# Patient Record
Sex: MALE | HISPANIC OR LATINO | Employment: FULL TIME | URBAN - METROPOLITAN AREA
[De-identification: names, ages, dates, MRNs, and addresses within clinical notes are randomized per-mention and may not be internally consistent; named-entity substitution may affect disease eponyms.]

---

## 2020-12-21 ENCOUNTER — HOSPITAL ENCOUNTER (INPATIENT)
Facility: HOSPITAL | Age: 35
LOS: 18 days | Discharge: HOME | End: 2021-01-08
Attending: EMERGENCY MEDICINE | Admitting: HOSPITALIST

## 2020-12-21 ENCOUNTER — APPOINTMENT (OUTPATIENT)
Dept: GENERAL RADIOLOGY | Facility: HOSPITAL | Age: 35
End: 2020-12-21

## 2020-12-21 DIAGNOSIS — N17.9 ACUTE KIDNEY INJURY (HCC): ICD-10-CM

## 2020-12-21 DIAGNOSIS — J96.01 ACUTE RESPIRATORY FAILURE WITH HYPOXIA (HCC): ICD-10-CM

## 2020-12-21 DIAGNOSIS — D69.6 THROMBOCYTOPENIA (HCC): ICD-10-CM

## 2020-12-21 DIAGNOSIS — U07.1 COVID-19 VIRUS INFECTION: ICD-10-CM

## 2020-12-21 DIAGNOSIS — J18.9 PNEUMONIA OF BOTH LUNGS DUE TO INFECTIOUS ORGANISM, UNSPECIFIED PART OF LUNG: Primary | ICD-10-CM

## 2020-12-21 PROBLEM — J12.82 PNEUMONIA DUE TO COVID-19 VIRUS: Status: ACTIVE | Noted: 2020-12-21

## 2020-12-21 LAB
ABO GROUP BLD: NORMAL
ALBUMIN SERPL-MCNC: 3.1 G/DL (ref 3.5–5.2)
ALBUMIN SERPL-MCNC: 3.3 G/DL (ref 3.5–5.2)
ALBUMIN/GLOB SERPL: 0.9 G/DL
ALP SERPL-CCNC: 77 U/L (ref 39–117)
ALP SERPL-CCNC: 78 U/L (ref 39–117)
ALT SERPL W P-5'-P-CCNC: 37 U/L (ref 1–41)
ALT SERPL W P-5'-P-CCNC: 38 U/L (ref 1–41)
ANION GAP SERPL CALCULATED.3IONS-SCNC: 10.6 MMOL/L (ref 5–15)
AST SERPL-CCNC: 39 U/L (ref 1–40)
AST SERPL-CCNC: 41 U/L (ref 1–40)
B PARAPERT DNA SPEC QL NAA+PROBE: NOT DETECTED
B PERT DNA SPEC QL NAA+PROBE: NOT DETECTED
BASOPHILS # BLD AUTO: 0.01 10*3/MM3 (ref 0–0.2)
BASOPHILS NFR BLD AUTO: 0.2 % (ref 0–1.5)
BILIRUB CONJ SERPL-MCNC: <0.2 MG/DL (ref 0–0.3)
BILIRUB INDIRECT SERPL-MCNC: ABNORMAL MG/DL
BILIRUB SERPL-MCNC: 0.6 MG/DL (ref 0–1.2)
BILIRUB SERPL-MCNC: 0.6 MG/DL (ref 0–1.2)
BLD GP AB SCN SERPL QL: NEGATIVE
BUN SERPL-MCNC: 23 MG/DL (ref 6–20)
BUN/CREAT SERPL: 13.1 (ref 7–25)
C PNEUM DNA NPH QL NAA+NON-PROBE: NOT DETECTED
CALCIUM SPEC-SCNC: 7.8 MG/DL (ref 8.6–10.5)
CHLORIDE SERPL-SCNC: 97 MMOL/L (ref 98–107)
CO2 SERPL-SCNC: 21.4 MMOL/L (ref 22–29)
CREAT SERPL-MCNC: 1.76 MG/DL (ref 0.76–1.27)
CREAT SERPL-MCNC: 1.82 MG/DL (ref 0.76–1.27)
D DIMER PPP FEU-MCNC: 1.24 MCGFEU/ML (ref 0–0.49)
D-LACTATE SERPL-SCNC: 1.2 MMOL/L (ref 0.5–2)
DEPRECATED RDW RBC AUTO: 37.1 FL (ref 37–54)
EOSINOPHIL # BLD AUTO: 0 10*3/MM3 (ref 0–0.4)
EOSINOPHIL NFR BLD AUTO: 0 % (ref 0.3–6.2)
ERYTHROCYTE [DISTWIDTH] IN BLOOD BY AUTOMATED COUNT: 13 % (ref 12.3–15.4)
FERRITIN SERPL-MCNC: 3057 NG/ML (ref 30–400)
FLUAV SUBTYP SPEC NAA+PROBE: NOT DETECTED
FLUBV RNA ISLT QL NAA+PROBE: NOT DETECTED
GFR SERPL CREATININE-BSD FRML MDRD: 43 ML/MIN/1.73
GFR SERPL CREATININE-BSD FRML MDRD: 44 ML/MIN/1.73
GFR SERPL CREATININE-BSD FRML MDRD: 54 ML/MIN/1.73
GLOBULIN UR ELPH-MCNC: 3.7 GM/DL
GLUCOSE BLDC GLUCOMTR-MCNC: 346 MG/DL (ref 70–130)
GLUCOSE BLDC GLUCOMTR-MCNC: 360 MG/DL (ref 70–130)
GLUCOSE SERPL-MCNC: 291 MG/DL (ref 65–99)
HADV DNA SPEC NAA+PROBE: NOT DETECTED
HBA1C MFR BLD: 9.5 % (ref 4.8–5.6)
HCOV 229E RNA SPEC QL NAA+PROBE: NOT DETECTED
HCOV HKU1 RNA SPEC QL NAA+PROBE: NOT DETECTED
HCOV NL63 RNA SPEC QL NAA+PROBE: NOT DETECTED
HCOV OC43 RNA SPEC QL NAA+PROBE: NOT DETECTED
HCT VFR BLD AUTO: 41.2 % (ref 37.5–51)
HGB BLD-MCNC: 14.1 G/DL (ref 13–17.7)
HMPV RNA NPH QL NAA+NON-PROBE: NOT DETECTED
HPIV1 RNA SPEC QL NAA+PROBE: NOT DETECTED
HPIV2 RNA SPEC QL NAA+PROBE: NOT DETECTED
HPIV3 RNA NPH QL NAA+PROBE: NOT DETECTED
HPIV4 P GENE NPH QL NAA+PROBE: NOT DETECTED
LDH SERPL-CCNC: 489 U/L (ref 135–225)
LYMPHOCYTES # BLD AUTO: 0.77 10*3/MM3 (ref 0.7–3.1)
LYMPHOCYTES NFR BLD AUTO: 13.9 % (ref 19.6–45.3)
M PNEUMO IGG SER IA-ACNC: NOT DETECTED
MAGNESIUM SERPL-MCNC: 2 MG/DL (ref 1.6–2.6)
MCH RBC QN AUTO: 27.5 PG (ref 26.6–33)
MCHC RBC AUTO-ENTMCNC: 34.2 G/DL (ref 31.5–35.7)
MCV RBC AUTO: 80.3 FL (ref 79–97)
MONOCYTES # BLD AUTO: 0.3 10*3/MM3 (ref 0.1–0.9)
MONOCYTES NFR BLD AUTO: 5.4 % (ref 5–12)
NEUTROPHILS NFR BLD AUTO: 4.43 10*3/MM3 (ref 1.7–7)
NEUTROPHILS NFR BLD AUTO: 80.1 % (ref 42.7–76)
NT-PROBNP SERPL-MCNC: 633.2 PG/ML (ref 0–450)
PLATELET # BLD AUTO: 123 10*3/MM3 (ref 140–450)
PMV BLD AUTO: 11.8 FL (ref 6–12)
POTASSIUM SERPL-SCNC: 3.8 MMOL/L (ref 3.5–5.2)
PROCALCITONIN SERPL-MCNC: 0.21 NG/ML (ref 0–0.25)
PROCALCITONIN SERPL-MCNC: 0.23 NG/ML (ref 0–0.25)
PROT SERPL-MCNC: 7 G/DL (ref 6–8.5)
PROT SERPL-MCNC: 7 G/DL (ref 6–8.5)
QT INTERVAL: 348 MS
RBC # BLD AUTO: 5.13 10*6/MM3 (ref 4.14–5.8)
RH BLD: POSITIVE
RHINOVIRUS RNA SPEC NAA+PROBE: NOT DETECTED
RSV RNA NPH QL NAA+NON-PROBE: NOT DETECTED
SARS-COV-2 RNA NPH QL NAA+NON-PROBE: DETECTED
SODIUM SERPL-SCNC: 129 MMOL/L (ref 136–145)
T&S EXPIRATION DATE: NORMAL
TROPONIN T SERPL-MCNC: 0.02 NG/ML (ref 0–0.03)
WBC # BLD AUTO: 5.53 10*3/MM3 (ref 3.4–10.8)

## 2020-12-21 PROCEDURE — 80076 HEPATIC FUNCTION PANEL: CPT | Performed by: HOSPITALIST

## 2020-12-21 PROCEDURE — 83615 LACTATE (LD) (LDH) ENZYME: CPT | Performed by: HOSPITALIST

## 2020-12-21 PROCEDURE — 87205 SMEAR GRAM STAIN: CPT | Performed by: HOSPITALIST

## 2020-12-21 PROCEDURE — 83735 ASSAY OF MAGNESIUM: CPT | Performed by: EMERGENCY MEDICINE

## 2020-12-21 PROCEDURE — 63710000001 INSULIN LISPRO (HUMAN) PER 5 UNITS: Performed by: NURSE PRACTITIONER

## 2020-12-21 PROCEDURE — 25010000002 ENOXAPARIN PER 10 MG: Performed by: HOSPITALIST

## 2020-12-21 PROCEDURE — 86850 RBC ANTIBODY SCREEN: CPT | Performed by: HOSPITALIST

## 2020-12-21 PROCEDURE — XW033E5 INTRODUCTION OF REMDESIVIR ANTI-INFECTIVE INTO PERIPHERAL VEIN, PERCUTANEOUS APPROACH, NEW TECHNOLOGY GROUP 5: ICD-10-PCS | Performed by: INTERNAL MEDICINE

## 2020-12-21 PROCEDURE — 87070 CULTURE OTHR SPECIMN AEROBIC: CPT | Performed by: HOSPITALIST

## 2020-12-21 PROCEDURE — 82728 ASSAY OF FERRITIN: CPT | Performed by: HOSPITALIST

## 2020-12-21 PROCEDURE — 94640 AIRWAY INHALATION TREATMENT: CPT

## 2020-12-21 PROCEDURE — 83036 HEMOGLOBIN GLYCOSYLATED A1C: CPT | Performed by: HOSPITALIST

## 2020-12-21 PROCEDURE — 99285 EMERGENCY DEPT VISIT HI MDM: CPT

## 2020-12-21 PROCEDURE — 25010000003 CEFTRIAXONE PER 250 MG: Performed by: EMERGENCY MEDICINE

## 2020-12-21 PROCEDURE — 80053 COMPREHEN METABOLIC PANEL: CPT | Performed by: EMERGENCY MEDICINE

## 2020-12-21 PROCEDURE — 82565 ASSAY OF CREATININE: CPT | Performed by: HOSPITALIST

## 2020-12-21 PROCEDURE — 87040 BLOOD CULTURE FOR BACTERIA: CPT | Performed by: EMERGENCY MEDICINE

## 2020-12-21 PROCEDURE — 83605 ASSAY OF LACTIC ACID: CPT | Performed by: EMERGENCY MEDICINE

## 2020-12-21 PROCEDURE — 93010 ELECTROCARDIOGRAM REPORT: CPT | Performed by: INTERNAL MEDICINE

## 2020-12-21 PROCEDURE — 82962 GLUCOSE BLOOD TEST: CPT

## 2020-12-21 PROCEDURE — 94799 UNLISTED PULMONARY SVC/PX: CPT

## 2020-12-21 PROCEDURE — 83880 ASSAY OF NATRIURETIC PEPTIDE: CPT | Performed by: EMERGENCY MEDICINE

## 2020-12-21 PROCEDURE — 84145 PROCALCITONIN (PCT): CPT | Performed by: HOSPITALIST

## 2020-12-21 PROCEDURE — 84484 ASSAY OF TROPONIN QUANT: CPT | Performed by: EMERGENCY MEDICINE

## 2020-12-21 PROCEDURE — 85025 COMPLETE CBC W/AUTO DIFF WBC: CPT | Performed by: EMERGENCY MEDICINE

## 2020-12-21 PROCEDURE — 25010000002 DEXAMETHASONE PER 1 MG: Performed by: EMERGENCY MEDICINE

## 2020-12-21 PROCEDURE — 85379 FIBRIN DEGRADATION QUANT: CPT | Performed by: HOSPITALIST

## 2020-12-21 PROCEDURE — 36415 COLL VENOUS BLD VENIPUNCTURE: CPT | Performed by: EMERGENCY MEDICINE

## 2020-12-21 PROCEDURE — 0202U NFCT DS 22 TRGT SARS-COV-2: CPT | Performed by: EMERGENCY MEDICINE

## 2020-12-21 PROCEDURE — 63710000001 INSULIN LISPRO (HUMAN) PER 5 UNITS: Performed by: HOSPITALIST

## 2020-12-21 PROCEDURE — 71045 X-RAY EXAM CHEST 1 VIEW: CPT

## 2020-12-21 PROCEDURE — 86900 BLOOD TYPING SEROLOGIC ABO: CPT | Performed by: HOSPITALIST

## 2020-12-21 PROCEDURE — 84145 PROCALCITONIN (PCT): CPT | Performed by: EMERGENCY MEDICINE

## 2020-12-21 PROCEDURE — 86901 BLOOD TYPING SEROLOGIC RH(D): CPT | Performed by: HOSPITALIST

## 2020-12-21 PROCEDURE — 93005 ELECTROCARDIOGRAM TRACING: CPT | Performed by: EMERGENCY MEDICINE

## 2020-12-21 RX ORDER — BENZONATATE 100 MG/1
200 CAPSULE ORAL 3 TIMES DAILY PRN
Status: DISCONTINUED | OUTPATIENT
Start: 2020-12-21 | End: 2021-01-08 | Stop reason: HOSPADM

## 2020-12-21 RX ORDER — DEXTROSE MONOHYDRATE 25 G/50ML
25 INJECTION, SOLUTION INTRAVENOUS
Status: DISCONTINUED | OUTPATIENT
Start: 2020-12-21 | End: 2021-01-08 | Stop reason: HOSPADM

## 2020-12-21 RX ORDER — ONDANSETRON 4 MG/1
4 TABLET, FILM COATED ORAL EVERY 6 HOURS PRN
Status: DISCONTINUED | OUTPATIENT
Start: 2020-12-21 | End: 2021-01-08 | Stop reason: HOSPADM

## 2020-12-21 RX ORDER — METFORMIN HYDROCHLORIDE 500 MG/1
500 TABLET, EXTENDED RELEASE ORAL
COMMUNITY
End: 2021-01-08 | Stop reason: HOSPADM

## 2020-12-21 RX ORDER — CEFTRIAXONE SODIUM 2 G/50ML
2 INJECTION, SOLUTION INTRAVENOUS ONCE
Status: COMPLETED | OUTPATIENT
Start: 2020-12-21 | End: 2020-12-21

## 2020-12-21 RX ORDER — CARVEDILOL 6.25 MG/1
6.25 TABLET ORAL 2 TIMES DAILY WITH MEALS
COMMUNITY
End: 2021-08-06 | Stop reason: HOSPADM

## 2020-12-21 RX ORDER — BUMETANIDE 1 MG/1
1 TABLET ORAL DAILY
COMMUNITY
End: 2021-08-06 | Stop reason: HOSPADM

## 2020-12-21 RX ORDER — ONDANSETRON 2 MG/ML
4 INJECTION INTRAMUSCULAR; INTRAVENOUS EVERY 6 HOURS PRN
Status: DISCONTINUED | OUTPATIENT
Start: 2020-12-21 | End: 2021-01-08 | Stop reason: HOSPADM

## 2020-12-21 RX ORDER — SODIUM CHLORIDE 0.9 % (FLUSH) 0.9 %
10 SYRINGE (ML) INJECTION AS NEEDED
Status: DISCONTINUED | OUTPATIENT
Start: 2020-12-21 | End: 2021-01-08 | Stop reason: HOSPADM

## 2020-12-21 RX ORDER — SPIRONOLACTONE 25 MG/1
25 TABLET ORAL DAILY
COMMUNITY
End: 2021-01-08 | Stop reason: HOSPADM

## 2020-12-21 RX ORDER — ATORVASTATIN CALCIUM 80 MG/1
80 TABLET, FILM COATED ORAL NIGHTLY
Status: DISCONTINUED | OUTPATIENT
Start: 2020-12-21 | End: 2021-01-08 | Stop reason: HOSPADM

## 2020-12-21 RX ORDER — GUAIFENESIN/DEXTROMETHORPHAN 100-10MG/5
5 SYRUP ORAL EVERY 4 HOURS PRN
Status: DISCONTINUED | OUTPATIENT
Start: 2020-12-21 | End: 2021-01-08 | Stop reason: HOSPADM

## 2020-12-21 RX ORDER — LISINOPRIL 10 MG/1
10 TABLET ORAL DAILY
Status: DISCONTINUED | OUTPATIENT
Start: 2020-12-21 | End: 2020-12-21

## 2020-12-21 RX ORDER — ATORVASTATIN CALCIUM 80 MG/1
80 TABLET, FILM COATED ORAL DAILY
Status: ON HOLD | COMMUNITY
End: 2021-08-06 | Stop reason: SDUPTHER

## 2020-12-21 RX ORDER — DEXAMETHASONE SODIUM PHOSPHATE 4 MG/ML
6 INJECTION, SOLUTION INTRA-ARTICULAR; INTRALESIONAL; INTRAMUSCULAR; INTRAVENOUS; SOFT TISSUE ONCE
Status: COMPLETED | OUTPATIENT
Start: 2020-12-21 | End: 2020-12-21

## 2020-12-21 RX ORDER — ACETAMINOPHEN 160 MG/5ML
650 SOLUTION ORAL EVERY 4 HOURS PRN
Status: DISCONTINUED | OUTPATIENT
Start: 2020-12-21 | End: 2021-01-08 | Stop reason: HOSPADM

## 2020-12-21 RX ORDER — CARVEDILOL 6.25 MG/1
6.25 TABLET ORAL 2 TIMES DAILY WITH MEALS
Status: DISCONTINUED | OUTPATIENT
Start: 2020-12-21 | End: 2021-01-08 | Stop reason: HOSPADM

## 2020-12-21 RX ORDER — BUMETANIDE 1 MG/1
1 TABLET ORAL DAILY
Status: DISCONTINUED | OUTPATIENT
Start: 2020-12-21 | End: 2020-12-21

## 2020-12-21 RX ORDER — ACETAMINOPHEN 325 MG/1
650 TABLET ORAL EVERY 4 HOURS PRN
Status: DISCONTINUED | OUTPATIENT
Start: 2020-12-21 | End: 2021-01-08 | Stop reason: HOSPADM

## 2020-12-21 RX ORDER — SODIUM CHLORIDE 0.9 % (FLUSH) 0.9 %
10 SYRINGE (ML) INJECTION EVERY 12 HOURS SCHEDULED
Status: DISCONTINUED | OUTPATIENT
Start: 2020-12-21 | End: 2021-01-08 | Stop reason: HOSPADM

## 2020-12-21 RX ORDER — METFORMIN HYDROCHLORIDE 500 MG/1
500 TABLET, EXTENDED RELEASE ORAL
Status: DISCONTINUED | OUTPATIENT
Start: 2020-12-22 | End: 2020-12-21

## 2020-12-21 RX ORDER — SPIRONOLACTONE 25 MG/1
25 TABLET ORAL DAILY
Status: DISCONTINUED | OUTPATIENT
Start: 2020-12-21 | End: 2020-12-21

## 2020-12-21 RX ORDER — NICOTINE POLACRILEX 4 MG
15 LOZENGE BUCCAL
Status: DISCONTINUED | OUTPATIENT
Start: 2020-12-21 | End: 2021-01-08 | Stop reason: HOSPADM

## 2020-12-21 RX ORDER — ASPIRIN 81 MG/1
81 TABLET ORAL DAILY
Status: DISCONTINUED | OUTPATIENT
Start: 2020-12-21 | End: 2021-01-08 | Stop reason: HOSPADM

## 2020-12-21 RX ORDER — ACETAMINOPHEN 650 MG/1
650 SUPPOSITORY RECTAL EVERY 4 HOURS PRN
Status: DISCONTINUED | OUTPATIENT
Start: 2020-12-21 | End: 2021-01-08 | Stop reason: HOSPADM

## 2020-12-21 RX ORDER — BUDESONIDE AND FORMOTEROL FUMARATE DIHYDRATE 160; 4.5 UG/1; UG/1
2 AEROSOL RESPIRATORY (INHALATION)
Status: DISCONTINUED | OUTPATIENT
Start: 2020-12-21 | End: 2021-01-08 | Stop reason: HOSPADM

## 2020-12-21 RX ORDER — LISINOPRIL 10 MG/1
10 TABLET ORAL DAILY
COMMUNITY
End: 2021-08-06 | Stop reason: HOSPADM

## 2020-12-21 RX ADMIN — DEXAMETHASONE SODIUM PHOSPHATE 6 MG: 4 INJECTION, SOLUTION INTRAMUSCULAR; INTRAVENOUS at 10:09

## 2020-12-21 RX ADMIN — INSULIN LISPRO 12 UNITS: 100 INJECTION, SOLUTION INTRAVENOUS; SUBCUTANEOUS at 22:17

## 2020-12-21 RX ADMIN — BUDESONIDE AND FORMOTEROL FUMARATE DIHYDRATE 2 PUFF: 160; 4.5 AEROSOL RESPIRATORY (INHALATION) at 17:55

## 2020-12-21 RX ADMIN — ENOXAPARIN SODIUM 40 MG: 40 INJECTION SUBCUTANEOUS at 14:14

## 2020-12-21 RX ADMIN — ATORVASTATIN CALCIUM 80 MG: 80 TABLET, FILM COATED ORAL at 22:18

## 2020-12-21 RX ADMIN — CEFTRIAXONE SODIUM 2 G: 2 INJECTION, SOLUTION INTRAVENOUS at 10:16

## 2020-12-21 RX ADMIN — INSULIN LISPRO 10 UNITS: 100 INJECTION, SOLUTION INTRAVENOUS; SUBCUTANEOUS at 16:46

## 2020-12-21 RX ADMIN — SODIUM CHLORIDE, PRESERVATIVE FREE 10 ML: 5 INJECTION INTRAVENOUS at 22:18

## 2020-12-21 RX ADMIN — CARVEDILOL 6.25 MG: 6.25 TABLET, FILM COATED ORAL at 22:17

## 2020-12-21 RX ADMIN — ENOXAPARIN SODIUM 40 MG: 40 INJECTION SUBCUTANEOUS at 22:17

## 2020-12-21 RX ADMIN — SODIUM CHLORIDE, PRESERVATIVE FREE 10 ML: 5 INJECTION INTRAVENOUS at 14:16

## 2020-12-21 RX ADMIN — ASPIRIN 81 MG: 81 TABLET, COATED ORAL at 14:14

## 2020-12-21 RX ADMIN — REMDESIVIR 200 MG: 100 INJECTION, POWDER, LYOPHILIZED, FOR SOLUTION INTRAVENOUS at 14:14

## 2020-12-22 LAB
ALBUMIN SERPL-MCNC: 2.9 G/DL (ref 3.5–5.2)
ALBUMIN/GLOB SERPL: 0.8 G/DL
ALP SERPL-CCNC: 70 U/L (ref 39–117)
ALT SERPL W P-5'-P-CCNC: 33 U/L (ref 1–41)
ANION GAP SERPL CALCULATED.3IONS-SCNC: 10.3 MMOL/L (ref 5–15)
AST SERPL-CCNC: 33 U/L (ref 1–40)
BASOPHILS # BLD AUTO: 0.01 10*3/MM3 (ref 0–0.2)
BASOPHILS NFR BLD AUTO: 0.2 % (ref 0–1.5)
BILIRUB CONJ SERPL-MCNC: <0.2 MG/DL (ref 0–0.3)
BILIRUB SERPL-MCNC: 0.4 MG/DL (ref 0–1.2)
BUN SERPL-MCNC: 31 MG/DL (ref 6–20)
BUN/CREAT SERPL: 17.9 (ref 7–25)
CALCIUM SPEC-SCNC: 7.6 MG/DL (ref 8.6–10.5)
CHLORIDE SERPL-SCNC: 97 MMOL/L (ref 98–107)
CK SERPL-CCNC: 517 U/L (ref 20–200)
CO2 SERPL-SCNC: 22.7 MMOL/L (ref 22–29)
CREAT SERPL-MCNC: 1.73 MG/DL (ref 0.76–1.27)
CRP SERPL-MCNC: 10.75 MG/DL (ref 0–0.5)
D DIMER PPP FEU-MCNC: 0.81 MCGFEU/ML (ref 0–0.49)
DEPRECATED RDW RBC AUTO: 42.2 FL (ref 37–54)
EOSINOPHIL # BLD AUTO: 0 10*3/MM3 (ref 0–0.4)
EOSINOPHIL NFR BLD AUTO: 0 % (ref 0.3–6.2)
ERYTHROCYTE [DISTWIDTH] IN BLOOD BY AUTOMATED COUNT: 13.6 % (ref 12.3–15.4)
FERRITIN SERPL-MCNC: 3305 NG/ML (ref 30–400)
FIBRINOGEN PPP-MCNC: 690 MG/DL (ref 219–464)
GFR SERPL CREATININE-BSD FRML MDRD: 45 ML/MIN/1.73
GLOBULIN UR ELPH-MCNC: 3.8 GM/DL
GLUCOSE BLDC GLUCOMTR-MCNC: 298 MG/DL (ref 70–130)
GLUCOSE BLDC GLUCOMTR-MCNC: 326 MG/DL (ref 70–130)
GLUCOSE BLDC GLUCOMTR-MCNC: 389 MG/DL (ref 70–130)
GLUCOSE BLDC GLUCOMTR-MCNC: 419 MG/DL (ref 70–130)
GLUCOSE SERPL-MCNC: 305 MG/DL (ref 65–99)
HCT VFR BLD AUTO: 41.2 % (ref 37.5–51)
HGB BLD-MCNC: 13.6 G/DL (ref 13–17.7)
IMM GRANULOCYTES # BLD AUTO: 0.02 10*3/MM3 (ref 0–0.05)
IMM GRANULOCYTES NFR BLD AUTO: 0.4 % (ref 0–0.5)
LDH SERPL-CCNC: 440 U/L (ref 135–225)
LYMPHOCYTES # BLD AUTO: 0.58 10*3/MM3 (ref 0.7–3.1)
LYMPHOCYTES NFR BLD AUTO: 12.9 % (ref 19.6–45.3)
MCH RBC QN AUTO: 27.6 PG (ref 26.6–33)
MCHC RBC AUTO-ENTMCNC: 33 G/DL (ref 31.5–35.7)
MCV RBC AUTO: 83.6 FL (ref 79–97)
MONOCYTES # BLD AUTO: 0.3 10*3/MM3 (ref 0.1–0.9)
MONOCYTES NFR BLD AUTO: 6.7 % (ref 5–12)
NEUTROPHILS NFR BLD AUTO: 3.58 10*3/MM3 (ref 1.7–7)
NEUTROPHILS NFR BLD AUTO: 79.8 % (ref 42.7–76)
NRBC BLD AUTO-RTO: 0 /100 WBC (ref 0–0.2)
PLATELET # BLD AUTO: 127 10*3/MM3 (ref 140–450)
PMV BLD AUTO: 12.8 FL (ref 6–12)
POTASSIUM SERPL-SCNC: 4.1 MMOL/L (ref 3.5–5.2)
PROT SERPL-MCNC: 6.7 G/DL (ref 6–8.5)
RBC # BLD AUTO: 4.93 10*6/MM3 (ref 4.14–5.8)
SODIUM SERPL-SCNC: 130 MMOL/L (ref 136–145)
WBC # BLD AUTO: 4.49 10*3/MM3 (ref 3.4–10.8)

## 2020-12-22 PROCEDURE — G0008 ADMIN INFLUENZA VIRUS VAC: HCPCS | Performed by: HOSPITALIST

## 2020-12-22 PROCEDURE — 94799 UNLISTED PULMONARY SVC/PX: CPT

## 2020-12-22 PROCEDURE — 82550 ASSAY OF CK (CPK): CPT | Performed by: HOSPITALIST

## 2020-12-22 PROCEDURE — 85384 FIBRINOGEN ACTIVITY: CPT | Performed by: HOSPITALIST

## 2020-12-22 PROCEDURE — 82248 BILIRUBIN DIRECT: CPT | Performed by: HOSPITALIST

## 2020-12-22 PROCEDURE — 90686 IIV4 VACC NO PRSV 0.5 ML IM: CPT | Performed by: HOSPITALIST

## 2020-12-22 PROCEDURE — 80053 COMPREHEN METABOLIC PANEL: CPT | Performed by: HOSPITALIST

## 2020-12-22 PROCEDURE — 25010000002 INFLUENZA VAC SPLIT QUAD 0.5 ML SUSPENSION PREFILLED SYRINGE: Performed by: HOSPITALIST

## 2020-12-22 PROCEDURE — 85025 COMPLETE CBC W/AUTO DIFF WBC: CPT | Performed by: HOSPITALIST

## 2020-12-22 PROCEDURE — 63710000001 INSULIN LISPRO (HUMAN) PER 5 UNITS: Performed by: NURSE PRACTITIONER

## 2020-12-22 PROCEDURE — 82962 GLUCOSE BLOOD TEST: CPT

## 2020-12-22 PROCEDURE — 63710000001 INSULIN LISPRO (HUMAN) PER 5 UNITS: Performed by: HOSPITALIST

## 2020-12-22 PROCEDURE — 63710000001 DEXAMETHASONE PER 0.25 MG: Performed by: HOSPITALIST

## 2020-12-22 PROCEDURE — 25010000002 ENOXAPARIN PER 10 MG: Performed by: HOSPITALIST

## 2020-12-22 PROCEDURE — 83615 LACTATE (LD) (LDH) ENZYME: CPT | Performed by: HOSPITALIST

## 2020-12-22 PROCEDURE — 85379 FIBRIN DEGRADATION QUANT: CPT | Performed by: HOSPITALIST

## 2020-12-22 PROCEDURE — 86140 C-REACTIVE PROTEIN: CPT | Performed by: HOSPITALIST

## 2020-12-22 PROCEDURE — 82728 ASSAY OF FERRITIN: CPT | Performed by: HOSPITALIST

## 2020-12-22 RX ADMIN — SODIUM CHLORIDE, PRESERVATIVE FREE 10 ML: 5 INJECTION INTRAVENOUS at 22:33

## 2020-12-22 RX ADMIN — SODIUM CHLORIDE, PRESERVATIVE FREE 10 ML: 5 INJECTION INTRAVENOUS at 08:16

## 2020-12-22 RX ADMIN — ACETAMINOPHEN 650 MG: 325 TABLET, FILM COATED ORAL at 00:23

## 2020-12-22 RX ADMIN — INSULIN LISPRO 8 UNITS: 100 INJECTION, SOLUTION INTRAVENOUS; SUBCUTANEOUS at 08:16

## 2020-12-22 RX ADMIN — REMDESIVIR 100 MG: 100 INJECTION, POWDER, LYOPHILIZED, FOR SOLUTION INTRAVENOUS at 13:19

## 2020-12-22 RX ADMIN — CARVEDILOL 6.25 MG: 6.25 TABLET, FILM COATED ORAL at 08:16

## 2020-12-22 RX ADMIN — ASPIRIN 81 MG: 81 TABLET, COATED ORAL at 08:16

## 2020-12-22 RX ADMIN — INSULIN LISPRO 12 UNITS: 100 INJECTION, SOLUTION INTRAVENOUS; SUBCUTANEOUS at 16:43

## 2020-12-22 RX ADMIN — BENZONATATE 200 MG: 100 CAPSULE ORAL at 13:19

## 2020-12-22 RX ADMIN — INFLUENZA VIRUS VACCINE 0.5 ML: 15; 15; 15; 15 SUSPENSION INTRAMUSCULAR at 11:11

## 2020-12-22 RX ADMIN — ATORVASTATIN CALCIUM 80 MG: 80 TABLET, FILM COATED ORAL at 22:31

## 2020-12-22 RX ADMIN — ENOXAPARIN SODIUM 40 MG: 40 INJECTION SUBCUTANEOUS at 22:32

## 2020-12-22 RX ADMIN — ENOXAPARIN SODIUM 40 MG: 40 INJECTION SUBCUTANEOUS at 08:18

## 2020-12-22 RX ADMIN — DEXAMETHASONE 6 MG: 4 TABLET ORAL at 08:16

## 2020-12-22 RX ADMIN — CARVEDILOL 6.25 MG: 6.25 TABLET, FILM COATED ORAL at 22:31

## 2020-12-22 RX ADMIN — BUDESONIDE AND FORMOTEROL FUMARATE DIHYDRATE 2 PUFF: 160; 4.5 AEROSOL RESPIRATORY (INHALATION) at 09:40

## 2020-12-22 RX ADMIN — INSULIN LISPRO 26 UNITS: 100 INJECTION, SOLUTION INTRAVENOUS; SUBCUTANEOUS at 22:32

## 2020-12-22 RX ADMIN — BUDESONIDE AND FORMOTEROL FUMARATE DIHYDRATE 2 PUFF: 160; 4.5 AEROSOL RESPIRATORY (INHALATION) at 19:38

## 2020-12-22 RX ADMIN — ACETAMINOPHEN 650 MG: 325 TABLET, FILM COATED ORAL at 11:11

## 2020-12-22 RX ADMIN — INSULIN LISPRO 10 UNITS: 100 INJECTION, SOLUTION INTRAVENOUS; SUBCUTANEOUS at 11:50

## 2020-12-23 ENCOUNTER — APPOINTMENT (OUTPATIENT)
Dept: CARDIOLOGY | Facility: HOSPITAL | Age: 35
End: 2020-12-23

## 2020-12-23 LAB
ALBUMIN SERPL-MCNC: 2.9 G/DL (ref 3.5–5.2)
ALBUMIN/GLOB SERPL: 0.8 G/DL
ALP SERPL-CCNC: 71 U/L (ref 39–117)
ALT SERPL W P-5'-P-CCNC: 31 U/L (ref 1–41)
ANION GAP SERPL CALCULATED.3IONS-SCNC: 9.6 MMOL/L (ref 5–15)
AST SERPL-CCNC: 40 U/L (ref 1–40)
BACTERIA SPEC RESP CULT: NORMAL
BASOPHILS # BLD AUTO: 0.01 10*3/MM3 (ref 0–0.2)
BASOPHILS NFR BLD AUTO: 0.1 % (ref 0–1.5)
BH CV LOWER VASCULAR LEFT COMMON FEMORAL AUGMENT: NORMAL
BH CV LOWER VASCULAR LEFT COMMON FEMORAL COMPETENT: NORMAL
BH CV LOWER VASCULAR LEFT COMMON FEMORAL COMPRESS: NORMAL
BH CV LOWER VASCULAR LEFT COMMON FEMORAL PHASIC: NORMAL
BH CV LOWER VASCULAR LEFT COMMON FEMORAL SPONT: NORMAL
BH CV LOWER VASCULAR LEFT DISTAL FEMORAL COMPRESS: NORMAL
BH CV LOWER VASCULAR LEFT GASTRONEMIUS COMPRESS: NORMAL
BH CV LOWER VASCULAR LEFT GREATER SAPH AK COMPRESS: NORMAL
BH CV LOWER VASCULAR LEFT GREATER SAPH BK COMPRESS: NORMAL
BH CV LOWER VASCULAR LEFT LESSER SAPH COMPRESS: NORMAL
BH CV LOWER VASCULAR LEFT MID FEMORAL AUGMENT: NORMAL
BH CV LOWER VASCULAR LEFT MID FEMORAL COMPETENT: NORMAL
BH CV LOWER VASCULAR LEFT MID FEMORAL COMPRESS: NORMAL
BH CV LOWER VASCULAR LEFT MID FEMORAL PHASIC: NORMAL
BH CV LOWER VASCULAR LEFT MID FEMORAL SPONT: NORMAL
BH CV LOWER VASCULAR LEFT PERONEAL COMPRESS: NORMAL
BH CV LOWER VASCULAR LEFT POPLITEAL AUGMENT: NORMAL
BH CV LOWER VASCULAR LEFT POPLITEAL COMPETENT: NORMAL
BH CV LOWER VASCULAR LEFT POPLITEAL COMPRESS: NORMAL
BH CV LOWER VASCULAR LEFT POPLITEAL PHASIC: NORMAL
BH CV LOWER VASCULAR LEFT POPLITEAL SPONT: NORMAL
BH CV LOWER VASCULAR LEFT POSTERIOR TIBIAL COMPRESS: NORMAL
BH CV LOWER VASCULAR LEFT PROFUNDA FEMORAL COMPRESS: NORMAL
BH CV LOWER VASCULAR LEFT PROXIMAL FEMORAL COMPRESS: NORMAL
BH CV LOWER VASCULAR LEFT SAPHENOFEMORAL JUNCTION COMPRESS: NORMAL
BH CV LOWER VASCULAR RIGHT COMMON FEMORAL AUGMENT: NORMAL
BH CV LOWER VASCULAR RIGHT COMMON FEMORAL COMPETENT: NORMAL
BH CV LOWER VASCULAR RIGHT COMMON FEMORAL COMPRESS: NORMAL
BH CV LOWER VASCULAR RIGHT COMMON FEMORAL PHASIC: NORMAL
BH CV LOWER VASCULAR RIGHT COMMON FEMORAL SPONT: NORMAL
BH CV LOWER VASCULAR RIGHT DISTAL FEMORAL COMPRESS: NORMAL
BH CV LOWER VASCULAR RIGHT GASTRONEMIUS COMPRESS: NORMAL
BH CV LOWER VASCULAR RIGHT GREATER SAPH AK COMPRESS: NORMAL
BH CV LOWER VASCULAR RIGHT GREATER SAPH BK COMPRESS: NORMAL
BH CV LOWER VASCULAR RIGHT LESSER SAPH COMPRESS: NORMAL
BH CV LOWER VASCULAR RIGHT MID FEMORAL AUGMENT: NORMAL
BH CV LOWER VASCULAR RIGHT MID FEMORAL COMPETENT: NORMAL
BH CV LOWER VASCULAR RIGHT MID FEMORAL COMPRESS: NORMAL
BH CV LOWER VASCULAR RIGHT MID FEMORAL PHASIC: NORMAL
BH CV LOWER VASCULAR RIGHT MID FEMORAL SPONT: NORMAL
BH CV LOWER VASCULAR RIGHT PERONEAL COMPRESS: NORMAL
BH CV LOWER VASCULAR RIGHT POPLITEAL AUGMENT: NORMAL
BH CV LOWER VASCULAR RIGHT POPLITEAL COMPETENT: NORMAL
BH CV LOWER VASCULAR RIGHT POPLITEAL COMPRESS: NORMAL
BH CV LOWER VASCULAR RIGHT POPLITEAL PHASIC: NORMAL
BH CV LOWER VASCULAR RIGHT POPLITEAL SPONT: NORMAL
BH CV LOWER VASCULAR RIGHT POSTERIOR TIBIAL COMPRESS: NORMAL
BH CV LOWER VASCULAR RIGHT PROFUNDA FEMORAL COMPRESS: NORMAL
BH CV LOWER VASCULAR RIGHT PROXIMAL FEMORAL COMPRESS: NORMAL
BH CV LOWER VASCULAR RIGHT SAPHENOFEMORAL JUNCTION COMPRESS: NORMAL
BILIRUB CONJ SERPL-MCNC: <0.2 MG/DL (ref 0–0.3)
BILIRUB SERPL-MCNC: 0.4 MG/DL (ref 0–1.2)
BUN SERPL-MCNC: 38 MG/DL (ref 6–20)
BUN/CREAT SERPL: 27.7 (ref 7–25)
CALCIUM SPEC-SCNC: 7.7 MG/DL (ref 8.6–10.5)
CHLORIDE SERPL-SCNC: 99 MMOL/L (ref 98–107)
CK SERPL-CCNC: 757 U/L (ref 20–200)
CO2 SERPL-SCNC: 21.4 MMOL/L (ref 22–29)
CREAT SERPL-MCNC: 1.37 MG/DL (ref 0.76–1.27)
CRP SERPL-MCNC: 5.22 MG/DL (ref 0–0.5)
DEPRECATED RDW RBC AUTO: 40 FL (ref 37–54)
EOSINOPHIL # BLD AUTO: 0 10*3/MM3 (ref 0–0.4)
EOSINOPHIL NFR BLD AUTO: 0 % (ref 0.3–6.2)
ERYTHROCYTE [DISTWIDTH] IN BLOOD BY AUTOMATED COUNT: 13.7 % (ref 12.3–15.4)
FERRITIN SERPL-MCNC: 4872 NG/ML (ref 30–400)
GFR SERPL CREATININE-BSD FRML MDRD: 59 ML/MIN/1.73
GLOBULIN UR ELPH-MCNC: 3.6 GM/DL
GLUCOSE BLDC GLUCOMTR-MCNC: 193 MG/DL (ref 70–130)
GLUCOSE BLDC GLUCOMTR-MCNC: 251 MG/DL (ref 70–130)
GLUCOSE BLDC GLUCOMTR-MCNC: 325 MG/DL (ref 70–130)
GLUCOSE BLDC GLUCOMTR-MCNC: 363 MG/DL (ref 70–130)
GLUCOSE SERPL-MCNC: 169 MG/DL (ref 65–99)
GRAM STN SPEC: NORMAL
HCT VFR BLD AUTO: 39.6 % (ref 37.5–51)
HGB BLD-MCNC: 13.6 G/DL (ref 13–17.7)
IMM GRANULOCYTES # BLD AUTO: 0.04 10*3/MM3 (ref 0–0.05)
IMM GRANULOCYTES NFR BLD AUTO: 0.5 % (ref 0–0.5)
L PNEUMO1 AG UR QL IA: NEGATIVE
LYMPHOCYTES # BLD AUTO: 0.55 10*3/MM3 (ref 0.7–3.1)
LYMPHOCYTES NFR BLD AUTO: 6.5 % (ref 19.6–45.3)
MCH RBC QN AUTO: 28 PG (ref 26.6–33)
MCHC RBC AUTO-ENTMCNC: 34.3 G/DL (ref 31.5–35.7)
MCV RBC AUTO: 81.6 FL (ref 79–97)
MONOCYTES # BLD AUTO: 0.43 10*3/MM3 (ref 0.1–0.9)
MONOCYTES NFR BLD AUTO: 5.1 % (ref 5–12)
NEUTROPHILS NFR BLD AUTO: 7.45 10*3/MM3 (ref 1.7–7)
NEUTROPHILS NFR BLD AUTO: 87.8 % (ref 42.7–76)
NRBC BLD AUTO-RTO: 0 /100 WBC (ref 0–0.2)
NT-PROBNP SERPL-MCNC: 453.3 PG/ML (ref 0–450)
PLATELET # BLD AUTO: 165 10*3/MM3 (ref 140–450)
PMV BLD AUTO: 12.7 FL (ref 6–12)
POTASSIUM SERPL-SCNC: 3.7 MMOL/L (ref 3.5–5.2)
PROT SERPL-MCNC: 6.5 G/DL (ref 6–8.5)
RBC # BLD AUTO: 4.85 10*6/MM3 (ref 4.14–5.8)
S PNEUM AG SPEC QL LA: NEGATIVE
SODIUM SERPL-SCNC: 130 MMOL/L (ref 136–145)
WBC # BLD AUTO: 8.48 10*3/MM3 (ref 3.4–10.8)

## 2020-12-23 PROCEDURE — 25010000002 ENOXAPARIN PER 10 MG: Performed by: HOSPITALIST

## 2020-12-23 PROCEDURE — 87899 AGENT NOS ASSAY W/OPTIC: CPT | Performed by: HOSPITALIST

## 2020-12-23 PROCEDURE — 82962 GLUCOSE BLOOD TEST: CPT

## 2020-12-23 PROCEDURE — 94799 UNLISTED PULMONARY SVC/PX: CPT

## 2020-12-23 PROCEDURE — 63710000001 INSULIN LISPRO (HUMAN) PER 5 UNITS: Performed by: HOSPITALIST

## 2020-12-23 PROCEDURE — 82550 ASSAY OF CK (CPK): CPT | Performed by: HOSPITALIST

## 2020-12-23 PROCEDURE — 80053 COMPREHEN METABOLIC PANEL: CPT | Performed by: HOSPITALIST

## 2020-12-23 PROCEDURE — 86140 C-REACTIVE PROTEIN: CPT | Performed by: HOSPITALIST

## 2020-12-23 PROCEDURE — 82728 ASSAY OF FERRITIN: CPT | Performed by: HOSPITALIST

## 2020-12-23 PROCEDURE — 93970 EXTREMITY STUDY: CPT

## 2020-12-23 PROCEDURE — 63710000001 INSULIN GLARGINE PER 5 UNITS: Performed by: HOSPITALIST

## 2020-12-23 PROCEDURE — 25010000002 DEXAMETHASONE SODIUM PHOSPHATE 10 MG/ML SOLUTION: Performed by: INTERNAL MEDICINE

## 2020-12-23 PROCEDURE — 82248 BILIRUBIN DIRECT: CPT | Performed by: HOSPITALIST

## 2020-12-23 PROCEDURE — 85025 COMPLETE CBC W/AUTO DIFF WBC: CPT | Performed by: HOSPITALIST

## 2020-12-23 PROCEDURE — 83880 ASSAY OF NATRIURETIC PEPTIDE: CPT | Performed by: INTERNAL MEDICINE

## 2020-12-23 RX ORDER — DEXAMETHASONE SODIUM PHOSPHATE 10 MG/ML
6 INJECTION, SOLUTION INTRAMUSCULAR; INTRAVENOUS 2 TIMES DAILY
Status: DISCONTINUED | OUTPATIENT
Start: 2020-12-23 | End: 2020-12-28

## 2020-12-23 RX ORDER — INSULIN GLARGINE 100 [IU]/ML
20 INJECTION, SOLUTION SUBCUTANEOUS NIGHTLY
Status: DISCONTINUED | OUTPATIENT
Start: 2020-12-23 | End: 2020-12-24

## 2020-12-23 RX ADMIN — ASPIRIN 81 MG: 81 TABLET, COATED ORAL at 08:16

## 2020-12-23 RX ADMIN — SODIUM CHLORIDE, PRESERVATIVE FREE 10 ML: 5 INJECTION INTRAVENOUS at 22:15

## 2020-12-23 RX ADMIN — INSULIN GLARGINE 20 UNITS: 100 INJECTION, SOLUTION SUBCUTANEOUS at 22:11

## 2020-12-23 RX ADMIN — BUDESONIDE AND FORMOTEROL FUMARATE DIHYDRATE 2 PUFF: 160; 4.5 AEROSOL RESPIRATORY (INHALATION) at 11:01

## 2020-12-23 RX ADMIN — ENOXAPARIN SODIUM 40 MG: 40 INJECTION SUBCUTANEOUS at 08:15

## 2020-12-23 RX ADMIN — CARVEDILOL 6.25 MG: 6.25 TABLET, FILM COATED ORAL at 08:16

## 2020-12-23 RX ADMIN — INSULIN LISPRO 16 UNITS: 100 INJECTION, SOLUTION INTRAVENOUS; SUBCUTANEOUS at 17:24

## 2020-12-23 RX ADMIN — DEXAMETHASONE SODIUM PHOSPHATE 6 MG: 10 INJECTION, SOLUTION INTRAMUSCULAR; INTRAVENOUS at 08:17

## 2020-12-23 RX ADMIN — CARVEDILOL 6.25 MG: 6.25 TABLET, FILM COATED ORAL at 22:14

## 2020-12-23 RX ADMIN — ENOXAPARIN SODIUM 40 MG: 40 INJECTION SUBCUTANEOUS at 22:12

## 2020-12-23 RX ADMIN — GUAIFENESIN AND DEXTROMETHORPHAN 5 ML: 100; 10 SYRUP ORAL at 22:16

## 2020-12-23 RX ADMIN — BENZONATATE 200 MG: 100 CAPSULE ORAL at 04:46

## 2020-12-23 RX ADMIN — ATORVASTATIN CALCIUM 80 MG: 80 TABLET, FILM COATED ORAL at 22:13

## 2020-12-23 RX ADMIN — INSULIN LISPRO 4 UNITS: 100 INJECTION, SOLUTION INTRAVENOUS; SUBCUTANEOUS at 08:15

## 2020-12-23 RX ADMIN — REMDESIVIR 100 MG: 100 INJECTION, POWDER, LYOPHILIZED, FOR SOLUTION INTRAVENOUS at 13:31

## 2020-12-23 RX ADMIN — SODIUM CHLORIDE, PRESERVATIVE FREE 10 ML: 5 INJECTION INTRAVENOUS at 08:16

## 2020-12-23 RX ADMIN — GUAIFENESIN AND DEXTROMETHORPHAN 5 ML: 100; 10 SYRUP ORAL at 08:25

## 2020-12-23 RX ADMIN — INSULIN LISPRO 12 UNITS: 100 INJECTION, SOLUTION INTRAVENOUS; SUBCUTANEOUS at 11:27

## 2020-12-23 RX ADMIN — DEXAMETHASONE SODIUM PHOSPHATE 6 MG: 10 INJECTION, SOLUTION INTRAMUSCULAR; INTRAVENOUS at 22:14

## 2020-12-23 RX ADMIN — BUDESONIDE AND FORMOTEROL FUMARATE DIHYDRATE 2 PUFF: 160; 4.5 AEROSOL RESPIRATORY (INHALATION) at 22:43

## 2020-12-24 ENCOUNTER — APPOINTMENT (OUTPATIENT)
Dept: GENERAL RADIOLOGY | Facility: HOSPITAL | Age: 35
End: 2020-12-24

## 2020-12-24 LAB
ALBUMIN SERPL-MCNC: 2.7 G/DL (ref 3.5–5.2)
ALBUMIN/GLOB SERPL: 0.7 G/DL
ALP SERPL-CCNC: 88 U/L (ref 39–117)
ALT SERPL W P-5'-P-CCNC: 37 U/L (ref 1–41)
ANION GAP SERPL CALCULATED.3IONS-SCNC: 10.2 MMOL/L (ref 5–15)
AST SERPL-CCNC: 58 U/L (ref 1–40)
BASOPHILS # BLD AUTO: 0.01 10*3/MM3 (ref 0–0.2)
BASOPHILS NFR BLD AUTO: 0.1 % (ref 0–1.5)
BILIRUB CONJ SERPL-MCNC: <0.2 MG/DL (ref 0–0.3)
BILIRUB SERPL-MCNC: 0.5 MG/DL (ref 0–1.2)
BUN SERPL-MCNC: 36 MG/DL (ref 6–20)
BUN/CREAT SERPL: 29.5 (ref 7–25)
CALCIUM SPEC-SCNC: 7.7 MG/DL (ref 8.6–10.5)
CHLORIDE SERPL-SCNC: 99 MMOL/L (ref 98–107)
CK SERPL-CCNC: 733 U/L (ref 20–200)
CO2 SERPL-SCNC: 19.8 MMOL/L (ref 22–29)
CREAT SERPL-MCNC: 1.22 MG/DL (ref 0.76–1.27)
CRP SERPL-MCNC: 2.88 MG/DL (ref 0–0.5)
D DIMER PPP FEU-MCNC: 0.72 MCGFEU/ML (ref 0–0.49)
DEPRECATED RDW RBC AUTO: 40.1 FL (ref 37–54)
EOSINOPHIL # BLD AUTO: 0 10*3/MM3 (ref 0–0.4)
EOSINOPHIL NFR BLD AUTO: 0 % (ref 0.3–6.2)
ERYTHROCYTE [DISTWIDTH] IN BLOOD BY AUTOMATED COUNT: 13.7 % (ref 12.3–15.4)
FERRITIN SERPL-MCNC: 5551 NG/ML (ref 30–400)
FIBRINOGEN PPP-MCNC: 585 MG/DL (ref 219–464)
GFR SERPL CREATININE-BSD FRML MDRD: 68 ML/MIN/1.73
GLOBULIN UR ELPH-MCNC: 4 GM/DL
GLUCOSE BLDC GLUCOMTR-MCNC: 284 MG/DL (ref 70–130)
GLUCOSE BLDC GLUCOMTR-MCNC: 289 MG/DL (ref 70–130)
GLUCOSE BLDC GLUCOMTR-MCNC: 307 MG/DL (ref 70–130)
GLUCOSE BLDC GLUCOMTR-MCNC: 315 MG/DL (ref 70–130)
GLUCOSE SERPL-MCNC: 287 MG/DL (ref 65–99)
HCT VFR BLD AUTO: 40.9 % (ref 37.5–51)
HGB BLD-MCNC: 13.9 G/DL (ref 13–17.7)
IMM GRANULOCYTES # BLD AUTO: 0.05 10*3/MM3 (ref 0–0.05)
IMM GRANULOCYTES NFR BLD AUTO: 0.6 % (ref 0–0.5)
LDH SERPL-CCNC: 627 U/L (ref 135–225)
LYMPHOCYTES # BLD AUTO: 0.43 10*3/MM3 (ref 0.7–3.1)
LYMPHOCYTES NFR BLD AUTO: 4.8 % (ref 19.6–45.3)
MCH RBC QN AUTO: 28.1 PG (ref 26.6–33)
MCHC RBC AUTO-ENTMCNC: 34 G/DL (ref 31.5–35.7)
MCV RBC AUTO: 82.8 FL (ref 79–97)
MONOCYTES # BLD AUTO: 0.33 10*3/MM3 (ref 0.1–0.9)
MONOCYTES NFR BLD AUTO: 3.7 % (ref 5–12)
NEUTROPHILS NFR BLD AUTO: 8.13 10*3/MM3 (ref 1.7–7)
NEUTROPHILS NFR BLD AUTO: 90.8 % (ref 42.7–76)
NRBC BLD AUTO-RTO: 0 /100 WBC (ref 0–0.2)
PLATELET # BLD AUTO: 173 10*3/MM3 (ref 140–450)
PMV BLD AUTO: 12.3 FL (ref 6–12)
POTASSIUM SERPL-SCNC: 4.4 MMOL/L (ref 3.5–5.2)
PROT SERPL-MCNC: 6.7 G/DL (ref 6–8.5)
RBC # BLD AUTO: 4.94 10*6/MM3 (ref 4.14–5.8)
SODIUM SERPL-SCNC: 129 MMOL/L (ref 136–145)
WBC # BLD AUTO: 8.95 10*3/MM3 (ref 3.4–10.8)

## 2020-12-24 PROCEDURE — 71045 X-RAY EXAM CHEST 1 VIEW: CPT

## 2020-12-24 PROCEDURE — 80053 COMPREHEN METABOLIC PANEL: CPT | Performed by: HOSPITALIST

## 2020-12-24 PROCEDURE — 86140 C-REACTIVE PROTEIN: CPT | Performed by: HOSPITALIST

## 2020-12-24 PROCEDURE — 63710000001 INSULIN GLARGINE PER 5 UNITS: Performed by: HOSPITALIST

## 2020-12-24 PROCEDURE — 82962 GLUCOSE BLOOD TEST: CPT

## 2020-12-24 PROCEDURE — 94799 UNLISTED PULMONARY SVC/PX: CPT

## 2020-12-24 PROCEDURE — 82248 BILIRUBIN DIRECT: CPT | Performed by: HOSPITALIST

## 2020-12-24 PROCEDURE — 25010000002 ENOXAPARIN PER 10 MG: Performed by: HOSPITALIST

## 2020-12-24 PROCEDURE — 85025 COMPLETE CBC W/AUTO DIFF WBC: CPT | Performed by: HOSPITALIST

## 2020-12-24 PROCEDURE — 63710000001 INSULIN LISPRO (HUMAN) PER 5 UNITS: Performed by: HOSPITALIST

## 2020-12-24 PROCEDURE — 82728 ASSAY OF FERRITIN: CPT | Performed by: HOSPITALIST

## 2020-12-24 PROCEDURE — 85384 FIBRINOGEN ACTIVITY: CPT | Performed by: HOSPITALIST

## 2020-12-24 PROCEDURE — 82550 ASSAY OF CK (CPK): CPT | Performed by: HOSPITALIST

## 2020-12-24 PROCEDURE — 83615 LACTATE (LD) (LDH) ENZYME: CPT | Performed by: HOSPITALIST

## 2020-12-24 PROCEDURE — 85379 FIBRIN DEGRADATION QUANT: CPT | Performed by: HOSPITALIST

## 2020-12-24 PROCEDURE — 25010000002 DEXAMETHASONE SODIUM PHOSPHATE 10 MG/ML SOLUTION: Performed by: INTERNAL MEDICINE

## 2020-12-24 RX ORDER — INSULIN GLARGINE 100 [IU]/ML
30 INJECTION, SOLUTION SUBCUTANEOUS NIGHTLY
Status: DISCONTINUED | OUTPATIENT
Start: 2020-12-24 | End: 2020-12-26

## 2020-12-24 RX ADMIN — GUAIFENESIN AND DEXTROMETHORPHAN 5 ML: 100; 10 SYRUP ORAL at 23:55

## 2020-12-24 RX ADMIN — ASPIRIN 81 MG: 81 TABLET, COATED ORAL at 09:36

## 2020-12-24 RX ADMIN — ENOXAPARIN SODIUM 40 MG: 40 INJECTION SUBCUTANEOUS at 09:35

## 2020-12-24 RX ADMIN — CARVEDILOL 6.25 MG: 6.25 TABLET, FILM COATED ORAL at 09:36

## 2020-12-24 RX ADMIN — SODIUM CHLORIDE, PRESERVATIVE FREE 10 ML: 5 INJECTION INTRAVENOUS at 23:57

## 2020-12-24 RX ADMIN — INSULIN GLARGINE 30 UNITS: 100 INJECTION, SOLUTION SUBCUTANEOUS at 23:57

## 2020-12-24 RX ADMIN — ATORVASTATIN CALCIUM 80 MG: 80 TABLET, FILM COATED ORAL at 23:56

## 2020-12-24 RX ADMIN — CARVEDILOL 6.25 MG: 6.25 TABLET, FILM COATED ORAL at 23:56

## 2020-12-24 RX ADMIN — DEXAMETHASONE SODIUM PHOSPHATE 6 MG: 10 INJECTION, SOLUTION INTRAMUSCULAR; INTRAVENOUS at 23:56

## 2020-12-24 RX ADMIN — INSULIN LISPRO 12 UNITS: 100 INJECTION, SOLUTION INTRAVENOUS; SUBCUTANEOUS at 18:03

## 2020-12-24 RX ADMIN — BENZONATATE 200 MG: 100 CAPSULE ORAL at 09:36

## 2020-12-24 RX ADMIN — ENOXAPARIN SODIUM 40 MG: 40 INJECTION SUBCUTANEOUS at 23:55

## 2020-12-24 RX ADMIN — BUDESONIDE AND FORMOTEROL FUMARATE DIHYDRATE 2 PUFF: 160; 4.5 AEROSOL RESPIRATORY (INHALATION) at 19:44

## 2020-12-24 RX ADMIN — BUDESONIDE AND FORMOTEROL FUMARATE DIHYDRATE 2 PUFF: 160; 4.5 AEROSOL RESPIRATORY (INHALATION) at 08:35

## 2020-12-24 RX ADMIN — INSULIN LISPRO 12 UNITS: 100 INJECTION, SOLUTION INTRAVENOUS; SUBCUTANEOUS at 09:36

## 2020-12-24 RX ADMIN — REMDESIVIR 100 MG: 100 INJECTION, POWDER, LYOPHILIZED, FOR SOLUTION INTRAVENOUS at 15:30

## 2020-12-24 RX ADMIN — SODIUM CHLORIDE, PRESERVATIVE FREE 10 ML: 5 INJECTION INTRAVENOUS at 09:36

## 2020-12-24 RX ADMIN — INSULIN LISPRO 16 UNITS: 100 INJECTION, SOLUTION INTRAVENOUS; SUBCUTANEOUS at 11:54

## 2020-12-24 RX ADMIN — DEXAMETHASONE SODIUM PHOSPHATE 6 MG: 10 INJECTION, SOLUTION INTRAMUSCULAR; INTRAVENOUS at 09:31

## 2020-12-25 LAB
ALBUMIN SERPL-MCNC: 2.6 G/DL (ref 3.5–5.2)
ALBUMIN/GLOB SERPL: 0.7 G/DL
ALP SERPL-CCNC: 101 U/L (ref 39–117)
ALT SERPL W P-5'-P-CCNC: 68 U/L (ref 1–41)
ANION GAP SERPL CALCULATED.3IONS-SCNC: 7.5 MMOL/L (ref 5–15)
AST SERPL-CCNC: 116 U/L (ref 1–40)
BASOPHILS # BLD AUTO: 0.01 10*3/MM3 (ref 0–0.2)
BASOPHILS NFR BLD AUTO: 0.1 % (ref 0–1.5)
BILIRUB CONJ SERPL-MCNC: <0.2 MG/DL (ref 0–0.3)
BILIRUB SERPL-MCNC: 0.6 MG/DL (ref 0–1.2)
BUN SERPL-MCNC: 37 MG/DL (ref 6–20)
BUN/CREAT SERPL: 29.6 (ref 7–25)
CALCIUM SPEC-SCNC: 7.7 MG/DL (ref 8.6–10.5)
CHLORIDE SERPL-SCNC: 98 MMOL/L (ref 98–107)
CK SERPL-CCNC: 626 U/L (ref 20–200)
CO2 SERPL-SCNC: 23.5 MMOL/L (ref 22–29)
CREAT SERPL-MCNC: 1.25 MG/DL (ref 0.76–1.27)
CRP SERPL-MCNC: 1.63 MG/DL (ref 0–0.5)
DEPRECATED RDW RBC AUTO: 40.3 FL (ref 37–54)
EOSINOPHIL # BLD AUTO: 0 10*3/MM3 (ref 0–0.4)
EOSINOPHIL NFR BLD AUTO: 0 % (ref 0.3–6.2)
ERYTHROCYTE [DISTWIDTH] IN BLOOD BY AUTOMATED COUNT: 13.6 % (ref 12.3–15.4)
FERRITIN SERPL-MCNC: 4994 NG/ML (ref 30–400)
GFR SERPL CREATININE-BSD FRML MDRD: 66 ML/MIN/1.73
GLOBULIN UR ELPH-MCNC: 3.9 GM/DL
GLUCOSE BLDC GLUCOMTR-MCNC: 234 MG/DL (ref 70–130)
GLUCOSE BLDC GLUCOMTR-MCNC: 258 MG/DL (ref 70–130)
GLUCOSE BLDC GLUCOMTR-MCNC: 269 MG/DL (ref 70–130)
GLUCOSE BLDC GLUCOMTR-MCNC: 345 MG/DL (ref 70–130)
GLUCOSE SERPL-MCNC: 246 MG/DL (ref 65–99)
HCT VFR BLD AUTO: 42.3 % (ref 37.5–51)
HGB BLD-MCNC: 14.3 G/DL (ref 13–17.7)
IMM GRANULOCYTES # BLD AUTO: 0.06 10*3/MM3 (ref 0–0.05)
IMM GRANULOCYTES NFR BLD AUTO: 0.5 % (ref 0–0.5)
LYMPHOCYTES # BLD AUTO: 0.52 10*3/MM3 (ref 0.7–3.1)
LYMPHOCYTES NFR BLD AUTO: 4.4 % (ref 19.6–45.3)
MCH RBC QN AUTO: 27.9 PG (ref 26.6–33)
MCHC RBC AUTO-ENTMCNC: 33.8 G/DL (ref 31.5–35.7)
MCV RBC AUTO: 82.6 FL (ref 79–97)
MONOCYTES # BLD AUTO: 0.45 10*3/MM3 (ref 0.1–0.9)
MONOCYTES NFR BLD AUTO: 3.8 % (ref 5–12)
NEUTROPHILS NFR BLD AUTO: 10.69 10*3/MM3 (ref 1.7–7)
NEUTROPHILS NFR BLD AUTO: 91.2 % (ref 42.7–76)
NRBC BLD AUTO-RTO: 0.1 /100 WBC (ref 0–0.2)
PLATELET # BLD AUTO: 197 10*3/MM3 (ref 140–450)
PMV BLD AUTO: 12.3 FL (ref 6–12)
POTASSIUM SERPL-SCNC: 4.4 MMOL/L (ref 3.5–5.2)
PROT SERPL-MCNC: 6.5 G/DL (ref 6–8.5)
RBC # BLD AUTO: 5.12 10*6/MM3 (ref 4.14–5.8)
SODIUM SERPL-SCNC: 129 MMOL/L (ref 136–145)
WBC # BLD AUTO: 11.73 10*3/MM3 (ref 3.4–10.8)

## 2020-12-25 PROCEDURE — 82550 ASSAY OF CK (CPK): CPT | Performed by: HOSPITALIST

## 2020-12-25 PROCEDURE — 63710000001 INSULIN LISPRO (HUMAN) PER 5 UNITS: Performed by: HOSPITALIST

## 2020-12-25 PROCEDURE — 82248 BILIRUBIN DIRECT: CPT | Performed by: HOSPITALIST

## 2020-12-25 PROCEDURE — 86140 C-REACTIVE PROTEIN: CPT | Performed by: HOSPITALIST

## 2020-12-25 PROCEDURE — 85025 COMPLETE CBC W/AUTO DIFF WBC: CPT | Performed by: HOSPITALIST

## 2020-12-25 PROCEDURE — 82728 ASSAY OF FERRITIN: CPT | Performed by: HOSPITALIST

## 2020-12-25 PROCEDURE — 94799 UNLISTED PULMONARY SVC/PX: CPT

## 2020-12-25 PROCEDURE — 25010000002 DEXAMETHASONE SODIUM PHOSPHATE 10 MG/ML SOLUTION: Performed by: INTERNAL MEDICINE

## 2020-12-25 PROCEDURE — 25010000002 ENOXAPARIN PER 10 MG: Performed by: HOSPITALIST

## 2020-12-25 PROCEDURE — 82962 GLUCOSE BLOOD TEST: CPT

## 2020-12-25 PROCEDURE — 63710000001 INSULIN GLARGINE PER 5 UNITS: Performed by: HOSPITALIST

## 2020-12-25 PROCEDURE — 80053 COMPREHEN METABOLIC PANEL: CPT | Performed by: HOSPITALIST

## 2020-12-25 RX ADMIN — INSULIN LISPRO 16 UNITS: 100 INJECTION, SOLUTION INTRAVENOUS; SUBCUTANEOUS at 11:34

## 2020-12-25 RX ADMIN — BUDESONIDE AND FORMOTEROL FUMARATE DIHYDRATE 2 PUFF: 160; 4.5 AEROSOL RESPIRATORY (INHALATION) at 20:32

## 2020-12-25 RX ADMIN — INSULIN LISPRO 12 UNITS: 100 INJECTION, SOLUTION INTRAVENOUS; SUBCUTANEOUS at 16:48

## 2020-12-25 RX ADMIN — DEXAMETHASONE SODIUM PHOSPHATE 6 MG: 10 INJECTION, SOLUTION INTRAMUSCULAR; INTRAVENOUS at 20:26

## 2020-12-25 RX ADMIN — SODIUM CHLORIDE, PRESERVATIVE FREE 10 ML: 5 INJECTION INTRAVENOUS at 20:27

## 2020-12-25 RX ADMIN — INSULIN LISPRO 8 UNITS: 100 INJECTION, SOLUTION INTRAVENOUS; SUBCUTANEOUS at 08:47

## 2020-12-25 RX ADMIN — ATORVASTATIN CALCIUM 80 MG: 80 TABLET, FILM COATED ORAL at 20:26

## 2020-12-25 RX ADMIN — CARVEDILOL 6.25 MG: 6.25 TABLET, FILM COATED ORAL at 08:48

## 2020-12-25 RX ADMIN — DEXAMETHASONE SODIUM PHOSPHATE 6 MG: 10 INJECTION, SOLUTION INTRAMUSCULAR; INTRAVENOUS at 08:47

## 2020-12-25 RX ADMIN — INSULIN GLARGINE 30 UNITS: 100 INJECTION, SOLUTION SUBCUTANEOUS at 21:04

## 2020-12-25 RX ADMIN — SODIUM CHLORIDE, PRESERVATIVE FREE 10 ML: 5 INJECTION INTRAVENOUS at 08:50

## 2020-12-25 RX ADMIN — ENOXAPARIN SODIUM 40 MG: 40 INJECTION SUBCUTANEOUS at 20:26

## 2020-12-25 RX ADMIN — REMDESIVIR 100 MG: 100 INJECTION, POWDER, LYOPHILIZED, FOR SOLUTION INTRAVENOUS at 14:49

## 2020-12-25 RX ADMIN — CARVEDILOL 6.25 MG: 6.25 TABLET, FILM COATED ORAL at 20:26

## 2020-12-25 RX ADMIN — BUDESONIDE AND FORMOTEROL FUMARATE DIHYDRATE 2 PUFF: 160; 4.5 AEROSOL RESPIRATORY (INHALATION) at 08:02

## 2020-12-25 RX ADMIN — ASPIRIN 81 MG: 81 TABLET, COATED ORAL at 08:48

## 2020-12-25 RX ADMIN — ENOXAPARIN SODIUM 40 MG: 40 INJECTION SUBCUTANEOUS at 08:46

## 2020-12-26 LAB
ALBUMIN SERPL-MCNC: 2.8 G/DL (ref 3.5–5.2)
ALBUMIN/GLOB SERPL: 0.7 G/DL
ALP SERPL-CCNC: 104 U/L (ref 39–117)
ALT SERPL W P-5'-P-CCNC: 72 U/L (ref 1–41)
ANION GAP SERPL CALCULATED.3IONS-SCNC: 9.3 MMOL/L (ref 5–15)
AST SERPL-CCNC: 103 U/L (ref 1–40)
BACTERIA SPEC AEROBE CULT: NORMAL
BACTERIA SPEC AEROBE CULT: NORMAL
BASOPHILS # BLD AUTO: 0.01 10*3/MM3 (ref 0–0.2)
BASOPHILS NFR BLD AUTO: 0.1 % (ref 0–1.5)
BILIRUB CONJ SERPL-MCNC: <0.2 MG/DL (ref 0–0.3)
BILIRUB SERPL-MCNC: 0.6 MG/DL (ref 0–1.2)
BUN SERPL-MCNC: 39 MG/DL (ref 6–20)
BUN/CREAT SERPL: 36.8 (ref 7–25)
CALCIUM SPEC-SCNC: 7.8 MG/DL (ref 8.6–10.5)
CHLORIDE SERPL-SCNC: 101 MMOL/L (ref 98–107)
CK SERPL-CCNC: 631 U/L (ref 20–200)
CO2 SERPL-SCNC: 24.7 MMOL/L (ref 22–29)
CREAT SERPL-MCNC: 1.06 MG/DL (ref 0.76–1.27)
CRP SERPL-MCNC: 1.53 MG/DL (ref 0–0.5)
D DIMER PPP FEU-MCNC: 0.9 MCGFEU/ML (ref 0–0.49)
DEPRECATED RDW RBC AUTO: 41.4 FL (ref 37–54)
EOSINOPHIL # BLD AUTO: 0 10*3/MM3 (ref 0–0.4)
EOSINOPHIL NFR BLD AUTO: 0 % (ref 0.3–6.2)
ERYTHROCYTE [DISTWIDTH] IN BLOOD BY AUTOMATED COUNT: 13.6 % (ref 12.3–15.4)
FERRITIN SERPL-MCNC: 173 NG/ML (ref 30–400)
FIBRINOGEN PPP-MCNC: 533 MG/DL (ref 219–464)
GFR SERPL CREATININE-BSD FRML MDRD: 80 ML/MIN/1.73
GLOBULIN UR ELPH-MCNC: 3.8 GM/DL
GLUCOSE BLDC GLUCOMTR-MCNC: 235 MG/DL (ref 70–130)
GLUCOSE BLDC GLUCOMTR-MCNC: 258 MG/DL (ref 70–130)
GLUCOSE BLDC GLUCOMTR-MCNC: 304 MG/DL (ref 70–130)
GLUCOSE BLDC GLUCOMTR-MCNC: 335 MG/DL (ref 70–130)
GLUCOSE SERPL-MCNC: 248 MG/DL (ref 65–99)
HCT VFR BLD AUTO: 43.8 % (ref 37.5–51)
HGB BLD-MCNC: 14.5 G/DL (ref 13–17.7)
IMM GRANULOCYTES # BLD AUTO: 0.14 10*3/MM3 (ref 0–0.05)
IMM GRANULOCYTES NFR BLD AUTO: 1.3 % (ref 0–0.5)
LDH SERPL-CCNC: 668 U/L (ref 135–225)
LYMPHOCYTES # BLD AUTO: 0.52 10*3/MM3 (ref 0.7–3.1)
LYMPHOCYTES NFR BLD AUTO: 4.9 % (ref 19.6–45.3)
MCH RBC QN AUTO: 27.7 PG (ref 26.6–33)
MCHC RBC AUTO-ENTMCNC: 33.1 G/DL (ref 31.5–35.7)
MCV RBC AUTO: 83.6 FL (ref 79–97)
MONOCYTES # BLD AUTO: 0.41 10*3/MM3 (ref 0.1–0.9)
MONOCYTES NFR BLD AUTO: 3.9 % (ref 5–12)
NEUTROPHILS NFR BLD AUTO: 89.8 % (ref 42.7–76)
NEUTROPHILS NFR BLD AUTO: 9.51 10*3/MM3 (ref 1.7–7)
NRBC BLD AUTO-RTO: 0 /100 WBC (ref 0–0.2)
PLATELET # BLD AUTO: 216 10*3/MM3 (ref 140–450)
PMV BLD AUTO: 12.1 FL (ref 6–12)
POTASSIUM SERPL-SCNC: 4.2 MMOL/L (ref 3.5–5.2)
PROT SERPL-MCNC: 6.6 G/DL (ref 6–8.5)
RBC # BLD AUTO: 5.24 10*6/MM3 (ref 4.14–5.8)
SODIUM SERPL-SCNC: 135 MMOL/L (ref 136–145)
WBC # BLD AUTO: 10.59 10*3/MM3 (ref 3.4–10.8)

## 2020-12-26 PROCEDURE — 86140 C-REACTIVE PROTEIN: CPT | Performed by: HOSPITALIST

## 2020-12-26 PROCEDURE — 94799 UNLISTED PULMONARY SVC/PX: CPT

## 2020-12-26 PROCEDURE — 82550 ASSAY OF CK (CPK): CPT | Performed by: HOSPITALIST

## 2020-12-26 PROCEDURE — 25010000002 ENOXAPARIN PER 10 MG: Performed by: HOSPITALIST

## 2020-12-26 PROCEDURE — 25010000002 DEXAMETHASONE SODIUM PHOSPHATE 10 MG/ML SOLUTION: Performed by: INTERNAL MEDICINE

## 2020-12-26 PROCEDURE — 82962 GLUCOSE BLOOD TEST: CPT

## 2020-12-26 PROCEDURE — 63710000001 INSULIN GLARGINE PER 5 UNITS: Performed by: INTERNAL MEDICINE

## 2020-12-26 PROCEDURE — 80053 COMPREHEN METABOLIC PANEL: CPT | Performed by: HOSPITALIST

## 2020-12-26 PROCEDURE — 63710000001 INSULIN LISPRO (HUMAN) PER 5 UNITS: Performed by: HOSPITALIST

## 2020-12-26 PROCEDURE — 85384 FIBRINOGEN ACTIVITY: CPT | Performed by: HOSPITALIST

## 2020-12-26 PROCEDURE — 82248 BILIRUBIN DIRECT: CPT | Performed by: HOSPITALIST

## 2020-12-26 PROCEDURE — 82728 ASSAY OF FERRITIN: CPT | Performed by: HOSPITALIST

## 2020-12-26 PROCEDURE — 85025 COMPLETE CBC W/AUTO DIFF WBC: CPT | Performed by: HOSPITALIST

## 2020-12-26 PROCEDURE — 85379 FIBRIN DEGRADATION QUANT: CPT | Performed by: HOSPITALIST

## 2020-12-26 PROCEDURE — 83615 LACTATE (LD) (LDH) ENZYME: CPT | Performed by: HOSPITALIST

## 2020-12-26 RX ORDER — INSULIN GLARGINE 100 [IU]/ML
50 INJECTION, SOLUTION SUBCUTANEOUS NIGHTLY
Status: DISCONTINUED | OUTPATIENT
Start: 2020-12-26 | End: 2020-12-28

## 2020-12-26 RX ADMIN — CARVEDILOL 6.25 MG: 6.25 TABLET, FILM COATED ORAL at 10:01

## 2020-12-26 RX ADMIN — ASPIRIN 81 MG: 81 TABLET, COATED ORAL at 10:01

## 2020-12-26 RX ADMIN — INSULIN LISPRO 8 UNITS: 100 INJECTION, SOLUTION INTRAVENOUS; SUBCUTANEOUS at 06:35

## 2020-12-26 RX ADMIN — BUDESONIDE AND FORMOTEROL FUMARATE DIHYDRATE 2 PUFF: 160; 4.5 AEROSOL RESPIRATORY (INHALATION) at 10:30

## 2020-12-26 RX ADMIN — BUDESONIDE AND FORMOTEROL FUMARATE DIHYDRATE 2 PUFF: 160; 4.5 AEROSOL RESPIRATORY (INHALATION) at 19:41

## 2020-12-26 RX ADMIN — SODIUM CHLORIDE, PRESERVATIVE FREE 10 ML: 5 INJECTION INTRAVENOUS at 21:06

## 2020-12-26 RX ADMIN — CARVEDILOL 6.25 MG: 6.25 TABLET, FILM COATED ORAL at 21:06

## 2020-12-26 RX ADMIN — ENOXAPARIN SODIUM 40 MG: 40 INJECTION SUBCUTANEOUS at 21:06

## 2020-12-26 RX ADMIN — SODIUM CHLORIDE, PRESERVATIVE FREE 10 ML: 5 INJECTION INTRAVENOUS at 10:01

## 2020-12-26 RX ADMIN — INSULIN GLARGINE 50 UNITS: 100 INJECTION, SOLUTION SUBCUTANEOUS at 21:06

## 2020-12-26 RX ADMIN — DEXAMETHASONE SODIUM PHOSPHATE 6 MG: 10 INJECTION, SOLUTION INTRAMUSCULAR; INTRAVENOUS at 21:05

## 2020-12-26 RX ADMIN — INSULIN LISPRO 16 UNITS: 100 INJECTION, SOLUTION INTRAVENOUS; SUBCUTANEOUS at 11:25

## 2020-12-26 RX ADMIN — INSULIN LISPRO 12 UNITS: 100 INJECTION, SOLUTION INTRAVENOUS; SUBCUTANEOUS at 16:47

## 2020-12-26 RX ADMIN — BENZONATATE 200 MG: 100 CAPSULE ORAL at 11:25

## 2020-12-26 RX ADMIN — DEXAMETHASONE SODIUM PHOSPHATE 6 MG: 10 INJECTION, SOLUTION INTRAMUSCULAR; INTRAVENOUS at 10:01

## 2020-12-26 RX ADMIN — ATORVASTATIN CALCIUM 80 MG: 80 TABLET, FILM COATED ORAL at 21:06

## 2020-12-26 RX ADMIN — ENOXAPARIN SODIUM 40 MG: 40 INJECTION SUBCUTANEOUS at 10:01

## 2020-12-27 LAB
ALBUMIN SERPL-MCNC: 2.6 G/DL (ref 3.5–5.2)
ALBUMIN/GLOB SERPL: 0.7 G/DL
ALP SERPL-CCNC: 115 U/L (ref 39–117)
ALT SERPL W P-5'-P-CCNC: 74 U/L (ref 1–41)
ANION GAP SERPL CALCULATED.3IONS-SCNC: 8.7 MMOL/L (ref 5–15)
AST SERPL-CCNC: 80 U/L (ref 1–40)
BASOPHILS # BLD AUTO: 0.02 10*3/MM3 (ref 0–0.2)
BASOPHILS NFR BLD AUTO: 0.2 % (ref 0–1.5)
BILIRUB CONJ SERPL-MCNC: <0.2 MG/DL (ref 0–0.3)
BILIRUB SERPL-MCNC: 0.6 MG/DL (ref 0–1.2)
BUN SERPL-MCNC: 40 MG/DL (ref 6–20)
BUN/CREAT SERPL: 40 (ref 7–25)
CALCIUM SPEC-SCNC: 7.6 MG/DL (ref 8.6–10.5)
CHLORIDE SERPL-SCNC: 106 MMOL/L (ref 98–107)
CK SERPL-CCNC: 560 U/L (ref 20–200)
CO2 SERPL-SCNC: 21.3 MMOL/L (ref 22–29)
CREAT SERPL-MCNC: 1 MG/DL (ref 0.76–1.27)
CRP SERPL-MCNC: 0.78 MG/DL (ref 0–0.5)
DEPRECATED RDW RBC AUTO: 42.6 FL (ref 37–54)
EOSINOPHIL # BLD AUTO: 0 10*3/MM3 (ref 0–0.4)
EOSINOPHIL NFR BLD AUTO: 0 % (ref 0.3–6.2)
ERYTHROCYTE [DISTWIDTH] IN BLOOD BY AUTOMATED COUNT: 13.6 % (ref 12.3–15.4)
FERRITIN SERPL-MCNC: 2558 NG/ML (ref 30–400)
GFR SERPL CREATININE-BSD FRML MDRD: 85 ML/MIN/1.73
GLOBULIN UR ELPH-MCNC: 3.6 GM/DL
GLUCOSE BLDC GLUCOMTR-MCNC: 254 MG/DL (ref 70–130)
GLUCOSE BLDC GLUCOMTR-MCNC: 256 MG/DL (ref 70–130)
GLUCOSE BLDC GLUCOMTR-MCNC: 282 MG/DL (ref 70–130)
GLUCOSE BLDC GLUCOMTR-MCNC: 301 MG/DL (ref 70–130)
GLUCOSE SERPL-MCNC: 267 MG/DL (ref 65–99)
HCT VFR BLD AUTO: 43.3 % (ref 37.5–51)
HGB BLD-MCNC: 14.1 G/DL (ref 13–17.7)
IMM GRANULOCYTES # BLD AUTO: 0.21 10*3/MM3 (ref 0–0.05)
IMM GRANULOCYTES NFR BLD AUTO: 1.8 % (ref 0–0.5)
LYMPHOCYTES # BLD AUTO: 0.45 10*3/MM3 (ref 0.7–3.1)
LYMPHOCYTES NFR BLD AUTO: 3.8 % (ref 19.6–45.3)
MCH RBC QN AUTO: 27.6 PG (ref 26.6–33)
MCHC RBC AUTO-ENTMCNC: 32.6 G/DL (ref 31.5–35.7)
MCV RBC AUTO: 84.9 FL (ref 79–97)
MONOCYTES # BLD AUTO: 0.58 10*3/MM3 (ref 0.1–0.9)
MONOCYTES NFR BLD AUTO: 5 % (ref 5–12)
NEUTROPHILS NFR BLD AUTO: 10.43 10*3/MM3 (ref 1.7–7)
NEUTROPHILS NFR BLD AUTO: 89.2 % (ref 42.7–76)
NRBC BLD AUTO-RTO: 0.1 /100 WBC (ref 0–0.2)
PLATELET # BLD AUTO: 199 10*3/MM3 (ref 140–450)
PMV BLD AUTO: 11.8 FL (ref 6–12)
POTASSIUM SERPL-SCNC: 4.3 MMOL/L (ref 3.5–5.2)
PROT SERPL-MCNC: 6.2 G/DL (ref 6–8.5)
RBC # BLD AUTO: 5.1 10*6/MM3 (ref 4.14–5.8)
SODIUM SERPL-SCNC: 136 MMOL/L (ref 136–145)
WBC # BLD AUTO: 11.69 10*3/MM3 (ref 3.4–10.8)

## 2020-12-27 PROCEDURE — 85025 COMPLETE CBC W/AUTO DIFF WBC: CPT | Performed by: HOSPITALIST

## 2020-12-27 PROCEDURE — 63710000001 INSULIN GLARGINE PER 5 UNITS: Performed by: INTERNAL MEDICINE

## 2020-12-27 PROCEDURE — 80053 COMPREHEN METABOLIC PANEL: CPT | Performed by: HOSPITALIST

## 2020-12-27 PROCEDURE — 94799 UNLISTED PULMONARY SVC/PX: CPT

## 2020-12-27 PROCEDURE — 25010000002 ENOXAPARIN PER 10 MG: Performed by: HOSPITALIST

## 2020-12-27 PROCEDURE — 82962 GLUCOSE BLOOD TEST: CPT

## 2020-12-27 PROCEDURE — 86140 C-REACTIVE PROTEIN: CPT | Performed by: HOSPITALIST

## 2020-12-27 PROCEDURE — 63710000001 INSULIN LISPRO (HUMAN) PER 5 UNITS: Performed by: HOSPITALIST

## 2020-12-27 PROCEDURE — 25010000002 DEXAMETHASONE SODIUM PHOSPHATE 10 MG/ML SOLUTION: Performed by: INTERNAL MEDICINE

## 2020-12-27 PROCEDURE — 82550 ASSAY OF CK (CPK): CPT | Performed by: HOSPITALIST

## 2020-12-27 PROCEDURE — 82728 ASSAY OF FERRITIN: CPT | Performed by: HOSPITALIST

## 2020-12-27 PROCEDURE — 82248 BILIRUBIN DIRECT: CPT | Performed by: HOSPITALIST

## 2020-12-27 RX ADMIN — ENOXAPARIN SODIUM 40 MG: 40 INJECTION SUBCUTANEOUS at 08:09

## 2020-12-27 RX ADMIN — BUDESONIDE AND FORMOTEROL FUMARATE DIHYDRATE 2 PUFF: 160; 4.5 AEROSOL RESPIRATORY (INHALATION) at 10:15

## 2020-12-27 RX ADMIN — SODIUM CHLORIDE, PRESERVATIVE FREE 10 ML: 5 INJECTION INTRAVENOUS at 21:32

## 2020-12-27 RX ADMIN — INSULIN GLARGINE 50 UNITS: 100 INJECTION, SOLUTION SUBCUTANEOUS at 21:52

## 2020-12-27 RX ADMIN — ENOXAPARIN SODIUM 40 MG: 40 INJECTION SUBCUTANEOUS at 21:32

## 2020-12-27 RX ADMIN — INSULIN LISPRO 12 UNITS: 100 INJECTION, SOLUTION INTRAVENOUS; SUBCUTANEOUS at 16:22

## 2020-12-27 RX ADMIN — BUDESONIDE AND FORMOTEROL FUMARATE DIHYDRATE 2 PUFF: 160; 4.5 AEROSOL RESPIRATORY (INHALATION) at 19:40

## 2020-12-27 RX ADMIN — SODIUM CHLORIDE, PRESERVATIVE FREE 10 ML: 5 INJECTION INTRAVENOUS at 08:09

## 2020-12-27 RX ADMIN — CARVEDILOL 6.25 MG: 6.25 TABLET, FILM COATED ORAL at 21:32

## 2020-12-27 RX ADMIN — DEXAMETHASONE SODIUM PHOSPHATE 6 MG: 10 INJECTION, SOLUTION INTRAMUSCULAR; INTRAVENOUS at 08:08

## 2020-12-27 RX ADMIN — CARVEDILOL 6.25 MG: 6.25 TABLET, FILM COATED ORAL at 08:09

## 2020-12-27 RX ADMIN — INSULIN LISPRO 12 UNITS: 100 INJECTION, SOLUTION INTRAVENOUS; SUBCUTANEOUS at 11:25

## 2020-12-27 RX ADMIN — DEXAMETHASONE SODIUM PHOSPHATE 6 MG: 10 INJECTION, SOLUTION INTRAMUSCULAR; INTRAVENOUS at 21:32

## 2020-12-27 RX ADMIN — ASPIRIN 81 MG: 81 TABLET, COATED ORAL at 08:09

## 2020-12-27 RX ADMIN — ATORVASTATIN CALCIUM 80 MG: 80 TABLET, FILM COATED ORAL at 21:32

## 2020-12-27 RX ADMIN — INSULIN LISPRO 12 UNITS: 100 INJECTION, SOLUTION INTRAVENOUS; SUBCUTANEOUS at 08:08

## 2020-12-28 ENCOUNTER — APPOINTMENT (OUTPATIENT)
Dept: GENERAL RADIOLOGY | Facility: HOSPITAL | Age: 35
End: 2020-12-28

## 2020-12-28 LAB
ALBUMIN SERPL-MCNC: 2.7 G/DL (ref 3.5–5.2)
ALBUMIN/GLOB SERPL: 0.8 G/DL
ALP SERPL-CCNC: 164 U/L (ref 39–117)
ALT SERPL W P-5'-P-CCNC: 79 U/L (ref 1–41)
ANION GAP SERPL CALCULATED.3IONS-SCNC: 9 MMOL/L (ref 5–15)
AST SERPL-CCNC: 72 U/L (ref 1–40)
BASOPHILS # BLD AUTO: 0.02 10*3/MM3 (ref 0–0.2)
BASOPHILS NFR BLD AUTO: 0.2 % (ref 0–1.5)
BILIRUB CONJ SERPL-MCNC: <0.2 MG/DL (ref 0–0.3)
BILIRUB SERPL-MCNC: 0.5 MG/DL (ref 0–1.2)
BUN SERPL-MCNC: 40 MG/DL (ref 6–20)
BUN/CREAT SERPL: 38.8 (ref 7–25)
CALCIUM SPEC-SCNC: 7.6 MG/DL (ref 8.6–10.5)
CHLORIDE SERPL-SCNC: 104 MMOL/L (ref 98–107)
CK SERPL-CCNC: 562 U/L (ref 20–200)
CO2 SERPL-SCNC: 21 MMOL/L (ref 22–29)
CREAT SERPL-MCNC: 1.03 MG/DL (ref 0.76–1.27)
CRP SERPL-MCNC: 0.58 MG/DL (ref 0–0.5)
D DIMER PPP FEU-MCNC: 1.07 MCGFEU/ML (ref 0–0.49)
DEPRECATED RDW RBC AUTO: 42.2 FL (ref 37–54)
EOSINOPHIL # BLD AUTO: 0.01 10*3/MM3 (ref 0–0.4)
EOSINOPHIL NFR BLD AUTO: 0.1 % (ref 0.3–6.2)
ERYTHROCYTE [DISTWIDTH] IN BLOOD BY AUTOMATED COUNT: 13.7 % (ref 12.3–15.4)
FERRITIN SERPL-MCNC: 1983 NG/ML (ref 30–400)
FIBRINOGEN PPP-MCNC: 453 MG/DL (ref 219–464)
GFR SERPL CREATININE-BSD FRML MDRD: 82 ML/MIN/1.73
GLOBULIN UR ELPH-MCNC: 3.5 GM/DL
GLUCOSE BLDC GLUCOMTR-MCNC: 215 MG/DL (ref 70–130)
GLUCOSE BLDC GLUCOMTR-MCNC: 252 MG/DL (ref 70–130)
GLUCOSE BLDC GLUCOMTR-MCNC: 285 MG/DL (ref 70–130)
GLUCOSE BLDC GLUCOMTR-MCNC: 294 MG/DL (ref 70–130)
GLUCOSE SERPL-MCNC: 308 MG/DL (ref 65–99)
HCT VFR BLD AUTO: 42.2 % (ref 37.5–51)
HGB BLD-MCNC: 14.2 G/DL (ref 13–17.7)
IMM GRANULOCYTES # BLD AUTO: 0.28 10*3/MM3 (ref 0–0.05)
IMM GRANULOCYTES NFR BLD AUTO: 2.4 % (ref 0–0.5)
LDH SERPL-CCNC: 571 U/L (ref 135–225)
LYMPHOCYTES # BLD AUTO: 0.38 10*3/MM3 (ref 0.7–3.1)
LYMPHOCYTES NFR BLD AUTO: 3.3 % (ref 19.6–45.3)
MCH RBC QN AUTO: 28.7 PG (ref 26.6–33)
MCHC RBC AUTO-ENTMCNC: 33.6 G/DL (ref 31.5–35.7)
MCV RBC AUTO: 85.3 FL (ref 79–97)
MONOCYTES # BLD AUTO: 0.74 10*3/MM3 (ref 0.1–0.9)
MONOCYTES NFR BLD AUTO: 6.4 % (ref 5–12)
NEUTROPHILS NFR BLD AUTO: 10.22 10*3/MM3 (ref 1.7–7)
NEUTROPHILS NFR BLD AUTO: 87.6 % (ref 42.7–76)
NRBC BLD AUTO-RTO: 0 /100 WBC (ref 0–0.2)
PLATELET # BLD AUTO: 199 10*3/MM3 (ref 140–450)
PMV BLD AUTO: 11.7 FL (ref 6–12)
POTASSIUM SERPL-SCNC: 4.4 MMOL/L (ref 3.5–5.2)
PROT SERPL-MCNC: 6.2 G/DL (ref 6–8.5)
RBC # BLD AUTO: 4.95 10*6/MM3 (ref 4.14–5.8)
SODIUM SERPL-SCNC: 134 MMOL/L (ref 136–145)
WBC # BLD AUTO: 11.65 10*3/MM3 (ref 3.4–10.8)

## 2020-12-28 PROCEDURE — 25010000002 ENOXAPARIN PER 10 MG: Performed by: HOSPITALIST

## 2020-12-28 PROCEDURE — 63710000001 INSULIN GLARGINE PER 5 UNITS: Performed by: INTERNAL MEDICINE

## 2020-12-28 PROCEDURE — 63710000001 INSULIN LISPRO (HUMAN) PER 5 UNITS: Performed by: HOSPITALIST

## 2020-12-28 PROCEDURE — 94799 UNLISTED PULMONARY SVC/PX: CPT

## 2020-12-28 PROCEDURE — 71045 X-RAY EXAM CHEST 1 VIEW: CPT

## 2020-12-28 PROCEDURE — 82728 ASSAY OF FERRITIN: CPT | Performed by: HOSPITALIST

## 2020-12-28 PROCEDURE — 83615 LACTATE (LD) (LDH) ENZYME: CPT | Performed by: HOSPITALIST

## 2020-12-28 PROCEDURE — 94660 CPAP INITIATION&MGMT: CPT

## 2020-12-28 PROCEDURE — 80053 COMPREHEN METABOLIC PANEL: CPT | Performed by: HOSPITALIST

## 2020-12-28 PROCEDURE — 82962 GLUCOSE BLOOD TEST: CPT

## 2020-12-28 PROCEDURE — 25010000002 DEXAMETHASONE SODIUM PHOSPHATE 10 MG/ML SOLUTION: Performed by: INTERNAL MEDICINE

## 2020-12-28 PROCEDURE — 85384 FIBRINOGEN ACTIVITY: CPT | Performed by: HOSPITALIST

## 2020-12-28 PROCEDURE — 82550 ASSAY OF CK (CPK): CPT | Performed by: HOSPITALIST

## 2020-12-28 PROCEDURE — 85379 FIBRIN DEGRADATION QUANT: CPT | Performed by: HOSPITALIST

## 2020-12-28 PROCEDURE — 86140 C-REACTIVE PROTEIN: CPT | Performed by: HOSPITALIST

## 2020-12-28 PROCEDURE — 85025 COMPLETE CBC W/AUTO DIFF WBC: CPT | Performed by: HOSPITALIST

## 2020-12-28 PROCEDURE — 82248 BILIRUBIN DIRECT: CPT | Performed by: HOSPITALIST

## 2020-12-28 RX ORDER — DEXAMETHASONE SODIUM PHOSPHATE 10 MG/ML
6 INJECTION, SOLUTION INTRAMUSCULAR; INTRAVENOUS DAILY
Status: DISCONTINUED | OUTPATIENT
Start: 2020-12-29 | End: 2020-12-29

## 2020-12-28 RX ORDER — INSULIN GLARGINE 100 [IU]/ML
65 INJECTION, SOLUTION SUBCUTANEOUS NIGHTLY
Status: DISCONTINUED | OUTPATIENT
Start: 2020-12-28 | End: 2020-12-30

## 2020-12-28 RX ADMIN — ASPIRIN 81 MG: 81 TABLET, COATED ORAL at 08:08

## 2020-12-28 RX ADMIN — INSULIN LISPRO 12 UNITS: 100 INJECTION, SOLUTION INTRAVENOUS; SUBCUTANEOUS at 16:48

## 2020-12-28 RX ADMIN — ENOXAPARIN SODIUM 40 MG: 40 INJECTION SUBCUTANEOUS at 21:53

## 2020-12-28 RX ADMIN — CARVEDILOL 6.25 MG: 6.25 TABLET, FILM COATED ORAL at 21:54

## 2020-12-28 RX ADMIN — SODIUM CHLORIDE, PRESERVATIVE FREE 10 ML: 5 INJECTION INTRAVENOUS at 21:54

## 2020-12-28 RX ADMIN — BUDESONIDE AND FORMOTEROL FUMARATE DIHYDRATE 2 PUFF: 160; 4.5 AEROSOL RESPIRATORY (INHALATION) at 19:42

## 2020-12-28 RX ADMIN — INSULIN GLARGINE 65 UNITS: 100 INJECTION, SOLUTION SUBCUTANEOUS at 21:53

## 2020-12-28 RX ADMIN — SODIUM CHLORIDE, PRESERVATIVE FREE 10 ML: 5 INJECTION INTRAVENOUS at 08:14

## 2020-12-28 RX ADMIN — ATORVASTATIN CALCIUM 80 MG: 80 TABLET, FILM COATED ORAL at 21:54

## 2020-12-28 RX ADMIN — BUDESONIDE AND FORMOTEROL FUMARATE DIHYDRATE 2 PUFF: 160; 4.5 AEROSOL RESPIRATORY (INHALATION) at 09:34

## 2020-12-28 RX ADMIN — INSULIN LISPRO 12 UNITS: 100 INJECTION, SOLUTION INTRAVENOUS; SUBCUTANEOUS at 08:08

## 2020-12-28 RX ADMIN — DEXAMETHASONE SODIUM PHOSPHATE 6 MG: 10 INJECTION, SOLUTION INTRAMUSCULAR; INTRAVENOUS at 08:08

## 2020-12-28 RX ADMIN — CARVEDILOL 6.25 MG: 6.25 TABLET, FILM COATED ORAL at 08:08

## 2020-12-28 RX ADMIN — ENOXAPARIN SODIUM 40 MG: 40 INJECTION SUBCUTANEOUS at 08:07

## 2020-12-28 RX ADMIN — INSULIN LISPRO 12 UNITS: 100 INJECTION, SOLUTION INTRAVENOUS; SUBCUTANEOUS at 11:58

## 2020-12-29 LAB
ALBUMIN SERPL-MCNC: 2.9 G/DL (ref 3.5–5.2)
ALBUMIN/GLOB SERPL: 0.8 G/DL
ALP SERPL-CCNC: 125 U/L (ref 39–117)
ALT SERPL W P-5'-P-CCNC: 86 U/L (ref 1–41)
ANION GAP SERPL CALCULATED.3IONS-SCNC: 9.2 MMOL/L (ref 5–15)
AST SERPL-CCNC: 74 U/L (ref 1–40)
BASOPHILS # BLD AUTO: 0.06 10*3/MM3 (ref 0–0.2)
BASOPHILS NFR BLD AUTO: 0.3 % (ref 0–1.5)
BILIRUB CONJ SERPL-MCNC: <0.2 MG/DL (ref 0–0.3)
BILIRUB SERPL-MCNC: 0.8 MG/DL (ref 0–1.2)
BUN SERPL-MCNC: 37 MG/DL (ref 6–20)
BUN/CREAT SERPL: 33.6 (ref 7–25)
CALCIUM SPEC-SCNC: 8.1 MG/DL (ref 8.6–10.5)
CHLORIDE SERPL-SCNC: 104 MMOL/L (ref 98–107)
CK SERPL-CCNC: 465 U/L (ref 20–200)
CO2 SERPL-SCNC: 22.8 MMOL/L (ref 22–29)
CREAT SERPL-MCNC: 1.1 MG/DL (ref 0.76–1.27)
CRP SERPL-MCNC: 0.41 MG/DL (ref 0–0.5)
DEPRECATED RDW RBC AUTO: 38.8 FL (ref 37–54)
EOSINOPHIL # BLD AUTO: 0.13 10*3/MM3 (ref 0–0.4)
EOSINOPHIL NFR BLD AUTO: 0.7 % (ref 0.3–6.2)
ERYTHROCYTE [DISTWIDTH] IN BLOOD BY AUTOMATED COUNT: 13.8 % (ref 12.3–15.4)
FERRITIN SERPL-MCNC: 1891 NG/ML (ref 30–400)
GFR SERPL CREATININE-BSD FRML MDRD: 76 ML/MIN/1.73
GLOBULIN UR ELPH-MCNC: 3.8 GM/DL
GLUCOSE BLDC GLUCOMTR-MCNC: 292 MG/DL (ref 70–130)
GLUCOSE BLDC GLUCOMTR-MCNC: 348 MG/DL (ref 70–130)
GLUCOSE BLDC GLUCOMTR-MCNC: 361 MG/DL (ref 70–130)
GLUCOSE BLDC GLUCOMTR-MCNC: 85 MG/DL (ref 70–130)
GLUCOSE SERPL-MCNC: 105 MG/DL (ref 65–99)
HCT VFR BLD AUTO: 47.2 % (ref 37.5–51)
HGB BLD-MCNC: 16 G/DL (ref 13–17.7)
IMM GRANULOCYTES # BLD AUTO: 0.78 10*3/MM3 (ref 0–0.05)
IMM GRANULOCYTES NFR BLD AUTO: 4.4 % (ref 0–0.5)
LYMPHOCYTES # BLD AUTO: 1.12 10*3/MM3 (ref 0.7–3.1)
LYMPHOCYTES NFR BLD AUTO: 6.3 % (ref 19.6–45.3)
MCH RBC QN AUTO: 27.7 PG (ref 26.6–33)
MCHC RBC AUTO-ENTMCNC: 33.9 G/DL (ref 31.5–35.7)
MCV RBC AUTO: 81.7 FL (ref 79–97)
MONOCYTES # BLD AUTO: 1.28 10*3/MM3 (ref 0.1–0.9)
MONOCYTES NFR BLD AUTO: 7.2 % (ref 5–12)
NEUTROPHILS NFR BLD AUTO: 14.33 10*3/MM3 (ref 1.7–7)
NEUTROPHILS NFR BLD AUTO: 81.1 % (ref 42.7–76)
NRBC BLD AUTO-RTO: 0 /100 WBC (ref 0–0.2)
PLATELET # BLD AUTO: 287 10*3/MM3 (ref 140–450)
PMV BLD AUTO: 11.5 FL (ref 6–12)
POTASSIUM SERPL-SCNC: 3.9 MMOL/L (ref 3.5–5.2)
PROT SERPL-MCNC: 6.7 G/DL (ref 6–8.5)
RBC # BLD AUTO: 5.78 10*6/MM3 (ref 4.14–5.8)
SODIUM SERPL-SCNC: 136 MMOL/L (ref 136–145)
WBC # BLD AUTO: 17.7 10*3/MM3 (ref 3.4–10.8)

## 2020-12-29 PROCEDURE — 82550 ASSAY OF CK (CPK): CPT | Performed by: HOSPITALIST

## 2020-12-29 PROCEDURE — 63710000001 INSULIN LISPRO (HUMAN) PER 5 UNITS: Performed by: HOSPITALIST

## 2020-12-29 PROCEDURE — 94799 UNLISTED PULMONARY SVC/PX: CPT

## 2020-12-29 PROCEDURE — 82248 BILIRUBIN DIRECT: CPT | Performed by: HOSPITALIST

## 2020-12-29 PROCEDURE — 25010000002 ENOXAPARIN PER 10 MG: Performed by: HOSPITALIST

## 2020-12-29 PROCEDURE — 80053 COMPREHEN METABOLIC PANEL: CPT | Performed by: HOSPITALIST

## 2020-12-29 PROCEDURE — 82728 ASSAY OF FERRITIN: CPT | Performed by: HOSPITALIST

## 2020-12-29 PROCEDURE — 82962 GLUCOSE BLOOD TEST: CPT

## 2020-12-29 PROCEDURE — 63710000001 INSULIN GLARGINE PER 5 UNITS: Performed by: INTERNAL MEDICINE

## 2020-12-29 PROCEDURE — 25010000002 DEXAMETHASONE SODIUM PHOSPHATE 10 MG/ML SOLUTION: Performed by: INTERNAL MEDICINE

## 2020-12-29 PROCEDURE — 86140 C-REACTIVE PROTEIN: CPT | Performed by: HOSPITALIST

## 2020-12-29 PROCEDURE — 85025 COMPLETE CBC W/AUTO DIFF WBC: CPT | Performed by: HOSPITALIST

## 2020-12-29 RX ORDER — DEXAMETHASONE SODIUM PHOSPHATE 10 MG/ML
6 INJECTION, SOLUTION INTRAMUSCULAR; INTRAVENOUS 2 TIMES DAILY
Status: DISCONTINUED | OUTPATIENT
Start: 2020-12-29 | End: 2021-01-04

## 2020-12-29 RX ADMIN — ENOXAPARIN SODIUM 40 MG: 40 INJECTION SUBCUTANEOUS at 09:00

## 2020-12-29 RX ADMIN — DEXAMETHASONE SODIUM PHOSPHATE 6 MG: 10 INJECTION, SOLUTION INTRAMUSCULAR; INTRAVENOUS at 09:00

## 2020-12-29 RX ADMIN — INSULIN LISPRO 16 UNITS: 100 INJECTION, SOLUTION INTRAVENOUS; SUBCUTANEOUS at 16:41

## 2020-12-29 RX ADMIN — ASPIRIN 81 MG: 81 TABLET, COATED ORAL at 09:00

## 2020-12-29 RX ADMIN — INSULIN GLARGINE 65 UNITS: 100 INJECTION, SOLUTION SUBCUTANEOUS at 20:47

## 2020-12-29 RX ADMIN — CARVEDILOL 6.25 MG: 6.25 TABLET, FILM COATED ORAL at 20:47

## 2020-12-29 RX ADMIN — DEXAMETHASONE SODIUM PHOSPHATE 6 MG: 10 INJECTION, SOLUTION INTRAMUSCULAR; INTRAVENOUS at 20:47

## 2020-12-29 RX ADMIN — BUDESONIDE AND FORMOTEROL FUMARATE DIHYDRATE 2 PUFF: 160; 4.5 AEROSOL RESPIRATORY (INHALATION) at 22:53

## 2020-12-29 RX ADMIN — INSULIN LISPRO 12 UNITS: 100 INJECTION, SOLUTION INTRAVENOUS; SUBCUTANEOUS at 13:25

## 2020-12-29 RX ADMIN — CARVEDILOL 6.25 MG: 6.25 TABLET, FILM COATED ORAL at 09:00

## 2020-12-29 RX ADMIN — ATORVASTATIN CALCIUM 80 MG: 80 TABLET, FILM COATED ORAL at 20:47

## 2020-12-29 RX ADMIN — BUDESONIDE AND FORMOTEROL FUMARATE DIHYDRATE 2 PUFF: 160; 4.5 AEROSOL RESPIRATORY (INHALATION) at 07:47

## 2020-12-29 RX ADMIN — SODIUM CHLORIDE, PRESERVATIVE FREE 10 ML: 5 INJECTION INTRAVENOUS at 20:47

## 2020-12-29 RX ADMIN — ENOXAPARIN SODIUM 40 MG: 40 INJECTION SUBCUTANEOUS at 20:47

## 2020-12-30 LAB
ALBUMIN SERPL-MCNC: 2.4 G/DL (ref 3.5–5.2)
ALBUMIN/GLOB SERPL: 0.6 G/DL
ALP SERPL-CCNC: 119 U/L (ref 39–117)
ALT SERPL W P-5'-P-CCNC: 68 U/L (ref 1–41)
ANION GAP SERPL CALCULATED.3IONS-SCNC: 6.4 MMOL/L (ref 5–15)
AST SERPL-CCNC: 43 U/L (ref 1–40)
BASOPHILS # BLD AUTO: 0.02 10*3/MM3 (ref 0–0.2)
BASOPHILS NFR BLD AUTO: 0.2 % (ref 0–1.5)
BILIRUB CONJ SERPL-MCNC: <0.2 MG/DL (ref 0–0.3)
BILIRUB SERPL-MCNC: 0.6 MG/DL (ref 0–1.2)
BUN SERPL-MCNC: 37 MG/DL (ref 6–20)
BUN/CREAT SERPL: 39.4 (ref 7–25)
CALCIUM SPEC-SCNC: 7.9 MG/DL (ref 8.6–10.5)
CHLORIDE SERPL-SCNC: 103 MMOL/L (ref 98–107)
CK SERPL-CCNC: 406 U/L (ref 20–200)
CO2 SERPL-SCNC: 21.6 MMOL/L (ref 22–29)
CREAT SERPL-MCNC: 0.94 MG/DL (ref 0.76–1.27)
CRP SERPL-MCNC: 2.25 MG/DL (ref 0–0.5)
D DIMER PPP FEU-MCNC: 0.87 MCGFEU/ML (ref 0–0.49)
DEPRECATED RDW RBC AUTO: 39.4 FL (ref 37–54)
EOSINOPHIL # BLD AUTO: 0.01 10*3/MM3 (ref 0–0.4)
EOSINOPHIL NFR BLD AUTO: 0.1 % (ref 0.3–6.2)
ERYTHROCYTE [DISTWIDTH] IN BLOOD BY AUTOMATED COUNT: 13.4 % (ref 12.3–15.4)
FERRITIN SERPL-MCNC: 1414 NG/ML (ref 30–400)
FIBRINOGEN PPP-MCNC: 448 MG/DL (ref 219–464)
GFR SERPL CREATININE-BSD FRML MDRD: 91 ML/MIN/1.73
GLOBULIN UR ELPH-MCNC: 3.9 GM/DL
GLUCOSE BLDC GLUCOMTR-MCNC: 234 MG/DL (ref 70–130)
GLUCOSE BLDC GLUCOMTR-MCNC: 237 MG/DL (ref 70–130)
GLUCOSE BLDC GLUCOMTR-MCNC: 319 MG/DL (ref 70–130)
GLUCOSE BLDC GLUCOMTR-MCNC: 362 MG/DL (ref 70–130)
GLUCOSE SERPL-MCNC: 280 MG/DL (ref 65–99)
HCT VFR BLD AUTO: 41 % (ref 37.5–51)
HGB BLD-MCNC: 13.8 G/DL (ref 13–17.7)
IMM GRANULOCYTES # BLD AUTO: 0.21 10*3/MM3 (ref 0–0.05)
IMM GRANULOCYTES NFR BLD AUTO: 2.3 % (ref 0–0.5)
LDH SERPL-CCNC: 456 U/L (ref 135–225)
LYMPHOCYTES # BLD AUTO: 0.39 10*3/MM3 (ref 0.7–3.1)
LYMPHOCYTES NFR BLD AUTO: 4.3 % (ref 19.6–45.3)
MCH RBC QN AUTO: 27.9 PG (ref 26.6–33)
MCHC RBC AUTO-ENTMCNC: 33.7 G/DL (ref 31.5–35.7)
MCV RBC AUTO: 82.8 FL (ref 79–97)
MONOCYTES # BLD AUTO: 0.47 10*3/MM3 (ref 0.1–0.9)
MONOCYTES NFR BLD AUTO: 5.2 % (ref 5–12)
NEUTROPHILS NFR BLD AUTO: 8.02 10*3/MM3 (ref 1.7–7)
NEUTROPHILS NFR BLD AUTO: 87.9 % (ref 42.7–76)
NRBC BLD AUTO-RTO: 0 /100 WBC (ref 0–0.2)
PLATELET # BLD AUTO: 195 10*3/MM3 (ref 140–450)
PMV BLD AUTO: 11.6 FL (ref 6–12)
POTASSIUM SERPL-SCNC: 5.1 MMOL/L (ref 3.5–5.2)
PROT SERPL-MCNC: 6.3 G/DL (ref 6–8.5)
RBC # BLD AUTO: 4.95 10*6/MM3 (ref 4.14–5.8)
SODIUM SERPL-SCNC: 131 MMOL/L (ref 136–145)
WBC # BLD AUTO: 9.12 10*3/MM3 (ref 3.4–10.8)

## 2020-12-30 PROCEDURE — 85025 COMPLETE CBC W/AUTO DIFF WBC: CPT | Performed by: HOSPITALIST

## 2020-12-30 PROCEDURE — 82550 ASSAY OF CK (CPK): CPT | Performed by: HOSPITALIST

## 2020-12-30 PROCEDURE — 85379 FIBRIN DEGRADATION QUANT: CPT | Performed by: HOSPITALIST

## 2020-12-30 PROCEDURE — 83615 LACTATE (LD) (LDH) ENZYME: CPT | Performed by: HOSPITALIST

## 2020-12-30 PROCEDURE — 82962 GLUCOSE BLOOD TEST: CPT

## 2020-12-30 PROCEDURE — 63710000001 INSULIN GLARGINE PER 5 UNITS: Performed by: INTERNAL MEDICINE

## 2020-12-30 PROCEDURE — 94760 N-INVAS EAR/PLS OXIMETRY 1: CPT

## 2020-12-30 PROCEDURE — 63710000001 INSULIN LISPRO (HUMAN) PER 5 UNITS: Performed by: HOSPITALIST

## 2020-12-30 PROCEDURE — 80053 COMPREHEN METABOLIC PANEL: CPT | Performed by: HOSPITALIST

## 2020-12-30 PROCEDURE — 94799 UNLISTED PULMONARY SVC/PX: CPT

## 2020-12-30 PROCEDURE — 86140 C-REACTIVE PROTEIN: CPT | Performed by: HOSPITALIST

## 2020-12-30 PROCEDURE — 85384 FIBRINOGEN ACTIVITY: CPT | Performed by: HOSPITALIST

## 2020-12-30 PROCEDURE — 25010000002 ENOXAPARIN PER 10 MG: Performed by: HOSPITALIST

## 2020-12-30 PROCEDURE — 25010000002 DEXAMETHASONE SODIUM PHOSPHATE 10 MG/ML SOLUTION: Performed by: INTERNAL MEDICINE

## 2020-12-30 PROCEDURE — 82248 BILIRUBIN DIRECT: CPT | Performed by: HOSPITALIST

## 2020-12-30 PROCEDURE — 82728 ASSAY OF FERRITIN: CPT | Performed by: HOSPITALIST

## 2020-12-30 RX ORDER — INSULIN GLARGINE 100 [IU]/ML
80 INJECTION, SOLUTION SUBCUTANEOUS NIGHTLY
Status: DISCONTINUED | OUTPATIENT
Start: 2020-12-30 | End: 2021-01-01

## 2020-12-30 RX ADMIN — DEXAMETHASONE SODIUM PHOSPHATE 6 MG: 10 INJECTION, SOLUTION INTRAMUSCULAR; INTRAVENOUS at 08:13

## 2020-12-30 RX ADMIN — INSULIN LISPRO 8 UNITS: 100 INJECTION, SOLUTION INTRAVENOUS; SUBCUTANEOUS at 08:13

## 2020-12-30 RX ADMIN — ENOXAPARIN SODIUM 40 MG: 40 INJECTION SUBCUTANEOUS at 20:20

## 2020-12-30 RX ADMIN — ENOXAPARIN SODIUM 40 MG: 40 INJECTION SUBCUTANEOUS at 08:13

## 2020-12-30 RX ADMIN — CARVEDILOL 6.25 MG: 6.25 TABLET, FILM COATED ORAL at 20:21

## 2020-12-30 RX ADMIN — INSULIN GLARGINE 80 UNITS: 100 INJECTION, SOLUTION SUBCUTANEOUS at 21:24

## 2020-12-30 RX ADMIN — SODIUM CHLORIDE, PRESERVATIVE FREE 10 ML: 5 INJECTION INTRAVENOUS at 20:21

## 2020-12-30 RX ADMIN — INSULIN LISPRO 8 UNITS: 100 INJECTION, SOLUTION INTRAVENOUS; SUBCUTANEOUS at 11:38

## 2020-12-30 RX ADMIN — CARVEDILOL 6.25 MG: 6.25 TABLET, FILM COATED ORAL at 08:13

## 2020-12-30 RX ADMIN — ATORVASTATIN CALCIUM 80 MG: 80 TABLET, FILM COATED ORAL at 20:21

## 2020-12-30 RX ADMIN — DEXAMETHASONE SODIUM PHOSPHATE 6 MG: 10 INJECTION, SOLUTION INTRAMUSCULAR; INTRAVENOUS at 20:21

## 2020-12-30 RX ADMIN — INSULIN LISPRO 20 UNITS: 100 INJECTION, SOLUTION INTRAVENOUS; SUBCUTANEOUS at 16:49

## 2020-12-30 RX ADMIN — SODIUM CHLORIDE, PRESERVATIVE FREE 10 ML: 5 INJECTION INTRAVENOUS at 08:13

## 2020-12-30 RX ADMIN — ASPIRIN 81 MG: 81 TABLET, COATED ORAL at 08:13

## 2020-12-30 RX ADMIN — BUDESONIDE AND FORMOTEROL FUMARATE DIHYDRATE 2 PUFF: 160; 4.5 AEROSOL RESPIRATORY (INHALATION) at 08:03

## 2020-12-30 RX ADMIN — BUDESONIDE AND FORMOTEROL FUMARATE DIHYDRATE 2 PUFF: 160; 4.5 AEROSOL RESPIRATORY (INHALATION) at 20:45

## 2020-12-31 ENCOUNTER — APPOINTMENT (OUTPATIENT)
Dept: CARDIOLOGY | Facility: HOSPITAL | Age: 35
End: 2020-12-31

## 2020-12-31 LAB
ALBUMIN SERPL-MCNC: 2.6 G/DL (ref 3.5–5.2)
ALBUMIN/GLOB SERPL: 0.8 G/DL
ALP SERPL-CCNC: 166 U/L (ref 39–117)
ALT SERPL W P-5'-P-CCNC: 68 U/L (ref 1–41)
ANION GAP SERPL CALCULATED.3IONS-SCNC: 8.3 MMOL/L (ref 5–15)
AST SERPL-CCNC: 44 U/L (ref 1–40)
BASOPHILS # BLD AUTO: 0.02 10*3/MM3 (ref 0–0.2)
BASOPHILS NFR BLD AUTO: 0.2 % (ref 0–1.5)
BH CV LOWER VASCULAR LEFT COMMON FEMORAL AUGMENT: NORMAL
BH CV LOWER VASCULAR LEFT COMMON FEMORAL COMPETENT: NORMAL
BH CV LOWER VASCULAR LEFT COMMON FEMORAL COMPRESS: NORMAL
BH CV LOWER VASCULAR LEFT COMMON FEMORAL PHASIC: NORMAL
BH CV LOWER VASCULAR LEFT COMMON FEMORAL SPONT: NORMAL
BH CV LOWER VASCULAR LEFT DISTAL FEMORAL COMPRESS: NORMAL
BH CV LOWER VASCULAR LEFT GASTRONEMIUS COMPRESS: NORMAL
BH CV LOWER VASCULAR LEFT GREATER SAPH AK COMPRESS: NORMAL
BH CV LOWER VASCULAR LEFT GREATER SAPH BK COMPRESS: NORMAL
BH CV LOWER VASCULAR LEFT LESSER SAPH COMPRESS: NORMAL
BH CV LOWER VASCULAR LEFT MID FEMORAL AUGMENT: NORMAL
BH CV LOWER VASCULAR LEFT MID FEMORAL COMPETENT: NORMAL
BH CV LOWER VASCULAR LEFT MID FEMORAL COMPRESS: NORMAL
BH CV LOWER VASCULAR LEFT MID FEMORAL PHASIC: NORMAL
BH CV LOWER VASCULAR LEFT MID FEMORAL SPONT: NORMAL
BH CV LOWER VASCULAR LEFT PERONEAL COMPRESS: NORMAL
BH CV LOWER VASCULAR LEFT POPLITEAL AUGMENT: NORMAL
BH CV LOWER VASCULAR LEFT POPLITEAL COMPETENT: NORMAL
BH CV LOWER VASCULAR LEFT POPLITEAL COMPRESS: NORMAL
BH CV LOWER VASCULAR LEFT POPLITEAL PHASIC: NORMAL
BH CV LOWER VASCULAR LEFT POPLITEAL SPONT: NORMAL
BH CV LOWER VASCULAR LEFT POSTERIOR TIBIAL COMPRESS: NORMAL
BH CV LOWER VASCULAR LEFT PROFUNDA FEMORAL COMPRESS: NORMAL
BH CV LOWER VASCULAR LEFT PROXIMAL FEMORAL COMPRESS: NORMAL
BH CV LOWER VASCULAR LEFT SAPHENOFEMORAL JUNCTION COMPRESS: NORMAL
BH CV LOWER VASCULAR RIGHT COMMON FEMORAL AUGMENT: NORMAL
BH CV LOWER VASCULAR RIGHT COMMON FEMORAL COMPETENT: NORMAL
BH CV LOWER VASCULAR RIGHT COMMON FEMORAL COMPRESS: NORMAL
BH CV LOWER VASCULAR RIGHT COMMON FEMORAL PHASIC: NORMAL
BH CV LOWER VASCULAR RIGHT COMMON FEMORAL SPONT: NORMAL
BH CV LOWER VASCULAR RIGHT DISTAL FEMORAL COMPRESS: NORMAL
BH CV LOWER VASCULAR RIGHT GASTRONEMIUS COMPRESS: NORMAL
BH CV LOWER VASCULAR RIGHT GREATER SAPH AK COMPRESS: NORMAL
BH CV LOWER VASCULAR RIGHT GREATER SAPH BK COMPRESS: NORMAL
BH CV LOWER VASCULAR RIGHT LESSER SAPH COMPRESS: NORMAL
BH CV LOWER VASCULAR RIGHT MID FEMORAL AUGMENT: NORMAL
BH CV LOWER VASCULAR RIGHT MID FEMORAL COMPETENT: NORMAL
BH CV LOWER VASCULAR RIGHT MID FEMORAL COMPRESS: NORMAL
BH CV LOWER VASCULAR RIGHT MID FEMORAL PHASIC: NORMAL
BH CV LOWER VASCULAR RIGHT MID FEMORAL SPONT: NORMAL
BH CV LOWER VASCULAR RIGHT PERONEAL COMPRESS: NORMAL
BH CV LOWER VASCULAR RIGHT POPLITEAL AUGMENT: NORMAL
BH CV LOWER VASCULAR RIGHT POPLITEAL COMPETENT: NORMAL
BH CV LOWER VASCULAR RIGHT POPLITEAL COMPRESS: NORMAL
BH CV LOWER VASCULAR RIGHT POPLITEAL PHASIC: NORMAL
BH CV LOWER VASCULAR RIGHT POPLITEAL SPONT: NORMAL
BH CV LOWER VASCULAR RIGHT POSTERIOR TIBIAL COMPRESS: NORMAL
BH CV LOWER VASCULAR RIGHT PROFUNDA FEMORAL COMPRESS: NORMAL
BH CV LOWER VASCULAR RIGHT PROXIMAL FEMORAL COMPRESS: NORMAL
BH CV LOWER VASCULAR RIGHT SAPHENOFEMORAL JUNCTION COMPRESS: NORMAL
BILIRUB CONJ SERPL-MCNC: <0.2 MG/DL (ref 0–0.3)
BILIRUB SERPL-MCNC: 0.4 MG/DL (ref 0–1.2)
BUN SERPL-MCNC: 36 MG/DL (ref 6–20)
BUN/CREAT SERPL: 31.9 (ref 7–25)
CALCIUM SPEC-SCNC: 8.2 MG/DL (ref 8.6–10.5)
CHLORIDE SERPL-SCNC: 102 MMOL/L (ref 98–107)
CK SERPL-CCNC: 507 U/L (ref 20–200)
CO2 SERPL-SCNC: 19.7 MMOL/L (ref 22–29)
CREAT SERPL-MCNC: 1.13 MG/DL (ref 0.76–1.27)
CRP SERPL-MCNC: 0.91 MG/DL (ref 0–0.5)
DEPRECATED RDW RBC AUTO: 39.9 FL (ref 37–54)
EOSINOPHIL # BLD AUTO: 0.02 10*3/MM3 (ref 0–0.4)
EOSINOPHIL NFR BLD AUTO: 0.2 % (ref 0.3–6.2)
ERYTHROCYTE [DISTWIDTH] IN BLOOD BY AUTOMATED COUNT: 13.4 % (ref 12.3–15.4)
FERRITIN SERPL-MCNC: 1375 NG/ML (ref 30–400)
GFR SERPL CREATININE-BSD FRML MDRD: 74 ML/MIN/1.73
GLOBULIN UR ELPH-MCNC: 3.4 GM/DL
GLUCOSE BLDC GLUCOMTR-MCNC: 313 MG/DL (ref 70–130)
GLUCOSE BLDC GLUCOMTR-MCNC: 324 MG/DL (ref 70–130)
GLUCOSE BLDC GLUCOMTR-MCNC: 332 MG/DL (ref 70–130)
GLUCOSE BLDC GLUCOMTR-MCNC: 388 MG/DL (ref 70–130)
GLUCOSE SERPL-MCNC: 278 MG/DL (ref 65–99)
HCT VFR BLD AUTO: 41.3 % (ref 37.5–51)
HGB BLD-MCNC: 14 G/DL (ref 13–17.7)
IMM GRANULOCYTES # BLD AUTO: 0.25 10*3/MM3 (ref 0–0.05)
IMM GRANULOCYTES NFR BLD AUTO: 2.2 % (ref 0–0.5)
LYMPHOCYTES # BLD AUTO: 0.4 10*3/MM3 (ref 0.7–3.1)
LYMPHOCYTES NFR BLD AUTO: 3.5 % (ref 19.6–45.3)
MCH RBC QN AUTO: 28.2 PG (ref 26.6–33)
MCHC RBC AUTO-ENTMCNC: 33.9 G/DL (ref 31.5–35.7)
MCV RBC AUTO: 83.3 FL (ref 79–97)
MONOCYTES # BLD AUTO: 0.66 10*3/MM3 (ref 0.1–0.9)
MONOCYTES NFR BLD AUTO: 5.8 % (ref 5–12)
NEUTROPHILS NFR BLD AUTO: 88.1 % (ref 42.7–76)
NEUTROPHILS NFR BLD AUTO: 9.94 10*3/MM3 (ref 1.7–7)
NRBC BLD AUTO-RTO: 0 /100 WBC (ref 0–0.2)
PLATELET # BLD AUTO: 189 10*3/MM3 (ref 140–450)
PMV BLD AUTO: 11.5 FL (ref 6–12)
POTASSIUM SERPL-SCNC: 4.3 MMOL/L (ref 3.5–5.2)
PROT SERPL-MCNC: 6 G/DL (ref 6–8.5)
RBC # BLD AUTO: 4.96 10*6/MM3 (ref 4.14–5.8)
SODIUM SERPL-SCNC: 130 MMOL/L (ref 136–145)
WBC # BLD AUTO: 11.29 10*3/MM3 (ref 3.4–10.8)

## 2020-12-31 PROCEDURE — 93970 EXTREMITY STUDY: CPT

## 2020-12-31 PROCEDURE — 82962 GLUCOSE BLOOD TEST: CPT

## 2020-12-31 PROCEDURE — 63710000001 INSULIN GLARGINE PER 5 UNITS: Performed by: INTERNAL MEDICINE

## 2020-12-31 PROCEDURE — 94799 UNLISTED PULMONARY SVC/PX: CPT

## 2020-12-31 PROCEDURE — 80053 COMPREHEN METABOLIC PANEL: CPT | Performed by: HOSPITALIST

## 2020-12-31 PROCEDURE — 25010000002 ENOXAPARIN PER 10 MG: Performed by: HOSPITALIST

## 2020-12-31 PROCEDURE — 82728 ASSAY OF FERRITIN: CPT | Performed by: HOSPITALIST

## 2020-12-31 PROCEDURE — 85025 COMPLETE CBC W/AUTO DIFF WBC: CPT | Performed by: HOSPITALIST

## 2020-12-31 PROCEDURE — 63710000001 INSULIN LISPRO (HUMAN) PER 5 UNITS: Performed by: HOSPITALIST

## 2020-12-31 PROCEDURE — 86140 C-REACTIVE PROTEIN: CPT | Performed by: HOSPITALIST

## 2020-12-31 PROCEDURE — 82550 ASSAY OF CK (CPK): CPT | Performed by: HOSPITALIST

## 2020-12-31 PROCEDURE — 82248 BILIRUBIN DIRECT: CPT | Performed by: HOSPITALIST

## 2020-12-31 PROCEDURE — 25010000002 DEXAMETHASONE SODIUM PHOSPHATE 10 MG/ML SOLUTION: Performed by: INTERNAL MEDICINE

## 2020-12-31 RX ORDER — BUMETANIDE 1 MG/1
1 TABLET ORAL DAILY
Status: DISCONTINUED | OUTPATIENT
Start: 2020-12-31 | End: 2021-01-08 | Stop reason: HOSPADM

## 2020-12-31 RX ADMIN — CARVEDILOL 6.25 MG: 6.25 TABLET, FILM COATED ORAL at 08:02

## 2020-12-31 RX ADMIN — DEXAMETHASONE SODIUM PHOSPHATE 6 MG: 10 INJECTION, SOLUTION INTRAMUSCULAR; INTRAVENOUS at 20:12

## 2020-12-31 RX ADMIN — ATORVASTATIN CALCIUM 80 MG: 80 TABLET, FILM COATED ORAL at 20:12

## 2020-12-31 RX ADMIN — SODIUM CHLORIDE, PRESERVATIVE FREE 10 ML: 5 INJECTION INTRAVENOUS at 08:10

## 2020-12-31 RX ADMIN — INSULIN LISPRO 16 UNITS: 100 INJECTION, SOLUTION INTRAVENOUS; SUBCUTANEOUS at 08:02

## 2020-12-31 RX ADMIN — INSULIN LISPRO 16 UNITS: 100 INJECTION, SOLUTION INTRAVENOUS; SUBCUTANEOUS at 12:05

## 2020-12-31 RX ADMIN — BUMETANIDE 1 MG: 1 TABLET ORAL at 17:28

## 2020-12-31 RX ADMIN — BUDESONIDE AND FORMOTEROL FUMARATE DIHYDRATE 2 PUFF: 160; 4.5 AEROSOL RESPIRATORY (INHALATION) at 19:50

## 2020-12-31 RX ADMIN — INSULIN LISPRO 20 UNITS: 100 INJECTION, SOLUTION INTRAVENOUS; SUBCUTANEOUS at 17:27

## 2020-12-31 RX ADMIN — ENOXAPARIN SODIUM 40 MG: 40 INJECTION SUBCUTANEOUS at 08:02

## 2020-12-31 RX ADMIN — ASPIRIN 81 MG: 81 TABLET, COATED ORAL at 08:02

## 2020-12-31 RX ADMIN — DEXAMETHASONE SODIUM PHOSPHATE 6 MG: 10 INJECTION, SOLUTION INTRAMUSCULAR; INTRAVENOUS at 08:02

## 2020-12-31 RX ADMIN — BUDESONIDE AND FORMOTEROL FUMARATE DIHYDRATE 2 PUFF: 160; 4.5 AEROSOL RESPIRATORY (INHALATION) at 09:50

## 2020-12-31 RX ADMIN — ENOXAPARIN SODIUM 40 MG: 40 INJECTION SUBCUTANEOUS at 21:30

## 2020-12-31 RX ADMIN — SODIUM CHLORIDE, PRESERVATIVE FREE 10 ML: 5 INJECTION INTRAVENOUS at 20:12

## 2020-12-31 RX ADMIN — INSULIN GLARGINE 80 UNITS: 100 INJECTION, SOLUTION SUBCUTANEOUS at 20:12

## 2020-12-31 RX ADMIN — CARVEDILOL 6.25 MG: 6.25 TABLET, FILM COATED ORAL at 20:12

## 2021-01-01 LAB
ALBUMIN SERPL-MCNC: 2.8 G/DL (ref 3.5–5.2)
ALBUMIN/GLOB SERPL: 0.9 G/DL
ALP SERPL-CCNC: 187 U/L (ref 39–117)
ALT SERPL W P-5'-P-CCNC: 58 U/L (ref 1–41)
ANION GAP SERPL CALCULATED.3IONS-SCNC: 8.6 MMOL/L (ref 5–15)
AST SERPL-CCNC: 32 U/L (ref 1–40)
BASOPHILS # BLD AUTO: 0.01 10*3/MM3 (ref 0–0.2)
BASOPHILS NFR BLD AUTO: 0.1 % (ref 0–1.5)
BILIRUB CONJ SERPL-MCNC: <0.2 MG/DL (ref 0–0.3)
BILIRUB SERPL-MCNC: 0.3 MG/DL (ref 0–1.2)
BUN SERPL-MCNC: 40 MG/DL (ref 6–20)
BUN/CREAT SERPL: 39.2 (ref 7–25)
CALCIUM SPEC-SCNC: 8 MG/DL (ref 8.6–10.5)
CHLORIDE SERPL-SCNC: 102 MMOL/L (ref 98–107)
CK SERPL-CCNC: 314 U/L (ref 20–200)
CO2 SERPL-SCNC: 20.4 MMOL/L (ref 22–29)
CREAT SERPL-MCNC: 1.02 MG/DL (ref 0.76–1.27)
CRP SERPL-MCNC: 0.41 MG/DL (ref 0–0.5)
D DIMER PPP FEU-MCNC: 0.62 MCGFEU/ML (ref 0–0.49)
DEPRECATED RDW RBC AUTO: 39.5 FL (ref 37–54)
EOSINOPHIL # BLD AUTO: 0.01 10*3/MM3 (ref 0–0.4)
EOSINOPHIL NFR BLD AUTO: 0.1 % (ref 0.3–6.2)
ERYTHROCYTE [DISTWIDTH] IN BLOOD BY AUTOMATED COUNT: 13.4 % (ref 12.3–15.4)
FERRITIN SERPL-MCNC: 1150 NG/ML (ref 30–400)
FIBRINOGEN PPP-MCNC: 376 MG/DL (ref 219–464)
GFR SERPL CREATININE-BSD FRML MDRD: 83 ML/MIN/1.73
GLOBULIN UR ELPH-MCNC: 3 GM/DL
GLUCOSE BLDC GLUCOMTR-MCNC: 308 MG/DL (ref 70–130)
GLUCOSE BLDC GLUCOMTR-MCNC: 325 MG/DL (ref 70–130)
GLUCOSE BLDC GLUCOMTR-MCNC: 336 MG/DL (ref 70–130)
GLUCOSE BLDC GLUCOMTR-MCNC: 388 MG/DL (ref 70–130)
GLUCOSE SERPL-MCNC: 323 MG/DL (ref 65–99)
HCT VFR BLD AUTO: 40.1 % (ref 37.5–51)
HGB BLD-MCNC: 13.4 G/DL (ref 13–17.7)
IMM GRANULOCYTES # BLD AUTO: 0.15 10*3/MM3 (ref 0–0.05)
IMM GRANULOCYTES NFR BLD AUTO: 1.4 % (ref 0–0.5)
LDH SERPL-CCNC: 387 U/L (ref 135–225)
LYMPHOCYTES # BLD AUTO: 0.32 10*3/MM3 (ref 0.7–3.1)
LYMPHOCYTES NFR BLD AUTO: 3.1 % (ref 19.6–45.3)
MCH RBC QN AUTO: 27.7 PG (ref 26.6–33)
MCHC RBC AUTO-ENTMCNC: 33.4 G/DL (ref 31.5–35.7)
MCV RBC AUTO: 83 FL (ref 79–97)
MONOCYTES # BLD AUTO: 0.49 10*3/MM3 (ref 0.1–0.9)
MONOCYTES NFR BLD AUTO: 4.7 % (ref 5–12)
NEUTROPHILS NFR BLD AUTO: 9.51 10*3/MM3 (ref 1.7–7)
NEUTROPHILS NFR BLD AUTO: 90.6 % (ref 42.7–76)
NRBC BLD AUTO-RTO: 0 /100 WBC (ref 0–0.2)
NT-PROBNP SERPL-MCNC: 1038 PG/ML (ref 0–450)
PLATELET # BLD AUTO: 158 10*3/MM3 (ref 140–450)
PMV BLD AUTO: 12 FL (ref 6–12)
POTASSIUM SERPL-SCNC: 4.4 MMOL/L (ref 3.5–5.2)
PROT SERPL-MCNC: 5.8 G/DL (ref 6–8.5)
RBC # BLD AUTO: 4.83 10*6/MM3 (ref 4.14–5.8)
SODIUM SERPL-SCNC: 131 MMOL/L (ref 136–145)
WBC # BLD AUTO: 10.49 10*3/MM3 (ref 3.4–10.8)

## 2021-01-01 PROCEDURE — 25010000002 DEXAMETHASONE SODIUM PHOSPHATE 10 MG/ML SOLUTION: Performed by: INTERNAL MEDICINE

## 2021-01-01 PROCEDURE — 63710000001 INSULIN LISPRO (HUMAN) PER 5 UNITS: Performed by: HOSPITALIST

## 2021-01-01 PROCEDURE — 82248 BILIRUBIN DIRECT: CPT | Performed by: HOSPITALIST

## 2021-01-01 PROCEDURE — 94799 UNLISTED PULMONARY SVC/PX: CPT

## 2021-01-01 PROCEDURE — 83615 LACTATE (LD) (LDH) ENZYME: CPT | Performed by: HOSPITALIST

## 2021-01-01 PROCEDURE — 82728 ASSAY OF FERRITIN: CPT | Performed by: HOSPITALIST

## 2021-01-01 PROCEDURE — 83880 ASSAY OF NATRIURETIC PEPTIDE: CPT | Performed by: INTERNAL MEDICINE

## 2021-01-01 PROCEDURE — 85025 COMPLETE CBC W/AUTO DIFF WBC: CPT | Performed by: HOSPITALIST

## 2021-01-01 PROCEDURE — 85379 FIBRIN DEGRADATION QUANT: CPT | Performed by: HOSPITALIST

## 2021-01-01 PROCEDURE — 82550 ASSAY OF CK (CPK): CPT | Performed by: HOSPITALIST

## 2021-01-01 PROCEDURE — 80053 COMPREHEN METABOLIC PANEL: CPT | Performed by: HOSPITALIST

## 2021-01-01 PROCEDURE — 85384 FIBRINOGEN ACTIVITY: CPT | Performed by: HOSPITALIST

## 2021-01-01 PROCEDURE — 63710000001 INSULIN GLARGINE PER 5 UNITS: Performed by: INTERNAL MEDICINE

## 2021-01-01 PROCEDURE — 25010000002 ENOXAPARIN PER 10 MG: Performed by: HOSPITALIST

## 2021-01-01 PROCEDURE — 86140 C-REACTIVE PROTEIN: CPT | Performed by: HOSPITALIST

## 2021-01-01 PROCEDURE — 82962 GLUCOSE BLOOD TEST: CPT

## 2021-01-01 RX ORDER — INSULIN GLARGINE 100 [IU]/ML
40 INJECTION, SOLUTION SUBCUTANEOUS EVERY 12 HOURS SCHEDULED
Status: DISCONTINUED | OUTPATIENT
Start: 2021-01-01 | End: 2021-01-01

## 2021-01-01 RX ORDER — INSULIN GLARGINE 100 [IU]/ML
50 INJECTION, SOLUTION SUBCUTANEOUS EVERY 12 HOURS SCHEDULED
Status: DISCONTINUED | OUTPATIENT
Start: 2021-01-01 | End: 2021-01-02

## 2021-01-01 RX ADMIN — DEXAMETHASONE SODIUM PHOSPHATE 6 MG: 10 INJECTION, SOLUTION INTRAMUSCULAR; INTRAVENOUS at 10:02

## 2021-01-01 RX ADMIN — INSULIN GLARGINE 40 UNITS: 100 INJECTION, SOLUTION SUBCUTANEOUS at 10:07

## 2021-01-01 RX ADMIN — INSULIN GLARGINE 50 UNITS: 100 INJECTION, SOLUTION SUBCUTANEOUS at 22:06

## 2021-01-01 RX ADMIN — DEXAMETHASONE SODIUM PHOSPHATE 6 MG: 10 INJECTION, SOLUTION INTRAMUSCULAR; INTRAVENOUS at 22:03

## 2021-01-01 RX ADMIN — INSULIN LISPRO 16 UNITS: 100 INJECTION, SOLUTION INTRAVENOUS; SUBCUTANEOUS at 10:03

## 2021-01-01 RX ADMIN — BUDESONIDE AND FORMOTEROL FUMARATE DIHYDRATE 2 PUFF: 160; 4.5 AEROSOL RESPIRATORY (INHALATION) at 08:59

## 2021-01-01 RX ADMIN — CARVEDILOL 6.25 MG: 6.25 TABLET, FILM COATED ORAL at 22:03

## 2021-01-01 RX ADMIN — BUDESONIDE AND FORMOTEROL FUMARATE DIHYDRATE 2 PUFF: 160; 4.5 AEROSOL RESPIRATORY (INHALATION) at 19:57

## 2021-01-01 RX ADMIN — GUAIFENESIN AND DEXTROMETHORPHAN 5 ML: 100; 10 SYRUP ORAL at 22:06

## 2021-01-01 RX ADMIN — SODIUM CHLORIDE, PRESERVATIVE FREE 10 ML: 5 INJECTION INTRAVENOUS at 10:02

## 2021-01-01 RX ADMIN — ENOXAPARIN SODIUM 40 MG: 40 INJECTION SUBCUTANEOUS at 10:02

## 2021-01-01 RX ADMIN — INSULIN LISPRO 14 UNITS: 100 INJECTION, SOLUTION INTRAVENOUS; SUBCUTANEOUS at 12:00

## 2021-01-01 RX ADMIN — ASPIRIN 81 MG: 81 TABLET, COATED ORAL at 10:02

## 2021-01-01 RX ADMIN — SODIUM CHLORIDE, PRESERVATIVE FREE 10 ML: 5 INJECTION INTRAVENOUS at 22:04

## 2021-01-01 RX ADMIN — INSULIN LISPRO 20 UNITS: 100 INJECTION, SOLUTION INTRAVENOUS; SUBCUTANEOUS at 17:25

## 2021-01-01 RX ADMIN — ENOXAPARIN SODIUM 40 MG: 40 INJECTION SUBCUTANEOUS at 22:04

## 2021-01-01 RX ADMIN — ATORVASTATIN CALCIUM 80 MG: 80 TABLET, FILM COATED ORAL at 22:02

## 2021-01-01 RX ADMIN — BUMETANIDE 1 MG: 1 TABLET ORAL at 10:02

## 2021-01-01 RX ADMIN — CARVEDILOL 6.25 MG: 6.25 TABLET, FILM COATED ORAL at 10:03

## 2021-01-01 NOTE — PLAN OF CARE
Goal Outcome Evaluation:  Plan of Care Reviewed With: patient  Progress: improving  Outcome Summary: VSS; Pt up in chair for part of the night and then to bed; Optioflow decreased to 60/80; Sats in the mid 90's; No complaints noted; Blood sugar remains in the 300's; Will continue to monitor

## 2021-01-01 NOTE — PROGRESS NOTES
[facility] HOSPITALIST    PROGRESS NOTE    Name:  Rufus Dorsey   Age:  35 y.o.  Sex:  male  :  1985  MRN:  [id number]   Visit Number:  [id number]  Admission Date:  2020  Date Of Service:  21  Primary Care Physician:  Kusum, No Known     LOS: 11 days :  Patient Care Team:  Provider, No Known as PCP - General:    History taken from:     patient chart    Chief Complaint:      Dyspnea    Subjective     Interval History:     Patient seen and examined again today.    Patient is 35-year-old with diabetes type 2, CHF with systolic heart failure, coronary artery disease with ischemic cardiomyopathy, hyperlipidemia who came in 2020 with 2 weeks of cough, chest pain, shortness of breath, fevers.  Chest x-ray showed bilateral infiltrates consistent with Covid pneumonia, Covid-19 testing was positive.  Patient was admitted.  He is been given remdesivir and Decadron.  He continues to require high flow oxygen.  Venous Dopplers were done twice since admission have both been negative.  His home Bumex has been added for edema.     He has severe shortness of breath still, requiring 80% oxygen.  He denies any chest pressure, nausea, vomiting, diarrhea, pain.  He claims he is doing better.  Reviewed inflammatory markers which appear to be improving.    Review of Systems:     All systems were reviewed and negative except for:  Respiratory: positive for  shortness of air    Objective     Vital Signs:    Temp:  [96.3 °F (35.7 °C)-96.8 °F (36 °C)] 96.3 °F (35.7 °C)  Heart Rate:  [] 81  Resp:  [16-20] 18  BP: (148-178)/() 166/98    Physical Exam:    General: Alert and oriented x4, morbidly obese, mild distress  Heart: Regular rate and rhythm without murmur rub or thrill  Lungs: Rhonchi noted bilaterally without use of accessory muscles respiration  Abdomen: Soft/nontender/nondistended.  No HSM is noted.  MSK: 5/5 strength in upper/lower extremities bilaterally.     Results  Review:      I reviewed the patient's new clinical results.    Labs:    Lab Results (last 24 hours)     Procedure Component Value Units Date/Time    BNP [[id number]] Collected: 01/01/21 0625    Specimen: Blood Updated: 01/01/21 1239    POC Glucose Once [[id number]]  (Abnormal) Collected: 01/01/21 1133    Specimen: Blood Updated: 01/01/21 1137     Glucose 325 mg/dL     Ferritin [[id number]]  (Abnormal) Collected: 01/01/21 0625    Specimen: Blood Updated: 01/01/21 0854     Ferritin 1,150.00 ng/mL     Narrative:      Results may be falsely decreased if patient taking Biotin.      Comprehensive Metabolic Panel [[id number]]  (Abnormal) Collected: 01/01/21 0625    Specimen: Blood Updated: 01/01/21 0813     Glucose 323 mg/dL      BUN 40 mg/dL      Creatinine 1.02 mg/dL      Sodium 131 mmol/L      Potassium 4.4 mmol/L      Chloride 102 mmol/L      CO2 20.4 mmol/L      Calcium 8.0 mg/dL      Total Protein 5.8 g/dL      Albumin 2.80 g/dL      ALT (SGPT) 58 U/L      AST (SGOT) 32 U/L      Alkaline Phosphatase 187 U/L      Total Bilirubin 0.3 mg/dL      eGFR Non African Amer 83 mL/min/1.73      Globulin 3.0 gm/dL      A/G Ratio 0.9 g/dL      BUN/Creatinine Ratio 39.2     Anion Gap 8.6 mmol/L     Narrative:      GFR Normal >60  Chronic Kidney Disease <60  Kidney Failure <15      CK [[id number]]  (Abnormal) Collected: 01/01/21 0625    Specimen: Blood Updated: 01/01/21 0728     Creatine Kinase 314 U/L     C-reactive Protein [[id number]]  (Normal) Collected: 01/01/21 0625    Specimen: Blood Updated: 01/01/21 0728     C-Reactive Protein 0.41 mg/dL     Lactate Dehydrogenase [[id number]]  (Abnormal) Collected: 01/01/21 0625    Specimen: Blood Updated: 01/01/21 0728      U/L     Bilirubin, Direct [[id number]]  (Normal) Collected: 01/01/21 0625    Specimen: Blood Updated: 01/01/21 0728     Bilirubin, Direct <0.2 mg/dL     D-dimer, Quantitative [[id number]]  (Abnormal) Collected: 01/01/21 0625    Specimen: Blood from Hand, Right Updated:  01/01/21 0710     D-Dimer, Quantitative 0.62 MCGFEU/mL     Narrative:      The Stago D-Dimer test used in conjunction with a clinical pretest probability (PTP) assessment model, has been approved by the FDA to rule out the presence of venous thromboembolism (VTE) in outpatients suspected of deep venous thrombosis (DVT) or pulmonary embolism (PE). The cut-off for negative predictive value is <0.50 MCGFEU/mL.    CBC & Differential [[id number]]  (Abnormal) Collected: 01/01/21 0625    Specimen: Blood Updated: 01/01/21 0710    Narrative:      The following orders were created for panel order CBC & Differential.  Procedure                               Abnormality         Status                     ---------                               -----------         ------                     CBC Auto Differential[[id number]]        Abnormal            Final result                 Please view results for these tests on the individual orders.    CBC Auto Differential [[id number]]  (Abnormal) Collected: 01/01/21 0625    Specimen: Blood Updated: 01/01/21 0710     WBC 10.49 10*3/mm3      RBC 4.83 10*6/mm3      Hemoglobin 13.4 g/dL      Hematocrit 40.1 %      MCV 83.0 fL      MCH 27.7 pg      MCHC 33.4 g/dL      RDW 13.4 %      RDW-SD 39.5 fl      MPV 12.0 fL      Platelets 158 10*3/mm3      Neutrophil % 90.6 %      Lymphocyte % 3.1 %      Monocyte % 4.7 %      Eosinophil % 0.1 %      Basophil % 0.1 %      Immature Grans % 1.4 %      Neutrophils, Absolute 9.51 10*3/mm3      Lymphocytes, Absolute 0.32 10*3/mm3      Monocytes, Absolute 0.49 10*3/mm3      Eosinophils, Absolute 0.01 10*3/mm3      Basophils, Absolute 0.01 10*3/mm3      Immature Grans, Absolute 0.15 10*3/mm3      nRBC 0.0 /100 WBC     Fibrinogen [[id number]]  (Normal) Collected: 01/01/21 0625    Specimen: Blood from Hand, Right Updated: 01/01/21 0707     Fibrinogen 376 mg/dL     POC Glucose Once [[id number]]  (Abnormal) Collected: 01/01/21 0623    Specimen: Blood Updated:  01/01/21 0623     Glucose 308 mg/dL     POC Glucose Once [[id number]]  (Abnormal) Collected: 12/31/20 1957    Specimen: Blood Updated: 12/31/20 1958     Glucose 313 mg/dL     POC Glucose Once [[id number]]  (Abnormal) Collected: 12/31/20 1556    Specimen: Blood Updated: 12/31/20 1557     Glucose 388 mg/dL            Radiology:    Imaging Results (Last 24 Hours)     ** No results found for the last 24 hours. **          Medication Review:     aspirin, 81 mg, Oral, Daily  atorvastatin, 80 mg, Oral, Nightly  budesonide-formoterol, 2 puff, Inhalation, BID - RT  bumetanide, 1 mg, Oral, Daily  carvedilol, 6.25 mg, Oral, BID With Meals  dexamethasone, 6 mg, Intravenous, BID  enoxaparin, 40 mg, Subcutaneous, Q12H  insulin glargine, 50 Units, Subcutaneous, Q12H  insulin lispro, 0-24 Units, Subcutaneous, TID AC  sodium chloride, 10 mL, Intravenous, Q12H             Assessment/Plan     Problem List Items Addressed This Visit        Other    Pneumonia of both lungs due to infectious organism - Primary    Acute respiratory failure with hypoxia (CMS/HCC)    * (Principal) COVID-19 virus infection    Acute kidney injury (CMS/HCC)    Relevant Medications    bumetanide (BUMEX) 1 MG tablet    spironolactone (ALDACTONE) 25 MG tablet    Thrombocytopenia (CMS/HCC)          1. Acute hypoxic respiratory failure secondary to COVID-19 pneumonia, with high oxygen requirements.    2.  History of coronary artery disease, on aspirin, Coreg and statins and will be continued.  3.  Diabetes mellitus,   hemoglobin A1c 9.5.  4.  Hyperlipidemia, on statins.  5.  Morbid obesity, patient was counseled to lose weight.      Plan:    Continue try to wean oxygen.  Monitor inflammatory markers.  Continue Lovenox for DVT prophylaxis twice daily due to morbid obesity.  Continue with Decadron.  Patient is status post remdesivir.  Increase his Lantus to 50 units of twice daily as his blood sugars are uncontrolled.  Patient status is guarded.  Patient is full code.   Further recommendations will depend on clinical course.    Shayan Bear,   01/01/21  13:26 EST

## 2021-01-01 NOTE — PROGRESS NOTES
"  Daily Progress Note.   [address]  1/1/2021    Patient:  Name:  Rufus Dorsey  MRN:  [id number]  1985  35 y.o.  male         CC: soa    Interval History:  Follow-up for acute hypoxic respiratory failure Covid pneumonia  Patient is up at bedside he says he feels okay still requiring 60 L heated high flow with 80% FiO2.  He says he has no chest pain he says his cough maybe once today no hemoptysis no pleuritic chest pain no high fevers.  He says he feels good despite his high oxygen needs.  ROS: No fever, no diarrhea, no chest pain  PMFSSH: no change    Physical Exam:  /98 (BP Location: Left arm, Patient Position: Lying)   Pulse 81   Temp 96.3 °F (35.7 °C) (Oral)   Resp 18   Ht 167.6 cm (65.98\")   Wt 125 kg (275 lb 3.2 oz)   SpO2 94%   BMI 44.44 kg/m²   Body mass index is 44.44 kg/m².    Intake/Output Summary (Last 24 hours) at 1/1/2021 1325  Last data filed at 12/31/2020 2310  Gross per 24 hour   Intake 360 ml   Output --   Net 360 ml   60lpm/80% fio2    General appearance: Nontoxic, conversant   Eyes: anicteric sclerae, moist conjunctivae; no lid-lag; PERRLA  HENT: Large neck circumference for range of motion unable to  JVD  Lungs: Distant breath sounds however there is no wheeze I cannot detect any crackles unlabored respiratory effort  CV: RRR, no rub  Abdomen: Soft, non-tender; no masses or HSM obese  Extremities: Positive bilateral lower extremity trace to 1+ peripheral edema or extremity lymphadenopathy  Skin: Warm well perfused no rash diffuse  Psych: Appropriate affect, alert and oriented to person, place and time  Neuro moves all ext, cns 2-12 intact sensation intact    Data Review:  Notable Labs:  Results from last 7 days   Lab Units 01/01/21  0625 12/31/20  0821 12/30/20  0514 12/29/20  0827 12/28/20  0612 12/27/20  0917 12/26/20  0525   WBC 10*3/mm3 10.49 11.29* 9.12 17.70* 11.65* 11.69* 10.59   HEMOGLOBIN g/dL 13.4 14.0 13.8 16.0 14.2 14.1 14.5   PLATELETS " 10*3/mm3 158 189 195 287 199 199 216     Results from last 7 days   Lab Units 01/01/21  0625 12/31/20  0821 12/30/20  0514 12/29/20  0827 12/28/20  0612 12/27/20  0917 12/26/20  0525   SODIUM mmol/L 131* 130* 131* 136 134* 136 135*   POTASSIUM mmol/L 4.4 4.3 5.1 3.9 4.4 4.3 4.2   CHLORIDE mmol/L 102 102 103 104 104 106 101   CO2 mmol/L 20.4* 19.7* 21.6* 22.8 21.0* 21.3* 24.7   BUN mg/dL 40* 36* 37* 37* 40* 40* 39*   CREATININE mg/dL 1.02 1.13 0.94 1.10 1.03 1.00 1.06   GLUCOSE mg/dL 323* 278* 280* 105* 308* 267* 248*   CALCIUM mg/dL 8.0* 8.2* 7.9* 8.1* 7.6* 7.6* 7.8*   Estimated Creatinine Clearance: 126.2 mL/min (by C-G formula based on SCr of 1.02 mg/dL).    Results from last 7 days   Lab Units 01/01/21  0625 12/31/20  0821 12/30/20  0514  12/28/20  0612   LDH U/L 387*  --  456*  --  571*   AST (SGOT) U/L 32 44* 43*   < > 72*   ALT (SGPT) U/L 58* 68* 68*   < > 79*   FERRITIN ng/mL 1,150.00* 1,375.00* 1,414.00*   < > 1,983.00*   D DIMER QUANT MCGFEU/mL 0.62*  --  0.87*  --  1.07*   CRP mg/dL 0.41 0.91* 2.25*   < > 0.58*   PLATELETS 10*3/mm3 158 189 195   < > 199    < > = values in this interval not displayed.             Imaging:  Reviewed chest images personally from past 3 days    Scheduled meds:    aspirin, 81 mg, Oral, Daily  atorvastatin, 80 mg, Oral, Nightly  budesonide-formoterol, 2 puff, Inhalation, BID - RT  bumetanide, 1 mg, Oral, Daily  carvedilol, 6.25 mg, Oral, BID With Meals  dexamethasone, 6 mg, Intravenous, BID  enoxaparin, 40 mg, Subcutaneous, Q12H  insulin glargine, 40 Units, Subcutaneous, Q12H  insulin lispro, 0-24 Units, Subcutaneous, TID AC  sodium chloride, 10 mL, Intravenous, Q12H        ASSESSMENT  /  PLAN:  Acute hypoxic respiratory failure  COVID-19 pneumonia hyperglycemia  Hyponatremia  Ischemic cardiomyopathy  Coronary artery disease  Systolic congestive heart failure-decompensated  Morbid obesity      Patient appears volume overloaded.  1 mg Bumex today suspect he may need IV Bumex  tomorrow  Continue dexamethasone twice daily  Lovenox 40 mg every 12 given Covid hypercoagulability risk as well as his weight.  Wean FiO2 on his OptiFlow as we can  Chest x-ray reviewed prior hospital course reviewed.  All issues new to me today.  Discussed with bedside nurse appreciate her care and insight the patient.          Chris Reveles MD  [facility]  01/01/21  13:25 EST

## 2021-01-01 NOTE — PLAN OF CARE
Goal Outcome Evaluation:  Plan of Care Reviewed With: patient  Progress: improving   VSS, no c/o pain or SOA. Weaning down FI02 and hopefully to HIFlo NC tonight. Educated patient on the importance of proper diet modifications r/t increase blood sugar. Lantus increased per MD order this morning. CTM.

## 2021-01-02 LAB
ALBUMIN SERPL-MCNC: 2.6 G/DL (ref 3.5–5.2)
ALBUMIN/GLOB SERPL: 0.8 G/DL
ALP SERPL-CCNC: 173 U/L (ref 39–117)
ALT SERPL W P-5'-P-CCNC: 52 U/L (ref 1–41)
ANION GAP SERPL CALCULATED.3IONS-SCNC: 7.7 MMOL/L (ref 5–15)
AST SERPL-CCNC: 26 U/L (ref 1–40)
BASOPHILS # BLD AUTO: 0.01 10*3/MM3 (ref 0–0.2)
BASOPHILS NFR BLD AUTO: 0.1 % (ref 0–1.5)
BILIRUB CONJ SERPL-MCNC: <0.2 MG/DL (ref 0–0.3)
BILIRUB SERPL-MCNC: 0.3 MG/DL (ref 0–1.2)
BUN SERPL-MCNC: 38 MG/DL (ref 6–20)
BUN/CREAT SERPL: 31.4 (ref 7–25)
CALCIUM SPEC-SCNC: 8.1 MG/DL (ref 8.6–10.5)
CHLORIDE SERPL-SCNC: 102 MMOL/L (ref 98–107)
CK SERPL-CCNC: 199 U/L (ref 20–200)
CO2 SERPL-SCNC: 23.3 MMOL/L (ref 22–29)
CREAT SERPL-MCNC: 1.21 MG/DL (ref 0.76–1.27)
CRP SERPL-MCNC: 0.23 MG/DL (ref 0–0.5)
DEPRECATED RDW RBC AUTO: 41 FL (ref 37–54)
EOSINOPHIL # BLD AUTO: 0 10*3/MM3 (ref 0–0.4)
EOSINOPHIL NFR BLD AUTO: 0 % (ref 0.3–6.2)
ERYTHROCYTE [DISTWIDTH] IN BLOOD BY AUTOMATED COUNT: 13.5 % (ref 12.3–15.4)
FERRITIN SERPL-MCNC: 1090 NG/ML (ref 30–400)
GFR SERPL CREATININE-BSD FRML MDRD: 68 ML/MIN/1.73
GLOBULIN UR ELPH-MCNC: 3.1 GM/DL
GLUCOSE BLDC GLUCOMTR-MCNC: 293 MG/DL (ref 70–130)
GLUCOSE BLDC GLUCOMTR-MCNC: 311 MG/DL (ref 70–130)
GLUCOSE BLDC GLUCOMTR-MCNC: 341 MG/DL (ref 70–130)
GLUCOSE BLDC GLUCOMTR-MCNC: 365 MG/DL (ref 70–130)
GLUCOSE SERPL-MCNC: 323 MG/DL (ref 65–99)
HCT VFR BLD AUTO: 39.6 % (ref 37.5–51)
HGB BLD-MCNC: 13.2 G/DL (ref 13–17.7)
IMM GRANULOCYTES # BLD AUTO: 0.13 10*3/MM3 (ref 0–0.05)
IMM GRANULOCYTES NFR BLD AUTO: 1.3 % (ref 0–0.5)
LYMPHOCYTES # BLD AUTO: 0.32 10*3/MM3 (ref 0.7–3.1)
LYMPHOCYTES NFR BLD AUTO: 3.3 % (ref 19.6–45.3)
MCH RBC QN AUTO: 28 PG (ref 26.6–33)
MCHC RBC AUTO-ENTMCNC: 33.3 G/DL (ref 31.5–35.7)
MCV RBC AUTO: 84.1 FL (ref 79–97)
MONOCYTES # BLD AUTO: 0.4 10*3/MM3 (ref 0.1–0.9)
MONOCYTES NFR BLD AUTO: 4.1 % (ref 5–12)
NEUTROPHILS NFR BLD AUTO: 8.84 10*3/MM3 (ref 1.7–7)
NEUTROPHILS NFR BLD AUTO: 91.2 % (ref 42.7–76)
NRBC BLD AUTO-RTO: 0 /100 WBC (ref 0–0.2)
PLATELET # BLD AUTO: 144 10*3/MM3 (ref 140–450)
PMV BLD AUTO: 11.8 FL (ref 6–12)
POTASSIUM SERPL-SCNC: 4.6 MMOL/L (ref 3.5–5.2)
PROT SERPL-MCNC: 5.7 G/DL (ref 6–8.5)
RBC # BLD AUTO: 4.71 10*6/MM3 (ref 4.14–5.8)
SODIUM SERPL-SCNC: 133 MMOL/L (ref 136–145)
WBC # BLD AUTO: 9.7 10*3/MM3 (ref 3.4–10.8)

## 2021-01-02 PROCEDURE — 82962 GLUCOSE BLOOD TEST: CPT

## 2021-01-02 PROCEDURE — 82550 ASSAY OF CK (CPK): CPT | Performed by: HOSPITALIST

## 2021-01-02 PROCEDURE — 80053 COMPREHEN METABOLIC PANEL: CPT | Performed by: HOSPITALIST

## 2021-01-02 PROCEDURE — 85025 COMPLETE CBC W/AUTO DIFF WBC: CPT | Performed by: HOSPITALIST

## 2021-01-02 PROCEDURE — 63710000001 INSULIN LISPRO (HUMAN) PER 5 UNITS: Performed by: HOSPITALIST

## 2021-01-02 PROCEDURE — 94799 UNLISTED PULMONARY SVC/PX: CPT

## 2021-01-02 PROCEDURE — 25010000002 DEXAMETHASONE SODIUM PHOSPHATE 10 MG/ML SOLUTION: Performed by: INTERNAL MEDICINE

## 2021-01-02 PROCEDURE — 25010000002 ENOXAPARIN PER 10 MG: Performed by: HOSPITALIST

## 2021-01-02 PROCEDURE — 82728 ASSAY OF FERRITIN: CPT | Performed by: HOSPITALIST

## 2021-01-02 PROCEDURE — 82248 BILIRUBIN DIRECT: CPT | Performed by: HOSPITALIST

## 2021-01-02 PROCEDURE — 86140 C-REACTIVE PROTEIN: CPT | Performed by: HOSPITALIST

## 2021-01-02 PROCEDURE — 63710000001 INSULIN GLARGINE PER 5 UNITS: Performed by: INTERNAL MEDICINE

## 2021-01-02 RX ORDER — INSULIN GLARGINE 100 [IU]/ML
55 INJECTION, SOLUTION SUBCUTANEOUS EVERY 12 HOURS SCHEDULED
Status: DISCONTINUED | OUTPATIENT
Start: 2021-01-02 | End: 2021-01-04

## 2021-01-02 RX ADMIN — DEXAMETHASONE SODIUM PHOSPHATE 6 MG: 10 INJECTION, SOLUTION INTRAMUSCULAR; INTRAVENOUS at 08:55

## 2021-01-02 RX ADMIN — INSULIN GLARGINE 50 UNITS: 100 INJECTION, SOLUTION SUBCUTANEOUS at 08:56

## 2021-01-02 RX ADMIN — INSULIN LISPRO 12 UNITS: 100 INJECTION, SOLUTION INTRAVENOUS; SUBCUTANEOUS at 17:51

## 2021-01-02 RX ADMIN — ASPIRIN 81 MG: 81 TABLET, COATED ORAL at 08:55

## 2021-01-02 RX ADMIN — ENOXAPARIN SODIUM 40 MG: 40 INJECTION SUBCUTANEOUS at 08:55

## 2021-01-02 RX ADMIN — INSULIN GLARGINE 55 UNITS: 100 INJECTION, SOLUTION SUBCUTANEOUS at 21:07

## 2021-01-02 RX ADMIN — BUDESONIDE AND FORMOTEROL FUMARATE DIHYDRATE 2 PUFF: 160; 4.5 AEROSOL RESPIRATORY (INHALATION) at 20:21

## 2021-01-02 RX ADMIN — INSULIN LISPRO 16 UNITS: 100 INJECTION, SOLUTION INTRAVENOUS; SUBCUTANEOUS at 08:55

## 2021-01-02 RX ADMIN — BUMETANIDE 1 MG: 1 TABLET ORAL at 08:55

## 2021-01-02 RX ADMIN — DEXAMETHASONE SODIUM PHOSPHATE 6 MG: 10 INJECTION, SOLUTION INTRAMUSCULAR; INTRAVENOUS at 20:15

## 2021-01-02 RX ADMIN — INSULIN LISPRO 12 UNITS: 100 INJECTION, SOLUTION INTRAVENOUS; SUBCUTANEOUS at 12:21

## 2021-01-02 RX ADMIN — ENOXAPARIN SODIUM 40 MG: 40 INJECTION SUBCUTANEOUS at 20:15

## 2021-01-02 RX ADMIN — CARVEDILOL 6.25 MG: 6.25 TABLET, FILM COATED ORAL at 20:15

## 2021-01-02 RX ADMIN — CARVEDILOL 6.25 MG: 6.25 TABLET, FILM COATED ORAL at 08:55

## 2021-01-02 RX ADMIN — SODIUM CHLORIDE, PRESERVATIVE FREE 10 ML: 5 INJECTION INTRAVENOUS at 08:56

## 2021-01-02 RX ADMIN — SODIUM CHLORIDE, PRESERVATIVE FREE 10 ML: 5 INJECTION INTRAVENOUS at 20:15

## 2021-01-02 RX ADMIN — ATORVASTATIN CALCIUM 80 MG: 80 TABLET, FILM COATED ORAL at 20:15

## 2021-01-02 RX ADMIN — BUDESONIDE AND FORMOTEROL FUMARATE DIHYDRATE 2 PUFF: 160; 4.5 AEROSOL RESPIRATORY (INHALATION) at 09:22

## 2021-01-02 NOTE — PROGRESS NOTES
[facility] HOSPITALIST    PROGRESS NOTE    Name:  Rufus Dorsey   Age:  35 y.o.  Sex:  male  :  1985  MRN:  [id number]   Visit Number:  [id number]  Admission Date:  2020  Date Of Service:  21  Primary Care Physician:  Provider, No Known     LOS: 12 days :  Patient Care Team:  Provider, No Known as PCP - General:    History taken from:     patient chart    Chief Complaint:      Dyspnea    Subjective     Interval History:     Patient seen and examined again today.    Patient is 35-year-old with diabetes type 2, CHF with systolic heart failure, coronary artery disease with ischemic cardiomyopathy, hyperlipidemia who came in 2020 with 2 weeks of cough, chest pain, shortness of breath, fevers.  Chest x-ray showed bilateral infiltrates consistent with Covid pneumonia, Covid-19 testing was positive.  Patient was admitted.  He is been given remdesivir and Decadron.  He continues to require high flow oxygen.      He has severe shortness of breath still, worse on exertion.  He had taken off his oxygen last night and his O2 saturations were at the upper 80s.  He denies any chest pressure, nausea, vomiting, diarrhea, pain.  He claims he is doing better.  He does have some edema, he has been placed on Bumex which is his home medications.  Reviewed inflammatory markers which appear to be improving.    His blood sugars continue to be elevated while on the Decadron.    Review of Systems:     All systems were reviewed and negative except for:  Respiratory: positive for  shortness of air    Objective     Vital Signs:    Temp:  [96.6 °F (35.9 °C)-97.9 °F (36.6 °C)] 96.6 °F (35.9 °C)  Heart Rate:  [68-84] 72  Resp:  [18-20] 18  BP: (139-175)/(83-96) 147/86    Physical Exam:    General: Alert and oriented x4, morbidly obese, mild distress  Heart: Regular rate and rhythm without murmur rub or thrill  Lungs: Rhonchi noted bilaterally without use of accessory muscles respiration  Abdomen:  Soft/nontender/nondistended.  No HSM is noted.  MSK: 5/5 strength in upper/lower extremities bilaterally.     Results Review:      I reviewed the patient's new clinical results.    Labs:    Lab Results (last 24 hours)     Procedure Component Value Units Date/Time    POC Glucose Once [[id number]]  (Abnormal) Collected: 01/02/21 1051    Specimen: Blood Updated: 01/02/21 1053     Glucose 293 mg/dL     Ferritin [[id number]]  (Abnormal) Collected: 01/02/21 0700    Specimen: Blood Updated: 01/02/21 0817     Ferritin 1,090.00 ng/mL     Narrative:      Results may be falsely decreased if patient taking Biotin.      Comprehensive Metabolic Panel [[id number]]  (Abnormal) Collected: 01/02/21 0700    Specimen: Blood Updated: 01/02/21 0802     Glucose 323 mg/dL      BUN 38 mg/dL      Creatinine 1.21 mg/dL      Sodium 133 mmol/L      Potassium 4.6 mmol/L      Chloride 102 mmol/L      CO2 23.3 mmol/L      Calcium 8.1 mg/dL      Total Protein 5.7 g/dL      Albumin 2.60 g/dL      ALT (SGPT) 52 U/L      AST (SGOT) 26 U/L      Alkaline Phosphatase 173 U/L      Total Bilirubin 0.3 mg/dL      eGFR Non African Amer 68 mL/min/1.73      Globulin 3.1 gm/dL      A/G Ratio 0.8 g/dL      BUN/Creatinine Ratio 31.4     Anion Gap 7.7 mmol/L     Narrative:      GFR Normal >60  Chronic Kidney Disease <60  Kidney Failure <15      CK [[id number]]  (Normal) Collected: 01/02/21 0700    Specimen: Blood Updated: 01/02/21 0757     Creatine Kinase 199 U/L     C-reactive Protein [[id number]]  (Normal) Collected: 01/02/21 0700    Specimen: Blood Updated: 01/02/21 0757     C-Reactive Protein 0.23 mg/dL     Bilirubin, Direct [[id number]]  (Normal) Collected: 01/02/21 0700    Specimen: Blood Updated: 01/02/21 0757     Bilirubin, Direct <0.2 mg/dL     CBC & Differential [[id number]]  (Abnormal) Collected: 01/02/21 0700    Specimen: Blood Updated: 01/02/21 0733    Narrative:      The following orders were created for panel order CBC & Differential.  Procedure                                Abnormality         Status                     ---------                               -----------         ------                     CBC Auto Differential[[id number]]        Abnormal            Final result                 Please view results for these tests on the individual orders.    CBC Auto Differential [[id number]]  (Abnormal) Collected: 01/02/21 0700    Specimen: Blood Updated: 01/02/21 0733     WBC 9.70 10*3/mm3      RBC 4.71 10*6/mm3      Hemoglobin 13.2 g/dL      Hematocrit 39.6 %      MCV 84.1 fL      MCH 28.0 pg      MCHC 33.3 g/dL      RDW 13.5 %      RDW-SD 41.0 fl      MPV 11.8 fL      Platelets 144 10*3/mm3      Neutrophil % 91.2 %      Lymphocyte % 3.3 %      Monocyte % 4.1 %      Eosinophil % 0.0 %      Basophil % 0.1 %      Immature Grans % 1.3 %      Neutrophils, Absolute 8.84 10*3/mm3      Lymphocytes, Absolute 0.32 10*3/mm3      Monocytes, Absolute 0.40 10*3/mm3      Eosinophils, Absolute 0.00 10*3/mm3      Basophils, Absolute 0.01 10*3/mm3      Immature Grans, Absolute 0.13 10*3/mm3      nRBC 0.0 /100 WBC     POC Glucose Once [[id number]]  (Abnormal) Collected: 01/02/21 0616    Specimen: Blood Updated: 01/02/21 0618     Glucose 341 mg/dL     POC Glucose Once [[id number]]  (Abnormal) Collected: 01/01/21 2022    Specimen: Blood Updated: 01/01/21 2023     Glucose 336 mg/dL     POC Glucose Once [[id number]]  (Abnormal) Collected: 01/01/21 1708    Specimen: Blood Updated: 01/01/21 1713     Glucose 388 mg/dL            Radiology:    Imaging Results (Last 24 Hours)     ** No results found for the last 24 hours. **          Medication Review:     aspirin, 81 mg, Oral, Daily  atorvastatin, 80 mg, Oral, Nightly  budesonide-formoterol, 2 puff, Inhalation, BID - RT  bumetanide, 1 mg, Oral, Daily  carvedilol, 6.25 mg, Oral, BID With Meals  dexamethasone, 6 mg, Intravenous, BID  enoxaparin, 40 mg, Subcutaneous, Q12H  insulin glargine, 50 Units, Subcutaneous, Q12H  insulin lispro, 0-24 Units,  Subcutaneous, TID AC  sodium chloride, 10 mL, Intravenous, Q12H             Assessment/Plan     Problem List Items Addressed This Visit        Other    Pneumonia of both lungs due to infectious organism - Primary    Acute respiratory failure with hypoxia (CMS/HCC)    * (Principal) COVID-19 virus infection    Acute kidney injury (CMS/HCC)    Relevant Medications    bumetanide (BUMEX) 1 MG tablet    spironolactone (ALDACTONE) 25 MG tablet    Thrombocytopenia (CMS/HCC)          1. Acute hypoxic respiratory failure secondary to COVID-19 pneumonia, with high oxygen requirements.    2.  History of coronary artery disease, on aspirin, Coreg and statins and will be continued.  3.  Diabetes mellitus,   hemoglobin A1c 9.5.  4.  Hyperlipidemia, on statins.  5.  Morbid obesity, patient was counseled to lose weight.      Plan:    Continue try to wean oxygen.  Monitor inflammatory markers.  Continue Lovenox for DVT prophylaxis twice daily due to morbid obesity and severity of disease.  Continue with Decadron.  Patient is status post remdesivir.  Increase his Lantus to 55 units of twice daily as his blood sugars continue to be uncontrolled.  Patient status is guarded.  Patient is full code.  Further recommendations will depend on clinical course.    Shayan Bear DO  01/02/21  13:40 EST

## 2021-01-02 NOTE — NURSING NOTE
Pt requesting to take a shower. Light in bathroom stooped working. Per engineering there is no replacement light available. Pt will be moved to room 519. Provider is aware that pt will use HFC/optiot flow to shower. Pt VSS will CTM

## 2021-01-02 NOTE — PLAN OF CARE
Goal Outcome Evaluation:  Plan of Care Reviewed With: patient  Progress: improving     Pt ambulated 20 ft (to new room) no acute distress noted. Pt 82% room air spO2. 15 L HFC pt is 92%, pt recovered well. RT notified and opti flow connected in new room. Pt is cooperative, pleasant. VSS. Will CTM.

## 2021-01-02 NOTE — PROGRESS NOTES
"  Daily Progress Note.   [address]  1/2/2021    Patient:  Name:  Rufus Dorsey  MRN:  [id number]  1985  35 y.o.  male         CC: soa    Interval History:  Follow-up for acute hypoxic respiratory failure Covid pneumonia  Continues weaned overnight he is awake alert talking on the phone. No conversational dyspnea he denies any chest pain cough sputum production. Did urinate a lot yesterday after diuretic dose in the morning.  ROS: No fever, no diarrhea, no chest pain  PMFSSH: no change    Physical Exam:  /86 (BP Location: Left arm, Patient Position: Lying)   Pulse 72   Temp 96.6 °F (35.9 °C) (Oral)   Resp 18   Ht 167.6 cm (65.98\")   Wt 125 kg (275 lb 3.2 oz)   SpO2 91%   BMI 44.44 kg/m²   Body mass index is 44.44 kg/m².  No intake or output data in the 24 hours ending 01/02/21 [id number]/80% fio2  To now decreased down to about 64% 60 L/min OptiFlow  General appearance: Nontoxic, conversant   Eyes: anicteric sclerae, moist conjunctivae; no lid-lag; PERRLA  HENT: Large neck circumference for range of motion unable to  JVD  Lungs: Lung exam limited with body habitus I do not hear any wheeze or rhonchi cannot hear any definitive crackles  CV: RRR, no rub  Abdomen: Soft, non-tender; no masses or HSM obese  Extremities: Positive bilateral lower extremity trace to 1+ peripheral edema or extremity lymphadenopathy  Skin: Warm well perfused no rash diffuse  Psych: Appropriate affect, alert and oriented to person, place and time  Neuro moves all ext, cns 2-12 intact sensation intact    Data Review:  Notable Labs:  Results from last 7 days   Lab Units 01/02/21  0700 01/01/21  0625 12/31/20  0821 12/30/20  0514 12/29/20  0827 12/28/20  0612 12/27/20  0917   WBC 10*3/mm3 9.70 10.49 11.29* 9.12 17.70* 11.65* 11.69*   HEMOGLOBIN g/dL 13.2 13.4 14.0 13.8 16.0 14.2 14.1   PLATELETS 10*3/mm3 144 158 189 195 287 199 199     Results from last 7 days   Lab Units 01/02/21  0700 01/01/21  0625 " 12/31/20  0821 12/30/20  0514 12/29/20  0827 12/28/20  0612 12/27/20  0917   SODIUM mmol/L 133* 131* 130* 131* 136 134* 136   POTASSIUM mmol/L 4.6 4.4 4.3 5.1 3.9 4.4 4.3   CHLORIDE mmol/L 102 102 102 103 104 104 106   CO2 mmol/L 23.3 20.4* 19.7* 21.6* 22.8 21.0* 21.3*   BUN mg/dL 38* 40* 36* 37* 37* 40* 40*   CREATININE mg/dL 1.21 1.02 1.13 0.94 1.10 1.03 1.00   GLUCOSE mg/dL 323* 323* 278* 280* 105* 308* 267*   CALCIUM mg/dL 8.1* 8.0* 8.2* 7.9* 8.1* 7.6* 7.6*   Estimated Creatinine Clearance: 106.4 mL/min (by C-G formula based on SCr of 1.21 mg/dL).    Results from last 7 days   Lab Units 01/02/21  0700 01/01/21  0625 12/31/20  0821 12/30/20  0514  12/28/20  0612   LDH U/L  --  387*  --  456*  --  571*   AST (SGOT) U/L 26 32 44* 43*   < > 72*   ALT (SGPT) U/L 52* 58* 68* 68*   < > 79*   FERRITIN ng/mL 1,090.00* 1,150.00* 1,375.00* 1,414.00*   < > 1,983.00*   D DIMER QUANT MCGFEU/mL  --  0.62*  --  0.87*  --  1.07*   CRP mg/dL 0.23 0.41 0.91* 2.25*   < > 0.58*   PLATELETS 10*3/mm3 144 158 189 195   < > 199    < > = values in this interval not displayed.             Imaging:  Reviewed chest images personally from past 3 days    Scheduled meds:    aspirin, 81 mg, Oral, Daily  atorvastatin, 80 mg, Oral, Nightly  budesonide-formoterol, 2 puff, Inhalation, BID - RT  bumetanide, 1 mg, Oral, Daily  carvedilol, 6.25 mg, Oral, BID With Meals  dexamethasone, 6 mg, Intravenous, BID  enoxaparin, 40 mg, Subcutaneous, Q12H  insulin glargine, 55 Units, Subcutaneous, Q12H  insulin lispro, 0-24 Units, Subcutaneous, TID AC  sodium chloride, 10 mL, Intravenous, Q12H        ASSESSMENT  /  PLAN:  Acute hypoxic respiratory failure  COVID-19 pneumonia hyperglycemia  Hyponatremia  Ischemic cardiomyopathy  Coronary artery disease  Systolic congestive heart failure-decompensated  Morbid obesity      Agree with Bumex will monitor creatinine closely may need to take a break tomorrow pending renal function in the morning certainly has improved  his oxygenation  Continue to wean oxygen as we are able to  Continue dexamethasone twice daily CRP is decreasing nicely now ferritin still fairly high suspect will decrease the dexamethasone as he continues to get better and ferritin comes down here in the next few days. To help with his glycemic control  Lovenox 40 mg every 12 given Covid hypercoagulability risk as well as his weight.        Chris Reveles MD  [facility]  01/02/21  1058

## 2021-01-03 LAB
ALBUMIN SERPL-MCNC: 2.6 G/DL (ref 3.5–5.2)
ALBUMIN/GLOB SERPL: 0.8 G/DL
ALP SERPL-CCNC: 155 U/L (ref 39–117)
ALT SERPL W P-5'-P-CCNC: 55 U/L (ref 1–41)
ANION GAP SERPL CALCULATED.3IONS-SCNC: 9 MMOL/L (ref 5–15)
AST SERPL-CCNC: 28 U/L (ref 1–40)
BASOPHILS # BLD AUTO: 0.01 10*3/MM3 (ref 0–0.2)
BASOPHILS NFR BLD AUTO: 0.1 % (ref 0–1.5)
BILIRUB SERPL-MCNC: 0.3 MG/DL (ref 0–1.2)
BUN SERPL-MCNC: 39 MG/DL (ref 6–20)
BUN/CREAT SERPL: 37.1 (ref 7–25)
CALCIUM SPEC-SCNC: 8.1 MG/DL (ref 8.6–10.5)
CHLORIDE SERPL-SCNC: 100 MMOL/L (ref 98–107)
CK SERPL-CCNC: 167 U/L (ref 20–200)
CO2 SERPL-SCNC: 24 MMOL/L (ref 22–29)
CREAT SERPL-MCNC: 1.05 MG/DL (ref 0.76–1.27)
CRP SERPL-MCNC: 0.14 MG/DL (ref 0–0.5)
D DIMER PPP FEU-MCNC: 0.43 MCGFEU/ML (ref 0–0.49)
DEPRECATED RDW RBC AUTO: 41.9 FL (ref 37–54)
EOSINOPHIL # BLD AUTO: 0 10*3/MM3 (ref 0–0.4)
EOSINOPHIL NFR BLD AUTO: 0 % (ref 0.3–6.2)
ERYTHROCYTE [DISTWIDTH] IN BLOOD BY AUTOMATED COUNT: 13.6 % (ref 12.3–15.4)
FERRITIN SERPL-MCNC: 1070 NG/ML (ref 30–400)
FIBRINOGEN PPP-MCNC: 312 MG/DL (ref 219–464)
GFR SERPL CREATININE-BSD FRML MDRD: 80 ML/MIN/1.73
GLOBULIN UR ELPH-MCNC: 3.1 GM/DL
GLUCOSE BLDC GLUCOMTR-MCNC: 162 MG/DL (ref 70–130)
GLUCOSE BLDC GLUCOMTR-MCNC: 248 MG/DL (ref 70–130)
GLUCOSE BLDC GLUCOMTR-MCNC: 333 MG/DL (ref 70–130)
GLUCOSE BLDC GLUCOMTR-MCNC: 368 MG/DL (ref 70–130)
GLUCOSE SERPL-MCNC: 386 MG/DL (ref 65–99)
HCT VFR BLD AUTO: 39.7 % (ref 37.5–51)
HGB BLD-MCNC: 13.2 G/DL (ref 13–17.7)
IMM GRANULOCYTES # BLD AUTO: 0.11 10*3/MM3 (ref 0–0.05)
IMM GRANULOCYTES NFR BLD AUTO: 1.1 % (ref 0–0.5)
LDH SERPL-CCNC: 355 U/L (ref 135–225)
LYMPHOCYTES # BLD AUTO: 0.31 10*3/MM3 (ref 0.7–3.1)
LYMPHOCYTES NFR BLD AUTO: 3.2 % (ref 19.6–45.3)
MCH RBC QN AUTO: 28 PG (ref 26.6–33)
MCHC RBC AUTO-ENTMCNC: 33.2 G/DL (ref 31.5–35.7)
MCV RBC AUTO: 84.3 FL (ref 79–97)
MONOCYTES # BLD AUTO: 0.43 10*3/MM3 (ref 0.1–0.9)
MONOCYTES NFR BLD AUTO: 4.4 % (ref 5–12)
NEUTROPHILS NFR BLD AUTO: 8.95 10*3/MM3 (ref 1.7–7)
NEUTROPHILS NFR BLD AUTO: 91.2 % (ref 42.7–76)
NRBC BLD AUTO-RTO: 0 /100 WBC (ref 0–0.2)
PLATELET # BLD AUTO: 136 10*3/MM3 (ref 140–450)
PMV BLD AUTO: 12.1 FL (ref 6–12)
POTASSIUM SERPL-SCNC: 4.5 MMOL/L (ref 3.5–5.2)
PROT SERPL-MCNC: 5.7 G/DL (ref 6–8.5)
RBC # BLD AUTO: 4.71 10*6/MM3 (ref 4.14–5.8)
SODIUM SERPL-SCNC: 133 MMOL/L (ref 136–145)
WBC # BLD AUTO: 9.81 10*3/MM3 (ref 3.4–10.8)

## 2021-01-03 PROCEDURE — 82728 ASSAY OF FERRITIN: CPT | Performed by: HOSPITALIST

## 2021-01-03 PROCEDURE — 94799 UNLISTED PULMONARY SVC/PX: CPT

## 2021-01-03 PROCEDURE — 25010000002 DEXAMETHASONE SODIUM PHOSPHATE 10 MG/ML SOLUTION: Performed by: INTERNAL MEDICINE

## 2021-01-03 PROCEDURE — 82962 GLUCOSE BLOOD TEST: CPT

## 2021-01-03 PROCEDURE — 82550 ASSAY OF CK (CPK): CPT | Performed by: HOSPITALIST

## 2021-01-03 PROCEDURE — 85384 FIBRINOGEN ACTIVITY: CPT | Performed by: HOSPITALIST

## 2021-01-03 PROCEDURE — 63710000001 INSULIN LISPRO (HUMAN) PER 5 UNITS: Performed by: HOSPITALIST

## 2021-01-03 PROCEDURE — 80053 COMPREHEN METABOLIC PANEL: CPT | Performed by: HOSPITALIST

## 2021-01-03 PROCEDURE — 86140 C-REACTIVE PROTEIN: CPT | Performed by: HOSPITALIST

## 2021-01-03 PROCEDURE — 25010000002 ENOXAPARIN PER 10 MG: Performed by: HOSPITALIST

## 2021-01-03 PROCEDURE — 85025 COMPLETE CBC W/AUTO DIFF WBC: CPT | Performed by: HOSPITALIST

## 2021-01-03 PROCEDURE — 83615 LACTATE (LD) (LDH) ENZYME: CPT | Performed by: HOSPITALIST

## 2021-01-03 PROCEDURE — 85379 FIBRIN DEGRADATION QUANT: CPT | Performed by: HOSPITALIST

## 2021-01-03 PROCEDURE — 63710000001 INSULIN GLARGINE PER 5 UNITS: Performed by: INTERNAL MEDICINE

## 2021-01-03 RX ADMIN — INSULIN LISPRO 20 UNITS: 100 INJECTION, SOLUTION INTRAVENOUS; SUBCUTANEOUS at 06:44

## 2021-01-03 RX ADMIN — CARVEDILOL 6.25 MG: 6.25 TABLET, FILM COATED ORAL at 20:49

## 2021-01-03 RX ADMIN — BUDESONIDE AND FORMOTEROL FUMARATE DIHYDRATE 2 PUFF: 160; 4.5 AEROSOL RESPIRATORY (INHALATION) at 20:06

## 2021-01-03 RX ADMIN — SODIUM CHLORIDE, PRESERVATIVE FREE 10 ML: 5 INJECTION INTRAVENOUS at 20:49

## 2021-01-03 RX ADMIN — BUMETANIDE 1 MG: 1 TABLET ORAL at 08:31

## 2021-01-03 RX ADMIN — BUDESONIDE AND FORMOTEROL FUMARATE DIHYDRATE 2 PUFF: 160; 4.5 AEROSOL RESPIRATORY (INHALATION) at 06:59

## 2021-01-03 RX ADMIN — CARVEDILOL 6.25 MG: 6.25 TABLET, FILM COATED ORAL at 08:31

## 2021-01-03 RX ADMIN — ENOXAPARIN SODIUM 40 MG: 40 INJECTION SUBCUTANEOUS at 08:30

## 2021-01-03 RX ADMIN — SODIUM CHLORIDE, PRESERVATIVE FREE 10 ML: 5 INJECTION INTRAVENOUS at 08:31

## 2021-01-03 RX ADMIN — INSULIN LISPRO 8 UNITS: 100 INJECTION, SOLUTION INTRAVENOUS; SUBCUTANEOUS at 17:15

## 2021-01-03 RX ADMIN — INSULIN GLARGINE 55 UNITS: 100 INJECTION, SOLUTION SUBCUTANEOUS at 20:57

## 2021-01-03 RX ADMIN — INSULIN LISPRO 16 UNITS: 100 INJECTION, SOLUTION INTRAVENOUS; SUBCUTANEOUS at 11:23

## 2021-01-03 RX ADMIN — ASPIRIN 81 MG: 81 TABLET, COATED ORAL at 08:31

## 2021-01-03 RX ADMIN — ENOXAPARIN SODIUM 40 MG: 40 INJECTION SUBCUTANEOUS at 20:48

## 2021-01-03 RX ADMIN — DEXAMETHASONE SODIUM PHOSPHATE 6 MG: 10 INJECTION, SOLUTION INTRAMUSCULAR; INTRAVENOUS at 20:49

## 2021-01-03 RX ADMIN — INSULIN GLARGINE 55 UNITS: 100 INJECTION, SOLUTION SUBCUTANEOUS at 08:31

## 2021-01-03 RX ADMIN — ATORVASTATIN CALCIUM 80 MG: 80 TABLET, FILM COATED ORAL at 20:49

## 2021-01-03 RX ADMIN — DEXAMETHASONE SODIUM PHOSPHATE 6 MG: 10 INJECTION, SOLUTION INTRAMUSCULAR; INTRAVENOUS at 08:30

## 2021-01-03 NOTE — PLAN OF CARE
Goal Outcome Evaluation:  Plan of Care Reviewed With: patient  Progress: no change   Unable to titrate optiflow at this time. Pt refusing to sit bed. Up ad valente to restroom. No distress noted. Pt glucose remains high. Education attempted; however pt is not receptive to plan. Opti flow 60 L ; 70 FiO2. Will CTM

## 2021-01-03 NOTE — PROGRESS NOTES
[facility]      Mar/chart reviewed  Follow up COVID19 pneumonia, AHRF  Some cough, doesn't feel distressed    Vital Sign Min/Max for last 24 hours  Temp  Min: 96.2 °F (35.7 °C)  Max: 98 °F (36.7 °C)   BP  Min: 147/86  Max: 171/99   Pulse  Min: 64  Max: 105   Resp  Min: 18  Max: 18   SpO2  Min: 90 %  Max: 97 %   Flow (L/min)  Min: 15  Max: 60   No data recorded     Nad, axox3,   perrl, eomi, no icterus,  mmm, no jvd, trachea midline, neck supple,  chest decreased ae bilaterally, no crackles, no wheezes,   rrr,   soft, nt, nd +bs,  no c/c/ e  Skin warm, dry no rashes    Labs: 1/3: reviewed;  Glucose 386  Bun 39  Cr 1.05  Na 133  Bicarb 24  crp 0.14  Wbc 9.8  hgb 13.2  plts 136    A/P:  1. Acute hypoxemic respiratory failure -- wean optiflow as able  2. COVID 19 pneumonia --  dexamethasone 6mg bid   3. Hyperglycemia  4. Hyponatremia    Making some progress, slowly, continue to wean optiflow, hopefully onto nasal cannula today or tomorrow.

## 2021-01-03 NOTE — PLAN OF CARE
Goal Outcome Evaluation:  Plan of Care Reviewed With: patient  Progress: no change  Outcome Summary: VSS. Remains on Optiflow 60L/65%. No new complaints overnight.

## 2021-01-03 NOTE — PROGRESS NOTES
[facility] HOSPITALIST    PROGRESS NOTE    Name:  Rufus Dorsey   Age:  35 y.o.  Sex:  male  :  1985  MRN:  [id number]   Visit Number:  [id number]  Admission Date:  2020  Date Of Service:  21  Primary Care Physician:  Kusum, No Known     LOS: 13 days :  Patient Care Team:  Provider, No Known as PCP - General:    History taken from:     patient chart    Chief Complaint:      Dyspnea    Subjective     Interval History:     Patient seen and examined on 1/3/2021.    Patient is 35-year-old with diabetes type 2, CHF with systolic heart failure, coronary artery disease with ischemic cardiomyopathy, hyperlipidemia who came in 2020 with 2 weeks of cough, chest pain, shortness of breath, fevers.  Chest x-ray showed bilateral infiltrates consistent with Covid pneumonia, Covid-19 testing was positive.  Patient was admitted.  He is been given remdesivir and Decadron.  He continues to require high flow oxygen.      He has severe shortness of breath still, but he thinks he is improved.  He has been moving around the room without difficulty.  He denies any chest pressure, nausea, vomiting, diarrhea, pain.  He is receiving Bumex for his edema which is a home medication for him.    His blood sugars continue to be elevated while on the Decadron, I have adjusted his Levemir, will continue adjust as needed..  Reviewed inflammatory markers they appear to be improving.    Review of Systems:     All systems were reviewed and negative except for:  Respiratory: positive for  shortness of air    Objective     Vital Signs:    Temp:  [96.2 °F (35.7 °C)-98 °F (36.7 °C)] 98 °F (36.7 °C)  Heart Rate:  [] 65  Resp:  [18-20] 20  BP: (165-171)/(87-99) 166/87    Physical Exam:    General: Alert and oriented x4, morbidly obese, mild distress  Heart: Regular rate and rhythm without murmur rub or thrill  Lungs: Rhonchi noted bilaterally without use of accessory muscles respiration  Abdomen:  Soft/nontender/nondistended.  No HSM is noted.  MSK: 5/5 strength in upper/lower extremities bilaterally.     Results Review:      I reviewed the patient's new clinical results.    Labs:    Lab Results (last 24 hours)     Procedure Component Value Units Date/Time    POC Glucose Once [[id number]]  (Abnormal) Collected: 01/03/21 1101    Specimen: Blood Updated: 01/03/21 1101     Glucose 333 mg/dL     Ferritin [[id number]]  (Abnormal) Collected: 01/03/21 0453    Specimen: Blood Updated: 01/03/21 0645     Ferritin 1,070.00 ng/mL     Narrative:      Results may be falsely decreased if patient taking Biotin.      POC Glucose Once [[id number]]  (Abnormal) Collected: 01/03/21 0629    Specimen: Blood Updated: 01/03/21 0631     Glucose 368 mg/dL     Comprehensive Metabolic Panel [[id number]]  (Abnormal) Collected: 01/03/21 0453    Specimen: Blood Updated: 01/03/21 0628     Glucose 386 mg/dL      BUN 39 mg/dL      Creatinine 1.05 mg/dL      Sodium 133 mmol/L      Potassium 4.5 mmol/L      Chloride 100 mmol/L      CO2 24.0 mmol/L      Calcium 8.1 mg/dL      Total Protein 5.7 g/dL      Albumin 2.60 g/dL      ALT (SGPT) 55 U/L      AST (SGOT) 28 U/L      Alkaline Phosphatase 155 U/L      Total Bilirubin 0.3 mg/dL      eGFR Non African Amer 80 mL/min/1.73      Globulin 3.1 gm/dL      A/G Ratio 0.8 g/dL      BUN/Creatinine Ratio 37.1     Anion Gap 9.0 mmol/L     Narrative:      GFR Normal >60  Chronic Kidney Disease <60  Kidney Failure <15      Fibrinogen [[id number]]  (Normal) Collected: 01/03/21 0453    Specimen: Blood from Hand, Left Updated: 01/03/21 0622     Fibrinogen 312 mg/dL     D-dimer, Quantitative [[id number]]  (Normal) Collected: 01/03/21 0453    Specimen: Blood from Hand, Left Updated: 01/03/21 0622     D-Dimer, Quantitative 0.43 MCGFEU/mL     Narrative:      The Stago D-Dimer test used in conjunction with a clinical pretest probability (PTP) assessment model, has been approved by the FDA to rule out the presence of venous  thromboembolism (VTE) in outpatients suspected of deep venous thrombosis (DVT) or pulmonary embolism (PE). The cut-off for negative predictive value is <0.50 MCGFEU/mL.    CK [[id number]]  (Normal) Collected: 01/03/21 0453    Specimen: Blood Updated: 01/03/21 0550     Creatine Kinase 167 U/L     C-reactive Protein [[id number]]  (Normal) Collected: 01/03/21 0453    Specimen: Blood Updated: 01/03/21 0550     C-Reactive Protein 0.14 mg/dL     Lactate Dehydrogenase [[id number]]  (Abnormal) Collected: 01/03/21 0453    Specimen: Blood Updated: 01/03/21 0550      U/L     CBC & Differential [[id number]]  (Abnormal) Collected: 01/03/21 0453    Specimen: Blood Updated: 01/03/21 0547    Narrative:      The following orders were created for panel order CBC & Differential.  Procedure                               Abnormality         Status                     ---------                               -----------         ------                     CBC Auto Differential[[id number]]        Abnormal            Final result                 Please view results for these tests on the individual orders.    CBC Auto Differential [[id number]]  (Abnormal) Collected: 01/03/21 0453    Specimen: Blood Updated: 01/03/21 0547     WBC 9.81 10*3/mm3      RBC 4.71 10*6/mm3      Hemoglobin 13.2 g/dL      Hematocrit 39.7 %      MCV 84.3 fL      MCH 28.0 pg      MCHC 33.2 g/dL      RDW 13.6 %      RDW-SD 41.9 fl      MPV 12.1 fL      Platelets 136 10*3/mm3      Neutrophil % 91.2 %      Lymphocyte % 3.2 %      Monocyte % 4.4 %      Eosinophil % 0.0 %      Basophil % 0.1 %      Immature Grans % 1.1 %      Neutrophils, Absolute 8.95 10*3/mm3      Lymphocytes, Absolute 0.31 10*3/mm3      Monocytes, Absolute 0.43 10*3/mm3      Eosinophils, Absolute 0.00 10*3/mm3      Basophils, Absolute 0.01 10*3/mm3      Immature Grans, Absolute 0.11 10*3/mm3      nRBC 0.0 /100 WBC     POC Glucose Once [[id number]]  (Abnormal) Collected: 01/02/21 2016    Specimen: Blood  Updated: 01/02/21 2018     Glucose 365 mg/dL     POC Glucose Once [[id number]]  (Abnormal) Collected: 01/02/21 1609    Specimen: Blood Updated: 01/02/21 1610     Glucose 311 mg/dL            Radiology:    Imaging Results (Last 24 Hours)     ** No results found for the last 24 hours. **          Medication Review:     aspirin, 81 mg, Oral, Daily  atorvastatin, 80 mg, Oral, Nightly  budesonide-formoterol, 2 puff, Inhalation, BID - RT  bumetanide, 1 mg, Oral, Daily  carvedilol, 6.25 mg, Oral, BID With Meals  dexamethasone, 6 mg, Intravenous, BID  enoxaparin, 40 mg, Subcutaneous, Q12H  insulin glargine, 55 Units, Subcutaneous, Q12H  insulin lispro, 0-24 Units, Subcutaneous, TID AC  sodium chloride, 10 mL, Intravenous, Q12H             Assessment/Plan     Problem List Items Addressed This Visit        Other    Pneumonia of both lungs due to infectious organism - Primary    Acute respiratory failure with hypoxia (CMS/HCC)    * (Principal) COVID-19 virus infection    Acute kidney injury (CMS/HCC)    Relevant Medications    bumetanide (BUMEX) 1 MG tablet    spironolactone (ALDACTONE) 25 MG tablet    Thrombocytopenia (CMS/HCC)          1. Acute hypoxic respiratory failure secondary to COVID-19 pneumonia, with high oxygen requirements.  2.  ARDS due to Covid-19  3.  History of coronary artery disease, on aspirin, Coreg and statins and will be continued.  4. Diabetes mellitus,   hemoglobin A1c 9.5.  5. Hyperlipidemia, on statins.  6. Morbid obesity, patient was counseled to lose weight.      Plan:    Continue try to wean oxygen.  Monitor inflammatory markers.  Continue Lovenox for DVT prophylaxis twice daily due to morbid obesity and severity of disease.  Continue with Decadron.  Monitor his blood sugars and response to increased dose of Lantus.  Patient status is guarded.  Patient is full code.  Discussed with pulmonology.  Further recommendations will depend on clinical course.    Shayan Bear DO  01/03/21  15:08  EST

## 2021-01-04 LAB
ALBUMIN SERPL-MCNC: 2.7 G/DL (ref 3.5–5.2)
ALBUMIN/GLOB SERPL: 1 G/DL
ALP SERPL-CCNC: 141 U/L (ref 39–117)
ALT SERPL W P-5'-P-CCNC: 71 U/L (ref 1–41)
ANION GAP SERPL CALCULATED.3IONS-SCNC: 7.2 MMOL/L (ref 5–15)
AST SERPL-CCNC: 42 U/L (ref 1–40)
BASOPHILS # BLD AUTO: 0.01 10*3/MM3 (ref 0–0.2)
BASOPHILS NFR BLD AUTO: 0.1 % (ref 0–1.5)
BILIRUB SERPL-MCNC: 0.3 MG/DL (ref 0–1.2)
BUN SERPL-MCNC: 44 MG/DL (ref 6–20)
BUN/CREAT SERPL: 41.1 (ref 7–25)
CALCIUM SPEC-SCNC: 8 MG/DL (ref 8.6–10.5)
CHLORIDE SERPL-SCNC: 100 MMOL/L (ref 98–107)
CK SERPL-CCNC: 151 U/L (ref 20–200)
CO2 SERPL-SCNC: 24.8 MMOL/L (ref 22–29)
CREAT SERPL-MCNC: 1.07 MG/DL (ref 0.76–1.27)
CRP SERPL-MCNC: 0.12 MG/DL (ref 0–0.5)
DEPRECATED RDW RBC AUTO: 41.7 FL (ref 37–54)
EOSINOPHIL # BLD AUTO: 0 10*3/MM3 (ref 0–0.4)
EOSINOPHIL NFR BLD AUTO: 0 % (ref 0.3–6.2)
ERYTHROCYTE [DISTWIDTH] IN BLOOD BY AUTOMATED COUNT: 14 % (ref 12.3–15.4)
FERRITIN SERPL-MCNC: 1049 NG/ML (ref 30–400)
GFR SERPL CREATININE-BSD FRML MDRD: 79 ML/MIN/1.73
GLOBULIN UR ELPH-MCNC: 2.6 GM/DL
GLUCOSE BLDC GLUCOMTR-MCNC: 231 MG/DL (ref 70–130)
GLUCOSE BLDC GLUCOMTR-MCNC: 256 MG/DL (ref 70–130)
GLUCOSE BLDC GLUCOMTR-MCNC: 297 MG/DL (ref 70–130)
GLUCOSE BLDC GLUCOMTR-MCNC: 325 MG/DL (ref 70–130)
GLUCOSE SERPL-MCNC: 271 MG/DL (ref 65–99)
HCT VFR BLD AUTO: 40.3 % (ref 37.5–51)
HGB BLD-MCNC: 13.4 G/DL (ref 13–17.7)
IMM GRANULOCYTES # BLD AUTO: 0.12 10*3/MM3 (ref 0–0.05)
IMM GRANULOCYTES NFR BLD AUTO: 1.2 % (ref 0–0.5)
LYMPHOCYTES # BLD AUTO: 0.41 10*3/MM3 (ref 0.7–3.1)
LYMPHOCYTES NFR BLD AUTO: 3.9 % (ref 19.6–45.3)
MCH RBC QN AUTO: 27.6 PG (ref 26.6–33)
MCHC RBC AUTO-ENTMCNC: 33.3 G/DL (ref 31.5–35.7)
MCV RBC AUTO: 82.9 FL (ref 79–97)
MONOCYTES # BLD AUTO: 0.39 10*3/MM3 (ref 0.1–0.9)
MONOCYTES NFR BLD AUTO: 3.7 % (ref 5–12)
NEUTROPHILS NFR BLD AUTO: 9.5 10*3/MM3 (ref 1.7–7)
NEUTROPHILS NFR BLD AUTO: 91.1 % (ref 42.7–76)
NRBC BLD AUTO-RTO: 0 /100 WBC (ref 0–0.2)
PLATELET # BLD AUTO: 147 10*3/MM3 (ref 140–450)
PMV BLD AUTO: 12.5 FL (ref 6–12)
POTASSIUM SERPL-SCNC: 4.4 MMOL/L (ref 3.5–5.2)
PROT SERPL-MCNC: 5.3 G/DL (ref 6–8.5)
RBC # BLD AUTO: 4.86 10*6/MM3 (ref 4.14–5.8)
SODIUM SERPL-SCNC: 132 MMOL/L (ref 136–145)
WBC # BLD AUTO: 10.43 10*3/MM3 (ref 3.4–10.8)

## 2021-01-04 PROCEDURE — 63710000001 INSULIN LISPRO (HUMAN) PER 5 UNITS: Performed by: HOSPITALIST

## 2021-01-04 PROCEDURE — 86140 C-REACTIVE PROTEIN: CPT | Performed by: HOSPITALIST

## 2021-01-04 PROCEDURE — 63710000001 INSULIN GLARGINE PER 5 UNITS: Performed by: INTERNAL MEDICINE

## 2021-01-04 PROCEDURE — 85025 COMPLETE CBC W/AUTO DIFF WBC: CPT | Performed by: HOSPITALIST

## 2021-01-04 PROCEDURE — 82962 GLUCOSE BLOOD TEST: CPT

## 2021-01-04 PROCEDURE — 80053 COMPREHEN METABOLIC PANEL: CPT | Performed by: HOSPITALIST

## 2021-01-04 PROCEDURE — 82728 ASSAY OF FERRITIN: CPT | Performed by: HOSPITALIST

## 2021-01-04 PROCEDURE — 25010000002 DEXAMETHASONE SODIUM PHOSPHATE 10 MG/ML SOLUTION: Performed by: INTERNAL MEDICINE

## 2021-01-04 PROCEDURE — 25010000002 ENOXAPARIN PER 10 MG: Performed by: HOSPITALIST

## 2021-01-04 PROCEDURE — 82550 ASSAY OF CK (CPK): CPT | Performed by: HOSPITALIST

## 2021-01-04 PROCEDURE — 94799 UNLISTED PULMONARY SVC/PX: CPT

## 2021-01-04 RX ORDER — INSULIN GLARGINE 100 [IU]/ML
60 INJECTION, SOLUTION SUBCUTANEOUS EVERY 12 HOURS SCHEDULED
Status: DISCONTINUED | OUTPATIENT
Start: 2021-01-04 | End: 2021-01-08 | Stop reason: HOSPADM

## 2021-01-04 RX ORDER — DEXAMETHASONE SODIUM PHOSPHATE 10 MG/ML
6 INJECTION, SOLUTION INTRAMUSCULAR; INTRAVENOUS DAILY
Status: DISCONTINUED | OUTPATIENT
Start: 2021-01-05 | End: 2021-01-06

## 2021-01-04 RX ADMIN — BUDESONIDE AND FORMOTEROL FUMARATE DIHYDRATE 2 PUFF: 160; 4.5 AEROSOL RESPIRATORY (INHALATION) at 20:11

## 2021-01-04 RX ADMIN — INSULIN LISPRO 16 UNITS: 100 INJECTION, SOLUTION INTRAVENOUS; SUBCUTANEOUS at 16:46

## 2021-01-04 RX ADMIN — BUMETANIDE 1 MG: 1 TABLET ORAL at 08:37

## 2021-01-04 RX ADMIN — BUDESONIDE AND FORMOTEROL FUMARATE DIHYDRATE 2 PUFF: 160; 4.5 AEROSOL RESPIRATORY (INHALATION) at 10:27

## 2021-01-04 RX ADMIN — SODIUM CHLORIDE, PRESERVATIVE FREE 10 ML: 5 INJECTION INTRAVENOUS at 20:48

## 2021-01-04 RX ADMIN — CARVEDILOL 6.25 MG: 6.25 TABLET, FILM COATED ORAL at 08:37

## 2021-01-04 RX ADMIN — ENOXAPARIN SODIUM 40 MG: 40 INJECTION SUBCUTANEOUS at 20:48

## 2021-01-04 RX ADMIN — ATORVASTATIN CALCIUM 80 MG: 80 TABLET, FILM COATED ORAL at 20:47

## 2021-01-04 RX ADMIN — CARVEDILOL 6.25 MG: 6.25 TABLET, FILM COATED ORAL at 20:47

## 2021-01-04 RX ADMIN — ENOXAPARIN SODIUM 40 MG: 40 INJECTION SUBCUTANEOUS at 08:37

## 2021-01-04 RX ADMIN — INSULIN GLARGINE 55 UNITS: 100 INJECTION, SOLUTION SUBCUTANEOUS at 08:46

## 2021-01-04 RX ADMIN — INSULIN LISPRO 8 UNITS: 100 INJECTION, SOLUTION INTRAVENOUS; SUBCUTANEOUS at 12:02

## 2021-01-04 RX ADMIN — INSULIN LISPRO 12 UNITS: 100 INJECTION, SOLUTION INTRAVENOUS; SUBCUTANEOUS at 06:46

## 2021-01-04 RX ADMIN — DEXAMETHASONE SODIUM PHOSPHATE 6 MG: 10 INJECTION, SOLUTION INTRAMUSCULAR; INTRAVENOUS at 08:37

## 2021-01-04 RX ADMIN — INSULIN GLARGINE 60 UNITS: 100 INJECTION, SOLUTION SUBCUTANEOUS at 21:00

## 2021-01-04 RX ADMIN — ASPIRIN 81 MG: 81 TABLET, COATED ORAL at 08:37

## 2021-01-04 RX ADMIN — SODIUM CHLORIDE, PRESERVATIVE FREE 10 ML: 5 INJECTION INTRAVENOUS at 08:37

## 2021-01-04 NOTE — PLAN OF CARE
Goal Outcome Evaluation:  Plan of Care Reviewed With: patient  Progress: no change  Outcome Summary: VSS. Remains on Optiflow 60L/ 65%. No new complaints overnight.      Problem: Breathing Pattern Ineffective  Goal: Effective Breathing Pattern  Outcome: Ongoing, Progressing  Intervention: Promote Improved Breathing Pattern  Recent Flowsheet Documentation  Taken 1/4/2021 0407 by Caitlin Amaya RN  Head of Bed (HOB): HOB at 30-45 degrees     Problem: Diabetes Comorbidity  Goal: Blood Glucose Level Within Desired Range  Outcome: Ongoing, Progressing  Intervention: Maintain Glycemic Control  Recent Flowsheet Documentation  Taken 1/3/2021 2059 by Caitlin Amaya RN  Glycemic Management: blood glucose monitoring

## 2021-01-04 NOTE — PROGRESS NOTES
[facility] HOSPITALIST    PROGRESS NOTE    Name:  Rufus Dorsey   Age:  35 y.o.  Sex:  male  :  1985  MRN:  [id number]   Visit Number:  [id number]  Admission Date:  2020  Date Of Service:  21  Primary Care Physician:  Kusum, No Known     LOS: 14 days :  Patient Care Team:  Provider, No Known as PCP - General:    History taken from:     patient chart    Chief Complaint:      Dyspnea    Subjective     Interval History:     Patient seen and examined again today.    Patient is 35-year-old with diabetes type 2, CHF with systolic heart failure, coronary artery disease with ischemic cardiomyopathy, hyperlipidemia who came in 2020 with 2 weeks of cough, chest pain, shortness of breath, fevers.  Chest x-ray showed bilateral infiltrates consistent with Covid pneumonia, Covid-19 testing was positive.  Patient was admitted.  He has been given remdesivir and Decadron.  He continues to require high flow oxygen.  His symptoms are consistent with ARDS.    He has severe shortness of breath still, but he thinks he is improved.  He has been moving around the room without difficulty.  He denies any chest pressure, nausea, vomiting, diarrhea, pain.  He is receiving Bumex for his edema which is a home medication for him.  His only complaint is swelling in the legs.  Dopplers have been done twice during this admission and have been negative.    His blood sugars continue to be elevated while on the Decadron, will increase his Lantus to 60 units twice daily and monitor his blood sugars closely.  Patient otherwise has no new complaints.  Oxygen is being weaned down, FiO2 is 65% at present.  Pulmonology following.    Review of Systems:     All systems were reviewed and negative except for:  Respiratory: positive for  shortness of air    Objective     Vital Signs:    Temp:  [95.2 °F (35.1 °C)-98.1 °F (36.7 °C)] 95.2 °F (35.1 °C)  Heart Rate:  [65-83] 75  Resp:  [18-20] 18  BP:  (126-162)/(79-99) 160/99    Physical Exam:    General: Alert and oriented x4, morbidly obese, mild distress  Heart: Regular rate and rhythm without murmur rub or thrill  Lungs: Rhonchi noted bilaterally without use of accessory muscles respiration  Abdomen: Soft/nontender/nondistended.  No HSM is noted.  MSK: 5/5 strength in upper/lower extremities bilaterally.  Edema noted bilaterally.     Results Review:      I reviewed the patient's new clinical results.    Labs:    Lab Results (last 24 hours)     Procedure Component Value Units Date/Time    POC Glucose Once [[id number]]  (Abnormal) Collected: 01/04/21 1131    Specimen: Blood Updated: 01/04/21 1142     Glucose 231 mg/dL     Ferritin [[id number]]  (Abnormal) Collected: 01/04/21 0538    Specimen: Blood Updated: 01/04/21 0853     Ferritin 1,049.00 ng/mL     Narrative:      Results may be falsely decreased if patient taking Biotin.      Comprehensive Metabolic Panel [[id number]]  (Abnormal) Collected: 01/04/21 0538    Specimen: Blood Updated: 01/04/21 0844     Glucose 271 mg/dL      BUN 44 mg/dL      Creatinine 1.07 mg/dL      Sodium 132 mmol/L      Potassium 4.4 mmol/L      Chloride 100 mmol/L      CO2 24.8 mmol/L      Calcium 8.0 mg/dL      Total Protein 5.3 g/dL      Albumin 2.70 g/dL      ALT (SGPT) 71 U/L      AST (SGOT) 42 U/L      Alkaline Phosphatase 141 U/L      Total Bilirubin 0.3 mg/dL      eGFR Non African Amer 79 mL/min/1.73      Globulin 2.6 gm/dL      A/G Ratio 1.0 g/dL      BUN/Creatinine Ratio 41.1     Anion Gap 7.2 mmol/L     Narrative:      GFR Normal >60  Chronic Kidney Disease <60  Kidney Failure <15      CK [[id number]]  (Normal) Collected: 01/04/21 0538    Specimen: Blood Updated: 01/04/21 0830     Creatine Kinase 151 U/L     C-reactive Protein [[id number]]  (Normal) Collected: 01/04/21 0538    Specimen: Blood Updated: 01/04/21 0830     C-Reactive Protein 0.12 mg/dL     CBC & Differential [[id number]]  (Abnormal) Collected: 01/04/21 0538    Specimen:  Blood Updated: 01/04/21 0735    Narrative:      The following orders were created for panel order CBC & Differential.  Procedure                               Abnormality         Status                     ---------                               -----------         ------                     CBC Auto Differential[[id number]]        Abnormal            Final result                 Please view results for these tests on the individual orders.    CBC Auto Differential [[id number]]  (Abnormal) Collected: 01/04/21 0538    Specimen: Blood Updated: 01/04/21 0735     WBC 10.43 10*3/mm3      RBC 4.86 10*6/mm3      Hemoglobin 13.4 g/dL      Hematocrit 40.3 %      MCV 82.9 fL      MCH 27.6 pg      MCHC 33.3 g/dL      RDW 14.0 %      RDW-SD 41.7 fl      MPV 12.5 fL      Platelets 147 10*3/mm3      Neutrophil % 91.1 %      Lymphocyte % 3.9 %      Monocyte % 3.7 %      Eosinophil % 0.0 %      Basophil % 0.1 %      Immature Grans % 1.2 %      Neutrophils, Absolute 9.50 10*3/mm3      Lymphocytes, Absolute 0.41 10*3/mm3      Monocytes, Absolute 0.39 10*3/mm3      Eosinophils, Absolute 0.00 10*3/mm3      Basophils, Absolute 0.01 10*3/mm3      Immature Grans, Absolute 0.12 10*3/mm3      nRBC 0.0 /100 WBC     POC Glucose Once [[id number]]  (Abnormal) Collected: 01/04/21 0608    Specimen: Blood Updated: 01/04/21 0609     Glucose 256 mg/dL     POC Glucose Once [[id number]]  (Abnormal) Collected: 01/03/21 2048    Specimen: Blood Updated: 01/03/21 2049     Glucose 162 mg/dL     POC Glucose Once [[id number]]  (Abnormal) Collected: 01/03/21 1647    Specimen: Blood Updated: 01/03/21 1649     Glucose 248 mg/dL            Radiology:    Imaging Results (Last 24 Hours)     ** No results found for the last 24 hours. **          Medication Review:     aspirin, 81 mg, Oral, Daily  atorvastatin, 80 mg, Oral, Nightly  budesonide-formoterol, 2 puff, Inhalation, BID - RT  bumetanide, 1 mg, Oral, Daily  carvedilol, 6.25 mg, Oral, BID With Meals  [START ON  1/5/2021] dexamethasone, 6 mg, Intravenous, Daily  enoxaparin, 40 mg, Subcutaneous, Q12H  insulin glargine, 55 Units, Subcutaneous, Q12H  insulin lispro, 0-24 Units, Subcutaneous, TID AC  sodium chloride, 10 mL, Intravenous, Q12H             Assessment/Plan     Problem List Items Addressed This Visit        Other    Pneumonia of both lungs due to infectious organism - Primary    Acute respiratory failure with hypoxia (CMS/HCC)    * (Principal) COVID-19 virus infection    Acute kidney injury (CMS/HCC)    Relevant Medications    bumetanide (BUMEX) 1 MG tablet    spironolactone (ALDACTONE) 25 MG tablet    Thrombocytopenia (CMS/HCC)          1. Acute hypoxic respiratory failure secondary to COVID-19 pneumonia, with high oxygen requirements.  2.  ARDS due to Covid-19  3.  History of coronary artery disease, on aspirin, Coreg and statins and will be continued.  4. Diabetes mellitus,   hemoglobin A1c 9.5.  5. Hyperlipidemia, on statins.  6. Morbid obesity, patient was counseled to lose weight.      Plan:    Continue try to wean oxygen.  Monitor inflammatory markers.  Continue Lovenox for DVT prophylaxis twice daily due to morbid obesity and severity of disease.  Continue with Decadron.  Monitor his blood sugars, increased dose of Lantus.  Patient condition is still guarded.  Patient is full code.   Further recommendations will depend on clinical course.    Shayan Bear DO  01/04/21  16:07 EST

## 2021-01-04 NOTE — PROGRESS NOTES
[facility]      Mar/chart reviewed  Follow up COVID19 pneumonia, AHRF  Some cough, doesn't feel distressed    Vital Sign Min/Max for last 24 hours  Temp  Min: 95.2 °F (35.1 °C)  Max: 98.1 °F (36.7 °C)   BP  Min: 126/79  Max: 162/93   Pulse  Min: 65  Max: 83   Resp  Min: 18  Max: 20   SpO2  Min: 91 %  Max: 100 %   Flow (L/min)  Min: 60  Max: 60   No data recorded     Nad, axox3,   perrl, eomi, no icterus,  mmm, no jvd, trachea midline, neck supple,  chest decreased ae bilaterally, no crackles, no wheezes,   rrr,   soft, nt, nd +bs,  no c/c/ e  Skin warm, dry no rashes    Labs: 1/4: reviewed:  Glucose 271  Bun 44  Cr 1.07  Na 132  Bicarb 24  Alt 71  St 42  crp 0.12  Wbc 10  hgb 13.4  plts 147    A/P:  1. Acute hypoxemic respiratory failure -- wean optiflow as able  2. COVID 19 pneumonia --  dexamethasone 6mg change to daily  3. Hyperglycemia  4. Hyponatremia    See if we can try him back on high flow nasal cannula

## 2021-01-04 NOTE — PLAN OF CARE
Problem: Adult Inpatient Plan of Care  Goal: Plan of Care Review  Outcome: Ongoing, Progressing  Flowsheets (Taken 1/4/2021 1547)  Progress: no change  Plan of Care Reviewed With: patient  Outcome Summary: vss, no falls, no pain, RT to wean optiflow, monitor BG, ad valente, continue to monitor  Goal: Patient-Specific Goal (Individualized)  Outcome: Ongoing, Progressing  Goal: Absence of Hospital-Acquired Illness or Injury  Outcome: Ongoing, Progressing  Intervention: Identify and Manage Fall Risk  Recent Flowsheet Documentation  Taken 1/4/2021 1437 by Arlet Olsen RN  Safety Promotion/Fall Prevention:   activity supervised   assistive device/personal items within reach   clutter free environment maintained   fall prevention program maintained   nonskid shoes/slippers when out of bed   safety round/check completed   room organization consistent  Taken 1/4/2021 1203 by Arlet Olsen RN  Safety Promotion/Fall Prevention:   activity supervised   assistive device/personal items within reach   clutter free environment maintained   fall prevention program maintained   nonskid shoes/slippers when out of bed   room organization consistent   safety round/check completed  Taken 1/4/2021 1033 by Arlet Olsen RN  Safety Promotion/Fall Prevention:   activity supervised   assistive device/personal items within reach   clutter free environment maintained   fall prevention program maintained   nonskid shoes/slippers when out of bed   safety round/check completed   room organization consistent  Taken 1/4/2021 0837 by Arlet Olsen RN  Safety Promotion/Fall Prevention:   activity supervised   assistive device/personal items within reach   clutter free environment maintained   fall prevention program maintained   nonskid shoes/slippers when out of bed   room organization consistent   safety round/check completed  Goal: Optimal Comfort and Wellbeing  Outcome: Ongoing, Progressing  Goal: Readiness for Transition of  Care  Outcome: Ongoing, Progressing     Problem: Breathing Pattern Ineffective  Goal: Effective Breathing Pattern  Outcome: Ongoing, Progressing  Intervention: Promote Improved Breathing Pattern  Recent Flowsheet Documentation  Taken 1/4/2021 1437 by Arlet Olsen RN  Airway/Ventilation Management:   calming measures promoted   humidification applied   pulmonary hygiene promoted  Breathing Techniques/Airway Clearance: deep/controlled cough encouraged  Taken 1/4/2021 0837 by Arlet Olsen, RN  Airway/Ventilation Management:   airway patency maintained   calming measures promoted   humidification applied   pulmonary hygiene promoted  Breathing Techniques/Airway Clearance: deep/controlled cough encouraged     Problem: Diabetes Comorbidity  Goal: Blood Glucose Level Within Desired Range  Outcome: Ongoing, Progressing     Problem: Heart Failure Comorbidity  Goal: Maintenance of Heart Failure Symptom Control  Outcome: Ongoing, Progressing     Problem: Hypertension Comorbidity  Goal: Blood Pressure in Desired Range  Outcome: Ongoing, Progressing   Goal Outcome Evaluation:  Plan of Care Reviewed With: patient  Progress: no change  Outcome Summary: vss, no falls, no pain, RT to wean optiflow, monitor BG, ad valente, continue to monitor

## 2021-01-05 PROBLEM — E11.9 TYPE 2 DIABETES MELLITUS, WITHOUT LONG-TERM CURRENT USE OF INSULIN: Status: ACTIVE | Noted: 2021-01-05

## 2021-01-05 LAB
ALBUMIN SERPL-MCNC: 2.7 G/DL (ref 3.5–5.2)
ALBUMIN/GLOB SERPL: 1 G/DL
ALP SERPL-CCNC: 164 U/L (ref 39–117)
ALT SERPL W P-5'-P-CCNC: 68 U/L (ref 1–41)
ANION GAP SERPL CALCULATED.3IONS-SCNC: 10.1 MMOL/L (ref 5–15)
AST SERPL-CCNC: 36 U/L (ref 1–40)
BASOPHILS # BLD AUTO: 0.01 10*3/MM3 (ref 0–0.2)
BASOPHILS NFR BLD AUTO: 0.1 % (ref 0–1.5)
BILIRUB SERPL-MCNC: 0.3 MG/DL (ref 0–1.2)
BUN SERPL-MCNC: 47 MG/DL (ref 6–20)
BUN/CREAT SERPL: 44.3 (ref 7–25)
CALCIUM SPEC-SCNC: 8 MG/DL (ref 8.6–10.5)
CHLORIDE SERPL-SCNC: 102 MMOL/L (ref 98–107)
CK SERPL-CCNC: 141 U/L (ref 20–200)
CO2 SERPL-SCNC: 23.9 MMOL/L (ref 22–29)
CREAT SERPL-MCNC: 1.06 MG/DL (ref 0.76–1.27)
CRP SERPL-MCNC: 0.1 MG/DL (ref 0–0.5)
D DIMER PPP FEU-MCNC: 0.32 MCGFEU/ML (ref 0–0.49)
DEPRECATED RDW RBC AUTO: 40.8 FL (ref 37–54)
EOSINOPHIL # BLD AUTO: 0.02 10*3/MM3 (ref 0–0.4)
EOSINOPHIL NFR BLD AUTO: 0.2 % (ref 0.3–6.2)
ERYTHROCYTE [DISTWIDTH] IN BLOOD BY AUTOMATED COUNT: 13.7 % (ref 12.3–15.4)
FERRITIN SERPL-MCNC: 1046 NG/ML (ref 30–400)
FIBRINOGEN PPP-MCNC: 344 MG/DL (ref 219–464)
GFR SERPL CREATININE-BSD FRML MDRD: 80 ML/MIN/1.73
GLOBULIN UR ELPH-MCNC: 2.7 GM/DL
GLUCOSE BLDC GLUCOMTR-MCNC: 191 MG/DL (ref 70–130)
GLUCOSE BLDC GLUCOMTR-MCNC: 243 MG/DL (ref 70–130)
GLUCOSE BLDC GLUCOMTR-MCNC: 260 MG/DL (ref 70–130)
GLUCOSE BLDC GLUCOMTR-MCNC: 346 MG/DL (ref 70–130)
GLUCOSE SERPL-MCNC: 263 MG/DL (ref 65–99)
HCT VFR BLD AUTO: 41.3 % (ref 37.5–51)
HGB BLD-MCNC: 13.6 G/DL (ref 13–17.7)
LDH SERPL-CCNC: 372 U/L (ref 135–225)
LYMPHOCYTES # BLD AUTO: 0.74 10*3/MM3 (ref 0.7–3.1)
LYMPHOCYTES NFR BLD AUTO: 6.5 % (ref 19.6–45.3)
MCH RBC QN AUTO: 27.5 PG (ref 26.6–33)
MCHC RBC AUTO-ENTMCNC: 32.9 G/DL (ref 31.5–35.7)
MCV RBC AUTO: 83.6 FL (ref 79–97)
MONOCYTES # BLD AUTO: 0.81 10*3/MM3 (ref 0.1–0.9)
MONOCYTES NFR BLD AUTO: 7.1 % (ref 5–12)
NEUTROPHILS NFR BLD AUTO: 85.3 % (ref 42.7–76)
NEUTROPHILS NFR BLD AUTO: 9.78 10*3/MM3 (ref 1.7–7)
PLATELET # BLD AUTO: 134 10*3/MM3 (ref 140–450)
PMV BLD AUTO: 12.4 FL (ref 6–12)
POTASSIUM SERPL-SCNC: 3.9 MMOL/L (ref 3.5–5.2)
PROT SERPL-MCNC: 5.4 G/DL (ref 6–8.5)
RBC # BLD AUTO: 4.94 10*6/MM3 (ref 4.14–5.8)
SODIUM SERPL-SCNC: 136 MMOL/L (ref 136–145)
WBC # BLD AUTO: 11.45 10*3/MM3 (ref 3.4–10.8)

## 2021-01-05 PROCEDURE — 85384 FIBRINOGEN ACTIVITY: CPT | Performed by: HOSPITALIST

## 2021-01-05 PROCEDURE — 82962 GLUCOSE BLOOD TEST: CPT

## 2021-01-05 PROCEDURE — 82550 ASSAY OF CK (CPK): CPT | Performed by: HOSPITALIST

## 2021-01-05 PROCEDURE — 94799 UNLISTED PULMONARY SVC/PX: CPT

## 2021-01-05 PROCEDURE — 63710000001 INSULIN LISPRO (HUMAN) PER 5 UNITS: Performed by: HOSPITALIST

## 2021-01-05 PROCEDURE — 85379 FIBRIN DEGRADATION QUANT: CPT | Performed by: HOSPITALIST

## 2021-01-05 PROCEDURE — 25010000002 DEXAMETHASONE SODIUM PHOSPHATE 10 MG/ML SOLUTION: Performed by: INTERNAL MEDICINE

## 2021-01-05 PROCEDURE — 86140 C-REACTIVE PROTEIN: CPT | Performed by: HOSPITALIST

## 2021-01-05 PROCEDURE — 82728 ASSAY OF FERRITIN: CPT | Performed by: HOSPITALIST

## 2021-01-05 PROCEDURE — 25010000002 ENOXAPARIN PER 10 MG: Performed by: HOSPITALIST

## 2021-01-05 PROCEDURE — 85025 COMPLETE CBC W/AUTO DIFF WBC: CPT | Performed by: HOSPITALIST

## 2021-01-05 PROCEDURE — 63710000001 INSULIN GLARGINE PER 5 UNITS: Performed by: INTERNAL MEDICINE

## 2021-01-05 PROCEDURE — 94640 AIRWAY INHALATION TREATMENT: CPT

## 2021-01-05 PROCEDURE — 83615 LACTATE (LD) (LDH) ENZYME: CPT | Performed by: HOSPITALIST

## 2021-01-05 PROCEDURE — 80053 COMPREHEN METABOLIC PANEL: CPT | Performed by: HOSPITALIST

## 2021-01-05 RX ADMIN — INSULIN LISPRO 4 UNITS: 100 INJECTION, SOLUTION INTRAVENOUS; SUBCUTANEOUS at 11:43

## 2021-01-05 RX ADMIN — ENOXAPARIN SODIUM 40 MG: 40 INJECTION SUBCUTANEOUS at 08:27

## 2021-01-05 RX ADMIN — BUDESONIDE AND FORMOTEROL FUMARATE DIHYDRATE 2 PUFF: 160; 4.5 AEROSOL RESPIRATORY (INHALATION) at 07:52

## 2021-01-05 RX ADMIN — CARVEDILOL 6.25 MG: 6.25 TABLET, FILM COATED ORAL at 08:27

## 2021-01-05 RX ADMIN — INSULIN LISPRO 12 UNITS: 100 INJECTION, SOLUTION INTRAVENOUS; SUBCUTANEOUS at 16:44

## 2021-01-05 RX ADMIN — BUDESONIDE AND FORMOTEROL FUMARATE DIHYDRATE 2 PUFF: 160; 4.5 AEROSOL RESPIRATORY (INHALATION) at 20:54

## 2021-01-05 RX ADMIN — SODIUM CHLORIDE, PRESERVATIVE FREE 10 ML: 5 INJECTION INTRAVENOUS at 08:27

## 2021-01-05 RX ADMIN — DEXAMETHASONE SODIUM PHOSPHATE 6 MG: 10 INJECTION, SOLUTION INTRAMUSCULAR; INTRAVENOUS at 08:28

## 2021-01-05 RX ADMIN — ASPIRIN 81 MG: 81 TABLET, COATED ORAL at 08:27

## 2021-01-05 RX ADMIN — CARVEDILOL 6.25 MG: 6.25 TABLET, FILM COATED ORAL at 22:52

## 2021-01-05 RX ADMIN — BUMETANIDE 1 MG: 1 TABLET ORAL at 08:27

## 2021-01-05 RX ADMIN — ATORVASTATIN CALCIUM 80 MG: 80 TABLET, FILM COATED ORAL at 22:52

## 2021-01-05 RX ADMIN — INSULIN LISPRO 8 UNITS: 100 INJECTION, SOLUTION INTRAVENOUS; SUBCUTANEOUS at 08:27

## 2021-01-05 RX ADMIN — ENOXAPARIN SODIUM 40 MG: 40 INJECTION SUBCUTANEOUS at 20:48

## 2021-01-05 RX ADMIN — SODIUM CHLORIDE, PRESERVATIVE FREE 10 ML: 5 INJECTION INTRAVENOUS at 20:49

## 2021-01-05 RX ADMIN — INSULIN GLARGINE 60 UNITS: 100 INJECTION, SOLUTION SUBCUTANEOUS at 08:36

## 2021-01-05 RX ADMIN — INSULIN GLARGINE 60 UNITS: 100 INJECTION, SOLUTION SUBCUTANEOUS at 21:19

## 2021-01-05 NOTE — PROGRESS NOTES
[facility]      Mar/chart reviewed  Follow up COVID19 pneumonia, AHRF  Some cough, doesn't feel distressed    Vital Sign Min/Max for last 24 hours  Temp  Min: 95.2 °F (35.1 °C)  Max: 97.4 °F (36.3 °C)   BP  Min: 126/79  Max: 179/101   Pulse  Min: 65  Max: 82   Resp  Min: 18  Max: 18   SpO2  Min: 90 %  Max: 100 %   Flow (L/min)  Min: 60  Max: 60   No data recorded     Nad, axox3,   perrl, eomi, no icterus,  mmm, no jvd, trachea midline, neck supple,  chest decreased ae bilaterally, no crackles, no wheezes,   rrr,   soft, nt, nd +bs,  no c/c/ e  Skin warm, dry no rashes    Labs: 1/5: reviewed:  Wbc 11.45  hgb 13.6  plts 134    A/P:  1. Acute hypoxemic respiratory failure -- wean optiflow as able  2. COVID 19 pneumonia --  dexamethasone 6mg change to daily  3. Hyperglycemia  4. Hyponatremia     See if we can try him back on high flow nasal cannula  I asked this to be tried yesterday, not sure if it was?

## 2021-01-05 NOTE — PLAN OF CARE
Problem: Adult Inpatient Plan of Care  Goal: Plan of Care Review  Outcome: Ongoing, Progressing  Flowsheets (Taken 1/5/2021 1447)  Progress: improving  Plan of Care Reviewed With: patient  Outcome Summary: vss, RT to wean optiflow, no falls, no pain, monitor BG, ad valente, continue to monitor  Goal: Patient-Specific Goal (Individualized)  Outcome: Ongoing, Progressing  Goal: Absence of Hospital-Acquired Illness or Injury  Outcome: Ongoing, Progressing  Intervention: Identify and Manage Fall Risk  Recent Flowsheet Documentation  Taken 1/5/2021 1438 by Arlet Olsen RN  Safety Promotion/Fall Prevention:   activity supervised   assistive device/personal items within reach   clutter free environment maintained   fall prevention program maintained   nonskid shoes/slippers when out of bed   room organization consistent   safety round/check completed  Taken 1/5/2021 1143 by Arlet Olsen RN  Safety Promotion/Fall Prevention:   assistive device/personal items within reach   activity supervised   clutter free environment maintained   fall prevention program maintained   nonskid shoes/slippers when out of bed   safety round/check completed   room organization consistent  Taken 1/5/2021 1021 by Arlet Olsen RN  Safety Promotion/Fall Prevention:   assistive device/personal items within reach   activity supervised   clutter free environment maintained   fall prevention program maintained   nonskid shoes/slippers when out of bed   room organization consistent   safety round/check completed  Taken 1/5/2021 0828 by Arlet Olsen RN  Safety Promotion/Fall Prevention:   activity supervised   assistive device/personal items within reach   clutter free environment maintained   fall prevention program maintained   nonskid shoes/slippers when out of bed   room organization consistent   safety round/check completed  Goal: Optimal Comfort and Wellbeing  Outcome: Ongoing, Progressing  Goal: Readiness for Transition of  Care  Outcome: Ongoing, Progressing     Problem: Breathing Pattern Ineffective  Goal: Effective Breathing Pattern  Outcome: Ongoing, Progressing  Intervention: Promote Improved Breathing Pattern  Recent Flowsheet Documentation  Taken 1/5/2021 1438 by Arlet Olsen RN  Supportive Measures: active listening utilized  Airway/Ventilation Management:   airway patency maintained   calming measures promoted   humidification applied   pulmonary hygiene promoted  Breathing Techniques/Airway Clearance: deep/controlled cough encouraged  Taken 1/5/2021 0828 by Arlet Olsen RN  Supportive Measures: active listening utilized     Problem: Diabetes Comorbidity  Goal: Blood Glucose Level Within Desired Range  Outcome: Ongoing, Progressing     Problem: Heart Failure Comorbidity  Goal: Maintenance of Heart Failure Symptom Control  Outcome: Ongoing, Progressing     Problem: Hypertension Comorbidity  Goal: Blood Pressure in Desired Range  Outcome: Ongoing, Progressing   Goal Outcome Evaluation:  Plan of Care Reviewed With: patient  Progress: improving  Outcome Summary: vss, RT to wean optiflow, no falls, no pain, monitor BG, ad valente, continue to monitor

## 2021-01-05 NOTE — PROGRESS NOTES
Continued Stay Note  [facility]     Patient Name: Rufus Dorsey  MRN: [id number]  Today's Date: 1/5/2021    Admit Date: 12/21/2020    Discharge Plan     Row Name 01/05/21 1556       Plan    Plan  Plans are home.  CCP conts to follow for assist with needs @ DC. Marissa Garcia RN/CCP        Discharge Codes    No documentation.             Marissa Garcia RN

## 2021-01-05 NOTE — PROGRESS NOTES
Name: Rufus Dorsey ADMIT: 2020   : 1985  PCP: Provider, No Known    MRN: [id number] LOS: 15 days   AGE/SEX: 35 y.o. male  ROOM: [facility]/     Subjective   Subjective   CC: dyspnea  No acute events. Patient has no new complaints. Had a little more cough today but dyspnea is about the same. Using incentive spirometry. Taking PO. No CP/f/c/n/v/d.    Objective   Objective   Vital Signs  Temp:  [95 °F (35 °C)-97.4 °F (36.3 °C)] 95 °F (35 °C)  Heart Rate:  [70-82] 76  Resp:  [18-24] 18  BP: (132-179)/() 132/86  SpO2:  [90 %-96 %] 93 %  on  Flow (L/min):  [60] 60;   Device (Oxygen Therapy): heated;high-flow nasal cannula;humidified  Body mass index is 44.44 kg/m².  Physical Exam  Vitals signs and nursing note reviewed.   Constitutional:       General: He is not in acute distress.     Appearance: He is not toxic-appearing.   HENT:      Head: Normocephalic and atraumatic.      Nose: Nose normal.      Mouth/Throat:      Mouth: Mucous membranes are moist.      Pharynx: Oropharynx is clear.   Eyes:      Conjunctiva/sclera: Conjunctivae normal.      Pupils: Pupils are equal, round, and reactive to light.   Neck:      Musculoskeletal: Normal range of motion and neck supple.   Cardiovascular:      Rate and Rhythm: Normal rate and regular rhythm.      Pulses: Normal pulses.   Pulmonary:      Effort: Pulmonary effort is normal.      Breath sounds: Examination of the right-lower field reveals decreased breath sounds. Examination of the left-lower field reveals decreased breath sounds. Decreased breath sounds present.   Abdominal:      General: Bowel sounds are normal.      Palpations: Abdomen is soft.      Tenderness: There is no abdominal tenderness.   Musculoskeletal:         General: No swelling or tenderness.   Skin:     General: Skin is warm and dry.      Capillary Refill: Capillary refill takes less than 2 seconds.   Neurological:      General: No focal deficit present.      Mental Status: He is alert and  oriented to person, place, and time.   Psychiatric:         Mood and Affect: Mood normal.         Behavior: Behavior normal.       Results Review     I reviewed the patient's new clinical results.  I reviewed the patient's telemetry.  Results from last 7 days   Lab Units 01/05/21  0541 01/04/21 0538 01/03/21 0453 01/02/21  0700   WBC 10*3/mm3 11.45* 10.43 9.81 9.70   HEMOGLOBIN g/dL 13.6 13.4 13.2 13.2   PLATELETS 10*3/mm3 134* 147 136* 144     Results from last 7 days   Lab Units 01/05/21  0543 01/04/21  0538 01/03/21 0453 01/02/21  0700   SODIUM mmol/L 136 132* 133* 133*   POTASSIUM mmol/L 3.9 4.4 4.5 4.6   CHLORIDE mmol/L 102 100 100 102   CO2 mmol/L 23.9 24.8 24.0 23.3   BUN mg/dL 47* 44* 39* 38*   CREATININE mg/dL 1.06 1.07 1.05 1.21   GLUCOSE mg/dL 263* 271* 386* 323*   Estimated Creatinine Clearance: 121.5 mL/min (by C-G formula based on SCr of 1.06 mg/dL).  Results from last 7 days   Lab Units 01/05/21 0543 01/04/21 0538 01/03/21 0453 01/02/21  0700   ALBUMIN g/dL 2.70* 2.70* 2.60* 2.60*   BILIRUBIN mg/dL 0.3 0.3 0.3 0.3   ALK PHOS U/L 164* 141* 155* 173*   AST (SGOT) U/L 36 42* 28 26   ALT (SGPT) U/L 68* 71* 55* 52*     Results from last 7 days   Lab Units 01/05/21 0543 01/04/21 0538 01/03/21 0453 01/02/21  0700   CALCIUM mg/dL 8.0* 8.0* 8.1* 8.1*   ALBUMIN g/dL 2.70* 2.70* 2.60* 2.60*       COVID19   Date Value Ref Range Status   12/21/2020 Detected (C) Not Detected - Ref. Range Final     Glucose   Date/Time Value Ref Range Status   01/05/2021 1616 260 (H) 70 - 130 mg/dL Final   01/05/2021 1059 191 (H) 70 - 130 mg/dL Final   01/05/2021 0611 243 (H) 70 - 130 mg/dL Final   01/04/2021 2045 297 (H) 70 - 130 mg/dL Final   01/04/2021 1619 325 (H) 70 - 130 mg/dL Final   01/04/2021 1131 231 (H) 70 - 130 mg/dL Final   01/04/2021 0608 256 (H) 70 - 130 mg/dL Final       Duplex Venous Lower Extremity - Bilateral CAR  · Normal bilateral lower extremity venous duplex scan.       Scheduled Medications  aspirin,  81 mg, Oral, Daily  atorvastatin, 80 mg, Oral, Nightly  budesonide-formoterol, 2 puff, Inhalation, BID - RT  bumetanide, 1 mg, Oral, Daily  carvedilol, 6.25 mg, Oral, BID With Meals  dexamethasone, 6 mg, Intravenous, Daily  enoxaparin, 40 mg, Subcutaneous, Q12H  insulin glargine, 60 Units, Subcutaneous, Q12H  insulin lispro, 0-24 Units, Subcutaneous, TID AC  sodium chloride, 10 mL, Intravenous, Q12H    Infusions   Diet  Diet Regular; Consistent Carbohydrate, Cardiac       Assessment/Plan     Active Hospital Problems    Diagnosis  POA   • **COVID-19 virus infection [U07.1]  Unknown   • Pneumonia of both lungs due to infectious organism [J18.9]  Yes   • Acute respiratory failure with hypoxia (CMS/HCC) [J96.01]  Unknown   • Acute kidney injury (CMS/HCC) [N17.9]  Unknown   • Thrombocytopenia (CMS/HCC) [D69.6]  Unknown   • Pneumonia due to COVID-19 virus [U07.1, J12.82]  Unknown      Resolved Hospital Problems   No resolved problems to display.   COVID-19 Pneumonia with Acute Hypoxic Respiratory Failure   - completed remdesivir, on prolonged steroid course  - encourage pulmonary toilet and wean oxygen as tolerated  - follow inflammatory markers  - appreciate pulmonology recs    Type 2 DM  - complicated by hyperglycemia from steroids-will continue on current regimen as I expect some improvement with dose reduction    Morbid Obesity  - complicating above    Lovenox 40mg SC Q12H for DVT prophylaxis.  Full code.  Discussed with patient, nursing staff and care team on multidisciplinary rounds.  Anticipate discharge TBD.  timing yet to be determined.      Joey Hughes MD  [facility]  01/05/21  16:40 EST    I wore protective equipment throughout this patient encounter including a face mask, gloves and protective eyewear.  Hand hygiene was performed before donning protective equipment and after removal when leaving the room.

## 2021-01-06 LAB
ALBUMIN SERPL-MCNC: 2.6 G/DL (ref 3.5–5.2)
ALBUMIN/GLOB SERPL: 0.9 G/DL
ALP SERPL-CCNC: 181 U/L (ref 39–117)
ALT SERPL W P-5'-P-CCNC: 68 U/L (ref 1–41)
ANION GAP SERPL CALCULATED.3IONS-SCNC: 5.3 MMOL/L (ref 5–15)
AST SERPL-CCNC: 30 U/L (ref 1–40)
BASOPHILS # BLD AUTO: 0.01 10*3/MM3 (ref 0–0.2)
BASOPHILS NFR BLD AUTO: 0.1 % (ref 0–1.5)
BILIRUB SERPL-MCNC: 0.3 MG/DL (ref 0–1.2)
BUN SERPL-MCNC: 42 MG/DL (ref 6–20)
BUN/CREAT SERPL: 37.2 (ref 7–25)
CALCIUM SPEC-SCNC: 7.8 MG/DL (ref 8.6–10.5)
CHLORIDE SERPL-SCNC: 103 MMOL/L (ref 98–107)
CK SERPL-CCNC: 118 U/L (ref 20–200)
CO2 SERPL-SCNC: 26.7 MMOL/L (ref 22–29)
CREAT SERPL-MCNC: 1.13 MG/DL (ref 0.76–1.27)
CRP SERPL-MCNC: 0.08 MG/DL (ref 0–0.5)
DEPRECATED RDW RBC AUTO: 40.8 FL (ref 37–54)
EOSINOPHIL # BLD AUTO: 0.02 10*3/MM3 (ref 0–0.4)
EOSINOPHIL NFR BLD AUTO: 0.2 % (ref 0.3–6.2)
ERYTHROCYTE [DISTWIDTH] IN BLOOD BY AUTOMATED COUNT: 13.8 % (ref 12.3–15.4)
FERRITIN SERPL-MCNC: 837 NG/ML (ref 30–400)
GFR SERPL CREATININE-BSD FRML MDRD: 74 ML/MIN/1.73
GLOBULIN UR ELPH-MCNC: 2.8 GM/DL
GLUCOSE BLDC GLUCOMTR-MCNC: 221 MG/DL (ref 70–130)
GLUCOSE BLDC GLUCOMTR-MCNC: 267 MG/DL (ref 70–130)
GLUCOSE BLDC GLUCOMTR-MCNC: 268 MG/DL (ref 70–130)
GLUCOSE BLDC GLUCOMTR-MCNC: 339 MG/DL (ref 70–130)
GLUCOSE BLDC GLUCOMTR-MCNC: 489 MG/DL (ref 70–130)
GLUCOSE SERPL-MCNC: 292 MG/DL (ref 65–99)
HCT VFR BLD AUTO: 38.2 % (ref 37.5–51)
HGB BLD-MCNC: 13 G/DL (ref 13–17.7)
IMM GRANULOCYTES # BLD AUTO: 0.08 10*3/MM3 (ref 0–0.05)
IMM GRANULOCYTES NFR BLD AUTO: 0.8 % (ref 0–0.5)
LYMPHOCYTES # BLD AUTO: 0.75 10*3/MM3 (ref 0.7–3.1)
LYMPHOCYTES NFR BLD AUTO: 7.3 % (ref 19.6–45.3)
MCH RBC QN AUTO: 28.4 PG (ref 26.6–33)
MCHC RBC AUTO-ENTMCNC: 34 G/DL (ref 31.5–35.7)
MCV RBC AUTO: 83.4 FL (ref 79–97)
MONOCYTES # BLD AUTO: 0.68 10*3/MM3 (ref 0.1–0.9)
MONOCYTES NFR BLD AUTO: 6.6 % (ref 5–12)
NEUTROPHILS NFR BLD AUTO: 8.69 10*3/MM3 (ref 1.7–7)
NEUTROPHILS NFR BLD AUTO: 85 % (ref 42.7–76)
NRBC BLD AUTO-RTO: 0 /100 WBC (ref 0–0.2)
PLATELET # BLD AUTO: 115 10*3/MM3 (ref 140–450)
PMV BLD AUTO: 12.4 FL (ref 6–12)
POTASSIUM SERPL-SCNC: 4 MMOL/L (ref 3.5–5.2)
PROT SERPL-MCNC: 5.4 G/DL (ref 6–8.5)
RBC # BLD AUTO: 4.58 10*6/MM3 (ref 4.14–5.8)
SODIUM SERPL-SCNC: 135 MMOL/L (ref 136–145)
WBC # BLD AUTO: 10.23 10*3/MM3 (ref 3.4–10.8)

## 2021-01-06 PROCEDURE — 63710000001 DEXAMETHASONE PER 0.25 MG: Performed by: INTERNAL MEDICINE

## 2021-01-06 PROCEDURE — 82728 ASSAY OF FERRITIN: CPT | Performed by: HOSPITALIST

## 2021-01-06 PROCEDURE — 86140 C-REACTIVE PROTEIN: CPT | Performed by: HOSPITALIST

## 2021-01-06 PROCEDURE — 85025 COMPLETE CBC W/AUTO DIFF WBC: CPT | Performed by: HOSPITALIST

## 2021-01-06 PROCEDURE — 94799 UNLISTED PULMONARY SVC/PX: CPT

## 2021-01-06 PROCEDURE — 82550 ASSAY OF CK (CPK): CPT | Performed by: HOSPITALIST

## 2021-01-06 PROCEDURE — 63710000001 INSULIN LISPRO (HUMAN) PER 5 UNITS: Performed by: HOSPITALIST

## 2021-01-06 PROCEDURE — 25010000002 ENOXAPARIN PER 10 MG: Performed by: HOSPITALIST

## 2021-01-06 PROCEDURE — 63710000001 INSULIN GLARGINE PER 5 UNITS: Performed by: INTERNAL MEDICINE

## 2021-01-06 PROCEDURE — 80053 COMPREHEN METABOLIC PANEL: CPT | Performed by: HOSPITALIST

## 2021-01-06 PROCEDURE — 82962 GLUCOSE BLOOD TEST: CPT

## 2021-01-06 RX ORDER — NICOTINE POLACRILEX 4 MG
15 LOZENGE BUCCAL
Status: DISCONTINUED | OUTPATIENT
Start: 2021-01-06 | End: 2021-01-08 | Stop reason: HOSPADM

## 2021-01-06 RX ORDER — INSULIN LISPRO 100 [IU]/ML
8 INJECTION, SOLUTION INTRAVENOUS; SUBCUTANEOUS
Status: DISCONTINUED | OUTPATIENT
Start: 2021-01-06 | End: 2021-01-07

## 2021-01-06 RX ORDER — INSULIN LISPRO 100 [IU]/ML
0-9 INJECTION, SOLUTION INTRAVENOUS; SUBCUTANEOUS
Status: DISCONTINUED | OUTPATIENT
Start: 2021-01-06 | End: 2021-01-07

## 2021-01-06 RX ORDER — DEXTROSE MONOHYDRATE 25 G/50ML
25 INJECTION, SOLUTION INTRAVENOUS
Status: DISCONTINUED | OUTPATIENT
Start: 2021-01-06 | End: 2021-01-08 | Stop reason: HOSPADM

## 2021-01-06 RX ADMIN — ENOXAPARIN SODIUM 40 MG: 40 INJECTION SUBCUTANEOUS at 21:18

## 2021-01-06 RX ADMIN — ASPIRIN 81 MG: 81 TABLET, COATED ORAL at 12:56

## 2021-01-06 RX ADMIN — ENOXAPARIN SODIUM 40 MG: 40 INJECTION SUBCUTANEOUS at 12:54

## 2021-01-06 RX ADMIN — SODIUM CHLORIDE, PRESERVATIVE FREE 10 ML: 5 INJECTION INTRAVENOUS at 12:56

## 2021-01-06 RX ADMIN — BUDESONIDE AND FORMOTEROL FUMARATE DIHYDRATE 2 PUFF: 160; 4.5 AEROSOL RESPIRATORY (INHALATION) at 21:06

## 2021-01-06 RX ADMIN — INSULIN GLARGINE 60 UNITS: 100 INJECTION, SOLUTION SUBCUTANEOUS at 12:56

## 2021-01-06 RX ADMIN — CARVEDILOL 6.25 MG: 6.25 TABLET, FILM COATED ORAL at 21:18

## 2021-01-06 RX ADMIN — INSULIN LISPRO 8 UNITS: 100 INJECTION, SOLUTION INTRAVENOUS; SUBCUTANEOUS at 12:51

## 2021-01-06 RX ADMIN — SODIUM CHLORIDE, PRESERVATIVE FREE 10 ML: 5 INJECTION INTRAVENOUS at 21:19

## 2021-01-06 RX ADMIN — DEXAMETHASONE 6 MG: 4 TABLET ORAL at 12:56

## 2021-01-06 RX ADMIN — INSULIN GLARGINE 60 UNITS: 100 INJECTION, SOLUTION SUBCUTANEOUS at 21:00

## 2021-01-06 RX ADMIN — ATORVASTATIN CALCIUM 80 MG: 80 TABLET, FILM COATED ORAL at 21:18

## 2021-01-06 RX ADMIN — BUDESONIDE AND FORMOTEROL FUMARATE DIHYDRATE 2 PUFF: 160; 4.5 AEROSOL RESPIRATORY (INHALATION) at 10:01

## 2021-01-06 RX ADMIN — INSULIN LISPRO 12 UNITS: 100 INJECTION, SOLUTION INTRAVENOUS; SUBCUTANEOUS at 16:57

## 2021-01-06 RX ADMIN — BUMETANIDE 1 MG: 1 TABLET ORAL at 12:55

## 2021-01-06 RX ADMIN — CARVEDILOL 6.25 MG: 6.25 TABLET, FILM COATED ORAL at 12:54

## 2021-01-06 NOTE — PLAN OF CARE
Goal Outcome Evaluation:  Plan of Care Reviewed With: patient  Progress: improving  Outcome Summary: Patient with elevated bp this morning, MD aware, transitioned to HFNC today, doing well on that this shift. up in chair most of the day no c/o

## 2021-01-06 NOTE — PROGRESS NOTES
[facility]      Mar/chart reviewed  Follow up COVID19 pneumonia, AHRF  Some cough, doesn't feel distressed    Vital Sign Min/Max for last 24 hours  Temp  Min: 95 °F (35 °C)  Max: 97 °F (36.1 °C)   BP  Min: 132/86  Max: 166/97   Pulse  Min: 70  Max: 83   Resp  Min: 18  Max: 24   SpO2  Min: 92 %  Max: 97 %   Flow (L/min)  Min: 40  Max: 60   No data recorded     Nad, axox3,   perrl, eomi, no icterus,  mmm, no jvd, trachea midline, neck supple,  chest decreased ae bilaterally, no crackles, no wheezes,   rrr,   soft, nt, nd +bs,  no c/c/ e  Skin warm, dry no rashes    A/P:  1. Acute hypoxemic respiratory failure -- wean optiflow as able  2. COVID 19 pneumonia --  dexamethasone 6mg change to daily  3. Hyperglycemia  4. Hyponatremia    Stop optiflow, place patient on regular nasal cannula, suspect he can be on less than 6 liters. I put him on this at bedside, he has sats mid to high 90's

## 2021-01-06 NOTE — PLAN OF CARE
Goal Outcome Evaluation:  Plan of Care Reviewed With: patient  Progress: improving  Outcome Summary: AOx4, VSS, Optiflow weaned down to 40L/35%- patient tolerating well, no c/o pain, diabetes education continues, AM labs pending, will cont to monitor.

## 2021-01-06 NOTE — PROGRESS NOTES
Name: Rufus Dorsey ADMIT: 2020   : 1985  PCP: Provider, No Known    MRN: [id number] LOS: 16 days   AGE/SEX: 35 y.o. male  ROOM: [facility]     Subjective   Subjective   CC: dyspnea  No acute events. Patient has no new complaints. He is overall feeling better. Using incentive spirometry. Taking PO. No CP/f/c/n/v/d.    Objective   Objective   Vital Signs  Temp:  [96.5 °F (35.8 °C)-98.7 °F (37.1 °C)] 98.7 °F (37.1 °C)  Heart Rate:  [73-83] 73  Resp:  [16-20] 16  BP: (153-169)/(85-97) 169/97  SpO2:  [92 %-97 %] 93 %  on  Flow (L/min):  [10-60] 10;   Device (Oxygen Therapy): high-flow nasal cannula;humidified  Body mass index is 44.44 kg/m².  Physical Exam  Vitals signs and nursing note reviewed.   Constitutional:       General: He is not in acute distress.     Appearance: He is not toxic-appearing.   HENT:      Head: Normocephalic and atraumatic.      Nose: Nose normal.      Mouth/Throat:      Mouth: Mucous membranes are moist.      Pharynx: Oropharynx is clear.   Eyes:      Conjunctiva/sclera: Conjunctivae normal.      Pupils: Pupils are equal, round, and reactive to light.   Neck:      Musculoskeletal: Normal range of motion and neck supple.   Cardiovascular:      Rate and Rhythm: Normal rate and regular rhythm.      Pulses: Normal pulses.   Pulmonary:      Effort: Pulmonary effort is normal.      Breath sounds: Examination of the right-lower field reveals decreased breath sounds. Examination of the left-lower field reveals decreased breath sounds. Decreased breath sounds present.   Abdominal:      General: Bowel sounds are normal.      Palpations: Abdomen is soft.      Tenderness: There is no abdominal tenderness.   Musculoskeletal:         General: No swelling or tenderness.   Skin:     General: Skin is warm and dry.      Capillary Refill: Capillary refill takes less than 2 seconds.   Neurological:      General: No focal deficit present.      Mental Status: He is alert and oriented to person, place,  and time.   Psychiatric:         Mood and Affect: Mood normal.         Behavior: Behavior normal.       Results Review     I reviewed the patient's new clinical results.  I reviewed the patient's telemetry.  Results from last 7 days   Lab Units 01/06/21  0608 01/05/21  0541 01/04/21  0538 01/03/21  0453   WBC 10*3/mm3 10.23 11.45* 10.43 9.81   HEMOGLOBIN g/dL 13.0 13.6 13.4 13.2   PLATELETS 10*3/mm3 115* 134* 147 136*     Results from last 7 days   Lab Units 01/06/21  0607 01/05/21  0543 01/04/21  0538 01/03/21  0453   SODIUM mmol/L 135* 136 132* 133*   POTASSIUM mmol/L 4.0 3.9 4.4 4.5   CHLORIDE mmol/L 103 102 100 100   CO2 mmol/L 26.7 23.9 24.8 24.0   BUN mg/dL 42* 47* 44* 39*   CREATININE mg/dL 1.13 1.06 1.07 1.05   GLUCOSE mg/dL 292* 263* 271* 386*   Estimated Creatinine Clearance: 114 mL/min (by C-G formula based on SCr of 1.13 mg/dL).  Results from last 7 days   Lab Units 01/06/21  0607 01/05/21  0543 01/04/21  0538 01/03/21  0453   ALBUMIN g/dL 2.60* 2.70* 2.70* 2.60*   BILIRUBIN mg/dL 0.3 0.3 0.3 0.3   ALK PHOS U/L 181* 164* 141* 155*   AST (SGOT) U/L 30 36 42* 28   ALT (SGPT) U/L 68* 68* 71* 55*     Results from last 7 days   Lab Units 01/06/21  0607 01/05/21  0543 01/04/21  0538 01/03/21  0453   CALCIUM mg/dL 7.8* 8.0* 8.0* 8.1*   ALBUMIN g/dL 2.60* 2.70* 2.70* 2.60*       COVID19   Date Value Ref Range Status   12/21/2020 Detected (C) Not Detected - Ref. Range Final     Glucose   Date/Time Value Ref Range Status   01/06/2021 1636 267 (H) 70 - 130 mg/dL Final   01/06/2021 1150 221 (H) 70 - 130 mg/dL Final   01/06/2021 0608 268 (H) 70 - 130 mg/dL Final   01/05/2021 2023 346 (H) 70 - 130 mg/dL Final   01/05/2021 1616 260 (H) 70 - 130 mg/dL Final   01/05/2021 1059 191 (H) 70 - 130 mg/dL Final   01/05/2021 0611 243 (H) 70 - 130 mg/dL Final       Duplex Venous Lower Extremity - Bilateral CAR  · Normal bilateral lower extremity venous duplex scan.       Scheduled Medications  aspirin, 81 mg, Oral,  Daily  atorvastatin, 80 mg, Oral, Nightly  budesonide-formoterol, 2 puff, Inhalation, BID - RT  bumetanide, 1 mg, Oral, Daily  carvedilol, 6.25 mg, Oral, BID With Meals  dexamethasone, 6 mg, Oral, Daily With Breakfast  enoxaparin, 40 mg, Subcutaneous, Q12H  insulin glargine, 60 Units, Subcutaneous, Q12H  insulin lispro, 0-24 Units, Subcutaneous, TID AC  sodium chloride, 10 mL, Intravenous, Q12H    Infusions   Diet  Diet Regular; Consistent Carbohydrate, Cardiac       Assessment/Plan     Active Hospital Problems    Diagnosis  POA   • **COVID-19 virus infection [U07.1]  Yes   • Type 2 diabetes mellitus, without long-term current use of insulin (CMS/[facility]) [E11.9]  Yes   • Pneumonia of both lungs due to infectious organism [J18.9]  Yes   • Acute respiratory failure with hypoxia (CMS/[facility]) [J96.01]  Yes   • Acute kidney injury (CMS/[facility]) [N17.9]  Yes   • Thrombocytopenia (CMS/[facility]) [D69.6]  Yes   • Pneumonia due to COVID-19 virus [U07.1, J12.82]  Yes      Resolved Hospital Problems   No resolved problems to display.   COVID-19 Pneumonia with Acute Hypoxic Respiratory Failure   - completed remdesivir, on prolonged steroid course  - encourage pulmonary toilet and wean oxygen as tolerated  - follow inflammatory markers  - appreciate pulmonology recs    Type 2 DM  - complicated by hyperglycemia from steroids  - continue lantus 60 units Q12H  - schedule humalog 8 units TID with meals  - continue ssi/hypoglycemia protocol coverage but will decrease to moderate dosing    Morbid Obesity  - complicating above    Lovenox 40mg SC Q12H for DVT prophylaxis.  Full code.  Discussed with patient, nursing staff and care team on multidisciplinary rounds.  Anticipate discharge TBD.  timing yet to be determined.      Joey Hughes MD  [facility]  01/06/21  17:55 EST    I wore protective equipment throughout this patient encounter including a face mask, gloves and protective eyewear.  Hand hygiene was performed before  donning protective equipment and after removal when leaving the room.

## 2021-01-07 LAB
ALBUMIN SERPL-MCNC: 2.8 G/DL (ref 3.5–5.2)
ALBUMIN/GLOB SERPL: 1 G/DL
ALP SERPL-CCNC: 184 U/L (ref 39–117)
ALT SERPL W P-5'-P-CCNC: 68 U/L (ref 1–41)
ANION GAP SERPL CALCULATED.3IONS-SCNC: 9.5 MMOL/L (ref 5–15)
AST SERPL-CCNC: 29 U/L (ref 1–40)
BASOPHILS # BLD AUTO: 0.01 10*3/MM3 (ref 0–0.2)
BASOPHILS NFR BLD AUTO: 0.1 % (ref 0–1.5)
BILIRUB SERPL-MCNC: 0.3 MG/DL (ref 0–1.2)
BUN SERPL-MCNC: 40 MG/DL (ref 6–20)
BUN/CREAT SERPL: 38.5 (ref 7–25)
CALCIUM SPEC-SCNC: 7.9 MG/DL (ref 8.6–10.5)
CHLORIDE SERPL-SCNC: 101 MMOL/L (ref 98–107)
CK SERPL-CCNC: 125 U/L (ref 20–200)
CO2 SERPL-SCNC: 25.5 MMOL/L (ref 22–29)
CREAT SERPL-MCNC: 1.04 MG/DL (ref 0.76–1.27)
CRP SERPL-MCNC: 0.24 MG/DL (ref 0–0.5)
D DIMER PPP FEU-MCNC: 0.3 MCGFEU/ML (ref 0–0.49)
DEPRECATED RDW RBC AUTO: 45.5 FL (ref 37–54)
EOSINOPHIL # BLD AUTO: 0.01 10*3/MM3 (ref 0–0.4)
EOSINOPHIL NFR BLD AUTO: 0.1 % (ref 0.3–6.2)
ERYTHROCYTE [DISTWIDTH] IN BLOOD BY AUTOMATED COUNT: 14.2 % (ref 12.3–15.4)
FERRITIN SERPL-MCNC: 825 NG/ML (ref 30–400)
FIBRINOGEN PPP-MCNC: 386 MG/DL (ref 219–464)
GFR SERPL CREATININE-BSD FRML MDRD: 81 ML/MIN/1.73
GLOBULIN UR ELPH-MCNC: 2.9 GM/DL
GLUCOSE BLDC GLUCOMTR-MCNC: 265 MG/DL (ref 70–130)
GLUCOSE BLDC GLUCOMTR-MCNC: 290 MG/DL (ref 70–130)
GLUCOSE BLDC GLUCOMTR-MCNC: 297 MG/DL (ref 70–130)
GLUCOSE BLDC GLUCOMTR-MCNC: 362 MG/DL (ref 70–130)
GLUCOSE BLDC GLUCOMTR-MCNC: 396 MG/DL (ref 70–130)
GLUCOSE SERPL-MCNC: 325 MG/DL (ref 65–99)
HCT VFR BLD AUTO: 40.3 % (ref 37.5–51)
HGB BLD-MCNC: 12.8 G/DL (ref 13–17.7)
IMM GRANULOCYTES # BLD AUTO: 0.07 10*3/MM3 (ref 0–0.05)
IMM GRANULOCYTES NFR BLD AUTO: 0.7 % (ref 0–0.5)
LDH SERPL-CCNC: 357 U/L (ref 135–225)
LYMPHOCYTES # BLD AUTO: 0.49 10*3/MM3 (ref 0.7–3.1)
LYMPHOCYTES NFR BLD AUTO: 4.8 % (ref 19.6–45.3)
MCH RBC QN AUTO: 27.9 PG (ref 26.6–33)
MCHC RBC AUTO-ENTMCNC: 31.8 G/DL (ref 31.5–35.7)
MCV RBC AUTO: 87.8 FL (ref 79–97)
MONOCYTES # BLD AUTO: 0.59 10*3/MM3 (ref 0.1–0.9)
MONOCYTES NFR BLD AUTO: 5.8 % (ref 5–12)
NEUTROPHILS NFR BLD AUTO: 8.97 10*3/MM3 (ref 1.7–7)
NEUTROPHILS NFR BLD AUTO: 88.5 % (ref 42.7–76)
NRBC BLD AUTO-RTO: 0 /100 WBC (ref 0–0.2)
PLATELET # BLD AUTO: 109 10*3/MM3 (ref 140–450)
PMV BLD AUTO: 12.9 FL (ref 6–12)
POTASSIUM SERPL-SCNC: 4.1 MMOL/L (ref 3.5–5.2)
PROT SERPL-MCNC: 5.7 G/DL (ref 6–8.5)
RBC # BLD AUTO: 4.59 10*6/MM3 (ref 4.14–5.8)
SODIUM SERPL-SCNC: 136 MMOL/L (ref 136–145)
WBC # BLD AUTO: 10.14 10*3/MM3 (ref 3.4–10.8)

## 2021-01-07 PROCEDURE — 25010000002 ENOXAPARIN PER 10 MG: Performed by: HOSPITALIST

## 2021-01-07 PROCEDURE — 85025 COMPLETE CBC W/AUTO DIFF WBC: CPT | Performed by: HOSPITALIST

## 2021-01-07 PROCEDURE — 83615 LACTATE (LD) (LDH) ENZYME: CPT | Performed by: HOSPITALIST

## 2021-01-07 PROCEDURE — 82962 GLUCOSE BLOOD TEST: CPT

## 2021-01-07 PROCEDURE — 85379 FIBRIN DEGRADATION QUANT: CPT | Performed by: HOSPITALIST

## 2021-01-07 PROCEDURE — 82728 ASSAY OF FERRITIN: CPT | Performed by: HOSPITALIST

## 2021-01-07 PROCEDURE — 80053 COMPREHEN METABOLIC PANEL: CPT | Performed by: HOSPITALIST

## 2021-01-07 PROCEDURE — 85384 FIBRINOGEN ACTIVITY: CPT | Performed by: HOSPITALIST

## 2021-01-07 PROCEDURE — 63710000001 INSULIN LISPRO (HUMAN) PER 5 UNITS: Performed by: INTERNAL MEDICINE

## 2021-01-07 PROCEDURE — 86140 C-REACTIVE PROTEIN: CPT | Performed by: HOSPITALIST

## 2021-01-07 PROCEDURE — 63710000001 INSULIN GLARGINE PER 5 UNITS: Performed by: INTERNAL MEDICINE

## 2021-01-07 PROCEDURE — 94799 UNLISTED PULMONARY SVC/PX: CPT

## 2021-01-07 PROCEDURE — 82550 ASSAY OF CK (CPK): CPT | Performed by: HOSPITALIST

## 2021-01-07 PROCEDURE — 63710000001 DEXAMETHASONE PER 0.25 MG: Performed by: INTERNAL MEDICINE

## 2021-01-07 RX ORDER — INSULIN LISPRO 100 [IU]/ML
0-9 INJECTION, SOLUTION INTRAVENOUS; SUBCUTANEOUS
Status: DISCONTINUED | OUTPATIENT
Start: 2021-01-07 | End: 2021-01-08 | Stop reason: HOSPADM

## 2021-01-07 RX ORDER — DEXAMETHASONE 4 MG/1
4 TABLET ORAL
Status: DISCONTINUED | OUTPATIENT
Start: 2021-01-07 | End: 2021-01-08

## 2021-01-07 RX ORDER — INSULIN LISPRO 100 [IU]/ML
12 INJECTION, SOLUTION INTRAVENOUS; SUBCUTANEOUS
Status: DISCONTINUED | OUTPATIENT
Start: 2021-01-07 | End: 2021-01-08 | Stop reason: HOSPADM

## 2021-01-07 RX ADMIN — BUDESONIDE AND FORMOTEROL FUMARATE DIHYDRATE 2 PUFF: 160; 4.5 AEROSOL RESPIRATORY (INHALATION) at 17:56

## 2021-01-07 RX ADMIN — INSULIN GLARGINE 60 UNITS: 100 INJECTION, SOLUTION SUBCUTANEOUS at 10:19

## 2021-01-07 RX ADMIN — INSULIN LISPRO 12 UNITS: 100 INJECTION, SOLUTION INTRAVENOUS; SUBCUTANEOUS at 18:57

## 2021-01-07 RX ADMIN — INSULIN LISPRO 8 UNITS: 100 INJECTION, SOLUTION INTRAVENOUS; SUBCUTANEOUS at 10:18

## 2021-01-07 RX ADMIN — INSULIN LISPRO 8 UNITS: 100 INJECTION, SOLUTION INTRAVENOUS; SUBCUTANEOUS at 12:34

## 2021-01-07 RX ADMIN — INSULIN LISPRO 6 UNITS: 100 INJECTION, SOLUTION INTRAVENOUS; SUBCUTANEOUS at 10:18

## 2021-01-07 RX ADMIN — INSULIN LISPRO 8 UNITS: 100 INJECTION, SOLUTION INTRAVENOUS; SUBCUTANEOUS at 18:58

## 2021-01-07 RX ADMIN — INSULIN LISPRO 6 UNITS: 100 INJECTION, SOLUTION INTRAVENOUS; SUBCUTANEOUS at 12:34

## 2021-01-07 RX ADMIN — SODIUM CHLORIDE, PRESERVATIVE FREE 10 ML: 5 INJECTION INTRAVENOUS at 10:17

## 2021-01-07 RX ADMIN — DEXAMETHASONE 4 MG: 4 TABLET ORAL at 10:16

## 2021-01-07 RX ADMIN — INSULIN GLARGINE 60 UNITS: 100 INJECTION, SOLUTION SUBCUTANEOUS at 20:56

## 2021-01-07 RX ADMIN — BUDESONIDE AND FORMOTEROL FUMARATE DIHYDRATE 2 PUFF: 160; 4.5 AEROSOL RESPIRATORY (INHALATION) at 09:22

## 2021-01-07 RX ADMIN — CARVEDILOL 6.25 MG: 6.25 TABLET, FILM COATED ORAL at 10:16

## 2021-01-07 RX ADMIN — BUMETANIDE 1 MG: 1 TABLET ORAL at 10:17

## 2021-01-07 RX ADMIN — INSULIN LISPRO 6 UNITS: 100 INJECTION, SOLUTION INTRAVENOUS; SUBCUTANEOUS at 20:57

## 2021-01-07 RX ADMIN — CARVEDILOL 6.25 MG: 6.25 TABLET, FILM COATED ORAL at 20:56

## 2021-01-07 RX ADMIN — ATORVASTATIN CALCIUM 80 MG: 80 TABLET, FILM COATED ORAL at 20:56

## 2021-01-07 RX ADMIN — ASPIRIN 81 MG: 81 TABLET, COATED ORAL at 10:16

## 2021-01-07 RX ADMIN — ENOXAPARIN SODIUM 40 MG: 40 INJECTION SUBCUTANEOUS at 20:57

## 2021-01-07 RX ADMIN — ENOXAPARIN SODIUM 40 MG: 40 INJECTION SUBCUTANEOUS at 10:16

## 2021-01-07 NOTE — PROGRESS NOTES
"Adult Nutrition  Assessment/PES    Patient Name:  Rufus Dorsey  YOB: 1985  MRN: [id number]  Admit Date:  12/21/2020    Assessment Date:  1/7/2021    Comments:  Nutrition follow up.  Continues to eat well, 100% at meals per chart PO data.  10L O2.  Weaning dexamethasone.  Feeling better.    RD to continue to follow.    RD working remotely due to covid-19 pandemic. Assessment completed based on review of electronic medical record. RD may be reached via secure chat or email.     Reason for Assessment     Row Name 01/07/21 1106          Reason for Assessment    Reason For Assessment  follow-up protocol           Anthropometrics     Row Name 01/07/21 1106          Anthropometrics    Height  167.6 cm (65.98\")        Admit Weight    Admit Weight  -- 275# 12/31        Ideal Body Weight (IBW)    Ideal Body Weight (IBW) (kg)  65.26        Body Mass Index (BMI)    BMI Assessment  BMI 40 or greater: obesity grade III 44.41         Labs/Tests/Procedures/Meds     Row Name 01/07/21 1107          Labs/Procedures/Meds    Lab Results Reviewed  reviewed, pertinent     Lab Results Comments  Gluc, BUN, Ca, AlkP, ALT, Alb, Hgb, Platelets        Diagnostic Tests/Procedures    Diagnostic Test/Procedure Reviewed  reviewed, pertinent        Medications    Pertinent Medications Reviewed  reviewed, pertinent     Pertinent Medications Comments  bumex, dexamethasone, lantus, lovenox, humalog         Physical Findings     Row Name 01/07/21 1108          Physical Findings    Overall Physical Appearance  obese;on oxygen therapy     Skin  edema B=23, intact         Estimated/Assessed Needs     Row Name 01/07/21 1106          Calculation Measurements    Height  167.6 cm (65.98\")         Nutrition Prescription Ordered     Row Name 01/07/21 1108          Nutrition Prescription PO    Current PO Diet  Regular     Common Modifiers  Consistent Carbohydrate;Cardiac         Evaluation of Received Nutrient/Fluid Intake     Row Name 01/07/21 " 1108          PO Evaluation    Number of Meals  2     % PO Intake  100         Problem/Interventions:    Intervention Goal     Row Name 01/07/21 1109          Intervention Goal    General  Maintain nutrition;Reduce/improve symptoms;Disease management/therapy     PO  Maintain intake     Weight  Appropriate weight loss         Nutrition Intervention     Row Name 01/07/21 1109          Nutrition Intervention    RD/Tech Action  Follow Tx progress;Care plan reviewd         Education/Evaluation     Row Name 01/07/21 1109          Education    Education  Will Instruct as appropriate        Monitor/Evaluation    Monitor  Per protocol;PO intake;Weight           Electronically signed by:  Daniella Jordan RD  01/07/21 11:09 EST

## 2021-01-07 NOTE — PROGRESS NOTES
[facility]      Mar/chart reviewed  Follow up COVID19 pneumonia, AHRF  Some cough, doesn't feel distressed    Vital Sign Min/Max for last 24 hours  Temp  Min: 95.5 °F (35.3 °C)  Max: 98.7 °F (37.1 °C)   BP  Min: 153/82  Max: 169/97   Pulse  Min: 73  Max: 89   Resp  Min: 16  Max: 18   SpO2  Min: 93 %  Max: 97 %   Flow (L/min)  Min: 10  Max: 10   No data recorded     Nad, axox3,   perrl, eomi, no icterus,  mmm, no jvd, trachea midline, neck supple,  chest decreased ae bilaterally, no crackles, no wheezes,   rrr,   soft, nt, nd +bs,  no c/c/ e  Skin warm, dry no rashes     A/P:  1. Acute hypoxemic respiratory failure -- wean oxygen as able  2. COVID 19 pneumonia --  will wean dexamethasone futher  3. Hyperglycemia  4. Hyponatremia

## 2021-01-07 NOTE — PROGRESS NOTES
Continued Stay Note  [facility]     Patient Name: Rufus Dorsey  MRN: [id number]  Today's Date: 1/7/2021    Admit Date: 12/21/2020    Discharge Plan     Row Name 01/07/21 1429       Plan    Plan  Plan is to return home with his brother upon DC.  CP continues to follow for assist with needs.    Plan Comments  Juanita is following in case home O2 is needed.  ..............sk        Discharge Codes    No documentation.             Mónica Lutz RN

## 2021-01-07 NOTE — PROGRESS NOTES
Name: Rufus Dorsey ADMIT: 2020   : 1985  PCP: Provider, No Known    MRN: [id number] LOS: 17 days   AGE/SEX: 35 y.o. male  ROOM: [facility] [place]/     Subjective   Subjective   CC: dyspnea  No acute events. Patient has no new complaints.  Using incentive spirometry. Taking PO. No CP/f/c/n/v/d.    Objective   Objective   Vital Signs  Temp:  [95.5 °F (35.3 °C)-96.9 °F (36.1 °C)] 96.9 °F (36.1 °C)  Heart Rate:  [76-89] 79  Resp:  [14-18] 14  BP: (153-188)/(82-99) 160/85  SpO2:  [88 %-97 %] 97 %  on  Flow (L/min):  [9-10] 9;   Device (Oxygen Therapy): high-flow nasal cannula;humidified  Body mass index is 44.44 kg/m².  Physical Exam  Vitals signs and nursing note reviewed.   Constitutional:       General: He is not in acute distress.     Appearance: He is not toxic-appearing.   HENT:      Head: Normocephalic and atraumatic.      Nose: Nose normal.      Mouth/Throat:      Mouth: Mucous membranes are moist.      Pharynx: Oropharynx is clear.   Eyes:      Conjunctiva/sclera: Conjunctivae normal.      Pupils: Pupils are equal, round, and reactive to light.   Neck:      Musculoskeletal: Normal range of motion and neck supple.   Cardiovascular:      Rate and Rhythm: Normal rate and regular rhythm.      Pulses: Normal pulses.   Pulmonary:      Effort: Pulmonary effort is normal.      Breath sounds: Examination of the right-lower field reveals decreased breath sounds. Examination of the left-lower field reveals decreased breath sounds. Decreased breath sounds present.   Abdominal:      General: Bowel sounds are normal.      Palpations: Abdomen is soft.      Tenderness: There is no abdominal tenderness.   Musculoskeletal:         General: No swelling or tenderness.   Skin:     General: Skin is warm and dry.      Capillary Refill: Capillary refill takes less than 2 seconds.   Neurological:      General: No focal deficit present.      Mental Status: He is alert and oriented to person, place, and time.   Psychiatric:          Mood and Affect: Mood normal.         Behavior: Behavior normal.       Results Review     I reviewed the patient's new clinical results.  I reviewed the patient's telemetry.  Results from last 7 days   Lab Units 01/07/21  0511 01/06/21  0608 01/05/21  0541 01/04/21  0538   WBC 10*3/mm3 10.14 10.23 11.45* 10.43   HEMOGLOBIN g/dL 12.8* 13.0 13.6 13.4   PLATELETS 10*3/mm3 109* 115* 134* 147     Results from last 7 days   Lab Units 01/07/21  0511 01/06/21  0607 01/05/21  0543 01/04/21  0538   SODIUM mmol/L 136 135* 136 132*   POTASSIUM mmol/L 4.1 4.0 3.9 4.4   CHLORIDE mmol/L 101 103 102 100   CO2 mmol/L 25.5 26.7 23.9 24.8   BUN mg/dL 40* 42* 47* 44*   CREATININE mg/dL 1.04 1.13 1.06 1.07   GLUCOSE mg/dL 325* 292* 263* 271*   Estimated Creatinine Clearance: 123.8 mL/min (by C-G formula based on SCr of 1.04 mg/dL).  Results from last 7 days   Lab Units 01/07/21  0511 01/06/21  0607 01/05/21  0543 01/04/21  0538   ALBUMIN g/dL 2.80* 2.60* 2.70* 2.70*   BILIRUBIN mg/dL 0.3 0.3 0.3 0.3   ALK PHOS U/L 184* 181* 164* 141*   AST (SGOT) U/L 29 30 36 42*   ALT (SGPT) U/L 68* 68* 68* 71*     Results from last 7 days   Lab Units 01/07/21  0511 01/06/21  0607 01/05/21  0543 01/04/21  0538   CALCIUM mg/dL 7.9* 7.8* 8.0* 8.0*   ALBUMIN g/dL 2.80* 2.60* 2.70* 2.70*       COVID19   Date Value Ref Range Status   12/21/2020 Detected (C) Not Detected - Ref. Range Final     Glucose   Date/Time Value Ref Range Status   01/07/2021 1233 290 (H) 70 - 130 mg/dL Final   01/07/2021 1100 362 (H) 70 - 130 mg/dL Final   01/07/2021 0604 297 (H) 70 - 130 mg/dL Final   01/06/2021 2008 339 (H) 70 - 130 mg/dL Final   01/06/2021 2007 489 (C) 70 - 130 mg/dL Final   01/06/2021 1636 267 (H) 70 - 130 mg/dL Final   01/06/2021 1150 221 (H) 70 - 130 mg/dL Final       Duplex Venous Lower Extremity - Bilateral CAR  · Normal bilateral lower extremity venous duplex scan.       Scheduled Medications  aspirin, 81 mg, Oral, Daily  atorvastatin, 80 mg, Oral,  Nightly  budesonide-formoterol, 2 puff, Inhalation, BID - RT  bumetanide, 1 mg, Oral, Daily  carvedilol, 6.25 mg, Oral, BID With Meals  dexamethasone, 4 mg, Oral, Daily With Breakfast  enoxaparin, 40 mg, Subcutaneous, Q12H  insulin glargine, 60 Units, Subcutaneous, Q12H  insulin lispro, 0-9 Units, Subcutaneous, TID AC  insulin lispro, 8 Units, Subcutaneous, TID With Meals  sodium chloride, 10 mL, Intravenous, Q12H    Infusions   Diet  Diet Regular; Consistent Carbohydrate, Cardiac       Assessment/Plan     Active Hospital Problems    Diagnosis  POA   • **COVID-19 virus infection [U07.1]  Yes   • Type 2 diabetes mellitus, without long-term current use of insulin (CMS/[facility]) [E11.9]  Yes   • Pneumonia of both lungs due to infectious organism [J18.9]  Yes   • Acute respiratory failure with hypoxia (CMS/[facility]) [J96.01]  Yes   • Acute kidney injury (CMS/[facility]) [N17.9]  Yes   • Thrombocytopenia (CMS/[facility]) [D69.6]  Yes   • Pneumonia due to COVID-19 virus [U07.1, J12.82]  Yes      Resolved Hospital Problems   No resolved problems to display.   COVID-19 Pneumonia with Acute Hypoxic Respiratory Failure   - completed remdesivir, on prolonged steroid course  - encourage pulmonary toilet and wean oxygen as tolerated  - follow inflammatory markers  - appreciate pulmonology recs    Type 2 DM  - complicated by hyperglycemia from steroids  - continue lantus 60 units Q12H  - increase scheduled humalog to 12 units TID with meals  - continue ssi/hypoglycemia protocol coverage but will decrease to moderate dosing    Morbid Obesity  - complicating above    Lovenox 40mg SC Q12H for DVT prophylaxis.  Full code.  Discussed with patient, nursing staff and care team on multidisciplinary rounds.  Anticipate discharge TBD.  timing yet to be determined.      Joey Hughes MD  [facility]  01/07/21  15:25 EST    I wore protective equipment throughout this patient encounter including a face mask, gloves and protective eyewear.   Hand hygiene was performed before donning protective equipment and after removal when leaving the room.

## 2021-01-07 NOTE — PLAN OF CARE
Goal Outcome Evaluation:  Plan of Care Reviewed With: patient  Progress: improving  Outcome Summary: Patient up in chair, showered today, tolerating activity with oxygen. currently on 8LHF

## 2021-01-08 ENCOUNTER — READMISSION MANAGEMENT (OUTPATIENT)
Dept: CALL CENTER | Facility: HOSPITAL | Age: 36
End: 2021-01-08

## 2021-01-08 VITALS
HEIGHT: 66 IN | SYSTOLIC BLOOD PRESSURE: 140 MMHG | RESPIRATION RATE: 18 BRPM | HEART RATE: 75 BPM | OXYGEN SATURATION: 95 % | WEIGHT: 275.2 LBS | BODY MASS INDEX: 44.23 KG/M2 | TEMPERATURE: 95.9 F | DIASTOLIC BLOOD PRESSURE: 77 MMHG

## 2021-01-08 PROBLEM — Z79.4 TYPE 2 DIABETES MELLITUS, WITH LONG-TERM CURRENT USE OF INSULIN (HCC): Status: ACTIVE | Noted: 2021-01-05

## 2021-01-08 PROBLEM — N17.9 ACUTE KIDNEY INJURY: Status: RESOLVED | Noted: 2020-12-21 | Resolved: 2021-01-08

## 2021-01-08 LAB
ALBUMIN SERPL-MCNC: 2.8 G/DL (ref 3.5–5.2)
ALBUMIN/GLOB SERPL: 1 G/DL
ALP SERPL-CCNC: 159 U/L (ref 39–117)
ALT SERPL W P-5'-P-CCNC: 62 U/L (ref 1–41)
ANION GAP SERPL CALCULATED.3IONS-SCNC: 5.6 MMOL/L (ref 5–15)
AST SERPL-CCNC: 25 U/L (ref 1–40)
BASOPHILS # BLD AUTO: 0.01 10*3/MM3 (ref 0–0.2)
BASOPHILS NFR BLD AUTO: 0.1 % (ref 0–1.5)
BILIRUB SERPL-MCNC: 0.3 MG/DL (ref 0–1.2)
BUN SERPL-MCNC: 42 MG/DL (ref 6–20)
BUN/CREAT SERPL: 40.4 (ref 7–25)
CALCIUM SPEC-SCNC: 8 MG/DL (ref 8.6–10.5)
CHLORIDE SERPL-SCNC: 103 MMOL/L (ref 98–107)
CK SERPL-CCNC: 154 U/L (ref 20–200)
CO2 SERPL-SCNC: 27.4 MMOL/L (ref 22–29)
CREAT SERPL-MCNC: 1.04 MG/DL (ref 0.76–1.27)
CRP SERPL-MCNC: 0.19 MG/DL (ref 0–0.5)
DEPRECATED RDW RBC AUTO: 41.6 FL (ref 37–54)
EOSINOPHIL # BLD AUTO: 0.04 10*3/MM3 (ref 0–0.4)
EOSINOPHIL NFR BLD AUTO: 0.4 % (ref 0.3–6.2)
ERYTHROCYTE [DISTWIDTH] IN BLOOD BY AUTOMATED COUNT: 13.9 % (ref 12.3–15.4)
FERRITIN SERPL-MCNC: 745 NG/ML (ref 30–400)
GFR SERPL CREATININE-BSD FRML MDRD: 81 ML/MIN/1.73
GLOBULIN UR ELPH-MCNC: 2.7 GM/DL
GLUCOSE BLDC GLUCOMTR-MCNC: 252 MG/DL (ref 70–130)
GLUCOSE BLDC GLUCOMTR-MCNC: 299 MG/DL (ref 70–130)
GLUCOSE SERPL-MCNC: 229 MG/DL (ref 65–99)
HCT VFR BLD AUTO: 38.9 % (ref 37.5–51)
HGB BLD-MCNC: 12.8 G/DL (ref 13–17.7)
IMM GRANULOCYTES # BLD AUTO: 0.07 10*3/MM3 (ref 0–0.05)
IMM GRANULOCYTES NFR BLD AUTO: 0.7 % (ref 0–0.5)
LYMPHOCYTES # BLD AUTO: 0.82 10*3/MM3 (ref 0.7–3.1)
LYMPHOCYTES NFR BLD AUTO: 8.4 % (ref 19.6–45.3)
MCH RBC QN AUTO: 27.5 PG (ref 26.6–33)
MCHC RBC AUTO-ENTMCNC: 32.9 G/DL (ref 31.5–35.7)
MCV RBC AUTO: 83.7 FL (ref 79–97)
MONOCYTES # BLD AUTO: 0.61 10*3/MM3 (ref 0.1–0.9)
MONOCYTES NFR BLD AUTO: 6.2 % (ref 5–12)
NEUTROPHILS NFR BLD AUTO: 8.25 10*3/MM3 (ref 1.7–7)
NEUTROPHILS NFR BLD AUTO: 84.2 % (ref 42.7–76)
NRBC BLD AUTO-RTO: 0 /100 WBC (ref 0–0.2)
PLATELET # BLD AUTO: 102 10*3/MM3 (ref 140–450)
PMV BLD AUTO: 12.2 FL (ref 6–12)
POTASSIUM SERPL-SCNC: 4 MMOL/L (ref 3.5–5.2)
PROT SERPL-MCNC: 5.5 G/DL (ref 6–8.5)
RBC # BLD AUTO: 4.65 10*6/MM3 (ref 4.14–5.8)
SODIUM SERPL-SCNC: 136 MMOL/L (ref 136–145)
WBC # BLD AUTO: 9.8 10*3/MM3 (ref 3.4–10.8)

## 2021-01-08 PROCEDURE — 82550 ASSAY OF CK (CPK): CPT | Performed by: HOSPITALIST

## 2021-01-08 PROCEDURE — 82962 GLUCOSE BLOOD TEST: CPT

## 2021-01-08 PROCEDURE — 63710000001 INSULIN GLARGINE PER 5 UNITS: Performed by: INTERNAL MEDICINE

## 2021-01-08 PROCEDURE — 86140 C-REACTIVE PROTEIN: CPT | Performed by: HOSPITALIST

## 2021-01-08 PROCEDURE — 80053 COMPREHEN METABOLIC PANEL: CPT | Performed by: HOSPITALIST

## 2021-01-08 PROCEDURE — 25010000002 ENOXAPARIN PER 10 MG: Performed by: HOSPITALIST

## 2021-01-08 PROCEDURE — 94799 UNLISTED PULMONARY SVC/PX: CPT

## 2021-01-08 PROCEDURE — 82728 ASSAY OF FERRITIN: CPT | Performed by: HOSPITALIST

## 2021-01-08 PROCEDURE — 85025 COMPLETE CBC W/AUTO DIFF WBC: CPT | Performed by: HOSPITALIST

## 2021-01-08 PROCEDURE — 63710000001 INSULIN LISPRO (HUMAN) PER 5 UNITS: Performed by: INTERNAL MEDICINE

## 2021-01-08 RX ORDER — DEXAMETHASONE 1 MG
TABLET ORAL
Qty: 9 TABLET | Refills: 0 | Status: SHIPPED | OUTPATIENT
Start: 2021-01-09 | End: 2021-01-16

## 2021-01-08 RX ORDER — DEXAMETHASONE 2 MG/1
2 TABLET ORAL
Status: DISCONTINUED | OUTPATIENT
Start: 2021-01-09 | End: 2021-01-08 | Stop reason: HOSPADM

## 2021-01-08 RX ORDER — INSULIN LISPRO 100 [IU]/ML
0-9 INJECTION, SOLUTION INTRAVENOUS; SUBCUTANEOUS
Refills: 12
Start: 2021-01-08 | End: 2021-08-06 | Stop reason: HOSPADM

## 2021-01-08 RX ORDER — BUDESONIDE AND FORMOTEROL FUMARATE DIHYDRATE 160; 4.5 UG/1; UG/1
2 AEROSOL RESPIRATORY (INHALATION)
Qty: 10.2 G | Refills: 0 | Status: ON HOLD | OUTPATIENT
Start: 2021-01-08 | End: 2021-08-06 | Stop reason: SDUPTHER

## 2021-01-08 RX ORDER — LANCETS 28 GAUGE
1 EACH MISCELLANEOUS
Qty: 100 EACH | Refills: 0 | Status: ON HOLD | OUTPATIENT
Start: 2021-01-08 | End: 2021-08-06 | Stop reason: SDUPTHER

## 2021-01-08 RX ORDER — BLOOD-GLUCOSE METER
1 KIT MISCELLANEOUS
Qty: 1 EACH | Refills: 0 | Status: SHIPPED | OUTPATIENT
Start: 2021-01-08

## 2021-01-08 RX ORDER — INSULIN LISPRO 100 [IU]/ML
12 INJECTION, SOLUTION INTRAVENOUS; SUBCUTANEOUS
Qty: 15 ML | Refills: 0 | Status: SHIPPED | OUTPATIENT
Start: 2021-01-08 | End: 2021-08-06 | Stop reason: HOSPADM

## 2021-01-08 RX ORDER — INSULIN GLARGINE 100 [IU]/ML
60 INJECTION, SOLUTION SUBCUTANEOUS 2 TIMES DAILY
Qty: 30 ML | Refills: 0 | Status: ON HOLD | OUTPATIENT
Start: 2021-01-08 | End: 2021-08-06 | Stop reason: SDUPTHER

## 2021-01-08 RX ADMIN — INSULIN LISPRO 12 UNITS: 100 INJECTION, SOLUTION INTRAVENOUS; SUBCUTANEOUS at 12:01

## 2021-01-08 RX ADMIN — INSULIN LISPRO 6 UNITS: 100 INJECTION, SOLUTION INTRAVENOUS; SUBCUTANEOUS at 12:02

## 2021-01-08 RX ADMIN — BUMETANIDE 1 MG: 1 TABLET ORAL at 09:24

## 2021-01-08 RX ADMIN — BUDESONIDE AND FORMOTEROL FUMARATE DIHYDRATE 2 PUFF: 160; 4.5 AEROSOL RESPIRATORY (INHALATION) at 09:49

## 2021-01-08 RX ADMIN — INSULIN GLARGINE 60 UNITS: 100 INJECTION, SOLUTION SUBCUTANEOUS at 10:33

## 2021-01-08 RX ADMIN — ASPIRIN 81 MG: 81 TABLET, COATED ORAL at 09:24

## 2021-01-08 RX ADMIN — ENOXAPARIN SODIUM 40 MG: 40 INJECTION SUBCUTANEOUS at 09:26

## 2021-01-08 RX ADMIN — INSULIN LISPRO 12 UNITS: 100 INJECTION, SOLUTION INTRAVENOUS; SUBCUTANEOUS at 09:28

## 2021-01-08 RX ADMIN — CARVEDILOL 6.25 MG: 6.25 TABLET, FILM COATED ORAL at 09:24

## 2021-01-08 RX ADMIN — INSULIN LISPRO 6 UNITS: 100 INJECTION, SOLUTION INTRAVENOUS; SUBCUTANEOUS at 09:25

## 2021-01-08 NOTE — PLAN OF CARE
Goal Outcome Evaluation:  Plan of Care Reviewed With: patient  Progress: improving     Pt's O2 needs have decreased this shift. Pt's VSS at this time on 5L/NC. Pt denies any pain or distress. Pt has been educated multiple times about diet, but continues to have family bring him food. Otherwise, no issues this shift. Pt's IV came out during shower, provider was made aware that pt would like to keep IV out, and states that is ok for now.

## 2021-01-08 NOTE — PROGRESS NOTES
Case Management/Social Work    Patient Name:  Rufus Dorsey  YOB: 1985  MRN: [id number]  Admit Date:  12/21/2020      O2 sat on room air at rest: 88%      Electronically signed by:  Mónica Lutz RN  01/08/21 12:34 EST

## 2021-01-08 NOTE — DISCHARGE SUMMARY
Date of Admission: 12/21/2020  Date of Discharge:  1/8/2021  Primary Care Physician: Provider, No Known     Discharge Diagnosis:  Active Hospital Problems    Diagnosis  POA   • **COVID-19 virus infection [U07.1]  Yes   • Type 2 diabetes mellitus, with long-term current use of insulin (CMS/[facility]) [E11.9, Z79.4]  Not Applicable   • Pneumonia of both lungs due to infectious organism [J18.9]  Yes   • Acute respiratory failure with hypoxia (CMS/[facility]) [J96.01]  Yes   • Thrombocytopenia (CMS/[facility]) [D69.6]  Yes   • Pneumonia due to COVID-19 virus [U07.1, J12.82]  Yes      Resolved Hospital Problems    Diagnosis Date Resolved POA   • Acute kidney injury (CMS/[facility]) [N17.9] 01/08/2021 Yes       Presenting Problem/History of Present Illness:  Thrombocytopenia (CMS/[facility]) [D69.6]  Acute kidney injury (CMS/[facility]) [N17.9]  Acute respiratory failure with hypoxia (CMS/[facility]) [J96.01]  Pneumonia of both lungs due to infectious organism, unspecified part of lung [J18.9]  COVID-19 virus infection [U07.1]     Hospital Course:  The patient is a 35 y.o. male with a history of type 2 DM, CAD, ICM, and chronic systolic CHF, who presented with cough, dyspnea, and fever. Please see admission H&P from 12/21/20 for further details. He was found to be COVID-19 positive and had acute hypoxic respiratory failure. He was started on decadron as well as remdesivir and he completed a course of the latter in the hospital. He did require optiflo but his respiratory status steadily improved and he was only requiring 3-4 liters of oxygen at discharge. He will go home on the remainder of a prolonged steroid taper. Pulmonology did follow him in the hospital and he should keep follow up with them as well as his PCP. I suspect his oxygen can be weaned over the next few weeks.   His course was complicated by uncontrolled diabetes. He had an A1C of 9.5%.  He was started on an insulin regimen and this will be continued at home as below. I explained that he needs to keep  "follow up with his PCP for insulin adjustment-he may require less when he is done with steroids though now he is on a fairly low dose so we likely have a fair margin on his blood glucose levels.   At this point he is feeling much better and is medically stable. He will be discharged home and he is agreeable to the above plan.    Exam Today:  Blood pressure 158/80, pulse 85, temperature 96.4 °F (35.8 °C), temperature source Oral, resp. rate 18, height 167.6 cm (65.98\"), weight 125 kg (275 lb 3.2 oz), SpO2 95 %.  Vitals signs and nursing note reviewed.   Constitutional:       General: He is not in acute distress.     Appearance: He is not toxic-appearing.   HENT:      Head: Normocephalic and atraumatic.      Nose: Nose normal.      Mouth/Throat:      Mouth: Mucous membranes are moist.      Pharynx: Oropharynx is clear.   Eyes:      Conjunctiva/sclera: Conjunctivae normal.      Pupils: Pupils are equal, round, and reactive to light.   Neck:      Musculoskeletal: Normal range of motion and neck supple.   Cardiovascular:      Rate and Rhythm: Normal rate and regular rhythm.      Pulses: Normal pulses.   Pulmonary:      Effort: Pulmonary effort is normal.      Breath sounds: Examination of the right-lower field reveals decreased breath sounds. Examination of the left-lower field reveals decreased breath sounds. Decreased breath sounds present.   Abdominal:      General: Bowel sounds are normal.      Palpations: Abdomen is soft.      Tenderness: There is no abdominal tenderness.   Musculoskeletal:         General: No swelling or tenderness.   Skin:     General: Skin is warm and dry.      Capillary Refill: Capillary refill takes less than 2 seconds.   Neurological:      General: No focal deficit present.      Mental Status: He is alert and oriented to person, place, and time.   Psychiatric:         Mood and Affect: Mood normal.         Behavior: Behavior normal.     Procedures Performed:  Rufus Dorsey  Duplex scan of " extremity veins including responses to compression and other maneuvers; bilateral  Order# [id number]  Reading physician: Arsenio Espinoza MD Ordering physician: Shayan Bear DO Study date: 20   Patient Information    Patient Name   Rufus Dorsey MRN   [id number] Sex   Male  (Age)   1985 (35 y.o.)   Clinical Indication    edema/swelling; right; foot   Comments:    Admission Information    Admission Date/Time Discharge Date/Time Room/Bed   20  08:36 AM  S519/1   Interpretation Summary    · Normal bilateral lower extremity venous duplex scan.          Consults:   Consults     Date and Time Order Name Status Description    2020 0417 Inpatient Pulmonology Consult Completed     2020 1013 [facility] (on-call MD unless specified) Details Completed            Discharge Disposition:  Home or Self Care    Discharge Medications:     Discharge Medications      New Medications      Instructions Start Date   budesonide-formoterol 160-4.5 MCG/ACT inhaler  Commonly known as: SYMBICORT   2 puffs, Inhalation, 2 Times Daily - RT      dexamethasone 1 MG tablet  Commonly known as: DECADRON   Take 2 tablets by mouth Daily With Breakfast for 3 days, THEN 1 tablet Daily With Breakfast for 2 days, THEN 0.5 tablets Daily With Breakfast for 2 days.   Start Date: 2021     FreeStyle Freedom Lite w/Device kit   Use to check blood sugar as directed.      freestyle lancets   Test 4 (Four) Times a Day After Meals & at Bedtime.      glucose blood test strip   Test 4 (Four) Times a Day After Meals & at Bedtime. Use as instructed      glucose monitor monitoring kit   1 each, Does not apply, 4 Times Daily Before Meals & Nightly      Insulin Glargine 100 UNIT/ML injection pen  Commonly known as: BASAGLAR KWIKPEN   60 Units, Subcutaneous, 2 Times Daily      insulin lispro 100 UNIT/ML injection  Commonly known as: humaLOG, ADMELOG   0-9 Units, Subcutaneous, 4 Times Daily With Meals & Nightly      Insulin  Lispro (1 Unit Dial) 100 UNIT/ML solution pen-injector  Commonly known as: HumaLOG KwikPen   12 Units, Subcutaneous, 3 Times Daily With Meals, In addition to sliding scale      Insulin Pen Needle 32G X 4 MM misc   Use with insulin 5 times daily         Continue These Medications      Instructions Start Date   ASPIRIN PO   81 mg, Daily      atorvastatin 80 MG tablet  Commonly known as: LIPITOR   80 mg, Oral, Daily      bumetanide 1 MG tablet  Commonly known as: BUMEX   1 mg, Oral, Daily      carvedilol 6.25 MG tablet  Commonly known as: COREG   6.25 mg, Oral, 2 Times Daily With Meals      lisinopril 10 MG tablet  Commonly known as: PRINIVIL,ZESTRIL   10 mg, Oral, Daily         Stop These Medications    metFORMIN  MG 24 hr tablet  Commonly known as: GLUCOPHAGE-XR     spironolactone 25 MG tablet  Commonly known as: ALDACTONE            Discharge Diet:   Diet Instructions     Diet: Regular, Consistent Carbohydrate, Cardiac; Thin      Discharge Diet:  Regular  Consistent Carbohydrate  Cardiac       Fluid Consistency: Thin          Activity at Discharge:   Activity Instructions     Activity as Tolerated            Follow-up Appointments:  No future appointments.  Additional Instructions for the Follow-ups that You Need to Schedule     Discharge Follow-up with PCP   As directed       Currently Documented PCP:    Provider, No Known    PCP Phone Number:    [phone]     Follow Up Details: 1 week         Discharge Follow-up with Specialty: Pulmonology ([facility])   As directed      Specialty: Pulmonology ([facility])    Follow Up Details: 2-3 weeks               Joey Hughes MD  01/08/21  13:31 EST    Time Spent on Discharge Activities: Greater than 30 minutes.

## 2021-01-08 NOTE — PROGRESS NOTES
Continued Stay Note  [facility]     Patient Name: Rufus Dorsey  MRN: [id number]  Today's Date: 1/8/2021    Admit Date: 12/21/2020    Discharge Plan     Row Name 01/08/21 1235       Plan    Plan Comments  DC orders and order for O2 noted.  Gayle/ Juanita notified and will deliver portable O2. ...........sk        Discharge Codes    No documentation.       Expected Discharge Date and Time     Expected Discharge Date Expected Discharge Time    Jan 8, 2021             Mónica Lutz RN

## 2021-01-08 NOTE — PROGRESS NOTES
[facility]    Doing well on much less oxygen at this point  Wean dexamethasone over the next 5-7 days  Will see prn while here  Ok with  Me to d/c home with oxygen

## 2021-01-08 NOTE — PROGRESS NOTES
Continued Stay Note  [facility]     Patient Name: Rufus Dorsey  MRN: [id number]  Today's Date: 1/8/2021    Admit Date: 12/21/2020    Discharge Plan     Row Name 01/08/21 1617       Plan    Plan Comments  Gayle states [facility]ita tried to deliver O2, but pt's brother did not accept because they are moving..  CCP spoke w/ pt who states his brother will accept the O2 now.  Gayle/ Juanita notified and she will tell Juanita to try delivery again.    Final Note  DC home with new home O2 thru Juanita.........sk        Discharge Codes    No documentation.       Expected Discharge Date and Time     Expected Discharge Date Expected Discharge Time    Jan 8, 2021             Mónica Lutz RN

## 2021-01-08 NOTE — PLAN OF CARE
Goal Outcome Evaluation:  Plan of Care Reviewed With: patient  Progress: improving  Outcome Summary: Patient's vital signs are stable.He ate his breakfast and lunch while sitting in the chair in his room.Patient is stable to discharge at this time.

## 2021-01-09 ENCOUNTER — READMISSION MANAGEMENT (OUTPATIENT)
Dept: CALL CENTER | Facility: HOSPITAL | Age: 36
End: 2021-01-09

## 2021-01-09 NOTE — OUTREACH NOTE
COVID-19 Week 1 Survey      Responses   [facility] patient discharged from?  [place]   Does the patient have one of the following disease processes/diagnoses(primary or secondary)?  COVID-19   COVID-19 underlying condition?  None   Call Number  Call 1   Week 1 Call successful?  Yes   Call start time  1255   Call end time  1405   Discharge diagnosis  acute hypoxic resp failure, PNA d/t COVID-19, T2DM, MEI   If primary language is not English what is the name and relationship or agency of  used?  [employer]   Meds reviewed with patient/caregiver?  Yes   Is the patient having any side effects they believe may be caused by any medication additions or changes?  No   Does the patient have all medications ordered at discharge?  No   What is keeping the patient from filling the prescriptions?  Medication reconciliation Issue   Nursing Interventions  Nurse provided patient education, Nurse advised patient to call provider   Prescription comments  Pt does not understand insulin and he will bring into the pharmacy for review as wel as I will contact the CM or diabetes educator for review   Is the patient taking all medications as directed (includes completed medication regime)?  No   What is preventing the patient from taking all medications as directed?  Other   Nursing Interventions  Nurse provided patient education   Does the patient have a primary care provider?   No   Comments regarding PCP  Pt goes to a health clinic for care   Does the patient have an appointment with their PCP or specialist within 7 days of discharge?  No   What is preventing the patient from scheduling follow up appointments within 7 days of discharge?  Unsure of when or with whom, Transportation   Nursing Interventions  Educated patient on importance of making appointment   Has the patient kept scheduled appointments due by today?  N/A   Comments  Pt's brother transports him to his appts and if he needs  anything   Has home health visited the patient within 72 hours of discharge?  N/A   What DME was ordered?  O2 from [facility]   Has all DME been delivered?  Yes   Psychosocial issues?  Yes   Notified Case Management  Psychosocial (Non Urgent)   Comments  Language barrier- tried to speak with pt's brotehr and nephew and could not reach them.    Did the patient receive a copy of their discharge instructions?  Yes   Did the patient receive a copy of COVID-19 specific instructions?  Yes   Nursing interventions  Reviewed instructions with patient   What is the patient's perception of their health status since discharge?  Improving   Does the patient have any of the following symptoms?  Shortness of breath, Cough   Nursing Interventions  Nurse provided patient education   Pulse Ox monitoring  None   Is the patient/caregiver able to teach back steps to recovery at home?  Rest and rebuild strength, gradually increase activity, Eat a well-balance diet   If the patient is a current smoker, are they able to teach back resources for cessation?  Not a smoker   Is the patient/caregiver able to teach back the hierarchy of who to call/visit for symptoms/problems? PCP, Specialist, Home health nurse, Urgent Care, ED, 911  Yes   COVID-19 call completed?  Yes   Wrap up additional comments  Spoke with MIA Meade at [facility] regarding pt's insulin as pt confused on dosing. Called pt back with  for second time and he feels more comfortable with insulin administration after reading through all education materials.           Mel Kline RN

## 2021-01-10 ENCOUNTER — READMISSION MANAGEMENT (OUTPATIENT)
Dept: CALL CENTER | Facility: HOSPITAL | Age: 36
End: 2021-01-10

## 2021-01-10 NOTE — OUTREACH NOTE
COVID-19 Week 1 Survey      Responses   [facility] patient discharged from?  [place]   Does the patient have one of the following disease processes/diagnoses(primary or secondary)?  COVID-19   COVID-19 underlying condition?  None [Type 2 diabetes mellitus, with long-term current use of insulin (CMS/[facility]) E11.9,]   Call Number  Call 2   Week 1 Call successful?  Yes   Call start time  1121   Call end time  1126   Discharge diagnosis  COVID-19 virus infection    Medication alerts for this patient  no medication changes   Meds reviewed with patient/caregiver?  Yes   Is the patient having any side effects they believe may be caused by any medication additions or changes?  No   Does the patient have all medications ordered at discharge?  Yes   Comments regarding appointments  will seeing provider tomorrow   Does the patient have a primary care provider?   Yes   Did the patient receive a copy of their discharge instructions?  Yes   Did the patient receive a copy of COVID-19 specific instructions?  Yes   Nursing interventions  Reviewed instructions with patient   What is the patient's perception of their health status since discharge?  Improving [wanting blood sugars]   Does the patient have any of the following symptoms?  None   Pulse Ox monitoring  None [not sure the patient understood the conversation]   O2 Sat comments  -- [not sure the caller understood the conversation]   Is the patient/caregiver able to teach back steps to recovery at home?  Set small, achievable goals for return to baseline health, Rest and rebuild strength, gradually increase activity, Eat a well-balance diet [discussed with the patient]   If the patient is a current smoker, are they able to teach back resources for cessation?  Not a smoker   Is the patient/caregiver able to teach back the hierarchy of who to call/visit for symptoms/problems? PCP, Specialist, Home health nurse, Urgent Care, ED, 911  Yes   COVID-19 call completed?  Yes    Wrap up additional comments  feels he is improving, seeing provider tomorrow, watching diet and monitoring blood sugars          Isabela Cesar RN

## 2021-01-11 ENCOUNTER — READMISSION MANAGEMENT (OUTPATIENT)
Dept: CALL CENTER | Facility: HOSPITAL | Age: 36
End: 2021-01-11

## 2021-01-11 NOTE — OUTREACH NOTE
Case Management Call Center Follow-up      Responses   [facility] Call Center Tracking Reason?  Other (specify in comments)   Other Tracking Comments  Outpt education   Has the Call Center Case Management Follow-up issue been resolved?  Yes   Follow-up Comments  Notes reveiwed.  Per follow up note the day after this one, pt doing well.  Has a follow up with his MD today.  Outpt diabetes education can be ordered by his MD if needed.          Gisell Moss RN    1/11/2021, 10:04 EST

## 2021-01-11 NOTE — OUTREACH NOTE
COVID-19 Week 1 Survey      Responses   [facility] patient discharged from?  [place]   Does the patient have one of the following disease processes/diagnoses(primary or secondary)?  COVID-19   COVID-19 underlying condition?  None   Call Number  Call 3   Week 1 Call successful?  Yes   Call start time  1520   Call end time  1538   Discharge diagnosis  COVID-19 virus infection    If primary language is not English what is the name and relationship or agency of  used?  [employer]   Is the patient having any side effects they believe may be caused by any medication additions or changes?  No   Does the patient have all medications ordered at discharge?  Yes   Prescription comments  Pharmacy states pt. meds have all been picked up. Pt. states he was contacted regarding insulin and is taking as directed.   Is the patient taking all medications as directed (includes completed medication regime)?  Yes   Comments regarding appointments  Pt. states he has not been seen by PCP yet.   Does the patient have a primary care provider?   Yes   Comments  Told pt. to schedule f/u appt. with PCP per D/C instructions   What DME was ordered?  O2 @4L continuous   Comments   required   Did the patient receive a copy of their discharge instructions?  Yes   Did the patient receive a copy of COVID-19 specific instructions?  Yes   Nursing interventions  Reviewed instructions with patient   What is the patient's perception of their health status since discharge?  Improving   Does the patient have any of the following symptoms?  Shortness of breath [SOB with exertion]   Nursing Interventions  Nurse provided patient education   Pulse Ox monitoring  None   Is the patient/caregiver able to teach back steps to recovery at home?  Eat a well-balance diet, Rest and rebuild strength, gradually increase activity   Is the patient/caregiver able to teach back the hierarchy of who to call/visit for  symptoms/problems? PCP, Specialist, Home health nurse, Urgent Care, ED, 911  Yes   COVID-19 call completed?  Yes   Wrap up additional comments  Pt. states he is doing good and his blood sugar was 110. Told pt. any increased SOB or difficulty breathing to return to ED.           Ayde Angel RN

## 2021-01-17 ENCOUNTER — READMISSION MANAGEMENT (OUTPATIENT)
Dept: CALL CENTER | Facility: HOSPITAL | Age: 36
End: 2021-01-17

## 2021-01-17 NOTE — OUTREACH NOTE
COVID-19 Week 2 Survey      Responses   [facility] patient discharged from?  [place]   Does the patient have one of the following disease processes/diagnoses(primary or secondary)?  COVID-19   COVID-19 underlying condition?  None   Call Number  Call 1   COVID-19 Week 2: Call 1 attempt successful?  No   Discharge diagnosis  COVID-19 virus infection           Naomie Ty RN

## 2021-01-21 ENCOUNTER — DOCUMENTATION (OUTPATIENT)
Dept: SOCIAL WORK | Facility: HOSPITAL | Age: 36
End: 2021-01-21

## 2021-01-22 ENCOUNTER — READMISSION MANAGEMENT (OUTPATIENT)
Dept: CALL CENTER | Facility: HOSPITAL | Age: 36
End: 2021-01-22

## 2021-01-22 NOTE — OUTREACH NOTE
COVID-19 Week 3 Survey      Responses   [facility] patient discharged from?  [place]   Does the patient have one of the following disease processes/diagnoses(primary or secondary)?  COVID-19   COVID-19 underlying condition?  None   Call Number  Call 1   COVID-19 Week 3: Call 1 attempt successful?  Yes   Call start time  1140   Discharge diagnosis  COVID-19 virus infection    If primary language is not English what is the name and relationship or agency of  used?  [employer] [id number]    Is patient permission given to speak with other caregiver?  Yes   Person spoke with today (if not patient) and relationship  [place]-nephew-only can tell me he is doing much better.          Marya Browne RN

## 2021-01-29 ENCOUNTER — READMISSION MANAGEMENT (OUTPATIENT)
Dept: CALL CENTER | Facility: HOSPITAL | Age: 36
End: 2021-01-29

## 2021-01-29 NOTE — OUTREACH NOTE
Case Management Call Center Follow-up      Responses   [facility] Call Center Tracking Reason?  Other (specify in comments)   Other Tracking Comments  Needs PCP and Cardiology   Has the Call Center Case Management Follow-up issue been resolved?  Yes   Follow-up Comments  Patient has a new patient appointment on 3/29/2021 and his MD can refer him to cardiologist when he sees him in the office.          Shannon Epley, RN    1/29/2021, 16:21 EST

## 2021-01-29 NOTE — OUTREACH NOTE
Case Management Call Center Follow-up      Responses   [facility] Call Center Tracking Reason?  Other (specify in comments)   Other Tracking Comments  Needs PCP and Cardiology   Has the Call Center Case Management Follow-up issue been resolved?  Yes   Follow-up Comments  Patient has a follow up with an MD and his MD can refer him to cardiologist when he sees him in the office.          Shannon Epley, RN    1/29/2021, 16:21 EST

## 2021-01-29 NOTE — OUTREACH NOTE
COVID-19 Week 4 Survey      Responses   [facility] patient discharged from?  [place]   Does the patient have one of the following disease processes/diagnoses(primary or secondary)?  COVID-19   COVID-19 underlying condition?  None   Call Number  Call 1 [PI [facility] [phone]]   COVID-19 Week 4: Call 1 attempt successful?  Yes   Call start time  1458   Call end time  1510   Discharge diagnosis  COVID-19 virus infection    Has the patient kept scheduled appointments due by today?  N/A   Comments  Told pt. to schedule f/u appt. with PCP per D/C instructions. Gave pt the number to call for PCP providers. Pt also requests that he needs a cardiology appt as he had CABG 5 yrs ago and has not been checked since. Emailed CM to assist.    DME comments  Pt still wearing home O2 at night.    What is the patient's perception of their health status since discharge?  Improving   Does the patient have any of the following symptoms?  Cough, Shortness of breath   Nursing Interventions  Nurse provided patient education   Pulse Ox monitoring  None   Is the patient/caregiver able to teach back steps to recovery at home?  Rest and rebuild strength, gradually increase activity, Set small, achievable goals for return to baseline health   If the patient is a current smoker, are they able to teach back resources for cessation?  Not a smoker   Is the patient/caregiver able to teach back the hierarchy of who to call/visit for symptoms/problems? PCP, Specialist, Home health nurse, Urgent Care, ED, 911  Yes   Is the patient interested in additional calls from an ambulatory ?  NOTE:  applies to high risk patients requiring additional follow-up.  No   Was the number of calls appropriate?  Yes   Interested in COVID-19 Plasma Donation?  No          Aubree Chakraborty RN

## 2021-03-02 NOTE — OUTREACH NOTE
Detail Level: Generalized Prep Survey      Responses   [facility]?  [place]   Is LACE score < 7 ?  No   Emergency Room discharge w/ pulse ox?  No   Eligibility  Readm Mgmt   Discharge diagnosis  acute hypoxic resp failure, PNA d/t COVID-19, T2DM, MEI   Does the patient have one of the following disease processes/diagnoses(primary or secondary)?  COVID-19   Does the patient have Home health ordered?  No   Is there a DME ordered?  Yes   What [facility] was [facility]   Prep survey completed?  Yes          Vianney Alas RN         Detail Level: Simple

## 2021-08-02 ENCOUNTER — APPOINTMENT (OUTPATIENT)
Dept: GENERAL RADIOLOGY | Facility: HOSPITAL | Age: 36
End: 2021-08-02

## 2021-08-02 ENCOUNTER — APPOINTMENT (OUTPATIENT)
Dept: CT IMAGING | Facility: HOSPITAL | Age: 36
End: 2021-08-02

## 2021-08-02 ENCOUNTER — HOSPITAL ENCOUNTER (INPATIENT)
Facility: HOSPITAL | Age: 36
LOS: 4 days | Discharge: HOME | End: 2021-08-06
Attending: EMERGENCY MEDICINE | Admitting: INTERNAL MEDICINE

## 2021-08-02 DIAGNOSIS — J06.9 VIRAL URI WITH COUGH: Primary | ICD-10-CM

## 2021-08-02 DIAGNOSIS — I50.23 SYSTOLIC CHF, ACUTE ON CHRONIC (HCC): ICD-10-CM

## 2021-08-02 DIAGNOSIS — R79.89 ELEVATED D-DIMER: ICD-10-CM

## 2021-08-02 DIAGNOSIS — R77.8 ELEVATED TROPONIN: ICD-10-CM

## 2021-08-02 DIAGNOSIS — I50.31 DIASTOLIC CHF, ACUTE (HCC): ICD-10-CM

## 2021-08-02 DIAGNOSIS — J96.01 ACUTE RESPIRATORY FAILURE WITH HYPOXIA (HCC): ICD-10-CM

## 2021-08-02 DIAGNOSIS — I25.5 ISCHEMIC CARDIOMYOPATHY: ICD-10-CM

## 2021-08-02 DIAGNOSIS — G47.33 OSA (OBSTRUCTIVE SLEEP APNEA): ICD-10-CM

## 2021-08-02 LAB
ALBUMIN SERPL-MCNC: 2.9 G/DL (ref 3.5–5.2)
ALBUMIN/GLOB SERPL: 0.9 G/DL
ALP SERPL-CCNC: 93 U/L (ref 39–117)
ALT SERPL W P-5'-P-CCNC: 28 U/L (ref 1–41)
ANION GAP SERPL CALCULATED.3IONS-SCNC: 6.8 MMOL/L (ref 5–15)
AST SERPL-CCNC: 16 U/L (ref 1–40)
BASOPHILS # BLD AUTO: 0.02 10*3/MM3 (ref 0–0.2)
BASOPHILS NFR BLD AUTO: 0.2 % (ref 0–1.5)
BILIRUB SERPL-MCNC: 0.6 MG/DL (ref 0–1.2)
BUN SERPL-MCNC: 23 MG/DL (ref 6–20)
BUN/CREAT SERPL: 14.1 (ref 7–25)
CALCIUM SPEC-SCNC: 8.1 MG/DL (ref 8.6–10.5)
CHLORIDE SERPL-SCNC: 103 MMOL/L (ref 98–107)
CO2 SERPL-SCNC: 27.2 MMOL/L (ref 22–29)
CREAT SERPL-MCNC: 1.63 MG/DL (ref 0.76–1.27)
D DIMER PPP FEU-MCNC: 0.94 MCGFEU/ML (ref 0–0.49)
D-LACTATE SERPL-SCNC: 1.1 MMOL/L (ref 0.5–2)
DEPRECATED RDW RBC AUTO: 42 FL (ref 37–54)
EOSINOPHIL # BLD AUTO: 0.11 10*3/MM3 (ref 0–0.4)
EOSINOPHIL NFR BLD AUTO: 1.1 % (ref 0.3–6.2)
ERYTHROCYTE [DISTWIDTH] IN BLOOD BY AUTOMATED COUNT: 14 % (ref 12.3–15.4)
GFR SERPL CREATININE-BSD FRML MDRD: 48 ML/MIN/1.73
GLOBULIN UR ELPH-MCNC: 3.3 GM/DL
GLUCOSE SERPL-MCNC: 261 MG/DL (ref 65–99)
HCT VFR BLD AUTO: 42.2 % (ref 37.5–51)
HGB BLD-MCNC: 14.3 G/DL (ref 13–17.7)
HOLD SPECIMEN: NORMAL
HOLD SPECIMEN: NORMAL
IMM GRANULOCYTES # BLD AUTO: 0.04 10*3/MM3 (ref 0–0.05)
IMM GRANULOCYTES NFR BLD AUTO: 0.4 % (ref 0–0.5)
LYMPHOCYTES # BLD AUTO: 1.05 10*3/MM3 (ref 0.7–3.1)
LYMPHOCYTES NFR BLD AUTO: 10.6 % (ref 19.6–45.3)
MCH RBC QN AUTO: 27.9 PG (ref 26.6–33)
MCHC RBC AUTO-ENTMCNC: 33.9 G/DL (ref 31.5–35.7)
MCV RBC AUTO: 82.4 FL (ref 79–97)
MONOCYTES # BLD AUTO: 0.59 10*3/MM3 (ref 0.1–0.9)
MONOCYTES NFR BLD AUTO: 6 % (ref 5–12)
NEUTROPHILS NFR BLD AUTO: 8.05 10*3/MM3 (ref 1.7–7)
NEUTROPHILS NFR BLD AUTO: 81.7 % (ref 42.7–76)
NRBC BLD AUTO-RTO: 0 /100 WBC (ref 0–0.2)
NT-PROBNP SERPL-MCNC: 2666 PG/ML (ref 0–450)
PLATELET # BLD AUTO: 283 10*3/MM3 (ref 140–450)
PMV BLD AUTO: 11.3 FL (ref 6–12)
POTASSIUM SERPL-SCNC: 3.8 MMOL/L (ref 3.5–5.2)
PROCALCITONIN SERPL-MCNC: 0.15 NG/ML (ref 0–0.25)
PROT SERPL-MCNC: 6.2 G/DL (ref 6–8.5)
QT INTERVAL: 405 MS
RBC # BLD AUTO: 5.12 10*6/MM3 (ref 4.14–5.8)
SARS-COV-2 RNA RESP QL NAA+PROBE: NOT DETECTED
SODIUM SERPL-SCNC: 137 MMOL/L (ref 136–145)
TROPONIN T SERPL-MCNC: 0.04 NG/ML (ref 0–0.03)
TROPONIN T SERPL-MCNC: 0.05 NG/ML (ref 0–0.03)
TROPONIN T SERPL-MCNC: 0.07 NG/ML (ref 0–0.03)
TSH SERPL DL<=0.05 MIU/L-ACNC: 3.55 UIU/ML (ref 0.27–4.2)
WBC # BLD AUTO: 9.86 10*3/MM3 (ref 3.4–10.8)
WHOLE BLOOD HOLD SPECIMEN: NORMAL

## 2021-08-02 PROCEDURE — 25010000002 FUROSEMIDE PER 20 MG: Performed by: INTERNAL MEDICINE

## 2021-08-02 PROCEDURE — 80053 COMPREHEN METABOLIC PANEL: CPT | Performed by: EMERGENCY MEDICINE

## 2021-08-02 PROCEDURE — U0003 INFECTIOUS AGENT DETECTION BY NUCLEIC ACID (DNA OR RNA); SEVERE ACUTE RESPIRATORY SYNDROME CORONAVIRUS 2 (SARS-COV-2) (CORONAVIRUS DISEASE [COVID-19]), AMPLIFIED PROBE TECHNIQUE, MAKING USE OF HIGH THROUGHPUT TECHNOLOGIES AS DESCRIBED BY CMS-2020-01-R: HCPCS | Performed by: EMERGENCY MEDICINE

## 2021-08-02 PROCEDURE — 25010000002 ENOXAPARIN PER 10 MG: Performed by: INTERNAL MEDICINE

## 2021-08-02 PROCEDURE — 83605 ASSAY OF LACTIC ACID: CPT | Performed by: EMERGENCY MEDICINE

## 2021-08-02 PROCEDURE — 93005 ELECTROCARDIOGRAM TRACING: CPT

## 2021-08-02 PROCEDURE — 71275 CT ANGIOGRAPHY CHEST: CPT

## 2021-08-02 PROCEDURE — 85379 FIBRIN DEGRADATION QUANT: CPT | Performed by: EMERGENCY MEDICINE

## 2021-08-02 PROCEDURE — 84484 ASSAY OF TROPONIN QUANT: CPT | Performed by: EMERGENCY MEDICINE

## 2021-08-02 PROCEDURE — 83880 ASSAY OF NATRIURETIC PEPTIDE: CPT | Performed by: EMERGENCY MEDICINE

## 2021-08-02 PROCEDURE — 84145 PROCALCITONIN (PCT): CPT | Performed by: EMERGENCY MEDICINE

## 2021-08-02 PROCEDURE — 0 IOPAMIDOL PER 1 ML: Performed by: INTERNAL MEDICINE

## 2021-08-02 PROCEDURE — 71046 X-RAY EXAM CHEST 2 VIEWS: CPT

## 2021-08-02 PROCEDURE — 93010 ELECTROCARDIOGRAM REPORT: CPT | Performed by: INTERNAL MEDICINE

## 2021-08-02 PROCEDURE — 85025 COMPLETE CBC W/AUTO DIFF WBC: CPT | Performed by: EMERGENCY MEDICINE

## 2021-08-02 PROCEDURE — 93005 ELECTROCARDIOGRAM TRACING: CPT | Performed by: EMERGENCY MEDICINE

## 2021-08-02 PROCEDURE — 99285 EMERGENCY DEPT VISIT HI MDM: CPT

## 2021-08-02 PROCEDURE — 84484 ASSAY OF TROPONIN QUANT: CPT | Performed by: INTERNAL MEDICINE

## 2021-08-02 PROCEDURE — 84443 ASSAY THYROID STIM HORMONE: CPT | Performed by: INTERNAL MEDICINE

## 2021-08-02 RX ORDER — ASPIRIN 81 MG/1
81 TABLET, CHEWABLE ORAL DAILY
Status: DISCONTINUED | OUTPATIENT
Start: 2021-08-03 | End: 2021-08-06 | Stop reason: HOSPADM

## 2021-08-02 RX ORDER — CARVEDILOL 6.25 MG/1
6.25 TABLET ORAL 2 TIMES DAILY WITH MEALS
Status: DISCONTINUED | OUTPATIENT
Start: 2021-08-02 | End: 2021-08-05

## 2021-08-02 RX ORDER — INSULIN LISPRO 100 [IU]/ML
12 INJECTION, SOLUTION INTRAVENOUS; SUBCUTANEOUS
Status: DISCONTINUED | OUTPATIENT
Start: 2021-08-03 | End: 2021-08-05

## 2021-08-02 RX ORDER — NICOTINE POLACRILEX 4 MG
15 LOZENGE BUCCAL
Status: DISCONTINUED | OUTPATIENT
Start: 2021-08-02 | End: 2021-08-06 | Stop reason: HOSPADM

## 2021-08-02 RX ORDER — LABETALOL HYDROCHLORIDE 5 MG/ML
20 INJECTION, SOLUTION INTRAVENOUS ONCE
Status: COMPLETED | OUTPATIENT
Start: 2021-08-02 | End: 2021-08-02

## 2021-08-02 RX ORDER — DEXTROSE MONOHYDRATE 25 G/50ML
25 INJECTION, SOLUTION INTRAVENOUS
Status: DISCONTINUED | OUTPATIENT
Start: 2021-08-02 | End: 2021-08-06 | Stop reason: HOSPADM

## 2021-08-02 RX ORDER — ONDANSETRON 4 MG/1
4 TABLET, FILM COATED ORAL EVERY 6 HOURS PRN
Status: DISCONTINUED | OUTPATIENT
Start: 2021-08-02 | End: 2021-08-06 | Stop reason: HOSPADM

## 2021-08-02 RX ORDER — FUROSEMIDE 10 MG/ML
40 INJECTION INTRAMUSCULAR; INTRAVENOUS EVERY 12 HOURS
Status: DISCONTINUED | OUTPATIENT
Start: 2021-08-02 | End: 2021-08-05

## 2021-08-02 RX ORDER — LISINOPRIL 10 MG/1
10 TABLET ORAL DAILY
Status: DISCONTINUED | OUTPATIENT
Start: 2021-08-03 | End: 2021-08-04

## 2021-08-02 RX ORDER — ACETAMINOPHEN 325 MG/1
650 TABLET ORAL EVERY 4 HOURS PRN
Status: DISCONTINUED | OUTPATIENT
Start: 2021-08-02 | End: 2021-08-06 | Stop reason: HOSPADM

## 2021-08-02 RX ORDER — ASPIRIN 325 MG
325 TABLET ORAL ONCE
Status: COMPLETED | OUTPATIENT
Start: 2021-08-02 | End: 2021-08-02

## 2021-08-02 RX ORDER — INSULIN GLARGINE 100 [IU]/ML
60 INJECTION, SOLUTION SUBCUTANEOUS 2 TIMES DAILY
Status: DISCONTINUED | OUTPATIENT
Start: 2021-08-02 | End: 2021-08-05

## 2021-08-02 RX ORDER — INSULIN LISPRO 100 [IU]/ML
0-9 INJECTION, SOLUTION INTRAVENOUS; SUBCUTANEOUS
Status: DISCONTINUED | OUTPATIENT
Start: 2021-08-03 | End: 2021-08-06 | Stop reason: HOSPADM

## 2021-08-02 RX ORDER — ONDANSETRON 2 MG/ML
4 INJECTION INTRAMUSCULAR; INTRAVENOUS EVERY 6 HOURS PRN
Status: DISCONTINUED | OUTPATIENT
Start: 2021-08-02 | End: 2021-08-06 | Stop reason: HOSPADM

## 2021-08-02 RX ORDER — SODIUM CHLORIDE 0.9 % (FLUSH) 0.9 %
10 SYRINGE (ML) INJECTION AS NEEDED
Status: DISCONTINUED | OUTPATIENT
Start: 2021-08-02 | End: 2021-08-06 | Stop reason: HOSPADM

## 2021-08-02 RX ORDER — UREA 10 %
3 LOTION (ML) TOPICAL NIGHTLY PRN
Status: DISCONTINUED | OUTPATIENT
Start: 2021-08-02 | End: 2021-08-06 | Stop reason: HOSPADM

## 2021-08-02 RX ORDER — NITROGLYCERIN 0.4 MG/1
0.4 TABLET SUBLINGUAL
Status: DISCONTINUED | OUTPATIENT
Start: 2021-08-02 | End: 2021-08-06 | Stop reason: HOSPADM

## 2021-08-02 RX ORDER — BUDESONIDE AND FORMOTEROL FUMARATE DIHYDRATE 160; 4.5 UG/1; UG/1
2 AEROSOL RESPIRATORY (INHALATION)
Status: DISCONTINUED | OUTPATIENT
Start: 2021-08-02 | End: 2021-08-04

## 2021-08-02 RX ORDER — CEFTRIAXONE SODIUM 1 G/50ML
1 INJECTION, SOLUTION INTRAVENOUS EVERY 24 HOURS
Status: DISCONTINUED | OUTPATIENT
Start: 2021-08-02 | End: 2021-08-04

## 2021-08-02 RX ORDER — ATORVASTATIN CALCIUM 80 MG/1
80 TABLET, FILM COATED ORAL DAILY
Status: DISCONTINUED | OUTPATIENT
Start: 2021-08-03 | End: 2021-08-06 | Stop reason: HOSPADM

## 2021-08-02 RX ORDER — INSULIN GLARGINE 100 [IU]/ML
60 INJECTION, SOLUTION SUBCUTANEOUS 2 TIMES DAILY
Status: DISCONTINUED | OUTPATIENT
Start: 2021-08-02 | End: 2021-08-02 | Stop reason: CLARIF

## 2021-08-02 RX ADMIN — FUROSEMIDE 40 MG: 10 INJECTION, SOLUTION INTRAMUSCULAR; INTRAVENOUS at 21:57

## 2021-08-02 RX ADMIN — ASPIRIN 325 MG: 325 TABLET ORAL at 16:18

## 2021-08-02 RX ADMIN — ENOXAPARIN SODIUM 40 MG: 40 INJECTION SUBCUTANEOUS at 21:57

## 2021-08-02 RX ADMIN — SODIUM CHLORIDE, PRESERVATIVE FREE 10 ML: 5 INJECTION INTRAVENOUS at 21:56

## 2021-08-02 RX ADMIN — LABETALOL HYDROCHLORIDE 20 MG: 5 INJECTION, SOLUTION INTRAVENOUS at 17:02

## 2021-08-02 RX ADMIN — IOPAMIDOL 95 ML: 755 INJECTION, SOLUTION INTRAVENOUS at 18:24

## 2021-08-02 NOTE — ED TRIAGE NOTES
Cp x 3 days.  Primary language Croatian    Patient was placed in face mask during first look triage.  Patient was wearing a face mask throughout encounter.  I wore personal protective equipment throughout the encounter.  Hand hygiene was performed before and after patient encounter.

## 2021-08-02 NOTE — ED PROVIDER NOTES
EMERGENCY DEPARTMENT ENCOUNTER    Room Number:  28/28  Date of encounter:  8/2/2021  PCP: Provider, No Known  Historian: Patient     I used full protective equipment while examining this patient.  This includes face mask, gloves and protective eyewear.  I washed my hands before entering the room and immediately upon leaving the room      HPI:  Chief Complaint: Cough, shortness of breath  A complete HPI/ROS/PMH/PSH/SH/FH are unobtainable due to: None    Context: Rufus Dorsey is a 36 y.o. male who presents to the ED c/o cough, shortness of breath.  Symptoms have been ongoing for about 3 days.  Cough is predominantly dry and rated as moderate to severe.  Symptoms are worsened by nothing, improved by nothing.  Patient does describe moderate shortness of breath that is worsened by cough and exertion.  Patient describes sharp pain in his chest when he takes a deep breath or when he coughs.  Pain is in the mid chest.  Patient states he had Covid in December and has not had his Covid vaccination.  Patient works in a kitchen.  He does have history of CABG and prior cardiomyopathy.  He has a history of insulin-dependent diabetes mellitus.      MEDICAL RECORD REVIEW  I reviewed prior medical records note the patient was hospitalized in December of this year with pneumonia related to Covid infection.  Patient also has a history of insulin-dependent diabetes.    PAST MEDICAL HISTORY  Active Ambulatory Problems     Diagnosis Date Noted   • Pneumonia of both lungs due to infectious organism 12/21/2020   • Acute respiratory failure with hypoxia (CMS/[facility]) 12/21/2020   • COVID-19 virus infection 12/21/2020   • Thrombocytopenia (CMS/[facility]) 12/21/2020   • Pneumonia due to COVID-19 virus 12/21/2020   • Type 2 diabetes mellitus, with long-term current use of insulin (CMS/[facility]) 01/05/2021     Resolved Ambulatory Problems     Diagnosis Date Noted   • Acute kidney injury (CMS/[facility]) 12/21/2020     Past Medical History:   Diagnosis Date   •  Diabetes (CMS/[facility])    • Heart failure (CMS/[facility]) 05/16/2016   • High cholesterol    • Ischemic cardiomyopathy 06/25/2016         PAST SURGICAL HISTORY  Past Surgical History:   Procedure Laterality Date   • CARDIAC CATHETERIZATION  01/25/2017    Right heart cath   • CARDIAC SURGERY     • CORONARY ARTERY BYPASS GRAFT  05/26/2015    cabg x3 Dr. Key         FAMILY HISTORY  Family History   Family history unknown: Yes         SOCIAL HISTORY  Social History     Socioeconomic History   • Marital status:      Spouse name: Not on file   • Number of children: Not on file   • Years of education: Not on file   • Highest education level: Not on file   Tobacco Use   • Smoking status: Never Smoker   Substance and Sexual Activity   • Alcohol use: Never   • Drug use: Never         ALLERGIES  Patient has no known allergies.       REVIEW OF SYSTEMS  Review of Systems   Constitutional: Positive for fatigue. Negative for fever.   HENT: Negative.  Negative for sore throat.    Eyes: Negative.    Respiratory: Positive for cough and shortness of breath.    Cardiovascular: Positive for chest pain.   Gastrointestinal: Negative.    Genitourinary: Negative.  Negative for dysuria.   Musculoskeletal: Positive for myalgias. Negative for back pain.   Skin: Negative.  Negative for rash.   Neurological: Negative.  Negative for headaches.   All other systems reviewed and are negative.          PHYSICAL EXAM    I have reviewed the triage vital signs and nursing notes.    ED Triage Vitals [08/02/21 1520]   Temp Heart Rate Resp BP SpO2   97.8 °F (36.6 °C) 87 16 -- 96 %      Temp src Heart Rate Source Patient Position BP Location FiO2 (%)   Tympanic Monitor -- -- --       Physical Exam  GENERAL: Alert male in mild distress.  Triage vitals reviewed and notable for pulse of 87.  O2 saturations have been variable and it dropped as low as 86 and were reported to be 96 at triage.  O2 saturations tend to average around 93% at bedside on room  air.  HENT: nares patent  EYES: no scleral icterus  CV: regular rhythm, regular rate-no murmur  RESPIRATORY: normal effort, clear to auscultation bilaterally-O2 saturations are variable but running mostly around 93 at rest while at bedside.  ABDOMEN: soft, obese-nontender to palpation  MUSCULOSKELETAL: no deformity-no segment swelling or tenderness to palpation  NEURO: Strength, sensation, and coordination are grossly intact.  Speech and mentation are unremarkable  SKIN: warm, dry-no unusual rashes are noted      LAB RESULTS  Recent Results (from the past 24 hour(s))   ECG 12 Lead    Collection Time: 08/02/21  3:31 PM   Result Value Ref Range    QT Interval 405 ms   COVID-19,[facility] IN-HOUSE CEPHEID/[facility] NP SWAB IN TRANSPORT MEDIA 8-12 HR TAT - Swab, Nasopharynx    Collection Time: 08/02/21  4:03 PM    Specimen: Nasopharynx; Swab   Result Value Ref Range    COVID19 Not Detected Not Detected - Ref. Range   Comprehensive Metabolic Panel    Collection Time: 08/02/21  4:12 PM    Specimen: Blood   Result Value Ref Range    Glucose 261 (H) 65 - 99 mg/dL    BUN 23 (H) 6 - 20 mg/dL    Creatinine 1.63 (H) 0.76 - 1.27 mg/dL    Sodium 137 136 - 145 mmol/L    Potassium 3.8 3.5 - 5.2 mmol/L    Chloride 103 98 - 107 mmol/L    CO2 27.2 22.0 - 29.0 mmol/L    Calcium 8.1 (L) 8.6 - 10.5 mg/dL    Total Protein 6.2 6.0 - 8.5 g/dL    Albumin 2.90 (L) 3.50 - 5.20 g/dL    ALT (SGPT) 28 1 - 41 U/L    AST (SGOT) 16 1 - 40 U/L    Alkaline Phosphatase 93 39 - 117 U/L    Total Bilirubin 0.6 0.0 - 1.2 mg/dL    eGFR Non African Amer 48 (L) >60 mL/min/1.73    Globulin 3.3 gm/dL    A/G Ratio 0.9 g/dL    BUN/Creatinine Ratio 14.1 7.0 - 25.0    Anion Gap 6.8 5.0 - 15.0 mmol/L   Troponin    Collection Time: 08/02/21  4:12 PM    Specimen: Blood   Result Value Ref Range    Troponin T 0.068 (C) 0.000 - 0.030 ng/mL   Green Top (Gel)    Collection Time: 08/02/21  4:12 PM   Result Value Ref Range    Extra Tube Hold for add-ons.    Lavender Top    Collection  Time: 08/02/21  4:12 PM   Result Value Ref Range    Extra Tube hold for add-on    Gold Top - SST    Collection Time: 08/02/21  4:12 PM   Result Value Ref Range    Extra Tube Hold for add-ons.    CBC Auto Differential    Collection Time: 08/02/21  4:12 PM    Specimen: Blood   Result Value Ref Range    WBC 9.86 3.40 - 10.80 10*3/mm3    RBC 5.12 4.14 - 5.80 10*6/mm3    Hemoglobin 14.3 13.0 - 17.7 g/dL    Hematocrit 42.2 37.5 - 51.0 %    MCV 82.4 79.0 - 97.0 fL    MCH 27.9 26.6 - 33.0 pg    MCHC 33.9 31.5 - 35.7 g/dL    RDW 14.0 12.3 - 15.4 %    RDW-SD 42.0 37.0 - 54.0 fl    MPV 11.3 6.0 - 12.0 fL    Platelets 283 140 - 450 10*3/mm3    Neutrophil % 81.7 (H) 42.7 - 76.0 %    Lymphocyte % 10.6 (L) 19.6 - 45.3 %    Monocyte % 6.0 5.0 - 12.0 %    Eosinophil % 1.1 0.3 - 6.2 %    Basophil % 0.2 0.0 - 1.5 %    Immature Grans % 0.4 0.0 - 0.5 %    Neutrophils, Absolute 8.05 (H) 1.70 - 7.00 10*3/mm3    Lymphocytes, Absolute 1.05 0.70 - 3.10 10*3/mm3    Monocytes, Absolute 0.59 0.10 - 0.90 10*3/mm3    Eosinophils, Absolute 0.11 0.00 - 0.40 10*3/mm3    Basophils, Absolute 0.02 0.00 - 0.20 10*3/mm3    Immature Grans, Absolute 0.04 0.00 - 0.05 10*3/mm3    nRBC 0.0 0.0 - 0.2 /100 WBC   D-dimer, Quantitative    Collection Time: 08/02/21  4:12 PM    Specimen: Blood   Result Value Ref Range    D-Dimer, Quantitative 0.94 (H) 0.00 - 0.49 MCGFEU/mL       Ordered the above labs and independently reviewed the results.      RADIOLOGY  XR Chest 2 View    Result Date: 8/2/2021  TWO-VIEW CHEST  HISTORY: Chest pain. Previous Covid 19 pneumonia.  FINDINGS: There is extensive pneumonia scattered in both lungs and particularly involves the right upper lobe and this appearance is quite similar to an exam dating back to 12/28/2020. The findings may relate to persistent changes of Covid 19 pneumonia. There may also be a component of superimposed congestive heart failure. The heart remains enlarged with sternal wires from previous cardiac surgery.         I ordered the above noted radiological studies. Reviewed by me and discussed with radiologist.  See dictation for official radiology interpretation.      PROCEDURES  Procedures      MEDICATIONS GIVEN IN ER    Medications   sodium chloride 0.9 % flush 10 mL (has no administration in time range)   aspirin tablet 325 mg (325 mg Oral Given 8/2/21 1618)   labetalol (NORMODYNE,TRANDATE) injection 20 mg (20 mg Intravenous Given 8/2/21 1702)         PROGRESS, DATA ANALYSIS, CONSULTS, AND MEDICAL DECISION MAKING    All labs have been independently reviewed by me.  All radiology studies have been reviewed by me and discussed with radiologist dictating the report.   EKG's independently viewed and interpreted by me.  Discussion below represents my analysis of pertinent findings related to patient's condition, differential diagnosis, treatment plan and final disposition.      ED Course as of Aug 02 1726   Mon Aug 02, 2021   1557 EKG          EKG time: 1531  Rhythm/Rate: Sinus 90  P waves and KS: Normal P waves and KS intervals  QRS, axis: Normal axis, LVH, IVCD  ST and T waves: Nonspecific ST and T wave changes    Interpreted Contemporaneously by me, independently viewed  Not significantly changed compared to prior 12/2020      [DB]   1616 EKG          EKG time: 1531  Rhythm/Rate: Sinus 90  P waves and KS: Normal P waves and KS intervals  QRS, axis: Normal axis, LVH  ST and T waves: Nonspecific ST and T wave changes    Interpreted Contemporaneously by me, independently viewed  Not significantly changed compared to prior 12/2020      [DB]   1617 Chest x-ray discussed with reading radiologist shows bilateral densities consistent with multifocal pneumonia.  X-ray is similar to films from December when patient had Covid pneumonia.    [DB]   1627 CYB-25-vopf-old male presents with cough and pleuritic chest pain.  Exam is worrisome for possible Covid.  Differential diagnosis is broad and would include infectious causes such as  pneumonia, both viral and bacterial including Covid.  Pulmonary embolism, aortic dissection and acute coronary syndrome or less likely causes of patient's symptoms.    [DB]   1627 Labs show elevated troponin of 0.068.  This could be related to acute coronary syndrome but given patient's respiratory components I suspect is more likely a sequela of likely viral infection, possibly Covid.  Patient does have mildly elevated D-dimer which could go along with pulmonary embolism but I think it is likely nonspecific related to Covid.  Nonetheless we will need to get a CTA of the chest to help exclude pulmonary embolism.    [DB]   1655 At this point we are dealing with a complex patient who has diabetes, frequent cough and x-ray concerning for Covid.  Complications also include elevated troponin likely from hypoxemia with Covid as well as elevated D-dimer which may represent pulmonary embolism but I feel that is less likely.  We will go ahead and contact [facility] about admission.  Will get CTA of the chest to help exclude pulmonary embolism.  We will give some IV Lopressor to help with blood pressure and heart rate.    [DB]   1716 Covid testing did come back negative but I will hesitant to remove isolation at this time as patient has symptoms and x-ray that are consistent with possible Covid.  Will get CTA of the chest to help exclude pulmonary embolism.    [DB]   1717 I discussed treatment management of this patient with Dr. Giovanni Gonzalez who will admit on behalf of [facility].    [DB]      ED Course User Index  [DB] Christian Holley MD       AS OF 17:26 EDT VITALS:    BP - 166/96  HR - 71  TEMP - 97.8 °F (36.6 °C) (Tympanic)  O2 SATS - 95%      DIAGNOSIS  Final diagnoses:   Viral URI with cough   Acute respiratory failure with hypoxia (CMS/HCC)   Elevated troponin   Elevated d-dimer         DISPOSITION  Admission         Christian Holley MD  08/02/21 1726

## 2021-08-02 NOTE — ED NOTES
Pt c/o SOA worse on exertion, dry cough, and 10/10 chest pain for 5 days. H/o COVID in December. Not vaccinated.      Chrissy Avina, DONALDO  08/02/21 1678

## 2021-08-03 PROBLEM — Z86.16 HISTORY OF COVID-19: Status: ACTIVE | Noted: 2021-08-03

## 2021-08-03 PROBLEM — R91.8 BILATERAL PULMONARY INFILTRATES ON CHEST X-RAY: Status: ACTIVE | Noted: 2021-08-03

## 2021-08-03 PROBLEM — E66.01 CLASS 3 SEVERE OBESITY IN ADULT: Status: ACTIVE | Noted: 2021-08-03

## 2021-08-03 PROBLEM — J81.0 ACUTE PULMONARY EDEMA: Status: ACTIVE | Noted: 2021-08-03

## 2021-08-03 PROBLEM — I27.20 PULMONARY HTN: Status: ACTIVE | Noted: 2021-08-03

## 2021-08-03 PROBLEM — J90 BILATERAL PLEURAL EFFUSION: Status: ACTIVE | Noted: 2021-08-03

## 2021-08-03 PROBLEM — E66.813 CLASS 3 SEVERE OBESITY IN ADULT: Status: ACTIVE | Noted: 2021-08-03

## 2021-08-03 PROBLEM — I50.31 DIASTOLIC CHF, ACUTE: Status: ACTIVE | Noted: 2021-08-03

## 2021-08-03 LAB
ANION GAP SERPL CALCULATED.3IONS-SCNC: 10.9 MMOL/L (ref 5–15)
B PARAPERT DNA SPEC QL NAA+PROBE: NOT DETECTED
B PERT DNA SPEC QL NAA+PROBE: NOT DETECTED
BUN SERPL-MCNC: 25 MG/DL (ref 6–20)
BUN/CREAT SERPL: 20 (ref 7–25)
C PNEUM DNA NPH QL NAA+NON-PROBE: NOT DETECTED
CALCIUM SPEC-SCNC: 7.7 MG/DL (ref 8.6–10.5)
CHLORIDE SERPL-SCNC: 102 MMOL/L (ref 98–107)
CO2 SERPL-SCNC: 23.1 MMOL/L (ref 22–29)
CREAT SERPL-MCNC: 1.25 MG/DL (ref 0.76–1.27)
DEPRECATED RDW RBC AUTO: 44.1 FL (ref 37–54)
ERYTHROCYTE [DISTWIDTH] IN BLOOD BY AUTOMATED COUNT: 14.2 % (ref 12.3–15.4)
FLUAV SUBTYP SPEC NAA+PROBE: NOT DETECTED
FLUBV RNA ISLT QL NAA+PROBE: NOT DETECTED
GFR SERPL CREATININE-BSD FRML MDRD: 65 ML/MIN/1.73
GLUCOSE BLDC GLUCOMTR-MCNC: 122 MG/DL (ref 70–130)
GLUCOSE BLDC GLUCOMTR-MCNC: 219 MG/DL (ref 70–130)
GLUCOSE BLDC GLUCOMTR-MCNC: 238 MG/DL (ref 70–130)
GLUCOSE BLDC GLUCOMTR-MCNC: 86 MG/DL (ref 70–130)
GLUCOSE SERPL-MCNC: 233 MG/DL (ref 65–99)
HADV DNA SPEC NAA+PROBE: NOT DETECTED
HBA1C MFR BLD: 9.07 % (ref 4.8–5.6)
HCOV 229E RNA SPEC QL NAA+PROBE: NOT DETECTED
HCOV HKU1 RNA SPEC QL NAA+PROBE: NOT DETECTED
HCOV NL63 RNA SPEC QL NAA+PROBE: NOT DETECTED
HCOV OC43 RNA SPEC QL NAA+PROBE: NOT DETECTED
HCT VFR BLD AUTO: 46.2 % (ref 37.5–51)
HGB BLD-MCNC: 14.8 G/DL (ref 13–17.7)
HMPV RNA NPH QL NAA+NON-PROBE: NOT DETECTED
HPIV1 RNA SPEC QL NAA+PROBE: NOT DETECTED
HPIV2 RNA SPEC QL NAA+PROBE: NOT DETECTED
HPIV3 RNA NPH QL NAA+PROBE: NOT DETECTED
HPIV4 P GENE NPH QL NAA+PROBE: NOT DETECTED
M PNEUMO IGG SER IA-ACNC: NOT DETECTED
MCH RBC QN AUTO: 27.6 PG (ref 26.6–33)
MCHC RBC AUTO-ENTMCNC: 32 G/DL (ref 31.5–35.7)
MCV RBC AUTO: 86 FL (ref 79–97)
PLATELET # BLD AUTO: 285 10*3/MM3 (ref 140–450)
PMV BLD AUTO: 11.6 FL (ref 6–12)
POTASSIUM SERPL-SCNC: 3.7 MMOL/L (ref 3.5–5.2)
RBC # BLD AUTO: 5.37 10*6/MM3 (ref 4.14–5.8)
RHINOVIRUS RNA SPEC NAA+PROBE: NOT DETECTED
RSV RNA NPH QL NAA+NON-PROBE: NOT DETECTED
SARS-COV-2 RNA NPH QL NAA+NON-PROBE: NOT DETECTED
SODIUM SERPL-SCNC: 136 MMOL/L (ref 136–145)
WBC # BLD AUTO: 8.97 10*3/MM3 (ref 3.4–10.8)

## 2021-08-03 PROCEDURE — 94640 AIRWAY INHALATION TREATMENT: CPT

## 2021-08-03 PROCEDURE — 94664 DEMO&/EVAL PT USE INHALER: CPT

## 2021-08-03 PROCEDURE — 0202U NFCT DS 22 TRGT SARS-COV-2: CPT | Performed by: INTERNAL MEDICINE

## 2021-08-03 PROCEDURE — 63710000001 INSULIN GLARGINE PER 5 UNITS: Performed by: INTERNAL MEDICINE

## 2021-08-03 PROCEDURE — 25010000002 FUROSEMIDE PER 20 MG: Performed by: INTERNAL MEDICINE

## 2021-08-03 PROCEDURE — 63710000001 INSULIN LISPRO (HUMAN) PER 5 UNITS: Performed by: INTERNAL MEDICINE

## 2021-08-03 PROCEDURE — 25010000002 ENOXAPARIN PER 10 MG: Performed by: INTERNAL MEDICINE

## 2021-08-03 PROCEDURE — 94799 UNLISTED PULMONARY SVC/PX: CPT

## 2021-08-03 PROCEDURE — 82962 GLUCOSE BLOOD TEST: CPT

## 2021-08-03 PROCEDURE — 99254 IP/OBS CNSLTJ NEW/EST MOD 60: CPT | Performed by: INTERNAL MEDICINE

## 2021-08-03 PROCEDURE — 83036 HEMOGLOBIN GLYCOSYLATED A1C: CPT | Performed by: INTERNAL MEDICINE

## 2021-08-03 PROCEDURE — 25010000002 CEFTRIAXONE PER 250 MG: Performed by: INTERNAL MEDICINE

## 2021-08-03 PROCEDURE — 85027 COMPLETE CBC AUTOMATED: CPT | Performed by: INTERNAL MEDICINE

## 2021-08-03 PROCEDURE — 80048 BASIC METABOLIC PNL TOTAL CA: CPT | Performed by: INTERNAL MEDICINE

## 2021-08-03 RX ADMIN — ENOXAPARIN SODIUM 40 MG: 40 INJECTION SUBCUTANEOUS at 08:30

## 2021-08-03 RX ADMIN — CEFTRIAXONE SODIUM 1 G: 1 INJECTION, SOLUTION INTRAVENOUS at 22:27

## 2021-08-03 RX ADMIN — INSULIN LISPRO 12 UNITS: 100 INJECTION, SOLUTION INTRAVENOUS; SUBCUTANEOUS at 12:22

## 2021-08-03 RX ADMIN — INSULIN GLARGINE 60 UNITS: 100 INJECTION, SOLUTION SUBCUTANEOUS at 08:13

## 2021-08-03 RX ADMIN — CARVEDILOL 6.25 MG: 6.25 TABLET, FILM COATED ORAL at 08:13

## 2021-08-03 RX ADMIN — INSULIN LISPRO 4 UNITS: 100 INJECTION, SOLUTION INTRAVENOUS; SUBCUTANEOUS at 12:22

## 2021-08-03 RX ADMIN — FUROSEMIDE 40 MG: 10 INJECTION, SOLUTION INTRAMUSCULAR; INTRAVENOUS at 22:27

## 2021-08-03 RX ADMIN — SODIUM CHLORIDE, PRESERVATIVE FREE 10 ML: 5 INJECTION INTRAVENOUS at 22:27

## 2021-08-03 RX ADMIN — BUDESONIDE AND FORMOTEROL FUMARATE DIHYDRATE 2 PUFF: 160; 4.5 AEROSOL RESPIRATORY (INHALATION) at 07:42

## 2021-08-03 RX ADMIN — FUROSEMIDE 40 MG: 10 INJECTION, SOLUTION INTRAMUSCULAR; INTRAVENOUS at 08:30

## 2021-08-03 RX ADMIN — INSULIN LISPRO 12 UNITS: 100 INJECTION, SOLUTION INTRAVENOUS; SUBCUTANEOUS at 08:30

## 2021-08-03 RX ADMIN — LISINOPRIL 10 MG: 10 TABLET ORAL at 08:13

## 2021-08-03 RX ADMIN — ASPIRIN 81 MG: 81 TABLET, CHEWABLE ORAL at 08:13

## 2021-08-03 RX ADMIN — INSULIN LISPRO 12 UNITS: 100 INJECTION, SOLUTION INTRAVENOUS; SUBCUTANEOUS at 17:50

## 2021-08-03 RX ADMIN — ENOXAPARIN SODIUM 40 MG: 40 INJECTION SUBCUTANEOUS at 22:27

## 2021-08-03 RX ADMIN — CEFTRIAXONE SODIUM 1 G: 1 INJECTION, SOLUTION INTRAVENOUS at 00:30

## 2021-08-03 RX ADMIN — CARVEDILOL 6.25 MG: 6.25 TABLET, FILM COATED ORAL at 17:49

## 2021-08-03 RX ADMIN — ATORVASTATIN CALCIUM 80 MG: 80 TABLET, FILM COATED ORAL at 08:13

## 2021-08-03 RX ADMIN — INSULIN LISPRO 4 UNITS: 100 INJECTION, SOLUTION INTRAVENOUS; SUBCUTANEOUS at 08:13

## 2021-08-03 RX ADMIN — CARVEDILOL 6.25 MG: 6.25 TABLET, FILM COATED ORAL at 00:30

## 2021-08-03 NOTE — CONSULTS
[facility]  Consult Note                                                                              8/3/2021  Jennyfer Simons MD    Patient Identification:  Rufus Dorsey:   36 y.o.  male  1985     Date of Admission:8/2/2021    CC:   Consult quested by Dr. Owusu for elevated troponin, chest pain, coronary artery disease cardiomegaly and congestive heart failure    History of Present Illness:   Rufus Dorsey is a 36 year old pt with a history of COVID -19 (12/2020), DMII, CAD (CABG x 3 with Dr. Key) and ischemic cardiomyopathy.  Records are very limited.  He was admitted on 5/2020 with Covid pneumonia without obvious cardiac complications.  He states he has not had any cardiac concerns since his surgery and is fact is fairly active.  He has not had any exertional chest pain.    He presented to ER last night with complaints of dry cough and shortness of breath for 3 days worse with exertion.  He reported sharp pain in his chest when he takes a deep breath or when he coughs. In ER BUN/CR 23/1.63, troponin 0.068, D dimer 0.94, proBNP 2660, CXR showed extensive PNA scattered in both lungs and particularly involves the right upper lobe. EKG showed SR 90 with slight sagging of the lateral T waves is somewhat new from prior EKG.  CT angiogram of the chest shows no pulmonary emboli with enlarged pulmonary artery.  Extensive bilateral pneumonia with bilateral pleural effusions noted.  Cardiomegaly was noted findings of right-sided dysfunction.  Was quite hypertensive on admission.    Has been started on biotics.  Has been given IV Lasix.  Regimen of aspirin Coreg, Lipitor and lisinopril have been continued.  He is currently resting comfortably.    Past Medical History:  Past Medical History:   Diagnosis Date   • Coronary artery disease    • Diabetes (CMS/HCC)    • Heart failure (CMS/HCC) 05/16/2016   • High cholesterol    • Hypertension    • Ischemic cardiomyopathy 06/25/2016       Past  Surgical History:  Past Surgical History:   Procedure Laterality Date   • CARDIAC CATHETERIZATION  01/25/2017    Right heart cath   • CARDIAC SURGERY     • CORONARY ARTERY BYPASS GRAFT  05/26/2015    cabg x3 Dr. Key       Allergies:  No Known Allergies    Home Meds:  Medications Prior to Admission   Medication Sig Dispense Refill Last Dose   • ASPIRIN PO 81 mg Daily.   8/2/2021 at Unknown time   • carvedilol (COREG) 6.25 MG tablet Take 6.25 mg by mouth 2 (Two) Times a Day With Meals.   8/2/2021 at Unknown time   • atorvastatin (LIPITOR) 80 MG tablet Take 80 mg by mouth Daily.   More than a month at Unknown time   • Blood Glucose Monitoring Suppl (FreeStyle Freedom Lite) w/Device kit Use to check blood sugar as directed. 1 each 0 More than a month at Unknown time   • budesonide-formoterol (SYMBICORT) 160-4.5 MCG/ACT inhaler Inhale 2 puffs 2 (Two) Times a Day. 10.2 g 0 More than a month at Unknown time   • bumetanide (BUMEX) 1 MG tablet Take 1 mg by mouth Daily.   More than a month at Unknown time   • glucose blood test strip Test 4 (Four) Times a Day After Meals & at Bedtime. Use as instructed 100 each 0 More than a month at Unknown time   • glucose monitor monitoring kit 1 each 4 (Four) Times a Day Before Meals & at Bedtime. 1 each 0 More than a month at Unknown time   • Insulin Glargine (BASAGLAR KWIKPEN) 100 UNIT/ML injection pen Inject 60 Units under the skin into the appropriate area as directed 2 (Two) Times a Day. 30 mL 0 More than a month at Unknown time   • insulin lispro (humaLOG, ADMELOG) 100 UNIT/ML injection Inject 0-9 Units under the skin into the appropriate area as directed 4 (Four) Times a Day With Meals & at Bedtime.  12 More than a month at Unknown time   • Insulin Lispro, 1 Unit Dial, (HumaLOG KwikPen) 100 UNIT/ML solution pen-injector Inject 12 Units under the skin into the appropriate area as directed 3 (Three) Times a Day With Meals. In addition to sliding scale 15 mL 0 More than a month  at Unknown time   • Insulin Pen Needle 32G X 4 MM misc Use with insulin 5 times daily 200 each 0 More than a month at Unknown time   • Lancets (freestyle) lancets Test 4 (Four) Times a Day After Meals & at Bedtime. 100 each 0 More than a month at Unknown time   • lisinopril (PRINIVIL,ZESTRIL) 10 MG tablet Take 10 mg by mouth Daily.   More than a month at Unknown time       Current Meds  Scheduled Meds:aspirin, 81 mg, Oral, Daily  atorvastatin, 80 mg, Oral, Daily  budesonide-formoterol, 2 puff, Inhalation, BID - RT  carvedilol, 6.25 mg, Oral, BID With Meals  cefTRIAXone, 1 g, Intravenous, Q24H  enoxaparin, 40 mg, Subcutaneous, Q12H  furosemide, 40 mg, Intravenous, Q12H  insulin glargine, 60 Units, Subcutaneous, BID  insulin lispro, 0-9 Units, Subcutaneous, TID With Meals  insulin lispro, 12 Units, Subcutaneous, TID With Meals  lisinopril, 10 mg, Oral, Daily      Continuous Infusions:Pharmacy to Dose enoxaparin (LOVENOX),       PRN Meds:.•  acetaminophen  •  dextrose  •  dextrose  •  glucagon (human recombinant)  •  melatonin  •  nitroglycerin  •  ondansetron **OR** ondansetron  •  Pharmacy to Dose enoxaparin (LOVENOX)  •  sodium chloride    Social History:   Social History     Socioeconomic History   • Marital status:      Spouse name: Not on file   • Number of children: Not on file   • Years of education: Not on file   • Highest education level: Not on file   Tobacco Use   • Smoking status: Former Smoker   • Smokeless tobacco: Never Used   Vaping Use   • Vaping Use: Never used   Substance and Sexual Activity   • Alcohol use: Never   • Drug use: Never   • Sexual activity: Defer       Family History:  Family History   Family history unknown: Yes       REVIEW OF SYSTEMS:   CONSTITUTIONAL: Has had weakness recently  HEENT: Eyes: No visual loss, blurred vision, double vision or yellow sclerae.   RESPIRATORY: No hemoptysis  GASTROINTESTINAL: No anorexia, nausea, vomiting or diarrhea. No abdominal pain, bright red  blood per rectum or melena.  GENITOURINARY: No burning on urination, hematuria or increased frequency.  NEUROLOGICAL: No headache, dizziness, syncope, paralysis, ataxia, numbness or tingling in the extremities. No change in bowel or bladder control.   MUSCULOSKELETAL: No muscle, back pain, joint pain or stiffness.   HEMATOLOGIC: No anemia, bleeding or bruising.   LYMPHATICS: No enlarged nodes. No history of splenectomy.   PSYCHIATRIC: No history of depression, anxiety, hallucinations.   ENDOCRINOLOGIC: No reports of sweating, cold or heat intolerance. No polyuria or polydipsia.     Physical Exam    /81 (BP Location: Right arm, Patient Position: Lying)   Pulse 71   Temp 96.7 °F (35.9 °C) (Oral)   Resp 18   Wt 128 kg (282 lb 3 oz)   SpO2 99%   BMI 45.57 kg/m²     General Appearance Well developed, cooperative and well nourished and no acute distress   Head Normocephalic, without abnormality, atraumatic   Ears Ears appear intact with no abnormalities noted   Throat No oral lesions, no thrush, oral mucosa moist   Neck No adenopathy, supple, trachea midline, no thyromegaly, no carotid bruit, no JVD   Back No skin lesions, erythema or scars, no tenderness to percussion or palpation,range of motion is normal   Lungs Clear to auscultation,respirations regular, even and unlabored   Heart Regular rhythm and normal rate, normal S1 and S2, no murmur, no gallop, no rub, no click   Chest wall No abnormalities observed   Abdomen Normal bowel sounds, no masses, no hepatomegaly,    Extremities Moves all extremities well, no edema, no cyanosis, no redness   Pulses Pulses palpable and equal bilaterally. Normal radial, carotid, femoral, dorsalis pedis and posterior tibial pulses bilaterally.    Skin No bleeding, bruising or rash   Psychiatric Alert and oriented x 3, normal mood and affect     Results from last 7 days   Lab Units 08/03/21  0503 08/02/21  1612 08/02/21  1612   SODIUM mmol/L 136   < > 137   POTASSIUM mmol/L  3.7   < > 3.8   CHLORIDE mmol/L 102   < > 103   CO2 mmol/L 23.1   < > 27.2   BUN mg/dL 25*   < > 23*   CREATININE mg/dL 1.25   < > 1.63*   CALCIUM mg/dL 7.7*   < > 8.1*   BILIRUBIN mg/dL  --   --  0.6   ALK PHOS U/L  --   --  93   ALT (SGPT) U/L  --   --  28   AST (SGOT) U/L  --   --  16   GLUCOSE mg/dL 233*   < > 261*    < > = values in this interval not displayed.     Results from last 7 days   Lab Units 08/02/21  2224 08/02/21  1708 08/02/21  1612   TROPONIN T ng/mL 0.050* 0.035* 0.068*     Results from last 7 days   Lab Units 08/03/21  0503 08/02/21  1612   WBC 10*3/mm3 8.97 9.86   HEMOGLOBIN g/dL 14.8 14.3   HEMATOCRIT % 46.2 42.2   PLATELETS 10*3/mm3 285 283                     I personally viewed and interpreted the patient's EKG/Telemetry data    Assessment and Plan  1.  Congestive heart failure with cardiomegaly and nonischemic cardiomyopathy.  Limited outside records.  We will check an echocardiogram and continue with Lasix.  Continue goal medical directed therapy.  Eventually would consider switch to Entresto and spironolactone.  2.  Elevated troponin.  Likely demand related but records limited.  We will try to get these and may need further coronary evaluation.  Await echocardiogram results and likely will need stress test prior to dismissal.  3.  Respiratory failure with markedly abnormal chest x-ray and CT findings suggestive of ongoing pneumonic process as well as heart failure.  He had COVID-19 in December.  Covid screen is negative x2.  Isolation has been lifted.  4.  Hypertension issues were elevated but seem to be improving.    5.  Hyperlipidemia  6.  Diabetes    I have reviewed HPI and ROS above.    Mini Menon  8/3/2021  08:01 EDT    50min spent in reviewing records, discussion and examination of the patient and discussion with other members of the patient's medical team.     Dictated utilizing Dragon dictation

## 2021-08-03 NOTE — CONSULTS
Referring Provider: Dr. Simons  Reason for Consultation: Pneumonia    Patient Care Team:  Provider, No Known as PCP - General    Chief complaint:   Shortness of breath    History of present illness:  Subjective   This is a 36-year-old male patient, non-smoker with history of severe SARS-CoV-2 pneumonia in December 2020 treated with HFNC and discharged home on oxygen PRN.     He also has extensive history of CAD and cardiomyopathy.  Records not available regarding his cardiac history.    He presented to the hospital last night for shortness of breath which started 3-4 days ago and has been getting worse gradually.  Dyspnea with covering on activities and partially alleviated by rest.  Also reported orthopnea and associated dry cough and chest discomfort.  He has no fever, chills, runny nose, sinus congestion or sick contact.    He did not get vaccinated for Covid.  He has no significant shortness of breath at baseline and was not using oxygen for a while up until few days ago when he started feeling short of breath then he started using his oxygen again.    In ED, SPO2 88% on RA.  He is currently on 2 L.  SARS-CoV-2 PCR x1 -ve.     Labs:  proBNP 26,00; troponin 0.035; A1c 9; creatinine 1.2; WBC, hemoglobin and platelets normal; neutrophils 81%    Review of Systems  Constitutional: No fever or chills.   ENMT: No sinus congestion or postnasal drip.  Cardiovascular: Chest tightness and leg swelling.  No palpitation.  Respiratory: See above.  Gastrointestinal: No constipation, diarrhea or abdominal pain.  No nausea or vomiting.  Neurology: No headache, weakness, numbness or dizziness.   Musculoskeletal: No joints pain, stiffness or swelling.   Psychiatry: No depression.  Genitourinary: No dysuria or frequent urination  Endo: No weight changes. No cold or warm intolerance.  Lymphatic: No swollen glands.  Integumentary: No rash.    History  Past Medical History:   Diagnosis Date   • Coronary artery disease     • Diabetes (CMS/[facility])    • Heart failure (CMS/[facility]) 05/16/2016   • High cholesterol    • Hypertension    • Ischemic cardiomyopathy 06/25/2016   ,   Past Surgical History:   Procedure Laterality Date   • CARDIAC CATHETERIZATION  01/25/2017    Right heart cath   • CARDIAC SURGERY     • CORONARY ARTERY BYPASS GRAFT  05/26/2015    cabg x3 Dr. Key   ,   Family History   Family history unknown: Yes   ,   Social History     Socioeconomic History   • Marital status:      Spouse name: Not on file   • Number of children: Not on file   • Years of education: Not on file   • Highest education level: Not on file   Tobacco Use   • Smoking status: Former Smoker   • Smokeless tobacco: Never Used   Vaping Use   • Vaping Use: Never used   Substance and Sexual Activity   • Alcohol use: Never   • Drug use: Never   • Sexual activity: Defer     E-cigarette/Vaping   • E-cigarette/Vaping Use Never User        ,   Medications Prior to Admission   Medication Sig Dispense Refill Last Dose   • ASPIRIN PO 81 mg Daily.   8/2/2021 at Unknown time   • carvedilol (COREG) 6.25 MG tablet Take 6.25 mg by mouth 2 (Two) Times a Day With Meals.   8/2/2021 at Unknown time   • atorvastatin (LIPITOR) 80 MG tablet Take 80 mg by mouth Daily.   More than a month at Unknown time   • Blood Glucose Monitoring Suppl (FreeStyle Freedom Lite) w/Device kit Use to check blood sugar as directed. 1 each 0 More than a month at Unknown time   • budesonide-formoterol (SYMBICORT) 160-4.5 MCG/ACT inhaler Inhale 2 puffs 2 (Two) Times a Day. 10.2 g 0 More than a month at Unknown time   • bumetanide (BUMEX) 1 MG tablet Take 1 mg by mouth Daily.   More than a month at Unknown time   • glucose blood test strip Test 4 (Four) Times a Day After Meals & at Bedtime. Use as instructed 100 each 0 More than a month at Unknown time   • glucose monitor monitoring kit 1 each 4 (Four) Times a Day Before Meals & at Bedtime. 1 each 0 More than a month at Unknown time   • Insulin  Glargine (BASAGLAR KWIKPEN) 100 UNIT/ML injection pen Inject 60 Units under the skin into the appropriate area as directed 2 (Two) Times a Day. 30 mL 0 More than a month at Unknown time   • insulin lispro (humaLOG, ADMELOG) 100 UNIT/ML injection Inject 0-9 Units under the skin into the appropriate area as directed 4 (Four) Times a Day With Meals & at Bedtime.  12 More than a month at Unknown time   • Insulin Lispro, 1 Unit Dial, (HumaLOG KwikPen) 100 UNIT/ML solution pen-injector Inject 12 Units under the skin into the appropriate area as directed 3 (Three) Times a Day With Meals. In addition to sliding scale 15 mL 0 More than a month at Unknown time   • Insulin Pen Needle 32G X 4 MM misc Use with insulin 5 times daily 200 each 0 More than a month at Unknown time   • Lancets (freestyle) lancets Test 4 (Four) Times a Day After Meals & at Bedtime. 100 each 0 More than a month at Unknown time   • lisinopril (PRINIVIL,ZESTRIL) 10 MG tablet Take 10 mg by mouth Daily.   More than a month at Unknown time   , Scheduled Meds:  aspirin, 81 mg, Oral, Daily  atorvastatin, 80 mg, Oral, Daily  budesonide-formoterol, 2 puff, Inhalation, BID - RT  carvedilol, 6.25 mg, Oral, BID With Meals  cefTRIAXone, 1 g, Intravenous, Q24H  enoxaparin, 40 mg, Subcutaneous, Q12H  furosemide, 40 mg, Intravenous, Q12H  insulin glargine, 60 Units, Subcutaneous, BID  insulin lispro, 0-9 Units, Subcutaneous, TID With Meals  insulin lispro, 12 Units, Subcutaneous, TID With Meals  lisinopril, 10 mg, Oral, Daily     and Allergies:  Patient has no known allergies.    Objective     Vital Signs   Temp:  [96.7 °F (35.9 °C)-97.9 °F (36.6 °C)] 96.7 °F (35.9 °C)  Heart Rate:  [66-98] 71  Resp:  [16-22] 18  BP: (128-184)/() 128/81    PPE used per hospital policy    Physical Exam:  Constitutional: Not in acute distress.  Eyes: Injected conjunctivae, EOMI. pupils equal reactive to light.  ENMT: North and Mallampati 4.. No oral thrush.   Neck: Large.  Trachea midline. No thyromegaly  Heart: RRR, no murmur  Lungs: Diffuse bilateral fine crackles at the bases.  No wheezing.  Nonlabored breathing.  Abdomen: Obese. Soft. No tenderness or dullness. No HSM.  Extremities: No cyanosis, clubbing but grade 2 pitting edema in feet.  Warm extremities and well-perfused.  Neuro: Conscious, alert, oriented x3.  Strength 5/5 in arms.  Psych: Appropriate mood and affect.    Integumentary: No rash.  Normal skin turgor  Lymphatic: No palpable cervical or supraclavicular lymph nodes.      Diagnostic imaging:  I personally and independently reviewed the following images:   CTA chest 8/2/2021: Bilateral pleural effusion more prominent on the right.  Bilateral groundglass opacities predominantly in the upper lobes.        Assessment     1. Pulmonary edema  2. Bilateral pleural effusion  3. Acute hypoxic respiratory failure  4. Acute congestive heart failure, unspecified whether systolic or diastolic  5. Bilateral pulmonary infiltrates: Mostly groundglass secondary to pulmonary edema but could have some fibrotic changes from prior Covid (fibrosis is not clear on current CT testing)  6. Dilated pulmonary artery on CT chest suggestive of pulmonary hypertension  7. History of severe COVID-19 pneumonia 12/21/2020, requiring high flow oxygen therapy  8. Morbid obesity (BMI 45)    Recommendations:    · Agree with diuresis.  · Check RVP but doubt viral illness based on his history.  Nasopharyngeal SARS-CoV-2 PCR testing is highly sensitive but not 100% and therefore we will repeated again along with respiratory viral panel.  If negative then he can be taken off isolation if okay with infection control.  · Pleural effusion is not very large and I suspect diuresis would improve his respiratory status and therefore I did not recommend drainage.    Discussed with Dr. Menon earlier before I have examined and interviewed the patient.      Diamante Yeager MD  08/03/21  08:46 EDT

## 2021-08-03 NOTE — PROGRESS NOTES
Pharmacy Consult - [facility]    Rufsu Dorsey has been consulted for pharmacy to dose enoxaparin for VTE Prophylaxis   per Dr Simons's request.         Relevant clinical data and objective history reviewed:  36 y.o. male   126 kg (278 lb)    Home Anticoagulation: aspirin    Past Medical History:   Diagnosis Date    Diabetes (CMS/[facility])     Heart failure (CMS/[facility]) 05/16/2016    High cholesterol     Ischemic cardiomyopathy 06/25/2016     has No Known Allergies.    Lab Results   Component Value Date    WBC 9.86 08/02/2021    HGB 14.3 08/02/2021    HCT 42.2 08/02/2021    MCV 82.4 08/02/2021     08/02/2021     Lab Results   Component Value Date    GLUCOSE 261 (H) 08/02/2021    CALCIUM 8.1 (L) 08/02/2021     08/02/2021    K 3.8 08/02/2021    CO2 27.2 08/02/2021     08/02/2021    BUN 23 (H) 08/02/2021    CREATININE 1.63 (H) 08/02/2021    EGFRIFAFRI 54 (L) 12/21/2020    EGFRIFNONA 48 (L) 08/02/2021    BCR 14.1 08/02/2021    ANIONGAP 6.8 08/02/2021       Estimated Creatinine Clearance: 78.6 mL/min (A) (by C-G formula based on SCr of 1.63 mg/dL (H)).    Active Inpatient Anticoagulation Orders: none    Assessment/Plan    Will start patient on 40 mg subcutaneous every 12 hours (BMI > 40), adjusted for renal function. Labs to be ordered by clinical pharmacist if clinically indicated. Pharmacy will continue to follow.     Vince Westbrook [facility]

## 2021-08-03 NOTE — PROGRESS NOTES
Name: Rufus Dorsey ADMIT: 2021   : 1985  PCP: Provider, No Known    MRN: [id number] LOS: 1 days   AGE/SEX: 36 y.o. male  ROOM: [facility]     Subjective   Subjective    He is stable on 2L NC. Chest pain and shortness of breath yesterday has resolved. He has had leg swelling. He is tired. Patient states he has not been on treatment for diabetes and is only been taking Coreg since discharge in the hospital in December.    Assisted by  monitor.     Review of Systems he denies nausea, vomiting, fever, chills, diarrhea, rash, LUTS     Objective   Objective   Vital Signs  Temp:  [96.7 °F (35.9 °C)-97.9 °F (36.6 °C)] 97.1 °F (36.2 °C)  Heart Rate:  [66-98] 68  Resp:  [16-22] 22  BP: (128-184)/() 147/90  SpO2:  [88 %-99 %] 95 %  on  Flow (L/min):  [2] 2;   Device (Oxygen Therapy): nasal cannula  Body mass index is 45.57 kg/m².  Physical Exam  Vitals reviewed.   Constitutional:       General: He is not in acute distress.     Appearance: He is well-developed. He is obese. He is not toxic-appearing.   HENT:      Head: Normocephalic and atraumatic.   Cardiovascular:      Rate and Rhythm: Normal rate and regular rhythm.   Pulmonary:      Effort: Pulmonary effort is normal. No respiratory distress.      Breath sounds: Rales (bilateral bases ) present. No wheezing.   Abdominal:      General: Bowel sounds are normal. There is no distension.      Palpations: Abdomen is soft. There is no mass.      Tenderness: There is no abdominal tenderness.      Hernia: No hernia is present.   Musculoskeletal:      Right lower leg: Edema present.      Left lower leg: Edema present.      Comments: +2BLE edema    Skin:     General: Skin is warm and dry.   Neurological:      General: No focal deficit present.      Mental Status: He is alert and oriented to person, place, and time.   Psychiatric:         Mood and Affect: Mood normal.         Behavior: Behavior normal.         Thought Content: Thought content  normal.         Results Review     I reviewed the patient's new clinical results.  Results from last 7 days   Lab Units 08/03/21  0503 08/02/21  1612   WBC 10*3/mm3 8.97 9.86   HEMOGLOBIN g/dL 14.8 14.3   PLATELETS 10*3/mm3 285 283     Results from last 7 days   Lab Units 08/03/21  0503 08/02/21  1612   SODIUM mmol/L 136 137   POTASSIUM mmol/L 3.7 3.8   CHLORIDE mmol/L 102 103   CO2 mmol/L 23.1 27.2   BUN mg/dL 25* 23*   CREATININE mg/dL 1.25 1.63*   GLUCOSE mg/dL 233* 261*   Estimated Creatinine Clearance: 103.4 mL/min (by C-G formula based on SCr of 1.25 mg/dL).  Results from last 7 days   Lab Units 08/02/21  1612   ALBUMIN g/dL 2.90*   BILIRUBIN mg/dL 0.6   ALK PHOS U/L 93   AST (SGOT) U/L 16   ALT (SGPT) U/L 28     Results from last 7 days   Lab Units 08/03/21  0503 08/02/21  1612   CALCIUM mg/dL 7.7* 8.1*   ALBUMIN g/dL  --  2.90*     Results from last 7 days   Lab Units 08/02/21  1725 08/02/21  1708   PROCALCITONIN ng/mL  --  0.15   LACTATE mmol/L 1.1  --      COVID19   Date Value Ref Range Status   08/03/2021 Not Detected Not Detected - Ref. Range Final   08/02/2021 Not Detected Not Detected - Ref. Range Final     Hemoglobin A1C   Date/Time Value Ref Range Status   08/03/2021 0503 9.07 (H) 4.80 - 5.60 % Final     Glucose   Date/Time Value Ref Range Status   08/03/2021 1134 238 (H) 70 - 130 mg/dL Final     Comment:     Meter: [id number] : [id number] Ellie LAO   08/03/2021 0608 219 (H) 70 - 130 mg/dL Final     Comment:     RN Notified R and V Meter: [id number] : [id number] Sergio LAO       CT Angiogram Chest  Narrative: CT ANGIOGRAM CHEST-     CLINICAL HISTORY: Elevated d-dimer. Suspect COVID 19 infection.     TECHNIQUE: Spiral CT images were obtained through the chest during rapid  IV injection of contrast and were reconstructed in 2 mm thick axial  slices.     Radiation dose reduction techniques were utilized, including automated  exposure control and exposure modulation based on body  size.     COMPARISON: None     FINDINGS: Lung window images demonstrate extensive bilateral groundglass  infiltrate compatible with COVID pneumonia. Only portions of the right  middle and lower lobes are spared. The main pulmonary arteries and their  lobar and segmental branches are fairly well opacified, and demonstrate  no filling defects. There is no CT evidence of acute pulmonary  thromboemboli embolism. The heart is moderately enlarged. Injected  contrast material fills primarily the right atrium and right ventricle  and refluxes into the IVC and hepatic veins indicating significant  cardiac dysfunction. This raises the possibility that some of the  pulmonary opacities may be due to pulmonary edema. There is a  moderate-sized posteriorly layering right pleural effusion. A small  posteriorly layering left pleural effusion is also present.      Impression: Extensive bilateral pneumonia and bilateral pleural  effusions, greater in size on the right than the left. Moderate  cardiomegaly and evidence of heart failure as noted. There is no CT  evidence of acute pulmonary thromboembolism.     This report was finalized on 8/2/2021 7:03 PM by Dr. Christian Palacios M.D.     XR Chest 2 View  TWO-VIEW CHEST     HISTORY: Chest pain. Previous Covid 19 pneumonia.     FINDINGS: There is extensive pneumonia scattered in both lungs and  particularly involves the right upper lobe and this appearance is quite  similar to an exam dating back to 12/28/2020. The findings may relate to  persistent changes of Covid 19 pneumonia. There may also be a component  of superimposed congestive heart failure. The heart remains enlarged  with sternal wires from previous cardiac surgery.     This report was finalized on 8/2/2021 6:06 PM by Dr. Rustam Jones M.D.       Scheduled Medications  aspirin, 81 mg, Oral, Daily  atorvastatin, 80 mg, Oral, Daily  budesonide-formoterol, 2 puff, Inhalation, BID - RT  carvedilol, 6.25 mg, Oral, BID With  Meals  cefTRIAXone, 1 g, Intravenous, Q24H  enoxaparin, 40 mg, Subcutaneous, Q12H  furosemide, 40 mg, Intravenous, Q12H  insulin glargine, 60 Units, Subcutaneous, BID  insulin lispro, 0-9 Units, Subcutaneous, TID With Meals  insulin lispro, 12 Units, Subcutaneous, TID With Meals  lisinopril, 10 mg, Oral, Daily    Infusions  Pharmacy to Dose enoxaparin (LOVENOX),     Diet  Diet Regular; Cardiac       Assessment/Plan     Active Hospital Problems    Diagnosis  POA   • **Bilateral pulmonary infiltrates on chest x-ray [R91.8]  Yes   • Class 3 severe obesity in adult (CMS/[facility]) [E66.01]  Yes   • Acute pulmonary edema (CMS/[facility]) [J81.0]  Yes   • Bilateral pleural effusion [J90]  Yes   • Diastolic CHF, acute (CMS/[facility]) [I50.31]  Yes   • Pulmonary HTN (CMS/[facility]) [I27.20]  Yes   • History of COVID-19 [Z86.16]  Yes   • Coronary artery disease [I25.10]  Yes   • Hypertension [I10]  Yes   • Viral URI with cough [J06.9]  Yes   • Type 2 diabetes mellitus, with long-term current use of insulin (CMS/[facility]) [E11.9, Z79.4]  Not Applicable   • Ischemic cardiomyopathy [I25.5]  Yes      Resolved Hospital Problems   No resolved problems to display.       36 y.o. male admitted with Bilateral pulmonary infiltrates on chest x-ray.  CXR with extensive pneumonia both lungs. Elevated troponin and ddimer. CTA chest with no PE but enlarged pulmonary artery and bilateral pneumonia with bilateral pleural effusions. Cardiomegaly present. Patient was admitted December 2020 with severe COVID-19 pneumonia with hypoxia.    · Patient pulmonary and cardiology assistance.  · Continue Lasix IV 40 mg daily. Defer diuretic changes to cardiology. IO and daily weights.  · COVID-19 negative yesterday. Repeat RVP today negative.End isolation precautions  · Echocardiogram ordered.  · Currently on antibiotics with IV Rocephin. Defer continuation to Pulmonary. Afebrile, normal WBC and procal.      DM2 with hyperglycemia   · Patient not taking any medication since discharge  December 2020.  A1c 9, consult DM educator   started on the insulin regimen that was ordered at [facility] over the winter. Glucose elevated. Monitor and make adjustments as needed    HTN  · BP elevated 147/90 but better than admission (117/118)  monitor with diuresis. Continue carvedilol and lisinopril    Obesity-complicating all aspects of care    · Lovenox 40 mg SC daily for DVT prophylaxis.  · Full code.  · Discussed with patient, nursing staff and Dr. Cortes .  · Anticipate discharge TBD    Greater than 50% of time spent in counseling and/or coordination of care. Total face/floor time 35 minutes.    JESSICA Liu  [facility]  08/03/21  14:34 EDT

## 2021-08-03 NOTE — CASE MANAGEMENT/SOCIAL WORK
Discharge Planning Assessment  [facility]     Patient Name: Rufus Dorsey  MRN: [id number]  Today's Date: 8/3/2021    Admit Date: 8/2/2021    Discharge Needs Assessment     Row Name 08/03/21 1532       Living Environment    Lives With  sibling(s)    Current Living Arrangements  home/apartment/condo    Primary Care Provided by  self    Provides Primary Care For  no one, unable/limited ability to care for self    Family Caregiver if Needed  sibling(s)    Quality of Family Relationships  helpful;involved;supportive    Able to Return to Prior Arrangements  yes       Resource/Environmental Concerns    Resource/Environmental Concerns  none    Transportation Concerns  car, none       Transition Planning    Patient/Family Anticipates Transition to  home with family    Patient/Family Anticipated Services at Transition  none    Transportation Anticipated  family or friend will provide       Discharge Needs Assessment    Readmission Within the Last 30 Days  no previous admission in last 30 days    Equipment Currently Used at Home  none    Concerns to be Addressed  discharge planning    Anticipated Changes Related to Illness  none    Equipment Needed After Discharge  none        Discharge Plan     Row Name 08/03/21 153       Plan    Plan  Home with brothmarito Enamorado    Patient/Family in Agreement with Plan  yes    Plan Comments  Attempted to reach patient via room phone, no answer. Outbound call to patient's brother Fei. Verified face sheet. Patient lives with his brother Fei. He is IADLs and still drives. Fei unsure if patient still has oxygen at home. Spoke with [facility] and she verified patient is still set up with oxygen from them with an order for continuous oxygen at 4L NC. CCP will continue to follow for DC needs. Message sent to  to verify if patient has active [place] Medicaid. Kristen Cardenas RN CCP        Continued Care and Services - Admitted Since 8/2/2021    Coordination has not been started for  this encounter.         Demographic Summary     Row Name 08/03/21 1532       General Information    Admission Type  inpatient    Arrived From  emergency department    Referral Source  admission list    Reason for Consult  discharge planning       Contact Information    Permission Granted to Share Info With  family/designee        Functional Status     Row Name 08/03/21 1533       Functional Status    Usual Activity Tolerance  moderate    Current Activity Tolerance  moderate       Functional Status, IADL    Medications  independent    Meal Preparation  independent    Housekeeping  independent    Laundry  independent    Shopping  independent        Psychosocial     Row Name 08/03/21 1533       Developmental Stage (Eriksson's)    Developmental Stage  Stage 7 (35-65 years/Middle Adulthood) Generativity vs. Stagnation        Abuse/Neglect    No documentation.       Legal    No documentation.       Substance Abuse    No documentation.       Patient Forms    No documentation.           Kristen Cardenas RN

## 2021-08-03 NOTE — PLAN OF CARE
Goal Outcome Evaluation:              Outcome Summary: no resp distress; isolation dc'd; to transfer to 6S; O2 decreased to 1LNC; no complaints;voiding per bathroom; instructed to use urinal for I&O's

## 2021-08-03 NOTE — PLAN OF CARE
Goal Outcome Evaluation:  Plan of Care Reviewed With: patient        Progress: improving  Outcome Summary: NEW ADMIT ON MY SHIFT. NAD NOTED. VSS. A/O X4. MMM. RR E/U. SKIN PWD. ON NASAL CANNULA @ 2LPM AND TOLERATING WELL. CARDIOLOGY AND PULMONOLOGY CONSULT CALLED-TO SEE PT TODAY. SPOKE WITH CARDIOLOGY REGARDING CRITICAL TROPONIN OF 0.05-NO NEW ORDERS RECEIVED.  USED FOR ADMISSION QUESTIONS. PT DOES SPEAK SOME ENGLISH AND ABLE TO COMMUNICATE SOME WITHOUT . DENIES PAIN. WILL CTM.

## 2021-08-03 NOTE — H&P
HISTORY AND PHYSICAL   [facility]        Patient Identification:  Name: Rufus Dorsey  Age: 36 y.o.  Sex: male  :  1985  MRN: [id number]                     Primary Care Physician: Provider, No Known    Chief Complaint:  36 year old with shortness of breath, cough and congestion which started three days ago; he denies fever or chills; he has had chest pain with coughing and a deep breath; he had covid last winter; he has a history of diabetes    History of Present Illness:   As above    Past Medical History:  Past Medical History:   Diagnosis Date   • Diabetes (CMS/[facility])    • Heart failure (CMS/[facility]) 2016   • High cholesterol    • Ischemic cardiomyopathy 2016     Past Surgical History:  Past Surgical History:   Procedure Laterality Date   • CARDIAC CATHETERIZATION  2017    Right heart cath   • CARDIAC SURGERY     • CORONARY ARTERY BYPASS GRAFT  2015    cabg x3 Dr. Key      Home Meds:  Medications Prior to Admission   Medication Sig Dispense Refill Last Dose   • ASPIRIN PO 81 mg Daily.      • atorvastatin (LIPITOR) 80 MG tablet Take 80 mg by mouth Daily.      • Blood Glucose Monitoring Suppl (FreeStyle Freedom Lite) w/Device kit Use to check blood sugar as directed. 1 each 0    • budesonide-formoterol (SYMBICORT) 160-4.5 MCG/ACT inhaler Inhale 2 puffs 2 (Two) Times a Day. 10.2 g 0    • bumetanide (BUMEX) 1 MG tablet Take 1 mg by mouth Daily.      • carvedilol (COREG) 6.25 MG tablet Take 6.25 mg by mouth 2 (Two) Times a Day With Meals.      • glucose blood test strip Test 4 (Four) Times a Day After Meals & at Bedtime. Use as instructed 100 each 0    • glucose monitor monitoring kit 1 each 4 (Four) Times a Day Before Meals & at Bedtime. 1 each 0    • Insulin Glargine (BASAGLAR KWIKPEN) 100 UNIT/ML injection pen Inject 60 Units under the skin into the appropriate area as directed 2 (Two) Times a Day. 30 mL 0    • insulin lispro (humaLOG, ADMELOG) 100 UNIT/ML injection  Inject 0-9 Units under the skin into the appropriate area as directed 4 (Four) Times a Day With Meals & at Bedtime.  12    • Insulin Lispro, 1 Unit Dial, (HumaLOG KwikPen) 100 UNIT/ML solution pen-injector Inject 12 Units under the skin into the appropriate area as directed 3 (Three) Times a Day With Meals. In addition to sliding scale 15 mL 0    • Insulin Pen Needle 32G X 4 MM misc Use with insulin 5 times daily 200 each 0    • Lancets (freestyle) lancets Test 4 (Four) Times a Day After Meals & at Bedtime. 100 each 0    • lisinopril (PRINIVIL,ZESTRIL) 10 MG tablet Take 10 mg by mouth Daily.          Allergies:  No Known Allergies  Immunizations:  Immunization History   Administered Date(s) Administered   • Flulaval/Fluarix/Fluzone Quad 12/22/2020     Social History:   Social History     Social History Narrative   • Not on file     Social History     Socioeconomic History   • Marital status:      Spouse name: Not on file   • Number of children: Not on file   • Years of education: Not on file   • Highest education level: Not on file   Tobacco Use   • Smoking status: Never Smoker   Substance and Sexual Activity   • Alcohol use: Never   • Drug use: Never       Family History:  Family History   Family history unknown: Yes        Review of Systems  See history of present illness and past medical history.  Patient denies headache, dizziness, syncope, falls, trauma, change in vision, change in hearing, change in taste, changes in weight, changes in appetite, focal weakness, numbness, or paresthesia.  Patient denies  sinus pressure, rhinorrhea, epistaxis, hemoptysis, nausea, vomiting,hematemesis, diarrhea, constipation or hematchezia.  Denies cold or heat intolerance, polydipsia, polyuria, polyphagia. Denies hematuria, pyuria, dysuria, hesitancy, frequency or urgency. Denies consumption of raw and under cooked meats foods or change in water source.  Denies fever, chills, sweats, night sweats.  Denies missing any  routine medications. Remainder of ROS is negative.    Objective:  T Max 24 hrs: Temp (24hrs), Av.8 °F (36.6 °C), Min:97.8 °F (36.6 °C), Max:97.8 °F (36.6 °C)    Vitals Ranges:   Temp:  [97.8 °F (36.6 °C)] 97.8 °F (36.6 °C)  Heart Rate:  [71-98] 84  Resp:  [16] 16  BP: (143-184)/() 143/86      Exam:  /86   Pulse 84   Temp 97.8 °F (36.6 °C) (Tympanic)   Resp 16   Wt 126 kg (278 lb)   SpO2 94%   BMI 44.89 kg/m²     General Appearance:    Alert, cooperative, no distress, appears stated age   Head:    Normocephalic, without obvious abnormality, atraumatic   Eyes:    PERRL, conjunctivae/corneas clear, EOM's intact, both eyes   Ears:    Normal external ear canals, both ears   Nose:   Nares normal, septum midline, mucosa normal, no drainage    or sinus tenderness   Throat:   Lips, mucosa, and tongue normal   Neck:   Supple, symmetrical, trachea midline, no adenopathy;     thyroid:  no enlargement/tenderness/nodules; no carotid    bruit or JVD   Back:     Symmetric, no curvature, ROM normal, no CVA tenderness   Lungs:     Decreased breath sounds bilaterally, respirations unlabored   Chest Wall:    No tenderness or deformity    Heart:    Regular rate and rhythm, S1 and S2 normal, no murmur, rub   or gallop   Abdomen:     Soft, nontender, bowel sounds active all four quadrants,     no masses, no hepatomegaly, no splenomegaly   Extremities:   Extremities normal, atraumatic, no cyanosis or edema   Pulses:   2+ and symmetric all extremities   Skin:   Skin color, texture, turgor normal, no rashes or lesions   Lymph nodes:   Cervical, supraclavicular, and axillary nodes normal   Neurologic:   CNII-XII intact, normal strength, sensation intact throughout      .    Data Review:  Labs in chart were reviewed.  WBC   Date Value Ref Range Status   2021 9.86 3.40 - 10.80 10*3/mm3 Final     Hemoglobin   Date Value Ref Range Status   2021 14.3 13.0 - 17.7 g/dL Final     Hematocrit   Date Value Ref Range  Status   08/02/2021 42.2 37.5 - 51.0 % Final     Platelets   Date Value Ref Range Status   08/02/2021 283 140 - 450 10*3/mm3 Final     Sodium   Date Value Ref Range Status   08/02/2021 137 136 - 145 mmol/L Final     Potassium   Date Value Ref Range Status   08/02/2021 3.8 3.5 - 5.2 mmol/L Final     Chloride   Date Value Ref Range Status   08/02/2021 103 98 - 107 mmol/L Final     CO2   Date Value Ref Range Status   08/02/2021 27.2 22.0 - 29.0 mmol/L Final     BUN   Date Value Ref Range Status   08/02/2021 23 (H) 6 - 20 mg/dL Final     Creatinine   Date Value Ref Range Status   08/02/2021 1.63 (H) 0.76 - 1.27 mg/dL Final     Glucose   Date Value Ref Range Status   08/02/2021 261 (H) 65 - 99 mg/dL Final     Calcium   Date Value Ref Range Status   08/02/2021 8.1 (L) 8.6 - 10.5 mg/dL Final     AST (SGOT)   Date Value Ref Range Status   08/02/2021 16 1 - 40 U/L Final     ALT (SGPT)   Date Value Ref Range Status   08/02/2021 28 1 - 41 U/L Final     Alkaline Phosphatase   Date Value Ref Range Status   08/02/2021 93 39 - 117 U/L Final     No results found for: APTT, INR             Imaging Results (All)     Procedure Component Value Units Date/Time    CT Angiogram Chest [[id number]] Collected: 08/02/21 1854     Updated: 08/02/21 1906    Narrative:      CT ANGIOGRAM CHEST-     CLINICAL HISTORY: Elevated d-dimer. Suspect COVID 19 infection.     TECHNIQUE: Spiral CT images were obtained through the chest during rapid  IV injection of contrast and were reconstructed in 2 mm thick axial  slices.     Radiation dose reduction techniques were utilized, including automated  exposure control and exposure modulation based on body size.     COMPARISON: None     FINDINGS: Lung window images demonstrate extensive bilateral groundglass  infiltrate compatible with COVID pneumonia. Only portions of the right  middle and lower lobes are spared. The main pulmonary arteries and their  lobar and segmental branches are fairly well opacified, and  demonstrate  no filling defects. There is no CT evidence of acute pulmonary  thromboemboli embolism. The heart is moderately enlarged. Injected  contrast material fills primarily the right atrium and right ventricle  and refluxes into the IVC and hepatic veins indicating significant  cardiac dysfunction. This raises the possibility that some of the  pulmonary opacities may be due to pulmonary edema. There is a  moderate-sized posteriorly layering right pleural effusion. A small  posteriorly layering left pleural effusion is also present.        Impression:      Extensive bilateral pneumonia and bilateral pleural  effusions, greater in size on the right than the left. Moderate  cardiomegaly and evidence of heart failure as noted. There is no CT  evidence of acute pulmonary thromboembolism.     This report was finalized on 8/2/2021 7:03 PM by Dr. Christian Palacios M.D.       XR Chest 2 View [[id number]] Collected: 08/02/21 1605     Updated: 08/02/21 1809    Narrative:      TWO-VIEW CHEST     HISTORY: Chest pain. Previous Covid 19 pneumonia.     FINDINGS: There is extensive pneumonia scattered in both lungs and  particularly involves the right upper lobe and this appearance is quite  similar to an exam dating back to 12/28/2020. The findings may relate to  persistent changes of Covid 19 pneumonia. There may also be a component  of superimposed congestive heart failure. The heart remains enlarged  with sternal wires from previous cardiac surgery.     This report was finalized on 8/2/2021 6:06 PM by Dr. Rustam Jones M.D.           Patient Active Problem List   Diagnosis Code   • Pneumonia of both lungs due to infectious organism J18.9   • Acute respiratory failure with hypoxia (CMS/[facility]) J96.01   • COVID-19 virus infection U07.1   • Thrombocytopenia (CMS/[facility]) D69.6   • Pneumonia due to COVID-19 virus U07.1, J12.82   • Type 2 diabetes mellitus, with long-term current use of insulin (CMS/[facility]) E11.9, Z79.4   • Viral URI with  cough J06.9       Assessment:    Viral URI with cough  acute hypoxic respiratory failure  Morbid obesity  Acute chf  Diabetes  Hypertension  ckd3  Right pleural effusion    Plan:  Check echo  Ask pulmonary to see him  Start antibiotics for suspected pneumonia  Wean oxygen as tolerated  Patient has chf and probably untreated sleep apnea resulting in cor pulmonale  Monitor on telemetry      Jennyfer Simons MD  8/2/2021  20:28 EDT

## 2021-08-04 ENCOUNTER — APPOINTMENT (OUTPATIENT)
Dept: CARDIOLOGY | Facility: HOSPITAL | Age: 36
End: 2021-08-04

## 2021-08-04 LAB
ALBUMIN SERPL-MCNC: 2.5 G/DL (ref 3.5–5.2)
ALBUMIN/GLOB SERPL: 0.7 G/DL
ALP SERPL-CCNC: 87 U/L (ref 39–117)
ALT SERPL W P-5'-P-CCNC: 22 U/L (ref 1–41)
ANION GAP SERPL CALCULATED.3IONS-SCNC: 7.3 MMOL/L (ref 5–15)
AORTIC DIMENSIONLESS INDEX: 0.7 (DI)
AST SERPL-CCNC: 26 U/L (ref 1–40)
BH CV ECHO MEAS - AO ARCH DIAM (PROXIMAL TRANS.): 3 CM
BH CV ECHO MEAS - AO MAX PG (FULL): 3.6 MMHG
BH CV ECHO MEAS - AO MAX PG: 5 MMHG
BH CV ECHO MEAS - AO MEAN PG (FULL): 1 MMHG
BH CV ECHO MEAS - AO MEAN PG: 2 MMHG
BH CV ECHO MEAS - AO ROOT AREA (BSA CORRECTED): 1.1
BH CV ECHO MEAS - AO ROOT AREA: 5.3 CM^2
BH CV ECHO MEAS - AO ROOT DIAM: 2.6 CM
BH CV ECHO MEAS - AO V2 MAX: 112 CM/SEC
BH CV ECHO MEAS - AO V2 MEAN: 70.7 CM/SEC
BH CV ECHO MEAS - AO V2 VTI: 17.3 CM
BH CV ECHO MEAS - AVA(I,A): 2.7 CM^2
BH CV ECHO MEAS - AVA(I,D): 2.7 CM^2
BH CV ECHO MEAS - AVA(V,A): 2 CM^2
BH CV ECHO MEAS - AVA(V,D): 2 CM^2
BH CV ECHO MEAS - BSA(HAYCOCK): 2.5 M^2
BH CV ECHO MEAS - BSA: 2.3 M^2
BH CV ECHO MEAS - BZI_BMI: 45.2 KILOGRAMS/M^2
BH CV ECHO MEAS - BZI_METRIC_HEIGHT: 167.6 CM
BH CV ECHO MEAS - BZI_METRIC_WEIGHT: 127 KG
BH CV ECHO MEAS - CONTRAST EF 4CH: 37 CM2
BH CV ECHO MEAS - EDV(CUBED): 262.1 ML
BH CV ECHO MEAS - EDV(MOD-SP2): 287 ML
BH CV ECHO MEAS - EDV(MOD-SP4): 282 ML
BH CV ECHO MEAS - EDV(TEICH): 208.5 ML
BH CV ECHO MEAS - EF(CUBED): 39.9 %
BH CV ECHO MEAS - EF(MOD-BP): 36.9 %
BH CV ECHO MEAS - EF(MOD-SP2): 31 %
BH CV ECHO MEAS - EF(MOD-SP4): 44.3 %
BH CV ECHO MEAS - EF(TEICH): 32.2 %
BH CV ECHO MEAS - ESV(CUBED): 157.5 ML
BH CV ECHO MEAS - ESV(MOD-SP2): 198 ML
BH CV ECHO MEAS - ESV(MOD-SP4): 157 ML
BH CV ECHO MEAS - ESV(TEICH): 141.3 ML
BH CV ECHO MEAS - FS: 15.6 %
BH CV ECHO MEAS - IVS/LVPW: 1.1
BH CV ECHO MEAS - IVSD: 1.6 CM
BH CV ECHO MEAS - LAT PEAK E' VEL: 7.5 CM/SEC
BH CV ECHO MEAS - LV DIASTOLIC VOL/BSA (35-75): 122.2 ML/M^2
BH CV ECHO MEAS - LV MASS(C)D: 495.8 GRAMS
BH CV ECHO MEAS - LV MASS(C)DI: 214.9 GRAMS/M^2
BH CV ECHO MEAS - LV MAX PG: 1.4 MMHG
BH CV ECHO MEAS - LV MEAN PG: 1 MMHG
BH CV ECHO MEAS - LV SYSTOLIC VOL/BSA (12-30): 68 ML/M^2
BH CV ECHO MEAS - LV V1 MAX: 60.1 CM/SEC
BH CV ECHO MEAS - LV V1 MEAN: 46.8 CM/SEC
BH CV ECHO MEAS - LV V1 VTI: 12.2 CM
BH CV ECHO MEAS - LVIDD: 6.4 CM
BH CV ECHO MEAS - LVIDS: 5.4 CM
BH CV ECHO MEAS - LVLD AP2: 10.7 CM
BH CV ECHO MEAS - LVLD AP4: 10.5 CM
BH CV ECHO MEAS - LVLS AP2: 10 CM
BH CV ECHO MEAS - LVLS AP4: 9.4 CM
BH CV ECHO MEAS - LVOT AREA (M): 3.8 CM^2
BH CV ECHO MEAS - LVOT AREA: 3.8 CM^2
BH CV ECHO MEAS - LVOT DIAM: 2.2 CM
BH CV ECHO MEAS - LVPWD: 1.5 CM
BH CV ECHO MEAS - MED PEAK E' VEL: 3.8 CM/SEC
BH CV ECHO MEAS - MV A DUR: 0.14 SEC
BH CV ECHO MEAS - MV A MAX VEL: 46.3 CM/SEC
BH CV ECHO MEAS - MV DEC TIME: 120 SEC
BH CV ECHO MEAS - MV E MAX VEL: 102 CM/SEC
BH CV ECHO MEAS - MV E/A: 2.2
BH CV ECHO MEAS - MV MAX PG: 8.8 MMHG
BH CV ECHO MEAS - MV MEAN PG: 3 MMHG
BH CV ECHO MEAS - MV V2 MAX: 148 CM/SEC
BH CV ECHO MEAS - MV V2 MEAN: 73.4 CM/SEC
BH CV ECHO MEAS - MV V2 VTI: 34.7 CM
BH CV ECHO MEAS - MVA(VTI): 1.3 CM^2
BH CV ECHO MEAS - PA ACC TIME: 0.11 SEC
BH CV ECHO MEAS - PA MAX PG (FULL): 1.5 MMHG
BH CV ECHO MEAS - PA MAX PG: 2.2 MMHG
BH CV ECHO MEAS - PA PR(ACCEL): 30.4 MMHG
BH CV ECHO MEAS - PA V2 MAX: 74.7 CM/SEC
BH CV ECHO MEAS - PULM A REVS DUR: 0.17 SEC
BH CV ECHO MEAS - PULM A REVS VEL: 27.2 CM/SEC
BH CV ECHO MEAS - PULM DIAS VEL: 84.5 CM/SEC
BH CV ECHO MEAS - PULM S/D: 0.43
BH CV ECHO MEAS - PULM SYS VEL: 36 CM/SEC
BH CV ECHO MEAS - PVA(V,A): 4.6 CM^2
BH CV ECHO MEAS - PVA(V,D): 4.6 CM^2
BH CV ECHO MEAS - QP/QS: 1.2
BH CV ECHO MEAS - RAP SYSTOLE: 3 MMHG
BH CV ECHO MEAS - RV MAX PG: 0.72 MMHG
BH CV ECHO MEAS - RV MEAN PG: 0 MMHG
BH CV ECHO MEAS - RV V1 MAX: 42.4 CM/SEC
BH CV ECHO MEAS - RV V1 MEAN: 25.8 CM/SEC
BH CV ECHO MEAS - RV V1 VTI: 6.9 CM
BH CV ECHO MEAS - RVOT AREA: 8 CM^2
BH CV ECHO MEAS - RVOT DIAM: 3.2 CM
BH CV ECHO MEAS - SI(AO): 39.8 ML/M^2
BH CV ECHO MEAS - SI(CUBED): 45.4 ML/M^2
BH CV ECHO MEAS - SI(LVOT): 20.1 ML/M^2
BH CV ECHO MEAS - SI(MOD-SP2): 38.6 ML/M^2
BH CV ECHO MEAS - SI(MOD-SP4): 54.2 ML/M^2
BH CV ECHO MEAS - SI(TEICH): 29.1 ML/M^2
BH CV ECHO MEAS - SV(AO): 91.9 ML
BH CV ECHO MEAS - SV(CUBED): 104.7 ML
BH CV ECHO MEAS - SV(LVOT): 46.4 ML
BH CV ECHO MEAS - SV(MOD-SP2): 89 ML
BH CV ECHO MEAS - SV(MOD-SP4): 125 ML
BH CV ECHO MEAS - SV(RVOT): 55.2 ML
BH CV ECHO MEAS - SV(TEICH): 67.2 ML
BH CV ECHO MEAS - TAPSE (>1.6): 1.4 CM
BH CV ECHO MEASUREMENTS AVERAGE E/E' RATIO: 18.05
BH CV VAS BP RIGHT ARM: NORMAL MMHG
BH CV XLRA - RV BASE: 3.9 CM
BH CV XLRA - TDI S': 5 CM/SEC
BILIRUB SERPL-MCNC: 0.4 MG/DL (ref 0–1.2)
BUN SERPL-MCNC: 27 MG/DL (ref 6–20)
BUN/CREAT SERPL: 21.3 (ref 7–25)
CALCIUM SPEC-SCNC: 8.1 MG/DL (ref 8.6–10.5)
CHLORIDE SERPL-SCNC: 101 MMOL/L (ref 98–107)
CO2 SERPL-SCNC: 29.7 MMOL/L (ref 22–29)
CREAT SERPL-MCNC: 1.27 MG/DL (ref 0.76–1.27)
DEPRECATED RDW RBC AUTO: 42.7 FL (ref 37–54)
ERYTHROCYTE [DISTWIDTH] IN BLOOD BY AUTOMATED COUNT: 14 % (ref 12.3–15.4)
GFR SERPL CREATININE-BSD FRML MDRD: 64 ML/MIN/1.73
GLOBULIN UR ELPH-MCNC: 3.4 GM/DL
GLUCOSE BLDC GLUCOMTR-MCNC: 117 MG/DL (ref 70–130)
GLUCOSE BLDC GLUCOMTR-MCNC: 129 MG/DL (ref 70–130)
GLUCOSE BLDC GLUCOMTR-MCNC: 129 MG/DL (ref 70–130)
GLUCOSE BLDC GLUCOMTR-MCNC: 199 MG/DL (ref 70–130)
GLUCOSE SERPL-MCNC: 165 MG/DL (ref 65–99)
HCT VFR BLD AUTO: 45.4 % (ref 37.5–51)
HGB BLD-MCNC: 14.5 G/DL (ref 13–17.7)
LEFT ATRIUM VOLUME INDEX: 36 ML/M2
LV EF 2D ECHO EST: 37 %
MAXIMAL PREDICTED HEART RATE: 184 BPM
MCH RBC QN AUTO: 27.3 PG (ref 26.6–33)
MCHC RBC AUTO-ENTMCNC: 31.9 G/DL (ref 31.5–35.7)
MCV RBC AUTO: 85.3 FL (ref 79–97)
NT-PROBNP SERPL-MCNC: 802.9 PG/ML (ref 0–450)
PLATELET # BLD AUTO: 256 10*3/MM3 (ref 140–450)
PMV BLD AUTO: 11.2 FL (ref 6–12)
POTASSIUM SERPL-SCNC: 4.2 MMOL/L (ref 3.5–5.2)
PROT SERPL-MCNC: 5.9 G/DL (ref 6–8.5)
RBC # BLD AUTO: 5.32 10*6/MM3 (ref 4.14–5.8)
SODIUM SERPL-SCNC: 138 MMOL/L (ref 136–145)
STRESS TARGET HR: 156 BPM
URATE SERPL-MCNC: 5.4 MG/DL (ref 3.4–7)
WBC # BLD AUTO: 8.27 10*3/MM3 (ref 3.4–10.8)

## 2021-08-04 PROCEDURE — 93306 TTE W/DOPPLER COMPLETE: CPT | Performed by: INTERNAL MEDICINE

## 2021-08-04 PROCEDURE — 25010000002 FUROSEMIDE PER 20 MG: Performed by: INTERNAL MEDICINE

## 2021-08-04 PROCEDURE — 25010000002 PERFLUTREN (DEFINITY) 8.476 MG IN SODIUM CHLORIDE (PF) 0.9 % 10 ML INJECTION: Performed by: INTERNAL MEDICINE

## 2021-08-04 PROCEDURE — 82962 GLUCOSE BLOOD TEST: CPT

## 2021-08-04 PROCEDURE — 99232 SBSQ HOSP IP/OBS MODERATE 35: CPT | Performed by: INTERNAL MEDICINE

## 2021-08-04 PROCEDURE — 85027 COMPLETE CBC AUTOMATED: CPT | Performed by: NURSE PRACTITIONER

## 2021-08-04 PROCEDURE — 25010000002 ENOXAPARIN PER 10 MG: Performed by: INTERNAL MEDICINE

## 2021-08-04 PROCEDURE — 94799 UNLISTED PULMONARY SVC/PX: CPT

## 2021-08-04 PROCEDURE — 83880 ASSAY OF NATRIURETIC PEPTIDE: CPT | Performed by: NURSE PRACTITIONER

## 2021-08-04 PROCEDURE — 84550 ASSAY OF BLOOD/URIC ACID: CPT | Performed by: NURSE PRACTITIONER

## 2021-08-04 PROCEDURE — 80053 COMPREHEN METABOLIC PANEL: CPT | Performed by: NURSE PRACTITIONER

## 2021-08-04 PROCEDURE — 93306 TTE W/DOPPLER COMPLETE: CPT

## 2021-08-04 PROCEDURE — 63710000001 INSULIN LISPRO (HUMAN) PER 5 UNITS: Performed by: INTERNAL MEDICINE

## 2021-08-04 RX ORDER — LISINOPRIL 10 MG/1
10 TABLET ORAL ONCE
Status: COMPLETED | OUTPATIENT
Start: 2021-08-04 | End: 2021-08-04

## 2021-08-04 RX ORDER — LISINOPRIL 20 MG/1
20 TABLET ORAL
Status: DISCONTINUED | OUTPATIENT
Start: 2021-08-05 | End: 2021-08-05

## 2021-08-04 RX ADMIN — INSULIN LISPRO 12 UNITS: 100 INJECTION, SOLUTION INTRAVENOUS; SUBCUTANEOUS at 09:18

## 2021-08-04 RX ADMIN — INSULIN LISPRO 12 UNITS: 100 INJECTION, SOLUTION INTRAVENOUS; SUBCUTANEOUS at 12:18

## 2021-08-04 RX ADMIN — LISINOPRIL 10 MG: 10 TABLET ORAL at 09:18

## 2021-08-04 RX ADMIN — BUDESONIDE AND FORMOTEROL FUMARATE DIHYDRATE 2 PUFF: 160; 4.5 AEROSOL RESPIRATORY (INHALATION) at 08:10

## 2021-08-04 RX ADMIN — ATORVASTATIN CALCIUM 80 MG: 80 TABLET, FILM COATED ORAL at 09:18

## 2021-08-04 RX ADMIN — ENOXAPARIN SODIUM 40 MG: 40 INJECTION SUBCUTANEOUS at 09:17

## 2021-08-04 RX ADMIN — CARVEDILOL 6.25 MG: 6.25 TABLET, FILM COATED ORAL at 09:18

## 2021-08-04 RX ADMIN — PERFLUTREN 2 ML: 6.52 INJECTION, SUSPENSION INTRAVENOUS at 07:34

## 2021-08-04 RX ADMIN — ASPIRIN 81 MG: 81 TABLET, CHEWABLE ORAL at 09:18

## 2021-08-04 RX ADMIN — ENOXAPARIN SODIUM 40 MG: 40 INJECTION SUBCUTANEOUS at 20:36

## 2021-08-04 RX ADMIN — CARVEDILOL 6.25 MG: 6.25 TABLET, FILM COATED ORAL at 17:39

## 2021-08-04 RX ADMIN — LISINOPRIL 10 MG: 10 TABLET ORAL at 12:19

## 2021-08-04 RX ADMIN — FUROSEMIDE 40 MG: 10 INJECTION, SOLUTION INTRAMUSCULAR; INTRAVENOUS at 09:17

## 2021-08-04 RX ADMIN — INSULIN LISPRO 12 UNITS: 100 INJECTION, SOLUTION INTRAVENOUS; SUBCUTANEOUS at 17:39

## 2021-08-04 RX ADMIN — FUROSEMIDE 40 MG: 10 INJECTION, SOLUTION INTRAMUSCULAR; INTRAVENOUS at 20:36

## 2021-08-04 NOTE — PLAN OF CARE
Problem: Adult Inpatient Plan of Care  Goal: Plan of Care Review  Outcome: Ongoing, Progressing  Flowsheets  Taken 8/4/2021 0617 by Tanika Thompson, RN  Progress: improving  Outcome Summary: Patient had no complaints overnight. Remains on 2L NC. No respiratory distress. IV antibotics continued. A&Ox4. VSS. Will continue to monitor.  Taken 8/3/2021 0449 by Mandi Pittman, RN  Plan of Care Reviewed With: patient

## 2021-08-04 NOTE — PROGRESS NOTES
[facility]  Progress note: 2021    Patient Identification:  Name:Rufus Dorsey  Age:36 y.o.  Sex: male  :  1985  MRN: [id number]           CC:  Congestive heart failure    Interval history:  Clinically improved and his blood pressure remains elevated.    Vital Signs:   Temp:  [96.7 °F (35.9 °C)-98.3 °F (36.8 °C)] 98.3 °F (36.8 °C)  Heart Rate:  [68-79] 79  Resp:  [18-22] 18  BP: (145-159)/(89-97) 159/89    Intake/Output Summary (Last 24 hours) at 2021 1040  Last data filed at 8/3/2021 1700  Gross per 24 hour   Intake 720 ml   Output 500 ml   Net 220 ml       Physical Examination:    General Appearance No acute distress   Neck No adenopathy, supple, trachea midline, no thyromegaly, no carotid bruit   Lungs  few bibasilar rales, left greater than right.  Respirations regular, even and unlabored   Heart Regular rhythm and normal rate, normal S1 and S2, no murmur, no gallop, no rub, no click   Chest wall No abnormalities observed   Abdomen Normal bowel sounds, no masses, no hepatomegaly, soft   Extremities Moves all extremities well, no edema, no cyanosis, no redness   Neurological Alert and oriented x 3     Lab Review:  Personally reviewed the labs, radiology imaging and other cardiac procedures.     Results from last 7 days   Lab Units 21  2224 21  1708 21  1612   TROPONIN T ng/mL 0.050* 0.035* 0.068*     Results from last 7 days   Lab Units 21  0929 21  0503 21  1612   WBC 10*3/mm3 8.27 8.97 9.86   HEMOGLOBIN g/dL 14.5 14.8 14.3   HEMATOCRIT % 45.4 46.2 42.2   PLATELETS 10*3/mm3 256 285 283         Medication Review:   Meds reviewed  Scheduled Meds:aspirin, 81 mg, Oral, Daily  atorvastatin, 80 mg, Oral, Daily  budesonide-formoterol, 2 puff, Inhalation, BID - RT  carvedilol, 6.25 mg, Oral, BID With Meals  cefTRIAXone, 1 g, Intravenous, Q24H  enoxaparin, 40 mg, Subcutaneous, Q12H  furosemide, 40 mg, Intravenous, Q12H  insulin glargine, 60 Units,  Subcutaneous, BID  insulin lispro, 0-9 Units, Subcutaneous, TID With Meals  insulin lispro, 12 Units, Subcutaneous, TID With Meals  lisinopril, 10 mg, Oral, Daily      Continuous Infusions:Pharmacy to Dose enoxaparin (LOVENOX),       I personally viewed and interpreted the patient's EKG/Telemetry data    Assessment and Plan  1.  Congestive heart failure with cardiomegaly and nonischemic cardiomyopathy.  Limited outside records.  Echo today shows an ejection fraction of 39% with global hypokinesis.  2.  Elevated troponin with atypical pleuritic chest pain.    Elevated troponin likely demand related with heart failure.  Chest pain has resolved.  Check an exercise cardiac stress test once pulmonary status and hypoxia improves.  Can consider for tomorrow but will reassess in a.m.  3.  Respiratory failure with markedly abnormal chest x-ray and CT findings suggestive of ongoing pneumonic process as well as heart failure.  He had COVID-19 in December.  Covid screen is negative x2.  Isolation has been lifted.  Pulmonary service feels this is mostly due to heart failure  4.  Hypertension.  Will increase lisinopril to 20 mg a day.  5.  Hyperlipidemia  6.  Diabetes  7.  RV dysfunction.  Agree with outpatient sleep study.      Mini Menon  8/4/202110:40 EDT  24min spent in reviewing records, discussion and examination of the patient and discussion with other members of the patient's medical team.     Dictated utilizing Dragon dictation

## 2021-08-04 NOTE — PROGRESS NOTES
Name: Rufus Dorsey ADMIT: 2021   : 1985  PCP: Provider, No Known    MRN: [id number] LOS: 2 days   AGE/SEX: 36 y.o. male  ROOM: [facility]     Subjective   Subjective      He is feeling much better.  Denies shortness of breath and is walking around the room.  Denies chest pain.  Leg swelling also decreasing      Review of Systems he denies nausea, vomiting, fever, chills, diarrhea, rash, LUTS     Objective   Objective   Vital Signs  Temp:  [96.7 °F (35.9 °C)-98.3 °F (36.8 °C)] 98.3 °F (36.8 °C)  Heart Rate:  [68-79] 79  Resp:  [18-22] 18  BP: (145-159)/(89-97) 159/89  SpO2:  [92 %-97 %] 94 %  on  Flow (L/min):  [1-2] 2;   Device (Oxygen Therapy): nasal cannula  Body mass index is 45.19 kg/m².  Physical Exam  Vitals reviewed.   Constitutional:       General: He is not in acute distress.     Appearance: He is well-developed. He is obese. He is not toxic-appearing.   HENT:      Head: Normocephalic and atraumatic.   Cardiovascular:      Rate and Rhythm: Normal rate and regular rhythm.   Pulmonary:      Effort: Pulmonary effort is normal. No respiratory distress.      Breath sounds: Rales (bilateral bases, improved) present. No wheezing.   Abdominal:      General: Bowel sounds are normal. There is no distension.      Palpations: Abdomen is soft. There is no mass.      Tenderness: There is no abdominal tenderness.      Hernia: No hernia is present.   Musculoskeletal:      Right lower leg: Edema present.      Left lower leg: Edema present.   Skin:     General: Skin is warm and dry.   Neurological:      General: No focal deficit present.      Mental Status: He is alert and oriented to person, place, and time.   Psychiatric:         Mood and Affect: Mood normal.         Behavior: Behavior normal.         Thought Content: Thought content normal.         Results Review     I reviewed the patient's new clinical results.  Results from last 7 days   Lab Units 21  0929 21  0503 21  1612   WBC  10*3/mm3 8.27 8.97 9.86   HEMOGLOBIN g/dL 14.5 14.8 14.3   PLATELETS 10*3/mm3 256 285 283     Results from last 7 days   Lab Units 08/04/21  0929 08/03/21  0503 08/02/21  1612   SODIUM mmol/L 138 136 137   POTASSIUM mmol/L 4.2 3.7 3.8   CHLORIDE mmol/L 101 102 103   CO2 mmol/L 29.7* 23.1 27.2   BUN mg/dL 27* 25* 23*   CREATININE mg/dL 1.27 1.25 1.63*   GLUCOSE mg/dL 165* 233* 261*   Estimated Creatinine Clearance: 101.3 mL/min (by C-G formula based on SCr of 1.27 mg/dL).  Results from last 7 days   Lab Units 08/04/21  0929 08/02/21  1612   ALBUMIN g/dL 2.50* 2.90*   BILIRUBIN mg/dL 0.4 0.6   ALK PHOS U/L 87 93   AST (SGOT) U/L 26 16   ALT (SGPT) U/L 22 28     Results from last 7 days   Lab Units 08/04/21  0929 08/03/21  0503 08/02/21  1612   CALCIUM mg/dL 8.1* 7.7* 8.1*   ALBUMIN g/dL 2.50*  --  2.90*     Results from last 7 days   Lab Units 08/02/21  1725 08/02/21  1708   PROCALCITONIN ng/mL  --  0.15   LACTATE mmol/L 1.1  --      COVID19   Date Value Ref Range Status   08/03/2021 Not Detected Not Detected - Ref. Range Final   08/02/2021 Not Detected Not Detected - Ref. Range Final     Hemoglobin A1C   Date/Time Value Ref Range Status   08/03/2021 0503 9.07 (H) 4.80 - 5.60 % Final     Glucose   Date/Time Value Ref Range Status   08/04/2021 1137 129 70 - 130 mg/dL Final     Comment:     Meter: [id number] : [id number] Donnie Wilson NA   08/04/2021 0556 129 70 - 130 mg/dL Final     Comment:     Meter: [id number] : [id number] Amado Vann NA   08/03/2021 2026 122 70 - 130 mg/dL Final     Comment:     Meter: [id number] : [id number] Amado Vann TASHIA   08/03/2021 1619 86 70 - 130 mg/dL Final     Comment:     Meter: [id number] : [id number] Ellie Sumeet TASHIA   08/03/2021 1134 238 (H) 70 - 130 mg/dL Final     Comment:     Meter: [id number] : [id number] Ellie Sumeet TASHIA   08/03/2021 0608 219 (H) 70 - 130 mg/dL Final     Comment:     RN Notified R and V Meter: [id number] : [id number] Sergio LAO        Adult Transthoracic Echo Complete W/ Cont if Necessary Per Protocol  · Calculated left ventricular EF = 36.9% Estimated left ventricular EF =   37% Estimated left ventricular EF was in agreement with the calculated   left ventricular EF. Left ventricular systolic function is moderately   decreased. Normal left ventricular wall thickness noted. The left   ventricular cavity is severely dilated. There is left ventricular global   hypokinesis noted. Left ventricular diastolic dysfunction is noted. There   is at least Grade 3 diastolic dysfunction No evidence of a left   ventricular thrombus present.  · The right ventricular cavity is mildly dilated. Moderately reduced right   ventricular systolic function noted.  · Left atrial volume is moderately increased. The right atrial cavity is   mildly dilated  · Trace aortic valve regurgitation is present.       Scheduled Medications  aspirin, 81 mg, Oral, Daily  atorvastatin, 80 mg, Oral, Daily  budesonide-formoterol, 2 puff, Inhalation, BID - RT  carvedilol, 6.25 mg, Oral, BID With Meals  cefTRIAXone, 1 g, Intravenous, Q24H  enoxaparin, 40 mg, Subcutaneous, Q12H  furosemide, 40 mg, Intravenous, Q12H  insulin glargine, 60 Units, Subcutaneous, BID  insulin lispro, 0-9 Units, Subcutaneous, TID With Meals  insulin lispro, 12 Units, Subcutaneous, TID With Meals  [START ON 8/5/2021] lisinopril, 20 mg, Oral, Q24H    Infusions  Pharmacy to Dose enoxaparin (LOVENOX),     Diet  Diet Regular; Cardiac  NPO Diet  NPO Diet       Assessment/Plan     Active Hospital Problems    Diagnosis  POA   • **Bilateral pulmonary infiltrates on chest x-ray [R91.8]  Yes   • Class 3 severe obesity in adult (CMS/HCC) [E66.01]  Yes   • Acute pulmonary edema (CMS/HCC) [J81.0]  Yes   • Bilateral pleural effusion [J90]  Yes   • Diastolic CHF, acute (CMS/HCC) [I50.31]  Yes   • Pulmonary HTN (CMS/HCC) [I27.20]  Yes   • History of COVID-19 [Z86.16]  Yes   • Coronary artery disease [I25.10]  Yes   •  Hypertension [I10]  Yes   • Viral URI with cough [J06.9]  Yes   • Type 2 diabetes mellitus, with long-term current use of insulin (CMS/[facility]) [E11.9, Z79.4]  Not Applicable   • Ischemic cardiomyopathy [I25.5]  Yes      Resolved Hospital Problems   No resolved problems to display.       36 y.o. male admitted with Bilateral pulmonary infiltrates on chest x-ray.  CXR with extensive pneumonia both lungs. Elevated troponin and ddimer. CTA chest with no PE but enlarged pulmonary artery and bilateral pneumonia with bilateral pleural effusions. Cardiomegaly present. Patient was admitted December 2020 with severe COVID-19 pneumonia with hypoxia.    Acute hy[oxi c resp failure   multifactoral  with CHF and fibrotic changes from prior COVID  Acute on chronic CHF, cardiomyopathy, elevated troponin   · COVID-19 negative and RVP negative. No indication for abx   ·  pulmonary and cardiology following.  · Continue Lasix IV 40 mg daily. Defer diuretic  to cardiology. IO and daily weights.  · Echocardiogram above. EF 17%   · Elevated troponin secondary to demand. Stress test tmrw 8/5     DM2 with hyperglycemia   · Patient not taking any medication since discharge December 2020.  · A1c 9, consult DM educator   · started on the insulin regimen that was ordered at [facility] over the winter   · Glucose acceptable     HTN  · BP elevated 147/90 but better than admission (117/118)  monitor with diuresis. Continue carvedilol and lisinopril    Obesity-complicating all aspects of care    · Lovenox 40 mg SC daily for DVT prophylaxis.  · Full code.  · Discussed with patient, nursing staff and Dr. Cortes .  · Anticipate discharge TBD    Greater than 50% of time spent in counseling and/or coordination of care. Total face/floor time 35 minutes.    JESSICA Liu  [facility]  08/04/21  12:56 EDT

## 2021-08-04 NOTE — PLAN OF CARE
Goal Outcome Evaluation:      Patient A/O x4, on RA, VSS. No complaints noted this shift. Extra dose of lisinopril given per orders. Patient to be NPO at midnight for possible stress test per cardiology. WCTM.

## 2021-08-04 NOTE — PROGRESS NOTES
"                                              LOS: 2 days   Patient Care Team:  Provider, No Known as PCP - General    Chief Complaint:  F/up respiratory failure, pulmonary infiltrates    Subjective   Interval History  Reported mild dry cough.  Dyspnea improved.  No chest pain    REVIEW OF SYSTEMS:   CARDIOVASCULAR: No chest pain, chest pressure or chest discomfort. No palpitations but edema.   Constitutional: No fever or chills    Ventilator/Non-Invasive Ventilation Settings (From admission, onward)    None                Physical Exam:     Vital Signs  Temp:  [96.7 °F (35.9 °C)-98.3 °F (36.8 °C)] 98.3 °F (36.8 °C)  Heart Rate:  [68-79] 79  Resp:  [18-22] 18  BP: (145-159)/(89-97) 159/89    Intake/Output Summary (Last 24 hours) at 8/4/2021 1130  Last data filed at 8/3/2021 1700  Gross per 24 hour   Intake 720 ml   Output 500 ml   Net 220 ml     Flowsheet Rows      First Filed Value   Admission Height  167.6 cm (66\") Documented at 08/04/2021 0650   Admission Weight  126 kg (278 lb) Documented at 08/02/2021 1627          PPE used per hospital policy    General Appearance:   Alert, cooperative, in no acute distress   ENMT:  Mallampati score 4, moist mucous membrane   Eyes:  Pupils equal and reactive to light. EOMI   Neck:   Large. Trachea midline. No thyromegaly.   Lungs:    Diminished breath sounds throughout.  Equal air entry bilaterally.  Bibasilar crackles, up to third of the chest.    Heart:   Regular rhythm and normal rate, normal S1 and S2, no         murmur   Skin:   No rash   Abdomen:    Obese. Soft. No tenderness. No HSM.   Neuro:  Conscious, alert, oriented x3. Strength 5/5 in upper and lower  ext   Extremities:  No cyanosis, clubbing but edema.  Warm extremities and well-perfused          Results Review:        Results from last 7 days   Lab Units 08/04/21  0929 08/03/21  0503 08/03/21  0503 08/02/21  1612 08/02/21  1612   SODIUM mmol/L 138  --  136  --  137   POTASSIUM mmol/L 4.2  --  3.7  --  3.8 "   CHLORIDE mmol/L 101  --  102  --  103   CO2 mmol/L 29.7*  --  23.1  --  27.2   BUN mg/dL 27*  --  25*  --  23*   CREATININE mg/dL 1.27  --  1.25  --  1.63*   GLUCOSE mg/dL 165*   < > 233*   < > 261*   CALCIUM mg/dL 8.1*  --  7.7*  --  8.1*    < > = values in this interval not displayed.     Results from last 7 days   Lab Units 08/02/21  2224 08/02/21  1708 08/02/21  1612   TROPONIN T ng/mL 0.050* 0.035* 0.068*     Results from last 7 days   Lab Units 08/04/21  0929 08/03/21  0503 08/02/21  1612   WBC 10*3/mm3 8.27 8.97 9.86   HEMOGLOBIN g/dL 14.5 14.8 14.3   HEMATOCRIT % 45.4 46.2 42.2   PLATELETS 10*3/mm3 256 285 283         Results from last 7 days   Lab Units 08/04/21  0929   PROBNP pg/mL 802.9*       I reviewed the patient's new clinical results.        Medication Review:   aspirin, 81 mg, Oral, Daily  atorvastatin, 80 mg, Oral, Daily  budesonide-formoterol, 2 puff, Inhalation, BID - RT  carvedilol, 6.25 mg, Oral, BID With Meals  cefTRIAXone, 1 g, Intravenous, Q24H  enoxaparin, 40 mg, Subcutaneous, Q12H  furosemide, 40 mg, Intravenous, Q12H  insulin glargine, 60 Units, Subcutaneous, BID  insulin lispro, 0-9 Units, Subcutaneous, TID With Meals  insulin lispro, 12 Units, Subcutaneous, TID With Meals  lisinopril, 10 mg, Oral, Once  [START ON 8/5/2021] lisinopril, 20 mg, Oral, Q24H        Pharmacy to Dose enoxaparin (LOVENOX),         Assessment   1. Acute pulmonary edema  2. Bilateral pleural effusion  3. Acute hypoxic respiratory failure  4. Acute congestive heart failure, unspecified whether systolic or diastolic  5. Bilateral pulmonary infiltrates: Mostly groundglass secondary to pulmonary edema but could have some fibrotic changes from prior Covid (fibrosis is not clear on current CT testing)  6. Dilated pulmonary artery on CT chest suggestive of pulmonary hypertension  7. History of severe COVID-19 pneumonia 12/21/2020, requiring high flow oxygen therapy  8. Morbid obesity (BMI 45)      Plan     · Echo  pending.  · Continue diuresis.  · Can discontinue inhalers and antibiotics if being prescribed for his lungs.  No evidence of bacterial pneumonia.  RVP negative and Covid negative times 2 and therefore viral pneumonia ruled out  · Outpatient evaluation for sleep apnea  · Oxygen by nasal cannula titrate keep SPO2 >92%  · Lovenox for DVT prophylaxis      Diamante Yeager MD  08/04/21  11:30 EDT          This note was dictated utilizing [facility] [employer] dictation

## 2021-08-05 LAB
ANION GAP SERPL CALCULATED.3IONS-SCNC: 8 MMOL/L (ref 5–15)
BUN SERPL-MCNC: 30 MG/DL (ref 6–20)
BUN/CREAT SERPL: 19.5 (ref 7–25)
CALCIUM SPEC-SCNC: 8.1 MG/DL (ref 8.6–10.5)
CHLORIDE SERPL-SCNC: 100 MMOL/L (ref 98–107)
CO2 SERPL-SCNC: 30 MMOL/L (ref 22–29)
CREAT SERPL-MCNC: 1.54 MG/DL (ref 0.76–1.27)
GFR SERPL CREATININE-BSD FRML MDRD: 51 ML/MIN/1.73
GLUCOSE BLDC GLUCOMTR-MCNC: 149 MG/DL (ref 70–130)
GLUCOSE BLDC GLUCOMTR-MCNC: 159 MG/DL (ref 70–130)
GLUCOSE BLDC GLUCOMTR-MCNC: 184 MG/DL (ref 70–130)
GLUCOSE BLDC GLUCOMTR-MCNC: 200 MG/DL (ref 70–130)
GLUCOSE SERPL-MCNC: 175 MG/DL (ref 65–99)
POTASSIUM SERPL-SCNC: 3.7 MMOL/L (ref 3.5–5.2)
SODIUM SERPL-SCNC: 138 MMOL/L (ref 136–145)

## 2021-08-05 PROCEDURE — 82962 GLUCOSE BLOOD TEST: CPT

## 2021-08-05 PROCEDURE — 25010000002 ENOXAPARIN PER 10 MG: Performed by: INTERNAL MEDICINE

## 2021-08-05 PROCEDURE — 63710000001 INSULIN LISPRO (HUMAN) PER 5 UNITS: Performed by: INTERNAL MEDICINE

## 2021-08-05 PROCEDURE — 25010000002 FUROSEMIDE PER 20 MG: Performed by: INTERNAL MEDICINE

## 2021-08-05 PROCEDURE — 80048 BASIC METABOLIC PNL TOTAL CA: CPT | Performed by: INTERNAL MEDICINE

## 2021-08-05 PROCEDURE — 99232 SBSQ HOSP IP/OBS MODERATE 35: CPT | Performed by: INTERNAL MEDICINE

## 2021-08-05 RX ORDER — INSULIN GLARGINE 100 [IU]/ML
15 INJECTION, SOLUTION SUBCUTANEOUS 2 TIMES DAILY
Status: DISCONTINUED | OUTPATIENT
Start: 2021-08-06 | End: 2021-08-06 | Stop reason: HOSPADM

## 2021-08-05 RX ORDER — INSULIN LISPRO 100 [IU]/ML
6 INJECTION, SOLUTION INTRAVENOUS; SUBCUTANEOUS
Status: DISCONTINUED | OUTPATIENT
Start: 2021-08-06 | End: 2021-08-06 | Stop reason: HOSPADM

## 2021-08-05 RX ORDER — AMLODIPINE BESYLATE 5 MG/1
5 TABLET ORAL
Status: DISCONTINUED | OUTPATIENT
Start: 2021-08-05 | End: 2021-08-05

## 2021-08-05 RX ORDER — CARVEDILOL 12.5 MG/1
12.5 TABLET ORAL 2 TIMES DAILY WITH MEALS
Status: DISCONTINUED | OUTPATIENT
Start: 2021-08-05 | End: 2021-08-06 | Stop reason: HOSPADM

## 2021-08-05 RX ADMIN — INSULIN LISPRO 12 UNITS: 100 INJECTION, SOLUTION INTRAVENOUS; SUBCUTANEOUS at 17:21

## 2021-08-05 RX ADMIN — ATORVASTATIN CALCIUM 80 MG: 80 TABLET, FILM COATED ORAL at 08:48

## 2021-08-05 RX ADMIN — LISINOPRIL 20 MG: 20 TABLET ORAL at 08:48

## 2021-08-05 RX ADMIN — INSULIN LISPRO 12 UNITS: 100 INJECTION, SOLUTION INTRAVENOUS; SUBCUTANEOUS at 08:50

## 2021-08-05 RX ADMIN — ASPIRIN 81 MG: 81 TABLET, CHEWABLE ORAL at 08:48

## 2021-08-05 RX ADMIN — CARVEDILOL 6.25 MG: 6.25 TABLET, FILM COATED ORAL at 08:48

## 2021-08-05 RX ADMIN — ENOXAPARIN SODIUM 40 MG: 40 INJECTION SUBCUTANEOUS at 21:45

## 2021-08-05 RX ADMIN — CARVEDILOL 12.5 MG: 12.5 TABLET, FILM COATED ORAL at 17:21

## 2021-08-05 RX ADMIN — INSULIN LISPRO 12 UNITS: 100 INJECTION, SOLUTION INTRAVENOUS; SUBCUTANEOUS at 12:03

## 2021-08-05 RX ADMIN — INSULIN LISPRO 2 UNITS: 100 INJECTION, SOLUTION INTRAVENOUS; SUBCUTANEOUS at 08:49

## 2021-08-05 RX ADMIN — INSULIN LISPRO 2 UNITS: 100 INJECTION, SOLUTION INTRAVENOUS; SUBCUTANEOUS at 17:19

## 2021-08-05 RX ADMIN — FUROSEMIDE 40 MG: 10 INJECTION, SOLUTION INTRAMUSCULAR; INTRAVENOUS at 08:49

## 2021-08-05 RX ADMIN — ENOXAPARIN SODIUM 40 MG: 40 INJECTION SUBCUTANEOUS at 08:48

## 2021-08-05 NOTE — PROGRESS NOTES
Name: Rufus Dorsey ADMIT: 2021   : 1985  PCP: Provider, No Known    MRN: [id number] LOS: 3 days   AGE/SEX: 36 y.o. male  ROOM: [facility]/1     Subjective   Subjective         Hypoxic overnight requiring 4 L of oxygen with sats declining into the 60s.  Upon entering room he is sleeping off 02 and saturations are 82%. Denies shortness of breath or chest pain and lower extremity swelling is improved.  Stress test being postponed.    Review of Systems he denies nausea, vomiting, fever, chills, diarrhea, rash, LUTS     Objective   Objective   Vital Signs  Temp:  [97.6 °F (36.4 °C)-98.2 °F (36.8 °C)] 98 °F (36.7 °C)  Heart Rate:  [65-73] 72  Resp:  [18] 18  BP: (142-154)/(67-98) 147/87  SpO2:  [95 %-99 %] 96 %  on  Flow (L/min):  [2] 2;   Device (Oxygen Therapy): nasal cannula  Body mass index is 44.77 kg/m².  Physical Exam  Vitals reviewed.   Constitutional:       General: He is not in acute distress.     Appearance: He is well-developed. He is obese. He is not toxic-appearing.   HENT:      Head: Normocephalic and atraumatic.   Cardiovascular:      Rate and Rhythm: Normal rate and regular rhythm.   Pulmonary:      Effort: Pulmonary effort is normal. No respiratory distress.      Breath sounds: Rales (bilateral bases, improved) present. No wheezing.   Abdominal:      General: Bowel sounds are normal. There is no distension.      Palpations: Abdomen is soft. There is no mass.      Tenderness: There is no abdominal tenderness.      Hernia: No hernia is present.   Musculoskeletal:      Right lower leg: Edema present.      Left lower leg: Edema present.      Comments: BLE edema improving    Skin:     General: Skin is warm and dry.   Neurological:      General: No focal deficit present.      Mental Status: He is alert and oriented to person, place, and time.   Psychiatric:         Mood and Affect: Mood normal.         Behavior: Behavior normal.         Thought Content: Thought content normal.         Results  Review     I reviewed the patient's new clinical results.  Results from last 7 days   Lab Units 08/04/21  0929 08/03/21  0503 08/02/21  1612   WBC 10*3/mm3 8.27 8.97 9.86   HEMOGLOBIN g/dL 14.5 14.8 14.3   PLATELETS 10*3/mm3 256 285 283     Results from last 7 days   Lab Units 08/05/21  0442 08/04/21  0929 08/03/21  0503 08/02/21  1612   SODIUM mmol/L 138 138 136 137   POTASSIUM mmol/L 3.7 4.2 3.7 3.8   CHLORIDE mmol/L 100 101 102 103   CO2 mmol/L 30.0* 29.7* 23.1 27.2   BUN mg/dL 30* 27* 25* 23*   CREATININE mg/dL 1.54* 1.27 1.25 1.63*   GLUCOSE mg/dL 175* 165* 233* 261*   Estimated Creatinine Clearance: 83.2 mL/min (A) (by C-G formula based on SCr of 1.54 mg/dL (H)).  Results from last 7 days   Lab Units 08/04/21  0929 08/02/21  1612   ALBUMIN g/dL 2.50* 2.90*   BILIRUBIN mg/dL 0.4 0.6   ALK PHOS U/L 87 93   AST (SGOT) U/L 26 16   ALT (SGPT) U/L 22 28     Results from last 7 days   Lab Units 08/05/21  0442 08/04/21  0929 08/03/21  0503 08/02/21  1612   CALCIUM mg/dL 8.1* 8.1* 7.7* 8.1*   ALBUMIN g/dL  --  2.50*  --  2.90*     Results from last 7 days   Lab Units 08/02/21  1725 08/02/21  1708   PROCALCITONIN ng/mL  --  0.15   LACTATE mmol/L 1.1  --      COVID19   Date Value Ref Range Status   08/03/2021 Not Detected Not Detected - Ref. Range Final   08/02/2021 Not Detected Not Detected - Ref. Range Final     Hemoglobin A1C   Date/Time Value Ref Range Status   08/03/2021 0503 9.07 (H) 4.80 - 5.60 % Final     Glucose   Date/Time Value Ref Range Status   08/05/2021 1135 149 (H) 70 - 130 mg/dL Final     Comment:     Meter: [id number] : [id number] Assumptionmandeep Costa NA   08/05/2021 0618 159 (H) 70 - 130 mg/dL Final     Comment:     Meter: [id number] : [id number] Ivelisse Judy    08/04/2021 2059 199 (H) 70 - 130 mg/dL Final     Comment:     Meter: [id number] : [id number] Ivelisse Kim    08/04/2021 1639 117 70 - 130 mg/dL Final     Comment:     Meter: [id number] : [id number] Donnie Wilson    08/04/2021  1137 129 70 - 130 mg/dL Final     Comment:     Meter: [id number] : [id number] Donnie Wilson NA   08/04/2021 0556 129 70 - 130 mg/dL Final     Comment:     Meter: [id number] : [id number] Amado Vann NA   08/03/2021 2026 122 70 - 130 mg/dL Final     Comment:     Meter: [id number] : [id number] Amado Vann NA       Adult Transthoracic Echo Complete W/ Cont if Necessary Per Protocol  · Calculated left ventricular EF = 36.9% Estimated left ventricular EF =   37% Estimated left ventricular EF was in agreement with the calculated   left ventricular EF. Left ventricular systolic function is moderately   decreased. Normal left ventricular wall thickness noted. The left   ventricular cavity is severely dilated. There is left ventricular global   hypokinesis noted. Left ventricular diastolic dysfunction is noted. There   is at least Grade 3 diastolic dysfunction No evidence of a left   ventricular thrombus present.  · The right ventricular cavity is mildly dilated. Moderately reduced right   ventricular systolic function noted.  · Left atrial volume is moderately increased. The right atrial cavity is   mildly dilated  · Trace aortic valve regurgitation is present.       Scheduled Medications  aspirin, 81 mg, Oral, Daily  atorvastatin, 80 mg, Oral, Daily  carvedilol, 12.5 mg, Oral, BID With Meals  enoxaparin, 40 mg, Subcutaneous, Q12H  insulin glargine, 60 Units, Subcutaneous, BID  insulin lispro, 0-9 Units, Subcutaneous, TID With Meals  insulin lispro, 12 Units, Subcutaneous, TID With Meals    Infusions  Pharmacy to Dose enoxaparin (LOVENOX),     Diet  NPO Diet NPO Except: Sips With Meds  Diet Regular; Cardiac, Consistent Carbohydrate       Assessment/Plan     Active Hospital Problems    Diagnosis  POA   • **Bilateral pulmonary infiltrates on chest x-ray [R91.8]  Yes   • Class 3 severe obesity in adult (CMS/HCC) [E66.01]  Yes   • Acute pulmonary edema (CMS/HCC) [J81.0]  Yes   • Bilateral pleural effusion [J90]  Yes    • Diastolic CHF, acute (CMS/HCC) [I50.31]  Yes   • Pulmonary HTN (CMS/[facility]) [I27.20]  Yes   • History of COVID-19 [Z86.16]  Yes   • Coronary artery disease [I25.10]  Yes   • Hypertension [I10]  Yes   • Viral URI with cough [J06.9]  Yes   • Type 2 diabetes mellitus, with long-term current use of insulin (CMS/[facility]) [E11.9, Z79.4]  Not Applicable   • Ischemic cardiomyopathy [I25.5]  Yes      Resolved Hospital Problems   No resolved problems to display.       36 y.o. male admitted with Bilateral pulmonary infiltrates on chest x-ray.  CXR with extensive pneumonia both lungs. Elevated troponin and ddimer. CTA chest with no PE but enlarged pulmonary artery and bilateral pneumonia with bilateral pleural effusions. Cardiomegaly present. Patient was admitted December 2020 with severe COVID-19 pneumonia with hypoxia.    Acute hypoxi c resp failure - multifactoral  with CHF and fibrotic changes from prior COVID  Acute on chronic CHF, cardiomyopathy, elevated troponin   · COVID-19 negative and RVP negative. No indication for abx   ·  pulmonary and cardiology following.  · Has been given Lasix IV 40 mg.  Increased creatinine today so holding diuretics and lisinopril. IO and daily weights.   · Echocardiogram above.  EF 39% global hypokinesis  · Elevated troponin secondary to demand. Stress test tmrw if hypoxia improves.  Wean O2 as tolerated     DM2 with hyperglycemia   · Patient not taking any medication since discharge December 2020.  · A1c 9, consult DM educator   · started on the insulin regimen that was ordered at [place] over the winter   · Glucose a bit higher today but still acceptable    HTN  · BP elevated 147/90 but better than admission (117/118)  monitor with holding lisinopril. Continue carvedilol    Suspected VERONICA- needs OP evaluation     Obesity-complicating all aspects of care    · Lovenox 40 mg SC daily for DVT prophylaxis.  · Full code.  · Discussed with patient, nursing staff and Dr. Cortes .  · Anticipate discharge  TBD        JESSICA Liu  [facility]  08/05/21  14:04 EDT

## 2021-08-05 NOTE — PROGRESS NOTES
[facility]  Progress note: 2021    Patient Identification:  Name:Rufus Dorsey  Age:36 y.o.  Sex: male  :  1985  MRN: [id number]           CC:  Congestive heart failure, hypoxia    Interval history:  Still hypoxemic overnight requiring 4 L of oxygen with O2 saturations at times in the 60s.  This morning remains on 4 L.  Denies any chest pain or shortness of breath    Vital Signs:   Temp:  [97.6 °F (36.4 °C)-98.2 °F (36.8 °C)] 98.2 °F (36.8 °C)  Heart Rate:  [65-73] 73  Resp:  [18] 18  BP: (130-154)/(67-98) 154/98    Intake/Output Summary (Last 24 hours) at 2021 0920  Last data filed at 2021 0900  Gross per 24 hour   Intake 0 ml   Output --   Net 0 ml       Physical Examination:    General Appearance No acute distress   Neck No adenopathy, supple, trachea midline, no thyromegaly, no carotid bruit, no JVD   Lungs Clear to auscultation,respirations regular, even and unlabored   Heart Regular rhythm and normal rate, normal S1 and S2, no murmur, no gallop, no rub, no click   Chest wall No abnormalities observed   Abdomen Normal bowel sounds, no masses, no hepatomegaly, soft   Extremities Moves all extremities well, no edema, no cyanosis, no redness   Neurological Alert and oriented x 3     Lab Review:  Personally reviewed the labs, radiology imaging and other cardiac procedures.   Results from last 7 days   Lab Units 21  0442 21  0929 21  0929   SODIUM mmol/L 138   < > 138   POTASSIUM mmol/L 3.7   < > 4.2   CHLORIDE mmol/L 100   < > 101   CO2 mmol/L 30.0*   < > 29.7*   BUN mg/dL 30*   < > 27*   CREATININE mg/dL 1.54*   < > 1.27   CALCIUM mg/dL 8.1*   < > 8.1*   BILIRUBIN mg/dL  --   --  0.4   ALK PHOS U/L  --   --  87   ALT (SGPT) U/L  --   --  22   AST (SGOT) U/L  --   --  26   GLUCOSE mg/dL 175*   < > 165*    < > = values in this interval not displayed.     Results from last 7 days   Lab Units 21  2224 21  1708 21  1612   TROPONIN T ng/mL 0.050* 0.035*  0.068*     Results from last 7 days   Lab Units 08/04/21  0929 08/03/21  0503 08/02/21  1612   WBC 10*3/mm3 8.27 8.97 9.86   HEMOGLOBIN g/dL 14.5 14.8 14.3   HEMATOCRIT % 45.4 46.2 42.2   PLATELETS 10*3/mm3 256 285 283         Medication Review:   Meds reviewed  Scheduled Meds:aspirin, 81 mg, Oral, Daily  atorvastatin, 80 mg, Oral, Daily  carvedilol, 6.25 mg, Oral, BID With Meals  enoxaparin, 40 mg, Subcutaneous, Q12H  furosemide, 40 mg, Intravenous, Q12H  insulin glargine, 60 Units, Subcutaneous, BID  insulin lispro, 0-9 Units, Subcutaneous, TID With Meals  insulin lispro, 12 Units, Subcutaneous, TID With Meals  lisinopril, 20 mg, Oral, Q24H      Continuous Infusions:Pharmacy to Dose enoxaparin (LOVENOX),       I personally viewed and interpreted the patient's EKG/Telemetry data    Assessment and Plan  1.  Congestive heart failure with cardiomegaly and nonischemic cardiomyopathy.  Limited outside records.  Echo today shows an ejection fraction of 39% with global hypokinesis.  Continue with carvedilol and increased dose.  We will hold on lisinopril and Lasix given worsening renal labs.  Reassess in a.m.  2.  Elevated troponin with atypical pleuritic chest pain.    Elevated troponin likely demand related with heart failure.  Chest pain has resolved.  Check an exercise cardiac stress test once pulmonary status and hypoxia improves.  Still requiring O2 requiring 4 L overnight.  Suspect he has some degree of sleep apnea.  We will try to wean off O2 and plan on stress test probably tomorrow instead.  3.  Respiratory failure with hypoxia and with markedly abnormal  CT findings suggestive of ongoing pneumonic process as well as heart failure.  He had COVID-19 in December.  Covid screen is negative x2.  As above.  Discussed with Dr. Yeager  4.  Hypertension.  Still remains elevated.  Increase Coreg.  With renal insufficiency will hold diuretics and lisinopril reassess in a.m.  May need to institute amlodipine.  5.  Renal  insufficiency.  Will hold diuretics recheck in a.m.  6.  Diabetes  7.  RV dysfunction.  Agree with outpatient sleep study.  9.  Hyperlipidemia      Mini Menon  8/5/202109:20 EDT  25min spent in reviewing records, discussion and examination of the patient and discussion with other members of the patient's medical team.     Dictated utilizing Dragon dictation

## 2021-08-05 NOTE — PLAN OF CARE
Goal Outcome Evaluation:      Patient A.O x 4, VSS. Was on 4L NC weaned back to RA. Diet resumed and patient to be NPO at midnight for possible stress test tomorrow. WCTM.

## 2021-08-05 NOTE — PROGRESS NOTES
"                                              LOS: 3 days   Patient Care Team:  Provider, No Known as PCP - General    Chief Complaint:  F/up respiratory failure, pulmonary infiltrates    Subjective   Interval History  No cough.  No shortness of breath at rest.  Seems to be requiring up to 4 L oxygen at night.    REVIEW OF SYSTEMS:   CARDIOVASCULAR: No chest pain, chest pressure or chest discomfort. No palpitations but edema.   Constitutional: No fever or chills    Ventilator/Non-Invasive Ventilation Settings (From admission, onward)    None                Physical Exam:     Vital Signs  Temp:  [97.6 °F (36.4 °C)-98.2 °F (36.8 °C)] 98 °F (36.7 °C)  Heart Rate:  [65-73] 72  Resp:  [18] 18  BP: (142-154)/(67-98) 147/87    Intake/Output Summary (Last 24 hours) at 8/5/2021 1554  Last data filed at 8/5/2021 1030  Gross per 24 hour   Intake 210 ml   Output --   Net 210 ml     Flowsheet Rows      First Filed Value   Admission Height  167.6 cm (66\") Documented at 08/04/2021 0650   Admission Weight  126 kg (278 lb) Documented at 08/02/2021 1627          PPE used per hospital policy    General Appearance:   Alert, cooperative, in no acute distress   ENMT:  Mallampati score 4, moist mucous membrane   Eyes:  Pupils equal and reactive to light. EOMI   Neck:   Large. Trachea midline. No thyromegaly.   Lungs:    Minimal crackles at the bases with exam limited due to body habitus.  No wheezing.  Nonlabored breathing.    Heart:   Regular rhythm and normal rate, normal S1 and S2, no         murmur   Skin:   No rash   Abdomen:    Obese. Soft. No tenderness. No HSM.   Neuro:  Conscious, alert, oriented x3. Strength 5/5 in upper and lower  ext   Extremities:  No cyanosis, clubbing but grade 1-2 pitting edema in legs.  Warm extremities and well-perfused          Results Review:        Results from last 7 days   Lab Units 08/05/21  0442 08/04/21  0929 08/04/21  0929 08/03/21  0503 08/03/21  0503   SODIUM mmol/L 138  --  138  --  136 "   POTASSIUM mmol/L 3.7  --  4.2  --  3.7   CHLORIDE mmol/L 100  --  101  --  102   CO2 mmol/L 30.0*  --  29.7*  --  23.1   BUN mg/dL 30*  --  27*  --  25*   CREATININE mg/dL 1.54*  --  1.27  --  1.25   GLUCOSE mg/dL 175*   < > 165*   < > 233*   CALCIUM mg/dL 8.1*  --  8.1*  --  7.7*    < > = values in this interval not displayed.     Results from last 7 days   Lab Units 08/02/21  2224 08/02/21  1708 08/02/21  1612   TROPONIN T ng/mL 0.050* 0.035* 0.068*     Results from last 7 days   Lab Units 08/04/21  0929 08/03/21  0503 08/02/21  1612   WBC 10*3/mm3 8.27 8.97 9.86   HEMOGLOBIN g/dL 14.5 14.8 14.3   HEMATOCRIT % 45.4 46.2 42.2   PLATELETS 10*3/mm3 256 285 283         Results from last 7 days   Lab Units 08/04/21  0929   PROBNP pg/mL 802.9*       I reviewed the patient's new clinical results.        Medication Review:   aspirin, 81 mg, Oral, Daily  atorvastatin, 80 mg, Oral, Daily  carvedilol, 12.5 mg, Oral, BID With Meals  enoxaparin, 40 mg, Subcutaneous, Q12H  insulin glargine, 60 Units, Subcutaneous, BID  insulin lispro, 0-9 Units, Subcutaneous, TID With Meals  insulin lispro, 12 Units, Subcutaneous, TID With Meals        Pharmacy to Dose enoxaparin (LOVENOX),         Assessment   1. Acute pulmonary edema  2. Bilateral pleural effusion  3. Acute hypoxic respiratory failure  4. Acute on chronic systolic and diastolic CHF, EF 37% with moderately dilated LA  5. Bilateral pulmonary infiltrates: Mostly groundglass secondary to pulmonary edema but could have some fibrotic changes from prior Covid (fibrosis is not clear on current CT testing)  6. Dilated pulmonary artery on CT chest suggestive of pulmonary hypertension  7. History of severe COVID-19 pneumonia 12/21/2020, requiring high flow oxygen therapy  8. Morbid obesity (BMI 45)      Plan     · Diuresis per cardiology  · Titrate wean off oxygen.  I took him off oxygen while patient was sitting at the edge of the bed and his SPO2 remained 97% during 5 minutes.   Suspect that most of his oxygen requirement is related to sleep apnea (he probably desaturates while asleep) and may be element of restriction from obesity.  · Outpatient evaluation for sleep apnea  · Oxygen by nasal cannula titrate keep SPO2 >92%  · Lovenox for DVT prophylaxis    Discussed with Dr. Sinan Yeager MD  08/05/21  15:54 EDT          This note was dictated utilizing LaunchTrack dictation

## 2021-08-06 ENCOUNTER — APPOINTMENT (OUTPATIENT)
Dept: NUCLEAR MEDICINE | Facility: HOSPITAL | Age: 36
End: 2021-08-06

## 2021-08-06 VITALS
BODY MASS INDEX: 44.63 KG/M2 | OXYGEN SATURATION: 87 % | WEIGHT: 277.7 LBS | TEMPERATURE: 97.9 F | DIASTOLIC BLOOD PRESSURE: 89 MMHG | HEART RATE: 80 BPM | HEIGHT: 66 IN | SYSTOLIC BLOOD PRESSURE: 136 MMHG | RESPIRATION RATE: 18 BRPM

## 2021-08-06 PROBLEM — I50.43 ACUTE ON CHRONIC COMBINED SYSTOLIC AND DIASTOLIC CHF (CONGESTIVE HEART FAILURE): Status: ACTIVE | Noted: 2021-08-03

## 2021-08-06 LAB
ANION GAP SERPL CALCULATED.3IONS-SCNC: 9.5 MMOL/L (ref 5–15)
BH CV REST NUCLEAR ISOTOPE DOSE: 9.2 MCI
BH CV STRESS BP STAGE 1: NORMAL
BH CV STRESS BP STAGE 2: NORMAL
BH CV STRESS DURATION MIN STAGE 1: 3
BH CV STRESS DURATION MIN STAGE 2: 1
BH CV STRESS DURATION SEC STAGE 1: 0
BH CV STRESS DURATION SEC STAGE 2: 17
BH CV STRESS GRADE STAGE 1: 10
BH CV STRESS GRADE STAGE 2: 12
BH CV STRESS HR STAGE 1: 117
BH CV STRESS HR STAGE 2: 125
BH CV STRESS METS STAGE 1: 5
BH CV STRESS METS STAGE 2: 7.5
BH CV STRESS NUCLEAR ISOTOPE DOSE: 30.1 MCI
BH CV STRESS PROTOCOL 1: NORMAL
BH CV STRESS PROTOCOL 2 COMMENTS STAGE 1: NORMAL
BH CV STRESS PROTOCOL 2 DOSE REGADENOSON STAGE 1: 0.4
BH CV STRESS PROTOCOL 2 DURATION MIN STAGE 1: 0
BH CV STRESS PROTOCOL 2 DURATION SEC STAGE 1: 10
BH CV STRESS PROTOCOL 2 STAGE 1: 1
BH CV STRESS PROTOCOL 2: NORMAL
BH CV STRESS RECOVERY BP: NORMAL MMHG
BH CV STRESS RECOVERY HR: 86 BPM
BH CV STRESS SPEED STAGE 1: 1.7
BH CV STRESS SPEED STAGE 2: 2.5
BH CV STRESS STAGE 1: 1
BH CV STRESS STAGE 2: 2
BUN SERPL-MCNC: 32 MG/DL (ref 6–20)
BUN/CREAT SERPL: 25 (ref 7–25)
CALCIUM SPEC-SCNC: 8 MG/DL (ref 8.6–10.5)
CHLORIDE SERPL-SCNC: 101 MMOL/L (ref 98–107)
CO2 SERPL-SCNC: 29.5 MMOL/L (ref 22–29)
CREAT SERPL-MCNC: 1.28 MG/DL (ref 0.76–1.27)
GFR SERPL CREATININE-BSD FRML MDRD: 64 ML/MIN/1.73
GLUCOSE BLDC GLUCOMTR-MCNC: 107 MG/DL (ref 70–130)
GLUCOSE BLDC GLUCOMTR-MCNC: 126 MG/DL (ref 70–130)
GLUCOSE BLDC GLUCOMTR-MCNC: 186 MG/DL (ref 70–130)
GLUCOSE SERPL-MCNC: 190 MG/DL (ref 65–99)
LV EF NUC BP: 19 %
MAXIMAL PREDICTED HEART RATE: 184 BPM
PERCENT MAX PREDICTED HR: 68.48 %
POTASSIUM SERPL-SCNC: 3.5 MMOL/L (ref 3.5–5.2)
SODIUM SERPL-SCNC: 140 MMOL/L (ref 136–145)
STRESS BASELINE BP: NORMAL MMHG
STRESS BASELINE HR: 82 BPM
STRESS PERCENT HR: 81 %
STRESS POST EXERCISE DUR MIN: 4 MIN
STRESS POST EXERCISE DUR SEC: 17 SEC
STRESS POST PEAK BP: NORMAL MMHG
STRESS POST PEAK HR: 126 BPM
STRESS TARGET HR: 156 BPM

## 2021-08-06 PROCEDURE — 99232 SBSQ HOSP IP/OBS MODERATE 35: CPT | Performed by: INTERNAL MEDICINE

## 2021-08-06 PROCEDURE — 0 TECHNETIUM SESTAMIBI: Performed by: INTERNAL MEDICINE

## 2021-08-06 PROCEDURE — 78452 HT MUSCLE IMAGE SPECT MULT: CPT

## 2021-08-06 PROCEDURE — 63710000001 INSULIN LISPRO (HUMAN) PER 5 UNITS: Performed by: INTERNAL MEDICINE

## 2021-08-06 PROCEDURE — 25010000002 ENOXAPARIN PER 10 MG: Performed by: INTERNAL MEDICINE

## 2021-08-06 PROCEDURE — 93017 CV STRESS TEST TRACING ONLY: CPT

## 2021-08-06 PROCEDURE — 78452 HT MUSCLE IMAGE SPECT MULT: CPT | Performed by: INTERNAL MEDICINE

## 2021-08-06 PROCEDURE — 63710000001 INSULIN GLARGINE PER 5 UNITS: Performed by: INTERNAL MEDICINE

## 2021-08-06 PROCEDURE — 25010000002 REGADENOSON 0.4 MG/5ML SOLUTION: Performed by: INTERNAL MEDICINE

## 2021-08-06 PROCEDURE — A9500 TC99M SESTAMIBI: HCPCS | Performed by: INTERNAL MEDICINE

## 2021-08-06 PROCEDURE — 80048 BASIC METABOLIC PNL TOTAL CA: CPT | Performed by: INTERNAL MEDICINE

## 2021-08-06 PROCEDURE — 82962 GLUCOSE BLOOD TEST: CPT

## 2021-08-06 PROCEDURE — 93016 CV STRESS TEST SUPVJ ONLY: CPT | Performed by: INTERNAL MEDICINE

## 2021-08-06 PROCEDURE — 93018 CV STRESS TEST I&R ONLY: CPT | Performed by: INTERNAL MEDICINE

## 2021-08-06 RX ORDER — ATORVASTATIN CALCIUM 80 MG/1
80 TABLET, FILM COATED ORAL DAILY
Qty: 30 TABLET | Refills: 0 | Status: ON HOLD | OUTPATIENT
Start: 2021-08-06 | End: 2021-12-09 | Stop reason: SDUPTHER

## 2021-08-06 RX ORDER — AMLODIPINE BESYLATE 2.5 MG/1
2.5 TABLET ORAL
Qty: 30 TABLET | Refills: 0 | Status: SHIPPED | OUTPATIENT
Start: 2021-08-07 | End: 2021-08-18

## 2021-08-06 RX ORDER — FUROSEMIDE 20 MG/1
20 TABLET ORAL DAILY
Qty: 30 TABLET | Refills: 0 | Status: SHIPPED | OUTPATIENT
Start: 2021-08-07 | End: 2021-12-09 | Stop reason: HOSPADM

## 2021-08-06 RX ORDER — BUDESONIDE AND FORMOTEROL FUMARATE DIHYDRATE 160; 4.5 UG/1; UG/1
2 AEROSOL RESPIRATORY (INHALATION)
Qty: 10.2 G | Refills: 0 | Status: ON HOLD | OUTPATIENT
Start: 2021-08-06 | End: 2021-12-09 | Stop reason: SDUPTHER

## 2021-08-06 RX ORDER — INSULIN GLARGINE 100 [IU]/ML
15 INJECTION, SOLUTION SUBCUTANEOUS NIGHTLY
Qty: 3 PEN | Refills: 0 | Status: SHIPPED | OUTPATIENT
Start: 2021-08-06 | End: 2021-08-06 | Stop reason: SDUPTHER

## 2021-08-06 RX ORDER — FUROSEMIDE 20 MG/1
20 TABLET ORAL DAILY
Status: DISCONTINUED | OUTPATIENT
Start: 2021-08-06 | End: 2021-08-06 | Stop reason: HOSPADM

## 2021-08-06 RX ORDER — LANCETS 23 GAUGE
1 EACH MISCELLANEOUS
Qty: 100 EACH | Refills: 0 | Status: SHIPPED | OUTPATIENT
Start: 2021-08-06

## 2021-08-06 RX ORDER — AMLODIPINE BESYLATE 2.5 MG/1
2.5 TABLET ORAL
Status: DISCONTINUED | OUTPATIENT
Start: 2021-08-06 | End: 2021-08-06 | Stop reason: HOSPADM

## 2021-08-06 RX ORDER — CARVEDILOL 12.5 MG/1
12.5 TABLET ORAL 2 TIMES DAILY WITH MEALS
Qty: 60 TABLET | Refills: 0 | Status: SHIPPED | OUTPATIENT
Start: 2021-08-06 | End: 2021-09-01

## 2021-08-06 RX ORDER — INSULIN GLARGINE 100 [IU]/ML
20 INJECTION, SOLUTION SUBCUTANEOUS NIGHTLY
Qty: 5 PEN | Refills: 0 | Status: SHIPPED | OUTPATIENT
Start: 2021-08-06 | End: 2021-12-09 | Stop reason: HOSPADM

## 2021-08-06 RX ADMIN — TECHNETIUM TC 99M SESTAMIBI 1 DOSE: 1 INJECTION INTRAVENOUS at 12:20

## 2021-08-06 RX ADMIN — ATORVASTATIN CALCIUM 80 MG: 80 TABLET, FILM COATED ORAL at 08:25

## 2021-08-06 RX ADMIN — INSULIN LISPRO 2 UNITS: 100 INJECTION, SOLUTION INTRAVENOUS; SUBCUTANEOUS at 08:25

## 2021-08-06 RX ADMIN — TECHNETIUM TC 99M SESTAMIBI 1 DOSE: 1 INJECTION INTRAVENOUS at 10:50

## 2021-08-06 RX ADMIN — ASPIRIN 81 MG: 81 TABLET, CHEWABLE ORAL at 08:26

## 2021-08-06 RX ADMIN — FUROSEMIDE 20 MG: 20 TABLET ORAL at 10:17

## 2021-08-06 RX ADMIN — INSULIN LISPRO 6 UNITS: 100 INJECTION, SOLUTION INTRAVENOUS; SUBCUTANEOUS at 17:19

## 2021-08-06 RX ADMIN — REGADENOSON 0.4 MG: 0.08 INJECTION, SOLUTION INTRAVENOUS at 12:20

## 2021-08-06 RX ADMIN — INSULIN GLARGINE 15 UNITS: 100 INJECTION, SOLUTION SUBCUTANEOUS at 08:25

## 2021-08-06 RX ADMIN — CARVEDILOL 12.5 MG: 12.5 TABLET, FILM COATED ORAL at 17:19

## 2021-08-06 RX ADMIN — AMLODIPINE BESYLATE 2.5 MG: 2.5 TABLET ORAL at 10:18

## 2021-08-06 RX ADMIN — INSULIN LISPRO 6 UNITS: 100 INJECTION, SOLUTION INTRAVENOUS; SUBCUTANEOUS at 08:25

## 2021-08-06 RX ADMIN — ENOXAPARIN SODIUM 40 MG: 40 INJECTION SUBCUTANEOUS at 08:25

## 2021-08-06 NOTE — PLAN OF CARE
Goal Outcome Evaluation:  Plan of Care Reviewed With: patient        Progress: improving  Outcome Summary: VSS. A&O. NPO at 12, stress test today. 4L at night. Lantus adjusted. Will CTM

## 2021-08-06 NOTE — CASE MANAGEMENT/SOCIAL WORK
Continued Stay Note  [facility]     Patient Name: Rufus Dorsey  MRN: [id number]  Today's Date: 8/6/2021    Admit Date: 8/2/2021    Discharge Plan     Row Name 08/06/21 1447       Plan    Plan  Return home with brother    Patient/Family in Agreement with Plan  yes    Plan Comments  Spoke with patient at bedside.  Plan remains for him to return home at DC and has home O2.  He does not have scales for daily weights - given from HF program.  Spoke with Rose/[facility] will arrange F/U with HF clinic.  CCP will follow as needed.  BHumeniuk RN       Expected Discharge Date and Time     Expected Discharge Date Expected Discharge Time    Aug 9, 2021             Becky S. Humeniuk, RN

## 2021-08-06 NOTE — DISCHARGE SUMMARY
NAME: Rufus Dorsey ADMIT: 2021   : 1985  PCP: Provider, No Known    MRN: [id number] LOS: 4 days   AGE/SEX: 36 y.o. male  ROOM: S612/1     Date of Admission:  2021  Date of Discharge:  2021    PCP: Provider, No Known    CHIEF COMPLAINT  Chest Pain      DISCHARGE DIAGNOSIS  Active Hospital Problems    Diagnosis  POA   • **Acute on chronic combined systolic and diastolic CHF (congestive heart failure) (CMS/[facility]) [I50.43]  Yes   • Class 3 severe obesity in adult (CMS/[facility]) [E66.01]  Yes   • Acute pulmonary edema (CMS/[facility]) [J81.0]  Yes   • Bilateral pleural effusion [J90]  Yes   • Bilateral pulmonary infiltrates on chest x-ray [R91.8]  Yes   • Pulmonary HTN (CMS/[facility]) [I27.20]  Yes   • History of COVID-19 [Z86.16]  Yes   • Coronary artery disease [I25.10]  Yes   • Hypertension [I10]  Yes   • Viral URI with cough [J06.9]  Yes   • Type 2 diabetes mellitus, with long-term current use of insulin (CMS/[facility]) [E11.9, Z79.4]  Not Applicable      Resolved Hospital Problems   No resolved problems to display.       SECONDARY DIAGNOSES  Past Medical History:   Diagnosis Date   • Coronary artery disease    • Diabetes (CMS/[facility])    • Heart failure (CMS/[facility]) 2016   • High cholesterol    • Hypertension    • Ischemic cardiomyopathy 2016       CONSULTS   Cardiology  Pulmonary    HOSPITAL COURSE  Patient is a 36 y.o. male with history of morbid obesity, diabetes, cardiomyopathy, previous severe COVID-19 treated with high flow oxygen discharged on oxygen.  He presents to the hospital with dyspnea.  He was found to have acute respiratory failure with pulmonary edema and congestive heart failure.  Also likely with some underlying fibrotic changes from prior Covid infection.  He also has pulmonary hypertension and likely sleep apnea.    He was admitted to the hospital.  He was seen by pulmonary who did not feel he had any acute infectious etiology to the symptoms.  Also seen by cardiology and was given  diuretics with improvement of his dyspnea.  He had echo with decreased ejection fraction.  He had stress test without any ischemia.  Medications for cardiomyopathy have been titrated.  It sounds like he was not taking medications at home.  He will closely follow-up with cardiology as an outpatient.  He also was not taking insulin prior to admission.  He was on previous high doses of insulin though this was coming off of his Covid pneumonia when he was on steroids.  We will resume a lower dose of insulin at discharge but likely this will be need to titrated with outpatient providers.  He will also have outpatient sleep study.  He is on room air during the day. He did qualify for 2L oxygen at discharge.       DIAGNOSTICS    CT Angiogram Chest [[id number]] Christopher as Reviewed   Order Status: Completed Collected: 08/02/21 1854    Updated: 08/02/21 1906   Narrative:     CT ANGIOGRAM CHEST-       CLINICAL HISTORY: Elevated d-dimer. Suspect COVID 19 infection.       TECHNIQUE: Spiral CT images were obtained through the chest during rapid   IV injection of contrast and were reconstructed in 2 mm thick axial   slices.       Radiation dose reduction techniques were utilized, including automated   exposure control and exposure modulation based on body size.       COMPARISON: None       FINDINGS: Lung window images demonstrate extensive bilateral groundglass   infiltrate compatible with COVID pneumonia. Only portions of the right   middle and lower lobes are spared. The main pulmonary arteries and their   lobar and segmental branches are fairly well opacified, and demonstrate   no filling defects. There is no CT evidence of acute pulmonary   thromboemboli embolism. The heart is moderately enlarged. Injected   contrast material fills primarily the right atrium and right ventricle   and refluxes into the IVC and hepatic veins indicating significant   cardiac dysfunction. This raises the possibility that some of the   pulmonary  opacities may be due to pulmonary edema. There is a   moderate-sized posteriorly layering right pleural effusion. A small   posteriorly layering left pleural effusion is also present.       Impression:     Extensive bilateral pneumonia and bilateral pleural   effusions, greater in size on the right than the left. Moderate   cardiomegaly and evidence of heart failure as noted. There is no CT   evidence of acute pulmonary thromboembolism.          08/06/2021 0629 08/06/2021 0737 Basic Metabolic Panel [[id number]]    (Abnormal)   Blood    Final result Component Value Units   Glucose 190High  mg/dL   BUN 32High  mg/dL   Creatinine 1.28High  mg/dL   Sodium 140 mmol/L   Potassium 3.5 mmol/L   Chloride 101 mmol/L   CO2 29.5High  mmol/L   Calcium 8.0Low  mg/dL   eGFR Non African Amer 64 mL/min/1.73   BUN/Creatinine Ratio 25.0    Anion Gap 9.5 mmol/L           08/05/2021 0442 08/05/2021 0551 Basic Metabolic Panel [[id number]]    (Abnormal)   Blood    Final result Component Value Units   Glucose 175High  mg/dL   BUN 30High  mg/dL   Creatinine 1.54High  mg/dL   Sodium 138 mmol/L   Potassium 3.7  mmol/L   Chloride 100 mmol/L   CO2 30.0High  mmol/L   Calcium 8.1Low  mg/dL   eGFR Non African Amer 51Low  mL/min/1.73   BUN/Creatinine Ratio 19.5    Anion Gap 8.0 mmol/L           08/04/2021 0929 08/04/2021 0949 CBC (No Diff) [[id number]]   Blood    Final result Component Value Units   WBC 8.27 10*3/mm3   RBC 5.32 10*6/mm3   Hemoglobin 14.5 g/dL   Hematocrit 45.4 %   MCV 85.3 fL   MCH 27.3 pg   MCHC 31.9 g/dL   RDW 14.0 %   RDW-SD 42.7 fl   MPV 11.2 fL   Platelets 256 10*3/mm3        08/03/2021 0503 08/03/2021 0732 Hemoglobin A1c [[id number]]    (Abnormal)   Blood    Final result Component Value Units   Hemoglobin A1C 9.07High  %            PHYSICAL EXAM  Objective    Alert  nad  No resp distress  Obese      CONDITION ON DISCHARGE  stable    DISCHARGE DISPOSITION   Home or Self Care      DISCHARGE MEDICATIONS       Your medication list       START taking these medications      Instructions Last Dose Given Next Dose Due   amLODIPine 2.5 MG tablet  Commonly known as: NORVASC  Start taking on: August 7, 2021      Continental 1 tableta por via oral diariamente.  (Take 1 tablet by mouth Daily.)       furosemide 20 MG tablet  Commonly known as: LASIX  Start taking on: August 7, 2021      Take 1 tablet by mouth Daily.          CHANGE how you take these medications      Instructions Last Dose Given Next Dose Due   BASAGLAR KWIKPEN 100 UNIT/ML injection pen  What changed:   · how much to take  · when to take this      Inject 20 Units under the skin into the appropriate area as directed Every Night.       carvedilol 12.5 MG tablet  Commonly known as: COREG  What changed:   · medication strength  · how much to take      Take 1 tablet by mouth 2 (Two) Times a Day With Meals.          CONTINUE taking these medications      Instructions Last Dose Given Next Dose Due   ASPIRIN PO      81 mg Daily.       atorvastatin 80 MG tablet  Commonly known as: LIPITOR      Continental 1 tableta por via oral diariamente.  (Take 1 tablet by mouth Daily.)       budesonide-formoterol 160-4.5 MCG/ACT inhaler  Commonly known as: SYMBICORT      Inhalar 2 bocanadas 2 (dos) veces al joseph.  (Inhale 2 puffs 2 (Two) Times a Day.)       FreeStyle Freedom Lite w/Device kit      Use to check blood sugar as directed.       freestyle lancets      Test 4 (Four) Times a Day After Meals & at Bedtime.       glucose blood test strip      Test 4 (Four) Times a Day After Meals & at Bedtime. Use as instructed       glucose monitor monitoring kit      1 each 4 (Four) Times a Day Before Meals & at Bedtime.       Insulin Pen Needle 32G X 4 MM misc      Use with insulin 5 times daily          STOP taking these medications    bumetanide 1 MG tablet  Commonly known as: BUMEX        HumaLOG KwikPen 100 UNIT/ML solution pen-injector  Generic drug: Insulin Lispro (1 Unit Dial)        insulin lispro 100 UNIT/ML  injection  Commonly known as: ADMELOG        lisinopril 10 MG tablet  Commonly known as: PRINIVIL,ZESTRIL              Where to Get Your Medications      [facility] [place]  [address] [facility] [address]    Hours: 7:00 AM-6:00 PM Mon-Fri, 8:00 AM-4:30 PM Sat-Sun (Closed 12-12:30PM) Phone: [phone]   · amLODIPine 2.5 MG tablet  · atorvastatin 80 MG tablet  · BASAGLAR KWIKPEN 100 UNIT/ML injection pen  · budesonide-formoterol 160-4.5 MCG/ACT inhaler  · carvedilol 12.5 MG tablet  · freestyle lancets  · furosemide 20 MG tablet  · glucose blood test strip  · Insulin Pen Needle 32G X 4 MM misc        No future appointments.  Additional Instructions for the Follow-ups that You Need to Schedule     Discharge Follow-up with Specialty: Cardiology in 1-2 weeks, Sleep study as scheduled and then follow up with pulmonary afterwards, PCP in 1 week   As directed      Specialty: Cardiology in 1-2 weeks, Sleep study as scheduled and then follow up with pulmonary afterwards, PCP in 1 week           Follow-up Information     [facility] .    Specialty: Cardiology  Contact information:  [address]  [address] [address] [phone]           Provider, No Known .    Contact information:  [facility] [address] [phone]                   TEST  RESULTS PENDING AT DISCHARGE         Arie Cortes MD  [facility]  08/06/21  15:54 EDT      Time: greater than 32 minutes on discharge  It was a pleasure taking care of this patient while in the hospital.

## 2021-08-06 NOTE — CONSULTS
"Diabetes Education  Assessment/Teaching    Patient Name:  Rufus Dorsey  YOB: 1985  MRN: [id number]  Admit Date:  8/2/2021      Assessment Date:  8/6/2021    Most Recent Value   General Information    Referral From:  A1c, Database [A1C 9%]   Height  167.6 cm (66\")   Weight  126 kg (277 lb 11.2 oz)   Weight Method  Standing scale   Pregnancy Assessment   Diabetes History   What type of diabetes do you have?  Type 2   Length of Diabetes Diagnosis  6 - 10 years   Current DM knowledge  fair   Have you had diabetes education/teaching in the past?  no   Do you test your blood sugar at home?  yes   Have you had low blood sugar? (<70mg/dl)  no   Have you had high blood sugar? (>140mg/dl)  no   How would you rate your diabetes control?  good   Have You Felt Down, Depressed or Hopeless?  no   Have You Felt Little Interest or Pleasure in Doing Things?  no   Education Preferences   What areas of diabetes would you like to learn about?  avoiding high blood sugar, medications for diabetes, testing my blood sugar at home   Nutrition Information   Assessment Topics   Healthy Eating - Assessment  Needs education   Taking Medication - Assessment  Needs education   Monitoring - Assessment  Needs education   DM Goals            Most Recent Value   DM Education Needs   Meter  Has own   Medication  Insulin, Oral [admelog and basaglar]   Problem Solving  Hyperglycemia   Healthy Coping  Appropriate   Discharge Plan  Home   Motivation  Moderate   Teaching Method  Discussion, Explanation, Teach back   Patient Response  Verbalized understanding            Other Comments:   phone was used to discuss with pt his diabetes management. He states he tests his BG once a day,taked his medicine most of the time,but he holds his basaglar insulin if his BG is around 100. Explained to him to continue with the basaglar ,even if BG are in the 100's. He states he goes to the [facility] on [address]. We discussed looking into a " CGM device.        Electronically signed by:  Brittnee Casey RN  08/06/21 16:14 EDT

## 2021-08-06 NOTE — PROGRESS NOTES
[facility]  Progress note: 2021    Patient Identification:  Name:Rufus Dorsey  Age:36 y.o.  Sex: male  :  1985  MRN: [id number]           CC:  Elevated troponin, congestive heart failure    Interval history:  Hypoxic overnight and felt to be due to sleep apnea.  Labs slightly improved.  No chest pain palpitation  Vital Signs:   Temp:  [97.5 °F (36.4 °C)-98 °F (36.7 °C)] 97.9 °F (36.6 °C)  Heart Rate:  [71-74] 71  Resp:  [18] 18  BP: (133-158)/(87-90) 149/89    Intake/Output Summary (Last 24 hours) at 2021 0859  Last data filed at 2021 1806  Gross per 24 hour   Intake 570 ml   Output --   Net 570 ml       Physical Examination:    General Appearance No acute distress   Neck No adenopathy, supple, trachea midline, no thyromegaly, no carotid bruit, no JVD   Lungs  bibasilar rales present,respirations regular, even and unlabored   Heart Regular rhythm and normal rate, normal S1 and S2, no murmur, no gallop, no rub, no click   Chest wall No abnormalities observed   Abdomen Normal bowel sounds, no masses, no hepatomegaly, soft   Extremities Moves all extremities well, no edema, no cyanosis, no redness   Neurological Alert and oriented x 3     Lab Review:  Personally reviewed the labs, radiology imaging and other cardiac procedures.   Results from last 7 days   Lab Units 21  0629 21  0442 21  0929   SODIUM mmol/L 140   < > 138   POTASSIUM mmol/L 3.5   < > 4.2   CHLORIDE mmol/L 101   < > 101   CO2 mmol/L 29.5*   < > 29.7*   BUN mg/dL 32*   < > 27*   CREATININE mg/dL 1.28*   < > 1.27   CALCIUM mg/dL 8.0*   < > 8.1*   BILIRUBIN mg/dL  --   --  0.4   ALK PHOS U/L  --   --  87   ALT (SGPT) U/L  --   --  22   AST (SGOT) U/L  --   --  26   GLUCOSE mg/dL 190*   < > 165*    < > = values in this interval not displayed.     Results from last 7 days   Lab Units 21  2224 21  1708 21  1612   TROPONIN T ng/mL 0.050* 0.035* 0.068*     Results from last 7 days   Lab Units  08/04/21  0929 08/03/21  0503 08/02/21  1612   WBC 10*3/mm3 8.27 8.97 9.86   HEMOGLOBIN g/dL 14.5 14.8 14.3   HEMATOCRIT % 45.4 46.2 42.2   PLATELETS 10*3/mm3 256 285 283         Medication Review:   Meds reviewed  Scheduled Meds:aspirin, 81 mg, Oral, Daily  atorvastatin, 80 mg, Oral, Daily  carvedilol, 12.5 mg, Oral, BID With Meals  enoxaparin, 40 mg, Subcutaneous, Q12H  insulin glargine, 15 Units, Subcutaneous, BID  insulin lispro, 0-9 Units, Subcutaneous, TID With Meals  insulin lispro, 6 Units, Subcutaneous, TID With Meals      Continuous Infusions:Pharmacy to Dose enoxaparin (LOVENOX),       I personally viewed and interpreted the patient's EKG/Telemetry data    Assessment and Plan  1.  Congestive heart failure with cardiomegaly and nonischemic cardiomyopathy.  Limited outside records.  Echo today shows an ejection fraction of 39% with global hypokinesis.    Sounds slightly wet though not symptomatic.  We will add back Lasix 20 mg a day and hold off on lisinopril for now.  If stress test is negative today, can dismiss and be seen as an outpatient in the heart failure clinic in 1 week with repeat labs.  At that point can hopefully reinstitute ACE inhibitor therapy  2.  Elevated troponin with atypical pleuritic chest pain.    Elevated troponin likely demand related with heart failure.  Chest pain has resolved.  We will check an exercise cardiac stress test today.  3.  Respiratory failure with hypoxia and with markedly abnormal  CT findings suggestive of ongoing pneumonic process as well as heart failure.  He had COVID-19 in December.  Covid screen is negative x2.   I remain concerned that some of 3 hypoxia pneumonic process is noncardiac in nature.  Clinically improved however  4.  Hypertension.   As above.  And added low-dose amlodipine.  5.  Renal insufficiency.    Recheck BMP next week if dismissed today.  Y.  6.  Diabetes  7.  RV dysfunction.  Agree with outpatient sleep study.  9.  Hyperlipidemia    Mini  Sinan  8/6/202108:59 EDT  25min spent in reviewing records, discussion and examination of the patient and discussion with other members of the patient's medical team.     Dictated utilizing Dragon dictation

## 2021-08-06 NOTE — NURSING NOTE
Patient 87% on RA, placed patient on 2L NC and patient recovered to 93%. Pulmonary notified and will place order for O2.

## 2021-08-06 NOTE — CASE MANAGEMENT/SOCIAL WORK
Continued Stay Note  [facility]     Patient Name: Rufus Dorsey  MRN: [id number]  Today's Date: 8/6/2021    Admit Date: 8/2/2021    Discharge Plan     Row Name 08/06/21 1504       Plan    Plan  Return home with brother    Patient/Family in Agreement with Plan  yes    Plan Comments  Patient now told RN that he does not have O2 at home any more - that he returned it.  CCP will follow for possible O2 need at DC.  BHumeniuk RN    Row Name 08/06/21 1447       Plan    Plan  Return home with brother    Patient/Family in Agreement with Plan  yes    Plan Comments  Spoke with patient at bedside.  Plan remains for him to return home at DC and has home O2.  He does not have scales for daily weights - given from HF program.  Spoke with Rose/[facility] will arrange F/U with HF clinic.  CCP will follow as needed.  BHumeniuk RN               Expected Discharge Date and Time     Expected Discharge Date Expected Discharge Time    Aug 9, 2021             Becky S. Humeniuk, RN

## 2021-08-07 ENCOUNTER — READMISSION MANAGEMENT (OUTPATIENT)
Dept: CALL CENTER | Facility: HOSPITAL | Age: 36
End: 2021-08-07

## 2021-08-07 NOTE — OUTREACH NOTE
Prep Survey      Responses   [facility] facility patient discharged from?  [facility]   Is LACE score < 7 ?  No   Emergency Room discharge w/ pulse ox?  No   Eligibility  Readm Mgmt   Discharge diagnosis  Acute on chronic combined systolic and diastolic CHF    Does the patient have one of the following disease processes/diagnoses(primary or secondary)?  CHF   Does the patient have Home health ordered?  No   Is there a DME ordered?  No   Prep survey completed?  Yes          Sagrario Lakhani RN

## 2021-08-09 NOTE — CASE MANAGEMENT/SOCIAL WORK
Case Management Discharge Note      Final Note: DC'd home with brother and [facility] providing O2 8/6.          Durable Medical Equipment Coordination complete.    Service Provider Selected Services Address Phone Fax Patient Preferred    [facility]  Durable Medical Equipment [address], [place] [id number] [phone] --                   Transportation Services  Private: Car    Final Discharge Disposition Code: 01 - home or self-care

## 2021-08-12 ENCOUNTER — READMISSION MANAGEMENT (OUTPATIENT)
Dept: CALL CENTER | Facility: HOSPITAL | Age: 36
End: 2021-08-12

## 2021-08-12 NOTE — OUTREACH NOTE
CHF Week 1 Survey      Responses   [facility] patient discharged from?  [facility]   Does the patient have one of the following disease processes/diagnoses(primary or secondary)?  CHF   CHF Week 1 attempt successful?  No [Call to patient and attempted to review medications--noted that there was a language barrier so communicated to patient that we would end this call and that another call would be placed with an .  He voiced understanding.  ]   Call start time  1210   Unsuccessful attempts  Attempt 1 [Returned call x 2 with Intrepreter #[phone] unsucessfully--updated chart to indicate  needed]   Discharge diagnosis  Acute on chronic combined systolic and diastolic CHF    Meds reviewed with patient/caregiver?  Yes          Erin Griffin RN

## 2021-08-18 ENCOUNTER — HOSPITAL ENCOUNTER (OUTPATIENT)
Dept: CARDIOLOGY | Facility: HOSPITAL | Age: 36
Discharge: HOME | End: 2021-08-18

## 2021-08-18 ENCOUNTER — READMISSION MANAGEMENT (OUTPATIENT)
Dept: CALL CENTER | Facility: HOSPITAL | Age: 36
End: 2021-08-18

## 2021-08-18 ENCOUNTER — LAB (OUTPATIENT)
Dept: LAB | Facility: HOSPITAL | Age: 36
End: 2021-08-18

## 2021-08-18 ENCOUNTER — TELEPHONE (OUTPATIENT)
Dept: CARDIAC REHAB | Facility: HOSPITAL | Age: 36
End: 2021-08-18

## 2021-08-18 VITALS
SYSTOLIC BLOOD PRESSURE: 134 MMHG | BODY MASS INDEX: 44.03 KG/M2 | RESPIRATION RATE: 16 BRPM | DIASTOLIC BLOOD PRESSURE: 76 MMHG | WEIGHT: 274 LBS | HEIGHT: 66 IN | HEART RATE: 72 BPM | OXYGEN SATURATION: 96 %

## 2021-08-18 DIAGNOSIS — Z13.220 SCREENING CHOLESTEROL LEVEL: ICD-10-CM

## 2021-08-18 DIAGNOSIS — I50.814 BIVENTRICULAR HEART FAILURE WITH REDUCED LEFT VENTRICULAR FUNCTION (HCC): ICD-10-CM

## 2021-08-18 DIAGNOSIS — N18.2 CKD (CHRONIC KIDNEY DISEASE) STAGE 2, GFR 60-89 ML/MIN: ICD-10-CM

## 2021-08-18 DIAGNOSIS — Z79.4 TYPE 2 DIABETES MELLITUS WITHOUT COMPLICATION, WITH LONG-TERM CURRENT USE OF INSULIN (HCC): ICD-10-CM

## 2021-08-18 DIAGNOSIS — I27.20 PULMONARY HTN (HCC): Primary | ICD-10-CM

## 2021-08-18 DIAGNOSIS — R06.83 SNORES: ICD-10-CM

## 2021-08-18 DIAGNOSIS — E11.9 TYPE 2 DIABETES MELLITUS WITHOUT COMPLICATION, WITH LONG-TERM CURRENT USE OF INSULIN (HCC): ICD-10-CM

## 2021-08-18 PROBLEM — I50.43 ACUTE ON CHRONIC COMBINED SYSTOLIC AND DIASTOLIC CHF (CONGESTIVE HEART FAILURE): Status: RESOLVED | Noted: 2021-08-03 | Resolved: 2021-08-18

## 2021-08-18 LAB
ALBUMIN UR-MCNC: >440 MG/DL
ANION GAP SERPL CALCULATED.3IONS-SCNC: 9.5 MMOL/L (ref 5–15)
BACTERIA UR QL AUTO: ABNORMAL /HPF
BILIRUB UR QL STRIP: NEGATIVE
BUN SERPL-MCNC: 26 MG/DL (ref 6–20)
BUN/CREAT SERPL: 17.1 (ref 7–25)
CALCIUM SPEC-SCNC: 8.1 MG/DL (ref 8.6–10.5)
CHLORIDE SERPL-SCNC: 105 MMOL/L (ref 98–107)
CHOLEST SERPL-MCNC: 155 MG/DL (ref 0–200)
CLARITY UR: CLEAR
CO2 SERPL-SCNC: 23.5 MMOL/L (ref 22–29)
COLOR UR: ABNORMAL
CREAT SERPL-MCNC: 1.52 MG/DL (ref 0.76–1.27)
CREAT UR-MCNC: 302.1 MG/DL
GFR SERPL CREATININE-BSD FRML MDRD: 52 ML/MIN/1.73
GLUCOSE SERPL-MCNC: 120 MG/DL (ref 65–99)
GLUCOSE UR STRIP-MCNC: ABNORMAL MG/DL
HDLC SERPL-MCNC: 37 MG/DL (ref 40–60)
HGB UR QL STRIP.AUTO: ABNORMAL
HYALINE CASTS UR QL AUTO: ABNORMAL /LPF
KETONES UR QL STRIP: ABNORMAL
LDLC SERPL CALC-MCNC: 86 MG/DL (ref 0–100)
LDLC/HDLC SERPL: 2.19 {RATIO}
LEUKOCYTE ESTERASE UR QL STRIP.AUTO: NEGATIVE
NITRITE UR QL STRIP: NEGATIVE
PH UR STRIP.AUTO: 6.5 [PH] (ref 5–8)
POTASSIUM SERPL-SCNC: 3.7 MMOL/L (ref 3.5–5.2)
PROT UR QL STRIP: ABNORMAL
PROT UR-MCNC: 1040 MG/DL
PROT/CREAT UR: 3442.6 MG/G CREA (ref 0–200)
RBC # UR: ABNORMAL /HPF
REF LAB TEST METHOD: ABNORMAL
SODIUM SERPL-SCNC: 138 MMOL/L (ref 136–145)
SP GR UR STRIP: >=1.03 (ref 1–1.03)
SQUAMOUS #/AREA URNS HPF: ABNORMAL /HPF
TRIGL SERPL-MCNC: 184 MG/DL (ref 0–150)
UROBILINOGEN UR QL STRIP: ABNORMAL
VLDLC SERPL-MCNC: 32 MG/DL (ref 5–40)
WBC UR QL AUTO: ABNORMAL /HPF

## 2021-08-18 PROCEDURE — 81001 URINALYSIS AUTO W/SCOPE: CPT

## 2021-08-18 PROCEDURE — G0463 HOSPITAL OUTPT CLINIC VISIT: HCPCS

## 2021-08-18 PROCEDURE — 80048 BASIC METABOLIC PNL TOTAL CA: CPT

## 2021-08-18 PROCEDURE — 82043 UR ALBUMIN QUANTITATIVE: CPT

## 2021-08-18 PROCEDURE — 82570 ASSAY OF URINE CREATININE: CPT

## 2021-08-18 PROCEDURE — 99215 OFFICE O/P EST HI 40 MIN: CPT | Performed by: INTERNAL MEDICINE

## 2021-08-18 PROCEDURE — 84156 ASSAY OF PROTEIN URINE: CPT

## 2021-08-18 PROCEDURE — 80061 LIPID PANEL: CPT

## 2021-08-18 PROCEDURE — 36415 COLL VENOUS BLD VENIPUNCTURE: CPT

## 2021-08-18 RX ORDER — SODIUM CHLORIDE 0.9 % (FLUSH) 0.9 %
10 SYRINGE (ML) INJECTION AS NEEDED
Status: CANCELLED | OUTPATIENT
Start: 2021-09-14

## 2021-08-18 RX ORDER — SODIUM CHLORIDE 0.9 % (FLUSH) 0.9 %
3 SYRINGE (ML) INJECTION EVERY 12 HOURS SCHEDULED
Status: CANCELLED | OUTPATIENT
Start: 2021-09-14

## 2021-08-18 NOTE — TELEPHONE ENCOUNTER
Received referral to Pulmonary Rehab. PFT order noted. Patient will be scheduled as soon as results are available. Please call with any questions.

## 2021-08-18 NOTE — PATIENT INSTRUCTIONS
Sacubitril; Valsartan Oral Tablets  ¿Qué es ivy medicamento?  El SACUBITRIL; VALSARTÁN es megan combinación de un inhibidor de la neprilisina y un bloqueador del receptor de angiotensina II. Se usa para tratar la insuficiencia cardiaca.  Ivy medicamento puede ser utilizado para otros usos; si tiene alguna pregunta consulte con landers proveedor de atención médica o con landers farmacéutico.  MARCAS COMUNES: Entresto  ¿Qué le nivia informar a mi profesional de la amelia antes de barrett ivy medicamento?  Necesitan saber si usted presenta alguno de los siguientes problemas o situaciones:  diabetes y kirsten un medicamento que contiene aliskiren enfermedad renal enfermedad hepática megan reacción alérgica o inusual al sacubitril; al valsartán, a fármacos llamados inhibidores de la enzima convertidora de la angiotensina (ECA), a bloqueadores del receptor de angiotensina II (BRA), a otros medicamentos, alimentos, colorantes o conservantes si está embarazada o buscando quedar embarazada si está amamantando a un bebé  ¿Cómo nivia utilizar ivy medicamento?  [place] ivy fármaco por vía oral. Úselo según las instrucciones en la etiqueta a la misma hora todos los días. Puede tomarlo con o sin alimentos. Si el medicamento le produce malestar estomacal, tómelo con alimentos. Siga tomándolo a menos que landers proveedor de atención médica le indique dejar de hacerlo.  Hable con landers proveedor de atención médica sobre el uso de ivy fármaco en niños. Aunque se puede recetar a niños tan pequeños owen de 1 año de edad con ciertas afecciones, existen precauciones que deben tomarse.  Sobredosis: Póngase en contacto inmediatamente con un centro toxicológico o megan graham de urgencia si usted herminio que haya tomado demasiado medicamento.  ATENCIÓN: Ivy medicamento es solo para usted. No comparta ivy medicamento con nadie.  ¿Qué sucede si me olvido de megan dosis?  Si olvida megan dosis, adminístrela lo antes posible. Si es tyree la hora de la próxima dosis, administre  solo patrick dosis. No se administre dosis adicionales o dobles.  ¿Qué puede interactuar con ivy medicamento?  No use esta medicina con ninguno de los siguientes medicamentos:  aliskiren si tiene diabetes inhibidores de la enzima convertidora de la angiotensina (ECA), tales woen benazepril, captopril, enalapril, fosinopril, lisinopril o ramipril  Esta medicina también puede interactuar con los siguientes medicamentos:  bloqueadores del receptor de angiotensina II (BRA) tales owen azilsartán, candesartán, eprosartán, irbesartán, losartán, olmesartán, telmisartán o valsartán litio SARAH, medicamentos para el dolor y la inflamación, tales owen ibuprofeno o naproxeno diuréticos ahorradores de potasio, tales owen amilorida, espironolactona y triamtereno suplementos de potasio  Puede ser que esta lista no menciona todas las posibles interacciones. Informe a landers profesional de la amelia de todos los productos a base de hierbas, medicamentos de venta jalen o suplementos nutritivos que esté tomando. Si usted fuma, consume bebidas alcohólicas o si utiliza drogas ilegales, indíqueselo también a landers profesional de la amelia. Algunas sustancias pueden interactuar con landers medicamento.  ¿A qué nivia estar atento al usar ivy medicamento?  Informe a landers médico o a landers profesional de la amelia si sarbjit síntomas no comienzan a mejorar o si empeoran.  No debe quedar embarazada mientras esté usando ivy medicamento. Las mujeres deben informar a landers médico si están buscando quedar embarazadas o si creen que podrían estar embarazadas. Existe la posibilidad de efectos secundarios graves en un bebé sin nacer. Para obtener más información, hable con landers profesional de la amelia o landers farmacéutico.  Puede experimentar mareos. No conduzca, no utilice maquinaria ni anais nada que le exija permanecer en estado de alerta hasta que sepa cómo le afecta ivy medicamento. No se siente ni se ponga de pie con rapidez, especialmente si es un paciente de edad avanzada.  [place] reduce el riesgo de mareos o desmayos. Evite consumir bebidas alcohólicas; pueden hacer que se sienta más mareado.  ¿Qué efectos secundarios puedo tener al utilizar ivy medicamento?  Efectos secundarios que debe informar a landers médico o a landers profesional de la amelia tan pronto owen sea posible:  reacciones alérgicas, tales owen erupción cutánea, comezón/picazón o urticaria, e hinchazón de la stanley, los labios o la lengua signos y síntomas de aumento del nivel de potasio, tales owen debilidad muscular; dolor en el pecho; o frecuencia cardiaca rápida o irregular signos y síntomas de lesión renal, tales owen dificultad para orinar o cambios en la cantidad de orina signos y síntomas de presión sanguínea baja, tales owen sentirse mareado o aturdido, o si desarrolla fatiga extrema  Efectos secundarios que generalmente no requieren atención médica (infórmelos a landers médico o a landers profesional de la amelia si persisten o si son molestos):  tos  Puede ser que esta lista no menciona todos los posibles efectos secundarios. Comuníquese a landers médico por asesoramiento médico sobre los efectos secundarios. Usted puede informar los efectos secundarios a la FDA por teléfono al [phone].  ¿Dónde nivia guardar mi medicina?  Mantenga fuera del alcance de los niños y las mascotas.  Guarde a temperatura ambiente, entre 20 y 25 grados Celsius (68 y 77 grados Fahrenheit). Proteja de la humedad. Mantenga el recipiente catracho cerrado. Deseche todo el fármaco que no haya utilizado después de la fecha de vencimiento.  ATENCIÓN: Ivy folleto es un resumen. Puede ser que no cubra toda la posible información. Si usted tiene preguntas acerca de esta medicina, consulte con landers médico, landers farmacéutico o landers profesional de la amelia.  © 2021 Elsevier[employer] (2021-03-20 00:00:00)  Cateterismo de la arteria pulmonar  Pulmonary Artery Catheterization    El cateterismo de la arteria pulmonar es un procedimiento que se realiza para evaluar la  circulación sanguínea a través del corazón y controlar el funcionamiento del corazón. En ivy procedimiento, se introduce un tubo flexible y judd (catéter) en el lado derecho del corazón y dentro de la arteria principal que transporta la nakul desde el corazón hasta los pulmones (arteria pulmonar). El procedimiento puede realizarse carine megan cirugía cardíaca o de los vasos sanguíneos, carine un cateterismo cardíaco o para controlar afecciones graves en la unidad de cuidados intensivos.  Informe al médico acerca de lo siguiente:  · Cualquier alergia que tenga.  · Todos los medicamentos que utiliza, incluidos vitaminas, hierbas, gotas oftálmicas, cremas y medicamentos de venta jalen.  · Cualquier problema que usted o sarbjit familiares hayan tenido con anestésicos.  · Cualquier enfermedad de la nakul que tenga.  · Cirugías previas a las que se sometió.  · Cualquier afección médica que tenga.  · Si está embarazada o podría estarlo.  ¿Cuáles son los riesgos?  En general, se trata de un procedimiento seguro. Sin embargo, pueden ocurrir complicaciones, por ejemplo:  · Hematomas o hemorragia en el lugar de inserción del catéter.  · Lesiones en la vena donde se insertó el catéter.  · Punción en el pulmón. Ivy es un riesgo si se usan las venas del moises o del pecho.  · Infección.  También pueden presentarse los siguientes problemas, aunque son poco frecuentes:  · Ritmos cardíacos anormales.  · Presión arterial baja.  · Acumulación de líquido alrededor del corazón.  · Obstrucción de un vaso sanguíneo debido a un coágulo de nakul (embolia).  ¿Qué ocurre antes del procedimiento?  Medicamentos  Consulte al médico sobre:  · Cambiar o suspender los medicamentos que kirsten habitualmente. [place] es muy importante si kirsten medicamentos para la diabetes o anticoagulantes.  · Brigid medicamentos owen aspirina e ibuprofeno. Estos medicamentos pueden tener un efecto anticoagulante en la nakul. No tome estos medicamentos a menos que el  médico se lo indique.  · Brigid medicamentos de venta jalen, vitaminas, hierbas y suplementos.  Instrucciones generales  · Siga las indicaciones del médico respecto de las restricciones para las comidas o las bebidas.  · Arun que alguien lo lleve a landers casa desde el hospital o la clínica.  · Pregúntele al médico qué medidas se tomarán para ayudar a prevenir megan infección. [place] puede incluir lo siguiente:  ? Rasurar el vello alrededor del lugar de la cirugía.  ? Aron la piel con un jabón antiséptico.  ? Antibióticos.  ¿Qué ocurre carine el procedimiento?    · Le colocarán megan vía intravenosa en megan de las venas.  · Le administrarán markus de los siguientes medicamentos o ambos:  ? Un medicamento para ayudarlo a relajarse (sedante).  ? Un medicamento para adormecer la lopez (anestesia local).  · Le realizarán megan pequeña incisión en megan vena de la lopez de inserción.  · Se insertará un catéter a través de la incisión hasta llegar a la vena. El médico desplazará cuidadosamente el catéter hasta la cámara superior del corazón (aurícula derecha). Se pueden usar radiografías para ayudar a guiar el catéter hasta el lugar correcto.  · Se introducirá el catéter a través de dos válvulas cardíacas (la válvula tricúspide y la válvula pulmonar) y se lo colocará dentro de la arteria pulmonar.  · Tan pronto owen el catéter esté en landers lugar, se medirá la presión arterial en la arteria pulmonar.  · Carine el procedimiento, se le controlará constantemente el ritmo cardíaco con un electrocardiograma (ECG).  · Se retirará el catéter megan vez completados los estudios y el monitoreo.  Leslie procedimiento puede variar según el médico y el hospital.  ¿Qué sucede después del procedimiento?  · Le controlarán la presión arterial, la frecuencia cardíaca, la frecuencia respiratoria y el nivel de oxígeno en la nakul hasta que abandone el hospital o la clínica.  Resumen  · El cateterismo de la arteria pulmonar es un procedimiento que se realiza para  evaluar la circulación sanguínea a través del corazón y controlar la actividad cardíaca.  · El procedimiento puede realizarse carine megan cirugía cardíaca o de los vasos sanguíneos, carine un cateterismo cardíaco o para controlar afecciones graves en la unidad de cuidados intensivos.  · Se introduce un tubo flexible y judd (catéter) en el lado derecho del corazón y dentro de la arteria principal que transporta la nakul desde el corazón hasta los pulmones (arteria pulmonar).  Esta información no tiene johnathon fin reemplazar el consejo del médico. Asegúrese de hacerle al médico cualquier pregunta que tenga.  Document Revised: 02/21/2019 Document Reviewed: 02/20/2019  Elsevier Patient Education © 2021 Elsevier Inc.  Hipertensión pulmonar  Pulmonary Hypertension  La hipertensión pulmonar es megan afección de larga duración (crónica) en la que existe hipertensión arterial en las arterias de los pulmones (arterias pulmonares). Esta afección ocurre cuando las arterias pulmonares se estrechan y tensan, lo que dificulta que la nakul fluya a través de los pulmones. Johnathon resultado, el corazón debe trabajar más para bombear nakul a través de los pulmones, lo que dificulta la respiración.  Con el paso del tiempo, la hipertensión pulmonar puede debilitar y dañar el músculo cardíaco, en especial, landers lado derecho. La hipertensión pulmonar es megan afección grave que puede poner en riesgo la jv.  ¿Cuáles son las causas?  Leslie trastorno podría ser consecuencia de diferentes afecciones. Según landers causa, puede categorizarse en ro grupos:  · Jr 1: Hipertensión pulmonar causada por un crecimiento anormal de pequeños vasos sanguíneos en los pulmones (hipertensión arterial pulmonar). El crecimiento anormal de vasos sanguíneos puede tener causas desconocidas o puede deberse a lo siguiente:  ? Transmitido de padres a hijos (hereditario).  ? Otra enfermedad, johnathon enfermedades del tejido conjuntivo (por ejemplo, lupus o esclerodermia),  enfermedad cardíaca congénita, enfermedad hepática o VIH.  ? Ciertos medicamentos o sustancias venenosas (toxinas).  · Jr 2: Hipertensión pulmonar causada por debilidad de la cámara izquierda del corazón (ventrículo za) o por megan enfermedad en las válvulas cardíacas.  · Jr 3: Hipertensión pulmonar causada por enfermedad pulmonar o niveles bajos de oxígeno. Las causas de ivy jr incluyen lo siguiente:  ? Enfisema o enfermedad pulmonar obstructiva crónica (EPOC).  ? Apnea del sueño sin tratar.  ? Fibrosis pulmonar.  ? Exposición prolongada a grandes [place] en ciertas personas que pudieran tener, desde antes, un mayor riesgo de sufrir hipertensión pulmonar.  · Jr 4: Hipertensión pulmonar causada por coágulos de nakul en los pulmones (embolia pulmonar).  · Jr 5: Otras causas de hipertensión pulmonar, owen anemia drepanocítica, sarcoidosis, tumores que presionan las arterias pulmonares y muchas otras enfermedades.  ¿Cuáles son los signos o los síntomas?  Los síntomas de esta afección incluyen los siguientes:  · Falta de aire. Puede notar que le falta el aire:  ? Al realizar megan actividad, owen caminar.  ? Al realizar megan actividad de muy poco esfuerzo, owen vestirse.  ? Sin realizar actividad, cuando aún está sentado.  · Tos. En algunos casos, es posible que al toser expulse mucosidad con nakul proveniente de los pulmones (hemoptisis).  · Cansancio o fatiga.  · Mareos, aturdimiento o desmayos, en especial, al realizar actividad física.  · Latidos cardíacos rápidos o sensación de que el corazón aletea o que los latidos son intermitentes (palpitaciones).  · Agrandamiento de las venas del moises.  · Hinchazón del abdomen, en la parte inferior de las piernas o en ambos.  · Color azulado en los labios y en las puntas de los dedos.  · Dolor u opresión en el pecho.  · Dolor abdominal, especialmente, en la lopez superior del abdomen.  ¿Cómo se diagnostica?  Esta afección podría diagnosticarse a partir de  markus o más de los siguientes estudios:  · Radiografía de tórax.  · Análisis de nakul.  · Exploración por tomografía computarizada (TC).  · Pruebas de la función pulmonar. Esta prueba mide la cantidad de aire que pueden contener los pulmones. También controla cómo entra y sale el aire de los pulmones.  · Prueba de caminata de 6 minutos. Con esta se evalúa la gravedad de la afección en relación con sarbjit niveles de actividad.  · Electrocardiograma (ECG). Es un estudio que registra los impulsos eléctricos del corazón.  · Ecocardiograma. Leslie estudio usa ondas sonoras (ecografía) para obtener megan imagen del corazón.  · Cateterismo cardíaco. En leslie procedimiento, se introduce un tubo judd (catéter) en la arteria pulmonar y se usa para evaluar la presión en esta y en el lado derecho del corazón.  · Biopsia de pulmón. [place] puede incluir un procedimiento para extraer megan muestra de tejido pulmonar (biopsia) para landers evaluación. Podría ayudar a determinar megan causa preexistente de la hipertensión pulmonar.  ¿Cómo se trata?  Esta afección no tiene denis, gabriella el tratamiento puede ayudar a aliviar los síntomas y a retrasar el progreso de la enfermedad. El tratamiento puede incluir lo siguiente:  · Rehabilitación cardíaca. [place] es un programa de tratamiento que involucra la realización de ejercicios, educación y asesoramiento para ayudarlo a fortalecerse y retomar un estilo de jv activo.  · Oxigenoterapia.  · Medicamentos para:  ? Presión arterial más baja.  ? Relajar (dilatar) los vasos sanguíneos pulmonares.  ? Ayudar a los latidos cardíacos a ser más eficaces y bombear más nakul.  ? Ayudar al cuerpo a eliminar el exceso de líquidos (diuréticos).  ? Hacer menos espesa la nakul para prevenir la formación de coágulos de nakul en los pulmones.  · Cirugía de pulmón para aliviar la presión en el corazón, en los casos graves que no responden a los tratamientos médicos.  · Trasplante de pulmón y corazón, o trasplante de pulmón.  [place] podría realizarse en casos muy graves.  Siga estas indicaciones en landers casa:  Comida y bebida    · Siga megan dieta saludable que incluya abundantes frutas y verduras frescas, cereales integrales y frijoles.  · Limite el consumo de sal (sodio) a menos de 2300 mg por día.  Estilo de jv  · No consuma ningún producto que contenga nicotina o tabaco, owen cigarrillos y cigarrillos electrónicos. Si necesita ayuda para dejar de fumar, consulte al médico.  · Evite ser fumador pasivo.  Actividad  · Descanse lo suficiente.  · Practique los ejercicios que le indiquen. Pregúntele al médico qué ejercicios son seguros para usted.  · Evite saunas y bañeras de hidromasajes.  · Evite los lugares muy altos.  Instrucciones generales  · Tow los medicamentos de venta jalen y los recetados solamente owen se lo haya indicado el médico. No cambie ni interrumpa los medicamentos sin consultar con el médico.  · Mantenga sarbjit vacunas al día, especialmente, las vacunas contra la gripe y contra la neumonía anuales.  · Si es ramon en edad fértil, evite quedar embarazada. Hable con landers médico sobre los métodos anticonceptivos.  · Considere modos de obtener ayuda para controlar la ansiedad y el estrés que le provoca vivir con hipertensión pulmonar. Hable con el médico sobre distintos grupos de apoyo y recursos en línea.  · Use oxigenoterapia en landers hogar según las indicaciones.  · Lleve un registro de landers peso. El aumento de peso podría ser un indicio de que la afección está empeorando.  · Concurra a todas las visitas de control owen se lo haya indicado el médico. [place] es importante.  Comuníquese con un médico si:  · La tos empeora.  · Le falta el aire más de lo habitual, o comienza a tener dificultades para realizar actividades que antes podía hacer.  · Debe usar los medicamentos o el oxígeno con mayor frecuencia o en dosis más altas de lo habitual.  Solicite ayuda de inmediato si:  · Le falta el aire de manera preocupante.  · Siente dolor u  opresión en el pecho.  · Tose y escupe nakul.  · Tiene hinchazón en los pies o las piernas, y ivy cuadro empeora.  · Aumenta mucho de peso en el transcurso de 1 o 2 días.  · Los medicamentos o el oxígeno no dan resultado.  Resumen  · La hipertensión pulmonar es megan afección crónica en la que existe hipertensión arterial en las arterias de los pulmones (arterias pulmonares).  · La hipertensión pulmonar es megan afección grave que puede poner en riesgo la jv. Puede ser consecuencia de diversas enfermedades.  · El tratamiento podría consistir en barrett medicamentos y usar oxigenoterapia. Los casos graves podrían requerir la realización de megan cirugía o un trasplante.  Esta información no tiene owen fin reemplazar el consejo del médico. Asegúrese de hacerle al médico cualquier pregunta que tenga.  Document Revised: 08/29/2018 Document Reviewed: 08/29/2018  Elsevier Patient Education © 2021 Elsevier Inc.

## 2021-08-18 NOTE — PROGRESS NOTES
Heart Failure & Pulmonary Arterial Hypertension  Dale Porter M.D., Ph.D., [facility]       Justine Mneon MD  [address] [address]    Thank you for asking me to see Rufus Dorsey for .    History of Present Illness  This is a 36 y.o. male with:    1.  Concerns for PH  2. NICM      Rufus Dorsey is a 36 y.o. male who presents for consultation today.  The patient is typically seen by Provider, No Known.  The patient's primary cardiologist is Dr. Menon. The patient has a chief complaint of Systolic CHF.    The patient is a Afghan-speaking male.  Telephone  was utilized today for the visit.  The patient is referred after a recent admission to our facility.  He was noted to have a previous severe COVID-19 course that ended up necessitating discharged home on oxygen.  He presented to the emergency department with dyspnea.  He was found to have acute hypoxic respiratory failure, pulmonary edema and a moderate systolic acute congestive heart failure.  There was concerns for underlying VERONICA and pulmonary hypertension.  The patient was admitted to the hospitalist service.  He underwent aggressive diuresis.  He was initiated on carvedilol 12.5 mg 1 tablet p.o. twice daily.  He is currently using furosemide.  Plans were for outpatient sleep study.  His 2D echocardiogram did show biventricular dysfunction.  There was concern for underlying pulmonary hypertension given his degree of right ventricular dysfunction.  There was no overt indication of pulmonary hypertension on his 2D TTE.  Myocardial perfusion scintigraphy showed no evidence of inducible ischemia.  His LVEF on that study was 19%.  He was not placed on ACE-/ARB.  He is currently using a low-dose of Norvasc.  He denies a history of autoimmune disease.  No prior history of VTE.  No family history of cardiomyopathy or pulmonary hypertension.  This was the first time he has been told there was concerns for pulmonary hypertension.  He  is routinely followed by PCP.  He was diagnosed with uncontrolled diabetes when he was admitted and has been placed on insulin.  He is currently medication compliant.  He does have a picture of his pill bottles at home.  He states that he is improving since he has left the hospital.      Review of Systems - Review of Systems   Cardiovascular: Positive for dyspnea on exertion and leg swelling. Negative for chest pain, claudication and cyanosis.   Respiratory: Positive for shortness of breath.         All other systems were reviewed and were negative.    Family history is unknown by patient.     reports that he has quit smoking. He has never used smokeless tobacco. He reports that he does not drink alcohol and does not use drugs.    No Known Allergies      Current Outpatient Medications:   •  amLODIPine (NORVASC) 2.5 MG tablet, Take 1 tablet by mouth Daily., Disp: 30 tablet, Rfl: 0  •  ASPIRIN PO, 81 mg Daily., Disp: , Rfl:   •  atorvastatin (LIPITOR) 80 MG tablet, Take 1 tablet by mouth Daily., Disp: 30 tablet, Rfl: 0  •  Blood Glucose Monitoring Suppl (FreeStyle Freedom Lite) w/Device kit, Use to check blood sugar as directed., Disp: 1 each, Rfl: 0  •  budesonide-formoterol (SYMBICORT) 160-4.5 MCG/ACT inhaler, Inhale 2 puffs 2 (Two) Times a Day., Disp: 10.2 g, Rfl: 0  •  carvedilol (COREG) 12.5 MG tablet, Take 1 tablet by mouth 2 (Two) Times a Day With Meals., Disp: 60 tablet, Rfl: 0  •  furosemide (LASIX) 20 MG tablet, Take 1 tablet by mouth Daily., Disp: 30 tablet, Rfl: 0  •  glucose blood test strip, Test 4 (Four) Times a Day After Meals & at Bedtime. Use as instructed, Disp: 420 each, Rfl: 0  •  glucose monitor monitoring kit, 1 each 4 (Four) Times a Day Before Meals & at Bedtime., Disp: 1 each, Rfl: 0  •  Insulin Glargine (BASAGLAR KWIKPEN) 100 UNIT/ML injection pen, Inject 20 Units under the skin into the appropriate area as directed Every Night., Disp: 5 pen, Rfl: 0  •  Insulin Pen Needle 32G X 4 MM misc, Use  with insulin 5 times daily, Disp: 200 each, Rfl: 0  •  Lancets 28G misc, 1 each by Other route 4 (Four) Times a Day After Meals & at Bedtime., Disp: 100 each, Rfl: 0  •  mometasone-formoterol (Dulera) 200-5 MCG/ACT inhaler, Inhale 1 puff 2 (two) times a day., Disp: 13 g, Rfl: 0      Physical Exam:  I have reviewed the patient's current vital signs as documented in the patient's EMR.   Vitals:    08/18/21 1440   BP: 134/76   Pulse: 72   Resp: 16   SpO2: 96%     Body mass index is 44.25 kg/m².   Pulse Pressure: high  Pulse Ox: Normal  on room air  General: alert, appears stated age and cooperative     Body Habitus: morbidly obese    HEENT: Head: Normocephalic, no lesions, without obvious abnormality. No arcus senilis, xanthelasma or xanthomas.  No malar flush, proptosis, xanthomas or malar flush.   Neuro: alert, oriented x3  speech normal in context and clarity  memory intact grossly  Pulses: 2+ and symmetric  JVP: Volume/Pulsation: Normal.  Normal x and y descent.  Waveforms: Normal waveforms with a, x, and v waves.   Physiology: Appropriate inspiratory decrease.  No Kussmaul's. No Christiana's.      Pulmonary:Normal     Precordium: Normal impulses. P2 is not palpable.  RV Heave: absent  LV Heave: absent  Lizella:  normal size and placement  Palpable S4: absent.  Heart rate: normal    Heart Rhythm: regular     Heart Sounds: S1: normal intensity  S2: normal intensity  S3: absent   S4: absent  Opening Snap: absent    A2-OS:  no  Pericardial Rub:  Absent: Yes      Ejection click: None      Murmurs:  absent   Extremity: moves all extremities equally.   Edema: No edema.       DATA REVIEWED:       TTE/ANTHONY:  Results for orders placed during the hospital encounter of 08/02/21    Adult Transthoracic Echo Complete W/ Cont if Necessary Per Protocol    Interpretation Summary  · Calculated left ventricular EF = 36.9% Estimated left ventricular EF = 37% Estimated left ventricular EF was in agreement with the calculated left  ventricular EF. Left ventricular systolic function is moderately decreased. Normal left ventricular wall thickness noted. The left ventricular cavity is severely dilated. There is left ventricular global hypokinesis noted. Left ventricular diastolic dysfunction is noted. There is at least Grade 3 diastolic dysfunction No evidence of a left ventricular thrombus present.  · The right ventricular cavity is mildly dilated. Moderately reduced right ventricular systolic function noted.  · Left atrial volume is moderately increased. The right atrial cavity is mildly dilated  · Trace aortic valve regurgitation is present.      My interpretation of the TTE is below.   LVEF is around 35%.  RV is dilated with reduced systolic function.    -----------------------------------------------------  CT:   CT Angiogram Chest    Result Date: 8/2/2021  Extensive bilateral pneumonia and bilateral pleural effusions, greater in size on the right than the left. Moderate cardiomegaly and evidence of heart failure as noted. There is no CT evidence of acute pulmonary thromboembolism.  This report was finalized on 8/2/2021 7:03 PM by Dr. Christian Palacios M.D.      I personally reviewed the images of the CT scan.  My personal interpretation is below.      Extensive bilateral GGO.  Dilated pulmonary artery.  Cardiomegaly.  Reflux of contrast into the IVC indicative of right ventricular dysfunction.  Moderate sized right pleural effusion.  Small left pleural effusion.        Laboratory evaluations:    Lab Results   Component Value Date    GLUCOSE 190 (H) 08/06/2021    BUN 32 (H) 08/06/2021    CREATININE 1.28 (H) 08/06/2021    EGFRIFNONA 64 08/06/2021    EGFRIFAFRI 54 (L) 12/21/2020    BCR 25.0 08/06/2021    K 3.5 08/06/2021    CO2 29.5 (H) 08/06/2021    CALCIUM 8.0 (L) 08/06/2021    ALBUMIN 2.50 (L) 08/04/2021    AST 26 08/04/2021    ALT 22 08/04/2021     Lab Results   Component Value Date    WBC 8.27 08/04/2021    HGB 14.5 08/04/2021    HCT 45.4  08/04/2021    MCV 85.3 08/04/2021     08/04/2021     No results found for: CHOL, CHLPL, TRIG, HDL, LDL, LDLDIRECT  Lab Results   Component Value Date    TSH 3.550 08/02/2021     Lab Results   Component Value Date    CKTOTAL 154 01/08/2021    TROPONINT 0.050 (C) 08/02/2021     Lab Results   Component Value Date    HGBA1C 9.07 (H) 08/03/2021     No results found for: DDIMER  Lab Results   Component Value Date    ALT 22 08/04/2021     Lab Results   Component Value Date    HGBA1C 9.07 (H) 08/03/2021    HGBA1C 9.50 (H) 12/21/2020     Lab Results   Component Value Date    CREATININE 1.28 (H) 08/06/2021     Lab Results   Component Value Date    FERRITIN 745.00 (H) 01/08/2021     No results found for: INR, PROTIME    Assessment/Plan     1. Pulmonary HTN (CMS/HCC). PAH/PVH. WHO Group Likely 2/3; FC: III.  RV status: Abnormal:.  PFTs: Pending. The patient was diagnosed with likely secondary PH due to untreated VERONICA and biventricular failure.  Patient recently had COVID-19 viral pneumonia.  It is possible that he has had worsening pulmonary pressures secondary to this.  He certainly has demonstrated right ventricular dysfunction on his 2D TTE, as well as his CTA.  CTA was certainly consistent with COVID-19 pneumonia.    The Shetty Index was updated today. Most recent score: 6. Score change: 0.     RHC PRE-OP:  The indications, risks and benefits of diagnostic right  heart cardiac catheterization, angiography, conscious sedation, and possible blood transfusion were discussed in detail with the patient. The increased risks that are present with pulmonary arterial hypertension with right heart catheterization were emphasized. I have cited a complication rate of 1.1% with total adverse events. This includes a 0.3% risk of inpatient admission due to a complication. I have cited a 0.5% risk of fatality. This includes the risk of PA perforation. I have cited a risk of hematoma, vagal reactions and pneumothorax as <1%. Pulmonary  vasoreactivity testing, if indicated, can cause hypotension, bronchospasm, chest pain and SOB. Pulmonary angiography, if indicated, can rarely cause bronchospasm and one reported death has been cited due to intrapulmonary hemorrhage. I have also discussed the possible risks, though low, of heart block and the need for emergency venous pacing. Additionally, I went over that if a rare complication requires a thoracotomy or median sternotomy that this would be performed emergently by CTS. The patient is willing to proceed.      ASA class is ASA 3 - Patient with moderate systemic disease with functional limitations; Mallampati is III (soft and hard palate and base of uvula visible).    -RHC, RUE access, possible VD challenge, Co-Oximetry run  -Discussed evaluation, treatment and usual course. All questions were answered.  -PFTs  -Sleep eval  -Pulm referral  -Recommended we continue active clinical surveillance.  Signs and symptoms of worsening PAH and RV failure were discussed. Hand-out was provided in discharge paperwork.     2. Biventricular heart failure with reduced left ventricular function (CMS/HCC). not acutely decompensated chronic severe systolic heart failure. Left Heart Failure and Right Heart Failure. Etiology: Viral. LVEF 19%.  NYHA stage C;FC-III. NYHA FC change: did not change. Last 6MWT:  Community Ambulator (>0.8 m/s).      Today, Patient appears euvolemic.and with  Moderate perfusion. The patient's hemodynamics are currently acceptable. HR is: normal and is not at goal. BP/MAP was reviewed and there isroom for medication up-titration.  Clinical trajectory was assessed and hasimproved.     I suspect we will need to eventually discontinue his carvedilol and transition him to Toprol-XL so that we can be more aggressive with his triple hormonal therapy.  However, we will continue his current dose of carvedilol today.    -STOP Aldactone  -Carvedilol 12.5 mg 1 tablet p.o. twice daily     -The patient will be  started on Entresto at 24 mg- 26 mg 1 tablet p.o. twice daily.  Patient will have a follow-up appointment in 1 week for a BMP.   -The patient has been instructed to not take Entresto with any ACE-I or ARB  -The patient has been instructed to not take Entresto with any medication that contains Aliskiren  -Handout was provided on Entresto  -MRA:   The patient is FC-NYHA Class III and MRA is indicated.   -Likely can introduce at next appt  -SGLT2 inhibitor therapy:  -Deferred  -Fluid restriction:   requested  -Other 2000 ml  -Patient has been asked to weigh daily and was provided with a printed diuretic strategy.  -Sodium restriction:   -1,500 mg Na  -ICD/CRT-D:   Possibly indicated, will readdress after GDMT      3.  CKD, possible. Will send basic labs/urine. May need nephrology.    4.  Snores. Concerns for sleep disordered breathing. There is concern for VERONICA. STOP-Bang screening questionnaire was performed and yielded High Risk . No prior nocturnal polysomnography. The risks of untreated VERONICA were explained, as well as the need for sleep study.   -Avoid ETOH, weight reduction  -Treatment of HTN, per PCP  -Sleep referral   5.    Screening cholesterol level will be sent today.   6.  DM2. Endo referral.          Return in about 1 week (around 8/25/2021).

## 2021-08-18 NOTE — OUTREACH NOTE
CHF Week 2 Survey      Responses   [facility] patient discharged from?  [place]   Does the patient have one of the following disease processes/diagnoses(primary or secondary)?  CHF   Week 2 attempt successful?  Yes   Call start time  [phone]   Call end time  0914   Discharge diagnosis  Acute on chronic combined systolic and diastolic CHF    If primary language is not English what is the name and relationship or agency of  used?  [employer] ID #[id number]   Meds reviewed with patient/caregiver?  Yes   Is the patient having any side effects they believe may be caused by any medication additions or changes?  No   Does the patient have all medications ordered at discharge?  Yes   Prescription comments  PATIENT STATES HE IS UNSURE IF HE HAS ALL MEDICATIONS. HE STATES THEY ARE AT HIS WORK RIGHT NOW, AND NOT WITH HIM AT HOME. PATIENT STATES HE WILL GO PICK THEM UP AND TAKE THEM TO HIS APPOINTMENT AT THE HEART FAILURE CLINIC TODAY, SO A NURSE CAN GO THROUGH THEM WITH HIM.    Is the patient taking all medications as directed (includes completed medication regime)?  Yes   Does the patient have a primary care provider?   Yes   Does the patient have an appointment with their PCP within 7 days of discharge?  No   Comments regarding PCP  PATIENT STATES HE GOES TO [facility]   What is preventing the patient from scheduling follow up appointments within 7 days of discharge?  Haven't had time   Nursing Interventions  Educated patient on importance of making appointment, Advised patient to make appointment   Has the patient kept scheduled appointments due by today?  N/A   Comments  PATIENT HAS AN APPOINTMENT THIS AFTERNOON AT THE HEART FAILURE CLINIC. ADDRESS OF THIS CLINIC PROVIDED TO PATIENT.    Has home health visited the patient within 72 hours of discharge?  N/A   Pulse Ox monitoring  None   Psychosocial issues?  No   Did the patient receive a copy of their discharge instructions?   Yes   Nursing interventions  Reviewed instructions with patient   What is the patient's perception of their health status since discharge?  Improving   Nursing interventions  Nurse provided patient education   Is the patient weighing daily?  Yes   Does the patient have scales?  Yes   Daily weight interventions  Education provided on importance of daily weight   If the patient is a current smoker, are they able to teach back resources for cessation?  Not a smoker   Is the patient/caregiver able to teach back the hierarchy of who to call/visit for symptoms/problems? PCP, Specialist, Home health nurse, Urgent Care, ED, 911  Yes   CHF Week 2 call completed?  Yes          Yudith Saleh LPN

## 2021-08-18 NOTE — PROGRESS NOTES
"[facility] HEART FAILURE      Patient Name: Rufus Dorsey  :1985  Age: 36 y.o.  Sex: male  Referring Provider: Justine Menon MD   Primary Cardiologist: Dr. Menon  Encounter Provider: Adrienne Coffey, PharmD, BCACP      Chief Complaint:   Chief Complaint   Patient presents with   • Congestive Heart Failure       HPI:  Patient presents for his initial visit in the [facility]. Of note, patient is [place] Islander speaking and an  via phone was utilized. PMH is significant for HFrEF (EF 37%), CAD s/p CABG x 3, ischemic cardiomyopathy, HTN, pulmonary hypertension, obesity, T2DM, and history of severe COVID-19 infection. He was recently hospitalized on 21 for an acute HF exacerbation and was discharged on 21. It does not sound on the discharge summary that patient was taking any medications prior to his admission. He did bring in a picture of his medication bottles to his visit today and he has had no issues/concerns with his medications since discharge. Overall, he has been feeling well since discharge and feels like he has been stable.      Current Regimen:  Heart Failure  Carvedilol 12.5 mg bid  Furosemide 20 mg daily      Other CV Meds  ASA 81 mg daily  Atorvastatin 80 mg daily  Amlodipine 2.5 mg daily      Medication Use:  Adherence: good, no missed doses since hospitalization  Past hx of medication use/intolerance: none  Affordability: no current prescription insurance      Diet:  Defer discussion to future visit      Social History:  Tobacco use: former smoker  Exercise: none currently, he asked about being able to walk      Immunization Status:  Pneumococcal: due  Influenza: obtains annually      OBJECTIVE:    /76 (BP Location: Left arm, Patient Position: Sitting)   Pulse 72   Resp 16   Ht 167.6 cm (65.98\")   Wt 124 kg (274 lb)   SpO2 96%   BMI 44.25 kg/m²     Body mass index is 44.25 kg/m².  Wt Readings from Last 1 Encounters:   21 124 kg (274 lb)       Home BP Readings: checks daily; " systolic ranges between 120-130      Lab Review:  Renal Function: Estimated Creatinine Clearance: 99.2 mL/min (A) (by C-G formula based on SCr of 1.28 mg/dL (H)).    Lab Results   Component Value Date    PROBNP 802.9 (H) 08/04/2021       Results for orders placed during the hospital encounter of 08/02/21    Adult Transthoracic Echo Complete W/ Cont if Necessary Per Protocol    Interpretation Summary  · Calculated left ventricular EF = 36.9% Estimated left ventricular EF = 37% Estimated left ventricular EF was in agreement with the calculated left ventricular EF. Left ventricular systolic function is moderately decreased. Normal left ventricular wall thickness noted. The left ventricular cavity is severely dilated. There is left ventricular global hypokinesis noted. Left ventricular diastolic dysfunction is noted. There is at least Grade 3 diastolic dysfunction No evidence of a left ventricular thrombus present.  · The right ventricular cavity is mildly dilated. Moderately reduced right ventricular systolic function noted.  · Left atrial volume is moderately increased. The right atrial cavity is mildly dilated  · Trace aortic valve regurgitation is present.      ASSESSMENT/PLAN OF CARE:    1. HFrEF (EF 37%)  - Patient reports he has been stable since discharge. Will work to optimize medication regimen  - Stop amlodipine  - Start Entresto 24/26 mg twice daily. Provided patient with samples from clinic today. Directions were written in Trinidadian  - Continue carvedilol 12.5 mg twice daily  - Continue furosemide 20 mg daily  - Will plan to start SGLT2i and up-titrate ARNI/BB to target doses at future appts  - Defer diet discussion to future visit  - Patient was advised he can start walking and this was encouraged. He plans to go to a gym  - Patient is due for PPSV23. He is also due for COVID-19 vaccination. Will discuss with him at future visit  - Advised patient to call clinic with 2-3 lb weight gain in 24 hrs or >5 lb  weight gain in 1 week       2. Insurance  - Patient does not currently have insurance. He states every time he has reached out to someone, he gets put on hold and he has not been able to complete this  - I will reach out to [employer] to see if someone can help facilitate      3. T2DM  - Patient's last A1c was 9.07% on 8/3/21. This is above goal of <7%. Patient currently takes Basaglar 20 units daily  - Will discuss with him at future appt if he has a PCP who can help manage. Ultimately, I would suggest patient be started on metformin + GLP1 agonist, especially given his cardiac history. We will also plan to start him on SGLT2i in this clinic due to his HF. I think on this combination he could likely be weaned off of his insulin  - For now, continue Basaglar 20 units daily  - Urine microalbumin/creatinine ordered today  - Eye/foot exam due      4. CAD s/p CABG x 3  - Patient indicated for high intensity statin  - Continue atorvastatin 80 mg daily  - I don't see a lipid panel via chart review. We will order one today      5. Pulmonary hypertension  - This was discussed with him by MD at visit today. Plan is to do a RHC and then medications will be discussed if needed      6. Duplicate inhalers  - Patient has both Symbicort and Dulera listed on medication list which is duplicate therapy. Will discuss with him at next visit to see which inhaler he is actually taking. He did mention he is only taking 1 but could not recall the name        30 min spent in direct patient care        Thank you for allowing me to participate in the care of your patient,         Adrienne Coffey [facility] HEART FAILURE  08/18/21  14:48 EDT

## 2021-08-26 ENCOUNTER — TRANSCRIBE ORDERS (OUTPATIENT)
Dept: ADMINISTRATIVE | Facility: HOSPITAL | Age: 36
End: 2021-08-26

## 2021-08-26 ENCOUNTER — HOSPITAL ENCOUNTER (OUTPATIENT)
Dept: CARDIOLOGY | Facility: HOSPITAL | Age: 36
Discharge: HOME | End: 2021-08-26
Admitting: NURSE PRACTITIONER

## 2021-08-26 VITALS
WEIGHT: 271.6 LBS | DIASTOLIC BLOOD PRESSURE: 74 MMHG | HEART RATE: 72 BPM | RESPIRATION RATE: 16 BRPM | SYSTOLIC BLOOD PRESSURE: 112 MMHG | HEIGHT: 66 IN | BODY MASS INDEX: 43.65 KG/M2 | OXYGEN SATURATION: 94 %

## 2021-08-26 DIAGNOSIS — E11.59 TYPE 2 DIABETES MELLITUS WITH OTHER CIRCULATORY COMPLICATION, WITH LONG-TERM CURRENT USE OF INSULIN (HCC): ICD-10-CM

## 2021-08-26 DIAGNOSIS — Z01.818 OTHER SPECIFIED PRE-OPERATIVE EXAMINATION: Primary | ICD-10-CM

## 2021-08-26 DIAGNOSIS — I27.20 PULMONARY HTN (HCC): ICD-10-CM

## 2021-08-26 DIAGNOSIS — Z79.4 TYPE 2 DIABETES MELLITUS WITH OTHER CIRCULATORY COMPLICATION, WITH LONG-TERM CURRENT USE OF INSULIN (HCC): ICD-10-CM

## 2021-08-26 DIAGNOSIS — I50.814 BIVENTRICULAR HEART FAILURE WITH REDUCED LEFT VENTRICULAR FUNCTION (HCC): Primary | ICD-10-CM

## 2021-08-26 DIAGNOSIS — I25.10 CORONARY ARTERY DISEASE INVOLVING NATIVE HEART WITHOUT ANGINA PECTORIS, UNSPECIFIED VESSEL OR LESION TYPE: ICD-10-CM

## 2021-08-26 DIAGNOSIS — I10 ESSENTIAL HYPERTENSION: ICD-10-CM

## 2021-08-26 PROBLEM — J06.9 VIRAL URI WITH COUGH: Status: RESOLVED | Noted: 2021-08-02 | Resolved: 2021-08-26

## 2021-08-26 PROBLEM — U07.1 PNEUMONIA DUE TO COVID-19 VIRUS: Status: RESOLVED | Noted: 2020-12-21 | Resolved: 2021-08-26

## 2021-08-26 PROBLEM — Z13.220 SCREENING CHOLESTEROL LEVEL: Status: RESOLVED | Noted: 2021-08-18 | Resolved: 2021-08-26

## 2021-08-26 PROBLEM — U07.1 COVID-19 VIRUS INFECTION: Status: RESOLVED | Noted: 2020-12-21 | Resolved: 2021-08-26

## 2021-08-26 PROBLEM — J96.01 ACUTE RESPIRATORY FAILURE WITH HYPOXIA: Status: RESOLVED | Noted: 2020-12-21 | Resolved: 2021-08-26

## 2021-08-26 PROBLEM — J12.82 PNEUMONIA DUE TO COVID-19 VIRUS: Status: RESOLVED | Noted: 2020-12-21 | Resolved: 2021-08-26

## 2021-08-26 PROBLEM — J81.0 ACUTE PULMONARY EDEMA: Status: RESOLVED | Noted: 2021-08-03 | Resolved: 2021-08-26

## 2021-08-26 PROBLEM — J18.9 PNEUMONIA OF BOTH LUNGS DUE TO INFECTIOUS ORGANISM: Status: RESOLVED | Noted: 2020-12-21 | Resolved: 2021-08-26

## 2021-08-26 PROBLEM — R91.8 BILATERAL PULMONARY INFILTRATES ON CHEST X-RAY: Status: RESOLVED | Noted: 2021-08-03 | Resolved: 2021-08-26

## 2021-08-26 LAB
ANION GAP SERPL CALCULATED.3IONS-SCNC: 9.2 MMOL/L (ref 5–15)
BUN SERPL-MCNC: 24 MG/DL (ref 6–20)
BUN/CREAT SERPL: 15.4 (ref 7–25)
CALCIUM SPEC-SCNC: 8.3 MG/DL (ref 8.6–10.5)
CHLORIDE SERPL-SCNC: 106 MMOL/L (ref 98–107)
CO2 SERPL-SCNC: 25.8 MMOL/L (ref 22–29)
CREAT SERPL-MCNC: 1.56 MG/DL (ref 0.76–1.27)
GFR SERPL CREATININE-BSD FRML MDRD: 51 ML/MIN/1.73
GLUCOSE SERPL-MCNC: 119 MG/DL (ref 65–99)
POTASSIUM SERPL-SCNC: 4 MMOL/L (ref 3.5–5.2)
SODIUM SERPL-SCNC: 141 MMOL/L (ref 136–145)

## 2021-08-26 PROCEDURE — 99215 OFFICE O/P EST HI 40 MIN: CPT | Performed by: NURSE PRACTITIONER

## 2021-08-26 PROCEDURE — 80048 BASIC METABOLIC PNL TOTAL CA: CPT

## 2021-08-26 PROCEDURE — 94618 PULMONARY STRESS TESTING: CPT

## 2021-08-26 RX ORDER — ASPIRIN 81 MG/1
81 TABLET ORAL DAILY
Status: ON HOLD | COMMUNITY
End: 2021-12-09 | Stop reason: SDUPTHER

## 2021-08-26 RX ORDER — SPIRONOLACTONE 25 MG/1
25 TABLET ORAL DAILY
Qty: 30 TABLET | Refills: 1 | Status: SHIPPED | OUTPATIENT
Start: 2021-08-26 | End: 2021-12-09 | Stop reason: HOSPADM

## 2021-08-26 NOTE — PROGRESS NOTES
"[facility] HEART FAILURE      Patient Name: Rufus Dorsey  :1985  Age: 36 y.o.  Sex: male  Referring Provider: Justine Menon MD   Primary Cardiologist: Dr. Menon  Encounter Provider: Adrienne Coffey, PharmD, BCACP      Chief Complaint:   Chief Complaint   Patient presents with   • Congestive Heart Failure       HPI:  Patient presents for a follow-up visit in the [facility]. Of note, patient is [place]de[place] speaking and an  via iPad was utilized.  # [id number]. PMH is significant for HFrEF (EF 37%), CAD s/p CABG x 3, ischemic cardiomyopathy, HTN, pulmonary hypertension, obesity, T2DM, and history of severe COVID-19 infection. Patient states he has been tolerating Entresto well. He has not been checking his BP at home but does not report any dizziness or lightheadedness.         Current Regimen:  Heart Failure  Carvedilol 12.5 mg bid  Furosemide 20 mg daily  Entresto 24/26 mg bid      Other CV Meds  ASA 81 mg daily  Atorvastatin 80 mg daily      Medication Use:  Adherence: good, no missed doses since hospitalization  Past hx of medication use/intolerance: none  Affordability: no current prescription insurance  he did fill out application he received at hospital discharge and is awaiting his card      Diet:  He states he has been eating mostly vegetables and eating less meat and tortillas. He is currently drinking about 2-3 L of water daily.      Social History:  Tobacco use: former smoker  Exercise: none currently, he asked about being able to walk      Immunization Status:  Pneumococcal: due  Influenza: obtains annually  COVID-19: due      OBJECTIVE:    /74 (BP Location: Left arm, Patient Position: Sitting)   Pulse 72   Resp 16   Ht 167.6 cm (65.98\")   Wt 123 kg (271 lb 9.6 oz)   SpO2 94%   BMI 43.86 kg/m²     Body mass index is 43.86 kg/m².  Wt Readings from Last 1 Encounters:   21 123 kg (271 lb 9.6 oz)       Home BP Readings: has not checked recently      Lab Review:  Renal Function: " Estimated Creatinine Clearance: 83.2 mL/min (A) (by C-G formula based on SCr of 1.52 mg/dL (H)).    Lab Results   Component Value Date    PROBNP 802.9 (H) 08/04/2021       Results for orders placed during the hospital encounter of 08/02/21    Adult Transthoracic Echo Complete W/ Cont if Necessary Per Protocol    Interpretation Summary  · Calculated left ventricular EF = 36.9% Estimated left ventricular EF = 37% Estimated left ventricular EF was in agreement with the calculated left ventricular EF. Left ventricular systolic function is moderately decreased. Normal left ventricular wall thickness noted. The left ventricular cavity is severely dilated. There is left ventricular global hypokinesis noted. Left ventricular diastolic dysfunction is noted. There is at least Grade 3 diastolic dysfunction No evidence of a left ventricular thrombus present.  · The right ventricular cavity is mildly dilated. Moderately reduced right ventricular systolic function noted.  · Left atrial volume is moderately increased. The right atrial cavity is mildly dilated  · Trace aortic valve regurgitation is present.      ASSESSMENT/PLAN OF CARE:    1. HFrEF (EF 37%)  - Patient has been tolerating Entresto initiation well. He does not report any dizziness/lightheadedness but has not been checking his BP at home. BMP today  - Start spironolactone 25 mg daily. Reviewed MOA/dosing/side effects. Will recheck BMP next week to assess K and renal function. Sent rx to [facility] retail pharmacy and walked patient down there to  prior to leaving  - Continue Entresto 24/26 mg twice daily  - Continue carvedilol 12.5 mg twice daily  - Continue furosemide 20 mg daily  - Will plan to start SGLT2i and up-titrate ARNI/BB to target doses at future appts. We may need to switch carvedilol to metoprolol succinate to give a little more BP room  - Will re-discuss exercise recommendations at next visit and see if patient has started walking  - Patient is due  for PPSV23 and COVID-19 vaccines. Discussed and advised patient to receive COVID-19 vaccine today. He is agreeable and will check with vaccination tent to see if they are open. Will discuss PPSV23 after he receives both COVID-19 vaccines  - Advised patient to call clinic with 2-3 lb weight gain in 24 hrs or >5 lb weight gain in 1 week       2. Insurance  - Patient filled out insurance application he received from the hospital. It looks like he received a temporary ID number and is awaiting his insurance card      3. Pulmonary hypertension  - Handout on RHC provided to patient at last visit. He has reviewed this and is agreeable to proceeding with RHC. Order entered and waiting to be scheduled      4. T2DM  - Patient's last A1c was 9.07% on 8/3/21. This is above goal of <7%. Patient currently takes Basaglar 20 units daily  - Will discuss with him at future appt if he has a PCP who can help manage. Ultimately, I would suggest patient be started on metformin + GLP1 agonist, especially given his cardiac history. We will also plan to start him on SGLT2i in this clinic due to his HF. I think on this combination he could likely be weaned off of his insulin  - For now, continue Basaglar 20 units daily        20 min spent in direct patient care        Thank you for allowing me to participate in the care of your patient,         Adrienne Coffey [facility] HEART FAILURE  08/26/21  14:48 EDT

## 2021-08-26 NOTE — PROGRESS NOTES
[facility] HEART FAILURE      Patient Name: Rufus Dorsey  :1985  Age: 36 y.o.  Sex: male  Referring Provider: Justine Menon MD   Primary Cardiologist: Justine Menon MD  Encounter Provider:  JESSICA Hernández      Chief Complaint:   Chief Complaint   Patient presents with   • Congestive Heart Failure         History of Present Illness This 35 y/o male, Welsh-speaking, new to this provider, comes today for further evaluation regarding his chronic systolic heart failure.  Current diagnoses to include biventricular heart failure, pulmonary hypertension, CKD, DM2, coronary artery disease s/p CABG x3 with Dr. Key, ischemic cardiomyopathy, and prior severe illness with COVID pneumonia in May 2020.    He was admitted 2021-2021 with exacerbation of his heart failure.  Echocardiogram during admission revealed no overt indication of pulmonary hypertension with biventricular dysfunction, EF 19%.  Aggressive diuresis was initiated as was carvedilol.  He was discharged home on oral Lasix with plans for an outpatient sleep study.  Stress testing revealed no evidence of myocardial ischemia.  While admitted he was diagnosed with uncontrolled diabetes and placed on insulin.  He has been very compliant today with his medication regimen.    He was seen outpatient by Dr. Porter  and amlodipine was stopped and Entresto was started.  Referral was sent for pulmonology, PFTs, sleep study, and right heart cath was discussed with the patient.     He is currently tolerating the addition of Entresto well.  He has no active complaints at this time.   used via iPad to interview patient,  number [id number].      The following portions of the patient's history were reviewed and updated as appropriate: allergies, current medications, past family history, past medical history, past social history, past surgical history and problem list.    Current Outpatient Medications   Medication Sig  Dispense Refill   • aspirin 81 MG EC tablet Take 81 mg by mouth Daily.     • atorvastatin (LIPITOR) 80 MG tablet Take 1 tablet by mouth Daily. 30 tablet 0   • Blood Glucose Monitoring Suppl (FreeStyle Freedom Lite) w/Device kit Use to check blood sugar as directed. 1 each 0   • budesonide-formoterol (SYMBICORT) 160-4.5 MCG/ACT inhaler Inhale 2 puffs 2 (Two) Times a Day. 10.2 g 0   • carvedilol (COREG) 12.5 MG tablet Take 1 tablet by mouth 2 (Two) Times a Day With Meals. 60 tablet 0   • furosemide (LASIX) 20 MG tablet Take 1 tablet by mouth Daily. 30 tablet 0   • glucose blood test strip Test 4 (Four) Times a Day After Meals & at Bedtime 420 each 0   • glucose monitor monitoring kit 1 each 4 (Four) Times a Day Before Meals & at Bedtime. 1 each 0   • Insulin Glargine (BASAGLAR KWIKPEN) 100 UNIT/ML injection pen Inject 20 Units under the skin into the appropriate area as directed Every Night. 5 pen 0   • Insulin Pen Needle 32G X 4 MM misc Use with insulin 5 times daily 200 each 0   • Lancets 28G misc 1 each by Other route 4 (Four) Times a Day After Meals & at Bedtime. 100 each 0   • sacubitril-valsartan (ENTRESTO) 24-26 MG tablet Take 1 tablet by mouth 2 (Two) Times a Day. 60 tablet    • spironolactone (ALDACTONE) 25 MG tablet Take 1 tablet by mouth Daily. 30 tablet 1     No current facility-administered medications for this encounter.       Past Medical History:   Diagnosis Date   • Coronary artery disease    • Diabetes (CMS/[facility])    • Heart failure (CMS/HCC) 05/16/2016   • High cholesterol    • Hypertension    • Ischemic cardiomyopathy 06/25/2016       Past Surgical History:   Procedure Laterality Date   • CARDIAC CATHETERIZATION  01/25/2017    Right heart cath   • CARDIAC SURGERY     • CORONARY ARTERY BYPASS GRAFT  05/26/2015    cabg x3 Dr. Key       Physical Exam  Constitutional:       General: He is not in acute distress.     Appearance: He is well-developed. He is obese. He is not ill-appearing.   HENT:       "Head: Normocephalic and atraumatic.   Eyes:      Conjunctiva/sclera: Conjunctivae normal.      Pupils: Pupils are equal, round, and reactive to light.   Neck:      Vascular: No JVD.   Cardiovascular:      Rate and Rhythm: Normal rate and regular rhythm.      Heart sounds: Normal heart sounds. No murmur heard.   No friction rub. No gallop.    Pulmonary:      Effort: Pulmonary effort is normal. No respiratory distress.      Breath sounds: Normal breath sounds.   Abdominal:      General: Bowel sounds are normal. There is no distension.      Palpations: Abdomen is soft.   Musculoskeletal:         General: No swelling or deformity.   Skin:     General: Skin is warm and dry.      Capillary Refill: Capillary refill takes less than 2 seconds.   Neurological:      Mental Status: He is alert and oriented to person, place, and time. Mental status is at baseline.   Psychiatric:         Mood and Affect: Mood normal.         Behavior: Behavior normal.          Review of Systems   Constitutional: Negative for fatigue and unexpected weight change.   HENT: Negative for congestion and nosebleeds.    Eyes: Negative for photophobia and visual disturbance.   Respiratory: Negative for cough, chest tightness and shortness of breath.    Cardiovascular: Negative for chest pain, palpitations and leg swelling.   Gastrointestinal: Negative for abdominal distention and blood in stool.   Endocrine: Negative for polyphagia and polyuria.   Genitourinary: Negative for frequency and urgency.   Musculoskeletal: Negative for joint swelling and myalgias.   Skin: Negative for pallor and rash.   Neurological: Negative for dizziness, syncope, weakness, light-headedness, numbness and headaches.   Hematological: Does not bruise/bleed easily.   Psychiatric/Behavioral: Negative for confusion and sleep disturbance.        OBJECTIVE:  /74 (BP Location: Left arm, Patient Position: Sitting)   Pulse 72   Resp 16   Ht 167.6 cm (65.98\")   Wt 123 kg (271 lb " 9.6 oz)   SpO2 94%   BMI 43.86 kg/m²      Body mass index is 43.86 kg/m².  Wt Readings from Last 1 Encounters:   08/26/21 123 kg (271 lb 9.6 oz)       Lab Review:  Renal Function: Estimated Creatinine Clearance: 81 mL/min (A) (by C-G formula based on SCr of 1.56 mg/dL (H)).    Lab Results   Component Value Date    PROBNP 802.9 (H) 08/04/2021       Results for orders placed during the hospital encounter of 08/02/21    Adult Transthoracic Echo Complete W/ Cont if Necessary Per Protocol    Interpretation Summary  · Calculated left ventricular EF = 36.9% Estimated left ventricular EF = 37% Estimated left ventricular EF was in agreement with the calculated left ventricular EF. Left ventricular systolic function is moderately decreased. Normal left ventricular wall thickness noted. The left ventricular cavity is severely dilated. There is left ventricular global hypokinesis noted. Left ventricular diastolic dysfunction is noted. There is at least Grade 3 diastolic dysfunction No evidence of a left ventricular thrombus present.  · The right ventricular cavity is mildly dilated. Moderately reduced right ventricular systolic function noted.  · Left atrial volume is moderately increased. The right atrial cavity is mildly dilated  · Trace aortic valve regurgitation is present.      Procedures      6 MINUTE WALK  Distance: recovery: total distance 1350 ft     SpO2: 96%  Supplemental O2 Used?: No     6 Minute Walk Comments: HR 68; DYS 0; /72    Cardiac Procedures:  1. Data Deficit       Previously trialed diuretics  Lasix      Previously trialed GDMT    Entresto  Carvedilol      ASSESSMENT:     Diagnosis Plan   1. Biventricular heart failure with reduced left ventricular function (CMS/HCC)  Basic Metabolic Panel   2. Pulmonary HTN (CMS/HCC)     3. Essential hypertension     4. Coronary artery disease involving native heart without angina pectoris, unspecified vessel or lesion type     5. Type 2 diabetes mellitus with  other circulatory complication, with long-term current use of insulin (CMS/[facility])           PLAN OF CARE:  1.  HFrEF-NYHA class IIc.  Most recent ejection fraction by echocardiogram 19%.  Current guideline directed medical therapy to include Entresto and carvedilol.  Diuresed on Lasix.    Today we will add spironolactone.  He was able to review the information regarding right heart catheterization, and he would like to proceed with this.  Pulmonary function testing has been ordered and he states they called him to schedule this today.  We will get him set up for his right heart catheterization as well.  Risks and benefits reviewed with the patient: Risks to include bleeding, infection, perforation of vessel, or death.  I advised that neck step will be to add an SGLT2 inhibitor, and once Dr. Porter has the results of his pulmonary function testing and his right heart catheterization he will begin optimizing his therapy for his pulmonary hypertension as well.    He has been working really hard on his low-sodium diet he was commended for this.  We also reviewed fluid restriction.  He states he is currently drinking 2 to 3 L of fluid daily, I advised that he cut this down to 1.5 L daily.  BMP and BNP to be checked today.  The patient states that we can email his results to him and he will be able to have them translated this way.    Directions for when to call the clinic reviewed with the patient to include weight gain of 2 to 3 pounds in 24 hours, weight gain of 5 to 10 pounds within 7 days; worsening shortness of breath; worsening lower extremity edema or abdominal distention.    2.  Pulmonary hypertension-PFTs and right heart cath pending.  Dr. Porter will evaluate him once this testing is completed again.    3.  Coronary artery disease-status post CABG x3 with Dr. Key at [facility].  Currently denies angina, stable.    4.  Hypertension-stable and controlled.    5.  DM 2-currently on insulin.   Patient to be started on SGLT2 inhibitor at next appointment most likely.      The pharmacist did review the importance of getting the COVID vaccine and he is willing to go as early as today to get this.              Advance Care Planning   To be reviewed at subsequent visit      Right heart cath; start spironolactone 25 mg daily; BMP/BNP today; follow-up with repeat BMP in 1 week for labs only; follow-up with Dr. Porter after RHC; 6MWT today    Thank you for allowing me to participate in the care of your patient,         JESSICA Hernández  [facility] HEART FAILURE  08/26/21  13:24 EDT      **Morteza Disclaimer:**  Much of this encounter note is an electronic transcription/translation of spoken language to printed text. The electronic translation of spoken language may permit erroneous, or at times, nonsensical words or phrases to be inadvertently transcribed. Although I have reviewed the note for such errors, some may still exist.

## 2021-08-27 ENCOUNTER — TELEPHONE (OUTPATIENT)
Dept: CARDIOLOGY | Facility: CLINIC | Age: 36
End: 2021-08-27

## 2021-08-27 NOTE — TELEPHONE ENCOUNTER
line used.   number: [id number].    Lab results reviewed the patient.  No change to current plan of care.  All questions and concerns addressed at this time.  Understanding and agreement verbalized.    JESSICA Hernández

## 2021-09-01 ENCOUNTER — HOSPITAL ENCOUNTER (OUTPATIENT)
Dept: CARDIOLOGY | Facility: HOSPITAL | Age: 36
Discharge: HOME | End: 2021-09-01
Admitting: INTERNAL MEDICINE

## 2021-09-01 VITALS
SYSTOLIC BLOOD PRESSURE: 118 MMHG | HEIGHT: 66 IN | RESPIRATION RATE: 16 BRPM | OXYGEN SATURATION: 96 % | HEART RATE: 70 BPM | DIASTOLIC BLOOD PRESSURE: 76 MMHG | BODY MASS INDEX: 43.36 KG/M2 | WEIGHT: 269.8 LBS

## 2021-09-01 DIAGNOSIS — I50.814 BIVENTRICULAR HEART FAILURE WITH REDUCED LEFT VENTRICULAR FUNCTION (HCC): ICD-10-CM

## 2021-09-01 DIAGNOSIS — I27.20 PULMONARY HTN (HCC): Primary | ICD-10-CM

## 2021-09-01 DIAGNOSIS — R06.83 SNORES: ICD-10-CM

## 2021-09-01 DIAGNOSIS — N18.2 CKD (CHRONIC KIDNEY DISEASE) STAGE 2, GFR 60-89 ML/MIN: ICD-10-CM

## 2021-09-01 LAB
ANION GAP SERPL CALCULATED.3IONS-SCNC: 7.1 MMOL/L (ref 5–15)
BUN SERPL-MCNC: 25 MG/DL (ref 6–20)
BUN/CREAT SERPL: 17.6 (ref 7–25)
CALCIUM SPEC-SCNC: 8.6 MG/DL (ref 8.6–10.5)
CHLORIDE SERPL-SCNC: 104 MMOL/L (ref 98–107)
CO2 SERPL-SCNC: 26.9 MMOL/L (ref 22–29)
CREAT SERPL-MCNC: 1.42 MG/DL (ref 0.76–1.27)
GFR SERPL CREATININE-BSD FRML MDRD: 56 ML/MIN/1.73
GLUCOSE SERPL-MCNC: 152 MG/DL (ref 65–99)
POTASSIUM SERPL-SCNC: 3.9 MMOL/L (ref 3.5–5.2)
SODIUM SERPL-SCNC: 138 MMOL/L (ref 136–145)

## 2021-09-01 PROCEDURE — 80048 BASIC METABOLIC PNL TOTAL CA: CPT

## 2021-09-01 PROCEDURE — G0463 HOSPITAL OUTPT CLINIC VISIT: HCPCS

## 2021-09-01 PROCEDURE — 99214 OFFICE O/P EST MOD 30 MIN: CPT | Performed by: INTERNAL MEDICINE

## 2021-09-01 RX ORDER — METOPROLOL SUCCINATE 50 MG/1
50 TABLET, EXTENDED RELEASE ORAL DAILY
Qty: 31 TABLET | Refills: 3 | Status: SHIPPED | OUTPATIENT
Start: 2021-09-01 | End: 2021-12-09 | Stop reason: HOSPADM

## 2021-09-01 RX ORDER — DAPAGLIFLOZIN 10 MG/1
10 TABLET, FILM COATED ORAL DAILY
Qty: 31 TABLET | Refills: 3 | Status: ON HOLD | OUTPATIENT
Start: 2021-09-01 | End: 2021-12-09 | Stop reason: SDUPTHER

## 2021-09-01 NOTE — PROGRESS NOTES
"[facility] HEART FAILURE      Patient Name: Rufus Dorsey  :1985  Age: 36 y.o.  Sex: male  Referring Provider: Justine Menon MD   Primary Cardiologist: Dr. Menon  Encounter Provider: Adrienne Coffey, PharmD, BCACP      Chief Complaint:   Chief Complaint   Patient presents with   • Congestive Heart Failure       HPI:  Patient presents for a follow-up visit in the [facility]. Of note, patient is [place] speaking and an  was utilized. PMH is significant for HFrEF (EF 37%), CAD s/p CABG x 3, ischemic cardiomyopathy, HTN, pulmonary hypertension, obesity, T2DM, and history of severe COVID-19 infection. Patient was unable to  spironolactone after last visit due to the copay. He is still waiting on his insurance card. Overall, he continues to feel well and is stable from a HF perspective. He is scheduled for his first COVID shot on Sat 21.       Current Regimen:  Heart Failure  Carvedilol 12.5 mg bid  Furosemide 20 mg daily  Entresto 24/26 mg bid      Other CV Meds  ASA 81 mg daily  Atorvastatin 80 mg daily      Medication Use:  Adherence: good, no missed doses since hospitalization  Past hx of medication use/intolerance: none  Affordability: no current prescription insurance  he did fill out application he received at hospital discharge and is awaiting his card      Diet:  He states he has been eating mostly vegetables and eating less meat and tortillas. He is currently drinking about 2-3 L of water daily.      Social History:  Tobacco use: former smoker  Exercise: none currently, he asked about being able to walk      Immunization Status:  Pneumococcal: due  Influenza: obtains annually  COVID-19: 1st dose scheduled for 21      OBJECTIVE:    /76 (BP Location: Left arm, Patient Position: Sitting)   Pulse 70   Resp 16   Ht 167.6 cm (65.98\")   Wt 122 kg (269 lb 12.8 oz)   SpO2 96%   BMI 43.57 kg/m²     Body mass index is 43.57 kg/m².  Wt Readings from Last 1 Encounters:   21 122 kg " (269 lb 12.8 oz)       Home BP Readings: has not checked recently      Lab Review:  Renal Function: Estimated Creatinine Clearance: 80.6 mL/min (A) (by C-G formula based on SCr of 1.56 mg/dL (H)).    Lab Results   Component Value Date    PROBNP 802.9 (H) 08/04/2021       Results for orders placed during the hospital encounter of 08/02/21    Adult Transthoracic Echo Complete W/ Cont if Necessary Per Protocol    Interpretation Summary  · Calculated left ventricular EF = 36.9% Estimated left ventricular EF = 37% Estimated left ventricular EF was in agreement with the calculated left ventricular EF. Left ventricular systolic function is moderately decreased. Normal left ventricular wall thickness noted. The left ventricular cavity is severely dilated. There is left ventricular global hypokinesis noted. Left ventricular diastolic dysfunction is noted. There is at least Grade 3 diastolic dysfunction No evidence of a left ventricular thrombus present.  · The right ventricular cavity is mildly dilated. Moderately reduced right ventricular systolic function noted.  · Left atrial volume is moderately increased. The right atrial cavity is mildly dilated  · Trace aortic valve regurgitation is present.      ASSESSMENT/PLAN OF CARE:    1. HFrEF (EF 37%)  - Patient was unable to  spironolactone after last visit due to copay. He is waiting for his insurance card to be shipped. He did say he would be able to afford it today  - Stop carvedilol  - Start metoprolol succinate 50 mg daily to allow for greater BP room to titrate meds in the future  - Start spironolactone 25 mg daily. BMP in 1 week  - Start Farxiga 10 mg daily. Reviewed MOA/dosing/side effects. Provided patient with free 30-day trial card. I can provide him with samples if needed  - Continue Entresto 24/26 mg twice daily. He should have ~2 weeks left. Will provide samples at next visit and reassess patient's insurance situation  - Continue furosemide 20 mg daily  -  Will plan to up-titrate ARNI/BB dosing at future appts as patient is able to tolerate  - Patient has 1st COVID-19 shot scheduled for 9/4/21  - Asked patient to bring in medication bottles to his next visit  - Advised patient to call clinic with 2-3 lb weight gain in 24 hrs or >5 lb weight gain in 1 week       Farxiga 30-day Free Trial Information:  BIN: [id number]  PCN: [id number]  Grp: [id number]  ID: [id number]        2. T2DM  - Patient's last A1c was 9.07% on 8/3/21. This is above goal of <7%. Patient currently takes Basaglar 20 units daily  - Will discuss with him at future appt if he has a PCP who can help manage. Ultimately, I would suggest patient be started on metformin + GLP1 agonist, especially given his cardiac history. We will also plan to start him on SGLT2i in this clinic due to his HF. I think on this combination he could likely be weaned off of his insulin  - For now, continue Basaglar 20 units daily        15 min spent in direct patient care        Thank you for allowing me to participate in the care of your patient,         Adrienne Coffey [facility] HEART FAILURE  09/01/21  14:48 EDT

## 2021-09-01 NOTE — PATIENT INSTRUCTIONS
Dapagliflozin tablets  ¿Qué es leslie medicamento?  La DAPAGLIFLOZINA controla los niveles de azúcar en la nakul en personas con diabetes. Se usa con cambios en el estilo de jv tales owen dieta y ejercicio físico. También se usa para tratar la insuficiencia cardiaca. Puede disminuir la necesidad de recibir tratamiento para la insuficiencia cardiaca en un hospital.  Leslie medicamento puede ser utilizado para otros usos; si tiene alguna pregunta consulte con landers proveedor de atención médica o con landers farmacéutico.  MARCAS COMUNES: Farxiga  ¿Qué le nivia informar a mi profesional de la amelia antes de barrett leslie medicamento?  Necesitan saber si usted presenta alguno de los siguientes problemas o situaciones:  deshidratación cetoacidosis diabética dieta baja en sal comer menos debido a megan enfermedad, cirugía, dieta o cualquier otro motivo realización de cirugías antecedentes de pancreatitis o problemas del páncreas antecedentes de infección por cándida en el pene o la vagina si yvonne alcohol con frecuencia infecciones en la vejiga, los riñones o el tracto urinario enfermedad renal baja presión sanguínea en hemodiálisis problemas para orinar diabetes tipo 1 varón sin circuncidar megan reacción alérgica o inusual a la dapagliflozina, a otros medicamentos, alimentos, colorantes o conservantes si está embarazada o buscando quedar embarazada si está amamantando a un bebé  ¿Cómo nivia utilizar leslie medicamento?  [place] leslie medicamento por vía oral con un vaso de agua. Siga las instrucciones de la etiqueta del medicamento. Puede tomarlo con o sin alimentos. Si el medicamento le produce malestar estomacal, tómelo con alimentos. [place] leslie medicamento por la mañana. [place] landers dosis a la misma hora cada día. No lo tome con emgan frecuencia mayor a la indicada. No deje de tomarlo, excepto si así lo indica landers médico.  Landers farmacéutico le dará megan Guía del medicamento especial (MedGuide, nombre en inglés) con cada receta y en cada ocasión que  la vuelva a surtir. Asegúrese de leer esta información cada vez cuidadosamente.  Hable con landers pediatra para informarse acerca del uso de ivy medicamento en niños. Puede requerir atención especial.  Sobredosis: Póngase en contacto inmediatamente con un centro toxicológico o megan graham de urgencia si usted herminio que haya tomado demasiado medicamento.  ATENCIÓN: Ivy medicamento es solo para usted. No comparta ivy medicamento con nadie.  ¿Qué sucede si me olvido de megan dosis?  Si se olvida megan dosis, tómela lo antes posible. Si es tyree la hora de la próxima dosis, tome sólo patrick dosis. No tome dosis adicionales o dobles.  ¿Qué puede interactuar con ivy medicamento?  No tome esta medicina con ninguno de los siguientes medicamentos:  gatifloxacino  Esta medicina también puede interactuar con los siguientes medicamentos:  alcohol ciertos medicamentos para la presión sanguínea, enfermedad cardiaca diuréticos insulina nateglinida pioglitazona antibioticos quinolónicos owen ciprofloxacina, levofloxacino, ofloxacino repaglinida algunos suplementos dietarios a base de hierbas medicamentos esteroideos, owen prednisona o cortisona sulfonilureas, tales owen glimepirida, glipizida, gliburida medicamentos para la tiroides  Puede ser que esta lista no menciona todas las posibles interacciones. Informe a landers profesional de la amelia de todos los productos a base de hierbas, medicamentos de venta jalen o suplementos nutritivos que esté tomando. Si usted fuma, consume bebidas alcohólicas o si utiliza drogas ilegales, indíqueselo también a landers profesional de la amelia. Algunas sustancias pueden interactuar con landers medicamento.  Puede ser que esta lista no menciona todas las posibles interacciones. Informe a landers profesional de la amelia de todos los productos a base de hierbas, medicamentos de venta jalen o suplementos nutritivos que esté tomando. Si usted fuma, consume bebidas alcohólicas o si utiliza drogas ilegales, indíqueselo también a landers  profesional de la amelia. Algunas sustancias pueden interactuar con landers medicamento.  ¿A qué nivia estar atento al usar leslie medicamento?  Visite a landers médico o a landers profesional de la amelia para revisar regularmente landers evolución.  Leslie medicamento puede causar megan afección grave en la que hay demasiado ácido en la nakul. Si tiene náuseas, vómitos, dolor de estómago, cansancio inusual o problemas respiratorios, deje de barrett leslie medicamento y llame a landers médico de inmediato. Si es posible, use megan jerod reactiva de cetonas para determinar si tiene cetonas en la orina.  Se monitorizará megan prueba llamada Hemoglobina A1C (A1C). Es un análisis de nakul sencillo. Mide landers control del nivel de azúcar en la nakul carine los últimos 2 a 3 meses. Se le realizará esta prueba cada 3 a 6 meses.  Aprenda a revisar landers nivel de azúcar en la nakul. Conozca los síntomas del nivel bajo y elevado de azúcar en la nakul y cómo controlarlos.  Siempre lleve con usted megan bahman rápida de azúcar por si tiene síntomas de nivel bajo de azúcar en la nakul. Algunos ejemplos incluyen caramelos duros de azúcar o tabletas de glucosa. Asegúrese de que otras personas sepan que usted se puede ahogar si come o yvonne cuando presenta síntomas graves de nivel bajo de azúcar en la nakul, owen convulsiones o pérdida del conocimiento. Deben obtener ayuda médica de inmediato.  Informe a landers médico o profesional de la amelia si tiene niveles elevados de azúcar en la nakul. Es posible que deba cambiar la dosis de landers medicamento. Si está enfermo o hace más ejercicio que lo habitual, es posible que necesite cambiar la dosis de landers medicamento.  No saltee comidas. Pregunte a landers médico o profesional de la amelia si debe evitar el alcohol. Muchos productos de venta jalen para la tos y el resfrío contienen azúcar o alcohol. Estos pueden afectar los niveles de azúcar en la nakul.  Use un brazalete o megan bradshaw de identificación médica, y lleve con usted megan tarjeta  que describa landers enfermedad, detalles de landers medicamento y horario de dosis.  ¿Qué efectos secundarios puedo tener al utilizar ivy medicamento?  Efectos secundarios que debe informar a landers médico o a landers profesional de la amelia tan pronto owen sea posible:  reacciones alérgicas, owen erupción cutánea, comezón/picazón o urticaria, e hinchazón de la stanley, los labios o la lengua problemas respiratorios mareos sensación de desmayo o aturdimiento, caídas debilidad muscular náuseas, vómitos, dolor o malestar estomacal inusual nuevo dolor o sensibilidad, cambio en el color de la piel, llagas o úlceras, o infección en las piernas o los pies secreciones, comezón/picazón o dolor en el pene en los hombres signos y síntomas de megan infección genital, tales owen fiebre; sensibilidad, enrojecimiento, o hinchazón en los genitales o en el área desde los genitales hasta la parte de atrás del recto signos y síntomas de niveles bajos de azúcar en la nakul, tales owen ansiedad, confusión, mareos, aumento del apetito, debilidad o cansancio inusuales, sudoración, temblores, frío, irritabilidad, dolor de skip, visión borrosa, ritmo cardiaco rápido, pérdida del conocimiento signos y síntomas de megan infección urinaria, tales owen fiebre, escalofríos, megan sensación de ardor al orinar, nakul en la orina, dolor de espalda problemas para orinar o cambios en la cantidad de orina, incluso la necesidad urgente de orinar con mayor frecuencia, en cantidades mayores o carine la noche cansancio inusual flujo vaginal, comezón/picazón u olor en las mujeres  Efectos secundarios que generalmente no requieren atención médica (infórmelos a landers médico o a landers profesional de la amelia si persisten o si son molestos):  aumento leve en la frecuencia urinaria sed  Puede ser que esta lista no menciona todos los posibles efectos secundarios. Comuníquese a landers médico por asesoramiento médico sobre los efectos secundarios. Usted puede informar los efectos secundarios a  la FDA por teléfono al [phone].  ¿Dónde nivia guardar mi medicina?  Manténgala fuera del alcance de los niños.  Guárdela a temperatura ambiente, entre 15 y 30 grados C (59 y 86 grados F). Deseche todo el medicamento que no haya utilizado, después de la fecha de vencimiento.  ATENCIÓN: Leslie folleto es un resumen. Puede ser que no cubra toda la posible información. Si usted tiene preguntas acerca de esta medicina, consulte con landers médico, landers farmacéutico o landers profesional de la amelia.  © 2021 Elsevier/Gold Standard (2020-10-21 00:00:00)

## 2021-09-01 NOTE — PROGRESS NOTES
Heart Failure & Pulmonary Arterial Hypertension  Dale Porter M.D., Ph.D., [facility]       Justine Menon MD  [address] [address]    Thank you for asking me to see Rufus Dorsey for .    History of Present Illness  This is a 36 y.o. male with:    1.  Concerns for PH  2. NICM      Rufus Dorsey is a 36 y.o. male who presents for returns to clinic today.  The patient is typically seen by Provider, No Known.  The patient's primary cardiologist is Dr. Menon. The patient has a chief complaint of Systolic CHF.    This patient is a Swedish speaking male.  Telephone  was utilized for today's visit.  The following information was copied today, but updated and is accurate.  The information is retained for historical purposes: Patient presents after a recent admission to our facility.  He was noted to have a previous severe COVID-19 course that ended up necessitating discharged home on oxygen.  He presented to the emergency department with dyspnea.  He was found to have acute hypoxic respiratory failure, pulmonary edema and a moderate systolic acute congestive heart failure.  There was concerns for underlying VERONICA and pulmonary hypertension.  The patient was admitted to the hospitalist service.  He underwent aggressive diuresis.  He was initiated on carvedilol 12.5 mg 1 tablet p.o. twice daily.  He is currently using furosemide.  Plans were for outpatient sleep study.  His 2D echocardiogram did show biventricular dysfunction.  There was concern for underlying pulmonary hypertension given his degree of right ventricular dysfunction.  There was no overt indication of pulmonary hypertension on his 2D TTE.  Myocardial perfusion scintigraphy showed no evidence of inducible ischemia.  His LVEF on that study was 19%.  He was not placed on ACE-/ARB.     His regimen includes carvedilol at 12.5 mg 1 tablet p.o. twice daily, ENTRESTO 24-26mg PO BID, and furosemide 20 mg daily.  He was referred to  sleep medicine, pulmonary medicine and pulmonary rehab.  PFTs are currently pending.  He is also awaiting scheduling for his RHC.  He reports that he is medication compliant.  Denies adverse effects.  He has stable exertional dyspnea and exercise intolerance.  He has not been seen by the emergency department or admitted for ADHF since his last appointment.  In his last appointment he was started on Aldactone 25 mg.  He presents today for hemodynamic and volume assessment as well as a laboratory assessment.  Unfortunately, he states he was unable to afford the co-pay for his Aldactone, so he has not actually had pain medication.      Review of Systems - Review of Systems   Cardiovascular: Positive for dyspnea on exertion and leg swelling. Negative for chest pain, claudication and cyanosis.   Respiratory: Positive for shortness of breath.    All other systems reviewed and are negative.       All other systems were reviewed and were negative.    Family history is unknown by patient.     reports that he has quit smoking. He has never used smokeless tobacco. He reports that he does not drink alcohol and does not use drugs.    No Known Allergies      Current Outpatient Medications:   •  aspirin 81 MG EC tablet, Take 81 mg by mouth Daily., Disp: , Rfl:   •  atorvastatin (LIPITOR) 80 MG tablet, Take 1 tablet by mouth Daily., Disp: 30 tablet, Rfl: 0  •  Blood Glucose Monitoring Suppl (FreeStyle Freedom Lite) w/Device kit, Use to check blood sugar as directed., Disp: 1 each, Rfl: 0  •  budesonide-formoterol (SYMBICORT) 160-4.5 MCG/ACT inhaler, Inhale 2 puffs 2 (Two) Times a Day., Disp: 10.2 g, Rfl: 0  •  carvedilol (COREG) 12.5 MG tablet, Take 1 tablet by mouth 2 (Two) Times a Day With Meals., Disp: 60 tablet, Rfl: 0  •  furosemide (LASIX) 20 MG tablet, Take 1 tablet by mouth Daily., Disp: 30 tablet, Rfl: 0  •  glucose blood test strip, Test 4 (Four) Times a Day After Meals & at Bedtime, Disp: 420 each, Rfl: 0  •  glucose  monitor monitoring kit, 1 each 4 (Four) Times a Day Before Meals & at Bedtime., Disp: 1 each, Rfl: 0  •  Insulin Glargine (BASAGLAR KWIKPEN) 100 UNIT/ML injection pen, Inject 20 Units under the skin into the appropriate area as directed Every Night., Disp: 5 pen, Rfl: 0  •  Insulin Pen Needle 32G X 4 MM misc, Use with insulin 5 times daily, Disp: 200 each, Rfl: 0  •  Lancets 28G misc, 1 each by Other route 4 (Four) Times a Day After Meals & at Bedtime., Disp: 100 each, Rfl: 0  •  sacubitril-valsartan (ENTRESTO) 24-26 MG tablet, Take 1 tablet by mouth 2 (Two) Times a Day., Disp: 60 tablet, Rfl:   •  spironolactone (ALDACTONE) 25 MG tablet, Take 1 tablet by mouth Daily., Disp: 30 tablet, Rfl: 1      Physical Exam:  I have reviewed the patient's current vital signs as documented in the patient's EMR.   Vitals:    09/01/21 1320   BP: 118/76   Pulse: 70   Resp: 16   SpO2: 96%     Body mass index is 43.57 kg/m².   Pulse Pressure: normal  Pulse Ox: Normal  on room air  General: alert, appears stated age and cooperative     Body Habitus: morbidly obese    HEENT: Head: Normocephalic, no lesions, without obvious abnormality. No arcus senilis, xanthelasma or xanthomas.  No malar flush, proptosis, xanthomas or malar flush.   Neuro: alert, oriented x3  speech normal in context and clarity  memory intact grossly  Pulses: 2+ and symmetric  JVP: Volume/Pulsation: Normal.  Normal x and y descent.  Waveforms: Normal waveforms with a, x, and v waves.   Physiology: Appropriate inspiratory decrease.  No Kussmaul's. No Christiana's.      Pulmonary:Normal     Precordium: Normal impulses. P2 is not palpable.  RV Heave: absent  LV Heave: absent  [place]:  normal size and placement  Palpable S4: absent.  Heart rate: normal    Heart Rhythm: regular     Heart Sounds: S1: normal intensity  S2: normal intensity  S3: absent   S4: absent  Opening Snap: absent    A2-OS:  no  Pericardial Rub:  Absent: Yes      Ejection click: None      Murmurs:  absent    Extremity: moves all extremities equally.   Edema: No edema.       DATA REVIEWED:       TTE/ANTHONY:  Results for orders placed during the hospital encounter of 08/02/21    Adult Transthoracic Echo Complete W/ Cont if Necessary Per Protocol    Interpretation Summary  · Calculated left ventricular EF = 36.9% Estimated left ventricular EF = 37% Estimated left ventricular EF was in agreement with the calculated left ventricular EF. Left ventricular systolic function is moderately decreased. Normal left ventricular wall thickness noted. The left ventricular cavity is severely dilated. There is left ventricular global hypokinesis noted. Left ventricular diastolic dysfunction is noted. There is at least Grade 3 diastolic dysfunction No evidence of a left ventricular thrombus present.  · The right ventricular cavity is mildly dilated. Moderately reduced right ventricular systolic function noted.  · Left atrial volume is moderately increased. The right atrial cavity is mildly dilated  · Trace aortic valve regurgitation is present.      My interpretation of the TTE is below.     LVEF is around 35%.  RV is dilated with reduced systolic function.      -----------------------------------------------------  CT:   CT Angiogram Chest    Result Date: 8/2/2021  Extensive bilateral pneumonia and bilateral pleural effusions, greater in size on the right than the left. Moderate cardiomegaly and evidence of heart failure as noted. There is no CT evidence of acute pulmonary thromboembolism.  This report was finalized on 8/2/2021 7:03 PM by Dr. Christian Palacios M.D.      I personally reviewed the images of the CT scan.  My personal interpretation is below.    Extensive bilateral GGO's.  Dilated pulmonary artery.  Reflux of contrast into the IVC indicative of right ventricular dysfunction.  Moderate size right pleural effusion.  Small left pleural effusion.            Laboratory evaluations:    Lab Results   Component Value Date    GLUCOSE 119  (H) 08/26/2021    BUN 24 (H) 08/26/2021    CREATININE 1.56 (H) 08/26/2021    EGFRIFNONA 51 (L) 08/26/2021    EGFRIFAFRI 54 (L) 12/21/2020    BCR 15.4 08/26/2021    K 4.0 08/26/2021    CO2 25.8 08/26/2021    CALCIUM 8.3 (L) 08/26/2021    ALBUMIN 2.50 (L) 08/04/2021    AST 26 08/04/2021    ALT 22 08/04/2021     Lab Results   Component Value Date    WBC 8.27 08/04/2021    HGB 14.5 08/04/2021    HCT 45.4 08/04/2021    MCV 85.3 08/04/2021     08/04/2021     Lab Results   Component Value Date    CHOL 155 08/18/2021    TRIG 184 (H) 08/18/2021    HDL 37 (L) 08/18/2021    LDL 86 08/18/2021     Lab Results   Component Value Date    TSH 3.550 08/02/2021     Lab Results   Component Value Date    CKTOTAL 154 01/08/2021    TROPONINT 0.050 (C) 08/02/2021     Lab Results   Component Value Date    HGBA1C 9.07 (H) 08/03/2021     No results found for: DDIMER  Lab Results   Component Value Date    ALT 22 08/04/2021     Lab Results   Component Value Date    HGBA1C 9.07 (H) 08/03/2021    HGBA1C 9.50 (H) 12/21/2020     Lab Results   Component Value Date    MICROALBUR >440.0 08/18/2021    CREATININE 1.56 (H) 08/26/2021     Lab Results   Component Value Date    FERRITIN 745.00 (H) 01/08/2021     No results found for: INR, PROTIME    Assessment/Plan     1. Pulmonary HTN (CMS/HCC). PAH/PVH. WHO Group Likely 2/3; FC: III.  RV status: Abnormal:.  PFTs are currently pending.  Patient was diagnosed with likely secondary PH due to untreated VERONICA and ongoing biventricular failure.  He recently had COVID-19 viral pneumonia.  Is possible he had worsening pulmonary pressure secondary to this.  His CTA certainly demonstrated right ventricular dysfunction.    MCKEON score was updated today and remains 6.    -RHC, RUE access, possible VD challenge, Co-Oximetry run: PENDING  -Discussed evaluation, treatment and usual course. All questions were answered.  -PFTs PENDING  -Sleep eval  -Pulm referral  -Recommended we continue active clinical surveillance.   Signs and symptoms of worsening PAH and RV failure were discussed. Hand-out was provided in discharge paperwork.     2. Biventricular heart failure with reduced left ventricular function (CMS/HCC). not acutely decompensated chronic severe systolic heart failure. Left Heart Failure and Right Heart Failure. Etiology: Viral. LVEF 19%.  NYHA stage C;FC-III. NYHA FC change: did not change. Last 6MWT:  Community Ambulator (>0.8 m/s).        Today, the patient appears euvolemic.  Hemodynamics indicate we can change his Coreg to Toprol XL. I would like give him more of a MAP to possibly up-titrate his ARNI.   Clinical trajectory is stable.      -STOP Coreg and START Toprol XL 100mg   -START Aldactone 25mg  -Entresto at 24 mg- 26 mg 1 tablet p.o. twice daily.    -SGLT2 inhibitor therapy:  -A BMP at initiation to verify GFR >45 was recommended, as was interval GFR surveillance  -Pt was advised SEs, some severe, including hypersensitivity and Rich's.   -START Farxiga 10mg with quarterly assessment of GFR  -Fluid restriction:   requested  -Other 2000 ml  -Patient has been asked to weigh daily and was provided with a printed diuretic strategy.  -Sodium restriction:   -1,500 mg Na  -ICD/CRT-D:   Possibly indicated, will readdress after GDMT   -BMP today   3.  CKD, possible.  Nephrology referral placed today.   4.  Snores. Concerns for sleep disordered breathing. There is concern for VERONICA. STOP-Bang screening questionnaire was performed and yielded High Risk . No prior nocturnal polysomnography. The risks of untreated VERONICA were explained, as well as the need for sleep study.   -Avoid ETOH, weight reduction  -Treatment of HTN, per PCP  -1. Referral to Pulmonology for sleep study and 2. I discussed the importance of treating VERONICA given known association of untreated arrythmias     Return in about 1 week (around 9/8/2021).

## 2021-09-02 ENCOUNTER — TRANSCRIBE ORDERS (OUTPATIENT)
Dept: CARDIOLOGY | Facility: CLINIC | Age: 36
End: 2021-09-02

## 2021-09-02 DIAGNOSIS — Z13.6 SCREENING FOR ISCHEMIC HEART DISEASE: ICD-10-CM

## 2021-09-02 DIAGNOSIS — Z01.810 PRE-OPERATIVE CARDIOVASCULAR EXAMINATION: Primary | ICD-10-CM

## 2021-09-04 ENCOUNTER — LAB (OUTPATIENT)
Dept: LAB | Facility: HOSPITAL | Age: 36
End: 2021-09-04

## 2021-09-04 DIAGNOSIS — Z01.818 OTHER SPECIFIED PRE-OPERATIVE EXAMINATION: ICD-10-CM

## 2021-09-04 LAB — SARS-COV-2 ORF1AB RESP QL NAA+PROBE: NOT DETECTED

## 2021-09-04 PROCEDURE — C9803 HOPD COVID-19 SPEC COLLECT: HCPCS

## 2021-09-04 PROCEDURE — U0004 COV-19 TEST NON-CDC HGH THRU: HCPCS

## 2021-09-09 ENCOUNTER — TRANSCRIBE ORDERS (OUTPATIENT)
Dept: SLEEP MEDICINE | Facility: HOSPITAL | Age: 36
End: 2021-09-09

## 2021-09-09 DIAGNOSIS — Z01.818 OTHER SPECIFIED PRE-OPERATIVE EXAMINATION: Primary | ICD-10-CM

## 2021-09-11 ENCOUNTER — LAB (OUTPATIENT)
Dept: LAB | Facility: HOSPITAL | Age: 36
End: 2021-09-11

## 2021-09-11 DIAGNOSIS — Z01.818 OTHER SPECIFIED PRE-OPERATIVE EXAMINATION: ICD-10-CM

## 2021-09-11 LAB — SARS-COV-2 ORF1AB RESP QL NAA+PROBE: NOT DETECTED

## 2021-09-11 PROCEDURE — C9803 HOPD COVID-19 SPEC COLLECT: HCPCS

## 2021-09-11 PROCEDURE — U0004 COV-19 TEST NON-CDC HGH THRU: HCPCS

## 2021-09-13 ENCOUNTER — DOCUMENTATION (OUTPATIENT)
Dept: CARDIOLOGY | Facility: HOSPITAL | Age: 36
End: 2021-09-13

## 2021-09-13 NOTE — PROGRESS NOTES
Spoke to Bill in cath lab to let him know that his RHC needs to be cancelled tomorrow. We were notified that procedure needs to be canceled because Pt does not have insurance or pre- procedure labs.  I am not authorized to cancel it in Epic.  He said he would let the  know.  We have attempted to contact the Pt but have not been able to reach him    Mattie Robles RN  [facility].

## 2021-09-17 ENCOUNTER — TELEPHONE (OUTPATIENT)
Dept: CARDIOLOGY | Facility: HOSPITAL | Age: 36
End: 2021-09-17

## 2021-09-17 NOTE — TELEPHONE ENCOUNTER
Called Patient using [employer]. Patient did not answer,  left VM. Reason for call, Made Patient an appointment at [facility] for November 22,2021 @ 9:00 am at the [facility] location [address]. Will Fax records.

## 2021-10-08 RX ORDER — INSULIN GLARGINE 100 [IU]/ML
20 INJECTION, SOLUTION SUBCUTANEOUS NIGHTLY
Qty: 5 PEN | Refills: 0 | Status: CANCELLED | OUTPATIENT
Start: 2021-10-08

## 2021-10-20 RX ORDER — INSULIN GLARGINE 100 [IU]/ML
20 INJECTION, SOLUTION SUBCUTANEOUS NIGHTLY
Qty: 5 PEN | Refills: 0 | Status: CANCELLED | OUTPATIENT
Start: 2021-10-08

## 2021-10-24 RX ORDER — INSULIN GLARGINE 100 [IU]/ML
20 INJECTION, SOLUTION SUBCUTANEOUS NIGHTLY
Qty: 5 PEN | Refills: 0 | Status: CANCELLED | OUTPATIENT
Start: 2021-10-08

## 2021-10-25 ENCOUNTER — TRANSCRIBE ORDERS (OUTPATIENT)
Dept: SLEEP MEDICINE | Facility: HOSPITAL | Age: 36
End: 2021-10-25

## 2021-10-25 DIAGNOSIS — Z01.818 OTHER SPECIFIED PRE-OPERATIVE EXAMINATION: Primary | ICD-10-CM

## 2021-12-01 RX ORDER — INSULIN GLARGINE 100 [IU]/ML
20 INJECTION, SOLUTION SUBCUTANEOUS NIGHTLY
Qty: 5 PEN | Refills: 0 | Status: CANCELLED | OUTPATIENT
Start: 2021-12-01

## 2021-12-03 ENCOUNTER — APPOINTMENT (OUTPATIENT)
Dept: GENERAL RADIOLOGY | Facility: HOSPITAL | Age: 36
End: 2021-12-03

## 2021-12-03 ENCOUNTER — HOSPITAL ENCOUNTER (INPATIENT)
Facility: HOSPITAL | Age: 36
LOS: 6 days | Discharge: HOME | End: 2021-12-09
Attending: EMERGENCY MEDICINE | Admitting: HOSPITALIST

## 2021-12-03 DIAGNOSIS — I20.0 UNSTABLE ANGINA (HCC): ICD-10-CM

## 2021-12-03 DIAGNOSIS — R77.8 ELEVATED TROPONIN: ICD-10-CM

## 2021-12-03 DIAGNOSIS — R09.02 HYPOXIA: ICD-10-CM

## 2021-12-03 DIAGNOSIS — I50.9 ACUTE ON CHRONIC CONGESTIVE HEART FAILURE, UNSPECIFIED HEART FAILURE TYPE (HCC): Primary | ICD-10-CM

## 2021-12-03 DIAGNOSIS — I50.21 ACUTE SYSTOLIC CHF (CONGESTIVE HEART FAILURE) (HCC): ICD-10-CM

## 2021-12-03 LAB
ALBUMIN SERPL-MCNC: 2.9 G/DL (ref 3.5–5.2)
ALBUMIN/GLOB SERPL: 0.8 G/DL
ALP SERPL-CCNC: 82 U/L (ref 39–117)
ALT SERPL W P-5'-P-CCNC: 16 U/L (ref 1–41)
ANION GAP SERPL CALCULATED.3IONS-SCNC: 12.1 MMOL/L (ref 5–15)
AST SERPL-CCNC: 14 U/L (ref 1–40)
BASOPHILS # BLD AUTO: 0.03 10*3/MM3 (ref 0–0.2)
BASOPHILS NFR BLD AUTO: 0.2 % (ref 0–1.5)
BILIRUB SERPL-MCNC: 0.6 MG/DL (ref 0–1.2)
BUN SERPL-MCNC: 27 MG/DL (ref 6–20)
BUN/CREAT SERPL: 17.2 (ref 7–25)
CALCIUM SPEC-SCNC: 8.1 MG/DL (ref 8.6–10.5)
CHLORIDE SERPL-SCNC: 100 MMOL/L (ref 98–107)
CO2 SERPL-SCNC: 23.9 MMOL/L (ref 22–29)
CREAT SERPL-MCNC: 1.57 MG/DL (ref 0.76–1.27)
DEPRECATED RDW RBC AUTO: 40.5 FL (ref 37–54)
EOSINOPHIL # BLD AUTO: 0.28 10*3/MM3 (ref 0–0.4)
EOSINOPHIL NFR BLD AUTO: 2.2 % (ref 0.3–6.2)
ERYTHROCYTE [DISTWIDTH] IN BLOOD BY AUTOMATED COUNT: 13.8 % (ref 12.3–15.4)
GFR SERPL CREATININE-BSD FRML MDRD: 50 ML/MIN/1.73
GLOBULIN UR ELPH-MCNC: 3.6 GM/DL
GLUCOSE SERPL-MCNC: 200 MG/DL (ref 65–99)
HCT VFR BLD AUTO: 42.2 % (ref 37.5–51)
HGB BLD-MCNC: 14.7 G/DL (ref 13–17.7)
HOLD SPECIMEN: NORMAL
IMM GRANULOCYTES # BLD AUTO: 0.07 10*3/MM3 (ref 0–0.05)
IMM GRANULOCYTES NFR BLD AUTO: 0.5 % (ref 0–0.5)
LYMPHOCYTES # BLD AUTO: 1.41 10*3/MM3 (ref 0.7–3.1)
LYMPHOCYTES NFR BLD AUTO: 11.1 % (ref 19.6–45.3)
MCH RBC QN AUTO: 28.7 PG (ref 26.6–33)
MCHC RBC AUTO-ENTMCNC: 34.8 G/DL (ref 31.5–35.7)
MCV RBC AUTO: 82.4 FL (ref 79–97)
MONOCYTES # BLD AUTO: 0.85 10*3/MM3 (ref 0.1–0.9)
MONOCYTES NFR BLD AUTO: 6.7 % (ref 5–12)
NEUTROPHILS NFR BLD AUTO: 10.11 10*3/MM3 (ref 1.7–7)
NEUTROPHILS NFR BLD AUTO: 79.3 % (ref 42.7–76)
NRBC BLD AUTO-RTO: 0 /100 WBC (ref 0–0.2)
NT-PROBNP SERPL-MCNC: 4144 PG/ML (ref 0–450)
PLATELET # BLD AUTO: 233 10*3/MM3 (ref 140–450)
PMV BLD AUTO: 11.7 FL (ref 6–12)
POTASSIUM SERPL-SCNC: 3.8 MMOL/L (ref 3.5–5.2)
PROT SERPL-MCNC: 6.5 G/DL (ref 6–8.5)
QT INTERVAL: 361 MS
RBC # BLD AUTO: 5.12 10*6/MM3 (ref 4.14–5.8)
SODIUM SERPL-SCNC: 136 MMOL/L (ref 136–145)
TROPONIN T SERPL-MCNC: 0.15 NG/ML (ref 0–0.03)
TROPONIN T SERPL-MCNC: 0.18 NG/ML (ref 0–0.03)
WBC NRBC COR # BLD: 12.75 10*3/MM3 (ref 3.4–10.8)
WHOLE BLOOD HOLD SPECIMEN: NORMAL
WHOLE BLOOD HOLD SPECIMEN: NORMAL

## 2021-12-03 PROCEDURE — 99284 EMERGENCY DEPT VISIT MOD MDM: CPT

## 2021-12-03 PROCEDURE — 93010 ELECTROCARDIOGRAM REPORT: CPT | Performed by: INTERNAL MEDICINE

## 2021-12-03 PROCEDURE — U0004 COV-19 TEST NON-CDC HGH THRU: HCPCS | Performed by: EMERGENCY MEDICINE

## 2021-12-03 PROCEDURE — 93005 ELECTROCARDIOGRAM TRACING: CPT | Performed by: EMERGENCY MEDICINE

## 2021-12-03 PROCEDURE — 93005 ELECTROCARDIOGRAM TRACING: CPT

## 2021-12-03 PROCEDURE — 71046 X-RAY EXAM CHEST 2 VIEWS: CPT

## 2021-12-03 PROCEDURE — 84484 ASSAY OF TROPONIN QUANT: CPT | Performed by: EMERGENCY MEDICINE

## 2021-12-03 PROCEDURE — 85025 COMPLETE CBC W/AUTO DIFF WBC: CPT | Performed by: EMERGENCY MEDICINE

## 2021-12-03 PROCEDURE — 25010000002 FUROSEMIDE PER 20 MG: Performed by: EMERGENCY MEDICINE

## 2021-12-03 PROCEDURE — 80053 COMPREHEN METABOLIC PANEL: CPT | Performed by: EMERGENCY MEDICINE

## 2021-12-03 PROCEDURE — 83880 ASSAY OF NATRIURETIC PEPTIDE: CPT | Performed by: EMERGENCY MEDICINE

## 2021-12-03 RX ORDER — SODIUM CHLORIDE 0.9 % (FLUSH) 0.9 %
10 SYRINGE (ML) INJECTION AS NEEDED
Status: DISCONTINUED | OUTPATIENT
Start: 2021-12-03 | End: 2021-12-09 | Stop reason: HOSPADM

## 2021-12-03 RX ORDER — FUROSEMIDE 10 MG/ML
40 INJECTION INTRAMUSCULAR; INTRAVENOUS ONCE
Status: COMPLETED | OUTPATIENT
Start: 2021-12-03 | End: 2021-12-03

## 2021-12-03 RX ORDER — ASPIRIN 81 MG/1
324 TABLET, CHEWABLE ORAL ONCE
Status: COMPLETED | OUTPATIENT
Start: 2021-12-03 | End: 2021-12-03

## 2021-12-03 RX ORDER — ASPIRIN 325 MG
325 TABLET ORAL ONCE
Status: DISCONTINUED | OUTPATIENT
Start: 2021-12-03 | End: 2021-12-03

## 2021-12-03 RX ADMIN — FUROSEMIDE 40 MG: 10 INJECTION, SOLUTION INTRAMUSCULAR; INTRAVENOUS at 19:51

## 2021-12-03 RX ADMIN — ASPIRIN 324 MG: 81 TABLET, CHEWABLE ORAL at 21:27

## 2021-12-03 NOTE — ED TRIAGE NOTES
Pt reports upper abd. Pain and midsternal chest pain x few days.     Pt arrives in triage with mask on. Triage staff wearing N95 masks and goggles.

## 2021-12-04 PROBLEM — I50.21 ACUTE SYSTOLIC CHF (CONGESTIVE HEART FAILURE): Status: ACTIVE | Noted: 2021-12-04

## 2021-12-04 PROBLEM — I20.0 UNSTABLE ANGINA (HCC): Status: ACTIVE | Noted: 2021-12-04

## 2021-12-04 PROBLEM — R07.89 OTHER CHEST PAIN: Status: ACTIVE | Noted: 2021-12-04

## 2021-12-04 LAB
ANION GAP SERPL CALCULATED.3IONS-SCNC: 8.7 MMOL/L (ref 5–15)
B PARAPERT DNA SPEC QL NAA+PROBE: NOT DETECTED
B PERT DNA SPEC QL NAA+PROBE: NOT DETECTED
BASOPHILS # BLD AUTO: 0.03 10*3/MM3 (ref 0–0.2)
BASOPHILS NFR BLD AUTO: 0.3 % (ref 0–1.5)
BUN SERPL-MCNC: 29 MG/DL (ref 6–20)
BUN/CREAT SERPL: 16.8 (ref 7–25)
C PNEUM DNA NPH QL NAA+NON-PROBE: NOT DETECTED
CALCIUM SPEC-SCNC: 8.1 MG/DL (ref 8.6–10.5)
CHLORIDE SERPL-SCNC: 100 MMOL/L (ref 98–107)
CO2 SERPL-SCNC: 27.3 MMOL/L (ref 22–29)
CREAT SERPL-MCNC: 1.73 MG/DL (ref 0.76–1.27)
DEPRECATED RDW RBC AUTO: 40.4 FL (ref 37–54)
EOSINOPHIL # BLD AUTO: 0.36 10*3/MM3 (ref 0–0.4)
EOSINOPHIL NFR BLD AUTO: 3.4 % (ref 0.3–6.2)
ERYTHROCYTE [DISTWIDTH] IN BLOOD BY AUTOMATED COUNT: 13.5 % (ref 12.3–15.4)
FLUAV SUBTYP SPEC NAA+PROBE: NOT DETECTED
FLUBV RNA ISLT QL NAA+PROBE: NOT DETECTED
GFR SERPL CREATININE-BSD FRML MDRD: 45 ML/MIN/1.73
GLUCOSE BLDC GLUCOMTR-MCNC: 158 MG/DL (ref 70–130)
GLUCOSE BLDC GLUCOMTR-MCNC: 186 MG/DL (ref 70–130)
GLUCOSE BLDC GLUCOMTR-MCNC: 195 MG/DL (ref 70–130)
GLUCOSE BLDC GLUCOMTR-MCNC: 215 MG/DL (ref 70–130)
GLUCOSE SERPL-MCNC: 226 MG/DL (ref 65–99)
HADV DNA SPEC NAA+PROBE: NOT DETECTED
HCOV 229E RNA SPEC QL NAA+PROBE: NOT DETECTED
HCOV HKU1 RNA SPEC QL NAA+PROBE: NOT DETECTED
HCOV NL63 RNA SPEC QL NAA+PROBE: NOT DETECTED
HCOV OC43 RNA SPEC QL NAA+PROBE: NOT DETECTED
HCT VFR BLD AUTO: 40.2 % (ref 37.5–51)
HGB BLD-MCNC: 13.5 G/DL (ref 13–17.7)
HMPV RNA NPH QL NAA+NON-PROBE: NOT DETECTED
HPIV1 RNA ISLT QL NAA+PROBE: NOT DETECTED
HPIV2 RNA SPEC QL NAA+PROBE: NOT DETECTED
HPIV3 RNA NPH QL NAA+PROBE: NOT DETECTED
HPIV4 P GENE NPH QL NAA+PROBE: NOT DETECTED
IMM GRANULOCYTES # BLD AUTO: 0.05 10*3/MM3 (ref 0–0.05)
IMM GRANULOCYTES NFR BLD AUTO: 0.5 % (ref 0–0.5)
INR PPP: 1.1 (ref 0.9–1.1)
LYMPHOCYTES # BLD AUTO: 1.48 10*3/MM3 (ref 0.7–3.1)
LYMPHOCYTES NFR BLD AUTO: 14.1 % (ref 19.6–45.3)
M PNEUMO IGG SER IA-ACNC: NOT DETECTED
MAGNESIUM SERPL-MCNC: 2.2 MG/DL (ref 1.6–2.6)
MCH RBC QN AUTO: 27.8 PG (ref 26.6–33)
MCHC RBC AUTO-ENTMCNC: 33.6 G/DL (ref 31.5–35.7)
MCV RBC AUTO: 82.7 FL (ref 79–97)
MONOCYTES # BLD AUTO: 0.78 10*3/MM3 (ref 0.1–0.9)
MONOCYTES NFR BLD AUTO: 7.4 % (ref 5–12)
NEUTROPHILS NFR BLD AUTO: 7.77 10*3/MM3 (ref 1.7–7)
NEUTROPHILS NFR BLD AUTO: 74.3 % (ref 42.7–76)
NRBC BLD AUTO-RTO: 0 /100 WBC (ref 0–0.2)
PLATELET # BLD AUTO: 203 10*3/MM3 (ref 140–450)
PMV BLD AUTO: 11.9 FL (ref 6–12)
POTASSIUM SERPL-SCNC: 3.7 MMOL/L (ref 3.5–5.2)
PROCALCITONIN SERPL-MCNC: 0.2 NG/ML (ref 0–0.25)
PROTHROMBIN TIME: 14 SECONDS (ref 11.7–14.2)
QT INTERVAL: 377 MS
RBC # BLD AUTO: 4.86 10*6/MM3 (ref 4.14–5.8)
RHINOVIRUS RNA SPEC NAA+PROBE: NOT DETECTED
RSV RNA NPH QL NAA+NON-PROBE: NOT DETECTED
SARS-COV-2 ORF1AB RESP QL NAA+PROBE: NOT DETECTED
SARS-COV-2 RNA NPH QL NAA+NON-PROBE: NOT DETECTED
SODIUM SERPL-SCNC: 136 MMOL/L (ref 136–145)
TROPONIN T SERPL-MCNC: 0.18 NG/ML (ref 0–0.03)
WBC NRBC COR # BLD: 10.47 10*3/MM3 (ref 3.4–10.8)

## 2021-12-04 PROCEDURE — 99153 MOD SED SAME PHYS/QHP EA: CPT | Performed by: INTERNAL MEDICINE

## 2021-12-04 PROCEDURE — 85347 COAGULATION TIME ACTIVATED: CPT

## 2021-12-04 PROCEDURE — 93005 ELECTROCARDIOGRAM TRACING: CPT | Performed by: INTERNAL MEDICINE

## 2021-12-04 PROCEDURE — C1887 CATHETER, GUIDING: HCPCS | Performed by: INTERNAL MEDICINE

## 2021-12-04 PROCEDURE — B2181ZZ FLUOROSCOPY OF LEFT INTERNAL MAMMARY BYPASS GRAFT USING LOW OSMOLAR CONTRAST: ICD-10-PCS | Performed by: INTERNAL MEDICINE

## 2021-12-04 PROCEDURE — 4A023N7 MEASUREMENT OF CARDIAC SAMPLING AND PRESSURE, LEFT HEART, PERCUTANEOUS APPROACH: ICD-10-PCS | Performed by: INTERNAL MEDICINE

## 2021-12-04 PROCEDURE — 0 IOPAMIDOL PER 1 ML: Performed by: INTERNAL MEDICINE

## 2021-12-04 PROCEDURE — 82962 GLUCOSE BLOOD TEST: CPT

## 2021-12-04 PROCEDURE — 94640 AIRWAY INHALATION TREATMENT: CPT

## 2021-12-04 PROCEDURE — 25010000002 ONDANSETRON PER 1 MG: Performed by: INTERNAL MEDICINE

## 2021-12-04 PROCEDURE — 84484 ASSAY OF TROPONIN QUANT: CPT | Performed by: NURSE PRACTITIONER

## 2021-12-04 PROCEDURE — 25010000002 FUROSEMIDE PER 20 MG: Performed by: NURSE PRACTITIONER

## 2021-12-04 PROCEDURE — 93459 L HRT ART/GRFT ANGIO: CPT | Performed by: INTERNAL MEDICINE

## 2021-12-04 PROCEDURE — 63710000001 INSULIN GLARGINE PER 5 UNITS: Performed by: INTERNAL MEDICINE

## 2021-12-04 PROCEDURE — B2111ZZ FLUOROSCOPY OF MULTIPLE CORONARY ARTERIES USING LOW OSMOLAR CONTRAST: ICD-10-PCS | Performed by: INTERNAL MEDICINE

## 2021-12-04 PROCEDURE — B2131ZZ FLUOROSCOPY OF MULTIPLE CORONARY ARTERY BYPASS GRAFTS USING LOW OSMOLAR CONTRAST: ICD-10-PCS | Performed by: INTERNAL MEDICINE

## 2021-12-04 PROCEDURE — C1769 GUIDE WIRE: HCPCS | Performed by: INTERNAL MEDICINE

## 2021-12-04 PROCEDURE — 92937 PRQ TRLUML REVSC CAB GRF 1: CPT | Performed by: INTERNAL MEDICINE

## 2021-12-04 PROCEDURE — 83735 ASSAY OF MAGNESIUM: CPT | Performed by: NURSE PRACTITIONER

## 2021-12-04 PROCEDURE — 027034Z DILATION OF CORONARY ARTERY, ONE ARTERY WITH DRUG-ELUTING INTRALUMINAL DEVICE, PERCUTANEOUS APPROACH: ICD-10-PCS | Performed by: INTERNAL MEDICINE

## 2021-12-04 PROCEDURE — C1874 STENT, COATED/COV W/DEL SYS: HCPCS | Performed by: INTERNAL MEDICINE

## 2021-12-04 PROCEDURE — 85025 COMPLETE CBC W/AUTO DIFF WBC: CPT | Performed by: NURSE PRACTITIONER

## 2021-12-04 PROCEDURE — B2151ZZ FLUOROSCOPY OF LEFT HEART USING LOW OSMOLAR CONTRAST: ICD-10-PCS | Performed by: INTERNAL MEDICINE

## 2021-12-04 PROCEDURE — 63710000001 INSULIN LISPRO (HUMAN) PER 5 UNITS: Performed by: INTERNAL MEDICINE

## 2021-12-04 PROCEDURE — 80048 BASIC METABOLIC PNL TOTAL CA: CPT | Performed by: NURSE PRACTITIONER

## 2021-12-04 PROCEDURE — 63710000001 INSULIN LISPRO (HUMAN) PER 5 UNITS: Performed by: NURSE PRACTITIONER

## 2021-12-04 PROCEDURE — 25010000002 HEPARIN (PORCINE) PER 1000 UNITS: Performed by: INTERNAL MEDICINE

## 2021-12-04 PROCEDURE — 99152 MOD SED SAME PHYS/QHP 5/>YRS: CPT | Performed by: INTERNAL MEDICINE

## 2021-12-04 PROCEDURE — 85610 PROTHROMBIN TIME: CPT | Performed by: NURSE PRACTITIONER

## 2021-12-04 PROCEDURE — C9604 PERC D-E COR REVASC T CABG S: HCPCS | Performed by: INTERNAL MEDICINE

## 2021-12-04 PROCEDURE — 25010000002 HYDRALAZINE PER 20 MG: Performed by: INTERNAL MEDICINE

## 2021-12-04 PROCEDURE — 0202U NFCT DS 22 TRGT SARS-COV-2: CPT | Performed by: HOSPITALIST

## 2021-12-04 PROCEDURE — 84145 PROCALCITONIN (PCT): CPT | Performed by: HOSPITALIST

## 2021-12-04 PROCEDURE — 25010000002 MIDAZOLAM PER 1 MG: Performed by: INTERNAL MEDICINE

## 2021-12-04 PROCEDURE — 99254 IP/OBS CNSLTJ NEW/EST MOD 60: CPT | Performed by: INTERNAL MEDICINE

## 2021-12-04 PROCEDURE — 94799 UNLISTED PULMONARY SVC/PX: CPT

## 2021-12-04 PROCEDURE — C1894 INTRO/SHEATH, NON-LASER: HCPCS | Performed by: INTERNAL MEDICINE

## 2021-12-04 PROCEDURE — 25010000002 FENTANYL CITRATE (PF) 50 MCG/ML SOLUTION: Performed by: INTERNAL MEDICINE

## 2021-12-04 PROCEDURE — 25010000002 FUROSEMIDE PER 20 MG: Performed by: INTERNAL MEDICINE

## 2021-12-04 PROCEDURE — 94664 DEMO&/EVAL PT USE INHALER: CPT

## 2021-12-04 DEVICE — XIENCE SKYPOINT™ EVEROLIMUS ELUTING CORONARY STENT SYSTEM 2.50 MM X 08 MM / RAPID-EXCHANGE
Type: IMPLANTABLE DEVICE | Status: FUNCTIONAL
Brand: XIENCE SKYPOINT™

## 2021-12-04 RX ORDER — ASPIRIN 81 MG/1
81 TABLET ORAL DAILY
Status: DISCONTINUED | OUTPATIENT
Start: 2021-12-04 | End: 2021-12-04 | Stop reason: SDUPTHER

## 2021-12-04 RX ORDER — INSULIN GLARGINE 100 [IU]/ML
20 INJECTION, SOLUTION SUBCUTANEOUS NIGHTLY
Status: DISCONTINUED | OUTPATIENT
Start: 2021-12-04 | End: 2021-12-04 | Stop reason: CLARIF

## 2021-12-04 RX ORDER — LIDOCAINE HYDROCHLORIDE 20 MG/ML
INJECTION, SOLUTION INFILTRATION; PERINEURAL AS NEEDED
Status: DISCONTINUED | OUTPATIENT
Start: 2021-12-04 | End: 2021-12-04 | Stop reason: HOSPADM

## 2021-12-04 RX ORDER — INSULIN LISPRO 100 [IU]/ML
0-7 INJECTION, SOLUTION INTRAVENOUS; SUBCUTANEOUS
Status: DISCONTINUED | OUTPATIENT
Start: 2021-12-04 | End: 2021-12-09 | Stop reason: HOSPADM

## 2021-12-04 RX ORDER — HYDROCODONE BITARTRATE AND ACETAMINOPHEN 5; 325 MG/1; MG/1
1 TABLET ORAL EVERY 4 HOURS PRN
Status: DISCONTINUED | OUTPATIENT
Start: 2021-12-04 | End: 2021-12-09 | Stop reason: HOSPADM

## 2021-12-04 RX ORDER — FENTANYL CITRATE 50 UG/ML
INJECTION, SOLUTION INTRAMUSCULAR; INTRAVENOUS AS NEEDED
Status: DISCONTINUED | OUTPATIENT
Start: 2021-12-04 | End: 2021-12-04 | Stop reason: HOSPADM

## 2021-12-04 RX ORDER — SODIUM CHLORIDE 9 MG/ML
INJECTION, SOLUTION INTRAVENOUS CONTINUOUS PRN
Status: COMPLETED | OUTPATIENT
Start: 2021-12-04 | End: 2021-12-04

## 2021-12-04 RX ORDER — ASPIRIN 81 MG/1
81 TABLET ORAL DAILY
Status: DISCONTINUED | OUTPATIENT
Start: 2021-12-05 | End: 2021-12-09 | Stop reason: HOSPADM

## 2021-12-04 RX ORDER — SODIUM CHLORIDE 0.9 % (FLUSH) 0.9 %
10 SYRINGE (ML) INJECTION AS NEEDED
Status: DISCONTINUED | OUTPATIENT
Start: 2021-12-04 | End: 2021-12-09 | Stop reason: HOSPADM

## 2021-12-04 RX ORDER — FUROSEMIDE 10 MG/ML
40 INJECTION INTRAMUSCULAR; INTRAVENOUS ONCE
Status: COMPLETED | OUTPATIENT
Start: 2021-12-04 | End: 2021-12-04

## 2021-12-04 RX ORDER — ACETAMINOPHEN 325 MG/1
650 TABLET ORAL EVERY 4 HOURS PRN
Status: DISCONTINUED | OUTPATIENT
Start: 2021-12-04 | End: 2021-12-09 | Stop reason: HOSPADM

## 2021-12-04 RX ORDER — ATORVASTATIN CALCIUM 80 MG/1
80 TABLET, FILM COATED ORAL NIGHTLY
Status: DISCONTINUED | OUTPATIENT
Start: 2021-12-04 | End: 2021-12-09 | Stop reason: HOSPADM

## 2021-12-04 RX ORDER — FUROSEMIDE 10 MG/ML
80 INJECTION INTRAMUSCULAR; INTRAVENOUS EVERY 12 HOURS
Status: DISCONTINUED | OUTPATIENT
Start: 2021-12-04 | End: 2021-12-05

## 2021-12-04 RX ORDER — DEXTROSE MONOHYDRATE 25 G/50ML
25 INJECTION, SOLUTION INTRAVENOUS
Status: DISCONTINUED | OUTPATIENT
Start: 2021-12-04 | End: 2021-12-09 | Stop reason: HOSPADM

## 2021-12-04 RX ORDER — NICOTINE POLACRILEX 4 MG
15 LOZENGE BUCCAL
Status: DISCONTINUED | OUTPATIENT
Start: 2021-12-04 | End: 2021-12-09 | Stop reason: HOSPADM

## 2021-12-04 RX ORDER — ONDANSETRON 2 MG/ML
4 INJECTION INTRAMUSCULAR; INTRAVENOUS EVERY 6 HOURS PRN
Status: DISCONTINUED | OUTPATIENT
Start: 2021-12-04 | End: 2021-12-09 | Stop reason: HOSPADM

## 2021-12-04 RX ORDER — INSULIN GLARGINE 100 [IU]/ML
20 INJECTION, SOLUTION SUBCUTANEOUS NIGHTLY
Status: DISCONTINUED | OUTPATIENT
Start: 2021-12-04 | End: 2021-12-09 | Stop reason: HOSPADM

## 2021-12-04 RX ORDER — ONDANSETRON 4 MG/1
4 TABLET, FILM COATED ORAL EVERY 6 HOURS PRN
Status: DISCONTINUED | OUTPATIENT
Start: 2021-12-04 | End: 2021-12-09 | Stop reason: HOSPADM

## 2021-12-04 RX ORDER — NITROGLYCERIN 0.4 MG/1
0.4 TABLET SUBLINGUAL
Status: DISCONTINUED | OUTPATIENT
Start: 2021-12-04 | End: 2021-12-09 | Stop reason: HOSPADM

## 2021-12-04 RX ORDER — HYDRALAZINE HYDROCHLORIDE 20 MG/ML
INJECTION INTRAMUSCULAR; INTRAVENOUS AS NEEDED
Status: DISCONTINUED | OUTPATIENT
Start: 2021-12-04 | End: 2021-12-04 | Stop reason: HOSPADM

## 2021-12-04 RX ORDER — MIDAZOLAM HYDROCHLORIDE 1 MG/ML
INJECTION INTRAMUSCULAR; INTRAVENOUS AS NEEDED
Status: DISCONTINUED | OUTPATIENT
Start: 2021-12-04 | End: 2021-12-04 | Stop reason: HOSPADM

## 2021-12-04 RX ORDER — ATORVASTATIN CALCIUM 20 MG/1
40 TABLET, FILM COATED ORAL NIGHTLY
Status: DISCONTINUED | OUTPATIENT
Start: 2021-12-04 | End: 2021-12-04 | Stop reason: SDUPTHER

## 2021-12-04 RX ORDER — ASPIRIN 325 MG
TABLET ORAL AS NEEDED
Status: DISCONTINUED | OUTPATIENT
Start: 2021-12-04 | End: 2021-12-04 | Stop reason: HOSPADM

## 2021-12-04 RX ORDER — BUDESONIDE AND FORMOTEROL FUMARATE DIHYDRATE 160; 4.5 UG/1; UG/1
2 AEROSOL RESPIRATORY (INHALATION)
Status: DISCONTINUED | OUTPATIENT
Start: 2021-12-04 | End: 2021-12-09 | Stop reason: HOSPADM

## 2021-12-04 RX ORDER — PRASUGREL 10 MG/1
10 TABLET, FILM COATED ORAL DAILY
Status: DISCONTINUED | OUTPATIENT
Start: 2021-12-05 | End: 2021-12-09 | Stop reason: HOSPADM

## 2021-12-04 RX ORDER — SODIUM CHLORIDE 0.9 % (FLUSH) 0.9 %
10 SYRINGE (ML) INJECTION EVERY 12 HOURS SCHEDULED
Status: DISCONTINUED | OUTPATIENT
Start: 2021-12-04 | End: 2021-12-09 | Stop reason: HOSPADM

## 2021-12-04 RX ORDER — HEPARIN SODIUM 1000 [USP'U]/ML
INJECTION, SOLUTION INTRAVENOUS; SUBCUTANEOUS AS NEEDED
Status: DISCONTINUED | OUTPATIENT
Start: 2021-12-04 | End: 2021-12-04 | Stop reason: HOSPADM

## 2021-12-04 RX ADMIN — SODIUM CHLORIDE, PRESERVATIVE FREE 10 ML: 5 INJECTION INTRAVENOUS at 11:16

## 2021-12-04 RX ADMIN — SODIUM CHLORIDE, PRESERVATIVE FREE 10 ML: 5 INJECTION INTRAVENOUS at 20:01

## 2021-12-04 RX ADMIN — SODIUM NITROPRUSSIDE 4 MCG/KG/MIN: 25 INJECTION, SOLUTION, CONCENTRATE INTRAVENOUS at 23:52

## 2021-12-04 RX ADMIN — BUDESONIDE AND FORMOTEROL FUMARATE DIHYDRATE 2 PUFF: 160; 4.5 AEROSOL RESPIRATORY (INHALATION) at 21:17

## 2021-12-04 RX ADMIN — FUROSEMIDE 80 MG: 10 INJECTION, SOLUTION INTRAMUSCULAR; INTRAVENOUS at 17:47

## 2021-12-04 RX ADMIN — EMPAGLIFLOZIN 10 MG: 10 TABLET, FILM COATED ORAL at 17:47

## 2021-12-04 RX ADMIN — SODIUM NITROPRUSSIDE 3.2 MCG/KG/MIN: 25 INJECTION, SOLUTION, CONCENTRATE INTRAVENOUS at 18:48

## 2021-12-04 RX ADMIN — INSULIN GLARGINE 20 UNITS: 100 INJECTION, SOLUTION SUBCUTANEOUS at 20:00

## 2021-12-04 RX ADMIN — INSULIN LISPRO 2 UNITS: 100 INJECTION, SOLUTION INTRAVENOUS; SUBCUTANEOUS at 17:59

## 2021-12-04 RX ADMIN — FUROSEMIDE 40 MG: 10 INJECTION, SOLUTION INTRAMUSCULAR; INTRAVENOUS at 11:24

## 2021-12-04 RX ADMIN — SODIUM NITROPRUSSIDE 3.4 MCG/KG/MIN: 25 INJECTION, SOLUTION, CONCENTRATE INTRAVENOUS at 21:08

## 2021-12-04 RX ADMIN — INSULIN LISPRO 2 UNITS: 100 INJECTION, SOLUTION INTRAVENOUS; SUBCUTANEOUS at 06:48

## 2021-12-04 RX ADMIN — ATORVASTATIN CALCIUM 80 MG: 80 TABLET, FILM COATED ORAL at 20:00

## 2021-12-04 RX ADMIN — SODIUM NITROPRUSSIDE 0.3 MCG/KG/MIN: 25 INJECTION, SOLUTION, CONCENTRATE INTRAVENOUS at 15:30

## 2021-12-04 RX ADMIN — ONDANSETRON 4 MG: 2 INJECTION INTRAMUSCULAR; INTRAVENOUS at 22:33

## 2021-12-04 RX ADMIN — ONDANSETRON 4 MG: 2 INJECTION INTRAMUSCULAR; INTRAVENOUS at 16:48

## 2021-12-04 NOTE — ED PROVIDER NOTES
EMERGENCY DEPARTMENT ENCOUNTER    Room Number:  27/27  Date of encounter:  12/3/2021  PCP: Provider, No Known  Patient Care Team:  Provider, No Known as PCP - General   Historian: Patient    HPI:  Chief Complaint: Chest pain  A complete HPI/ROS/PMH/PSH/SH/FH are unobtainable due to: Language barrier    Context: Rufus Dorsey is a 36 y.o. male who presents to the ED c/o chest pain and shortness of breath for the last 5 days.  He reports it is gotten progressively worse.  He reports the pain is constant.  It is in the center of his chest.  It does not radiate.  It is associated with shortness of breath.  He has not had any nausea or diaphoresis.  He states he has had similar episodes in the past.  He reports the shortness of breath is worse when he wakes up in the morning.  The pain does not go away.  He is not on home oxygen.  He states he has been taking his diuretics.  He states his symptoms have gotten progressively worse since Monday.  He states his symptoms are worse with laying flat.  He reports his legs have been swelling.    Prior record review: Cardiology note 8/6/2021 with congestive heart failure, EF 39% with global hypokinesis.  Elevated troponin likely related to heart failure.  Respiratory failure with hypoxia.    PAST MEDICAL HISTORY  Active Ambulatory Problems     Diagnosis Date Noted   • Thrombocytopenia ([facility]) 12/21/2020   • Type 2 diabetes mellitus, with long-term current use of insulin ([facility]) 01/05/2021   • Class 3 severe obesity in adult ([facility]) 08/03/2021   • Bilateral pleural effusion 08/03/2021   • Pulmonary HTN ([facility]) 08/03/2021   • History of COVID-19 08/03/2021   • Coronary artery disease    • Hypertension    • Biventricular heart failure with reduced left ventricular function ([facility]) 08/18/2021   • CKD (chronic kidney disease) stage 2, GFR 60-89 ml/min 08/18/2021   • Snores 08/18/2021     Resolved Ambulatory Problems     Diagnosis Date Noted   • Pneumonia of both lungs due to infectious  organism 12/21/2020   • Acute respiratory failure with hypoxia (HCC) 12/21/2020   • COVID-19 virus infection 12/21/2020   • Acute kidney injury (HCC) 12/21/2020   • Pneumonia due to COVID-19 virus 12/21/2020   • Viral URI with cough 08/02/2021   • Acute pulmonary edema (HCC) 08/03/2021   • Acute on chronic combined systolic and diastolic CHF (congestive heart failure) (HCC) 08/03/2021   • Bilateral pulmonary infiltrates on chest x-ray 08/03/2021   • Ischemic cardiomyopathy 06/25/2016   • Screening cholesterol level 08/18/2021     Past Medical History:   Diagnosis Date   • CHF (congestive heart failure) ([facility])    • Diabetes (HCC)    • Heart failure (HCC) 05/16/2016   • High cholesterol        The patient qualifies to receive the vaccine, but they have not yet received it.    PAST SURGICAL HISTORY  Past Surgical History:   Procedure Laterality Date   • CARDIAC CATHETERIZATION  01/25/2017    Right heart cath   • CARDIAC SURGERY     • CORONARY ARTERY BYPASS GRAFT  05/26/2015    cabg x3 Dr. Key         FAMILY HISTORY  Family History   Family history unknown: Yes         SOCIAL HISTORY  Social History     Socioeconomic History   • Marital status:    Tobacco Use   • Smoking status: Former Smoker   • Smokeless tobacco: Never Used   Vaping Use   • Vaping Use: Never used   Substance and Sexual Activity   • Alcohol use: Never   • Drug use: Never   • Sexual activity: Defer         ALLERGIES  Patient has no known allergies.        REVIEW OF SYSTEMS  Review of Systems   Positive chest pain, shortness of breath, negative nausea, negative vomiting, negative fever, negative chills, negative abdominal pain  All systems reviewed and negative except for those discussed in HPI.       PHYSICAL EXAM    I have reviewed the triage vital signs and nursing notes.    ED Triage Vitals   Temp Heart Rate Resp BP SpO2   12/03/21 1347 12/03/21 1347 12/03/21 1347 12/03/21 1852 12/03/21 1347   98.7 °F (37.1 °C) 103 18 (!) 182/110 91 %       Temp src Heart Rate Source Patient Position BP Location FiO2 (%)   12/03/21 1347 -- -- -- --   Tympanic           Physical Exam  GENERAL: Awake, alert, no acute distress  SKIN: Warm, dry  HENT: Normocephalic, atraumatic  EYES: no scleral icterus  CV: regular rhythm, regular rate  RESPIRATORY: normal effort, lungs clear  ABDOMEN: soft, nontender, nondistended  MUSCULOSKELETAL: no deformity, 1-2+ pitting edema bilateral lower extremities.  No calf tenderness  NEURO: alert, moves all extremities, follows commands          LAB RESULTS  Recent Results (from the past 24 hour(s))   ECG 12 Lead    Collection Time: 12/03/21  1:51 PM   Result Value Ref Range    QT Interval 361 ms   Comprehensive Metabolic Panel    Collection Time: 12/03/21  7:00 PM    Specimen: Blood   Result Value Ref Range    Glucose 200 (H) 65 - 99 mg/dL    BUN 27 (H) 6 - 20 mg/dL    Creatinine 1.57 (H) 0.76 - 1.27 mg/dL    Sodium 136 136 - 145 mmol/L    Potassium 3.8 3.5 - 5.2 mmol/L    Chloride 100 98 - 107 mmol/L    CO2 23.9 22.0 - 29.0 mmol/L    Calcium 8.1 (L) 8.6 - 10.5 mg/dL    Total Protein 6.5 6.0 - 8.5 g/dL    Albumin 2.90 (L) 3.50 - 5.20 g/dL    ALT (SGPT) 16 1 - 41 U/L    AST (SGOT) 14 1 - 40 U/L    Alkaline Phosphatase 82 39 - 117 U/L    Total Bilirubin 0.6 0.0 - 1.2 mg/dL    eGFR Non African Amer 50 (L) >60 mL/min/1.73    Globulin 3.6 gm/dL    A/G Ratio 0.8 g/dL    BUN/Creatinine Ratio 17.2 7.0 - 25.0    Anion Gap 12.1 5.0 - 15.0 mmol/L   Troponin    Collection Time: 12/03/21  7:00 PM    Specimen: Blood   Result Value Ref Range    Troponin T 0.149 (C) 0.000 - 0.030 ng/mL   Green Top (Gel)    Collection Time: 12/03/21  7:00 PM   Result Value Ref Range    Extra Tube Hold for add-ons.    Lavender Top    Collection Time: 12/03/21  7:00 PM   Result Value Ref Range    Extra Tube hold for add-on    Light Blue Top    Collection Time: 12/03/21  7:00 PM   Result Value Ref Range    Extra Tube hold for add-on    CBC Auto Differential    Collection  Time: 12/03/21  7:00 PM    Specimen: Blood   Result Value Ref Range    WBC 12.75 (H) 3.40 - 10.80 10*3/mm3    RBC 5.12 4.14 - 5.80 10*6/mm3    Hemoglobin 14.7 13.0 - 17.7 g/dL    Hematocrit 42.2 37.5 - 51.0 %    MCV 82.4 79.0 - 97.0 fL    MCH 28.7 26.6 - 33.0 pg    MCHC 34.8 31.5 - 35.7 g/dL    RDW 13.8 12.3 - 15.4 %    RDW-SD 40.5 37.0 - 54.0 fl    MPV 11.7 6.0 - 12.0 fL    Platelets 233 140 - 450 10*3/mm3    Neutrophil % 79.3 (H) 42.7 - 76.0 %    Lymphocyte % 11.1 (L) 19.6 - 45.3 %    Monocyte % 6.7 5.0 - 12.0 %    Eosinophil % 2.2 0.3 - 6.2 %    Basophil % 0.2 0.0 - 1.5 %    Immature Grans % 0.5 0.0 - 0.5 %    Neutrophils, Absolute 10.11 (H) 1.70 - 7.00 10*3/mm3    Lymphocytes, Absolute 1.41 0.70 - 3.10 10*3/mm3    Monocytes, Absolute 0.85 0.10 - 0.90 10*3/mm3    Eosinophils, Absolute 0.28 0.00 - 0.40 10*3/mm3    Basophils, Absolute 0.03 0.00 - 0.20 10*3/mm3    Immature Grans, Absolute 0.07 (H) 0.00 - 0.05 10*3/mm3    nRBC 0.0 0.0 - 0.2 /100 WBC   BNP    Collection Time: 12/03/21  7:00 PM    Specimen: Blood   Result Value Ref Range    proBNP 4,144.0 (H) 0.0 - 450.0 pg/mL       Ordered the above labs and independently reviewed the results.        RADIOLOGY  XR Chest 2 View    Result Date: 12/3/2021  XR CHEST 2 VW-  HISTORY: Male who is 36 years-old,  chest pain  TECHNIQUE: Frontal and lateral views of the chest  COMPARISON: 08/02/2021  FINDINGS: The heart is enlarged. Sternotomy wires are noted. Extensive bilateral infiltrates are seen, suggesting pneumonia and/or edema, although the appearance is similar to prior exam, there could be a chronic component. No pleural effusion, or pneumothorax. No acute osseous process.      Extensive bilateral infiltrates are seen, suggesting pneumonia and/or edema, although the appearance is similar to prior exam, there could be a chronic component. Follow-up is recommended. Cardiomegaly.  This report was finalized on 12/3/2021 3:07 PM by Dr. Suleiman Anderson M.D.        I  ordered the above noted radiological studies. Reviewed by me and discussed with radiologist.  See dictation for official radiology interpretation.      PROCEDURES    Procedures      MEDICATIONS GIVEN IN ER    Medications   sodium chloride 0.9 % flush 10 mL (has no administration in time range)   furosemide (LASIX) injection 40 mg (40 mg Intravenous Given 12/3/21 1951)   aspirin chewable tablet 324 mg (324 mg Oral Given 12/3/21 2127)         PROGRESS, DATA ANALYSIS, CONSULTS, AND MEDICAL DECISION MAKING    All labs have been independently reviewed by me.  All radiology studies have been reviewed by me and discussed with radiologist dictating the report.   EKG's independently viewed and interpreted by me.  Discussion below represents my analysis of pertinent findings related to patient's condition, differential diagnosis, treatment plan and final disposition.    Differential diagnosis includes but is not limited to CHF exacerbation, non-STEMI, STEMI, unstable angina, pneumonia, hypoxia.    ED Course as of 12/03/21 2145   Fri Dec 03, 2021   1945 EKG          EKG time: 1351  Rhythm/Rate: Sinus tach, rate 103  P waves and KS: Normal P, normal KS  QRS, axis: Narrow QRS, normal axis, LVH  ST and T waves: Normal ST and T waves    Interpreted Contemporaneously by me, independently viewed  Not significantly changed compared to prior 8-2-21   [TR]   2105 Creatinine(!): 1.57 [TR]   2105 Troponin T(!!): 0.149 [TR]   2105 WBC(!): 12.75 [TR]   2138 I reviewed work-up and findings with the patient.  Answered all questions.  Plan admission.  He is agreeable. [TR]   2139 proBNP(!): 4,144.0 [TR]   2139 SpO2: 92 % [TR]   2143 Speaking with Luis with [facility].  Will admit to Dr. Diallo. [TR]      ED Course User Index  [TR] Beau Thomas MD           PPE: The patient wore a mask and I wore an N95 mask throughout the entire patient encounter.       AS OF 21:45 EST VITALS:    BP - 169/96  HR - 98  TEMP - 98.7 °F (37.1 °C)  (Tympanic)  O2 SATS - 92%        DIAGNOSIS  Final diagnoses:   Acute on chronic congestive heart failure, unspecified heart failure type (HCC)   Elevated troponin   Hypoxia         DISPOSITION  ED Disposition     ED Disposition Condition Comment    Decision to Admit  Level of Care: Telemetry [5]   Diagnosis: Acute on chronic congestive heart failure, unspecified heart failure type (HCC) [[id number]]   Admitting Physician: ALCIDES HERMOSILLO [[id number]]   Attending Physician: ALCIDES HERMOSILLO [[id number]]   Certification: I Certify That Inpatient Hospital Services Are Medically Necessary For Greater Than 2 Midnights                  Beau Thomas MD  12/03/21 1424

## 2021-12-04 NOTE — H&P
Patient Name:  Rufus Dorsey  YOB: 1985  MRN:  [id number]  Admit Date:  12/3/2021  Patient Care Team:  Provider, No Known as PCP - General      Subjective   History Present Illness     Chief Complaint   Patient presents with   • Chest Pain       Mr. Dorsey is a 36 y.o. male with a history of congestive heart failure, CABG, cardiomyopathy respiratory failure with hypoxia that presents to [facility] complaining of chest pain and shortness of breath for the last 5 days with progressive worsening.  Chest pain described as compression and squeezing in the middle of the chest.  There is no radiation.  He denies diaphoresis.  Shortness of breath has been constant and worsened with exertion and lying down.  No fever or chills.  Nothing makes the chest pain or shortness of breath any worse or better.  However his chest pain is currently a little bit better while he is sitting here.  He reports orthopnea.  He has a productive cough with pink and white sputum production.  He has noticed swelling of his legs and feet which is also progressively worse.  He has gained about 3 or 4 pounds in the past couple of weeks.  He watches his diet and does not eat any fast food or canned goods or added salt.  When questioned about his medication the patient states he believes he made a mistake when he picked up his prescriptions last time after being discharged from the hospital.  He has been on his regular diabetes medications but has not been on any other medications at all.  He states he believes he got confused when he went to the pharmacy and did not  all of his medicines.    The patient's Merry language is Greenlandic.  Interview conducted via audiovisual  on the iPad.    Review of Systems   Constitutional: Negative for appetite change, chills, diaphoresis and fever.   HENT: Negative for congestion, rhinorrhea and sore throat.    Eyes: Negative for visual disturbance.   Respiratory:  Positive for cough and shortness of breath.    Cardiovascular: Positive for chest pain and leg swelling. Negative for palpitations.   Gastrointestinal: Positive for abdominal distention. Negative for abdominal pain, constipation, diarrhea, nausea and vomiting.   Genitourinary: Negative for difficulty urinating.   Musculoskeletal: Negative for arthralgias and myalgias.   Skin: Negative for rash and wound.   Neurological: Negative for dizziness, weakness, light-headedness and headaches.   Psychiatric/Behavioral: Negative for decreased concentration.        Personal History     Past Medical History:   Diagnosis Date   • CHF (congestive heart failure) ([facility])    • Coronary artery disease    • Diabetes (HCC)    • Heart failure (HCC) 05/16/2016   • High cholesterol    • Hypertension    • Ischemic cardiomyopathy 06/25/2016     Past Surgical History:   Procedure Laterality Date   • CARDIAC CATHETERIZATION  01/25/2017    Right heart cath   • CARDIAC SURGERY     • CORONARY ARTERY BYPASS GRAFT  05/26/2015    cabg x3 Dr. Key     Family History   Family history unknown: Yes     Social History     Tobacco Use   • Smoking status: Former Smoker   • Smokeless tobacco: Never Used   Vaping Use   • Vaping Use: Never used   Substance Use Topics   • Alcohol use: Never   • Drug use: Never     No current facility-administered medications on file prior to encounter.     Current Outpatient Medications on File Prior to Encounter   Medication Sig Dispense Refill   • aspirin 81 MG EC tablet Take 81 mg by mouth Daily.     • Blood Glucose Monitoring Suppl (FreeStyle Freedom Lite) w/Device kit Use to check blood sugar as directed. 1 each 0   • budesonide-formoterol (SYMBICORT) 160-4.5 MCG/ACT inhaler Inhale 2 puffs 2 (Two) Times a Day. 10.2 g 0   • Dapagliflozin Propanediol (Farxiga) 10 MG tablet Take 1 tablet by mouth Daily. 31 tablet 3   • furosemide (LASIX) 20 MG tablet Take 1 tablet by mouth Daily. 30 tablet 0   • glucose blood test strip  Test 4 (Four) Times a Day After Meals & at Bedtime 420 each 0   • glucose monitor monitoring kit 1 each 4 (Four) Times a Day Before Meals & at Bedtime. 1 each 0   • Insulin Glargine (BASAGLAR KWIKPEN) 100 UNIT/ML injection pen Inject 20 Units under the skin into the appropriate area as directed Every Night. 5 pen 0   • Insulin Pen Needle 32G X 4 MM misc Use with insulin 5 times daily 200 each 0   • Lancets 28G misc 1 each by Other route 4 (Four) Times a Day After Meals & at Bedtime. 100 each 0   • metoprolol succinate XL (TOPROL-XL) 50 MG 24 hr tablet Take 1 tablet by mouth Daily. (Patient taking differently: Take 25 mg by mouth Daily.) 31 tablet 3   • sacubitril-valsartan (ENTRESTO) 24-26 MG tablet Take 1 tablet by mouth 2 (Two) Times a Day. 60 tablet    • spironolactone (ALDACTONE) 25 MG tablet Take 1 tablet by mouth Daily. 30 tablet 1   • atorvastatin (LIPITOR) 80 MG tablet Take 1 tablet by mouth Daily. 30 tablet 0     No Known Allergies    Objective    Objective     Vital Signs  Temp:  [97.9 °F (36.6 °C)-98.7 °F (37.1 °C)] 97.9 °F (36.6 °C)  Heart Rate:  [] 92  Resp:  [16-22] 16  BP: (148-187)/() 187/100  SpO2:  [88 %-100 %] 100 %  on   ;   Device (Oxygen Therapy): room air  Body mass index is 44.05 kg/m².    Physical Exam  Constitutional:       General: He is not in acute distress.     Appearance: He is obese. He is not ill-appearing or toxic-appearing.   HENT:      Head: Normocephalic and atraumatic.      Nose: Nose normal.      Mouth/Throat:      Mouth: Mucous membranes are moist.   Eyes:      Extraocular Movements: Extraocular movements intact.      Conjunctiva/sclera: Conjunctivae normal.      Pupils: Pupils are equal, round, and reactive to light.   Cardiovascular:      Rate and Rhythm: Normal rate and regular rhythm.      Pulses: Normal pulses.      Heart sounds: Normal heart sounds.   Pulmonary:      Effort: Pulmonary effort is normal. No respiratory distress.      Breath sounds: Normal  breath sounds.   Abdominal:      General: Bowel sounds are normal. There is no distension.      Palpations: Abdomen is soft.      Tenderness: There is no abdominal tenderness.   Musculoskeletal:         General: Swelling (Moderate +2 pitting edema to lower legs, ankles, feet bilaterally) and tenderness present. Normal range of motion.      Cervical back: Normal range of motion.   Skin:     General: Skin is warm and dry.   Neurological:      General: No focal deficit present.      Mental Status: He is alert and oriented to person, place, and time.   Psychiatric:         Mood and Affect: Mood normal.         Results Review:  I reviewed the patient's new clinical results.      Lab Results (last 24 hours)     Procedure Component Value Units Date/Time    CBC & Differential [[id number]]  (Abnormal) Collected: 12/03/21 1900    Specimen: Blood Updated: 12/03/21 1923    Narrative:      The following orders were created for panel order CBC & Differential.  Procedure                               Abnormality         Status                     ---------                               -----------         ------                     CBC Auto Differential[[id number]]        Abnormal            Final result                 Please view results for these tests on the individual orders.    Comprehensive Metabolic Panel [[id number]]  (Abnormal) Collected: 12/03/21 1900    Specimen: Blood Updated: 12/03/21 1939     Glucose 200 mg/dL      BUN 27 mg/dL      Creatinine 1.57 mg/dL      Sodium 136 mmol/L      Potassium 3.8 mmol/L      Comment: Slight hemolysis detected by analyzer. Results may be affected.        Chloride 100 mmol/L      CO2 23.9 mmol/L      Calcium 8.1 mg/dL      Total Protein 6.5 g/dL      Albumin 2.90 g/dL      ALT (SGPT) 16 U/L      AST (SGOT) 14 U/L      Alkaline Phosphatase 82 U/L      Total Bilirubin 0.6 mg/dL      eGFR Non African Amer 50 mL/min/1.73      Globulin 3.6 gm/dL      A/G Ratio 0.8 g/dL      BUN/Creatinine  Ratio 17.2     Anion Gap 12.1 mmol/L     Narrative:      GFR Normal >60  Chronic Kidney Disease <60  Kidney Failure <15      Troponin [[id number]]  (Abnormal) Collected: 12/03/21 1900    Specimen: Blood Updated: 12/03/21 2001     Troponin T 0.149 ng/mL     Narrative:      Troponin T Reference Range:  <= 0.03 ng/mL-   Negative for AMI  >0.03 ng/mL-     Abnormal for myocardial necrosis.  Clinicians would have to utilize clinical acumen, EKG, Troponin and serial changes to determine if it is an Acute Myocardial Infarction or myocardial injury due to an underlying chronic condition.       Results may be falsely decreased if patient taking Biotin.      CBC Auto Differential [[id number]]  (Abnormal) Collected: 12/03/21 1900    Specimen: Blood Updated: 12/03/21 1923     WBC 12.75 10*3/mm3      RBC 5.12 10*6/mm3      Hemoglobin 14.7 g/dL      Hematocrit 42.2 %      MCV 82.4 fL      MCH 28.7 pg      MCHC 34.8 g/dL      RDW 13.8 %      RDW-SD 40.5 fl      MPV 11.7 fL      Platelets 233 10*3/mm3      Neutrophil % 79.3 %      Lymphocyte % 11.1 %      Monocyte % 6.7 %      Eosinophil % 2.2 %      Basophil % 0.2 %      Immature Grans % 0.5 %      Neutrophils, Absolute 10.11 10*3/mm3      Lymphocytes, Absolute 1.41 10*3/mm3      Monocytes, Absolute 0.85 10*3/mm3      Eosinophils, Absolute 0.28 10*3/mm3      Basophils, Absolute 0.03 10*3/mm3      Immature Grans, Absolute 0.07 10*3/mm3      nRBC 0.0 /100 WBC     BNP [[id number]]  (Abnormal) Collected: 12/03/21 1900    Specimen: Blood Updated: 12/03/21 2135     proBNP 4,144.0 pg/mL     Narrative:      Among patients with dyspnea, NT-proBNP is highly sensitive for the detection of acute congestive heart failure. In addition NT-proBNP of <300 pg/ml effectively rules out acute congestive heart failure with 99% negative predictive value.    Results may be falsely decreased if patient taking Biotin.      Troponin [[id number]]  (Abnormal) Collected: 12/03/21 2132    Specimen: Blood Updated:  12/03/21 2241     Troponin T 0.183 ng/mL     Narrative:      Troponin T Reference Range:  <= 0.03 ng/mL-   Negative for AMI  >0.03 ng/mL-     Abnormal for myocardial necrosis.  Clinicians would have to utilize clinical acumen, EKG, Troponin and serial changes to determine if it is an Acute Myocardial Infarction or myocardial injury due to an underlying chronic condition.       Results may be falsely decreased if patient taking Biotin.      COVID PRE-OP / PRE-PROCEDURE SCREENING ORDER (NO ISOLATION) - Swab, Nasopharynx [[id number]]  (Normal) Collected: 12/03/21 2143    Specimen: Swab from Nasopharynx Updated: 12/04/21 0125    Narrative:      The following orders were created for panel order COVID PRE-OP / PRE-PROCEDURE SCREENING ORDER (NO ISOLATION) - Swab, Nasopharynx.  Procedure                               Abnormality         Status                     ---------                               -----------         ------                     COVID-19,APTIMA PANTHER(...[[id number]]  Normal              Final result                 Please view results for these tests on the individual orders.    COVID-19,APTIMA PANTHER(BRADY),[facility], NP/OP SWAB IN UTM/VTM/SALINE TRANSPORT MEDIA,24 HR TAT - Swab, Nasopharynx [[id number]]  (Normal) Collected: 12/03/21 2143    Specimen: Swab from Nasopharynx Updated: 12/04/21 0125     COVID19 Not Detected    Narrative:      Fact sheet for providers: https://www.fda.gov/media/683427/download     Fact sheet for patients: https://www.fda.gov/media/551961/download    Test performed by RT PCR.    Troponin [[id number]]  (Abnormal) Collected: 12/04/21 0418    Specimen: Blood Updated: 12/04/21 0612     Troponin T 0.177 ng/mL     Narrative:      Troponin T Reference Range:  <= 0.03 ng/mL-   Negative for AMI  >0.03 ng/mL-     Abnormal for myocardial necrosis.  Clinicians would have to utilize clinical acumen, EKG, Troponin and serial changes to determine if it is an Acute Myocardial Infarction or  myocardial injury due to an underlying chronic condition.       Results may be falsely decreased if patient taking Biotin.      Basic Metabolic Panel [[id number]]  (Abnormal) Collected: 12/04/21 0418    Specimen: Blood Updated: 12/04/21 0611     Glucose 226 mg/dL      BUN 29 mg/dL      Creatinine 1.73 mg/dL      Sodium 136 mmol/L      Potassium 3.7 mmol/L      Chloride 100 mmol/L      CO2 27.3 mmol/L      Calcium 8.1 mg/dL      eGFR Non African Amer 45 mL/min/1.73      BUN/Creatinine Ratio 16.8     Anion Gap 8.7 mmol/L     Narrative:      GFR Normal >60  Chronic Kidney Disease <60  Kidney Failure <15      CBC Auto Differential [[id number]]  (Abnormal) Collected: 12/04/21 0418    Specimen: Blood Updated: 12/04/21 0535     WBC 10.47 10*3/mm3      RBC 4.86 10*6/mm3      Hemoglobin 13.5 g/dL      Hematocrit 40.2 %      MCV 82.7 fL      MCH 27.8 pg      MCHC 33.6 g/dL      RDW 13.5 %      RDW-SD 40.4 fl      MPV 11.9 fL      Platelets 203 10*3/mm3      Neutrophil % 74.3 %      Lymphocyte % 14.1 %      Monocyte % 7.4 %      Eosinophil % 3.4 %      Basophil % 0.3 %      Immature Grans % 0.5 %      Neutrophils, Absolute 7.77 10*3/mm3      Lymphocytes, Absolute 1.48 10*3/mm3      Monocytes, Absolute 0.78 10*3/mm3      Eosinophils, Absolute 0.36 10*3/mm3      Basophils, Absolute 0.03 10*3/mm3      Immature Grans, Absolute 0.05 10*3/mm3      nRBC 0.0 /100 WBC     Protime-INR [[id number]]  (Normal) Collected: 12/04/21 0418    Specimen: Blood Updated: 12/04/21 0549     Protime 14.0 Seconds      INR 1.10    Magnesium [[id number]]  (Normal) Collected: 12/04/21 0418    Specimen: Blood Updated: 12/04/21 0611     Magnesium 2.2 mg/dL     POC Glucose Once [[id number]]  (Abnormal) Collected: 12/04/21 0623    Specimen: Blood Updated: 12/04/21 0632     Glucose 195 mg/dL      Comment: Meter: [id number] : [id number] Chao LAO       POC Glucose Once [[id number]]  (Abnormal) Collected: 12/04/21 1135    Specimen: Blood Updated: 12/04/21  1138     Glucose 158 mg/dL      Comment: Meter: [id number] : [id number] Hand Selena ALIN             Imaging Results (Last 24 Hours)     Procedure Component Value Units Date/Time    XR Chest 2 View [[id number]] Collected: 12/03/21 1503     Updated: 12/03/21 1510    Narrative:      XR CHEST 2 VW-     HISTORY: Male who is 36 years-old,  chest pain     TECHNIQUE: Frontal and lateral views of the chest     COMPARISON: 08/02/2021     FINDINGS: The heart is enlarged. Sternotomy wires are noted. Extensive  bilateral infiltrates are seen, suggesting pneumonia and/or edema,  although the appearance is similar to prior exam, there could be a  chronic component. No pleural effusion, or pneumothorax. No acute  osseous process.       Impression:      Extensive bilateral infiltrates are seen, suggesting  pneumonia and/or edema, although the appearance is similar to prior  exam, there could be a chronic component. Follow-up is recommended.  Cardiomegaly.     This report was finalized on 12/3/2021 3:07 PM by Dr. Suleiman Anderson M.D.             Results for orders placed during the hospital encounter of 08/02/21    Adult Transthoracic Echo Complete W/ Cont if Necessary Per Protocol    Interpretation Summary  · Calculated left ventricular EF = 36.9% Estimated left ventricular EF = 37% Estimated left ventricular EF was in agreement with the calculated left ventricular EF. Left ventricular systolic function is moderately decreased. Normal left ventricular wall thickness noted. The left ventricular cavity is severely dilated. There is left ventricular global hypokinesis noted. Left ventricular diastolic dysfunction is noted. There is at least Grade 3 diastolic dysfunction No evidence of a left ventricular thrombus present.  · The right ventricular cavity is mildly dilated. Moderately reduced right ventricular systolic function noted.  · Left atrial volume is moderately increased. The right atrial cavity is mildly dilated  · Trace  aortic valve regurgitation is present.      ECG 12 Lead   Final Result   HEART RATE= 103  bpm   RR Interval= 580  ms   NM Interval= 187  ms   P Horizontal Axis= 17  deg   P Front Axis= 7  deg   QRSD Interval= 122  ms   QT Interval= 361  ms   QRS Axis= 75  deg   T Wave Axis= 162  deg   - ABNORMAL ECG -   Sinus tachycardia   Probable left atrial enlargement   LVH with secondary repolarization abnormality   NO SIGNIFICANT CHANGE FROM PREVIOUS ECG   Electronically Signed By: Meeta Arguelles ([facility]) 03-Dec-2021 15:31:41   Date and Time of Study: 2021-12-03 13:51:58           Assessment/Plan     Active Hospital Problems    Diagnosis  POA   • **Acute on chronic congestive heart failure (HCC) [I50.9]  Yes   • Other chest pain [R07.89]  Unknown   • CKD (chronic kidney disease) stage 2, GFR 60-89 ml/min [N18.2]  Yes   • Pulmonary HTN ([facility]) [I27.20]  Yes   • Obesity, Class III, BMI 40-49.9 (morbid obesity) ([facility]) [E66.01]  Unknown   • Coronary artery disease [I25.10]  Yes   • Hypertension [I10]  Yes   • Type 2 diabetes mellitus, with long-term current use of insulin ([facility]) [E11.9, Z79.4]  Not Applicable      Resolved Hospital Problems   No resolved problems to display.     Mr. Dorsey is a 36 y.o. male with a history of congestive heart failure, diabetes, CABG, cardiomyopathy respiratory failure with hypoxia that presents to [facility] complaining of this pain and shortness of breath for the last 5 days.    Work-up: Troponin 0 0.149, 0.183 0.177.  BNP 4,144  Creatinine 1.73, BUN 29  White count normal.  Hemoglobin 13.5  Blood glucose readings elevated, 152-226  COVID-19 swab negative  Chest x-ray shows cardiomegaly and extensive bilateral infiltrates with similar appearance on prior exam  EKG sinus tach with probable left atrial enlargement, LVF    Chest pain with elevated troponin  Acute on chronic congestive heart failure  CKD  Pulmonary hypertension  Obesity  CAD  Hypertension  Diabetes mellitus type  2    Plan:   Received 40 mg IV Lasix last night.  Has about 1000 mL of urine output documented for last shift.    Recent admission with elevated troponin and CHF.  Symptoms likely precipitated by him not being on his medications.  Cardiology following and plan is for Cath Lab.  Blood pressure significantly elevated.  Defer to cardiology and monitor.  We will manage his diabetes coverage with sliding scale NovoLog for now.      · I discussed the patient's findings and my recommendations with patient.    VTE Prophylaxis - SCDs.  Code Status - Full code.       JESSICA Robledo  [facility]  12/04/21  12:28 EST

## 2021-12-04 NOTE — ED NOTES
Pt noted to drop sats when sleeping, pt woken and encouraged to take deep breaths and sats immediately improve.     Halie Esqueda, RN  12/03/21 6500

## 2021-12-04 NOTE — ED NOTES
.  Nursing report ED to floor  Rufus Dorsey  36 y.o.  male    HPI :   Chief Complaint   Patient presents with   • Chest Pain       Admitting doctor:   Joey Diallo MD    Admitting diagnosis:   The primary encounter diagnosis was Acute on chronic congestive heart failure, unspecified heart failure type (HCC). Diagnoses of Elevated troponin and Hypoxia were also pertinent to this visit.    Code status:   Current Code Status     Date Active Code Status Order ID Comments User Context       Prior    Advance Care Planning Activity          Allergies:   Patient has no known allergies.    Intake and Output    Intake/Output Summary (Last 24 hours) at 12/3/2021 2235  Last data filed at 12/3/2021 2134  Gross per 24 hour   Intake --   Output 1000 ml   Net -1000 ml       Weight:       12/03/21 1852   Weight: 127 kg (280 lb 6.4 oz)       Most recent vitals:   Vitals:    12/03/21 1347 12/03/21 1852 12/03/21 2133   BP:  (!) 182/110 169/96   Pulse: 103 106 98   Resp: 18 22 18   Temp: 98.7 °F (37.1 °C)     TempSrc: Tympanic     SpO2: 91% 92%    Weight:  127 kg (280 lb 6.4 oz)        Active LDAs/IV Access:   Lines, Drains & Airways     Active LDAs     Name Placement date Placement time Site Days    Peripheral IV 12/03/21 1859 Right Antecubital 12/03/21 1859  Antecubital  less than 1                Labs (abnormal labs have a star):   Labs Reviewed   COMPREHENSIVE METABOLIC PANEL - Abnormal; Notable for the following components:       Result Value    Glucose 200 (*)     BUN 27 (*)     Creatinine 1.57 (*)     Calcium 8.1 (*)     Albumin 2.90 (*)     eGFR Non  Amer 50 (*)     All other components within normal limits    Narrative:     GFR Normal >60  Chronic Kidney Disease <60  Kidney Failure <15     TROPONIN (IN-HOUSE) - Abnormal; Notable for the following components:    Troponin T 0.149 (*)     All other components within normal limits    Narrative:     Troponin T Reference Range:  <= 0.03 ng/mL-   Negative for  AMI  >0.03 ng/mL-     Abnormal for myocardial necrosis.  Clinicians would have to utilize clinical acumen, EKG, Troponin and serial changes to determine if it is an Acute Myocardial Infarction or myocardial injury due to an underlying chronic condition.       Results may be falsely decreased if patient taking Biotin.     CBC WITH AUTO DIFFERENTIAL - Abnormal; Notable for the following components:    WBC 12.75 (*)     Neutrophil % 79.3 (*)     Lymphocyte % 11.1 (*)     Neutrophils, Absolute 10.11 (*)     Immature Grans, Absolute 0.07 (*)     All other components within normal limits   BNP (IN-HOUSE) - Abnormal; Notable for the following components:    proBNP 4,144.0 (*)     All other components within normal limits    Narrative:     Among patients with dyspnea, NT-proBNP is highly sensitive for the detection of acute congestive heart failure. In addition NT-proBNP of <300 pg/ml effectively rules out acute congestive heart failure with 99% negative predictive value.    Results may be falsely decreased if patient taking Biotin.     COVID PRE-OP / PRE-PROCEDURE SCREENING ORDER (NO ISOLATION)    Narrative:     The following orders were created for panel order COVID PRE-OP / PRE-PROCEDURE SCREENING ORDER (NO ISOLATION) - Swab, Nasopharynx.  Procedure                               Abnormality         Status                     ---------                               -----------         ------                     COVID-19,APTIMA PANTHER(...[[id number]]                      In process                   Please view results for these tests on the individual orders.   COVID-19,APTIMA PANTHER (BRADY) SARABJIT ,NP/OP SWAB IN UTM/VTM/SALINE TRANSPORT MEDIA,24 HR TAT   RAINBOW DRAW    Narrative:     The following orders were created for panel order [place] Draw.  Procedure                               Abnormality         Status                     ---------                               -----------         ------                      Green Top (Gel)[[id number]]                                  Final result               Lavender Top[[id number]]                                     Final result               Gold Top - SST[[id number]]                                                              Light Blue Top[[id number]]                                   Final result                 Please view results for these tests on the individual orders.   TROPONIN (IN-HOUSE)   CBC AND DIFFERENTIAL    Narrative:     The following orders were created for panel order CBC & Differential.  Procedure                               Abnormality         Status                     ---------                               -----------         ------                     CBC Auto Differential[[id number]]        Abnormal            Final result                 Please view results for these tests on the individual orders.   GREEN TOP   LAVENDER TOP   LIGHT BLUE TOP       EKG:   ECG 12 Lead   Final Result   HEART RATE= 103  bpm   RR Interval= 580  ms   RI Interval= 187  ms   P Horizontal Axis= 17  deg   P Front Axis= 7  deg   QRSD Interval= 122  ms   QT Interval= 361  ms   QRS Axis= 75  deg   T Wave Axis= 162  deg   - ABNORMAL ECG -   Sinus tachycardia   Probable left atrial enlargement   LVH with secondary repolarization abnormality   NO SIGNIFICANT CHANGE FROM PREVIOUS ECG   Electronically Signed By: Meeta Arguelles ([facility]) 03-Dec-2021 15:31:41   Date and Time of Study: 2021-12-03 13:51:58          Meds given in ED:   Medications   sodium chloride 0.9 % flush 10 mL (has no administration in time range)   furosemide (LASIX) injection 40 mg (40 mg Intravenous Given 12/3/21 1951)   aspirin chewable tablet 324 mg (324 mg Oral Given 12/3/21 2127)       Imaging results:  XR Chest 2 View    Result Date: 12/3/2021  Extensive bilateral infiltrates are seen, suggesting pneumonia and/or edema, although the appearance is similar to prior exam, there could be a chronic component.  Follow-up is recommended. Cardiomegaly.  This report was finalized on 12/3/2021 3:07 PM by Dr. Suleiman Anderson M.D.        Ambulatory status:   - up ad valente    Social issues:   Social History     Socioeconomic History   • Marital status:    Tobacco Use   • Smoking status: Former Smoker   • Smokeless tobacco: Never Used   Vaping Use   • Vaping Use: Never used   Substance and Sexual Activity   • Alcohol use: Never   • Drug use: Never   • Sexual activity: Defer       NIH Stroke Scale:        Nursing report ED to floor:       Halie Esqueda, RN  12/03/21 9857

## 2021-12-04 NOTE — CONSULTS
Date of Hospital Visit: 21  Encounter Provider: Les Reyes MD  Place of Service: [facility] CARDIOLOGY  Patient Name: Rufus Dorsey  :1985  [id number]  Referral Provider: Joey Diallo*    Chief complaint: Chest Pain/CHF    Reason for Consult:  Chest Pain    History of Present Illness:    Mr Dorsey is a 36 year old patient of Dr Menon who is now being followed by the heart failure clinic.  He has a history of systolic congestive heart failure, ischemic cardiomyopathy with EF 19%, pulmonary hypertension, diabetes, coronary disease (s/p CABG x 3 )    He has prolonged stay in the hospital with COVID-19 in 2020 and he was discharged home on oxygen.     He was seen by Dr Menon in 2021 when he was admitted with pneumonia and pleural effusions for elevated troponin and CHF. He was treated with IV diuretics. He did undergo stress test which showed no ischemia and EF 19% and ECHO which showed his EF of 36%. There was concern that he was not compliant with his medications at home     He was last seen in the heart failure clinic in 2021.  He was doing well on Entresto and lasix, aldactone. He was to be set up for RHC and then discuss possible ICD after but I do not see this was done and he was a no show to his last heart failure appointment.    He presented to the ED last night with complaints of constant chest pain and SOA that started 5 days prior. He reports his symptoms are worse with lying down and he has associated edema.  He no longer is on home oxygen.     His B/P was elevated at 182/110.  Labs included troponin 0.149 with creatinine of 1.57, BNP 4144.  CXR showed bilateral infiltrates and edema which is similar to his previous exam and cardiomegaly. He received lasix 40 mg and  mg and was admitted for further evaluation.      His repeat troponin was 0.183 and 0.177. He diuresed 1000 cc overnight . His blood pressure has improved  to 148/98    I reviewed the above HPI and agree with it this is a gentleman who only speaks [place]ian this was obtained with the services of an  he has had a history of a cardiomyopathy thought to be an ischemic myopathy he has had a three-vessel bypass not here in 2016 he has diabetes he had probably has some degree of nephrotic syndrome he has been having chest pain for 5 days is worse with any kind of activity he does not have PND orthopnea he has had a chronic cough that is been nonproductive no fevers chills or sweats no history of bleeding no history of lung disease he does have some renal insufficiency    Previous Cardiac Testing   ECHO 8/4/2021  · Calculated left ventricular EF = 36.9% Estimated left ventricular EF = 37% Estimated left ventricular EF was in agreement with the calculated left ventricular EF. Left ventricular systolic function is moderately decreased. Normal left ventricular wall thickness noted. The left ventricular cavity is severely dilated. There is left ventricular global hypokinesis noted. Left ventricular diastolic dysfunction is noted. There is at least Grade 3 diastolic dysfunction No evidence of a left ventricular thrombus present.  · The right ventricular cavity is mildly dilated. Moderately reduced right ventricular systolic function noted.  · Left atrial volume is moderately increased. The right atrial cavity is mildly dilated  · Trace aortic valve regurgitation is present.      Stress Test 8/6/2021  · Post regadenoson EKG is negative for myocardial ischemia.  · Left ventricular ejection fraction is severely reduced. (Calculated EF = 19%). There is global hypokinesis with akinetic inferior wall.  · Myocardial perfusion imaging indicates no evidence of ischemia.  · Impressions are consistent with a high risk study.  · There is no prior study available for comparison.           Past Medical History:   Diagnosis Date   • CHF (congestive heart failure) (HCC)    • Coronary  artery disease    • Diabetes (HCC)    • Heart failure (HCC) 05/16/2016   • High cholesterol    • Hypertension    • Ischemic cardiomyopathy 06/25/2016       Past Surgical History:   Procedure Laterality Date   • CARDIAC CATHETERIZATION  01/25/2017    Right heart cath   • CARDIAC SURGERY     • CORONARY ARTERY BYPASS GRAFT  05/26/2015    cabg x3 Dr. Key       Medications Prior to Admission   Medication Sig Dispense Refill Last Dose   • aspirin 81 MG EC tablet Take 81 mg by mouth Daily.      • Blood Glucose Monitoring Suppl (FreeStyle Freedom Lite) w/Device kit Use to check blood sugar as directed. 1 each 0    • budesonide-formoterol (SYMBICORT) 160-4.5 MCG/ACT inhaler Inhale 2 puffs 2 (Two) Times a Day. 10.2 g 0    • Dapagliflozin Propanediol (Farxiga) 10 MG tablet Take 1 tablet by mouth Daily. 31 tablet 3    • furosemide (LASIX) 20 MG tablet Take 1 tablet by mouth Daily. 30 tablet 0    • glucose blood test strip Test 4 (Four) Times a Day After Meals & at Bedtime 420 each 0    • glucose monitor monitoring kit 1 each 4 (Four) Times a Day Before Meals & at Bedtime. 1 each 0    • Insulin Glargine (BASAGLAR KWIKPEN) 100 UNIT/ML injection pen Inject 20 Units under the skin into the appropriate area as directed Every Night. 5 pen 0    • Insulin Pen Needle 32G X 4 MM misc Use with insulin 5 times daily 200 each 0    • Lancets 28G misc 1 each by Other route 4 (Four) Times a Day After Meals & at Bedtime. 100 each 0    • metoprolol succinate XL (TOPROL-XL) 50 MG 24 hr tablet Take 1 tablet by mouth Daily. (Patient taking differently: Take 25 mg by mouth Daily.) 31 tablet 3    • sacubitril-valsartan (ENTRESTO) 24-26 MG tablet Take 1 tablet by mouth 2 (Two) Times a Day. 60 tablet     • spironolactone (ALDACTONE) 25 MG tablet Take 1 tablet by mouth Daily. 30 tablet 1    • atorvastatin (LIPITOR) 80 MG tablet Take 1 tablet by mouth Daily. 30 tablet 0        Current Meds  Scheduled Meds:insulin lispro, 0-7 Units, Subcutaneous,  TID AC  sodium chloride, 10 mL, Intravenous, Q12H      Continuous Infusions:   PRN Meds:.•  dextrose  •  dextrose  •  glucagon (human recombinant)  •  nitroglycerin  •  ondansetron  •  sodium chloride  •  sodium chloride    Allergies as of 12/03/2021   • (No Known Allergies)       Social History     Socioeconomic History   • Marital status: Single   Tobacco Use   • Smoking status: Former Smoker   • Smokeless tobacco: Never Used   Vaping Use   • Vaping Use: Never used   Substance and Sexual Activity   • Alcohol use: Never   • Drug use: Never   • Sexual activity: Defer       Family History   Family history unknown: Yes       REVIEW OF SYSTEMS:   ROS was performed and is negative except as outlined in HPI     REVIEW OF SYSTEMS:   CONSTITUTIONAL: No weight loss, fever, chills, weakness or fatigue.   HEENT: Eyes: No visual loss, blurred vision, double vision or yellow sclerae. Ears, Nose, Throat: No hearing loss, sneezing, congestion, runny nose or sore throat.   SKIN: No rash or itching.     RESPIRATORY: No shortness of breath, hemoptysis, cough or sputum.   GASTROINTESTINAL: No anorexia, nausea, vomiting or diarrhea. No abdominal pain, bright red blood per rectum or melena.  GENITOURINARY: No burning on urination, hematuria or increased frequency.  NEUROLOGICAL: No headache, dizziness, syncope, paralysis, ataxia, numbness or tingling in the extremities. No change in bowel or bladder control.   MUSCULOSKELETAL: No muscle, back pain, joint pain or stiffness.   HEMATOLOGIC: No anemia, bleeding or bruising.   LYMPHATICS: No enlarged nodes. No history of splenectomy.   PSYCHIATRIC: No history of depression, anxiety, hallucinations.   ENDOCRINOLOGIC: No reports of sweating, cold or heat intolerance. No polyuria or polydipsia.        Objective:   Temp:  [98.6 °F (37 °C)-98.7 °F (37.1 °C)] 98.6 °F (37 °C)  Heart Rate:  [] 84  Resp:  [16-22] 16  BP: (148-182)/() 148/98  Body mass index is 44.05 kg/m².  Flowsheet  Rows      First Filed Value   Admission Height --   Admission Weight 127 kg (280 lb 6.4 oz) Documented at 12/03/2021 1852        Vitals:    12/04/21 0719   BP: 148/98   Pulse: 84   Resp: 16   Temp: 98.6 °F (37 °C)   SpO2: 96%       Head:    Normocephalic, without obvious abnormality, atraumatic   Eyes:            Lids and lashes normal, conjunctivae and sclerae normal, no   icterus, no pallor   Ears:    Ears appear intact with no abnormalities noted   Throat:   No oral lesions, dentition good   Neck:   No adenopathy, supple, trachea midline, no thyromegaly, no   carotid bruit, no JVD   Lungs:     Breath sounds are equal and clear to auscultation    Heart:    Normal S1 and S2, RRR, No M/G/R   Abdomen:    Normal bowel sounds, no masses, no organomegaly, soft, nontender,       nondistended, no guarding   Extremities:   Moves all extremities well, no edema, no cyanosis, no redness   Pulses:   Pulses palpable and equal bilaterally.    Skin:  Psychiatric:   No bleeding, bruising or rash    Awake, alert and oriented x 3, normal mood and affect             Telemetry      EKG on arrival      EKG baseline        I personally viewed and interpreted the patient's EKG/Telemetry data    Assessment:  Active Hospital Problems    Diagnosis  POA   • Other chest pain [R07.89]  Unknown   • Acute on chronic congestive heart failure (HCC) [I50.9]  Yes   • CKD (chronic kidney disease) stage 2, GFR 60-89 ml/min [N18.2]  Yes   • Pulmonary HTN (HCC) [I27.20]  Yes   • Obesity, Class III, BMI 40-49.9 (morbid obesity) ([facility]) [E66.01]  Unknown   • Coronary artery disease [I25.10]  Yes   • Hypertension [I10]  Yes   • Type 2 diabetes mellitus, with long-term current use of insulin (HCC) [E11.9, Z79.4]  Not Applicable   • Thrombocytopenia ([facility]) [D69.6]  Yes      Resolved Hospital Problems   No resolved problems to display.       Plan: So that the sad case a young man with a history of a bypass bad diabetes probably nephrotic syndrome now comes in  with chest pain for 5 days with minimal activity troponins are elevated in the setting of a creatinine is elevated INR proBNP that is but his story is very suggestive of an acute coronary syndrome so I think we need to take him to the lab today and do a cardiac cath on him I want to do a left and right heart cath on him they had talked about doing a right heart in the past but it had gotten set up.  I went over the risk versus benefits of this approach with him and he agrees and understands further decisions will be based on the findings at the time of his cath    Les Reyes MD  12/04/21  11:24 EST.

## 2021-12-05 LAB
ANION GAP SERPL CALCULATED.3IONS-SCNC: 7.7 MMOL/L (ref 5–15)
BACTERIA UR QL AUTO: ABNORMAL /HPF
BILIRUB UR QL STRIP: NEGATIVE
BUN SERPL-MCNC: 34 MG/DL (ref 6–20)
BUN/CREAT SERPL: 14.2 (ref 7–25)
CALCIUM SPEC-SCNC: 7.9 MG/DL (ref 8.6–10.5)
CHLORIDE SERPL-SCNC: 95 MMOL/L (ref 98–107)
CHOLEST SERPL-MCNC: 260 MG/DL (ref 0–200)
CLARITY UR: CLEAR
CO2 SERPL-SCNC: 26.3 MMOL/L (ref 22–29)
COLOR UR: YELLOW
CREAT SERPL-MCNC: 2.39 MG/DL (ref 0.76–1.27)
CREAT UR-MCNC: 66.5 MG/DL
DEPRECATED RDW RBC AUTO: 42.9 FL (ref 37–54)
ERYTHROCYTE [DISTWIDTH] IN BLOOD BY AUTOMATED COUNT: 14 % (ref 12.3–15.4)
GFR SERPL CREATININE-BSD FRML MDRD: 31 ML/MIN/1.73
GLUCOSE BLDC GLUCOMTR-MCNC: 178 MG/DL (ref 70–130)
GLUCOSE BLDC GLUCOMTR-MCNC: 190 MG/DL (ref 70–130)
GLUCOSE BLDC GLUCOMTR-MCNC: 213 MG/DL (ref 70–130)
GLUCOSE BLDC GLUCOMTR-MCNC: 217 MG/DL (ref 70–130)
GLUCOSE BLDC GLUCOMTR-MCNC: 226 MG/DL (ref 70–130)
GLUCOSE SERPL-MCNC: 239 MG/DL (ref 65–99)
GLUCOSE UR STRIP-MCNC: ABNORMAL MG/DL
HBA1C MFR BLD: 7.4 % (ref 4.8–5.6)
HCT VFR BLD AUTO: 38.8 % (ref 37.5–51)
HDLC SERPL-MCNC: 36 MG/DL (ref 40–60)
HGB BLD-MCNC: 12.8 G/DL (ref 13–17.7)
HGB UR QL STRIP.AUTO: NEGATIVE
HYALINE CASTS UR QL AUTO: ABNORMAL /LPF
KETONES UR QL STRIP: NEGATIVE
LDLC SERPL CALC-MCNC: 201 MG/DL (ref 0–100)
LDLC/HDLC SERPL: 5.52 {RATIO}
LEUKOCYTE ESTERASE UR QL STRIP.AUTO: NEGATIVE
MCH RBC QN AUTO: 27.8 PG (ref 26.6–33)
MCHC RBC AUTO-ENTMCNC: 33 G/DL (ref 31.5–35.7)
MCV RBC AUTO: 84.3 FL (ref 79–97)
NITRITE UR QL STRIP: NEGATIVE
PH UR STRIP.AUTO: 5.5 [PH] (ref 5–8)
PLATELET # BLD AUTO: 256 10*3/MM3 (ref 140–450)
PMV BLD AUTO: 11.7 FL (ref 6–12)
POTASSIUM SERPL-SCNC: 3.6 MMOL/L (ref 3.5–5.2)
PROT ?TM UR-MCNC: 161 MG/DL
PROT UR QL STRIP: ABNORMAL
PROT/CREAT UR: 2421.1 MG/G CREA (ref 0–200)
QT INTERVAL: 393 MS
RBC # BLD AUTO: 4.6 10*6/MM3 (ref 4.14–5.8)
RBC # UR STRIP: ABNORMAL /HPF
REF LAB TEST METHOD: ABNORMAL
SODIUM SERPL-SCNC: 129 MMOL/L (ref 136–145)
SP GR UR STRIP: 1.02 (ref 1–1.03)
SPERM URNS QL MICRO: ABNORMAL /HPF
SQUAMOUS #/AREA URNS HPF: ABNORMAL /HPF
TRIGL SERPL-MCNC: 126 MG/DL (ref 0–150)
UROBILINOGEN UR QL STRIP: ABNORMAL
VLDLC SERPL-MCNC: 23 MG/DL (ref 5–40)
WBC # UR STRIP: ABNORMAL /HPF
WBC NRBC COR # BLD: 11.47 10*3/MM3 (ref 3.4–10.8)

## 2021-12-05 PROCEDURE — 85027 COMPLETE CBC AUTOMATED: CPT | Performed by: INTERNAL MEDICINE

## 2021-12-05 PROCEDURE — 99233 SBSQ HOSP IP/OBS HIGH 50: CPT | Performed by: INTERNAL MEDICINE

## 2021-12-05 PROCEDURE — 94799 UNLISTED PULMONARY SVC/PX: CPT

## 2021-12-05 PROCEDURE — 63710000001 INSULIN GLARGINE PER 5 UNITS: Performed by: INTERNAL MEDICINE

## 2021-12-05 PROCEDURE — 94760 N-INVAS EAR/PLS OXIMETRY 1: CPT

## 2021-12-05 PROCEDURE — 84156 ASSAY OF PROTEIN URINE: CPT | Performed by: INTERNAL MEDICINE

## 2021-12-05 PROCEDURE — 80061 LIPID PANEL: CPT | Performed by: INTERNAL MEDICINE

## 2021-12-05 PROCEDURE — 80048 BASIC METABOLIC PNL TOTAL CA: CPT | Performed by: INTERNAL MEDICINE

## 2021-12-05 PROCEDURE — 25010000002 HYDRALAZINE PER 20 MG: Performed by: INTERNAL MEDICINE

## 2021-12-05 PROCEDURE — 82962 GLUCOSE BLOOD TEST: CPT

## 2021-12-05 PROCEDURE — 81001 URINALYSIS AUTO W/SCOPE: CPT | Performed by: INTERNAL MEDICINE

## 2021-12-05 PROCEDURE — 25010000002 FUROSEMIDE PER 20 MG: Performed by: INTERNAL MEDICINE

## 2021-12-05 PROCEDURE — 63710000001 INSULIN LISPRO (HUMAN) PER 5 UNITS: Performed by: HOSPITALIST

## 2021-12-05 PROCEDURE — 63710000001 INSULIN LISPRO (HUMAN) PER 5 UNITS: Performed by: INTERNAL MEDICINE

## 2021-12-05 PROCEDURE — 82570 ASSAY OF URINE CREATININE: CPT | Performed by: INTERNAL MEDICINE

## 2021-12-05 PROCEDURE — 93005 ELECTROCARDIOGRAM TRACING: CPT | Performed by: INTERNAL MEDICINE

## 2021-12-05 PROCEDURE — 83036 HEMOGLOBIN GLYCOSYLATED A1C: CPT | Performed by: INTERNAL MEDICINE

## 2021-12-05 RX ORDER — INSULIN LISPRO 100 [IU]/ML
5 INJECTION, SOLUTION INTRAVENOUS; SUBCUTANEOUS
Status: DISCONTINUED | OUTPATIENT
Start: 2021-12-05 | End: 2021-12-09 | Stop reason: HOSPADM

## 2021-12-05 RX ORDER — HYDRALAZINE HYDROCHLORIDE 20 MG/ML
10 INJECTION INTRAMUSCULAR; INTRAVENOUS ONCE
Status: COMPLETED | OUTPATIENT
Start: 2021-12-05 | End: 2021-12-05

## 2021-12-05 RX ORDER — METOPROLOL SUCCINATE 25 MG/1
25 TABLET, EXTENDED RELEASE ORAL
Status: DISCONTINUED | OUTPATIENT
Start: 2021-12-05 | End: 2021-12-06

## 2021-12-05 RX ORDER — ISOSORBIDE MONONITRATE 30 MG/1
30 TABLET, EXTENDED RELEASE ORAL
Status: DISCONTINUED | OUTPATIENT
Start: 2021-12-05 | End: 2021-12-07

## 2021-12-05 RX ORDER — HYDRALAZINE HYDROCHLORIDE 50 MG/1
50 TABLET, FILM COATED ORAL EVERY 8 HOURS SCHEDULED
Status: DISCONTINUED | OUTPATIENT
Start: 2021-12-05 | End: 2021-12-09 | Stop reason: HOSPADM

## 2021-12-05 RX ADMIN — INSULIN LISPRO 3 UNITS: 100 INJECTION, SOLUTION INTRAVENOUS; SUBCUTANEOUS at 08:47

## 2021-12-05 RX ADMIN — INSULIN LISPRO 5 UNITS: 100 INJECTION, SOLUTION INTRAVENOUS; SUBCUTANEOUS at 17:50

## 2021-12-05 RX ADMIN — SODIUM NITROPRUSSIDE 3.1 MCG/KG/MIN: 25 INJECTION, SOLUTION, CONCENTRATE INTRAVENOUS at 08:34

## 2021-12-05 RX ADMIN — EMPAGLIFLOZIN 10 MG: 10 TABLET, FILM COATED ORAL at 08:48

## 2021-12-05 RX ADMIN — SODIUM NITROPRUSSIDE 3.1 MCG/KG/MIN: 25 INJECTION, SOLUTION, CONCENTRATE INTRAVENOUS at 05:34

## 2021-12-05 RX ADMIN — ISOSORBIDE MONONITRATE 30 MG: 30 TABLET ORAL at 14:00

## 2021-12-05 RX ADMIN — SODIUM CHLORIDE, PRESERVATIVE FREE 10 ML: 5 INJECTION INTRAVENOUS at 21:06

## 2021-12-05 RX ADMIN — SODIUM NITROPRUSSIDE 3.9 MCG/KG/MIN: 25 INJECTION, SOLUTION, CONCENTRATE INTRAVENOUS at 03:36

## 2021-12-05 RX ADMIN — HYDRALAZINE HYDROCHLORIDE 50 MG: 50 TABLET, FILM COATED ORAL at 14:00

## 2021-12-05 RX ADMIN — INSULIN LISPRO 3 UNITS: 100 INJECTION, SOLUTION INTRAVENOUS; SUBCUTANEOUS at 12:42

## 2021-12-05 RX ADMIN — BUDESONIDE AND FORMOTEROL FUMARATE DIHYDRATE 2 PUFF: 160; 4.5 AEROSOL RESPIRATORY (INHALATION) at 08:04

## 2021-12-05 RX ADMIN — SODIUM CHLORIDE, PRESERVATIVE FREE 10 ML: 5 INJECTION INTRAVENOUS at 11:15

## 2021-12-05 RX ADMIN — SODIUM NITROPRUSSIDE 2.9 MCG/KG/MIN: 25 INJECTION, SOLUTION, CONCENTRATE INTRAVENOUS at 11:14

## 2021-12-05 RX ADMIN — HYDRALAZINE HYDROCHLORIDE 50 MG: 50 TABLET, FILM COATED ORAL at 21:17

## 2021-12-05 RX ADMIN — INSULIN GLARGINE 20 UNITS: 100 INJECTION, SOLUTION SUBCUTANEOUS at 21:05

## 2021-12-05 RX ADMIN — ASPIRIN 81 MG: 81 TABLET, COATED ORAL at 08:48

## 2021-12-05 RX ADMIN — INSULIN LISPRO 5 UNITS: 100 INJECTION, SOLUTION INTRAVENOUS; SUBCUTANEOUS at 12:43

## 2021-12-05 RX ADMIN — METOPROLOL SUCCINATE 25 MG: 25 TABLET, EXTENDED RELEASE ORAL at 14:00

## 2021-12-05 RX ADMIN — PRASUGREL 10 MG: 10 TABLET, FILM COATED ORAL at 08:48

## 2021-12-05 RX ADMIN — INSULIN LISPRO 2 UNITS: 100 INJECTION, SOLUTION INTRAVENOUS; SUBCUTANEOUS at 17:50

## 2021-12-05 RX ADMIN — FUROSEMIDE 80 MG: 10 INJECTION, SOLUTION INTRAMUSCULAR; INTRAVENOUS at 05:34

## 2021-12-05 RX ADMIN — HYDRALAZINE HYDROCHLORIDE 10 MG: 20 INJECTION INTRAMUSCULAR; INTRAVENOUS at 13:09

## 2021-12-05 RX ADMIN — BUDESONIDE AND FORMOTEROL FUMARATE DIHYDRATE 2 PUFF: 160; 4.5 AEROSOL RESPIRATORY (INHALATION) at 19:07

## 2021-12-05 RX ADMIN — ATORVASTATIN CALCIUM 80 MG: 80 TABLET, FILM COATED ORAL at 21:06

## 2021-12-05 RX ADMIN — SODIUM NITROPRUSSIDE 4.1 MCG/KG/MIN: 25 INJECTION, SOLUTION, CONCENTRATE INTRAVENOUS at 01:41

## 2021-12-05 NOTE — PROGRESS NOTES
Name: Rufus Dorsey ADMIT: 12/3/2021   : 1985  PCP: Provider, No Known    MRN: [id number] LOS: 2 days   AGE/SEX: 36 y.o. male  ROOM: [facility]     Subjective   Subjective   Denies chest pain, shortness of breath    Review of Systems   Constitutional: Negative for fever.   Respiratory: Negative for shortness of breath.    Cardiovascular: Negative for chest pain.   Gastrointestinal: Negative for abdominal pain.      Objective   Objective   Vital Signs  Temp:  [97.9 °F (36.6 °C)-99 °F (37.2 °C)] 98 °F (36.7 °C)  Heart Rate:  [] 101  Resp:  [11-26] 14  BP: ()/() 146/60  SpO2:  [85 %-100 %] 99 %  on  Flow (L/min):  [3-4] 4;   Device (Oxygen Therapy): nasal cannula  Body mass index is 43.26 kg/m².    Physical Exam  Constitutional:       General: He is not in acute distress.     Appearance: Normal appearance. He is not toxic-appearing.   HENT:      Head: Normocephalic and atraumatic.   Cardiovascular:      Rate and Rhythm: Normal rate and regular rhythm.   Pulmonary:      Effort: Pulmonary effort is normal. No respiratory distress.      Breath sounds: Normal breath sounds. No wheezing or rhonchi.   Abdominal:      General: Bowel sounds are normal. There is no distension.      Palpations: Abdomen is soft.      Tenderness: There is no abdominal tenderness. There is no guarding or rebound.   Musculoskeletal:         General: No swelling.      Right lower leg: No edema.      Left lower leg: No edema.   Skin:     General: Skin is warm and dry.   Neurological:      General: No focal deficit present.      Mental Status: He is alert.   Psychiatric:         Mood and Affect: Mood normal.         Behavior: Behavior normal.     Results Review  I reviewed the patient's new clinical results.  Results from last 7 days   Lab Units 21  0540 21  0418 21  1900   WBC 10*3/mm3 11.47* 10.47 12.75*   HEMOGLOBIN g/dL 12.8* 13.5 14.7   PLATELETS 10*3/mm3 256 203 233     Results from last 7 days   Lab  Units 12/05/21  0540 12/04/21  0418 12/03/21  1900   SODIUM mmol/L 129* 136 136   POTASSIUM mmol/L 3.6 3.7 3.8   CHLORIDE mmol/L 95* 100 100   CO2 mmol/L 26.3 27.3 23.9   BUN mg/dL 34* 29* 27*   CREATININE mg/dL 2.39* 1.73* 1.57*   GLUCOSE mg/dL 239* 226* 200*     Lab Results   Component Value Date    ANIONGAP 7.7 12/05/2021     Estimated Creatinine Clearance: 52.6 mL/min (A) (by C-G formula based on SCr of 2.39 mg/dL (H)).    Results from last 7 days   Lab Units 12/03/21  1900   ALBUMIN g/dL 2.90*   BILIRUBIN mg/dL 0.6   ALK PHOS U/L 82   AST (SGOT) U/L 14   ALT (SGPT) U/L 16     Results from last 7 days   Lab Units 12/05/21  0540 12/04/21  0418 12/03/21  1900   CALCIUM mg/dL 7.9* 8.1* 8.1*   ALBUMIN g/dL  --   --  2.90*   MAGNESIUM mg/dL  --  2.2  --      Results from last 7 days   Lab Units 12/04/21  0418   PROCALCITONIN ng/mL 0.20     Hemoglobin A1C   Date/Time Value Ref Range Status   12/05/2021 0540 7.40 (H) 4.80 - 5.60 % Final     Glucose   Date/Time Value Ref Range Status   12/05/2021 0725 217 (H) 70 - 130 mg/dL Final     Comment:     Meter: [id number] : [id number] Aung Leonard NA   12/05/2021 0546 226 (H) 70 - 130 mg/dL Final     Comment:     Meter: [id number] : [id number] Yaquelinleidy Chavez NA   12/04/2021 1920 215 (H) 70 - 130 mg/dL Final     Comment:     Meter: [id number] : [id number] Ana Mariaclaude Chavez NA   12/04/2021 1742 186 (H) 70 - 130 mg/dL Final     Comment:     Meter: [id number] : [id number] Micaeladee Kerrnona NA   12/04/2021 1135 158 (H) 70 - 130 mg/dL Final     Comment:     Meter: [id number] : [id number] Sagar OGDEN   12/04/2021 0623 195 (H) 70 - 130 mg/dL Final     Comment:     Meter: [id number] : [id number] Chao Quezada NA       Scheduled Meds  aspirin, 81 mg, Oral, Daily  atorvastatin, 80 mg, Oral, Nightly  budesonide-formoterol, 2 puff, Inhalation, BID - RT  empagliflozin, 10 mg, Oral, Daily  furosemide, 80 mg, Intravenous, Q12H  insulin glargine, 20 Units,  Subcutaneous, Nightly  insulin lispro, 0-7 Units, Subcutaneous, TID AC  prasugrel, 10 mg, Oral, Daily  sodium chloride, 10 mL, Intravenous, Q12H    Continuous Infusions  nitroprusside, 0.3-10 mcg/kg/min, Last Rate: 2.9 mcg/kg/min (12/05/21 1114)    PRN Meds  •  acetaminophen  •  dextrose  •  dextrose  •  glucagon (human recombinant)  •  HYDROcod Polst-CPM Polst ER  •  HYDROcodone-acetaminophen  •  nitroglycerin  •  ondansetron  •  ondansetron **OR** ondansetron  •  sodium chloride  •  sodium chloride    nitroprusside, 0.3-10 mcg/kg/min, Last Rate: 2.9 mcg/kg/min (12/05/21 1114)    Diet  Diet Regular; Cardiac      Results for orders placed during the hospital encounter of 08/02/21    Adult Transthoracic Echo Complete W/ Cont if Necessary Per Protocol    Interpretation Summary  · Calculated left ventricular EF = 36.9% Estimated left ventricular EF = 37% Estimated left ventricular EF was in agreement with the calculated left ventricular EF. Left ventricular systolic function is moderately decreased. Normal left ventricular wall thickness noted. The left ventricular cavity is severely dilated. There is left ventricular global hypokinesis noted. Left ventricular diastolic dysfunction is noted. There is at least Grade 3 diastolic dysfunction No evidence of a left ventricular thrombus present.  · The right ventricular cavity is mildly dilated. Moderately reduced right ventricular systolic function noted.  · Left atrial volume is moderately increased. The right atrial cavity is mildly dilated  · Trace aortic valve regurgitation is present.       Intake/Output Summary (Last 24 hours) at 12/5/2021 1209  Last data filed at 12/5/2021 1114  Gross per 24 hour   Intake 2713 ml   Output 3100 ml   Net -387 ml        Assessment/Plan     Active Hospital Problems    Diagnosis  POA   • **Acute on chronic congestive heart failure (HCC) [I50.9]  Yes   • Other chest pain [R07.89]  Unknown   • Unstable angina (HCC) [I20.0]  Unknown   •  Acute systolic CHF (congestive heart failure) ([facility]) [I50.21]  Unknown   • CKD (chronic kidney disease) stage 2, GFR 60-89 ml/min [N18.2]  Yes   • Pulmonary HTN ([facility]) [I27.20]  Yes   • Obesity, Class III, BMI 40-49.9 (morbid obesity) ([facility]) [E66.01]  Yes   • Coronary artery disease [I25.10]  Yes   • Hypertension [I10]  Yes   • Type 2 diabetes mellitus, with long-term current use of insulin ([facility]) [E11.9, Z79.4]  Not Applicable      Resolved Hospital Problems   No resolved problems to display.     36 y.o. male admitted with Acute on chronic congestive heart failure ([facility]).    · NSTEMI s/p PCI  · CHF IV furosemide  · [facility] nephrology consulted  · DM2 uncontrolled. On long acting and correctional insulin, add mealtime. A1c 7.40  · Discussed with patient and nursing staff  · Discharge: ANGELES Kramer MD  [facility]  12/05/21  12:05 EST    I wore protective equipment throughout this patient encounter including a face mask, gloves, and protective eyewear.  Hand hygiene was performed before donning protective equipment and after removal when leaving the room.

## 2021-12-05 NOTE — CONSULTS
Referring Provider: Dr Reyes   Reason for Consultation: MEI     Subjective     Chief complaint   Chief Complaint   Patient presents with   • Chest Pain       History of present illness:  37 yo  M with h/o CAD/CABG, ICM, DM, HTN who presented yesterday with chest pain, dyspnea, swelling, and 3-4 lb weight gain over couple weeks.  Underwent urgent cardiac cath yesterday for ACS which revealed LVEDP 40 to 50 mm Hg and stent placed LIMA to LAD.  Started on nitroprusside and diuresed.  Cr climbed slightly 1.57 to 1.73 yesterday with diuresis, up further 2.4 today post cath.  BL Cr appears to be 1.3 - 1.5 mg/dL range as of AUG/SEPT 2021 (thoug as low as 1.0 in Jan 2021).  He was hosp Dec 2020 with COVID-19 PNA.  UOP 3200 cc/24h through this AM.  Initial CXR showed pulm edema.  Home meds notable for lasix, aldactone, entresto, jardiance.  A1c is 7.4.  On nasal cannula O2 currently.  Less CP/dyspnea.  Nausea with emesis x 1 yesterday resolved.  Patient speaks some English.      Past Medical History:   Diagnosis Date   • CHF (congestive heart failure) (HCC)    • Coronary artery disease    • Diabetes (HCC)    • Heart failure (HCC) 05/16/2016   • High cholesterol    • Hypertension    • Ischemic cardiomyopathy 06/25/2016     Past Surgical History:   Procedure Laterality Date   • CARDIAC CATHETERIZATION  01/25/2017    Right heart cath   • CARDIAC SURGERY     • CORONARY ARTERY BYPASS GRAFT  05/26/2015    cabg x3 Dr. Key     Family History   Family history unknown: Yes       Social History     Tobacco Use   • Smoking status: Former Smoker   • Smokeless tobacco: Never Used   Vaping Use   • Vaping Use: Never used   Substance Use Topics   • Alcohol use: Never   • Drug use: Never     Medications Prior to Admission   Medication Sig Dispense Refill Last Dose   • aspirin 81 MG EC tablet Take 81 mg by mouth Daily.      • Blood Glucose Monitoring Suppl (FreeStyle Freedom Lite) w/Device kit Use to check blood sugar as  "directed. 1 each 0    • budesonide-formoterol (SYMBICORT) 160-4.5 MCG/ACT inhaler Inhale 2 puffs 2 (Two) Times a Day. 10.2 g 0    • Dapagliflozin Propanediol (Farxiga) 10 MG tablet Take 1 tablet by mouth Daily. 31 tablet 3    • furosemide (LASIX) 20 MG tablet Take 1 tablet by mouth Daily. 30 tablet 0    • glucose blood test strip Test 4 (Four) Times a Day After Meals & at Bedtime 420 each 0    • glucose monitor monitoring kit 1 each 4 (Four) Times a Day Before Meals & at Bedtime. 1 each 0    • Insulin Glargine (BASAGLAR KWIKPEN) 100 UNIT/ML injection pen Inject 20 Units under the skin into the appropriate area as directed Every Night. 5 pen 0    • Insulin Pen Needle 32G X 4 MM misc Use with insulin 5 times daily 200 each 0    • Lancets 28G misc 1 each by Other route 4 (Four) Times a Day After Meals & at Bedtime. 100 each 0    • metoprolol succinate XL (TOPROL-XL) 50 MG 24 hr tablet Take 1 tablet by mouth Daily. (Patient taking differently: Take 25 mg by mouth Daily.) 31 tablet 3    • sacubitril-valsartan (ENTRESTO) 24-26 MG tablet Take 1 tablet by mouth 2 (Two) Times a Day. 60 tablet     • spironolactone (ALDACTONE) 25 MG tablet Take 1 tablet by mouth Daily. 30 tablet 1    • atorvastatin (LIPITOR) 80 MG tablet Take 1 tablet by mouth Daily. 30 tablet 0      Allergies:  Patient has no known allergies.    Review of Systems  14 points review of system was performed and it was negative other than what noted above in the HPI    Objective     Vital Signs  Temp:  [97.9 °F (36.6 °C)-99 °F (37.2 °C)] 98 °F (36.7 °C)  Heart Rate:  [] 101  Resp:  [11-26] 14  BP: ()/() 146/60    Flowsheet Rows      First Filed Value   Admission Height 167.6 cm (66\") Documented at 12/04/2021 1445   Admission Weight 127 kg (280 lb 6.4 oz) Documented at 12/03/2021 1852           I/O this shift:  In: 420 [P.O.:420]  Out: 500 [Urine:500]  I/O last 3 completed shifts:  In: 2293 [P.O.:240; I.V.:2053]  Out: 4200 " [Urine:4200]    Intake/Output Summary (Last 24 hours) at 12/5/2021 1234  Last data filed at 12/5/2021 1114  Gross per 24 hour   Intake 2713 ml   Output 3100 ml   Net -387 ml       Physical Exam:  General Appearance: pleasant obese M in no distress, comfortable   Skin: warm and dry  HEENT: pupils round and reactive to light, oral mucosa normal, nonicteric sclera  Neck: supple, no JVD, trachea midline  Lungs: CTA, unlabored breathing effort  Heart: RRR, normal S1 and S2, no S3, no rub  Abdomen: soft, nontender, normoactive bowels  : no palpable bladder,  Extremities: trace BLE pedal/ankle edema, 2+ radial pulses   Neuro: normal speech and mental status    Results Review:  Results for orders placed or performed during the hospital encounter of 12/03/21   COVID-19,APTIMA PANTHER(BRADY),[facility] SARABJIT, NP/OP SWAB IN UTM/VTM/SALINE TRANSPORT MEDIA,24 HR TAT - Swab, Nasopharynx    Specimen: Nasopharynx; Swab   Result Value Ref Range    COVID19 Not Detected Not Detected - Ref. Range   Respiratory Panel PCR w/COVID-19(SARS-CoV-2) SARABJIT/JENAE/LEX/PAD/COR/MAD/KULDEEP In-House, NP Swab in UTM/VTM, 3-4 HR TAT - Swab, Nasopharynx    Specimen: Nasopharynx; Swab   Result Value Ref Range    ADENOVIRUS, PCR Not Detected Not Detected    Coronavirus 229E Not Detected Not Detected    Coronavirus HKU1 Not Detected Not Detected    Coronavirus NL63 Not Detected Not Detected    Coronavirus OC43 Not Detected Not Detected    COVID19 Not Detected Not Detected - Ref. Range    Human Metapneumovirus Not Detected Not Detected    Human Rhinovirus/Enterovirus Not Detected Not Detected    Influenza A PCR Not Detected Not Detected    Influenza B PCR Not Detected Not Detected    Parainfluenza Virus 1 Not Detected Not Detected    Parainfluenza Virus 2 Not Detected Not Detected    Parainfluenza Virus 3 Not Detected Not Detected    Parainfluenza Virus 4 Not Detected Not Detected    RSV, PCR Not Detected Not Detected    Bordetella pertussis pcr Not Detected Not Detected     Bordetella parapertussis PCR Not Detected Not Detected    Chlamydophila pneumoniae PCR Not Detected Not Detected    Mycoplasma pneumo by PCR Not Detected Not Detected   Comprehensive Metabolic Panel    Specimen: Blood   Result Value Ref Range    Glucose 200 (H) 65 - 99 mg/dL    BUN 27 (H) 6 - 20 mg/dL    Creatinine 1.57 (H) 0.76 - 1.27 mg/dL    Sodium 136 136 - 145 mmol/L    Potassium 3.8 3.5 - 5.2 mmol/L    Chloride 100 98 - 107 mmol/L    CO2 23.9 22.0 - 29.0 mmol/L    Calcium 8.1 (L) 8.6 - 10.5 mg/dL    Total Protein 6.5 6.0 - 8.5 g/dL    Albumin 2.90 (L) 3.50 - 5.20 g/dL    ALT (SGPT) 16 1 - 41 U/L    AST (SGOT) 14 1 - 40 U/L    Alkaline Phosphatase 82 39 - 117 U/L    Total Bilirubin 0.6 0.0 - 1.2 mg/dL    eGFR Non African Amer 50 (L) >60 mL/min/1.73    Globulin 3.6 gm/dL    A/G Ratio 0.8 g/dL    BUN/Creatinine Ratio 17.2 7.0 - 25.0    Anion Gap 12.1 5.0 - 15.0 mmol/L   Troponin    Specimen: Blood   Result Value Ref Range    Troponin T 0.149 (C) 0.000 - 0.030 ng/mL   Troponin    Specimen: Blood   Result Value Ref Range    Troponin T 0.183 (C) 0.000 - 0.030 ng/mL   CBC Auto Differential    Specimen: Blood   Result Value Ref Range    WBC 12.75 (H) 3.40 - 10.80 10*3/mm3    RBC 5.12 4.14 - 5.80 10*6/mm3    Hemoglobin 14.7 13.0 - 17.7 g/dL    Hematocrit 42.2 37.5 - 51.0 %    MCV 82.4 79.0 - 97.0 fL    MCH 28.7 26.6 - 33.0 pg    MCHC 34.8 31.5 - 35.7 g/dL    RDW 13.8 12.3 - 15.4 %    RDW-SD 40.5 37.0 - 54.0 fl    MPV 11.7 6.0 - 12.0 fL    Platelets 233 140 - 450 10*3/mm3    Neutrophil % 79.3 (H) 42.7 - 76.0 %    Lymphocyte % 11.1 (L) 19.6 - 45.3 %    Monocyte % 6.7 5.0 - 12.0 %    Eosinophil % 2.2 0.3 - 6.2 %    Basophil % 0.2 0.0 - 1.5 %    Immature Grans % 0.5 0.0 - 0.5 %    Neutrophils, Absolute 10.11 (H) 1.70 - 7.00 10*3/mm3    Lymphocytes, Absolute 1.41 0.70 - 3.10 10*3/mm3    Monocytes, Absolute 0.85 0.10 - 0.90 10*3/mm3    Eosinophils, Absolute 0.28 0.00 - 0.40 10*3/mm3    Basophils, Absolute 0.03 0.00 - 0.20  10*3/mm3    Immature Grans, Absolute 0.07 (H) 0.00 - 0.05 10*3/mm3    nRBC 0.0 0.0 - 0.2 /100 WBC   BNP    Specimen: Blood   Result Value Ref Range    proBNP 4,144.0 (H) 0.0 - 450.0 pg/mL   Troponin    Specimen: Blood   Result Value Ref Range    Troponin T 0.177 (C) 0.000 - 0.030 ng/mL   Basic Metabolic Panel    Specimen: Blood   Result Value Ref Range    Glucose 226 (H) 65 - 99 mg/dL    BUN 29 (H) 6 - 20 mg/dL    Creatinine 1.73 (H) 0.76 - 1.27 mg/dL    Sodium 136 136 - 145 mmol/L    Potassium 3.7 3.5 - 5.2 mmol/L    Chloride 100 98 - 107 mmol/L    CO2 27.3 22.0 - 29.0 mmol/L    Calcium 8.1 (L) 8.6 - 10.5 mg/dL    eGFR Non African Amer 45 (L) >60 mL/min/1.73    BUN/Creatinine Ratio 16.8 7.0 - 25.0    Anion Gap 8.7 5.0 - 15.0 mmol/L   CBC Auto Differential    Specimen: Blood   Result Value Ref Range    WBC 10.47 3.40 - 10.80 10*3/mm3    RBC 4.86 4.14 - 5.80 10*6/mm3    Hemoglobin 13.5 13.0 - 17.7 g/dL    Hematocrit 40.2 37.5 - 51.0 %    MCV 82.7 79.0 - 97.0 fL    MCH 27.8 26.6 - 33.0 pg    MCHC 33.6 31.5 - 35.7 g/dL    RDW 13.5 12.3 - 15.4 %    RDW-SD 40.4 37.0 - 54.0 fl    MPV 11.9 6.0 - 12.0 fL    Platelets 203 140 - 450 10*3/mm3    Neutrophil % 74.3 42.7 - 76.0 %    Lymphocyte % 14.1 (L) 19.6 - 45.3 %    Monocyte % 7.4 5.0 - 12.0 %    Eosinophil % 3.4 0.3 - 6.2 %    Basophil % 0.3 0.0 - 1.5 %    Immature Grans % 0.5 0.0 - 0.5 %    Neutrophils, Absolute 7.77 (H) 1.70 - 7.00 10*3/mm3    Lymphocytes, Absolute 1.48 0.70 - 3.10 10*3/mm3    Monocytes, Absolute 0.78 0.10 - 0.90 10*3/mm3    Eosinophils, Absolute 0.36 0.00 - 0.40 10*3/mm3    Basophils, Absolute 0.03 0.00 - 0.20 10*3/mm3    Immature Grans, Absolute 0.05 0.00 - 0.05 10*3/mm3    nRBC 0.0 0.0 - 0.2 /100 WBC   Protime-INR    Specimen: Blood   Result Value Ref Range    Protime 14.0 11.7 - 14.2 Seconds    INR 1.10 0.90 - 1.10   Magnesium    Specimen: Blood   Result Value Ref Range    Magnesium 2.2 1.6 - 2.6 mg/dL   Procalcitonin    Specimen: Blood   Result Value  Ref Range    Procalcitonin 0.20 0.00 - 0.25 ng/mL   CBC (No Diff)    Specimen: Blood   Result Value Ref Range    WBC 11.47 (H) 3.40 - 10.80 10*3/mm3    RBC 4.60 4.14 - 5.80 10*6/mm3    Hemoglobin 12.8 (L) 13.0 - 17.7 g/dL    Hematocrit 38.8 37.5 - 51.0 %    MCV 84.3 79.0 - 97.0 fL    MCH 27.8 26.6 - 33.0 pg    MCHC 33.0 31.5 - 35.7 g/dL    RDW 14.0 12.3 - 15.4 %    RDW-SD 42.9 37.0 - 54.0 fl    MPV 11.7 6.0 - 12.0 fL    Platelets 256 140 - 450 10*3/mm3   Basic Metabolic Panel    Specimen: Blood   Result Value Ref Range    Glucose 239 (H) 65 - 99 mg/dL    BUN 34 (H) 6 - 20 mg/dL    Creatinine 2.39 (H) 0.76 - 1.27 mg/dL    Sodium 129 (L) 136 - 145 mmol/L    Potassium 3.6 3.5 - 5.2 mmol/L    Chloride 95 (L) 98 - 107 mmol/L    CO2 26.3 22.0 - 29.0 mmol/L    Calcium 7.9 (L) 8.6 - 10.5 mg/dL    eGFR Non African Amer 31 (L) >60 mL/min/1.73    BUN/Creatinine Ratio 14.2 7.0 - 25.0    Anion Gap 7.7 5.0 - 15.0 mmol/L   Hemoglobin A1c    Specimen: Blood   Result Value Ref Range    Hemoglobin A1C 7.40 (H) 4.80 - 5.60 %   Lipid Panel    Specimen: Blood   Result Value Ref Range    Total Cholesterol 260 (H) 0 - 200 mg/dL    Triglycerides 126 0 - 150 mg/dL    HDL Cholesterol 36 (L) 40 - 60 mg/dL    LDL Cholesterol  201 (H) 0 - 100 mg/dL    VLDL Cholesterol 23 5 - 40 mg/dL    LDL/HDL Ratio 5.52    POC Glucose Once    Specimen: Blood   Result Value Ref Range    Glucose 195 (H) 70 - 130 mg/dL   POC Glucose Once    Specimen: Blood   Result Value Ref Range    Glucose 158 (H) 70 - 130 mg/dL   POC Glucose Once    Specimen: Blood   Result Value Ref Range    Glucose 186 (H) 70 - 130 mg/dL   POC Glucose Once    Specimen: Blood   Result Value Ref Range    Glucose 215 (H) 70 - 130 mg/dL   POC Glucose Once    Specimen: Blood   Result Value Ref Range    Glucose 226 (H) 70 - 130 mg/dL   POC Glucose Once    Specimen: Blood   Result Value Ref Range    Glucose 217 (H) 70 - 130 mg/dL   ECG 12 Lead   Result Value Ref Range    QT Interval 361 ms   ECG  12 Lead   Result Value Ref Range    QT Interval 377 ms   ECG 12 Lead   Result Value Ref Range    QT Interval 393 ms   Green Top (Gel)   Result Value Ref Range    Extra Tube Hold for add-ons.    Lavender Top   Result Value Ref Range    Extra Tube hold for add-on    Light Blue Top   Result Value Ref Range    Extra Tube hold for add-on      Imaging Results (Last 72 Hours)     Procedure Component Value Units Date/Time    XR Chest 2 View [[id number]] Collected: 12/03/21 1503     Updated: 12/03/21 1510    Narrative:      XR CHEST 2 VW-     HISTORY: Male who is 36 years-old,  chest pain     TECHNIQUE: Frontal and lateral views of the chest     COMPARISON: 08/02/2021     FINDINGS: The heart is enlarged. Sternotomy wires are noted. Extensive  bilateral infiltrates are seen, suggesting pneumonia and/or edema,  although the appearance is similar to prior exam, there could be a  chronic component. No pleural effusion, or pneumothorax. No acute  osseous process.       Impression:      Extensive bilateral infiltrates are seen, suggesting  pneumonia and/or edema, although the appearance is similar to prior  exam, there could be a chronic component. Follow-up is recommended.  Cardiomegaly.     This report was finalized on 12/3/2021 3:07 PM by Dr. Suleiman Anderson M.D.               aspirin, 81 mg, Oral, Daily  atorvastatin, 80 mg, Oral, Nightly  budesonide-formoterol, 2 puff, Inhalation, BID - RT  furosemide, 80 mg, Intravenous, Q12H  hydrALAZINE, 50 mg, Oral, Q8H  insulin glargine, 20 Units, Subcutaneous, Nightly  insulin lispro, 0-7 Units, Subcutaneous, TID AC  insulin lispro, 5 Units, Subcutaneous, TID With Meals  isosorbide mononitrate, 30 mg, Oral, Q24H  metoprolol succinate XL, 25 mg, Oral, Q24H  prasugrel, 10 mg, Oral, Daily  sodium chloride, 10 mL, Intravenous, Q12H      nitroprusside, 0.3-10 mcg/kg/min, Last Rate: 2.9 mcg/kg/min (12/05/21 1114)        Assessment/Plan   1. Non olig MEI - Cr climbed slightly 1.5 to 1.7  with diuresis but up to 2.4 today likely due to contrast nephropathy post cath.  K/HCO3 normal.  Mild hypervolemic hyponatremia present, Na 129.    2. CKD stage 3 - BL Cr 1.3 - 1.5 range with multiple risk factors: DM, HTN, atherosclerotic CVD, CHF   3. Vol overload - improved some with initial diuresis, UOP ~ 5L/36h; will hold lasix this evening due to MEI and likely resume in AM  4. CAD/CABG, NSTEMI, s/p LHC/PCI 12/4/21, on effient, asa 81   5. ICM, EF 37% on echo Aug 2021   6. HTN - BP labile, hydralazine/nitrate added to help wean nitroprusside; hold entresto due to MEI; also on toprol XL low dose  7. DM2, A1c 7.4,  this AM; mgmt per [facility]; hold jardiance for now due to MEI     Plan  - DC jardiance   - hold lasix, likely resume in AM  - holding entresto  - check UA & PVR   - will watch for hopeful plateau in Cr next 48 to 72h    Thank you Dr Reyes for involving me in pt's care.      Acute on chronic congestive heart failure (HCC)    Type 2 diabetes mellitus, with long-term current use of insulin (HCC)    Obesity, Class III, BMI 40-49.9 (morbid obesity) (HCC)    Pulmonary HTN (HCC)    Coronary artery disease    Hypertension    CKD (chronic kidney disease) stage 2, GFR 60-89 ml/min    Other chest pain    Unstable angina (HCC)    Acute systolic CHF (congestive heart failure) (HCC)        I discussed the patient's findings and my recommendations with patient, nursing staff and consulting provider    Red Ackerman MD  12/05/21  12:34 EST      Much of this encounter note is an electronic transcription/translation of spoken language to printed text. The electronic translation of spoken language may permit erroneous, or at times, nonsensical words or phrases to be inadvertently transcribed; Although I have reviewed the note for such errors, some may still exist

## 2021-12-05 NOTE — PROGRESS NOTES
"Rufus Dorsey  1985 36 y.o.  [id number]      Patient Care Team:  Provider, No Known as PCP - General    CC: Non-STEMI, severe coronary disease, ischemic cardiomyopathy, diabetes, renal failure  Interval History: He does look better but his renal functions worsened      Objective   Vital Signs  Temp:  [97.9 °F (36.6 °C)-99 °F (37.2 °C)] 98 °F (36.7 °C)  Heart Rate:  [] 101  Resp:  [11-26] 14  BP: ()/() 146/60    Intake/Output Summary (Last 24 hours) at 12/5/2021 1202  Last data filed at 12/5/2021 1114  Gross per 24 hour   Intake 2713 ml   Output 3100 ml   Net -387 ml     Flowsheet Rows      First Filed Value   Admission Height 167.6 cm (66\") Documented at 12/04/2021 1445   Admission Weight 127 kg (280 lb 6.4 oz) Documented at 12/03/2021 1852          Physical Exam:   General Appearance:    Alert,oriented, in no acute distress   Lungs:     Clear to auscultation,BS are equal    Heart:    Normal S1 and S2, RRR without murmur, positive S3 gallop or rub   HEENT:    Sclerae are clear, no JVD or adenopathy   Abdomen:     Normal bowel sounds, soft nontender, nondistended, no HSM   Extremities:   Moves all extremities well, no edema, no cyanosis, no             Redness, no rash     Medication Review:      aspirin, 81 mg, Oral, Daily  atorvastatin, 80 mg, Oral, Nightly  budesonide-formoterol, 2 puff, Inhalation, BID - RT  empagliflozin, 10 mg, Oral, Daily  furosemide, 80 mg, Intravenous, Q12H  insulin glargine, 20 Units, Subcutaneous, Nightly  insulin lispro, 0-7 Units, Subcutaneous, TID AC  prasugrel, 10 mg, Oral, Daily  sodium chloride, 10 mL, Intravenous, Q12H      nitroprusside, 0.3-10 mcg/kg/min, Last Rate: 2.9 mcg/kg/min (12/05/21 1114)          I reviewed the patient's new clinical results.  I personally viewed and interpreted the patient's EKG/Telemetry data    Assessment/Plan  Active Hospital Problems    Diagnosis  POA   • **Acute on chronic congestive heart failure (HCC) [I50.9]  Yes     " Priority: High   • Unstable angina ([facility]) [I20.0]  Unknown     Priority: High   • Hypertension [I10]  Yes     Priority: High   • Other chest pain [R07.89]  Unknown   • Acute systolic CHF (congestive heart failure) ([facility]) [I50.21]  Unknown   • CKD (chronic kidney disease) stage 2, GFR 60-89 ml/min [N18.2]  Yes   • Pulmonary HTN ([facility]) [I27.20]  Yes   • Obesity, Class III, BMI 40-49.9 (morbid obesity) ([facility]) [E66.01]  Yes   • Coronary artery disease [I25.10]  Yes   • Type 2 diabetes mellitus, with long-term current use of insulin ([facility]) [E11.9, Z79.4]  Not Applicable      Resolved Hospital Problems   No resolved problems to display.       This is a really sick young man is got really bad coronary artery disease we did place a stent and the LIMA to the LAD that may help a little bit is not having chest discomfort now he is got a severe cardiomyopathy severe heart failure yesterday his LVEDP was 45.  I had him on night pride working to wean him off that and try and move him out of the CCU today his renal function has worsened and so I need the nephrology service to see him and I have consulted them.  His prognosis is still guarded    Les Reyes MD  12/05/21  12:02 EST

## 2021-12-06 LAB
ACT BLD: 303 SECONDS (ref 82–152)
ALBUMIN SERPL-MCNC: 2.8 G/DL (ref 3.5–5.2)
ANION GAP SERPL CALCULATED.3IONS-SCNC: 6.4 MMOL/L (ref 5–15)
BUN SERPL-MCNC: 31 MG/DL (ref 6–20)
BUN/CREAT SERPL: 15.9 (ref 7–25)
CALCIUM SPEC-SCNC: 8 MG/DL (ref 8.6–10.5)
CHLORIDE SERPL-SCNC: 101 MMOL/L (ref 98–107)
CO2 SERPL-SCNC: 28.6 MMOL/L (ref 22–29)
CREAT SERPL-MCNC: 1.95 MG/DL (ref 0.76–1.27)
DEPRECATED RDW RBC AUTO: 45.4 FL (ref 37–54)
ERYTHROCYTE [DISTWIDTH] IN BLOOD BY AUTOMATED COUNT: 14.2 % (ref 12.3–15.4)
GFR SERPL CREATININE-BSD FRML MDRD: 39 ML/MIN/1.73
GLUCOSE BLDC GLUCOMTR-MCNC: 145 MG/DL (ref 70–130)
GLUCOSE BLDC GLUCOMTR-MCNC: 164 MG/DL (ref 70–130)
GLUCOSE BLDC GLUCOMTR-MCNC: 167 MG/DL (ref 70–130)
GLUCOSE BLDC GLUCOMTR-MCNC: 184 MG/DL (ref 70–130)
GLUCOSE SERPL-MCNC: 166 MG/DL (ref 65–99)
HCT VFR BLD AUTO: 41 % (ref 37.5–51)
HGB BLD-MCNC: 13 G/DL (ref 13–17.7)
MCH RBC QN AUTO: 27.4 PG (ref 26.6–33)
MCHC RBC AUTO-ENTMCNC: 31.7 G/DL (ref 31.5–35.7)
MCV RBC AUTO: 86.3 FL (ref 79–97)
PHOSPHATE SERPL-MCNC: 4.3 MG/DL (ref 2.5–4.5)
PLATELET # BLD AUTO: 230 10*3/MM3 (ref 140–450)
PMV BLD AUTO: 11.9 FL (ref 6–12)
POTASSIUM SERPL-SCNC: 3.6 MMOL/L (ref 3.5–5.2)
RBC # BLD AUTO: 4.75 10*6/MM3 (ref 4.14–5.8)
SODIUM SERPL-SCNC: 136 MMOL/L (ref 136–145)
URATE SERPL-MCNC: 6.5 MG/DL (ref 3.4–7)
WBC NRBC COR # BLD: 10.11 10*3/MM3 (ref 3.4–10.8)

## 2021-12-06 PROCEDURE — 82962 GLUCOSE BLOOD TEST: CPT

## 2021-12-06 PROCEDURE — 99232 SBSQ HOSP IP/OBS MODERATE 35: CPT | Performed by: INTERNAL MEDICINE

## 2021-12-06 PROCEDURE — 63710000001 INSULIN LISPRO (HUMAN) PER 5 UNITS: Performed by: HOSPITALIST

## 2021-12-06 PROCEDURE — 94799 UNLISTED PULMONARY SVC/PX: CPT

## 2021-12-06 PROCEDURE — 84550 ASSAY OF BLOOD/URIC ACID: CPT | Performed by: INTERNAL MEDICINE

## 2021-12-06 PROCEDURE — 80069 RENAL FUNCTION PANEL: CPT | Performed by: INTERNAL MEDICINE

## 2021-12-06 PROCEDURE — 85027 COMPLETE CBC AUTOMATED: CPT | Performed by: HOSPITALIST

## 2021-12-06 PROCEDURE — 63710000001 INSULIN LISPRO (HUMAN) PER 5 UNITS: Performed by: INTERNAL MEDICINE

## 2021-12-06 PROCEDURE — 63710000001 INSULIN GLARGINE PER 5 UNITS: Performed by: INTERNAL MEDICINE

## 2021-12-06 RX ORDER — METOPROLOL SUCCINATE 100 MG/1
100 TABLET, EXTENDED RELEASE ORAL
Status: DISCONTINUED | OUTPATIENT
Start: 2021-12-07 | End: 2021-12-09 | Stop reason: HOSPADM

## 2021-12-06 RX ORDER — METOPROLOL SUCCINATE 50 MG/1
50 TABLET, EXTENDED RELEASE ORAL
Status: DISCONTINUED | OUTPATIENT
Start: 2021-12-06 | End: 2021-12-06

## 2021-12-06 RX ORDER — BUMETANIDE 2 MG/1
2 TABLET ORAL
Status: DISCONTINUED | OUTPATIENT
Start: 2021-12-06 | End: 2021-12-08

## 2021-12-06 RX ADMIN — ASPIRIN 81 MG: 81 TABLET, COATED ORAL at 08:18

## 2021-12-06 RX ADMIN — ATORVASTATIN CALCIUM 80 MG: 80 TABLET, FILM COATED ORAL at 20:08

## 2021-12-06 RX ADMIN — BUMETANIDE 2 MG: 2 TABLET ORAL at 17:59

## 2021-12-06 RX ADMIN — METOPROLOL SUCCINATE 50 MG: 25 TABLET, EXTENDED RELEASE ORAL at 08:18

## 2021-12-06 RX ADMIN — BUDESONIDE AND FORMOTEROL FUMARATE DIHYDRATE 2 PUFF: 160; 4.5 AEROSOL RESPIRATORY (INHALATION) at 20:10

## 2021-12-06 RX ADMIN — INSULIN GLARGINE 20 UNITS: 100 INJECTION, SOLUTION SUBCUTANEOUS at 20:08

## 2021-12-06 RX ADMIN — BUDESONIDE AND FORMOTEROL FUMARATE DIHYDRATE 2 PUFF: 160; 4.5 AEROSOL RESPIRATORY (INHALATION) at 08:57

## 2021-12-06 RX ADMIN — INSULIN LISPRO 5 UNITS: 100 INJECTION, SOLUTION INTRAVENOUS; SUBCUTANEOUS at 08:17

## 2021-12-06 RX ADMIN — INSULIN LISPRO 2 UNITS: 100 INJECTION, SOLUTION INTRAVENOUS; SUBCUTANEOUS at 08:17

## 2021-12-06 RX ADMIN — SODIUM CHLORIDE, PRESERVATIVE FREE 10 ML: 5 INJECTION INTRAVENOUS at 21:19

## 2021-12-06 RX ADMIN — HYDRALAZINE HYDROCHLORIDE 50 MG: 50 TABLET, FILM COATED ORAL at 05:56

## 2021-12-06 RX ADMIN — PRASUGREL 10 MG: 10 TABLET, FILM COATED ORAL at 08:18

## 2021-12-06 RX ADMIN — BUMETANIDE 2 MG: 2 TABLET ORAL at 08:18

## 2021-12-06 RX ADMIN — SODIUM CHLORIDE, PRESERVATIVE FREE 10 ML: 5 INJECTION INTRAVENOUS at 09:57

## 2021-12-06 RX ADMIN — HYDRALAZINE HYDROCHLORIDE 50 MG: 50 TABLET, FILM COATED ORAL at 14:52

## 2021-12-06 RX ADMIN — ISOSORBIDE MONONITRATE 30 MG: 30 TABLET ORAL at 08:18

## 2021-12-06 RX ADMIN — HYDRALAZINE HYDROCHLORIDE 50 MG: 50 TABLET, FILM COATED ORAL at 21:19

## 2021-12-06 RX ADMIN — INSULIN LISPRO 5 UNITS: 100 INJECTION, SOLUTION INTRAVENOUS; SUBCUTANEOUS at 17:59

## 2021-12-06 RX ADMIN — INSULIN LISPRO 2 UNITS: 100 INJECTION, SOLUTION INTRAVENOUS; SUBCUTANEOUS at 12:04

## 2021-12-06 RX ADMIN — INSULIN LISPRO 5 UNITS: 100 INJECTION, SOLUTION INTRAVENOUS; SUBCUTANEOUS at 12:04

## 2021-12-06 NOTE — PLAN OF CARE
Goal Outcome Evaluation:   Pt remains in CCU on 3.5 L O2 via NC. Pt denies any chest pain. VSS. CTM in CCU.   Problem: Adult Inpatient Plan of Care  Goal: Optimal Comfort and Wellbeing  Outcome: Ongoing, Progressing  Intervention: Provide Person-Centered Care     Problem: Diabetes Comorbidity  Goal: Blood Glucose Level Within Desired Range  Outcome: Ongoing, Progressing     Problem: Heart Failure Comorbidity  Goal: Maintenance of Heart Failure Symptom Control  Outcome: Ongoing, Progressing  Intervention: Maintain Heart Failure-Management Strategies     Problem: Tissue Perfusion (Acute Coronary Syndrome)  Goal: Adequate Tissue Perfusion  Outcome: Ongoing, Progressing  Intervention: Optimize Cardiac Tissue Perfusion     Problem: Tissue Perfusion (Acute Coronary Syndrome)  Goal: Adequate Tissue Perfusion  Outcome: Ongoing, Progressing  Intervention: Optimize Cardiac Tissue Perfusion     Problem: Adult Inpatient Plan of Care  Goal: Absence of Hospital-Acquired Illness or Injury  Intervention: Identify and Manage Fall Risk  Intervention: Prevent Skin Injury  Intervention: Prevent Infection     Problem: Adjustment to Illness (Acute Coronary Syndrome)  Goal: Optimal Adaptation to Illness  Intervention: Support Adjustment to Life-Changing Event     Problem: Adult Inpatient Plan of Care  Goal: Absence of Hospital-Acquired Illness or Injury  Intervention: Prevent Infection     Problem: Adjustment to Illness (Acute Coronary Syndrome)  Goal: Optimal Adaptation to Illness  Intervention: Support Adjustment to Life-Changing Event

## 2021-12-06 NOTE — PLAN OF CARE
Goal Outcome Evaluation:  No chest pain or nausea today. Renal consult for increasing Creatinine. Nipride off, oral meds started.

## 2021-12-06 NOTE — CASE MANAGEMENT/SOCIAL WORK
Discharge Planning Assessment  [facility]     Patient Name: Rufus Dorsey  MRN: [id number]  Today's Date: 12/6/2021    Admit Date: 12/3/2021     Discharge Needs Assessment     Row Name 12/06/21 1131       Living Environment    Lives With sibling(s)    Current Living Arrangements home/apartment/condo    Potentially Unsafe Housing Conditions unable to assess    Primary Care Provided by self    Provides Primary Care For no one    Family Caregiver if Needed sibling(s)    Quality of Family Relationships supportive    Able to Return to Prior Arrangements yes       Resource/Environmental Concerns    Resource/Environmental Concerns none    Transportation Concerns car, none       Transition Planning    Patient/Family Anticipates Transition to home with family    Patient/Family Anticipated Services at Transition none    Transportation Anticipated family or friend will provide       Discharge Needs Assessment    Equipment Currently Used at Home oxygen    Concerns to be Addressed discharge planning    Equipment Needed After Discharge none    Provided Post Acute Provider List? N/A    Provided Post Acute Provider Quality & Resource List? N/A               Discharge Plan     Row Name 12/06/21 1136       Plan    Plan Comments CCP spoke to patient at bedside.   CCP role explained and discharge planning discussed. Face sheet verified.   Pt PCP is at the [facility].  Pt’s emergency contact is his brother Fei, [phone].   Pt lives in a one story house with his brother.  He uses no DME to ambulate.  He uses oxygen at night.  He is independent with ADL’s.  He has a no past  history of Home Health.  He has no history of rehab.   Pt obtains his medications from the clinic or [facility] retail pharmacy  He would like meds to bed for this visit.  Discharge plan is home.  Pt denies needs  CCP following for needs closer to discharge    Row Name 12/06/21 1131       Plan    Plan Plan is home  He lives with brother  He will provide  transport home              Continued Care and Services - Admitted Since 12/3/2021    Coordination has not been started for this encounter.          Demographic Summary    No documentation.                Functional Status    No documentation.                Psychosocial    No documentation.                Abuse/Neglect    No documentation.                Legal    No documentation.                Substance Abuse    No documentation.                Patient Forms    No documentation.                   Zoë Ramon RN

## 2021-12-06 NOTE — NURSING NOTE
VSS, no complaints of chest pain, BP stable, tolerating getting up to the bathroom, possible OF today?

## 2021-12-06 NOTE — PROGRESS NOTES
"Patient Name: Rufus Dorsey  :1985  36 y.o.      Patient Care Team:  Provider, No Known as PCP - General    Interval History:   New tachycardia    Subjective:  Following for coronary disease/ischemic cardiomyopathy and now arrhythmia    Objective   Vital Signs  Temp:  [97.9 °F (36.6 °C)-99.5 °F (37.5 °C)] 98.4 °F (36.9 °C)  Heart Rate:  [] 92  Resp:  [16-18] 18  BP: (133-164)/(63-93) 152/90    Intake/Output Summary (Last 24 hours) at 2021 1135  Last data filed at 2021 0500  Gross per 24 hour   Intake 1286 ml   Output 625 ml   Net 661 ml     Flowsheet Rows      First Filed Value   Admission Height 167.6 cm (66\") Documented at 2021 1445   Admission Weight 127 kg (280 lb 6.4 oz) Documented at 2021 1852          Physical Exam:   General Appearance:    Alert, cooperative, in no acute distress   Lungs:     Clear to auscultation.  Normal respiratory effort and rate.      Heart:    Regular rhythm and normal rate, normal S1 and S2, no murmurs, gallops or rubs.     Chest Wall:    No abnormalities observed   Abdomen:     Soft, nontender, positive bowel sounds.     Extremities:   no cyanosis, clubbing or edema.  No marked joint deformities.  Adequate musculoskeletal strength.       Results Review:    Results from last 7 days   Lab Units 21  0554   SODIUM mmol/L 136   POTASSIUM mmol/L 3.6   CHLORIDE mmol/L 101   CO2 mmol/L 28.6   BUN mg/dL 31*   CREATININE mg/dL 1.95*   GLUCOSE mg/dL 166*   CALCIUM mg/dL 8.0*     Results from last 7 days   Lab Units 21  0418 21  2132 21  1900   TROPONIN T ng/mL 0.177* 0.183* 0.149*     Results from last 7 days   Lab Units 21  0554   WBC 10*3/mm3 10.11   HEMOGLOBIN g/dL 13.0   HEMATOCRIT % 41.0   PLATELETS 10*3/mm3 230     Results from last 7 days   Lab Units 21  0418   INR  1.10     Results from last 7 days   Lab Units 21  0540   CHOLESTEROL mg/dL 260*     Results from last 7 days   Lab Units 21  0418 "   MAGNESIUM mg/dL 2.2     Results from last 7 days   Lab Units 12/05/21  0540   CHOLESTEROL mg/dL 260*   TRIGLYCERIDES mg/dL 126   HDL CHOL mg/dL 36*   LDL CHOL mg/dL 201*         Medication Review:   aspirin, 81 mg, Oral, Daily  atorvastatin, 80 mg, Oral, Nightly  budesonide-formoterol, 2 puff, Inhalation, BID - RT  bumetanide, 2 mg, Oral, BID  hydrALAZINE, 50 mg, Oral, Q8H  insulin glargine, 20 Units, Subcutaneous, Nightly  insulin lispro, 0-7 Units, Subcutaneous, TID AC  insulin lispro, 5 Units, Subcutaneous, TID With Meals  isosorbide mononitrate, 30 mg, Oral, Q24H  metoprolol succinate XL, 50 mg, Oral, Q24H  prasugrel, 10 mg, Oral, Daily  sodium chloride, 10 mL, Intravenous, Q12H         nitroprusside, 0.3-10 mcg/kg/min, Last Rate: Stopped (12/05/21 1357)        Assessment/Plan     1.  Coronary artery disease with history of CABG x3.  Heart catheterization December 4, 2021 showed diffuse small vessel disease.  A drug-eluting stent was placed in the LIMA going to the LAD.  2.  Acute on chronic systolic congestive heart failure.  Ejection fraction 37% by echo August 2021.  Left ventricular end-diastolic pressure between 40 and 50 mmHg by cath on December 4, 2021  3.  Chest pain.  4.  Tachycardia.  5.  Diabetes  6.  Moderately reduced right ventricular systolic function.  7.  Acute on chronic kidney disease.  Please see nephrology note.    Continue to uptitrate metoprolol succinate for better blood pressure and rhythm control.  Hydralazine and nitrates and lieu of ACE inhibitor/angiotensin receptor blocker/Entresto due to kidney disease.  Once Toprol-XL is maximized, start to maximize nitrates given his small vessel disease.    Okay with transfer out of the unit today.  Increase ambulation.    Sagrario Otoole MD, [facility]  12/06/21  11:35 EST

## 2021-12-06 NOTE — PROGRESS NOTES
"   LOS: 3 days    Patient Care Team:  Provider, No Known as PCP - General    Chief Complaint:    Chief Complaint   Patient presents with   • Chest Pain     Follow UP MEI CKD3  Subjective     Interval History:   UOP 1.1L   Off nipride  Less SOA/swelling  150/90 this AM      Objective     Vital Signs  Temp:  [97.9 °F (36.6 °C)-99.5 °F (37.5 °C)] 98.4 °F (36.9 °C)  Heart Rate:  [] 87  Resp:  [16] 16  BP: (103-164)/(60-93) 152/90    Flowsheet Rows      First Filed Value   Admission Height 167.6 cm (66\") Documented at 12/04/2021 1445   Admission Weight 127 kg (280 lb 6.4 oz) Documented at 12/03/2021 1852          No intake/output data recorded.  I/O last 3 completed shifts:  In: 3118 [P.O.:1030; I.V.:2088]  Out: 1925 [Urine:1925]    Intake/Output Summary (Last 24 hours) at 12/6/2021 0733  Last data filed at 12/6/2021 0500  Gross per 24 hour   Intake 1706 ml   Output 1125 ml   Net 581 ml       Physical Exam:  General Appearance: alert, comfortable  Neck supple no JVD  Lungs CTA bilat no rales  CV RRR no m/g  abd soft NT/ND  vasc trace BLE pedal/ankle edema      Results Review:    Results from last 7 days   Lab Units 12/06/21  0554 12/05/21  0540 12/04/21  0418 12/03/21  1900 12/03/21  1900   SODIUM mmol/L 136 129* 136   < > 136   POTASSIUM mmol/L 3.6 3.6 3.7   < > 3.8   CHLORIDE mmol/L 101 95* 100   < > 100   CO2 mmol/L 28.6 26.3 27.3   < > 23.9   BUN mg/dL 31* 34* 29*   < > 27*   CREATININE mg/dL 1.95* 2.39* 1.73*   < > 1.57*   CALCIUM mg/dL 8.0* 7.9* 8.1*   < > 8.1*   BILIRUBIN mg/dL  --   --   --   --  0.6   ALK PHOS U/L  --   --   --   --  82   ALT (SGPT) U/L  --   --   --   --  16   AST (SGOT) U/L  --   --   --   --  14   GLUCOSE mg/dL 166* 239* 226*   < > 200*    < > = values in this interval not displayed.       Estimated Creatinine Clearance: 64.5 mL/min (A) (by C-G formula based on SCr of 1.95 mg/dL (H)).    Results from last 7 days   Lab Units 12/06/21  0554 12/04/21  0418   MAGNESIUM mg/dL  --  2.2 "   PHOSPHORUS mg/dL 4.3  --        Results from last 7 days   Lab Units 12/06/21  0554   URIC ACID mg/dL 6.5       Results from last 7 days   Lab Units 12/06/21  0554 12/05/21  0540 12/04/21 0418 12/03/21  1900   WBC 10*3/mm3 10.11 11.47* 10.47 12.75*   HEMOGLOBIN g/dL 13.0 12.8* 13.5 14.7   PLATELETS 10*3/mm3 230 256 203 233       Results from last 7 days   Lab Units 12/04/21  0418   INR  1.10         Imaging Results (Last 24 Hours)     ** No results found for the last 24 hours. **        aspirin, 81 mg, Oral, Daily  atorvastatin, 80 mg, Oral, Nightly  budesonide-formoterol, 2 puff, Inhalation, BID - RT  hydrALAZINE, 50 mg, Oral, Q8H  insulin glargine, 20 Units, Subcutaneous, Nightly  insulin lispro, 0-7 Units, Subcutaneous, TID AC  insulin lispro, 5 Units, Subcutaneous, TID With Meals  isosorbide mononitrate, 30 mg, Oral, Q24H  metoprolol succinate XL, 25 mg, Oral, Q24H  prasugrel, 10 mg, Oral, Daily  sodium chloride, 10 mL, Intravenous, Q12H      nitroprusside, 0.3-10 mcg/kg/min, Last Rate: Stopped (12/05/21 1357)        Medication Review:   Current Facility-Administered Medications   Medication Dose Route Frequency Provider Last Rate Last Admin   • acetaminophen (TYLENOL) tablet 650 mg  650 mg Oral Q4H PRN Les Reyes MD       • aspirin EC tablet 81 mg  81 mg Oral Daily Les Reyes MD   81 mg at 12/05/21 0848   • atorvastatin (LIPITOR) tablet 80 mg  80 mg Oral Nightly Les Reyes MD   80 mg at 12/05/21 2106   • budesonide-formoterol (SYMBICORT) 160-4.5 MCG/ACT inhaler 2 puff  2 puff Inhalation BID - RT Les Reyes MD   2 puff at 12/05/21 1907   • dextrose (D50W) (25 g/50 mL) IV injection 25 g  25 g Intravenous Q15 Min PRN Les Reyes MD       • dextrose (GLUTOSE) oral gel 15 g  15 g Oral Q15 Min PRN Les Reyes MD       • glucagon (human recombinant) (GLUCAGEN DIAGNOSTIC) injection 1 mg  1 mg Subcutaneous Q15 Min PRN Les Reyes MD       • hydrALAZINE (APRESOLINE) tablet 50 mg   50 mg Oral Q8H Les Reyes MD   50 mg at 12/06/21 0556   • HYDROcod Polst-CPM Polst ER (TUSSIONEX PENNKINETIC) 10-8 MG/5ML ER suspension 5 mL  5 mL Oral Q12H PRN Cedrick Kramer MD       • HYDROcodone-acetaminophen (NORCO) 5-325 MG per tablet 1 tablet  1 tablet Oral Q4H PRN Les Reyes MD       • insulin glargine (LANTUS, SEMGLEE) injection 20 Units  20 Units Subcutaneous Nightly Les Reyes MD   20 Units at 12/05/21 2105   • insulin lispro (ADMELOG) injection 0-7 Units  0-7 Units Subcutaneous TID AC Les Reyes MD   2 Units at 12/05/21 1750   • insulin lispro (ADMELOG) injection 5 Units  5 Units Subcutaneous TID With Meals Cedrick Kramer MD   5 Units at 12/05/21 1750   • isosorbide mononitrate (IMDUR) 24 hr tablet 30 mg  30 mg Oral Q24H Les Reyes MD   30 mg at 12/05/21 1400   • metoprolol succinate XL (TOPROL-XL) 24 hr tablet 25 mg  25 mg Oral Q24H Les Reyes MD   25 mg at 12/05/21 1400   • nitroglycerin (NITROSTAT) SL tablet 0.4 mg  0.4 mg Sublingual Q5 Min PRN Les Reyes MD       • nitroprusside (NIPRIDE) 50 mg in dextrose (D5W) 5 % 250 mL (0.2 mg/mL) infusion  0.3-10 mcg/kg/min Intravenous Titrated Les Reyes MD   Stopped at 12/05/21 1357   • ondansetron (ZOFRAN) injection 4 mg  4 mg Intravenous Q6H PRN Les Reyes MD   4 mg at 12/04/21 2233   • ondansetron (ZOFRAN) tablet 4 mg  4 mg Oral Q6H PRN Les Reyes MD        Or   • ondansetron (ZOFRAN) injection 4 mg  4 mg Intravenous Q6H PRN Les Reyes MD       • prasugrel (EFFIENT) tablet 10 mg  10 mg Oral Daily Les Reyes MD   10 mg at 12/05/21 0848   • sodium chloride 0.9 % flush 10 mL  10 mL Intravenous PRN Les Reyes MD       • sodium chloride 0.9 % flush 10 mL  10 mL Intravenous Q12H Les Reyes MD   10 mL at 12/05/21 2106   • sodium chloride 0.9 % flush 10 mL  10 mL Intravenous PRN Les Reyes MD           Assessment/Plan   1. Non olig MEI - Cr climbed slightly 1.5 to 1.7  with diuresis but up to 2.4 yesterday likely due to contrast nephropathy post cath.  Better 1.9 today.  K/HCO3 normal.  Mild hypervolemic hyponatremia better, Na up to 136.    2. CKD stage 3 - BL Cr 1.3 - 1.5 range with multiple risk factors: DM, HTN, atherosclerotic CVD, CHF   3. Vol overload - improved some with initial diuresis, less edema/dyspnea, held IV lasix yest PM due to MEI; transition to PO diuretic today  4. CAD/CABG, NSTEMI, s/p LHC/PCI 12/4/21, on effient, asa 81   5. ICM, EF 37% on echo Aug 2021   6. HTN - BP labile, hydralazine/nitrate added now off nitroprusside; holding entresto due to MEI; also on toprol XL low dose, HR 80s, 150/90 this AM  7. DM2, A1c 7.4, mgmt per [facility]; hold jardiance for now due to MEI     Plan  - start bumex 2mg PO BID  - inc toprol XL 50mg   - no objection to Tx out CCU       Acute on chronic congestive heart failure (HCC)    Type 2 diabetes mellitus, with long-term current use of insulin (HCC)    Obesity, Class III, BMI 40-49.9 (morbid obesity) (HCC)    Pulmonary HTN (HCC)    Coronary artery disease    Hypertension    CKD (chronic kidney disease) stage 2, GFR 60-89 ml/min    Other chest pain    Unstable angina (HCC)    Acute systolic CHF (congestive heart failure) ([facility])              Red Ackerman MD  12/06/21  07:33 EST

## 2021-12-07 LAB
ANION GAP SERPL CALCULATED.3IONS-SCNC: 8 MMOL/L (ref 5–15)
BASOPHILS # BLD AUTO: 0.03 10*3/MM3 (ref 0–0.2)
BASOPHILS NFR BLD AUTO: 0.3 % (ref 0–1.5)
BUN SERPL-MCNC: 28 MG/DL (ref 6–20)
BUN/CREAT SERPL: 16.9 (ref 7–25)
CALCIUM SPEC-SCNC: 7.9 MG/DL (ref 8.6–10.5)
CHLORIDE SERPL-SCNC: 96 MMOL/L (ref 98–107)
CO2 SERPL-SCNC: 29 MMOL/L (ref 22–29)
CREAT SERPL-MCNC: 1.66 MG/DL (ref 0.76–1.27)
DEPRECATED RDW RBC AUTO: 44.2 FL (ref 37–54)
EOSINOPHIL # BLD AUTO: 0.39 10*3/MM3 (ref 0–0.4)
EOSINOPHIL NFR BLD AUTO: 3.5 % (ref 0.3–6.2)
ERYTHROCYTE [DISTWIDTH] IN BLOOD BY AUTOMATED COUNT: 14.2 % (ref 12.3–15.4)
GFR SERPL CREATININE-BSD FRML MDRD: 47 ML/MIN/1.73
GLUCOSE BLDC GLUCOMTR-MCNC: 129 MG/DL (ref 70–130)
GLUCOSE BLDC GLUCOMTR-MCNC: 168 MG/DL (ref 70–130)
GLUCOSE BLDC GLUCOMTR-MCNC: 229 MG/DL (ref 70–130)
GLUCOSE BLDC GLUCOMTR-MCNC: 340 MG/DL (ref 70–130)
GLUCOSE SERPL-MCNC: 138 MG/DL (ref 65–99)
HCT VFR BLD AUTO: 40.3 % (ref 37.5–51)
HGB BLD-MCNC: 13.2 G/DL (ref 13–17.7)
IMM GRANULOCYTES # BLD AUTO: 0.04 10*3/MM3 (ref 0–0.05)
IMM GRANULOCYTES NFR BLD AUTO: 0.4 % (ref 0–0.5)
LYMPHOCYTES # BLD AUTO: 0.96 10*3/MM3 (ref 0.7–3.1)
LYMPHOCYTES NFR BLD AUTO: 8.6 % (ref 19.6–45.3)
MCH RBC QN AUTO: 28.1 PG (ref 26.6–33)
MCHC RBC AUTO-ENTMCNC: 32.8 G/DL (ref 31.5–35.7)
MCV RBC AUTO: 85.7 FL (ref 79–97)
MONOCYTES # BLD AUTO: 0.9 10*3/MM3 (ref 0.1–0.9)
MONOCYTES NFR BLD AUTO: 8 % (ref 5–12)
NEUTROPHILS NFR BLD AUTO: 79.2 % (ref 42.7–76)
NEUTROPHILS NFR BLD AUTO: 8.88 10*3/MM3 (ref 1.7–7)
NRBC BLD AUTO-RTO: 0 /100 WBC (ref 0–0.2)
PLATELET # BLD AUTO: 244 10*3/MM3 (ref 140–450)
PMV BLD AUTO: 11.4 FL (ref 6–12)
POTASSIUM SERPL-SCNC: 3.6 MMOL/L (ref 3.5–5.2)
RBC # BLD AUTO: 4.7 10*6/MM3 (ref 4.14–5.8)
SODIUM SERPL-SCNC: 133 MMOL/L (ref 136–145)
WBC NRBC COR # BLD: 11.2 10*3/MM3 (ref 3.4–10.8)

## 2021-12-07 PROCEDURE — 94760 N-INVAS EAR/PLS OXIMETRY 1: CPT

## 2021-12-07 PROCEDURE — 94799 UNLISTED PULMONARY SVC/PX: CPT

## 2021-12-07 PROCEDURE — 63710000001 INSULIN LISPRO (HUMAN) PER 5 UNITS: Performed by: HOSPITALIST

## 2021-12-07 PROCEDURE — 99232 SBSQ HOSP IP/OBS MODERATE 35: CPT | Performed by: INTERNAL MEDICINE

## 2021-12-07 PROCEDURE — 63710000001 INSULIN LISPRO (HUMAN) PER 5 UNITS: Performed by: INTERNAL MEDICINE

## 2021-12-07 PROCEDURE — 94761 N-INVAS EAR/PLS OXIMETRY MLT: CPT

## 2021-12-07 PROCEDURE — 80048 BASIC METABOLIC PNL TOTAL CA: CPT | Performed by: INTERNAL MEDICINE

## 2021-12-07 PROCEDURE — 85025 COMPLETE CBC W/AUTO DIFF WBC: CPT | Performed by: INTERNAL MEDICINE

## 2021-12-07 PROCEDURE — 63710000001 INSULIN GLARGINE PER 5 UNITS: Performed by: INTERNAL MEDICINE

## 2021-12-07 PROCEDURE — 82962 GLUCOSE BLOOD TEST: CPT

## 2021-12-07 RX ORDER — ISOSORBIDE MONONITRATE 60 MG/1
60 TABLET, EXTENDED RELEASE ORAL
Status: DISCONTINUED | OUTPATIENT
Start: 2021-12-08 | End: 2021-12-09 | Stop reason: HOSPADM

## 2021-12-07 RX ORDER — ISOSORBIDE MONONITRATE 30 MG/1
30 TABLET, EXTENDED RELEASE ORAL ONCE
Status: COMPLETED | OUTPATIENT
Start: 2021-12-07 | End: 2021-12-07

## 2021-12-07 RX ORDER — POTASSIUM CHLORIDE 750 MG/1
20 TABLET, FILM COATED, EXTENDED RELEASE ORAL ONCE
Status: COMPLETED | OUTPATIENT
Start: 2021-12-07 | End: 2021-12-07

## 2021-12-07 RX ADMIN — BUDESONIDE AND FORMOTEROL FUMARATE DIHYDRATE 2 PUFF: 160; 4.5 AEROSOL RESPIRATORY (INHALATION) at 10:38

## 2021-12-07 RX ADMIN — BUDESONIDE AND FORMOTEROL FUMARATE DIHYDRATE 2 PUFF: 160; 4.5 AEROSOL RESPIRATORY (INHALATION) at 20:48

## 2021-12-07 RX ADMIN — ASPIRIN 81 MG: 81 TABLET, COATED ORAL at 08:58

## 2021-12-07 RX ADMIN — INSULIN LISPRO 3 UNITS: 100 INJECTION, SOLUTION INTRAVENOUS; SUBCUTANEOUS at 17:09

## 2021-12-07 RX ADMIN — INSULIN LISPRO 5 UNITS: 100 INJECTION, SOLUTION INTRAVENOUS; SUBCUTANEOUS at 17:09

## 2021-12-07 RX ADMIN — ISOSORBIDE MONONITRATE 30 MG: 30 TABLET ORAL at 10:22

## 2021-12-07 RX ADMIN — HYDRALAZINE HYDROCHLORIDE 50 MG: 50 TABLET, FILM COATED ORAL at 13:24

## 2021-12-07 RX ADMIN — INSULIN LISPRO 5 UNITS: 100 INJECTION, SOLUTION INTRAVENOUS; SUBCUTANEOUS at 11:47

## 2021-12-07 RX ADMIN — PRASUGREL 10 MG: 10 TABLET, FILM COATED ORAL at 08:58

## 2021-12-07 RX ADMIN — BUMETANIDE 2 MG: 2 TABLET ORAL at 08:58

## 2021-12-07 RX ADMIN — SODIUM CHLORIDE, PRESERVATIVE FREE 10 ML: 5 INJECTION INTRAVENOUS at 20:17

## 2021-12-07 RX ADMIN — BUMETANIDE 2 MG: 2 TABLET ORAL at 17:10

## 2021-12-07 RX ADMIN — INSULIN LISPRO 5 UNITS: 100 INJECTION, SOLUTION INTRAVENOUS; SUBCUTANEOUS at 09:00

## 2021-12-07 RX ADMIN — HYDRALAZINE HYDROCHLORIDE 50 MG: 50 TABLET, FILM COATED ORAL at 21:52

## 2021-12-07 RX ADMIN — METOPROLOL SUCCINATE 100 MG: 100 TABLET, EXTENDED RELEASE ORAL at 08:58

## 2021-12-07 RX ADMIN — POTASSIUM CHLORIDE 20 MEQ: 750 TABLET, EXTENDED RELEASE ORAL at 10:21

## 2021-12-07 RX ADMIN — ATORVASTATIN CALCIUM 80 MG: 80 TABLET, FILM COATED ORAL at 20:17

## 2021-12-07 RX ADMIN — ISOSORBIDE MONONITRATE 30 MG: 30 TABLET ORAL at 08:58

## 2021-12-07 RX ADMIN — HYDRALAZINE HYDROCHLORIDE 50 MG: 50 TABLET, FILM COATED ORAL at 05:44

## 2021-12-07 RX ADMIN — HYDROCODONE BITARTRATE AND ACETAMINOPHEN 1 TABLET: 5; 325 TABLET ORAL at 21:54

## 2021-12-07 RX ADMIN — INSULIN GLARGINE 20 UNITS: 100 INJECTION, SOLUTION SUBCUTANEOUS at 20:17

## 2021-12-07 RX ADMIN — SODIUM CHLORIDE, PRESERVATIVE FREE 10 ML: 5 INJECTION INTRAVENOUS at 09:00

## 2021-12-07 RX ADMIN — INSULIN LISPRO 2 UNITS: 100 INJECTION, SOLUTION INTRAVENOUS; SUBCUTANEOUS at 08:57

## 2021-12-07 NOTE — PROGRESS NOTES
[facility] Progress Note      Patient Name: Rufus Dorsey  : 1985  MRN: [id number]  Primary Care Physician:  Provider, No Known  Date of admission: 12/3/2021    Subjective     Interval History:   Follow up Rocco on CKD 3.  Feels better. Spoke in Polish and english.  Ate breakfast yesterday.  Did not want lunch or dinner.  Jello this am. Not soa.  Edema better. Weight not done    Review of Systems:   As noted above    Objective     Vitals:   Temp:  [97.9 °F (36.6 °C)-99.3 °F (37.4 °C)] 97.9 °F (36.6 °C)  Heart Rate:  [80-93] 91  Resp:  [15-18] 15  BP: (138-158)/(74-91) 138/74  Flow (L/min):  [3.5] 3.5    Intake/Output Summary (Last 24 hours) at 2021 0851  Last data filed at 2021 0500  Gross per 24 hour   Intake 730 ml   Output 2550 ml   Net -1820 ml       Physical Exam:    General Appearance: Obese, alert, oriented x 3, no acute distress. Sitting on side of bed.    Skin: warm and dry  HEENT: oral mucosa normal, nonicteric sclera  Neck: supple, no JVD  Lungs: Clear to auscultation, no wheezing.   Heart: RRR, normal S1 and S2  Abdomen: soft, nontender, nondistended. + bs  Extremities: no edema lower ext  Neuro: normal speech and mental status     Scheduled Meds:     aspirin, 81 mg, Oral, Daily  atorvastatin, 80 mg, Oral, Nightly  budesonide-formoterol, 2 puff, Inhalation, BID - RT  bumetanide, 2 mg, Oral, BID  hydrALAZINE, 50 mg, Oral, Q8H  insulin glargine, 20 Units, Subcutaneous, Nightly  insulin lispro, 0-7 Units, Subcutaneous, TID AC  insulin lispro, 5 Units, Subcutaneous, TID With Meals  isosorbide mononitrate, 30 mg, Oral, Q24H  metoprolol succinate XL, 100 mg, Oral, Q24H  prasugrel, 10 mg, Oral, Daily  sodium chloride, 10 mL, Intravenous, Q12H      IV Meds:   nitroprusside, 0.3-10 mcg/kg/min, Last Rate: Stopped (21 8076)        Results Reviewed:   I have personally reviewed the results from the time of this admission to 2021 08:51 EST     Results from  last 7 days   Lab Units 12/07/21  0545 12/06/21  0554 12/05/21  0540 12/04/21 0418 12/03/21  1900   SODIUM mmol/L 133* 136 129*   < > 136   POTASSIUM mmol/L 3.6 3.6 3.6   < > 3.8   CHLORIDE mmol/L 96* 101 95*   < > 100   CO2 mmol/L 29.0 28.6 26.3   < > 23.9   BUN mg/dL 28* 31* 34*   < > 27*   CREATININE mg/dL 1.66* 1.95* 2.39*   < > 1.57*   CALCIUM mg/dL 7.9* 8.0* 7.9*   < > 8.1*   BILIRUBIN mg/dL  --   --   --   --  0.6   ALK PHOS U/L  --   --   --   --  82   ALT (SGPT) U/L  --   --   --   --  16   AST (SGOT) U/L  --   --   --   --  14   GLUCOSE mg/dL 138* 166* 239*   < > 200*    < > = values in this interval not displayed.       Estimated Creatinine Clearance: 75.8 mL/min (A) (by C-G formula based on SCr of 1.66 mg/dL (H)).    Results from last 7 days   Lab Units 12/06/21  0554 12/04/21 0418   MAGNESIUM mg/dL  --  2.2   PHOSPHORUS mg/dL 4.3  --        Results from last 7 days   Lab Units 12/06/21  0554   URIC ACID mg/dL 6.5       Results from last 7 days   Lab Units 12/07/21  0545 12/06/21  0554 12/05/21  0540 12/04/21 0418 12/03/21  1900   WBC 10*3/mm3 11.20* 10.11 11.47* 10.47 12.75*   HEMOGLOBIN g/dL 13.2 13.0 12.8* 13.5 14.7   PLATELETS 10*3/mm3 244 230 256 203 233       Results from last 7 days   Lab Units 12/04/21 0418   INR  1.10       Assessment / Plan     ASSESSMENT:  1. Rocco on CKD3.  Creatinine better .  Weight not done.  diuresing on po diuretic.   2. Acute on chronic ischemic systolic heart failure, CAD, NSTEMI: ROSEY LIMA to LAD 12/4. RV systolic dysfunction.   3. DM2  4. HTN better controlled on po meds.     PLAN:  1. Continue po bumex.  2. Ok with renal for transfer out of CCU.   3. One dose KDur today.   Marissa Beck MD  12/07/21  08:51 EST    [facility]  [phone]

## 2021-12-08 LAB
ALBUMIN SERPL-MCNC: 2.6 G/DL (ref 3.5–5.2)
ALBUMIN/GLOB SERPL: 0.7 G/DL
ALP SERPL-CCNC: 95 U/L (ref 39–117)
ALT SERPL W P-5'-P-CCNC: 19 U/L (ref 1–41)
ANION GAP SERPL CALCULATED.3IONS-SCNC: 13.2 MMOL/L (ref 5–15)
AST SERPL-CCNC: 37 U/L (ref 1–40)
BILIRUB SERPL-MCNC: 0.4 MG/DL (ref 0–1.2)
BUN SERPL-MCNC: 37 MG/DL (ref 6–20)
BUN/CREAT SERPL: 20.3 (ref 7–25)
CALCIUM SPEC-SCNC: 8.1 MG/DL (ref 8.6–10.5)
CHLORIDE SERPL-SCNC: 99 MMOL/L (ref 98–107)
CO2 SERPL-SCNC: 24.8 MMOL/L (ref 22–29)
CREAT SERPL-MCNC: 1.82 MG/DL (ref 0.76–1.27)
GFR SERPL CREATININE-BSD FRML MDRD: 42 ML/MIN/1.73
GLOBULIN UR ELPH-MCNC: 3.7 GM/DL
GLUCOSE BLDC GLUCOMTR-MCNC: 116 MG/DL (ref 70–130)
GLUCOSE BLDC GLUCOMTR-MCNC: 131 MG/DL (ref 70–130)
GLUCOSE BLDC GLUCOMTR-MCNC: 139 MG/DL (ref 70–130)
GLUCOSE BLDC GLUCOMTR-MCNC: 154 MG/DL (ref 70–130)
GLUCOSE BLDC GLUCOMTR-MCNC: 179 MG/DL (ref 70–130)
GLUCOSE SERPL-MCNC: 163 MG/DL (ref 65–99)
MAGNESIUM SERPL-MCNC: 2.2 MG/DL (ref 1.6–2.6)
POTASSIUM SERPL-SCNC: 3.3 MMOL/L (ref 3.5–5.2)
PROT SERPL-MCNC: 6.3 G/DL (ref 6–8.5)
SODIUM SERPL-SCNC: 137 MMOL/L (ref 136–145)

## 2021-12-08 PROCEDURE — 94760 N-INVAS EAR/PLS OXIMETRY 1: CPT

## 2021-12-08 PROCEDURE — 63710000001 INSULIN LISPRO (HUMAN) PER 5 UNITS: Performed by: INTERNAL MEDICINE

## 2021-12-08 PROCEDURE — 94761 N-INVAS EAR/PLS OXIMETRY MLT: CPT

## 2021-12-08 PROCEDURE — 63710000001 INSULIN GLARGINE PER 5 UNITS: Performed by: INTERNAL MEDICINE

## 2021-12-08 PROCEDURE — 94799 UNLISTED PULMONARY SVC/PX: CPT

## 2021-12-08 PROCEDURE — 82962 GLUCOSE BLOOD TEST: CPT

## 2021-12-08 PROCEDURE — 63710000001 INSULIN LISPRO (HUMAN) PER 5 UNITS: Performed by: HOSPITALIST

## 2021-12-08 PROCEDURE — 83735 ASSAY OF MAGNESIUM: CPT | Performed by: INTERNAL MEDICINE

## 2021-12-08 PROCEDURE — 80053 COMPREHEN METABOLIC PANEL: CPT | Performed by: INTERNAL MEDICINE

## 2021-12-08 RX ORDER — POTASSIUM CHLORIDE 750 MG/1
20 TABLET, FILM COATED, EXTENDED RELEASE ORAL DAILY
Status: DISCONTINUED | OUTPATIENT
Start: 2021-12-08 | End: 2021-12-09 | Stop reason: HOSPADM

## 2021-12-08 RX ORDER — BUMETANIDE 1 MG/1
1 TABLET ORAL
Status: DISCONTINUED | OUTPATIENT
Start: 2021-12-08 | End: 2021-12-09 | Stop reason: HOSPADM

## 2021-12-08 RX ORDER — INSULIN GLARGINE 100 [IU]/ML
20 INJECTION, SOLUTION SUBCUTANEOUS NIGHTLY
Qty: 5 PEN | Refills: 0 | Status: CANCELLED | OUTPATIENT
Start: 2021-12-01

## 2021-12-08 RX ADMIN — BUMETANIDE 1 MG: 1 TABLET ORAL at 17:30

## 2021-12-08 RX ADMIN — INSULIN LISPRO 5 UNITS: 100 INJECTION, SOLUTION INTRAVENOUS; SUBCUTANEOUS at 09:48

## 2021-12-08 RX ADMIN — HYDROCODONE BITARTRATE AND ACETAMINOPHEN 1 TABLET: 5; 325 TABLET ORAL at 22:56

## 2021-12-08 RX ADMIN — HYDRALAZINE HYDROCHLORIDE 50 MG: 50 TABLET, FILM COATED ORAL at 14:52

## 2021-12-08 RX ADMIN — HYDRALAZINE HYDROCHLORIDE 50 MG: 50 TABLET, FILM COATED ORAL at 23:19

## 2021-12-08 RX ADMIN — SODIUM CHLORIDE, PRESERVATIVE FREE 10 ML: 5 INJECTION INTRAVENOUS at 08:57

## 2021-12-08 RX ADMIN — METOPROLOL SUCCINATE 100 MG: 100 TABLET, EXTENDED RELEASE ORAL at 08:56

## 2021-12-08 RX ADMIN — POTASSIUM CHLORIDE 20 MEQ: 750 TABLET, EXTENDED RELEASE ORAL at 08:56

## 2021-12-08 RX ADMIN — ASPIRIN 81 MG: 81 TABLET, COATED ORAL at 08:56

## 2021-12-08 RX ADMIN — ISOSORBIDE MONONITRATE 60 MG: 60 TABLET ORAL at 08:56

## 2021-12-08 RX ADMIN — ATORVASTATIN CALCIUM 80 MG: 80 TABLET, FILM COATED ORAL at 21:03

## 2021-12-08 RX ADMIN — INSULIN LISPRO 2 UNITS: 100 INJECTION, SOLUTION INTRAVENOUS; SUBCUTANEOUS at 09:48

## 2021-12-08 RX ADMIN — PRASUGREL 10 MG: 10 TABLET, FILM COATED ORAL at 08:56

## 2021-12-08 RX ADMIN — HYDROCODONE POLISTIREX AND CHLORPHENIRAMINE POLISITREX 5 ML: 10; 8 SUSPENSION, EXTENDED RELEASE ORAL at 22:56

## 2021-12-08 RX ADMIN — INSULIN GLARGINE 20 UNITS: 100 INJECTION, SOLUTION SUBCUTANEOUS at 21:03

## 2021-12-08 RX ADMIN — BUMETANIDE 1 MG: 1 TABLET ORAL at 08:56

## 2021-12-08 RX ADMIN — HYDRALAZINE HYDROCHLORIDE 50 MG: 50 TABLET, FILM COATED ORAL at 05:16

## 2021-12-08 RX ADMIN — BUDESONIDE AND FORMOTEROL FUMARATE DIHYDRATE 2 PUFF: 160; 4.5 AEROSOL RESPIRATORY (INHALATION) at 08:04

## 2021-12-08 RX ADMIN — INSULIN LISPRO 5 UNITS: 100 INJECTION, SOLUTION INTRAVENOUS; SUBCUTANEOUS at 18:39

## 2021-12-08 RX ADMIN — INSULIN LISPRO 2 UNITS: 100 INJECTION, SOLUTION INTRAVENOUS; SUBCUTANEOUS at 12:25

## 2021-12-08 RX ADMIN — BUDESONIDE AND FORMOTEROL FUMARATE DIHYDRATE 2 PUFF: 160; 4.5 AEROSOL RESPIRATORY (INHALATION) at 20:08

## 2021-12-08 RX ADMIN — INSULIN LISPRO 5 UNITS: 100 INJECTION, SOLUTION INTRAVENOUS; SUBCUTANEOUS at 12:25

## 2021-12-08 NOTE — PLAN OF CARE
Goal Outcome Evaluation:      Pt remains in CCU, on RA, refusing to use NC- which he uses at home but does maintain O2 saturations 92-96%. Pt awaiting floor bed on telemetry, CTM.      Problem: Adult Inpatient Plan of Care  Goal: Absence of Hospital-Acquired Illness or Injury  Outcome: Ongoing, Progressing  Intervention: Identify and Manage Fall Risk  Intervention: Prevent Skin Injury  Intervention: Prevent Infection  Goal: Optimal Comfort and Wellbeing  Outcome: Ongoing, Progressing  Intervention: Provide Person-Centered Care     Problem: Diabetes Comorbidity  Goal: Blood Glucose Level Within Desired Range  Outcome: Ongoing, Progressing     Problem: Heart Failure Comorbidity  Goal: Maintenance of Heart Failure Symptom Control  Outcome: Ongoing, Progressing  Intervention: Maintain Heart Failure-Management Strategies     Problem: Adjustment to Illness (Acute Coronary Syndrome)  Goal: Optimal Adaptation to Illness  Outcome: Ongoing, Progressing  Intervention: Support Adjustment to Life-Changing Event     Problem: Arrhythmia/Dysrhythmia (Acute Coronary Syndrome)  Goal: Normalized Cardiac Rhythm  Outcome: Ongoing, Progressing     Problem: Diabetes Comorbidity  Goal: Blood Glucose Level Within Desired Range  Outcome: Ongoing, Progressing     Problem: Adjustment to Illness (Acute Coronary Syndrome)  Goal: Optimal Adaptation to Illness  Outcome: Ongoing, Progressing  Intervention: Support Adjustment to Life-Changing Event     Problem: Tissue Perfusion (Acute Coronary Syndrome)  Goal: Adequate Tissue Perfusion  Intervention: Optimize Cardiac Tissue Perfusion

## 2021-12-08 NOTE — PROGRESS NOTES
"   LOS: 5 days    Patient Care Team:  Provider, No Known as PCP - General    Chief Complaint:    Chief Complaint   Patient presents with   • Chest Pain     Follow UP I/O 1.3/2.4  Subjective     Interval History:   Feels well, denies dyspnea ; on room air    Objective     Vital Signs  Temp:  [97.8 °F (36.6 °C)-98.8 °F (37.1 °C)] 97.8 °F (36.6 °C)  Heart Rate:  [81-98] 81  Resp:  [16-24] 18  BP: (130-150)/(65-89) 144/83    Flowsheet Rows      First Filed Value   Admission Height 167.6 cm (66\") Documented at 12/04/2021 1445   Admission Weight 127 kg (280 lb 6.4 oz) Documented at 12/03/2021 1852          No intake/output data recorded.  I/O last 3 completed shifts:  In: 1820 [P.O.:1820]  Out: 3700 [Urine:3700]    Intake/Output Summary (Last 24 hours) at 12/8/2021 0704  Last data filed at 12/8/2021 0516  Gross per 24 hour   Intake 1340 ml   Output 2400 ml   Net -1060 ml       Physical Exam:  General Appearance: alert, comfortable  Neck supple no JVD  Lungs CTA bilat no rales  CV RRR no m/g  abd soft NT/ND  vasc no pedal/ankle edema     Results Review:    Results from last 7 days   Lab Units 12/07/21  0545 12/06/21  0554 12/05/21  0540 12/04/21  0418 12/03/21  1900   SODIUM mmol/L 133* 136 129*   < > 136   POTASSIUM mmol/L 3.6 3.6 3.6   < > 3.8   CHLORIDE mmol/L 96* 101 95*   < > 100   CO2 mmol/L 29.0 28.6 26.3   < > 23.9   BUN mg/dL 28* 31* 34*   < > 27*   CREATININE mg/dL 1.66* 1.95* 2.39*   < > 1.57*   CALCIUM mg/dL 7.9* 8.0* 7.9*   < > 8.1*   BILIRUBIN mg/dL  --   --   --   --  0.6   ALK PHOS U/L  --   --   --   --  82   ALT (SGPT) U/L  --   --   --   --  16   AST (SGOT) U/L  --   --   --   --  14   GLUCOSE mg/dL 138* 166* 239*   < > 200*    < > = values in this interval not displayed.       Estimated Creatinine Clearance: 75.4 mL/min (A) (by C-G formula based on SCr of 1.66 mg/dL (H)).    Results from last 7 days   Lab Units 12/06/21  0554 12/04/21  0418   MAGNESIUM mg/dL  --  2.2   PHOSPHORUS mg/dL 4.3  --  "       Results from last 7 days   Lab Units 12/06/21  0554   URIC ACID mg/dL 6.5       Results from last 7 days   Lab Units 12/07/21  0545 12/06/21  0554 12/05/21  0540 12/04/21  0418 12/03/21  1900   WBC 10*3/mm3 11.20* 10.11 11.47* 10.47 12.75*   HEMOGLOBIN g/dL 13.2 13.0 12.8* 13.5 14.7   PLATELETS 10*3/mm3 244 230 256 203 233       Results from last 7 days   Lab Units 12/04/21 0418   INR  1.10         Imaging Results (Last 24 Hours)     ** No results found for the last 24 hours. **        aspirin, 81 mg, Oral, Daily  atorvastatin, 80 mg, Oral, Nightly  budesonide-formoterol, 2 puff, Inhalation, BID - RT  bumetanide, 2 mg, Oral, BID  hydrALAZINE, 50 mg, Oral, Q8H  insulin glargine, 20 Units, Subcutaneous, Nightly  insulin lispro, 0-7 Units, Subcutaneous, TID AC  insulin lispro, 5 Units, Subcutaneous, TID With Meals  isosorbide mononitrate, 60 mg, Oral, Q24H  metoprolol succinate XL, 100 mg, Oral, Q24H  prasugrel, 10 mg, Oral, Daily  sodium chloride, 10 mL, Intravenous, Q12H      nitroprusside, 0.3-10 mcg/kg/min, Last Rate: Stopped (12/05/21 1357)        Medication Review:   Current Facility-Administered Medications   Medication Dose Route Frequency Provider Last Rate Last Admin   • acetaminophen (TYLENOL) tablet 650 mg  650 mg Oral Q4H PRN Les Reyes MD       • aspirin EC tablet 81 mg  81 mg Oral Daily Les Reyes MD   81 mg at 12/07/21 0858   • atorvastatin (LIPITOR) tablet 80 mg  80 mg Oral Nightly Les Reyes MD   80 mg at 12/07/21 2017   • budesonide-formoterol (SYMBICORT) 160-4.5 MCG/ACT inhaler 2 puff  2 puff Inhalation BID - RT Les Reyes MD   2 puff at 12/07/21 2048   • bumetanide (BUMEX) tablet 2 mg  2 mg Oral BID Red Ackerman MD   2 mg at 12/07/21 1710   • dextrose (D50W) (25 g/50 mL) IV injection 25 g  25 g Intravenous Q15 Min PRN Les Reyes MD       • dextrose (GLUTOSE) oral gel 15 g  15 g Oral Q15 Min PRN Les Reyes MD       • glucagon (human  recombinant) (GLUCAGEN DIAGNOSTIC) injection 1 mg  1 mg Subcutaneous Q15 Min PRN Les Reyes MD       • hydrALAZINE (APRESOLINE) tablet 50 mg  50 mg Oral Q8H Les Reyes MD   50 mg at 12/08/21 0516   • HYDROcod Polst-CPM Polst ER (TUSSIONEX PENNKINETIC) 10-8 MG/5ML ER suspension 5 mL  5 mL Oral Q12H PRN Cedrick Kramer MD       • HYDROcodone-acetaminophen (NORCO) 5-325 MG per tablet 1 tablet  1 tablet Oral Q4H PRN Les Reyes MD   1 tablet at 12/07/21 2154   • insulin glargine (LANTUS, SEMGLEE) injection 20 Units  20 Units Subcutaneous Nightly Les Reyes MD   20 Units at 12/07/21 2017   • insulin lispro (ADMELOG) injection 0-7 Units  0-7 Units Subcutaneous TID AC eLs Reyes MD   3 Units at 12/07/21 1709   • insulin lispro (ADMELOG) injection 5 Units  5 Units Subcutaneous TID With Meals Cedrick Kramer MD   5 Units at 12/07/21 1709   • isosorbide mononitrate (IMDUR) 24 hr tablet 60 mg  60 mg Oral Q24H Sagrario Otoole MD       • metoprolol succinate XL (TOPROL-XL) 24 hr tablet 100 mg  100 mg Oral Q24H Sagrario Otoole MD   100 mg at 12/07/21 0858   • nitroglycerin (NITROSTAT) SL tablet 0.4 mg  0.4 mg Sublingual Q5 Min PRN Les Reyes MD       • nitroprusside (NIPRIDE) 50 mg in dextrose (D5W) 5 % 250 mL (0.2 mg/mL) infusion  0.3-10 mcg/kg/min Intravenous Titrated Les Reyes MD   Stopped at 12/05/21 1357   • ondansetron (ZOFRAN) injection 4 mg  4 mg Intravenous Q6H PRN Les Reyes MD   4 mg at 12/04/21 2233   • ondansetron (ZOFRAN) tablet 4 mg  4 mg Oral Q6H PRN Les Reyes MD        Or   • ondansetron (ZOFRAN) injection 4 mg  4 mg Intravenous Q6H PRN Les Reyes MD       • prasugrel (EFFIENT) tablet 10 mg  10 mg Oral Daily Les Reyes MD   10 mg at 12/07/21 0858   • sodium chloride 0.9 % flush 10 mL  10 mL Intravenous PRN Les Reyes MD       • sodium chloride 0.9 % flush 10 mL  10 mL Intravenous Q12H Les Reyes MD   10 mL at 12/07/21 2017   •  sodium chloride 0.9 % flush 10 mL  10 mL Intravenous PRN Les Reyes MD           Assessment/Plan   1. Non olig MEI - RADHA resolving, Cr down to 1.6 yesterday, labs pending today  2. CKD stage 3 - BL Cr 1.3 - 1.5 range with multiple risk factors: DM, HTN, atherosclerotic CVD, CHF   3. Vol overload - improved with diuresis, good UOP on PO bumex, NEG fluid balance, no periph edema or dyspnea   4. CAD/CABG, NSTEMI, s/p LHC/PCI 12/4/21, on effient, asa 81   5. ICM, EF 37% on echo Aug 2021   6. HTN - BP better, toprol XL titrated to 100mg, on hydralazine and imdur; could resume entresto as outpatient   7. DM2, A1c 7.4, mgmt per [facility]; hold jardiance for now due to MEI     Plan  - instead of Tx out CCU patient appears stable enough for discharge, RN to touch base with [facility].  Continue bumex as is barring surprises on pending labs.  WIll arrange nephrology follow up 2-3 weeks.    Addendum: labs back, Cr up slightly 1.8 and K down to 3.3.  Will decrease bumex 1mg BID, continue this dose at discharge.  Will schedule KCL 20meq dialy.  Ok with discharge still.      Acute on chronic congestive heart failure (HCC)    Type 2 diabetes mellitus, with long-term current use of insulin ([facility])    Obesity, Class III, BMI 40-49.9 (morbid obesity) ([facility])    Pulmonary HTN ([facility])    Coronary artery disease    Hypertension    CKD (chronic kidney disease) stage 2, GFR 60-89 ml/min    Other chest pain    Unstable angina ([facility])    Acute systolic CHF (congestive heart failure) ([facility])              Red Ackerman MD  12/08/21  07:04 EST

## 2021-12-08 NOTE — PROGRESS NOTES
Name: Rufus Dorsey ADMIT: 12/3/2021   : 1985  PCP: Provider, No Known    MRN: [id number] LOS: 5 days   AGE/SEX: 36 y.o. male  ROOM: [facility]     Subjective   Subjective   Patient is lying on the bed and is in no major distress.  No new events overnight.  Denies nausea, vomiting, abdominal pain, chest pain.       Objective   Objective   Vital Signs  Temp:  [97.8 °F (36.6 °C)-98.4 °F (36.9 °C)] 98.4 °F (36.9 °C)  Heart Rate:  [81-95] 82  Resp:  [16-20] 18  BP: (135-165)/(82-95) 136/93  SpO2:  [93 %-94 %] 94 %  on   ;   Device (Oxygen Therapy): room air  Body mass index is 43.06 kg/m².  Physical Exam  Constitutional:       General: He is not in acute distress.     Appearance: Normal appearance. He is not toxic-appearing.   HENT:      Head: Normocephalic and atraumatic.   Cardiovascular:      Rate and Rhythm: Normal rate and regular rhythm.   Pulmonary:      Effort: Pulmonary effort is normal. No respiratory distress.      Breath sounds: Normal breath sounds. No wheezing or rhonchi.   Abdominal:      General: Bowel sounds are normal. There is no distension.      Palpations: Abdomen is soft.      Tenderness: There is no abdominal tenderness. There is no guarding or rebound.   Musculoskeletal:         General: No swelling.      Right lower leg: No edema.      Left lower leg: No edema.   Skin:     General: Skin is warm and dry.   Neurological:      General: No focal deficit present.      Mental Status: He is alert.   Psychiatric:         Mood and Affect: Mood normal.         Behavior: Behavior normal.       Results Review     I reviewed the patient's new clinical results.  Results from last 7 days   Lab Units 21  0545 21  0554 21  0540 21  0418   WBC 10*3/mm3 11.20* 10.11 11.47* 10.47   HEMOGLOBIN g/dL 13.2 13.0 12.8* 13.5   PLATELETS 10*3/mm3 244 230 256 203     Results from last 7 days   Lab Units 21  0516 21  0545 21  0554 21  0540   SODIUM mmol/L 137 133* 136  129*   POTASSIUM mmol/L 3.3* 3.6 3.6 3.6   CHLORIDE mmol/L 99 96* 101 95*   CO2 mmol/L 24.8 29.0 28.6 26.3   BUN mg/dL 37* 28* 31* 34*   CREATININE mg/dL 1.82* 1.66* 1.95* 2.39*   GLUCOSE mg/dL 163* 138* 166* 239*   EGFR IF NONAFRICN AM mL/min/1.73 42* 47* 39* 31*     Results from last 7 days   Lab Units 12/08/21  0516 12/06/21  0554 12/03/21  1900   ALBUMIN g/dL 2.60* 2.80* 2.90*   BILIRUBIN mg/dL 0.4  --  0.6   ALK PHOS U/L 95  --  82   AST (SGOT) U/L 37  --  14   ALT (SGPT) U/L 19  --  16     Results from last 7 days   Lab Units 12/08/21  0516 12/07/21  0545 12/06/21  0554 12/05/21  0540 12/04/21  0418 12/04/21  0418 12/03/21  1900 12/03/21  1900   CALCIUM mg/dL 8.1* 7.9* 8.0* 7.9*   < > 8.1*   < > 8.1*   ALBUMIN g/dL 2.60*  --  2.80*  --   --   --   --  2.90*   MAGNESIUM mg/dL 2.2  --   --   --   --  2.2  --   --    PHOSPHORUS mg/dL  --   --  4.3  --   --   --   --   --     < > = values in this interval not displayed.     Results from last 7 days   Lab Units 12/04/21  0418   PROCALCITONIN ng/mL 0.20     COVID19   Date Value Ref Range Status   12/04/2021 Not Detected Not Detected - Ref. Range Final   12/03/2021 Not Detected Not Detected - Ref. Range Final     Glucose   Date/Time Value Ref Range Status   12/08/2021 1103 179 (H) 70 - 130 mg/dL Final     Comment:     Meter: [id number] : [id number] Joe Flory TASHIA   12/08/2021 0752 154 (H) 70 - 130 mg/dL Final     Comment:     Meter: [id number] : [id number] Joe Ruth NA   12/07/2021 1954 129 70 - 130 mg/dL Final     Comment:     Meter: [id number] : [id number] Magdalena Mcdonald NA   12/07/2021 1656 229 (H) 70 - 130 mg/dL Final     Comment:     Meter: [id number] : [id number] Chandler Wallis RN   12/07/2021 1136 340 (H) 70 - 130 mg/dL Final     Comment:     Meter: [id number] : [id number] [facility]    12/07/2021 0746 168 (H) 70 - 130 mg/dL Final     Comment:     Meter: [id number] : [id number] [facility]    12/06/2021 1951 164 (H) 70 -  130 mg/dL Final     Comment:     Meter: [id number] : [id number] Sd Garza [facility]       Cardiac Catheterization/Vascular Study  CARDIAC CATHETERIZATION REPORT    Procedure:Left heart catheterization with selective injection of the LIMA   and saphenous vein grafts, PCI thru LIMA to LAD    DATE OF PROCEDURE: 12/04/21    PROCEDURE PERFORMED BY: Les Reyes MD, [facility]    INDICATION FOR PROCEDURE: Unstable angina heart failure    DESCRIPTION OF PROCEDURE: After consent was obtained, access was gained in   his left radial artery using a micropuncture technique. A 6-Chinese short   sheath was placed without difficulty.  Intraarterial cocktail was given.    Selective injection of the LIMA followed by selective injection of the   left and right coronary arteries were performed using a JL-3.5 and JR-4   diagnostic catheter.  We then used an JR4 catheter to inject the saphenous   vein grafts followed by left ventriculography.  Patient tolerated the   procedure well without early complication and EBL was minimal.  At the   conclusion, the sheath was removed and hemostasis was obtained. The   patient went to the prep holding area in stable condition.    FINDINGS:    LEFT VENTRICULOGRAPHY: The LV pressure was 158/40.  There was no mitral   insufficiency or gradient across the aortic valve on pullback.  No LV gram   was performed    CORONARY ANGIOGRAPHY:  Left main: Normal  Left anterior descending: Immediately diseased 50% and a 90% then 100%   occlusion in the mid LAD  Ramus intermedius:Not present  Circumflex: Diffusely diseased throughout 70% proximally OM1 90% lesion   long area of 90% disease in the distal circumflex and that is just   occluded there are faint left to right collaterals  RCA: Is a dominant vessel.  100% occluded proximally    LIMA to LAD: The large graft is patent until it hits the insertion site of   the LAD and there is a 90% insertion lesion the LAD in the distal LAD just   is occluded     SVG  to OM1 and OM 2 is widely patent and looks good the native vessels are   very small probably diseased throughout and there are left to right   collaterals    I could not find any more vein grafts and the report was only 3 so we have   the mall    SUMMARY: Acute systolic heart failure unstable angina working to do a PCI   in his JENNIFER to the LAD but it is possible he could have angina after that   he has terrible small vessel diffuse diabetic coronary disease in addition   his LVEDP is between 40 and 50.    Interventional note: 10,000 units of heparin was given the ACT was   therapeutic prasugrel was given at the conclusion of the case.  A 6 Kyrgyz   JENNIFER guide was passed up and intubated the left internal mammary artery.  A   BMW was passed down in the distal LAD and we direct stented this with a   2.5x8 Xience Janina drug-eluting stent at 16 ivette there was 0% residual and   BELKIS-3 flow and at that point balloons wires and guides were removed the   sheath was removed and a TR band was applied    Interventional data: 90% JENNIFER to the LAD pre, post it was 0.  Type C   lesion, 0 complications no dissection the flow is BELKIS-3 both pre and post    RECOMMENDATIONS: Routine PCI care this guide needs aggressive guideline   directed medical therapy but he is in trouble right now we will admit him   to the CCU and put him on nitroprusside went up to diurese him we will   have to watch his renal function carefully his prognosis is guarded    Les Reyes MD  12/04/21  13:57 EST    Scheduled Medications  aspirin, 81 mg, Oral, Daily  atorvastatin, 80 mg, Oral, Nightly  budesonide-formoterol, 2 puff, Inhalation, BID - RT  bumetanide, 1 mg, Oral, BID  hydrALAZINE, 50 mg, Oral, Q8H  insulin glargine, 20 Units, Subcutaneous, Nightly  insulin lispro, 0-7 Units, Subcutaneous, TID AC  insulin lispro, 5 Units, Subcutaneous, TID With Meals  isosorbide mononitrate, 60 mg, Oral, Q24H  metoprolol succinate XL, 100 mg, Oral, Q24H  potassium  chloride, 20 mEq, Oral, Daily  prasugrel, 10 mg, Oral, Daily  sodium chloride, 10 mL, Intravenous, Q12H    Infusions  nitroprusside, 0.3-10 mcg/kg/min, Last Rate: Stopped (12/05/21 1357)    Diet  Diet Regular; Consistent Carbohydrate, Cardiac       Assessment/Plan     Active Hospital Problems    Diagnosis  POA   • **Acute on chronic congestive heart failure (HCC) [I50.9]  Yes   • Other chest pain [R07.89]  Unknown   • Unstable angina ([facility]) [I20.0]  Unknown   • Acute systolic CHF (congestive heart failure) ([facility]) [I50.21]  Unknown   • CKD (chronic kidney disease) stage 2, GFR 60-89 ml/min [N18.2]  Yes   • Pulmonary HTN ([facility]) [I27.20]  Yes   • Obesity, Class III, BMI 40-49.9 (morbid obesity) ([facility]) [E66.01]  Yes   • Coronary artery disease [I25.10]  Yes   • Hypertension [I10]  Yes   • Type 2 diabetes mellitus, with long-term current use of insulin ([facility]) [E11.9, Z79.4]  Not Applicable      Resolved Hospital Problems   No resolved problems to display.       36 y.o. male admitted with Acute on chronic congestive heart failure (HCC).    1. Non-ST ovation MI, patient underwent cardiac catheter and stent placement earlier during this hospital stay and he also has a history of bypass surgery.  On aspirin, Effient, metoprolol, isosorbide, statins.  Please note patient is uninsured and reportedly cannot afford medications.  CCP consult will be obtained for medication assistance as an outpatient basis.  2.  Acute on chronic systolic dysfunction, currently compensated and is on Bumex and metoprolol.  3.  CKD stage II, creatinine is at baseline.  Nephrology also evaluated him.  Did have some contrast-induced nephropathy earlier during this hospital stay.  4.  Morbid obesity, patient was counseled to lose weight.  5.  Diabetes mellitus, currently on Lantus and corrective dose insulin and will be continued.  6.  Hypertension, on hydralazine, isosorbide and metoprolol and will be continued.  7.  Hyperlipidemia, on statins.      Patrick  Garry Barrett MD  [facility]  12/08/21  17:02 EST

## 2021-12-09 VITALS
SYSTOLIC BLOOD PRESSURE: 119 MMHG | TEMPERATURE: 99.1 F | WEIGHT: 265.2 LBS | DIASTOLIC BLOOD PRESSURE: 65 MMHG | HEIGHT: 66 IN | RESPIRATION RATE: 18 BRPM | OXYGEN SATURATION: 95 % | HEART RATE: 83 BPM | BODY MASS INDEX: 42.62 KG/M2

## 2021-12-09 LAB
ANION GAP SERPL CALCULATED.3IONS-SCNC: 7.4 MMOL/L (ref 5–15)
BUN SERPL-MCNC: 33 MG/DL (ref 6–20)
BUN/CREAT SERPL: 20.9 (ref 7–25)
CALCIUM SPEC-SCNC: 7.9 MG/DL (ref 8.6–10.5)
CHLORIDE SERPL-SCNC: 99 MMOL/L (ref 98–107)
CO2 SERPL-SCNC: 25.6 MMOL/L (ref 22–29)
CREAT SERPL-MCNC: 1.58 MG/DL (ref 0.76–1.27)
GFR SERPL CREATININE-BSD FRML MDRD: 50 ML/MIN/1.73
GLUCOSE BLDC GLUCOMTR-MCNC: 131 MG/DL (ref 70–130)
GLUCOSE BLDC GLUCOMTR-MCNC: 143 MG/DL (ref 70–130)
GLUCOSE SERPL-MCNC: 122 MG/DL (ref 65–99)
POTASSIUM SERPL-SCNC: 3.6 MMOL/L (ref 3.5–5.2)
SODIUM SERPL-SCNC: 132 MMOL/L (ref 136–145)

## 2021-12-09 PROCEDURE — 63710000001 INSULIN LISPRO (HUMAN) PER 5 UNITS: Performed by: HOSPITALIST

## 2021-12-09 PROCEDURE — 82962 GLUCOSE BLOOD TEST: CPT

## 2021-12-09 PROCEDURE — 94799 UNLISTED PULMONARY SVC/PX: CPT

## 2021-12-09 PROCEDURE — 80048 BASIC METABOLIC PNL TOTAL CA: CPT | Performed by: INTERNAL MEDICINE

## 2021-12-09 RX ORDER — PRASUGREL 10 MG/1
10 TABLET, FILM COATED ORAL DAILY
Qty: 30 TABLET | Refills: 0 | Status: SHIPPED | OUTPATIENT
Start: 2021-12-10 | End: 2022-01-09

## 2021-12-09 RX ORDER — DAPAGLIFLOZIN 10 MG/1
10 TABLET, FILM COATED ORAL DAILY
Qty: 30 TABLET | Refills: 0 | Status: SHIPPED | OUTPATIENT
Start: 2021-12-09 | End: 2022-10-20 | Stop reason: SDUPTHER

## 2021-12-09 RX ORDER — INSULIN GLARGINE 100 [IU]/ML
20 INJECTION, SOLUTION SUBCUTANEOUS NIGHTLY
Qty: 15 ML | Refills: 0 | Status: SHIPPED | OUTPATIENT
Start: 2021-12-09 | End: 2022-01-08

## 2021-12-09 RX ORDER — METOPROLOL SUCCINATE 100 MG/1
100 TABLET, EXTENDED RELEASE ORAL
Qty: 30 TABLET | Refills: 0 | Status: SHIPPED | OUTPATIENT
Start: 2021-12-10 | End: 2022-10-20

## 2021-12-09 RX ORDER — ATORVASTATIN CALCIUM 80 MG/1
80 TABLET, FILM COATED ORAL DAILY
Qty: 30 TABLET | Refills: 0 | Status: SHIPPED | OUTPATIENT
Start: 2021-12-09 | End: 2022-01-08

## 2021-12-09 RX ORDER — BUDESONIDE AND FORMOTEROL FUMARATE DIHYDRATE 160; 4.5 UG/1; UG/1
2 AEROSOL RESPIRATORY (INHALATION)
Qty: 1 EACH | Refills: 0 | Status: SHIPPED | OUTPATIENT
Start: 2021-12-09 | End: 2022-01-08

## 2021-12-09 RX ORDER — ISOSORBIDE MONONITRATE 30 MG/1
60 TABLET, EXTENDED RELEASE ORAL
Qty: 60 TABLET | Refills: 0 | Status: SHIPPED | OUTPATIENT
Start: 2021-12-10 | End: 2022-10-20

## 2021-12-09 RX ORDER — ASPIRIN 81 MG/1
81 TABLET ORAL DAILY
Qty: 30 TABLET | Refills: 0 | Status: SHIPPED | OUTPATIENT
Start: 2021-12-09 | End: 2022-01-08

## 2021-12-09 RX ORDER — HYDRALAZINE HYDROCHLORIDE 50 MG/1
50 TABLET, FILM COATED ORAL EVERY 8 HOURS SCHEDULED
Qty: 90 TABLET | Refills: 0 | Status: SHIPPED | OUTPATIENT
Start: 2021-12-09 | End: 2022-10-20

## 2021-12-09 RX ORDER — BLOOD-GLUCOSE METER
1 EACH MISCELLANEOUS
Qty: 100 EACH | Refills: 0 | Status: SHIPPED | OUTPATIENT
Start: 2021-12-09 | End: 2022-01-08

## 2021-12-09 RX ORDER — BUMETANIDE 1 MG/1
1 TABLET ORAL 2 TIMES DAILY
Qty: 60 TABLET | Refills: 0 | Status: SHIPPED | OUTPATIENT
Start: 2021-12-09 | End: 2022-10-20

## 2021-12-09 RX ADMIN — HYDRALAZINE HYDROCHLORIDE 50 MG: 50 TABLET, FILM COATED ORAL at 06:06

## 2021-12-09 RX ADMIN — ISOSORBIDE MONONITRATE 60 MG: 60 TABLET ORAL at 08:09

## 2021-12-09 RX ADMIN — INSULIN LISPRO 5 UNITS: 100 INJECTION, SOLUTION INTRAVENOUS; SUBCUTANEOUS at 08:10

## 2021-12-09 RX ADMIN — POTASSIUM CHLORIDE 20 MEQ: 750 TABLET, EXTENDED RELEASE ORAL at 08:09

## 2021-12-09 RX ADMIN — BUDESONIDE AND FORMOTEROL FUMARATE DIHYDRATE 2 PUFF: 160; 4.5 AEROSOL RESPIRATORY (INHALATION) at 08:45

## 2021-12-09 RX ADMIN — METOPROLOL SUCCINATE 100 MG: 100 TABLET, EXTENDED RELEASE ORAL at 08:09

## 2021-12-09 RX ADMIN — HYDRALAZINE HYDROCHLORIDE 50 MG: 50 TABLET, FILM COATED ORAL at 14:59

## 2021-12-09 RX ADMIN — ASPIRIN 81 MG: 81 TABLET, COATED ORAL at 08:09

## 2021-12-09 RX ADMIN — SODIUM CHLORIDE, PRESERVATIVE FREE 10 ML: 5 INJECTION INTRAVENOUS at 08:10

## 2021-12-09 RX ADMIN — PRASUGREL 10 MG: 10 TABLET, FILM COATED ORAL at 08:09

## 2021-12-09 RX ADMIN — INSULIN LISPRO 5 UNITS: 100 INJECTION, SOLUTION INTRAVENOUS; SUBCUTANEOUS at 12:13

## 2021-12-09 RX ADMIN — BUMETANIDE 1 MG: 1 TABLET ORAL at 08:09

## 2021-12-09 NOTE — PLAN OF CARE
Goal Outcome Evaluation:  Patient being discharged home. Meds to beds delivered. Patient denies pain. Patient to be discharged today.

## 2021-12-09 NOTE — CASE MANAGEMENT/SOCIAL WORK
Continued Stay Note  [facility]     Patient Name: Rufus Dorsey  MRN: [id number]  Today's Date: 12/9/2021    Admit Date: 12/3/2021     Discharge Plan     Row Name 12/09/21 1429       Plan    Plan Comments DC orders in EPIC.  Patient got his dc medications from [facility] and was able to afford them.  [facility] did give patient [employer], [employer] and one other drug discount card.  Financial assistance application also printed out and given to patient. Patient denies any additional needs and plans to return home. Eugenia Moreno RN               Discharge Codes    No documentation.               Expected Discharge Date and Time     Expected Discharge Date Expected Discharge Time    Dec 9, 2021             Eugenia Moreno RN

## 2021-12-09 NOTE — CASE MANAGEMENT/SOCIAL WORK
Case Management Discharge Note      Final Note: DC'd home. Eugenia Moreno RN    Provided Post Acute Provider List?: N/A  Provided Post Acute Provider Quality & Resource List?: N/A    Selected Continued Care - Admitted Since 12/3/2021     Destination    No services have been selected for the patient.              Durable Medical Equipment    No services have been selected for the patient.              Dialysis/Infusion    No services have been selected for the patient.              Home Medical Care    No services have been selected for the patient.              Therapy    No services have been selected for the patient.              Community Resources    No services have been selected for the patient.              Community & DME    No services have been selected for the patient.                  Transportation Services  Private: Car    Final Discharge Disposition Code: 01 - home or self-care

## 2021-12-09 NOTE — CONSULTS
Met with patient, discussed benefits of cardiac rehab. Provided phase II information along with the contact information for cardiac rehab here at [facility]. Will call after discharge to schedule, patient unsure if he wants to attend,

## 2021-12-09 NOTE — PROGRESS NOTES
"   LOS: 6 days    Patient Care Team:  Provider, No Known as PCP - General    Chief Complaint:    Chief Complaint   Patient presents with   • Chest Pain     Follow UP MEI CKD3  Subjective     Interval History:   DC held due to concerns about ability to obtain/fill meds  Wt stable 265 (vs 272 on 12/4)  Hypoxic to 88% overnight  No dyspnea currently     Objective     Vital Signs  Temp:  [97.9 °F (36.6 °C)-99.1 °F (37.3 °C)] 99.1 °F (37.3 °C)  Heart Rate:  [82-95] 83  Resp:  [18-20] 20  BP: (110-165)/(62-95) 110/67    Flowsheet Rows      First Filed Value   Admission Height 167.6 cm (66\") Documented at 12/04/2021 1445   Admission Weight 127 kg (280 lb 6.4 oz) Documented at 12/03/2021 1852          No intake/output data recorded.  I/O last 3 completed shifts:  In: 840 [P.O.:840]  Out: 1200 [Urine:1200]    Intake/Output Summary (Last 24 hours) at 12/9/2021 0800  Last data filed at 12/9/2021 0333  Gross per 24 hour   Intake 480 ml   Output 600 ml   Net -120 ml       Physical Exam:  General Appearance: alert, comfortable  Neck supple no JVD  Lungs CTA bilat no rales  CV RRR no m/g  abd soft NT/ND  vasc no pedal/ankle edema     Results Review:    Results from last 7 days   Lab Units 12/08/21  0516 12/07/21  0545 12/06/21  0554 12/04/21  0418 12/03/21  1900   SODIUM mmol/L 137 133* 136   < > 136   POTASSIUM mmol/L 3.3* 3.6 3.6   < > 3.8   CHLORIDE mmol/L 99 96* 101   < > 100   CO2 mmol/L 24.8 29.0 28.6   < > 23.9   BUN mg/dL 37* 28* 31*   < > 27*   CREATININE mg/dL 1.82* 1.66* 1.95*   < > 1.57*   CALCIUM mg/dL 8.1* 7.9* 8.0*   < > 8.1*   BILIRUBIN mg/dL 0.4  --   --   --  0.6   ALK PHOS U/L 95  --   --   --  82   ALT (SGPT) U/L 19  --   --   --  16   AST (SGOT) U/L 37  --   --   --  14   GLUCOSE mg/dL 163* 138* 166*   < > 200*    < > = values in this interval not displayed.       Estimated Creatinine Clearance: 68.5 mL/min (A) (by C-G formula based on SCr of 1.82 mg/dL (H)).    Results from last 7 days   Lab Units " 12/08/21  0516 12/06/21  0554 12/04/21  0418   MAGNESIUM mg/dL 2.2  --  2.2   PHOSPHORUS mg/dL  --  4.3  --        Results from last 7 days   Lab Units 12/06/21  0554   URIC ACID mg/dL 6.5       Results from last 7 days   Lab Units 12/07/21  0545 12/06/21  0554 12/05/21  0540 12/04/21  0418 12/03/21  1900   WBC 10*3/mm3 11.20* 10.11 11.47* 10.47 12.75*   HEMOGLOBIN g/dL 13.2 13.0 12.8* 13.5 14.7   PLATELETS 10*3/mm3 244 230 256 203 233       Results from last 7 days   Lab Units 12/04/21 0418   INR  1.10         Imaging Results (Last 24 Hours)     ** No results found for the last 24 hours. **        aspirin, 81 mg, Oral, Daily  atorvastatin, 80 mg, Oral, Nightly  budesonide-formoterol, 2 puff, Inhalation, BID - RT  bumetanide, 1 mg, Oral, BID  hydrALAZINE, 50 mg, Oral, Q8H  insulin glargine, 20 Units, Subcutaneous, Nightly  insulin lispro, 0-7 Units, Subcutaneous, TID AC  insulin lispro, 5 Units, Subcutaneous, TID With Meals  isosorbide mononitrate, 60 mg, Oral, Q24H  metoprolol succinate XL, 100 mg, Oral, Q24H  potassium chloride, 20 mEq, Oral, Daily  prasugrel, 10 mg, Oral, Daily  sodium chloride, 10 mL, Intravenous, Q12H      nitroprusside, 0.3-10 mcg/kg/min, Last Rate: Stopped (12/05/21 1357)        Medication Review:   Current Facility-Administered Medications   Medication Dose Route Frequency Provider Last Rate Last Admin   • acetaminophen (TYLENOL) tablet 650 mg  650 mg Oral Q4H PRN Les Reyes MD       • aspirin EC tablet 81 mg  81 mg Oral Daily Les Reyes MD   81 mg at 12/08/21 0856   • atorvastatin (LIPITOR) tablet 80 mg  80 mg Oral Nightly Les Reyes MD   80 mg at 12/08/21 0461   • budesonide-formoterol (SYMBICORT) 160-4.5 MCG/ACT inhaler 2 puff  2 puff Inhalation BID - RT Les Reyes MD   2 puff at 12/08/21 2008   • bumetanide (BUMEX) tablet 1 mg  1 mg Oral BID Red Ackerman MD   1 mg at 12/08/21 1730   • dextrose (D50W) (25 g/50 mL) IV injection 25 g  25 g Intravenous  Q15 Min PRN Les Reyes MD       • dextrose (GLUTOSE) oral gel 15 g  15 g Oral Q15 Min PRN Les Reyes MD       • glucagon (human recombinant) (GLUCAGEN DIAGNOSTIC) injection 1 mg  1 mg Subcutaneous Q15 Min PRN Les Reyes MD       • hydrALAZINE (APRESOLINE) tablet 50 mg  50 mg Oral Q8H Les Reyes MD   50 mg at 12/09/21 0606   • HYDROcod Polst-CPM Polst ER (TUSSIONEX PENNKINETIC) 10-8 MG/5ML ER suspension 5 mL  5 mL Oral Q12H PRN Cedrick Kramer MD   5 mL at 12/08/21 2256   • HYDROcodone-acetaminophen (NORCO) 5-325 MG per tablet 1 tablet  1 tablet Oral Q4H PRN Les Reyes MD   1 tablet at 12/08/21 2256   • insulin glargine (LANTUS, SEMGLEE) injection 20 Units  20 Units Subcutaneous Nightly Les Reyes MD   20 Units at 12/08/21 2103   • insulin lispro (ADMELOG) injection 0-7 Units  0-7 Units Subcutaneous TID AC Les Reyes MD   2 Units at 12/08/21 1225   • insulin lispro (ADMELOG) injection 5 Units  5 Units Subcutaneous TID With Meals Cedrick Kramer MD   5 Units at 12/08/21 1839   • isosorbide mononitrate (IMDUR) 24 hr tablet 60 mg  60 mg Oral Q24H Sagrario Otoole MD   60 mg at 12/08/21 0856   • metoprolol succinate XL (TOPROL-XL) 24 hr tablet 100 mg  100 mg Oral Q24H Sagrario Otoole MD   100 mg at 12/08/21 0856   • nitroglycerin (NITROSTAT) SL tablet 0.4 mg  0.4 mg Sublingual Q5 Min PRN Les Reyes MD       • nitroprusside (NIPRIDE) 50 mg in dextrose (D5W) 5 % 250 mL (0.2 mg/mL) infusion  0.3-10 mcg/kg/min Intravenous Titrated Les Reyes MD   Stopped at 12/05/21 1357   • ondansetron (ZOFRAN) injection 4 mg  4 mg Intravenous Q6H PRN Les Reyes MD   4 mg at 12/04/21 2232   • ondansetron (ZOFRAN) tablet 4 mg  4 mg Oral Q6H PRN Les Reyes MD        Or   • ondansetron (ZOFRAN) injection 4 mg  4 mg Intravenous Q6H PRN Les Ryees MD       • potassium chloride (K-DUR,KLOR-CON) ER tablet 20 mEq  20 mEq Oral Daily Red Ackerman MD   20 mEq  at 12/08/21 0856   • prasugrel (EFFIENT) tablet 10 mg  10 mg Oral Daily Les Reyes MD   10 mg at 12/08/21 0856   • sodium chloride 0.9 % flush 10 mL  10 mL Intravenous PRN Les Reyes MD       • sodium chloride 0.9 % flush 10 mL  10 mL Intravenous Q12H Les Reyes MD   10 mL at 12/08/21 0857   • sodium chloride 0.9 % flush 10 mL  10 mL Intravenous PRN Les Reyes MD           Assessment/Plan   1. Non olig MEI - RADHA had resolved, Cr down to 1.6 on 12/7, up to 1.8 yesterday likely prerenal due to diuresis, reduced bumex dose, labs pending today.  Hypokalemic.  2. CKD stage 3 - BL Cr 1.3 - 1.5 range with multiple risk factors: DM, HTN, atherosclerotic CVD, CHF   3. Vol overload - improved with diuresis, UOP NR but weight down 7 lbs.  Hypoxic overnight while slept (? VERONICA), no dyspnea currently; reduced bumex 1mg PO BID yesterday, no periph edema   4. CAD/CABG, NSTEMI, s/p LHC/PCI 12/4/21, on effient, asa 81   5. ICM, EF 37% on echo Aug 2021 ; no LV gram on cath here   6. HTN - BP better, toprol XL titrated to 100mg, on hydralazine and imdur  7. DM2, A1c 7.4, mgmt per [facility]; hold jardiance for now due to MEI     Plan  - continue bumex 1mg BID and KCL 20meq daily pending lab review, d/w RN, to call with results  - CCP for help with medication access -- if this is resolved no objection to discharge today and will arrange nephrology follow up 2 weeks      Acute on chronic congestive heart failure (HCC)    Type 2 diabetes mellitus, with long-term current use of insulin ([facility])    Obesity, Class III, BMI 40-49.9 (morbid obesity) ([facility])    Pulmonary HTN (HCC)    Coronary artery disease    Hypertension    CKD (chronic kidney disease) stage 2, GFR 60-89 ml/min    Other chest pain    Unstable angina (HCC)    Acute systolic CHF (congestive heart failure) ([facility])              Red Ackerman MD  12/09/21  08:00 EST

## 2021-12-09 NOTE — DISCHARGE SUMMARY
Patient Name: Rufus Dorsey  : 1985  MRN: [id number]    Date of Admission: 12/3/2021  Date of Discharge:  2021  Primary Care Physician: Provider, No Known      Chief Complaint:   Chest Pain      Discharge Diagnoses     Active Hospital Problems    Diagnosis  POA   • **Acute on chronic congestive heart failure (HCC) [I50.9]  Yes   • Other chest pain [R07.89]  Unknown   • Unstable angina (HCC) [I20.0]  Unknown   • Acute systolic CHF (congestive heart failure) (HCC) [I50.21]  Unknown   • CKD (chronic kidney disease) stage 2, GFR 60-89 ml/min [N18.2]  Yes   • Pulmonary HTN (HCC) [I27.20]  Yes   • Obesity, Class III, BMI 40-49.9 (morbid obesity) (Formerly [facility]) [E66.01]  Yes   • Coronary artery disease [I25.10]  Yes   • Hypertension [I10]  Yes   • Type 2 diabetes mellitus, with long-term current use of insulin (Formerly [facility]) [E11.9, Z79.4]  Not Applicable      Resolved Hospital Problems   No resolved problems to display.        Hospital Course     Mr. Dorsey is a 36 y.o. male with a history of congestive heart failure, CABG, cardiomyopathy respiratory failure with hypoxia that presents to [facility] complaining of chest pain and shortness of breath for the last 5 days with progressive worsening.  Chest pain described as compression and squeezing in the middle of the chest.  There is no radiation.  He denies diaphoresis.  Shortness of breath has been constant and worsened with exertion and lying down.  No fever or chills.  Nothing makes the chest pain or shortness of breath any worse or better.  However his chest pain is currently a little bit better while he is sitting here.  He reports orthopnea.  He has a productive cough with pink and white sputum production.  He has noticed swelling of his legs and feet which is also progressively worse.  He has gained about 3 or 4 pounds in the past couple of weeks.  He watches his diet and does not eat any fast food or canned goods or added salt.  When questioned  about his medication the patient states he believes he made a mistake when he picked up his prescriptions last time after being discharged from the hospital.  He has been on his regular diabetes medications but has not been on any other medications at all.  He states he believes he got confused when he went to the pharmacy and did not  all of his medicines.    1. Non-ST elevation MI, patient underwent cardiac catheter and stent placement earlier during this hospital stay and he also has a history of bypass surgery.  On aspirin, Effient, metoprolol, isosorbide, statins.  Patient is unfortunately uninsured and [facility] has been notified and they will coordinate with the pharmacy to arrange his home medications.  He will follow up with heart failure clinic as well as cardiology as an outpatient basis.  2.  Acute on chronic systolic dysfunction, currently compensated and is on Bumex, Entresto, metoprolol.  3.  CKD stage II, creatinine is at baseline.  Nephrology also evaluated him.  Did have some contrast-induced nephropathy earlier during this hospital stay.  Nephrology did clear him to be discharged home.  4.  Morbid obesity, patient was counseled to lose weight.  5.  Diabetes mellitus, on Lantus and Farxiga which will be continued upon discharge.  6.  Hypertension, on hydralazine, isosorbide and metoprolol and will be continued.  7.  Hyperlipidemia, on statins.    Day of Discharge       Physical Exam:  Temp:  [97.9 °F (36.6 °C)-99.1 °F (37.3 °C)] 99.1 °F (37.3 °C)  Heart Rate:  [82-98] 94  Resp:  [18-20] 18  BP: (110-136)/(62-93) 110/67  Body mass index is 42.8 kg/m².  Physical Exam  Constitutional:       General: He is not in acute distress.     Appearance: Normal appearance. He is not toxic-appearing.   HENT:      Head: Normocephalic and atraumatic.   Cardiovascular:      Rate and Rhythm: Normal rate and regular rhythm.   Pulmonary:      Effort: Pulmonary effort is normal. No respiratory distress.      Breath  sounds: Normal breath sounds. No wheezing or rhonchi.   Abdominal:      General: Bowel sounds are normal. There is no distension.      Palpations: Abdomen is soft.      Tenderness: There is no abdominal tenderness. There is no guarding or rebound.   Musculoskeletal:         General: No swelling.      Right lower leg: No edema.      Left lower leg: No edema.   Skin:     General: Skin is warm and dry.   Neurological:      General: No focal deficit present.      Mental Status: He is alert.   Psychiatric:         Mood and Affect: Mood normal.         Behavior: Behavior normal.          Consultants     Consult Orders (all) (From admission, onward)     Start     Ordered    12/05/21 1210  Inpatient Nephrology Consult  Once        Specialty:  Nephrology  Provider:  Red Ackerman MD    12/05/21 1209    12/05/21 0725  Inpatient Nephrology Consult  Once        Specialty:  Nephrology  Provider:  Red Ackerman MD    12/05/21 0726    12/04/21 0135  Inpatient Cardiology Consult  Once        Specialty:  Cardiology  Provider:  Justine Menon MD    12/04/21 0135    12/04/21 0059  Inpatient Case Management  Consult  Once        Provider:  (Not yet assigned)    12/04/21 0101    12/03/21 2102  [facility] (on-call MD unless specified) Details  Once        Specialty:  Hospitalist  Provider:  (Not yet assigned)    12/03/21 2101              Procedures     SAPHENOUS VEIN GRAFT, Coronary angiography, Stent ROSEY coronary, Native mammary injection      Imaging Results (All)     Procedure Component Value Units Date/Time    XR Chest 2 View [[id number]] Collected: 12/03/21 1503     Updated: 12/03/21 1510    Narrative:      XR CHEST 2 VW-     HISTORY: Male who is 36 years-old,  chest pain     TECHNIQUE: Frontal and lateral views of the chest     COMPARISON: 08/02/2021     FINDINGS: The heart is enlarged. Sternotomy wires are noted. Extensive  bilateral infiltrates are seen, suggesting pneumonia and/or edema,  although  the appearance is similar to prior exam, there could be a  chronic component. No pleural effusion, or pneumothorax. No acute  osseous process.       Impression:      Extensive bilateral infiltrates are seen, suggesting  pneumonia and/or edema, although the appearance is similar to prior  exam, there could be a chronic component. Follow-up is recommended.  Cardiomegaly.     This report was finalized on 12/3/2021 3:07 PM by Dr. Suleiman Anderson M.D.           Results for orders placed during the hospital encounter of 12/21/20    Duplex Venous Lower Extremity - Bilateral CAR    Interpretation Summary  · Normal bilateral lower extremity venous duplex scan.    Results for orders placed during the hospital encounter of 08/02/21    Adult Transthoracic Echo Complete W/ Cont if Necessary Per Protocol    Interpretation Summary  · Calculated left ventricular EF = 36.9% Estimated left ventricular EF = 37% Estimated left ventricular EF was in agreement with the calculated left ventricular EF. Left ventricular systolic function is moderately decreased. Normal left ventricular wall thickness noted. The left ventricular cavity is severely dilated. There is left ventricular global hypokinesis noted. Left ventricular diastolic dysfunction is noted. There is at least Grade 3 diastolic dysfunction No evidence of a left ventricular thrombus present.  · The right ventricular cavity is mildly dilated. Moderately reduced right ventricular systolic function noted.  · Left atrial volume is moderately increased. The right atrial cavity is mildly dilated  · Trace aortic valve regurgitation is present.    Pertinent Labs     Results from last 7 days   Lab Units 12/07/21  0545 12/06/21  0554 12/05/21  0540 12/04/21  0418   WBC 10*3/mm3 11.20* 10.11 11.47* 10.47   HEMOGLOBIN g/dL 13.2 13.0 12.8* 13.5   PLATELETS 10*3/mm3 244 230 256 203     Results from last 7 days   Lab Units 12/09/21  1013 12/08/21  0516 12/07/21  0545 12/06/21  0554   SODIUM  mmol/L 132* 137 133* 136   POTASSIUM mmol/L 3.6 3.3* 3.6 3.6   CHLORIDE mmol/L 99 99 96* 101   CO2 mmol/L 25.6 24.8 29.0 28.6   BUN mg/dL 33* 37* 28* 31*   CREATININE mg/dL 1.58* 1.82* 1.66* 1.95*   GLUCOSE mg/dL 122* 163* 138* 166*   EGFR IF NONAFRICN AM mL/min/1.73 50* 42* 47* 39*     Results from last 7 days   Lab Units 12/08/21  0516 12/06/21  0554 12/03/21  1900   ALBUMIN g/dL 2.60* 2.80* 2.90*   BILIRUBIN mg/dL 0.4  --  0.6   ALK PHOS U/L 95  --  82   AST (SGOT) U/L 37  --  14   ALT (SGPT) U/L 19  --  16     Results from last 7 days   Lab Units 12/09/21  1013 12/08/21  0516 12/07/21  0545 12/06/21  0554 12/05/21  0540 12/04/21  0418 12/03/21  1900 12/03/21  1900   CALCIUM mg/dL 7.9* 8.1* 7.9* 8.0*   < > 8.1*   < > 8.1*   ALBUMIN g/dL  --  2.60*  --  2.80*  --   --   --  2.90*   MAGNESIUM mg/dL  --  2.2  --   --   --  2.2  --   --    PHOSPHORUS mg/dL  --   --   --  4.3  --   --   --   --     < > = values in this interval not displayed.       Results from last 7 days   Lab Units 12/04/21  0418 12/03/21  2132 12/03/21  1900   TROPONIN T ng/mL 0.177* 0.183* 0.149*   PROBNP pg/mL  --   --  4,144.0*     Results from last 7 days   Lab Units 12/06/21  0554 12/05/21  1713   CREATININE UR mg/dL  --  66.5   PROTEIN TOTAL URINE mg/dL  --  161.0   URIC ACID mg/dL 6.5  --    PROT/CREAT RATIO UR mg/G Crea  --  2,421.1*     Results from last 7 days   Lab Units 12/05/21  0540   CHOLESTEROL mg/dL 260*   TRIGLYCERIDES mg/dL 126   HDL CHOL mg/dL 36*   LDL CHOL mg/dL 201*         Results from last 7 days   Lab Units 12/04/21  1608   COVID19  Not Detected       Test Results Pending at Discharge       Discharge Details        Discharge Medications      New Medications      Instructions Start Date   bumetanide 1 MG tablet  Commonly known as: BUMEX   1 mg, Oral, 2 Times Daily      hydrALAZINE 50 MG tablet  Commonly known as: APRESOLINE   50 mg, Oral, Every 8 Hours Scheduled      insulin glargine 100 UNIT/ML injection  Commonly known  as: LANTUS, SEMGLEE  Replaces: BASAGLAR KWIKPEN 100 UNIT/ML injection pen   20 Units, Subcutaneous, Nightly      isosorbide mononitrate 30 MG 24 hr tablet  Commonly known as: IMDUR   60 mg, Oral, Every 24 Hours Scheduled   Start Date: December 10, 2021     prasugrel 10 MG tablet  Commonly known as: EFFIENT   10 mg, Oral, Daily   Start Date: December 10, 2021        Changes to Medications      Instructions Start Date   metoprolol succinate  MG 24 hr tablet  Commonly known as: TOPROL-XL  What changed:   · medication strength  · how much to take  · when to take this   100 mg, Oral, Every 24 Hours Scheduled   Start Date: December 10, 2021        Continue These Medications      Instructions Start Date   aspirin 81 MG EC tablet   81 mg, Oral, Daily      atorvastatin 80 MG tablet  Commonly known as: LIPITOR   80 mg, Oral, Daily      budesonide-formoterol 160-4.5 MCG/ACT inhaler  Commonly known as: SYMBICORT   2 puffs, Inhalation, 2 Times Daily - RT      Farxiga 10 MG tablet  Generic drug: Dapagliflozin Propanediol   Take 1 tablet by mouth Daily.      FreeStyle Freedom Lite w/Device kit   Use to check blood sugar as directed.      glucose blood test strip   1 each, Other, 4 Times Daily After Meals & at Bedtime      glucose monitor monitoring kit   1 each, Does not apply, 4 Times Daily Before Meals & Nightly      Lancets 28G misc   1 each, Other, 4 Times Daily After Meals & at Bedtime      sacubitril-valsartan 24-26 MG tablet  Commonly known as: ENTRESTO   1 tablet, Oral, 2 Times Daily      Unifine Pentips 32G X 4 MM misc  Generic drug: Insulin Pen Needle   Use with insulin 5 times daily         Stop These Medications    BASAGLAR KWIKPEN 100 UNIT/ML injection pen  Replaced by: insulin glargine 100 UNIT/ML injection     furosemide 20 MG tablet  Commonly known as: LASIX     spironolactone 25 MG tablet  Commonly known as: ALDACTONE            No Known Allergies    Discharge Disposition:  Home or Self Care      Discharge  Diet:  Diet Order   Procedures   • Diet Regular; Consistent Carbohydrate, Cardiac       Discharge Activity:   Activity Instructions     Activity as Tolerated            CODE STATUS:    Code Status and Medical Interventions:   Ordered at: 12/04/21 2536     Level Of Support Discussed With:    Patient     Code Status (Patient has no pulse and is not breathing):    CPR (Attempt to Resuscitate)     Medical Interventions (Patient has pulse or is breathing):    Full Support       Future Appointments   Date Time Provider Department Center   12/21/2021 10:30 AM Lupis Petersen, APRN [facility]     Additional Instructions for the Follow-ups that You Need to Schedule     Ambulatory Referral to Cardiac Rehab   As directed      Discharge Follow-up with PCP   As directed       Currently Documented PCP:    Provider, No Known    PCP Phone Number:    [phone]     Follow Up Details: 2 weeks         Discharge Follow-up with Specified Provider: ; 3 Weeks   As directed      To:     Follow Up: 3 Weeks         Discharge Follow-up with Specified Provider: ; 1 Month   As directed      To:     Follow Up: 1 Month            Follow-up Information     [facility] .    Specialty: Cardiac Rehabilitation  Contact information:  [address] [facility] [id number] [phone]           Provider, No Known .    Why: 2 weeks  Contact information:  [facility] [address] [phone]                         Additional Instructions for the Follow-ups that You Need to Schedule     Ambulatory Referral to Cardiac Rehab   As directed      Discharge Follow-up with PCP   As directed       Currently Documented PCP:    Provider, No Known    PCP Phone Number:    [phone]     Follow Up Details: 2 weeks         Discharge Follow-up with Specified Provider: ; 3 Weeks   As directed      To:     Follow Up: 3 Weeks         Discharge Follow-up with  Specified Provider: ; 1 Month   As directed      To:     Follow Up: 1 Month           Time Spent on Discharge:  Greater than 30 minutes      Patrick Barrett MD  [facility]  12/09/21  11:40 EST

## 2021-12-09 NOTE — PLAN OF CARE
Goal Outcome Evaluation:            Pt. Arrived to this unit around 2200 from ER, alert, oriented x4, ambulatory, cont. B&B, Pt. Does not c/o pain to chest area, noted coughing a lot with productions, cough syrup given at 2256, Pt. Said that he got pain to upper chest area because of coughing a lot, pain medicine given at 2256 as ordered, pain medicine and cough syrup effected, noted decreased coughing,Pt. Lying in bed after midnight, LCTA no congested, no wheezing noted, breath sounds diminished to lower lungs area, 02 sats 95 to 96% on room air, no s/s acute distress noted until this time

## 2021-12-10 ENCOUNTER — READMISSION MANAGEMENT (OUTPATIENT)
Dept: CALL CENTER | Facility: HOSPITAL | Age: 36
End: 2021-12-10

## 2021-12-10 NOTE — OUTREACH NOTE
Prep Survey      Responses   [facility] patient discharged from? [place]   Is LACE score < 7 ? No   Emergency Room discharge w/ pulse ox? No   Eligibility Readm Mgmt   Discharge diagnosis Acute on chronic congestive heart failure, s/p heart cath   Does the patient have one of the following disease processes/diagnoses(primary or secondary)? CHF   Does the patient have Home health ordered? No   Is there a DME ordered? No   Prep survey completed? Yes          Kayli Petty RN

## 2021-12-14 ENCOUNTER — READMISSION MANAGEMENT (OUTPATIENT)
Dept: CALL CENTER | Facility: HOSPITAL | Age: 36
End: 2021-12-14

## 2021-12-14 NOTE — OUTREACH NOTE
CHF Week 1 Survey      Responses   [facility] patient discharged from? [place]   Does the patient have one of the following disease processes/diagnoses(primary or secondary)? CHF   CHF Week 1 attempt successful? No   Unsuccessful attempts Attempt 1          Gayle Lombardi RN

## 2021-12-28 ENCOUNTER — READMISSION MANAGEMENT (OUTPATIENT)
Dept: CALL CENTER | Facility: HOSPITAL | Age: 36
End: 2021-12-28

## 2021-12-28 NOTE — OUTREACH NOTE
CHF Week 3 Survey      Responses   [facility] patient discharged from? [place]   Does the patient have one of the following disease processes/diagnoses(primary or secondary)? CHF   Week 3 attempt successful? Yes   Call start time 1511   Call end time 1525   Meds reviewed with patient/caregiver? Yes   Is the patient taking all medications as directed (includes completed medication regime)? Yes   Has the patient kept scheduled appointments due by today? N/A   Comments has an appointment  this week but car did not start, suggested to  call for another appointment   Pulse Ox monitoring Intermittent   What is the patient's perception of their health status since discharge? Improving  [has  hacking cough]   Is the patient weighing daily? No   Does the patient have scales? No   Is the patient able to teach back Heart Failure Zones? Yes  [reviewed the zones  with the patient thinks he is in green zone, has a hacking cough]   Is the patient able to teach back signs and symptoms of worsening condition? (i.e. weight gain, shortness of air, etc.) Yes   CHF Week 3 call completed? Yes   Wrap up additional comments has a hacking cough,  wanting insurance. feels improving. bs was 102. will be emailing case management          Isabela Cesar RN

## 2022-01-05 ENCOUNTER — READMISSION MANAGEMENT (OUTPATIENT)
Dept: CALL CENTER | Facility: HOSPITAL | Age: 37
End: 2022-01-05

## 2022-01-05 NOTE — OUTREACH NOTE
CHF Week 4 Survey      Responses   [facility] patient discharged from? [place]   Does the patient have one of the following disease processes/diagnoses(primary or secondary)? CHF   Week 4 attempt successful? Yes   Call start time 1453   Call end time 1512   Discharge diagnosis Acute on chronic congestive heart failure, s/p heart cath   If primary language is not English what is the name and relationship or agency of  used? [employer] #[id number]   Meds reviewed with patient/caregiver? Yes   Is the patient having any side effects they believe may be caused by any medication additions or changes? No   Is the patient taking all medications as directed (includes completed medication regime)? Yes   Medication comments Wanting to know how to get refills.  Advised that he needs to follow up with PCP and get refills.   Has the patient kept scheduled appointments due by today? N/A   What is preventing the patient from keeping their appointments? Doesn't understand importance   Comments Advised pt to make appt with PCP/cardiologist.     Pulse Ox monitoring None   Psychosocial issues? No   What is the patient's perception of their health status since discharge? Improving   Is the patient weighing daily? Yes   Does the patient have scales? Yes   Daily weight interventions Education provided on importance of daily weight   If the patient is a current smoker, are they able to teach back resources for cessation? Not a smoker   Is the patient/caregiver able to teach back the hierarchy of who to call/visit for symptoms/problems? PCP, Specialist, Home health nurse, Urgent Care, ED, 911 Yes   Additional teach back comments Pt states he has not follow up with drs.  Advised to make follow up appts and the importance.  States he is feeling good.  Weighs daily with no weight gain.  Blood sugars were 112 today.     Week 4 Call Completed? Yes   Would the patient like one additional call? No   Graduated Yes   Wrap up  additional comments Pt was advised to make follow up appts so he can get refills of prescribed medications from discharge.  Voice he will do this          Marissa Dyer LPN

## 2022-01-14 RX ORDER — METOPROLOL SUCCINATE 100 MG/1
100 TABLET, EXTENDED RELEASE ORAL
Qty: 30 TABLET | Refills: 0 | Status: CANCELLED | OUTPATIENT
Start: 2022-01-14 | End: 2022-02-13

## 2022-01-14 RX ORDER — ATORVASTATIN CALCIUM 80 MG/1
80 TABLET, FILM COATED ORAL DAILY
Qty: 30 TABLET | Refills: 0 | Status: CANCELLED | OUTPATIENT
Start: 2022-01-14 | End: 2022-02-13

## 2022-01-14 RX ORDER — HYDRALAZINE HYDROCHLORIDE 50 MG/1
50 TABLET, FILM COATED ORAL EVERY 8 HOURS SCHEDULED
Qty: 90 TABLET | Refills: 0 | Status: CANCELLED | OUTPATIENT
Start: 2022-01-14 | End: 2022-02-13

## 2022-01-14 RX ORDER — ISOSORBIDE MONONITRATE 30 MG/1
60 TABLET, EXTENDED RELEASE ORAL
Qty: 60 TABLET | Refills: 0 | Status: CANCELLED | OUTPATIENT
Start: 2022-01-14 | End: 2022-02-13

## 2022-01-14 RX ORDER — ASPIRIN 81 MG/1
81 TABLET ORAL DAILY
Qty: 30 TABLET | Refills: 0 | Status: CANCELLED | OUTPATIENT
Start: 2022-01-14 | End: 2022-02-13

## 2022-01-14 RX ORDER — PRASUGREL 10 MG/1
10 TABLET, FILM COATED ORAL DAILY
Qty: 30 TABLET | Refills: 0 | Status: CANCELLED | OUTPATIENT
Start: 2022-01-14 | End: 2022-02-13

## 2022-01-14 RX ORDER — BUMETANIDE 1 MG/1
1 TABLET ORAL 2 TIMES DAILY
Qty: 60 TABLET | Refills: 0 | Status: CANCELLED | OUTPATIENT
Start: 2022-01-14 | End: 2022-02-13

## 2022-01-19 RX ORDER — ASPIRIN 81 MG/1
81 TABLET ORAL DAILY
Qty: 30 TABLET | Refills: 0 | Status: CANCELLED | OUTPATIENT
Start: 2022-01-14 | End: 2022-02-13

## 2022-02-28 RX ORDER — ISOSORBIDE MONONITRATE 30 MG/1
60 TABLET, EXTENDED RELEASE ORAL
Qty: 60 TABLET | Refills: 0 | Status: CANCELLED | OUTPATIENT
Start: 2022-02-28 | End: 2022-03-30

## 2022-02-28 RX ORDER — METOPROLOL SUCCINATE 100 MG/1
100 TABLET, EXTENDED RELEASE ORAL
Qty: 30 TABLET | Refills: 0 | Status: CANCELLED | OUTPATIENT
Start: 2022-02-28 | End: 2022-03-30

## 2022-02-28 RX ORDER — INSULIN GLARGINE 100 [IU]/ML
20 INJECTION, SOLUTION SUBCUTANEOUS NIGHTLY
Qty: 15 ML | Refills: 0 | Status: CANCELLED | OUTPATIENT
Start: 2022-02-28 | End: 2022-03-30

## 2022-02-28 RX ORDER — HYDRALAZINE HYDROCHLORIDE 50 MG/1
50 TABLET, FILM COATED ORAL EVERY 8 HOURS SCHEDULED
Qty: 90 TABLET | Refills: 0 | Status: CANCELLED | OUTPATIENT
Start: 2022-02-28 | End: 2022-03-30

## 2022-03-01 RX ORDER — BUMETANIDE 1 MG/1
1 TABLET ORAL 2 TIMES DAILY
Qty: 60 TABLET | Refills: 0 | Status: CANCELLED | OUTPATIENT
Start: 2022-03-01 | End: 2022-03-31

## 2022-03-02 RX ORDER — INSULIN GLARGINE 100 [IU]/ML
20 INJECTION, SOLUTION SUBCUTANEOUS NIGHTLY
Qty: 15 ML | Refills: 0 | Status: CANCELLED | OUTPATIENT
Start: 2022-02-28 | End: 2022-03-30

## 2022-03-02 RX ORDER — ISOSORBIDE MONONITRATE 30 MG/1
60 TABLET, EXTENDED RELEASE ORAL
Qty: 60 TABLET | Refills: 0 | Status: CANCELLED | OUTPATIENT
Start: 2022-02-28 | End: 2022-03-30

## 2022-03-02 RX ORDER — METOPROLOL SUCCINATE 100 MG/1
100 TABLET, EXTENDED RELEASE ORAL
Qty: 30 TABLET | Refills: 0 | Status: CANCELLED | OUTPATIENT
Start: 2022-02-28 | End: 2022-03-30

## 2022-03-02 RX ORDER — HYDRALAZINE HYDROCHLORIDE 50 MG/1
50 TABLET, FILM COATED ORAL EVERY 8 HOURS SCHEDULED
Qty: 90 TABLET | Refills: 0 | Status: CANCELLED | OUTPATIENT
Start: 2022-02-28 | End: 2022-03-30

## 2022-05-12 RX ORDER — BUMETANIDE 1 MG/1
1 TABLET ORAL 2 TIMES DAILY
Qty: 60 TABLET | Refills: 0 | Status: CANCELLED | OUTPATIENT
Start: 2022-05-12 | End: 2022-06-11

## 2022-05-15 RX ORDER — BUMETANIDE 1 MG/1
1 TABLET ORAL 2 TIMES DAILY
Qty: 60 TABLET | Refills: 0 | Status: CANCELLED | OUTPATIENT
Start: 2022-05-12 | End: 2022-06-11

## 2022-06-04 ENCOUNTER — APPOINTMENT (OUTPATIENT)
Dept: GENERAL RADIOLOGY | Facility: HOSPITAL | Age: 37
End: 2022-06-04

## 2022-06-04 ENCOUNTER — HOSPITAL ENCOUNTER (INPATIENT)
Facility: HOSPITAL | Age: 37
LOS: 4 days | Discharge: HOME | End: 2022-06-09
Attending: EMERGENCY MEDICINE | Admitting: STUDENT IN AN ORGANIZED HEALTH CARE EDUCATION/TRAINING PROGRAM

## 2022-06-04 DIAGNOSIS — R79.89 ELEVATED BRAIN NATRIURETIC PEPTIDE (BNP) LEVEL: ICD-10-CM

## 2022-06-04 DIAGNOSIS — N28.9 RENAL INSUFFICIENCY: ICD-10-CM

## 2022-06-04 DIAGNOSIS — I50.33 DIASTOLIC CHF, ACUTE ON CHRONIC: ICD-10-CM

## 2022-06-04 DIAGNOSIS — U07.1 COVID-19: Primary | ICD-10-CM

## 2022-06-04 DIAGNOSIS — R06.00 DYSPNEA, UNSPECIFIED TYPE: ICD-10-CM

## 2022-06-04 DIAGNOSIS — R77.8 ELEVATED TROPONIN: ICD-10-CM

## 2022-06-04 DIAGNOSIS — I50.43 ACUTE ON CHRONIC COMBINED SYSTOLIC AND DIASTOLIC CHF (CONGESTIVE HEART FAILURE): ICD-10-CM

## 2022-06-04 DIAGNOSIS — I50.9 HEART FAILURE, UNSPECIFIED HF CHRONICITY, UNSPECIFIED HEART FAILURE TYPE: ICD-10-CM

## 2022-06-04 DIAGNOSIS — I10 HYPERTENSION, UNSPECIFIED TYPE: ICD-10-CM

## 2022-06-04 LAB
ALBUMIN SERPL-MCNC: 2.5 G/DL (ref 3.5–5.2)
ALBUMIN/GLOB SERPL: 0.7 G/DL
ALP SERPL-CCNC: 108 U/L (ref 39–117)
ALT SERPL W P-5'-P-CCNC: 18 U/L (ref 1–41)
ANION GAP SERPL CALCULATED.3IONS-SCNC: 7.4 MMOL/L (ref 5–15)
AST SERPL-CCNC: 17 U/L (ref 1–40)
B PARAPERT DNA SPEC QL NAA+PROBE: NOT DETECTED
B PERT DNA SPEC QL NAA+PROBE: NOT DETECTED
BASOPHILS # BLD AUTO: 0.04 10*3/MM3 (ref 0–0.2)
BASOPHILS NFR BLD AUTO: 0.4 % (ref 0–1.5)
BILIRUB SERPL-MCNC: 0.4 MG/DL (ref 0–1.2)
BUN SERPL-MCNC: 28 MG/DL (ref 6–20)
BUN/CREAT SERPL: 14.8 (ref 7–25)
C PNEUM DNA NPH QL NAA+NON-PROBE: NOT DETECTED
CALCIUM SPEC-SCNC: 8 MG/DL (ref 8.6–10.5)
CHLORIDE SERPL-SCNC: 104 MMOL/L (ref 98–107)
CO2 SERPL-SCNC: 26.6 MMOL/L (ref 22–29)
CREAT SERPL-MCNC: 1.89 MG/DL (ref 0.76–1.27)
DEPRECATED RDW RBC AUTO: 42.9 FL (ref 37–54)
EGFRCR SERPLBLD CKD-EPI 2021: 46.6 ML/MIN/1.73
EOSINOPHIL # BLD AUTO: 0.2 10*3/MM3 (ref 0–0.4)
EOSINOPHIL NFR BLD AUTO: 2 % (ref 0.3–6.2)
ERYTHROCYTE [DISTWIDTH] IN BLOOD BY AUTOMATED COUNT: 14.3 % (ref 12.3–15.4)
FLUAV SUBTYP SPEC NAA+PROBE: NOT DETECTED
FLUBV RNA ISLT QL NAA+PROBE: NOT DETECTED
GLOBULIN UR ELPH-MCNC: 3.8 GM/DL
GLUCOSE BLDC GLUCOMTR-MCNC: 150 MG/DL (ref 70–130)
GLUCOSE SERPL-MCNC: 167 MG/DL (ref 65–99)
HADV DNA SPEC NAA+PROBE: NOT DETECTED
HCOV 229E RNA SPEC QL NAA+PROBE: NOT DETECTED
HCOV HKU1 RNA SPEC QL NAA+PROBE: NOT DETECTED
HCOV NL63 RNA SPEC QL NAA+PROBE: NOT DETECTED
HCOV OC43 RNA SPEC QL NAA+PROBE: NOT DETECTED
HCT VFR BLD AUTO: 42.4 % (ref 37.5–51)
HGB BLD-MCNC: 14.3 G/DL (ref 13–17.7)
HMPV RNA NPH QL NAA+NON-PROBE: NOT DETECTED
HPIV1 RNA ISLT QL NAA+PROBE: NOT DETECTED
HPIV2 RNA SPEC QL NAA+PROBE: NOT DETECTED
HPIV3 RNA NPH QL NAA+PROBE: NOT DETECTED
HPIV4 P GENE NPH QL NAA+PROBE: NOT DETECTED
IMM GRANULOCYTES # BLD AUTO: 0.05 10*3/MM3 (ref 0–0.05)
IMM GRANULOCYTES NFR BLD AUTO: 0.5 % (ref 0–0.5)
LYMPHOCYTES # BLD AUTO: 1.29 10*3/MM3 (ref 0.7–3.1)
LYMPHOCYTES NFR BLD AUTO: 13.1 % (ref 19.6–45.3)
M PNEUMO IGG SER IA-ACNC: NOT DETECTED
MAGNESIUM SERPL-MCNC: 2.4 MG/DL (ref 1.6–2.6)
MCH RBC QN AUTO: 28 PG (ref 26.6–33)
MCHC RBC AUTO-ENTMCNC: 33.7 G/DL (ref 31.5–35.7)
MCV RBC AUTO: 83 FL (ref 79–97)
MONOCYTES # BLD AUTO: 0.6 10*3/MM3 (ref 0.1–0.9)
MONOCYTES NFR BLD AUTO: 6.1 % (ref 5–12)
NEUTROPHILS NFR BLD AUTO: 7.69 10*3/MM3 (ref 1.7–7)
NEUTROPHILS NFR BLD AUTO: 77.9 % (ref 42.7–76)
NRBC BLD AUTO-RTO: 0 /100 WBC (ref 0–0.2)
NT-PROBNP SERPL-MCNC: 4193 PG/ML (ref 0–450)
PLATELET # BLD AUTO: 214 10*3/MM3 (ref 140–450)
PMV BLD AUTO: 11.2 FL (ref 6–12)
POTASSIUM SERPL-SCNC: 3.8 MMOL/L (ref 3.5–5.2)
PROCALCITONIN SERPL-MCNC: 0.16 NG/ML (ref 0–0.25)
PROT SERPL-MCNC: 6.3 G/DL (ref 6–8.5)
QT INTERVAL: 358 MS
RBC # BLD AUTO: 5.11 10*6/MM3 (ref 4.14–5.8)
RHINOVIRUS RNA SPEC NAA+PROBE: NOT DETECTED
RSV RNA NPH QL NAA+NON-PROBE: NOT DETECTED
SARS-COV-2 RNA NPH QL NAA+NON-PROBE: DETECTED
SODIUM SERPL-SCNC: 138 MMOL/L (ref 136–145)
TROPONIN T SERPL-MCNC: 0.04 NG/ML (ref 0–0.03)
TSH SERPL DL<=0.05 MIU/L-ACNC: 3.16 UIU/ML (ref 0.27–4.2)
WBC NRBC COR # BLD: 9.87 10*3/MM3 (ref 3.4–10.8)

## 2022-06-04 PROCEDURE — 93010 ELECTROCARDIOGRAM REPORT: CPT | Performed by: INTERNAL MEDICINE

## 2022-06-04 PROCEDURE — 83880 ASSAY OF NATRIURETIC PEPTIDE: CPT | Performed by: EMERGENCY MEDICINE

## 2022-06-04 PROCEDURE — G0378 HOSPITAL OBSERVATION PER HR: HCPCS

## 2022-06-04 PROCEDURE — 0202U NFCT DS 22 TRGT SARS-COV-2: CPT | Performed by: EMERGENCY MEDICINE

## 2022-06-04 PROCEDURE — 80053 COMPREHEN METABOLIC PANEL: CPT | Performed by: EMERGENCY MEDICINE

## 2022-06-04 PROCEDURE — 93005 ELECTROCARDIOGRAM TRACING: CPT | Performed by: EMERGENCY MEDICINE

## 2022-06-04 PROCEDURE — 82962 GLUCOSE BLOOD TEST: CPT

## 2022-06-04 PROCEDURE — 71045 X-RAY EXAM CHEST 1 VIEW: CPT

## 2022-06-04 PROCEDURE — 99285 EMERGENCY DEPT VISIT HI MDM: CPT

## 2022-06-04 PROCEDURE — 36415 COLL VENOUS BLD VENIPUNCTURE: CPT

## 2022-06-04 PROCEDURE — 63710000001 INSULIN LISPRO (HUMAN) PER 5 UNITS: Performed by: INTERNAL MEDICINE

## 2022-06-04 PROCEDURE — 25010000002 FUROSEMIDE PER 20 MG: Performed by: EMERGENCY MEDICINE

## 2022-06-04 PROCEDURE — 84484 ASSAY OF TROPONIN QUANT: CPT | Performed by: EMERGENCY MEDICINE

## 2022-06-04 PROCEDURE — 83735 ASSAY OF MAGNESIUM: CPT | Performed by: EMERGENCY MEDICINE

## 2022-06-04 PROCEDURE — 85025 COMPLETE CBC W/AUTO DIFF WBC: CPT | Performed by: EMERGENCY MEDICINE

## 2022-06-04 PROCEDURE — 84145 PROCALCITONIN (PCT): CPT | Performed by: EMERGENCY MEDICINE

## 2022-06-04 PROCEDURE — 84443 ASSAY THYROID STIM HORMONE: CPT | Performed by: INTERNAL MEDICINE

## 2022-06-04 RX ORDER — DEXTROSE MONOHYDRATE 25 G/50ML
25 INJECTION, SOLUTION INTRAVENOUS
Status: DISCONTINUED | OUTPATIENT
Start: 2022-06-04 | End: 2022-06-09 | Stop reason: HOSPADM

## 2022-06-04 RX ORDER — FUROSEMIDE 10 MG/ML
80 INJECTION INTRAMUSCULAR; INTRAVENOUS ONCE
Status: COMPLETED | OUTPATIENT
Start: 2022-06-04 | End: 2022-06-04

## 2022-06-04 RX ORDER — INSULIN LISPRO 100 [IU]/ML
0-9 INJECTION, SOLUTION INTRAVENOUS; SUBCUTANEOUS
Status: DISCONTINUED | OUTPATIENT
Start: 2022-06-04 | End: 2022-06-09 | Stop reason: HOSPADM

## 2022-06-04 RX ORDER — NICOTINE POLACRILEX 4 MG
15 LOZENGE BUCCAL
Status: DISCONTINUED | OUTPATIENT
Start: 2022-06-04 | End: 2022-06-09 | Stop reason: HOSPADM

## 2022-06-04 RX ORDER — FUROSEMIDE 10 MG/ML
40 INJECTION INTRAMUSCULAR; INTRAVENOUS EVERY 12 HOURS
Status: DISCONTINUED | OUTPATIENT
Start: 2022-06-05 | End: 2022-06-05

## 2022-06-04 RX ADMIN — FUROSEMIDE 80 MG: 10 INJECTION, SOLUTION INTRAMUSCULAR; INTRAVENOUS at 17:16

## 2022-06-04 RX ADMIN — INSULIN LISPRO 2 UNITS: 100 INJECTION, SOLUTION INTRAVENOUS; SUBCUTANEOUS at 19:32

## 2022-06-04 NOTE — ED PROVIDER NOTES
EMERGENCY DEPARTMENT ENCOUNTER    Room Number:  14/14  Date of encounter:  6/4/2022  PCP: Provider, No Known  Historian: Patient      HPI:  Chief Complaint: Shortness of breath  A complete HPI/ROS/PMH/PSH/SH/FH are unobtainable due to: None    Context: Derick Dorsey is a 36 y.o. male who presents to the ED via private vehicle for evaluation for 2 to 3 days of increasing exertional shortness of breath, swelling in the legs.  Denies any fevers or chills, has had a mild cough nonproductive.  No vomiting or diarrhea.  No recent travel or recent surgeries.  Previously has been on diuretics but has not had it for 2 or 3 months because his doctor did not represcribe it he states.  Does report a history of heart surgery in the past.  Symptoms improved with rest but not completely resolved.  No chest pains, dizziness, lightness.       MEDICAL RECORD REVIEW    No prior charts for review on chart review in [facility]    PAST MEDICAL HISTORY  Active Ambulatory Problems     Diagnosis Date Noted   • No Active Ambulatory Problems     Resolved Ambulatory Problems     Diagnosis Date Noted   • No Resolved Ambulatory Problems     Past Medical History:   Diagnosis Date   • Hypertension          PAST SURGICAL HISTORY  Past Surgical History:   Procedure Laterality Date   • CARDIAC SURGERY           FAMILY HISTORY  History reviewed. No pertinent family history.      SOCIAL HISTORY  Social History     Socioeconomic History   • Marital status: Single   Tobacco Use   • Smoking status: Never Smoker   Substance and Sexual Activity   • Alcohol use: Never         ALLERGIES  Patient has no known allergies.        REVIEW OF SYSTEMS  Review of Systems     All systems reviewed and negative except for those discussed in HPI.       PHYSICAL EXAM    I have reviewed the triage vital signs and nursing notes.    ED Triage Vitals   Temp Heart Rate Resp BP SpO2   06/04/22 1522 06/04/22 1522 06/04/22 1522 06/04/22 1621 06/04/22 1522   98 °F (36.7 °C) 97 16 (!)  192/105 98 %      Temp src Heart Rate Source Patient Position BP Location FiO2 (%)   06/04/22 1522 06/04/22 1522 06/04/22 1621 06/04/22 1621 --   Tympanic Monitor Sitting Left arm        Physical Exam  General: Appears mildly uncomfortable, nontoxic, nondiaphoretic  HEENT: Mucous membranes moist, atraumatic, EOMI  Neck: Full ROM  Pulm: Symmetric chest rise, nonlabored, lungs diminished bilaterally with rales in the bases bilaterally  Cardiovascular: Regular rate and rhythm, intact distal pulses, 1+ nonpitting edema bilateral lower extremities  GI: Soft, nontender, nondistended, no rebound, no guarding, bowel sounds present  MSK: Full ROM, no deformity  Skin: Warm, dry  Neuro: Awake, alert, oriented x 4, GCS 15, moving all extremities, no focal deficits  Psych: Calm, cooperative      N95, protective eye goggles, and gloves used during this encounter. Patient in surgical mask.      LAB RESULTS  Recent Results (from the past 24 hour(s))   Respiratory Panel PCR w/COVID-19(SARS-CoV-2) SARABJIT/JENAE/LEX/PAD/COR/MAD/UKLDEEP In-House, NP Swab in UTM/VTM, 3-4 HR TAT - Swab, Nasopharynx    Collection Time: 06/04/22  4:10 PM    Specimen: Nasopharynx; Swab   Result Value Ref Range    ADENOVIRUS, PCR Not Detected Not Detected    Coronavirus 229E Not Detected Not Detected    Coronavirus HKU1 Not Detected Not Detected    Coronavirus NL63 Not Detected Not Detected    Coronavirus OC43 Not Detected Not Detected    COVID19 Detected (C) Not Detected - Ref. Range    Human Metapneumovirus Not Detected Not Detected    Human Rhinovirus/Enterovirus Not Detected Not Detected    Influenza A PCR Not Detected Not Detected    Influenza B PCR Not Detected Not Detected    Parainfluenza Virus 1 Not Detected Not Detected    Parainfluenza Virus 2 Not Detected Not Detected    Parainfluenza Virus 3 Not Detected Not Detected    Parainfluenza Virus 4 Not Detected Not Detected    RSV, PCR Not Detected Not Detected    Bordetella pertussis pcr Not Detected Not  Detected    Bordetella parapertussis PCR Not Detected Not Detected    Chlamydophila pneumoniae PCR Not Detected Not Detected    Mycoplasma pneumo by PCR Not Detected Not Detected   ECG 12 Lead    Collection Time: 06/04/22  4:16 PM   Result Value Ref Range    QT Interval 358 ms   Comprehensive Metabolic Panel    Collection Time: 06/04/22  4:20 PM    Specimen: Blood   Result Value Ref Range    Glucose 167 (H) 65 - 99 mg/dL    BUN 28 (H) 6 - 20 mg/dL    Creatinine 1.89 (H) 0.76 - 1.27 mg/dL    Sodium 138 136 - 145 mmol/L    Potassium 3.8 3.5 - 5.2 mmol/L    Chloride 104 98 - 107 mmol/L    CO2 26.6 22.0 - 29.0 mmol/L    Calcium 8.0 (L) 8.6 - 10.5 mg/dL    Total Protein 6.3 6.0 - 8.5 g/dL    Albumin 2.50 (L) 3.50 - 5.20 g/dL    ALT (SGPT) 18 1 - 41 U/L    AST (SGOT) 17 1 - 40 U/L    Alkaline Phosphatase 108 39 - 117 U/L    Total Bilirubin 0.4 0.0 - 1.2 mg/dL    Globulin 3.8 gm/dL    A/G Ratio 0.7 g/dL    BUN/Creatinine Ratio 14.8 7.0 - 25.0    Anion Gap 7.4 5.0 - 15.0 mmol/L    eGFR 46.6 (L) >60.0 mL/min/1.73   BNP    Collection Time: 06/04/22  4:20 PM    Specimen: Blood   Result Value Ref Range    proBNP 4,193.0 (H) 0.0 - 450.0 pg/mL   Troponin    Collection Time: 06/04/22  4:20 PM    Specimen: Blood   Result Value Ref Range    Troponin T 0.039 (C) 0.000 - 0.030 ng/mL   Procalcitonin    Collection Time: 06/04/22  4:20 PM    Specimen: Blood   Result Value Ref Range    Procalcitonin 0.16 0.00 - 0.25 ng/mL   Magnesium    Collection Time: 06/04/22  4:20 PM    Specimen: Blood   Result Value Ref Range    Magnesium 2.4 1.6 - 2.6 mg/dL   CBC Auto Differential    Collection Time: 06/04/22  4:20 PM    Specimen: Blood   Result Value Ref Range    WBC 9.87 3.40 - 10.80 10*3/mm3    RBC 5.11 4.14 - 5.80 10*6/mm3    Hemoglobin 14.3 13.0 - 17.7 g/dL    Hematocrit 42.4 37.5 - 51.0 %    MCV 83.0 79.0 - 97.0 fL    MCH 28.0 26.6 - 33.0 pg    MCHC 33.7 31.5 - 35.7 g/dL    RDW 14.3 12.3 - 15.4 %    RDW-SD 42.9 37.0 - 54.0 fl    MPV 11.2 6.0 -  12.0 fL    Platelets 214 140 - 450 10*3/mm3    Neutrophil % 77.9 (H) 42.7 - 76.0 %    Lymphocyte % 13.1 (L) 19.6 - 45.3 %    Monocyte % 6.1 5.0 - 12.0 %    Eosinophil % 2.0 0.3 - 6.2 %    Basophil % 0.4 0.0 - 1.5 %    Immature Grans % 0.5 0.0 - 0.5 %    Neutrophils, Absolute 7.69 (H) 1.70 - 7.00 10*3/mm3    Lymphocytes, Absolute 1.29 0.70 - 3.10 10*3/mm3    Monocytes, Absolute 0.60 0.10 - 0.90 10*3/mm3    Eosinophils, Absolute 0.20 0.00 - 0.40 10*3/mm3    Basophils, Absolute 0.04 0.00 - 0.20 10*3/mm3    Immature Grans, Absolute 0.05 0.00 - 0.05 10*3/mm3    nRBC 0.0 0.0 - 0.2 /100 WBC       Ordered the above labs and independently reviewed the results.        RADIOLOGY  XR Chest 1 View    Result Date: 6/4/2022  PORTABLE RADIOGRAPHIC VIEW OF THE CHEST  CLINICAL HISTORY: Dyspnea upon exertion.  FINDINGS: Portable radiographic view of the chest demonstrates relatively low lung volumes. Overlying chest wall soft tissues compromise visualization of the lungs and overall this examination is poorly penetrated. Nonetheless, there are interstitial abnormalities identified within both lungs in a diffuse fashion. It is unclear whether this represents hydrostatic interstitial pulmonary edema or pneumonia or some combination thereof. Please correlate with clinical data. The cardiomediastinal silhouette is enlarged but likely accentuated by low lung volumes, as well as portable technique. Sternal wires are incidentally noted. The osseous structures are unremarkable.        I ordered the above noted radiological studies. Reviewed by me.  See dictation for official radiology interpretation.      PROCEDURES    Procedures  EKG    EKG Time: 1616  Rhythm/Rate: Sinus rhythm with rate 98  Normal axis  Nonspecific IVCD with a QRS of 128  Nonspecific T wave abnormalities   No STEMI     Interpreted Contemporaneously by me, independently viewed  No prior for comparison      MEDICATIONS GIVEN IN ER    Medications   furosemide (LASIX) injection  80 mg (80 mg Intravenous Given 6/4/22 1716)         PROGRESS, DATA ANALYSIS, CONSULTS, AND MEDICAL DECISION MAKING    All labs have been independently reviewed by me.  All radiology studies have been reviewed by me and discussed with radiologist dictating the report.   EKG's independently viewed and interpreted by me.  Discussion below represents my analysis of pertinent findings related to patient's condition, differential diagnosis, treatment plan and final disposition.    Initial concern for heart failure, renal failure, electrolyte abnormalities, anemia, pneumonia, viral process including COVID, among others.  Plan for labs, chest x-ray, and reevaluation.    ED Course as of 06/04/22 1729   Sat Jun 04, 2022   1659 Glucose(!): 167 [DC]   1659 BUN(!): 28 [DC]   1659 Creatinine(!): 1.89  No prior [DC]   1659 ALT (SGPT): 18 [DC]   1659 AST (SGOT): 17 [DC]   1659 Alkaline Phosphatase: 108 [DC]   1659 Total Bilirubin: 0.4 [DC]   1659 Magnesium: 2.4 [DC]   1659 proBNP(!): 4,193.0 [DC]   1659 WBC: 9.87 [DC]   1659 Hemoglobin: 14.3 [DC]   1700 Troponin T(!!): 0.039 [DC]   1721 COVID19(!!): Detected [DC]   1721 My plan at this point is to hospitalize the patient for what appears to be probably heart failure exacerbation on top of COVID infection.  Has no hypoxia but does have some increased work of breathing and obvious dyspnea.  Dose of IV Lasix has been ordered.  Patient has been updated on the course and plan and need for hospital stay. [DC]   1728 Discussed with Dr. Simons, [facility], discussed patient's clinical course and findings today, treatment modalities, and need for hospitalization. She recommends observation and telemetry placement. [DC]      ED Course User Index  [DC] Chris Davison MD       AS OF 17:29 EDT VITALS:    BP - 174/100  HR - 95  TEMP - 98 °F (36.7 °C) (Tympanic)  02 SATS - 95%        DIAGNOSIS  Final diagnoses:   COVID-19   Heart failure, unspecified HF chronicity, unspecified heart failure type (HCC)    Hypertension, unspecified type   Dyspnea, unspecified type   Renal insufficiency   Elevated brain natriuretic peptide (BNP) level   Elevated troponin         DISPOSITION  HOSPITALIZATION    Discussed treatment plan and reason for hospitalization with pt/family and hospitalizing physician.  Pt/family voiced understanding of the plan for hospitalization for further testing/treatment as needed.                  Chris Davison MD  06/04/22 1099

## 2022-06-04 NOTE — ED TRIAGE NOTES
mendez benson walking.  He reports he has had a 10 pound weight loss since Thursday.  His abd and feet are swollen    Patient was placed in face mask during first look triage.  Patient was wearing a face mask throughout encounter.  I wore personal protective equipment throughout the encounter.  Hand hygiene was performed before and after patient encounter.

## 2022-06-04 NOTE — H&P
HISTORY AND PHYSICAL   [facility]        Date of Admission: 2022  Patient Identification:  Name: Derick Dorsey  Age: 36 y.o.  Sex: male  :  1985  MRN: [id number]                     Primary Care Physician: Provider, No Known    Chief Complaint:  36 year old gentleman who presented to the emergency room with shortness of breath which started 2-3 days ago; he denies chest pain; no fever or chills; no sick contacts; he was on lasix in the past but has not been on it for several weeks; he has had heart surgery    History of Present Illness:   As above    Past Medical History:  Past Medical History:   Diagnosis Date   • Hypertension      Past Surgical History:  Past Surgical History:   Procedure Laterality Date   • CARDIAC SURGERY        Home Meds:  (Not in a hospital admission)      Allergies:  No Known Allergies  Immunizations:    There is no immunization history on file for this patient.  Social History:   Social History     Social History Narrative   • Not on file     Social History     Socioeconomic History   • Marital status: Single   Tobacco Use   • Smoking status: Never Smoker   Substance and Sexual Activity   • Alcohol use: Never       Family History:  History reviewed. No pertinent family history.     Review of Systems  See history of present illness and past medical history.  Patient denies headache, dizziness, syncope, falls, trauma, change in vision, change in hearing, change in taste, changes in weight, changes in appetite, focal weakness, numbness, or paresthesia.  Patient denies   palpitations,  sinus pressure, rhinorrhea, epistaxis, hemoptysis, nausea, vomiting,hematemesis, diarrhea, constipation or hematchezia.  Denies cold or heat intolerance, polydipsia, polyuria, polyphagia. Denies hematuria, pyuria, dysuria, hesitancy, frequency or urgency. Denies consumption of raw and under cooked meats foods or change in water source.  Denies fever, chills, sweats, night sweats.   "Denies missing any routine medications. Remainder of ROS is negative.    Objective:  T Max 24 hrs: Temp (24hrs), Av °F (36.7 °C), Min:98 °F (36.7 °C), Max:98 °F (36.7 °C)    Vitals Ranges:   Temp:  [98 °F (36.7 °C)] 98 °F (36.7 °C)  Heart Rate:  [90-99] 90  Resp:  [16] 16  BP: (174-192)/() 174/100      Exam:  /100   Pulse 90   Temp 98 °F (36.7 °C) (Tympanic)   Resp 16   Ht 167.6 cm (66\")   Wt 118 kg (260 lb)   SpO2 98%   BMI 41.97 kg/m²     General Appearance:    Alert, cooperative, no distress, appears stated age   Head:    Normocephalic, without obvious abnormality, atraumatic   Eyes:    PERRL, conjunctivae/corneas clear, EOM's intact, both eyes   Ears:    Normal external ear canals, both ears   Nose:   Nares normal, septum midline, mucosa normal, no drainage    or sinus tenderness   Throat:   Lips, mucosa, and tongue normal   Neck:   Supple, symmetrical, trachea midline, no adenopathy;     thyroid:  no enlargement/tenderness/nodules; no carotid    bruit or JVD   Back:     Symmetric, no curvature, ROM normal, no CVA tenderness   Lungs:     Decreased breath sounds bilaterally, respirations unlabored   Chest Wall:    No tenderness or deformity    Heart:    Regular rate and rhythm, S1 and S2 normal, no murmur, rub   or gallop   Abdomen:     Soft, nontender, bowel sounds active all four quadrants,     no masses, no hepatomegaly, no splenomegaly   Extremities:   Extremities normal, atraumatic, no cyanosis or edema   Pulses:   2+ and symmetric all extremities   Skin:   Skin color, texture, turgor normal, no rashes or lesions   Lymph nodes:   Cervical, supraclavicular, and axillary nodes normal   Neurologic:   CNII-XII intact, normal strength, sensation intact throughout      .    Data Review:  Labs in chart were reviewed.  WBC   Date Value Ref Range Status   2022 9.87 3.40 - 10.80 10*3/mm3 Final     Hemoglobin   Date Value Ref Range Status   2022 14.3 13.0 - 17.7 g/dL Final "     Hematocrit   Date Value Ref Range Status   06/04/2022 42.4 37.5 - 51.0 % Final     Platelets   Date Value Ref Range Status   06/04/2022 214 140 - 450 10*3/mm3 Final     Sodium   Date Value Ref Range Status   06/04/2022 138 136 - 145 mmol/L Final     Potassium   Date Value Ref Range Status   06/04/2022 3.8 3.5 - 5.2 mmol/L Final     Chloride   Date Value Ref Range Status   06/04/2022 104 98 - 107 mmol/L Final     CO2   Date Value Ref Range Status   06/04/2022 26.6 22.0 - 29.0 mmol/L Final     BUN   Date Value Ref Range Status   06/04/2022 28 (H) 6 - 20 mg/dL Final     Creatinine   Date Value Ref Range Status   06/04/2022 1.89 (H) 0.76 - 1.27 mg/dL Final     Glucose   Date Value Ref Range Status   06/04/2022 167 (H) 65 - 99 mg/dL Final     Calcium   Date Value Ref Range Status   06/04/2022 8.0 (L) 8.6 - 10.5 mg/dL Final     Magnesium   Date Value Ref Range Status   06/04/2022 2.4 1.6 - 2.6 mg/dL Final     AST (SGOT)   Date Value Ref Range Status   06/04/2022 17 1 - 40 U/L Final     ALT (SGPT)   Date Value Ref Range Status   06/04/2022 18 1 - 41 U/L Final     Alkaline Phosphatase   Date Value Ref Range Status   06/04/2022 108 39 - 117 U/L Final                Imaging Results (All)     Procedure Component Value Units Date/Time    XR Chest 1 View [[id number]] Collected: 06/04/22 1709     Updated: 06/04/22 1709    Narrative:      PORTABLE RADIOGRAPHIC VIEW OF THE CHEST     CLINICAL HISTORY: Dyspnea upon exertion.     FINDINGS: Portable radiographic view of the chest demonstrates  relatively low lung volumes. Overlying chest wall soft tissues  compromise visualization of the lungs and overall this examination is  poorly penetrated. Nonetheless, there are interstitial abnormalities  identified within both lungs in a diffuse fashion. It is unclear whether  this represents hydrostatic interstitial pulmonary edema or pneumonia or  some combination thereof. Please correlate with clinical data. The  cardiomediastinal  silhouette is enlarged but likely accentuated by low  lung volumes, as well as portable technique. Sternal wires are  incidentally noted. The osseous structures are unremarkable.               Assessment:  Active Hospital Problems    Diagnosis  POA   • COVID-19 [U07.1]  Yes      Resolved Hospital Problems   No resolved problems to display.   acute chf  Hyperglycemia  Hypertension  obesity    Plan:  Will diurese  Repeat troponin  Ask cardiology to see him  accu checks, insulin sliding scale  Hold off on steroids since he is not hypoxic  Monitor on telemetry  Control blood pressure  D.w patient and ED provider    Jennyfer Simons MD  6/4/2022  19:15 EDT

## 2022-06-05 ENCOUNTER — APPOINTMENT (OUTPATIENT)
Dept: CARDIOLOGY | Facility: HOSPITAL | Age: 37
End: 2022-06-05

## 2022-06-05 PROBLEM — J81.0 ACUTE PULMONARY EDEMA (HCC): Status: ACTIVE | Noted: 2022-06-05

## 2022-06-05 PROBLEM — R79.89 ELEVATED TROPONIN: Status: ACTIVE | Noted: 2022-06-05

## 2022-06-05 PROBLEM — R77.8 ELEVATED TROPONIN: Status: ACTIVE | Noted: 2022-06-05

## 2022-06-05 PROBLEM — E11.59 TYPE 2 DIABETES MELLITUS WITH CIRCULATORY DISORDER: Status: ACTIVE | Noted: 2022-06-05

## 2022-06-05 PROBLEM — I50.33 DIASTOLIC CHF, ACUTE ON CHRONIC: Status: ACTIVE | Noted: 2022-06-05

## 2022-06-05 PROBLEM — Z91.199 MEDICALLY NONCOMPLIANT: Status: ACTIVE | Noted: 2022-06-05

## 2022-06-05 PROBLEM — E11.29 TYPE 2 DIABETES MELLITUS WITH RENAL COMPLICATION: Status: ACTIVE | Noted: 2022-06-05

## 2022-06-05 LAB
AORTIC DIMENSIONLESS INDEX: 0.7 (DI)
BH CV ECHO MEAS - ACS: 2.07 CM
BH CV ECHO MEAS - AO MAX PG: 3.6 MMHG
BH CV ECHO MEAS - AO MEAN PG: 1.91 MMHG
BH CV ECHO MEAS - AO ROOT DIAM: 2.9 CM
BH CV ECHO MEAS - AO V2 MAX: 95.3 CM/SEC
BH CV ECHO MEAS - AO V2 VTI: 16.3 CM
BH CV ECHO MEAS - AVA(I,D): 2.6 CM2
BH CV ECHO MEAS - EDV(CUBED): 363.3 ML
BH CV ECHO MEAS - EDV(MOD-SP2): 275 ML
BH CV ECHO MEAS - EDV(MOD-SP4): 341 ML
BH CV ECHO MEAS - EF(MOD-BP): 20.7 %
BH CV ECHO MEAS - EF(MOD-SP2): 14.2 %
BH CV ECHO MEAS - EF(MOD-SP4): 24.3 %
BH CV ECHO MEAS - ESV(CUBED): 256.6 ML
BH CV ECHO MEAS - ESV(MOD-SP2): 236 ML
BH CV ECHO MEAS - ESV(MOD-SP4): 258 ML
BH CV ECHO MEAS - FS: 10.9 %
BH CV ECHO MEAS - IVS/LVPW: 1.05 CM
BH CV ECHO MEAS - IVSD: 1.07 CM
BH CV ECHO MEAS - LAT PEAK E' VEL: 7.7 CM/SEC
BH CV ECHO MEAS - LV DIASTOLIC VOL/BSA (35-75): 149.8 CM2
BH CV ECHO MEAS - LV MASS(C)D: 352.5 GRAMS
BH CV ECHO MEAS - LV MAX PG: 1.49 MMHG
BH CV ECHO MEAS - LV MEAN PG: 0.81 MMHG
BH CV ECHO MEAS - LV SYSTOLIC VOL/BSA (12-30): 113.4 CM2
BH CV ECHO MEAS - LV V1 MAX: 61.1 CM/SEC
BH CV ECHO MEAS - LV V1 VTI: 10.7 CM
BH CV ECHO MEAS - LVIDD: 7.1 CM
BH CV ECHO MEAS - LVIDS: 6.4 CM
BH CV ECHO MEAS - LVOT AREA: 4 CM2
BH CV ECHO MEAS - LVOT DIAM: 2.26 CM
BH CV ECHO MEAS - LVPWD: 1.02 CM
BH CV ECHO MEAS - MED PEAK E' VEL: 5 CM/SEC
BH CV ECHO MEAS - MV A MAX VEL: 38.8 CM/SEC
BH CV ECHO MEAS - MV DEC SLOPE: 797.1 CM/SEC2
BH CV ECHO MEAS - MV DEC TIME: 96 MSEC
BH CV ECHO MEAS - MV E MAX VEL: 100 CM/SEC
BH CV ECHO MEAS - MV E/A: 2.6
BH CV ECHO MEAS - MV MAX PG: 7.5 MMHG
BH CV ECHO MEAS - MV MEAN PG: 2.5 MMHG
BH CV ECHO MEAS - MV P1/2T: 52.4 MSEC
BH CV ECHO MEAS - MV V2 VTI: 26.5 CM
BH CV ECHO MEAS - MVA(P1/2T): 4.2 CM2
BH CV ECHO MEAS - MVA(VTI): 1.62 CM2
BH CV ECHO MEAS - PA ACC TIME: 0.08 SEC
BH CV ECHO MEAS - PA PR(ACCEL): 42.2 MMHG
BH CV ECHO MEAS - PA V2 MAX: 71.9 CM/SEC
BH CV ECHO MEAS - PULM DIAS VEL: 57.5 CM/SEC
BH CV ECHO MEAS - PULM S/D: 0.5
BH CV ECHO MEAS - PULM SYS VEL: 28.7 CM/SEC
BH CV ECHO MEAS - QP/QS: 2.1
BH CV ECHO MEAS - RV MAX PG: 1.54 MMHG
BH CV ECHO MEAS - RV V1 MAX: 62.1 CM/SEC
BH CV ECHO MEAS - RV V1 VTI: 11 CM
BH CV ECHO MEAS - RVOT DIAM: 3.2 CM
BH CV ECHO MEAS - SI(MOD-SP2): 17.1 ML/M2
BH CV ECHO MEAS - SI(MOD-SP4): 36.5 ML/M2
BH CV ECHO MEAS - SV(LVOT): 43 ML
BH CV ECHO MEAS - SV(MOD-SP2): 39 ML
BH CV ECHO MEAS - SV(MOD-SP4): 83 ML
BH CV ECHO MEAS - SV(RVOT): 90 ML
BH CV ECHO MEAS - TAPSE (>1.6): 1.83 CM
BH CV ECHO MEASUREMENTS AVERAGE E/E' RATIO: 15.75
CHOLEST SERPL-MCNC: 297 MG/DL (ref 0–200)
GLUCOSE BLDC GLUCOMTR-MCNC: 136 MG/DL (ref 70–130)
GLUCOSE BLDC GLUCOMTR-MCNC: 145 MG/DL (ref 70–130)
GLUCOSE BLDC GLUCOMTR-MCNC: 200 MG/DL (ref 70–130)
HBA1C MFR BLD: 7.7 % (ref 4.8–5.6)
HDLC SERPL-MCNC: 38 MG/DL (ref 40–60)
LDLC SERPL CALC-MCNC: 231 MG/DL (ref 0–100)
LDLC/HDLC SERPL: 6.05 {RATIO}
MAXIMAL PREDICTED HEART RATE: 184 BPM
STRESS TARGET HR: 156 BPM
TRIGL SERPL-MCNC: 146 MG/DL (ref 0–150)
TROPONIN T SERPL-MCNC: 0.04 NG/ML (ref 0–0.03)
VLDLC SERPL-MCNC: 28 MG/DL (ref 5–40)

## 2022-06-05 PROCEDURE — 84484 ASSAY OF TROPONIN QUANT: CPT | Performed by: INTERNAL MEDICINE

## 2022-06-05 PROCEDURE — 25010000002 PERFLUTREN (DEFINITY) 8.476 MG IN SODIUM CHLORIDE (PF) 0.9 % 10 ML INJECTION: Performed by: INTERNAL MEDICINE

## 2022-06-05 PROCEDURE — 80061 LIPID PANEL: CPT | Performed by: INTERNAL MEDICINE

## 2022-06-05 PROCEDURE — 93306 TTE W/DOPPLER COMPLETE: CPT | Performed by: INTERNAL MEDICINE

## 2022-06-05 PROCEDURE — 63710000001 INSULIN LISPRO (HUMAN) PER 5 UNITS: Performed by: INTERNAL MEDICINE

## 2022-06-05 PROCEDURE — 93306 TTE W/DOPPLER COMPLETE: CPT

## 2022-06-05 PROCEDURE — 25010000002 FUROSEMIDE PER 20 MG: Performed by: HOSPITALIST

## 2022-06-05 PROCEDURE — 36415 COLL VENOUS BLD VENIPUNCTURE: CPT | Performed by: INTERNAL MEDICINE

## 2022-06-05 PROCEDURE — 99254 IP/OBS CNSLTJ NEW/EST MOD 60: CPT | Performed by: INTERNAL MEDICINE

## 2022-06-05 PROCEDURE — 82962 GLUCOSE BLOOD TEST: CPT

## 2022-06-05 PROCEDURE — 83036 HEMOGLOBIN GLYCOSYLATED A1C: CPT | Performed by: INTERNAL MEDICINE

## 2022-06-05 PROCEDURE — 25010000002 FUROSEMIDE PER 20 MG: Performed by: INTERNAL MEDICINE

## 2022-06-05 RX ORDER — NITROGLYCERIN 0.4 MG/1
0.4 TABLET SUBLINGUAL
Status: DISCONTINUED | OUTPATIENT
Start: 2022-06-05 | End: 2022-06-09 | Stop reason: HOSPADM

## 2022-06-05 RX ORDER — AMLODIPINE BESYLATE 5 MG/1
5 TABLET ORAL
Status: DISCONTINUED | OUTPATIENT
Start: 2022-06-05 | End: 2022-06-05

## 2022-06-05 RX ORDER — CARVEDILOL 3.12 MG/1
3.12 TABLET ORAL 2 TIMES DAILY WITH MEALS
Status: DISCONTINUED | OUTPATIENT
Start: 2022-06-05 | End: 2022-06-05

## 2022-06-05 RX ORDER — CARVEDILOL 6.25 MG/1
6.25 TABLET ORAL 2 TIMES DAILY WITH MEALS
Status: DISCONTINUED | OUTPATIENT
Start: 2022-06-05 | End: 2022-06-05

## 2022-06-05 RX ORDER — ATORVASTATIN CALCIUM 80 MG/1
80 TABLET, FILM COATED ORAL NIGHTLY
Status: DISCONTINUED | OUTPATIENT
Start: 2022-06-05 | End: 2022-06-05

## 2022-06-05 RX ORDER — ACETAMINOPHEN 325 MG/1
650 TABLET ORAL EVERY 6 HOURS PRN
Status: DISCONTINUED | OUTPATIENT
Start: 2022-06-05 | End: 2022-06-09 | Stop reason: HOSPADM

## 2022-06-05 RX ORDER — UREA 10 %
3 LOTION (ML) TOPICAL NIGHTLY PRN
Status: DISCONTINUED | OUTPATIENT
Start: 2022-06-05 | End: 2022-06-09 | Stop reason: HOSPADM

## 2022-06-05 RX ORDER — SPIRONOLACTONE 25 MG/1
25 TABLET ORAL DAILY
Status: DISCONTINUED | OUTPATIENT
Start: 2022-06-05 | End: 2022-06-09 | Stop reason: HOSPADM

## 2022-06-05 RX ORDER — CARVEDILOL 12.5 MG/1
12.5 TABLET ORAL 2 TIMES DAILY WITH MEALS
Status: DISCONTINUED | OUTPATIENT
Start: 2022-06-05 | End: 2022-06-09 | Stop reason: HOSPADM

## 2022-06-05 RX ORDER — ONDANSETRON 2 MG/ML
4 INJECTION INTRAMUSCULAR; INTRAVENOUS EVERY 6 HOURS PRN
Status: DISCONTINUED | OUTPATIENT
Start: 2022-06-05 | End: 2022-06-09 | Stop reason: HOSPADM

## 2022-06-05 RX ORDER — ONDANSETRON 4 MG/1
4 TABLET, FILM COATED ORAL EVERY 6 HOURS PRN
Status: DISCONTINUED | OUTPATIENT
Start: 2022-06-05 | End: 2022-06-09 | Stop reason: HOSPADM

## 2022-06-05 RX ORDER — ROSUVASTATIN CALCIUM 40 MG/1
40 TABLET, COATED ORAL NIGHTLY
Status: DISCONTINUED | OUTPATIENT
Start: 2022-06-05 | End: 2022-06-09 | Stop reason: HOSPADM

## 2022-06-05 RX ORDER — FUROSEMIDE 10 MG/ML
40 INJECTION INTRAMUSCULAR; INTRAVENOUS EVERY 8 HOURS
Status: DISCONTINUED | OUTPATIENT
Start: 2022-06-05 | End: 2022-06-08

## 2022-06-05 RX ORDER — ATORVASTATIN CALCIUM 20 MG/1
40 TABLET, FILM COATED ORAL NIGHTLY
Status: DISCONTINUED | OUTPATIENT
Start: 2022-06-05 | End: 2022-06-05

## 2022-06-05 RX ADMIN — HYDRALAZINE HYDROCHLORIDE: 25 TABLET, FILM COATED ORAL at 21:03

## 2022-06-05 RX ADMIN — SPIRONOLACTONE 25 MG: 25 TABLET, FILM COATED ORAL at 14:16

## 2022-06-05 RX ADMIN — CARVEDILOL 12.5 MG: 12.5 TABLET, FILM COATED ORAL at 17:16

## 2022-06-05 RX ADMIN — FUROSEMIDE 40 MG: 10 INJECTION, SOLUTION INTRAMUSCULAR; INTRAVENOUS at 22:25

## 2022-06-05 RX ADMIN — ROSUVASTATIN CALCIUM 40 MG: 40 TABLET, FILM COATED ORAL at 21:03

## 2022-06-05 RX ADMIN — PERFLUTREN 2 ML: 6.52 INJECTION, SUSPENSION INTRAVENOUS at 13:54

## 2022-06-05 RX ADMIN — FUROSEMIDE 40 MG: 10 INJECTION, SOLUTION INTRAMUSCULAR; INTRAVENOUS at 06:09

## 2022-06-05 RX ADMIN — HYDRALAZINE HYDROCHLORIDE: 25 TABLET, FILM COATED ORAL at 14:16

## 2022-06-05 RX ADMIN — INSULIN LISPRO 4 UNITS: 100 INJECTION, SOLUTION INTRAVENOUS; SUBCUTANEOUS at 17:16

## 2022-06-05 RX ADMIN — FUROSEMIDE 40 MG: 10 INJECTION, SOLUTION INTRAMUSCULAR; INTRAVENOUS at 14:17

## 2022-06-05 NOTE — PROGRESS NOTES
"    DAILY PROGRESS NOTE  [facility]    Patient Identification:  Name: Derick Dorsey  Age: 36 y.o.  Sex: male  :  1985  MRN: [id number]         Primary Care Physician: Provider, No Known    Subjective:  Interval History: Claims has not take medications for over 3 months.  When I further questioned as to why I do not hear a single valid reason as to why.  I counseled patient and the need to own his own healthcare as there are multiple free clinics that are noted in the area.  He wants to blame other instances such as insurance.  He claims that he was taking Lasix only prior to the mid admission but again has been off that for multiple months.  He states he had been swelling up both in his abdomen as well as lower extremities.  He received 80 mg in the ER and was subsequently placed on IV Lasix at 40 mg twice daily and he is already noting improved output with improved pressures and symptoms as other medications have also been initiated for blood pressure control.    Objective: Pleasant conversational.  Nontoxic.  No family at bedside    Scheduled Meds:carvedilol, 6.25 mg, Oral, BID With Meals  furosemide, 40 mg, Intravenous, Q8H  insulin lispro, 0-9 Units, Subcutaneous, TID With Meals  isosorbide-hydrALAZINE (BIDIL) 20-37.5 mg combo dose, , Oral, Q8H  spironolactone, 25 mg, Oral, Daily      Continuous Infusions:     Vital signs in last 24 hours:  Temp:  [97.5 °F (36.4 °C)-98.3 °F (36.8 °C)] 98 °F (36.7 °C)  Heart Rate:  [84-99] 86  Resp:  [14-18] 18  BP: (142-192)/() 145/87    Intake/Output:  No intake or output data in the 24 hours ending 22 1415    Exam:  /87 (BP Location: Left arm)   Pulse 86   Temp 98 °F (36.7 °C) (Oral)   Resp 18   Ht 167.6 cm (66\")   Wt 124 kg (272 lb 12.8 oz)   SpO2 97%   BMI 44.03 kg/m²     General Appearance:    Alert, cooperative, AAOx3, obese with acanthosis nigracans changes                          Head:    Normocephalic, without obvious " abnormality, atraumatic                           Eyes:    PERRL, conjunctivae/corneas clear, EOM's intact, both eyes                         Throat:   Oral mucosa pink and moist                           Neck:   Supple, unable to visualize JVD secondary to large diameter neck                         Lungs:    Moving pretty decent air entry to bases with diffuse Rales to auscultation bilaterally, respirations unlabored                 Chest Wall:    No tenderness or deformity                          Heart:    Regular rate and rhythm, S1 and S2 normal                  Abdomen:     Soft, nontender, bowel sounds active                  Extremities: Moving all, no cyanosis with 2-3+ pitting edema also involving thighs and abdominal wall                        Pulses:   Pulses palpable in all extremities                 Skin:    Lots of tattoo ink.  None of which gang-related per discussion with patient                   Neurologic:   CNII-XII intact, moving all with adequate strength    Data Review:  Labs in chart were reviewed.    Assessment:  Active Hospital Problems    Diagnosis  POA   • **Acute pulmonary edema (HCC) [J81.0]  Unknown   • Elevated troponin [R77.8]  Unknown   • Diastolic CHF, acute on chronic (HCC) [I50.33]  Unknown   • Type 2 diabetes mellitus with circulatory disorder (HCC) [E11.59]  Unknown   • Type 2 diabetes mellitus with renal complication (HCC) [E11.29]  Unknown   • Medically noncompliant [Z91.19]  Not Applicable   • COVID-19 [U07.1]  Yes      Resolved Hospital Problems   No resolved problems to display.       Plan:    Medical noncompliance feeling all of his current acute on chronic illnesses.    Patient having pulmonary edema with my concerns for acute on chronic diastolic dysfunction but we are awaiting further review of echocardiogram per cardiology as CHF may likely be biventricular involving both systolic and diastolic function.  After further discussion with echo tech perhaps there is  good to be a lot more pathology noted on that echo as there were concerns for wall motion abnormalities and significantly depressed EF    Increase Lasix from twice daily dosing to 3 times daily and monitor ins and outs quickly -patient already noting clinical improvement in regards to breathing since diuresis was resumed.   -Will monitor uric    Already on a beta-blocker now and I also see where patient is now on spironolactone and Isordil    His hypertension was causing emergency at admission with subsequent organ failure leading to cardiac and renal dysfunction.  Initial blood pressure was 192/195 and currently 145/87    COVID is an incidental finding -I am not sure he is very symptomatic from this.  Further enhanced precautions and isolation per hospital policy    DM2 with circulatory disorder/A1c of 7.7   -Suggest Januvia or metformin in the near future but will hold off now in case we have further contrasted studies   -Sliding scale adequate for now as blood sugars are not terrible with current reading of 145    HLD -needs statin initiation and will start 40 mg nightly    SCDs for DVT prophylaxis    Fuentes Leung MD  6/5/2022  14:15 EDT

## 2022-06-05 NOTE — PLAN OF CARE
Problem: Adult Inpatient Plan of Care  Goal: Plan of Care Review  Flowsheets (Taken 6/5/2022 1517)  Outcome Evaluation: AOX4, no c/o painor SOA. 2L NC, Independent on all ADL's.Echo done today.Bowel Movement today.   Goal Outcome Evaluation:              Outcome Evaluation: AOX4, no c/o painor SOA. 2L NC, Independent on all ADL's.Echo done today.Bowel Movement today.

## 2022-06-05 NOTE — CONSULTS
Patient Name: Derick Dorsey  :1985  36 y.o.    Date of Admission: 2022  Date of Consultation:  22  Encounter Provider: Ameena Hoff MD  Place of Service: [facility] CARDIOLOGY  Referring Provider: Jennyfer Simons MD  Patient Care Team:  Provider, No Known as PCP - General      Chief complaint: dyspnea on exertion    Reason for consultation: CHF     History of Present Illness:      This is a 36-year-old male with history of hypertension, DM, obesity and CABG x3 with Dr. Key who presented to the emergency department increasing dyspnea on exertion and swelling of his legs.  His dyspnea was better with rest but not resolved.  He did have a mild nonproductive cough.  He had previously been on diuretics but these been discontinued for at least 2 to 3 months because he did not get a refill on the prescription.    Upon arrival, he was hypertensive with a blood pressure 192/105, ECG showed sinus rhythm with left atrial enlargement and nonspecific intraventricular conduction delay.  Chest x-ray showed low lung volumes with interstitial abnormalities possible pulmonary edema.  BNP was 4193 with a troponin 0.039 creatinine 1.89 and TSH at 3.16.  He was COVID-positive.  Hemoglobin A1c is 7.7, magnesium 2.4, normal procalcitonin, normal CBC.  He received 80 mg of IV Lasix in the emergency department.  Blood pressure on arrival was 190/105 with a heart rate of 97, blood pressure this morning is 162/99 with a heart rate of 82.    Echo done 2022 was technically difficult but did show moderately enlarged left ventricle with ejection fraction of 20%, severe global hypokinesis, decreased right ventricular systolic function, grade 2 diastolic dysfunction and a mild to moderate sized pericardial effusion without clear evidence of tamponade.    He tried to  his diuretic but could not get it refilled and so was not taking it.  He then noticed 5 days of  progressive short windedness and gained 20 pounds of fluid.  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  He tries really watch his diet and mainly eats just vegetables.  Not sure if he has been taking his other medications or not.  Since diuresing him here, he feels better.  He has no further chest pain or pressure and does not feels heart racing or skipping.     He had a cardiac catheterization done 12/4/2021 showing normal left main, occluded mid LAD, 70% diffuse circumflex stenosis, 90% OM1 stenosis, 100% proximal occlusion of the RCA, LIMA to LAD with a 90% insertion point stenosis, patent SVG to OM1 and OM 2.  He received a 2.5 x 8 mm Xience drug-eluting stent to the mid LAD insertion point.  Echocardiogram done 8/2021 showed ejection fraction 37%.  Creatinine baseline is followed at 1.6 based on labs from 12/2021.      He has 2 charts in the system now and he has seen Dr. Menon as well as Amarilis Umanzor in heart failure in the past.  The chart labeled Rufus Dorsey has all of his past medical history and testing.  I think his name was spelled wrong when he came in this time.  Also all of his records from [facility] [place] are under Rufus Dorsey.       Cardiac testing:  None available for review.     Past Medical History:   Diagnosis Date   • Hypertension        Past Surgical History:   Procedure Laterality Date   • CARDIAC SURGERY           Prior to Admission medications    Not on File       No Known Allergies    Social History     Socioeconomic History   • Marital status: Single   Tobacco Use   • Smoking status: Never Smoker   Substance and Sexual Activity   • Alcohol use: Never       History reviewed. No pertinent family history.    REVIEW OF SYSTEMS:   All systems reviewed.  Pertinent positives identified in HPI.  All other systems are negative.      Objective:     Vitals:    06/05/22 0820 06/05/22 0822 06/05/22  1427 06/05/22 1431   BP: (!) 153/104 145/87 (!) 155/107 162/99   BP Location: Right arm Left arm Left arm    Patient Position:   Sitting    Pulse:   82    Resp:   18    Temp:   97 °F (36.1 °C)    TempSrc:   Oral    SpO2:   99%    Weight:       Height:         Body mass index is 44.03 kg/m².    General Appearance:    Alert, cooperative, in no acute distress   Head:    Normocephalic, without obvious abnormality, atraumatic   Eyes:            Lids and lashes normal, conjunctivae and sclerae normal, no icterus, no pallor   Ears:    Ears appear intact with no abnormalities noted   Throat:   No oral lesions, no thrush, oral mucosa moist   Neck:   No adenopathy, supple, trachea midline, no thyromegaly, no carotid bruit, no JVD   Back:     No kyphosis present, no scoliosis present, no skin lesions, erythema or scars, no tenderness to palpation, range of motion normal   Lungs:     Clear to auscultation, respirations regular, even and unlabored    Heart:    Regular rhythm and normal rate, normal S1 and S2, no murmur, no gallop, no rub, no click   Chest Wall:    No abnormalities observed   Abdomen:     Normal bowel sounds, no masses, no organomegaly, soft, nontender, nondistended, no guarding, no rebound  tenderness   Extremities:   Moves all extremities well, no edema, no cyanosis, no redness   Pulses:   Pulses palpable and equal bilaterally. Normal radial, carotid, dorsalis pedis and posterior tibial pulses bilaterally.    Skin:  Psychiatric:   No bleeding, bruising or rash    Alert and oriented x 3, normal mood and affect   Lab Review:     Results from last 7 days   Lab Units 06/04/22  1620   SODIUM mmol/L 138   POTASSIUM mmol/L 3.8   CHLORIDE mmol/L 104   CO2 mmol/L 26.6   BUN mg/dL 28*   CREATININE mg/dL 1.89*   CALCIUM mg/dL 8.0*   BILIRUBIN mg/dL 0.4   ALK PHOS U/L 108   ALT (SGPT) U/L 18   AST (SGOT) U/L 17   GLUCOSE mg/dL 167*     Results from last 7 days   Lab Units 06/05/22  0724 06/04/22  1620   TROPONIN T ng/mL  0.036* 0.039*     Results from last 7 days   Lab Units 06/04/22  1620   WBC 10*3/mm3 9.87   HEMOGLOBIN g/dL 14.3   HEMATOCRIT % 42.4   PLATELETS 10*3/mm3 214         Results from last 7 days   Lab Units 06/04/22  1620   MAGNESIUM mg/dL 2.4     Results from last 7 days   Lab Units 06/05/22  0724   CHOLESTEROL mg/dL 297*   TRIGLYCERIDES mg/dL 146   HDL CHOL mg/dL 38*   LDL CHOL mg/dL 231*             EKG        I personally viewed and interpreted the patient's EKG/Telemetry data.        Assessment and Plan:       1. Acute HFrEF/HFpEF- he really has both.  In addition, he probably has biventricular failure with right ventricle is not well visualized., check echo, continue diuresis, start coreg and bidil.   2. covid +  3. Hypertensive urgency, start spironolactone.   4. CKD  - baseline 1.6.   5. Diabetes mellitus-elevated hemoglobin A1c  6. Obesity  7. Probable sleep apnea  8. CAD, with h/o CABG, s/p ROSEY to LIMA to LAD insertion 12/2021.  9. Hyperlipidemia - change to rosuvastatin 40mg nightly.       Continue diuresis and try to treat blood pressure.  Will refer back to heart failure.  Dr. Menon is his cardiologist here at [facility].      Has 2 charts - prior records under Rufus Dorsey.     Ameena Hoff MD  06/05/22  14:37 EDT

## 2022-06-05 NOTE — PLAN OF CARE
Problem: Fall Injury Risk  Goal: Absence of Fall and Fall-Related Injury  Outcome: Ongoing, Progressing  Intervention: Identify and Manage Contributors  Recent Flowsheet Documentation  Taken 6/5/2022 0417 by Aundrea Burris RN  Medication Review/Management: medications reviewed  Taken 6/5/2022 0213 by Aundrea Burris RN  Medication Review/Management: medications reviewed  Taken 6/5/2022 0025 by Aundrea Burris RN  Medication Review/Management: medications reviewed  Taken 6/4/2022 2154 by Aundrea Burris RN  Medication Review/Management: medications reviewed  Intervention: Promote Injury-Free Environment  Recent Flowsheet Documentation  Taken 6/5/2022 0417 by Aundrea Burris RN  Safety Promotion/Fall Prevention:   activity supervised   assistive device/personal items within reach   clutter free environment maintained   fall prevention program maintained   lighting adjusted   nonskid shoes/slippers when out of bed   safety round/check completed   room organization consistent  Taken 6/5/2022 0213 by Aundrea Burris RN  Safety Promotion/Fall Prevention:   activity supervised   assistive device/personal items within reach   clutter free environment maintained   fall prevention program maintained   lighting adjusted   nonskid shoes/slippers when out of bed   room organization consistent   safety round/check completed  Taken 6/5/2022 0025 by Aundrea Burris RN  Safety Promotion/Fall Prevention:   activity supervised   assistive device/personal items within reach   clutter free environment maintained   fall prevention program maintained   lighting adjusted   nonskid shoes/slippers when out of bed   room organization consistent   safety round/check completed  Taken 6/4/2022 2154 by Aundrea Burris RN  Safety Promotion/Fall Prevention:   activity supervised   assistive device/personal items within reach   clutter free environment maintained   fall prevention program maintained   lighting adjusted   nonskid shoes/slippers when out of bed    room organization consistent   safety round/check completed     Problem: Adult Inpatient Plan of Care  Goal: Plan of Care Review  Outcome: Ongoing, Progressing  Flowsheets (Taken 6/5/2022 0438)  Progress: no change  Plan of Care Reviewed With: patient  Outcome Evaluation:   Patient admitted from ED with CHF/ Covid positive. Since arrival to unit, vital signs WDL. 02@2L NC   tolerated. Fall precautions maintained. Cardiology consulted to see this AM.Will cont to monitor.  Goal: Patient-Specific Goal (Individualized)  Outcome: Ongoing, Progressing  Goal: Absence of Hospital-Acquired Illness or Injury  Outcome: Ongoing, Progressing  Intervention: Identify and Manage Fall Risk  Recent Flowsheet Documentation  Taken 6/5/2022 0417 by Aundrea Burris, RN  Safety Promotion/Fall Prevention:   activity supervised   assistive device/personal items within reach   clutter free environment maintained   fall prevention program maintained   lighting adjusted   nonskid shoes/slippers when out of bed   safety round/check completed   room organization consistent  Taken 6/5/2022 0213 by Aundrea Burris, DONALDO  Safety Promotion/Fall Prevention:   activity supervised   assistive device/personal items within reach   clutter free environment maintained   fall prevention program maintained   lighting adjusted   nonskid shoes/slippers when out of bed   room organization consistent   safety round/check completed  Taken 6/5/2022 0025 by Aundrea Burris, RN  Safety Promotion/Fall Prevention:   activity supervised   assistive device/personal items within reach   clutter free environment maintained   fall prevention program maintained   lighting adjusted   nonskid shoes/slippers when out of bed   room organization consistent   safety round/check completed  Taken 6/4/2022 2154 by Aundrea Burris, RN  Safety Promotion/Fall Prevention:   activity supervised   assistive device/personal items within reach   clutter free environment maintained   fall prevention  program maintained   lighting adjusted   nonskid shoes/slippers when out of bed   room organization consistent   safety round/check completed  Intervention: Prevent Skin Injury  Recent Flowsheet Documentation  Taken 6/5/2022 0417 by Aundrea Burris RN  Body Position:   position changed independently   supine  Taken 6/5/2022 0213 by Aundrea Burris RN  Body Position:   position changed independently   supine  Taken 6/5/2022 0025 by Aundrea Burris RN  Body Position:   position changed independently   right   side-lying  Skin Protection:   adhesive use limited   transparent dressing maintained   tubing/devices free from skin contact  Taken 6/4/2022 2154 by Aundrea Burris RN  Body Position:   position changed independently   right   side-lying  Skin Protection:   adhesive use limited   incontinence pads utilized   transparent dressing maintained   tubing/devices free from skin contact  Intervention: Prevent and Manage VTE (Venous Thromboembolism) Risk  Recent Flowsheet Documentation  Taken 6/5/2022 0417 by Aundrea Burris RN  Activity Management: activity adjusted per tolerance  Taken 6/5/2022 0213 by Aundrea Burris RN  Activity Management: activity adjusted per tolerance  Taken 6/5/2022 0025 by Aundrea Burris RN  Activity Management: activity adjusted per tolerance  VTE Prevention/Management:   bilateral   dorsiflexion/plantar flexion performed  Taken 6/4/2022 2154 by Aundrea Burris RN  Activity Management: activity adjusted per tolerance  VTE Prevention/Management:   bilateral   dorsiflexion/plantar flexion performed  Intervention: Prevent Infection  Recent Flowsheet Documentation  Taken 6/5/2022 0417 by Aundrea Burris RN  Infection Prevention:   hand hygiene promoted   personal protective equipment utilized   rest/sleep promoted   single patient room provided  Taken 6/5/2022 0213 by Aundrea Burris RN  Infection Prevention:   personal protective equipment utilized   rest/sleep promoted   single patient room provided   visitors  restricted/screened   hand hygiene promoted  Taken 6/5/2022 0025 by Aundrea Burris RN  Infection Prevention:   hand hygiene promoted   personal protective equipment utilized   rest/sleep promoted   single patient room provided  Taken 6/4/2022 2154 by Aundrea Burris RN  Infection Prevention:   personal protective equipment utilized   rest/sleep promoted   single patient room provided   hand hygiene promoted  Goal: Optimal Comfort and Wellbeing  Outcome: Ongoing, Progressing  Intervention: Provide Person-Centered Care  Recent Flowsheet Documentation  Taken 6/5/2022 0025 by Aundrea Burris RN  Trust Relationship/Rapport:   care explained   reassurance provided   thoughts/feelings acknowledged  Taken 6/4/2022 2154 by Aundrea Burris RN  Trust Relationship/Rapport:   care explained   reassurance provided   thoughts/feelings acknowledged  Goal: Readiness for Transition of Care  Outcome: Ongoing, Progressing  Intervention: Mutually Develop Transition Plan  Recent Flowsheet Documentation  Taken 6/4/2022 2159 by Aundrea Burris RN  Transportation Anticipated: family or friend will provide  Patient/Family Anticipated Services at Transition:   Patient/Family Anticipates Transition to: home with family  Taken 6/4/2022 2156 by Aundrea Burris RN  Equipment Currently Used at Home: glucometer   Goal Outcome Evaluation:  Plan of Care Reviewed With: patient        Progress: no change  Outcome Evaluation: Patient admitted from ED with CHF/ Covid positive. Since arrival to unit, vital signs WDL. 02@2L NC; tolerated. Fall precautions maintained. Cardiology consulted to see this AM.Will cont to monitor.

## 2022-06-06 PROBLEM — I50.43 ACUTE ON CHRONIC COMBINED SYSTOLIC AND DIASTOLIC CHF (CONGESTIVE HEART FAILURE): Status: ACTIVE | Noted: 2022-06-06

## 2022-06-06 LAB
ALBUMIN SERPL-MCNC: 2.9 G/DL (ref 3.5–5.2)
ANION GAP SERPL CALCULATED.3IONS-SCNC: 8.7 MMOL/L (ref 5–15)
BUN SERPL-MCNC: 33 MG/DL (ref 6–20)
BUN/CREAT SERPL: 18.5 (ref 7–25)
CALCIUM SPEC-SCNC: 8.1 MG/DL (ref 8.6–10.5)
CHLORIDE SERPL-SCNC: 99 MMOL/L (ref 98–107)
CO2 SERPL-SCNC: 27.3 MMOL/L (ref 22–29)
CREAT SERPL-MCNC: 1.78 MG/DL (ref 0.76–1.27)
EGFRCR SERPLBLD CKD-EPI 2021: 50.1 ML/MIN/1.73
GLUCOSE BLDC GLUCOMTR-MCNC: 127 MG/DL (ref 70–130)
GLUCOSE BLDC GLUCOMTR-MCNC: 133 MG/DL (ref 70–130)
GLUCOSE BLDC GLUCOMTR-MCNC: 163 MG/DL (ref 70–130)
GLUCOSE BLDC GLUCOMTR-MCNC: 197 MG/DL (ref 70–130)
GLUCOSE SERPL-MCNC: 159 MG/DL (ref 65–99)
PHOSPHATE SERPL-MCNC: 3.7 MG/DL (ref 2.5–4.5)
POTASSIUM SERPL-SCNC: 3.6 MMOL/L (ref 3.5–5.2)
SODIUM SERPL-SCNC: 135 MMOL/L (ref 136–145)
URATE SERPL-MCNC: 6 MG/DL (ref 3.4–7)

## 2022-06-06 PROCEDURE — 25010000002 FUROSEMIDE PER 20 MG: Performed by: HOSPITALIST

## 2022-06-06 PROCEDURE — 84550 ASSAY OF BLOOD/URIC ACID: CPT | Performed by: HOSPITALIST

## 2022-06-06 PROCEDURE — 99232 SBSQ HOSP IP/OBS MODERATE 35: CPT | Performed by: INTERNAL MEDICINE

## 2022-06-06 PROCEDURE — 80069 RENAL FUNCTION PANEL: CPT | Performed by: HOSPITALIST

## 2022-06-06 PROCEDURE — 82962 GLUCOSE BLOOD TEST: CPT

## 2022-06-06 PROCEDURE — 63710000001 INSULIN LISPRO (HUMAN) PER 5 UNITS: Performed by: INTERNAL MEDICINE

## 2022-06-06 RX ORDER — POTASSIUM CHLORIDE 750 MG/1
40 TABLET, FILM COATED, EXTENDED RELEASE ORAL EVERY 4 HOURS
Status: COMPLETED | OUTPATIENT
Start: 2022-06-06 | End: 2022-06-06

## 2022-06-06 RX ADMIN — INSULIN LISPRO 2 UNITS: 100 INJECTION, SOLUTION INTRAVENOUS; SUBCUTANEOUS at 12:15

## 2022-06-06 RX ADMIN — CARVEDILOL 12.5 MG: 12.5 TABLET, FILM COATED ORAL at 09:20

## 2022-06-06 RX ADMIN — POTASSIUM CHLORIDE 40 MEQ: 750 TABLET, EXTENDED RELEASE ORAL at 17:35

## 2022-06-06 RX ADMIN — POTASSIUM CHLORIDE 40 MEQ: 750 TABLET, EXTENDED RELEASE ORAL at 14:48

## 2022-06-06 RX ADMIN — HYDRALAZINE HYDROCHLORIDE: 25 TABLET, FILM COATED ORAL at 14:48

## 2022-06-06 RX ADMIN — FUROSEMIDE 40 MG: 10 INJECTION, SOLUTION INTRAMUSCULAR; INTRAVENOUS at 22:19

## 2022-06-06 RX ADMIN — SPIRONOLACTONE 25 MG: 25 TABLET, FILM COATED ORAL at 09:20

## 2022-06-06 RX ADMIN — ROSUVASTATIN CALCIUM 40 MG: 40 TABLET, FILM COATED ORAL at 20:44

## 2022-06-06 RX ADMIN — FUROSEMIDE 40 MG: 10 INJECTION, SOLUTION INTRAMUSCULAR; INTRAVENOUS at 14:48

## 2022-06-06 RX ADMIN — FUROSEMIDE 40 MG: 10 INJECTION, SOLUTION INTRAMUSCULAR; INTRAVENOUS at 06:56

## 2022-06-06 RX ADMIN — CARVEDILOL 12.5 MG: 12.5 TABLET, FILM COATED ORAL at 17:35

## 2022-06-06 RX ADMIN — HYDRALAZINE HYDROCHLORIDE: 25 TABLET, FILM COATED ORAL at 22:19

## 2022-06-06 RX ADMIN — HYDRALAZINE HYDROCHLORIDE: 25 TABLET, FILM COATED ORAL at 06:56

## 2022-06-06 NOTE — PLAN OF CARE
Problem: Adult Inpatient Plan of Care  Goal: Plan of Care Review  6/6/2022 1649 by Geri Wan, RN  Flowsheets (Taken 6/6/2022 1649)  Outcome Evaluation: VSS, 2L,  Independent on all ADL's, IV Lasix given.  6/6/2022 1432 by Geri Wan, RN  Outcome: Ongoing, Progressing   Goal Outcome Evaluation:              Outcome Evaluation: VSS, 2L,  Independent on all ADL's, IV Lasix given.

## 2022-06-06 NOTE — PLAN OF CARE
Problem: Adult Inpatient Plan of Care  Goal: Plan of Care Review  Outcome: Ongoing, Progressing  Flowsheets (Taken 6/6/2022 2853)  Progress: no change  Plan of Care Reviewed With: patient  Outcome Evaluation:   VSS, BP good overnight   2L NC O2   up ad valente   SR on monitor   no acute distress noted   will cont to monitor   Goal Outcome Evaluation:  Plan of Care Reviewed With: patient        Progress: no change  Outcome Evaluation: VSS, BP good overnight; 2L NC O2; up ad valente; SR on monitor; no acute distress noted; will cont to monitor

## 2022-06-06 NOTE — PROGRESS NOTES
Discharge Planning Assessment  [facility]     Patient Name: Rufus Dorsey  MRN: [id number]  Today's Date: 6/6/2022    Admit Date: 6/4/2022     Discharge Needs Assessment     Row Name 06/06/22 1449       Living Environment    People in Home alone    Current Living Arrangements home    Primary Care Provided by self    Provides Primary Care For no one    Family Caregiver if Needed sibling(s)    Family Caregiver Names brother Kar    Quality of Family Relationships involved    Able to Return to Prior Arrangements yes       Resource/Environmental Concerns    Resource/Environmental Concerns financial    Financial Concerns insurance, none       Transition Planning    Patient/Family Anticipates Transition to home with family    Patient/Family Anticipated Services at Transition none    Transportation Anticipated family or friend will provide       Discharge Needs Assessment    Readmission Within the Last 30 Days no previous admission in last 30 days    Concerns to be Addressed adjustment to diagnosis/illness;financial/insurance;compliance issue               Discharge Plan     Row Name 06/06/22 1456       Plan    Plan Comments Attempted to call room x2 no answer. Met with patient at the bedside (PPE worn per protocol). Face sheet verified with patient. Patient lives with his brother and his brother's family. He is normally IADL. He has a glucometer, no other DME. He is planning to return home with the help of his family. He denies dc needs at this time.  He does not have a PCP.  If there are any  home o2 or DME needs they will be self pay as pt does not have any insurance. No SSN unable to make MA99 Med Assist notified per /Arden. Med Assist notes patient is over income and does not qualify for any assistance programs. Provider form given to patient. Meds to bed to check cost of any new dc meds. S/w Kai, self pay cost for home o2 through [employer] is approx $100/month.  CCP will follow progress.     Row Name 06/06/22 1455       Plan    Plan Home              Continued Care and Services - Admitted Since 6/4/2022    Coordination has not been started for this encounter.          Demographic Summary    No documentation.                Functional Status     Row Name 06/06/22 1450       Functional Status    Usual Activity Tolerance moderate       Assessment of Health Literacy    Health Literacy Fair       Functional Status, IADL    Medications independent    Meal Preparation independent    Housekeeping independent    Laundry independent    Shopping independent               Psychosocial    No documentation.                Abuse/Neglect    No documentation.                Legal    No documentation.                Substance Abuse    No documentation.                Patient Forms    No documentation.                   Chelsie Shepherd RN

## 2022-06-06 NOTE — PROGRESS NOTES
"    DAILY PROGRESS NOTE  [facility]    Patient Identification:  Name: Rufus Dorsey  Age: 36 y.o.  Sex: male  :  1985  MRN: [id number]         Primary Care Physician: Provider, No Known    Subjective:  Interval History: Feeling much better overall.  He still has some shortness of breath but he is breathing much better.  He is happy with his urine output as well as his problems with swelling.  He denies any confusion nausea vomiting diarrhea.  His previous issues of chest discomfort are resolved.    Objective: Sitting up on side of the bed conversational and pleasant.  Nontoxic in appearance.  Counseled patient to keep legs elevated as opposed to hanging down given need for diuresis and edema    Scheduled Meds:carvedilol, 12.5 mg, Oral, BID With Meals  furosemide, 40 mg, Intravenous, Q8H  insulin lispro, 0-9 Units, Subcutaneous, TID With Meals  isosorbide-hydrALAZINE (BIDIL) 20-37.5 mg combo dose, , Oral, Q8H  rosuvastatin, 40 mg, Oral, Nightly  spironolactone, 25 mg, Oral, Daily      Continuous Infusions:     Vital signs in last 24 hours:  Temp:  [97 °F (36.1 °C)-98.5 °F (36.9 °C)] 98.2 °F (36.8 °C)  Heart Rate:  [67-87] 85  Resp:  [18-20] 18  BP: (125-162)/() 128/65    Intake/Output:  No intake or output data in the 24 hours ending 22 0945    Exam:  /65 (BP Location: Left arm, Patient Position: Lying)   Pulse 85   Temp 98.2 °F (36.8 °C) (Oral)   Resp 18   Ht 167.6 cm (66\")   Wt 122 kg (268 lb 11.2 oz)   SpO2 97%   BMI 43.37 kg/m²     General Appearance:    Alert, cooperative, AAOx3                          Head:    Normocephalic, without obvious abnormality, atraumatic                           Eyes:    Conjunctivae/corneas clear, EOM's intact, both eyes                         Throat:   Oral mucosa pink and moist                           Neck:   No JVD                         Lungs:    Much improved air entry overall clear with very subtle rales at bases to " auscultation bilaterally, respirations unlabored                 Chest Wall:    No tenderness or deformity                          Heart:    Regular rate and rhythm, S1 and S2 normal                  Abdomen:     Soft, nontender, bowel sounds active                 Extremities: Moving all with baseline strength, edema improving but still 2-3+ extending up thighs and into abdominal wall                        Pulses:   Pulses palpable in lower extremities                            Skin:   Skin is warm and dry, acanthosis nigracans                  Neurologic:   CNII-XII intact, no focal deficits noted     Data Review:  Labs in chart were reviewed.    Assessment:  Active Hospital Problems    Diagnosis  POA   • **Acute on chronic combined systolic and diastolic CHF (congestive heart failure) ([facility]) [I50.43]  Unknown   • Acute pulmonary edema ([facility]) [J81.0]  Unknown   • Elevated troponin [R77.8]  Unknown   • Type 2 diabetes mellitus with circulatory disorder ([facility]) [E11.59]  Unknown   • Type 2 diabetes mellitus with renal complication ([facility]) [E11.29]  Unknown   • Medically noncompliant [Z91.19]  Not Applicable   • COVID-19 [U07.1]  Yes      Resolved Hospital Problems   No resolved problems to display.       Plan:    Medical noncompliance feeling all of his current acute on chronic illnesses.     Acute on chronic both systolic and diastolic CHF with subsequent of pulmonary edema and anasarca   -Lasix increased 6/5/2022 to 3 times daily   -Unfortunately still awaiting a.m. labs so I can adjust medical management to ICU but I anticipate he can remain on 3 times daily dosing another day or 2 as there is still quite a bit of fluid to diurese if his creatinine and kidney function is tolerating   -Beta-blocker, spironolactone, Isordil   -Hypertensive emergency with subsequent organ failure improving -BP now therapeutic      COVID is an incidental finding -I am not sure he is very symptomatic from this.  Further enhanced  precautions and isolation per hospital policy     DM2 with circulatory disorder/A1c of 7.7              -Suggest Januvia or metformin in the near future but will hold off now in case we have further contrasted studies              -Sliding scale adequate for now as blood sugars are not terrible with current reading of 145   -BS overall stable -elevated numbers noted previously are secondary to someone bring in exogenous nondiabetic food into the hospital     HLD -initiated 40 mg nightly     SCDs for DVT prophylaxis    Disposition -patient is going to be in the hospital at least another 2 to 3 days      Addendum -a.m. labs have finally posted.  Creatinine 1.89-1.78 with a potassium of 3.6 and a bicarb of 27 and a uric acid of 6.0.  Renal disease/CKD secondary to diabetes but otherwise creatinine and uric are tolerating and will continue with aggressive diuresis.  Albumin 2.9.  Give 40 of K x2 4 hours apart we will continue diuresis and monitor RFP/trend uric    Fuentes Leung MD  6/6/2022  09:45 EDT

## 2022-06-06 NOTE — PROGRESS NOTES
"Patient Name: Rufus Dorsey  :1985  36 y.o.      Patient Care Team:  Provider, No Known as PCP - General    Interval History:   Receiving diuretics.      Subjective:  Following for heart failure       Objective   Vital Signs  Temp:  [97 °F (36.1 °C)-98.5 °F (36.9 °C)] 98.2 °F (36.8 °C)  Heart Rate:  [67-87] 85  Resp:  [18-20] 18  BP: (125-162)/() 128/65  No intake or output data in the 24 hours ending 22 1340  Flowsheet Rows    Flowsheet Row First Filed Value   Admission Height 167.6 cm (66\") Documented at 2022 1624   Admission Weight 118 kg (260 lb) Documented at 2022 1624          Physical Exam:   General Appearance:    Alert, cooperative, in no acute distress   Lungs:     Clear to auscultation.  Normal respiratory effort and rate.      Heart:    Regular rhythm and normal rate, normal S1 and S2, no murmurs, gallops or rubs.     Chest Wall:    No abnormalities observed   Abdomen:     Soft, nontender, positive bowel sounds.     Extremities:  2+ bilateral lower extremity edema     Results Review:    Results from last 7 days   Lab Units 22  1015   SODIUM mmol/L 135*   POTASSIUM mmol/L 3.6   CHLORIDE mmol/L 99   CO2 mmol/L 27.3   BUN mg/dL 33*   CREATININE mg/dL 1.78*   GLUCOSE mg/dL 159*   CALCIUM mg/dL 8.1*     Results from last 7 days   Lab Units 22  0724 22  1620   TROPONIN T ng/mL 0.036* 0.039*     Results from last 7 days   Lab Units 22  1620   WBC 10*3/mm3 9.87   HEMOGLOBIN g/dL 14.3   HEMATOCRIT % 42.4   PLATELETS 10*3/mm3 214         Results from last 7 days   Lab Units 22  0724   CHOLESTEROL mg/dL 297*     Results from last 7 days   Lab Units 22  1620   MAGNESIUM mg/dL 2.4     Results from last 7 days   Lab Units 22  0724   CHOLESTEROL mg/dL 297*   TRIGLYCERIDES mg/dL 146   HDL CHOL mg/dL 38*   LDL CHOL mg/dL 231*         Medication Review:   carvedilol, 12.5 mg, Oral, BID With Meals  furosemide, 40 mg, Intravenous, Q8H  insulin " lispro, 0-9 Units, Subcutaneous, TID With Meals  isosorbide-hydrALAZINE (BIDIL) 20-37.5 mg combo dose, , Oral, Q8H  potassium chloride, 40 mEq, Oral, Q4H  rosuvastatin, 40 mg, Oral, Nightly  spironolactone, 25 mg, Oral, Daily              Assessment & Plan     1.  Acute on chronic combined systolic and diastolic congestive heart failure.  Ejection fraction 21%.  Grade 3 diastolic dysfunction.  2.  Moderate pericardial effusion.  3.  Hypertension.  Now controlled on current medications.  4.  COVID-positive.  5.  Diabetes  6.  Hyperlipidemia  7.  Hypokalemia.  Noted replacement orders.  7.  Chronic kidney disease.  8.  Coronary artery disease with history of CABG and status post drug-eluting stent to the LIMA to the LAD in December 2021.    Continue diuretics.  Recheck labs in the morning.  He is a patient of  and the heart failure clinic.  He has 2 charts.    Sagrario Otoole MD, [facility]  06/06/22  13:40 EDT

## 2022-06-07 LAB
ALBUMIN SERPL-MCNC: 2.8 G/DL (ref 3.5–5.2)
ANION GAP SERPL CALCULATED.3IONS-SCNC: 7.2 MMOL/L (ref 5–15)
BUN SERPL-MCNC: 34 MG/DL (ref 6–20)
BUN/CREAT SERPL: 19.1 (ref 7–25)
CALCIUM SPEC-SCNC: 8 MG/DL (ref 8.6–10.5)
CHLORIDE SERPL-SCNC: 102 MMOL/L (ref 98–107)
CO2 SERPL-SCNC: 28.8 MMOL/L (ref 22–29)
CREAT SERPL-MCNC: 1.78 MG/DL (ref 0.76–1.27)
EGFRCR SERPLBLD CKD-EPI 2021: 50.1 ML/MIN/1.73
GLUCOSE BLDC GLUCOMTR-MCNC: 136 MG/DL (ref 70–130)
GLUCOSE BLDC GLUCOMTR-MCNC: 152 MG/DL (ref 70–130)
GLUCOSE BLDC GLUCOMTR-MCNC: 159 MG/DL (ref 70–130)
GLUCOSE BLDC GLUCOMTR-MCNC: 161 MG/DL (ref 70–130)
GLUCOSE SERPL-MCNC: 161 MG/DL (ref 65–99)
PHOSPHATE SERPL-MCNC: 4 MG/DL (ref 2.5–4.5)
POTASSIUM SERPL-SCNC: 3.9 MMOL/L (ref 3.5–5.2)
SODIUM SERPL-SCNC: 138 MMOL/L (ref 136–145)
URATE SERPL-MCNC: 6.5 MG/DL (ref 3.4–7)

## 2022-06-07 PROCEDURE — 63710000001 INSULIN LISPRO (HUMAN) PER 5 UNITS: Performed by: INTERNAL MEDICINE

## 2022-06-07 PROCEDURE — 83880 ASSAY OF NATRIURETIC PEPTIDE: CPT | Performed by: INTERNAL MEDICINE

## 2022-06-07 PROCEDURE — 84550 ASSAY OF BLOOD/URIC ACID: CPT | Performed by: HOSPITALIST

## 2022-06-07 PROCEDURE — 80069 RENAL FUNCTION PANEL: CPT | Performed by: HOSPITALIST

## 2022-06-07 PROCEDURE — 99232 SBSQ HOSP IP/OBS MODERATE 35: CPT | Performed by: INTERNAL MEDICINE

## 2022-06-07 PROCEDURE — 82962 GLUCOSE BLOOD TEST: CPT

## 2022-06-07 PROCEDURE — 25010000002 FUROSEMIDE PER 20 MG: Performed by: HOSPITALIST

## 2022-06-07 RX ADMIN — FUROSEMIDE 40 MG: 10 INJECTION, SOLUTION INTRAMUSCULAR; INTRAVENOUS at 06:21

## 2022-06-07 RX ADMIN — HYDRALAZINE HYDROCHLORIDE: 25 TABLET, FILM COATED ORAL at 14:49

## 2022-06-07 RX ADMIN — INSULIN LISPRO 2 UNITS: 100 INJECTION, SOLUTION INTRAVENOUS; SUBCUTANEOUS at 17:13

## 2022-06-07 RX ADMIN — INSULIN LISPRO 2 UNITS: 100 INJECTION, SOLUTION INTRAVENOUS; SUBCUTANEOUS at 08:37

## 2022-06-07 RX ADMIN — CARVEDILOL 12.5 MG: 12.5 TABLET, FILM COATED ORAL at 08:37

## 2022-06-07 RX ADMIN — FUROSEMIDE 40 MG: 10 INJECTION, SOLUTION INTRAMUSCULAR; INTRAVENOUS at 14:49

## 2022-06-07 RX ADMIN — CARVEDILOL 12.5 MG: 12.5 TABLET, FILM COATED ORAL at 17:13

## 2022-06-07 RX ADMIN — SPIRONOLACTONE 25 MG: 25 TABLET, FILM COATED ORAL at 08:37

## 2022-06-07 RX ADMIN — ROSUVASTATIN CALCIUM 40 MG: 40 TABLET, FILM COATED ORAL at 20:49

## 2022-06-07 RX ADMIN — HYDRALAZINE HYDROCHLORIDE: 25 TABLET, FILM COATED ORAL at 20:49

## 2022-06-07 RX ADMIN — HYDRALAZINE HYDROCHLORIDE: 25 TABLET, FILM COATED ORAL at 06:21

## 2022-06-07 RX ADMIN — FUROSEMIDE 40 MG: 10 INJECTION, SOLUTION INTRAMUSCULAR; INTRAVENOUS at 20:49

## 2022-06-07 NOTE — PROGRESS NOTES
"    DAILY PROGRESS NOTE  [facility]    Patient Identification:  Name: Rufus Dorsey  Age: 36 y.o.  Sex: male  :  1985  MRN: [id number]         Primary Care Physician: Provider, No Known    Subjective:  Interval History: Feels much better.  Denies chest pain.  Urinating copiously.  Denies nausea vomiting.  Shortness of breath is much improved.  Denies fever chills.    Objective: Does not appear in any significant distress.  On iPhone.  Nontoxic.  No family at bedside    Scheduled Meds:carvedilol, 12.5 mg, Oral, BID With Meals  furosemide, 40 mg, Intravenous, Q8H  insulin lispro, 0-9 Units, Subcutaneous, TID With Meals  isosorbide-hydrALAZINE (BIDIL) 20-37.5 mg combo dose, , Oral, Q8H  rosuvastatin, 40 mg, Oral, Nightly  spironolactone, 25 mg, Oral, Daily      Continuous Infusions:     Vital signs in last 24 hours:  Temp:  [96 °F (35.6 °C)-97.5 °F (36.4 °C)] 97.3 °F (36.3 °C)  Heart Rate:  [75-81] 78  Resp:  [18] 18  BP: (101-120)/(58-81) 114/70    Intake/Output:  No intake or output data in the 24 hours ending 22 1304    Exam:  /70 (BP Location: Left arm, Patient Position: Lying)   Pulse 78   Temp 97.3 °F (36.3 °C) (Oral)   Resp 18   Ht 167.6 cm (66\")   Wt 122 kg (268 lb 15.4 oz)   SpO2 97%   BMI 43.41 kg/m²     General Appearance:    Alert, cooperative, AAOx3                          Head:    Normocephalic, without obvious abnormality, atraumatic                           Eyes:    Conjunctivae/corneas clear, EOM's intact, both eyes                         Throat:   Oral mucosa pink and moist                           Neck:   Supple, no JVD                         Lungs:    Rales appear to be resolving as he is overall clear to auscultation bilaterally, respirations unlabored and no conversational dyspnea                 Chest Wall:    No tenderness or deformity                          Heart:    Regular rate and rhythm, S1 and S2 normal                  Abdomen:     Soft, " nontender, bowel sounds active, resolving body wall edema                 Extremities: Moving all with appropriate strength, no cyanosis with improving 3+ edema now down to 1+                        Pulses:   Pulses palpable in lower extremities                            Skin:   Skin is warm and dry, acanthosis nigracans                  neurologic:   CNII-XII intact, no focal deficits noted     Data Review:  Labs in chart were reviewed.    Assessment:  Active Hospital Problems    Diagnosis  POA   • **Acute on chronic combined systolic and diastolic CHF (congestive heart failure) ([facility]) [I50.43]  Unknown   • Acute pulmonary edema ([facility]) [J81.0]  Unknown   • Elevated troponin [R77.8]  Unknown   • Type 2 diabetes mellitus with circulatory disorder ([facility]) [E11.59]  Unknown   • Type 2 diabetes mellitus with renal complication ([facility]) [E11.29]  Unknown   • Medically noncompliant [Z91.19]  Not Applicable   • COVID-19 [U07.1]  Yes      Resolved Hospital Problems   No resolved problems to display.       Plan:    Creatinine and uric acid continue to tolerate Lasix at 40 mg IV every 8 we will continue another day but likely can switch over to p.o. equivalent tomorrow   -Cardiology consulted and following   -Highly abnormal echo with suppressed EF/biventricular dysfunction compounded by moderate pericardial effusion   -Resolving pulmonary edema/anasarca   -Driven by hypertensive emergency with subsequent organ failure   -BP much more controlled on beta-blocker/spironolactone/Lasix/Isordil    COVID is an incidental finding -I am not sure he is very symptomatic from this.  Further enhanced precautions and isolation per hospital policy     DM2 with circulatory disorder/A1c of 7.7              -Suggest Januvia or metformin in the near future but will hold off now in case we have further contrasted studies              -Sliding scale adequate for now as blood sugars are not overall stable as long as people do not continue to bring in  exogenous nondiabetic food into the hospital     HLD -initiated 40 mg nightly     SCDs for DVT prophylaxis       Fuentes Leung MD  6/7/2022  13:04 EDT

## 2022-06-07 NOTE — PLAN OF CARE
Problem: Adult Inpatient Plan of Care  Goal: Plan of Care Review  6/7/2022 1654 by Geri Wan, RN  Flowsheets (Taken 6/7/2022 1654)  Outcome Evaluation: VSS, IV Lasix, 2L NC, Independent on all ADL's, No c/o pain or SOA.  6/7/2022 1330 by Geri Wan, RN  Outcome: Ongoing, Progressing   Goal Outcome Evaluation:              Outcome Evaluation: VSS, IV Lasix, 2L NC, Independent on all ADL's, No c/o pain or SOA.

## 2022-06-07 NOTE — PROGRESS NOTES
"Patient Name: Rufus Dorsey  :1985  36 y.o.      Patient Care Team:  Provider, No Known as PCP - General    Interval History:   Continued on IV diuretics    Subjective:  Following for heart failure    Objective   Vital Signs  Temp:  [96 °F (35.6 °C)-98.3 °F (36.8 °C)] 97.3 °F (36.3 °C)  Heart Rate:  [70-81] 78  Resp:  [18] 18  BP: (101-120)/(58-81) 114/70  No intake or output data in the 24 hours ending 22 1136  Flowsheet Rows    Flowsheet Row First Filed Value   Admission Height 167.6 cm (66\") Documented at 2022 1624   Admission Weight 118 kg (260 lb) Documented at 2022 1624          Physical Exam:   General Appearance:    Alert, cooperative, in no acute distress   Lungs:     Clear to auscultation.  Normal respiratory effort and rate.      Heart:    Regular rhythm and normal rate, normal S1 and S2, no murmurs, gallops or rubs.     Chest Wall:    No abnormalities observed   Abdomen:     Soft, nontender, positive bowel sounds.     Extremities:   no cyanosis, clubbing or edema.  No marked joint deformities.  Adequate musculoskeletal strength.       Results Review:    Results from last 7 days   Lab Units 22  0838   SODIUM mmol/L 138   POTASSIUM mmol/L 3.9   CHLORIDE mmol/L 102   CO2 mmol/L 28.8   BUN mg/dL 34*   CREATININE mg/dL 1.78*   GLUCOSE mg/dL 161*   CALCIUM mg/dL 8.0*     Results from last 7 days   Lab Units 22  0724 22  1620   TROPONIN T ng/mL 0.036* 0.039*     Results from last 7 days   Lab Units 22  1620   WBC 10*3/mm3 9.87   HEMOGLOBIN g/dL 14.3   HEMATOCRIT % 42.4   PLATELETS 10*3/mm3 214         Results from last 7 days   Lab Units 22  0724   CHOLESTEROL mg/dL 297*     Results from last 7 days   Lab Units 22  1620   MAGNESIUM mg/dL 2.4     Results from last 7 days   Lab Units 22  0724   CHOLESTEROL mg/dL 297*   TRIGLYCERIDES mg/dL 146   HDL CHOL mg/dL 38*   LDL CHOL mg/dL 231*         Medication Review:   carvedilol, 12.5 mg, Oral, BID " With Meals  furosemide, 40 mg, Intravenous, Q8H  insulin lispro, 0-9 Units, Subcutaneous, TID With Meals  isosorbide-hydrALAZINE (BIDIL) 20-37.5 mg combo dose, , Oral, Q8H  rosuvastatin, 40 mg, Oral, Nightly  spironolactone, 25 mg, Oral, Daily              Assessment & Plan     1.  Acute on chronic combined systolic and diastolic congestive heart failure.  Ejection fraction 21%.  Grade 3 diastolic dysfunction.  2.  Moderate pericardial effusion.  3.  Hypertension.  Now controlled on current medications.  4.  COVID-positive.  5.  Diabetes  6.  Hyperlipidemia  7.  Hypokalemia.  Noted replacement orders.  7.  Chronic kidney disease.  8.  Coronary artery disease with history of CABG and status post drug-eluting stent to the LIMA to the LAD in December 2021.     -Receiving IV Lasix 40 mg every 8 hours.  -Renal function stable  -Still has a little bit of edema.  Continue with IV diuretics today and then reevaluate in the morning    Sagrario Otoole MD, [facility]  06/07/22  11:36 EDT

## 2022-06-07 NOTE — PLAN OF CARE
Problem: Adult Inpatient Plan of Care  Goal: Plan of Care Review  Outcome: Ongoing, Progressing  Flowsheets (Taken 6/7/2022 0506)  Progress: improving  Plan of Care Reviewed With: patient  Outcome Evaluation:   VSS   1-2L NC O2   SR on monitor   IV lasix cont   pt slept well overnight   BP WNL   will cont to monitor   Goal Outcome Evaluation:  Plan of Care Reviewed With: patient        Progress: improving  Outcome Evaluation: VSS; 1-2L NC O2; SR on monitor; IV lasix cont; pt slept well overnight; BP WNL; will cont to monitor

## 2022-06-08 LAB
ANION GAP SERPL CALCULATED.3IONS-SCNC: 8.3 MMOL/L (ref 5–15)
BUN SERPL-MCNC: 37 MG/DL (ref 6–20)
BUN/CREAT SERPL: 18.4 (ref 7–25)
CALCIUM SPEC-SCNC: 8 MG/DL (ref 8.6–10.5)
CHLORIDE SERPL-SCNC: 99 MMOL/L (ref 98–107)
CO2 SERPL-SCNC: 29.7 MMOL/L (ref 22–29)
CREAT SERPL-MCNC: 2.01 MG/DL (ref 0.76–1.27)
EGFRCR SERPLBLD CKD-EPI 2021: 43.3 ML/MIN/1.73
GLUCOSE BLDC GLUCOMTR-MCNC: 146 MG/DL (ref 70–130)
GLUCOSE BLDC GLUCOMTR-MCNC: 149 MG/DL (ref 70–130)
GLUCOSE BLDC GLUCOMTR-MCNC: 151 MG/DL (ref 70–130)
GLUCOSE BLDC GLUCOMTR-MCNC: 184 MG/DL (ref 70–130)
GLUCOSE SERPL-MCNC: 181 MG/DL (ref 65–99)
NT-PROBNP SERPL-MCNC: 802 PG/ML (ref 0–450)
POTASSIUM SERPL-SCNC: 3.5 MMOL/L (ref 3.5–5.2)
SODIUM SERPL-SCNC: 137 MMOL/L (ref 136–145)
URATE SERPL-MCNC: 6.8 MG/DL (ref 3.4–7)

## 2022-06-08 PROCEDURE — 63710000001 INSULIN LISPRO (HUMAN) PER 5 UNITS: Performed by: INTERNAL MEDICINE

## 2022-06-08 PROCEDURE — 25010000002 FUROSEMIDE PER 20 MG: Performed by: HOSPITALIST

## 2022-06-08 PROCEDURE — 80048 BASIC METABOLIC PNL TOTAL CA: CPT | Performed by: INTERNAL MEDICINE

## 2022-06-08 PROCEDURE — 82962 GLUCOSE BLOOD TEST: CPT

## 2022-06-08 PROCEDURE — 84550 ASSAY OF BLOOD/URIC ACID: CPT | Performed by: HOSPITALIST

## 2022-06-08 PROCEDURE — 99232 SBSQ HOSP IP/OBS MODERATE 35: CPT | Performed by: INTERNAL MEDICINE

## 2022-06-08 RX ADMIN — FUROSEMIDE 40 MG: 10 INJECTION, SOLUTION INTRAMUSCULAR; INTRAVENOUS at 06:45

## 2022-06-08 RX ADMIN — CARVEDILOL 12.5 MG: 12.5 TABLET, FILM COATED ORAL at 17:49

## 2022-06-08 RX ADMIN — HYDRALAZINE HYDROCHLORIDE: 25 TABLET, FILM COATED ORAL at 15:36

## 2022-06-08 RX ADMIN — INSULIN LISPRO 2 UNITS: 100 INJECTION, SOLUTION INTRAVENOUS; SUBCUTANEOUS at 08:28

## 2022-06-08 RX ADMIN — ROSUVASTATIN CALCIUM 40 MG: 40 TABLET, FILM COATED ORAL at 21:54

## 2022-06-08 RX ADMIN — HYDRALAZINE HYDROCHLORIDE: 25 TABLET, FILM COATED ORAL at 06:45

## 2022-06-08 RX ADMIN — INSULIN LISPRO 2 UNITS: 100 INJECTION, SOLUTION INTRAVENOUS; SUBCUTANEOUS at 11:56

## 2022-06-08 RX ADMIN — SPIRONOLACTONE 25 MG: 25 TABLET, FILM COATED ORAL at 08:29

## 2022-06-08 RX ADMIN — HYDRALAZINE HYDROCHLORIDE: 25 TABLET, FILM COATED ORAL at 21:54

## 2022-06-08 RX ADMIN — CARVEDILOL 12.5 MG: 12.5 TABLET, FILM COATED ORAL at 08:28

## 2022-06-08 NOTE — PLAN OF CARE
Problem: Fall Injury Risk  Goal: Absence of Fall and Fall-Related Injury  Intervention: Identify and Manage Contributors  Flowsheets  Taken 6/8/2022 0220  Medication Review/Management: medications reviewed  Taken 6/8/2022 0050  Medication Review/Management: medications reviewed  Taken 6/7/2022 2234  Medication Review/Management: medications reviewed  Taken 6/7/2022 2038  Medication Review/Management: medications reviewed  Intervention: Promote Injury-Free Environment  Flowsheets  Taken 6/8/2022 0454  Safety Promotion/Fall Prevention:   activity supervised   assistive device/personal items within reach   clutter free environment maintained   fall prevention program maintained   lighting adjusted   nonskid shoes/slippers when out of bed   room organization consistent   safety round/check completed  Taken 6/8/2022 0220  Safety Promotion/Fall Prevention:   activity supervised   clutter free environment maintained   assistive device/personal items within reach   fall prevention program maintained   lighting adjusted   nonskid shoes/slippers when out of bed   room organization consistent   safety round/check completed  Taken 6/8/2022 0050  Safety Promotion/Fall Prevention:   activity supervised   assistive device/personal items within reach   clutter free environment maintained   fall prevention program maintained   lighting adjusted   nonskid shoes/slippers when out of bed   room organization consistent   safety round/check completed  Taken 6/7/2022 2234  Safety Promotion/Fall Prevention:   activity supervised   assistive device/personal items within reach   clutter free environment maintained   fall prevention program maintained   lighting adjusted   nonskid shoes/slippers when out of bed   room organization consistent   safety round/check completed  Taken 6/7/2022 2038  Safety Promotion/Fall Prevention:   activity supervised   assistive device/personal items within reach   clutter free environment maintained   fall  prevention program maintained   lighting adjusted   nonskid shoes/slippers when out of bed   room organization consistent   safety round/check completed     Problem: Adult Inpatient Plan of Care  Goal: Plan of Care Review  Flowsheets (Taken 6/8/2022 0500)  Progress: improving  Plan of Care Reviewed With: patient  Outcome Evaluation: No acute changes. VSS. IV lasix in use. O2 in use @ 2L via NC. Up ad valente. Makes needs known. Bed locked and in lowest position. Side rails up x2. Call light within reach. Will continue to assess.  Goal: Absence of Hospital-Acquired Illness or Injury  Intervention: Identify and Manage Fall Risk  Flowsheets  Taken 6/8/2022 0454  Safety Promotion/Fall Prevention:   activity supervised   assistive device/personal items within reach   clutter free environment maintained   fall prevention program maintained   lighting adjusted   nonskid shoes/slippers when out of bed   room organization consistent   safety round/check completed  Taken 6/8/2022 0220  Safety Promotion/Fall Prevention:   activity supervised   clutter free environment maintained   assistive device/personal items within reach   fall prevention program maintained   lighting adjusted   nonskid shoes/slippers when out of bed   room organization consistent   safety round/check completed  Taken 6/8/2022 0050  Safety Promotion/Fall Prevention:   activity supervised   assistive device/personal items within reach   clutter free environment maintained   fall prevention program maintained   lighting adjusted   nonskid shoes/slippers when out of bed   room organization consistent   safety round/check completed  Taken 6/7/2022 2234  Safety Promotion/Fall Prevention:   activity supervised   assistive device/personal items within reach   clutter free environment maintained   fall prevention program maintained   lighting adjusted   nonskid shoes/slippers when out of bed   room organization consistent   safety round/check completed  Taken 6/7/2022  2038  Safety Promotion/Fall Prevention:   activity supervised   assistive device/personal items within reach   clutter free environment maintained   fall prevention program maintained   lighting adjusted   nonskid shoes/slippers when out of bed   room organization consistent   safety round/check completed  Intervention: Prevent Skin Injury  Flowsheets  Taken 6/8/2022 0454  Body Position: position changed independently  Taken 6/8/2022 0220  Body Position: position changed independently  Taken 6/8/2022 0050  Body Position: position changed independently  Skin Protection:   adhesive use limited   incontinence pads utilized   transparent dressing maintained   tubing/devices free from skin contact  Taken 6/7/2022 2234  Body Position: position changed independently  Taken 6/7/2022 2038  Body Position: position changed independently  Intervention: Prevent and Manage VTE (Venous Thromboembolism) Risk  Flowsheets  Taken 6/8/2022 0454  Activity Management:   activity adjusted per tolerance   activity encouraged  Taken 6/8/2022 0220  Activity Management:   activity adjusted per tolerance   activity encouraged  Taken 6/8/2022 0050  Activity Management:   activity adjusted per tolerance   activity encouraged   up ad valente  VTE Prevention/Management:   bilateral   dorsiflexion/plantar flexion performed  Taken 6/7/2022 2234  Activity Management:   activity adjusted per tolerance   activity encouraged   up ad valente  Taken 6/7/2022 2038  Activity Management:   activity adjusted per tolerance   activity encouraged  VTE Prevention/Management:   bilateral   dorsiflexion/plantar flexion performed  Intervention: Prevent Infection  Flowsheets  Taken 6/8/2022 0454  Infection Prevention:   cohorting utilized   environmental surveillance performed   equipment surfaces disinfected   hand hygiene promoted   rest/sleep promoted   personal protective equipment utilized   single patient room provided   visitors restricted/screened  Taken 6/8/2022  0220  Infection Prevention:   cohorting utilized   environmental surveillance performed   equipment surfaces disinfected   rest/sleep promoted   hand hygiene promoted   personal protective equipment utilized   visitors restricted/screened   single patient room provided  Taken 6/8/2022 0050  Infection Prevention:   environmental surveillance performed   cohorting utilized   equipment surfaces disinfected   hand hygiene promoted   personal protective equipment utilized   rest/sleep promoted   visitors restricted/screened   single patient room provided  Taken 6/7/2022 2234  Infection Prevention:   cohorting utilized   environmental surveillance performed   equipment surfaces disinfected   hand hygiene promoted   personal protective equipment utilized   rest/sleep promoted   single patient room provided   visitors restricted/screened  Taken 6/7/2022 2038  Infection Prevention:   cohorting utilized   environmental surveillance performed   hand hygiene promoted   equipment surfaces disinfected   personal protective equipment utilized   rest/sleep promoted   single patient room provided   visitors restricted/screened  Goal: Optimal Comfort and Wellbeing  Intervention: Provide Person-Centered Care  Flowsheets  Taken 6/8/2022 0050  Trust Relationship/Rapport:   care explained   choices provided   emotional support provided   empathic listening provided   questions answered   questions encouraged   reassurance provided   thoughts/feelings acknowledged  Taken 6/7/2022 2038  Trust Relationship/Rapport:   care explained   choices provided   emotional support provided   empathic listening provided   questions answered   questions encouraged   thoughts/feelings acknowledged   reassurance provided  Goal: Readiness for Transition of Care  Intervention: Mutually Develop Transition Plan  Flowsheets  Taken 6/6/2022 1449 by Chelsie Shepherd RN  Transportation Anticipated: family or friend will provide  Concerns to be Addressed:    adjustment to diagnosis/illness   financial/insurance   compliance issue  Readmission Within the Last 30 Days: no previous admission in last 30 days  Patient/Family Anticipated Services at Transition: none  Patient/Family Anticipates Transition to: home with family  Taken 6/4/2022 2156 by Aundrea Burris, RN  Equipment Currently Used at Home: glucometer   Goal Outcome Evaluation:  Plan of Care Reviewed With: patient        Progress: improving  Outcome Evaluation: No acute changes. VSS. IV lasix in use. O2 in use @ 2L via NC. Up ad valente. Makes needs known. Bed locked and in lowest position. Side rails up x2. Call light within reach. Will continue to assess.

## 2022-06-08 NOTE — PROGRESS NOTES
Name: Rufus Dorsey ADMIT: 2022   : 1985  PCP: Provider, No Known    MRN: [id number] LOS: 3 days   AGE/SEX: 36 y.o. male  ROOM: [facility]     Subjective   Subjective   Doing well.  No complaints.  Good diuresis.  On room air.    Review of Systems   Constitutional: Negative for chills and fever.   Respiratory: Negative for chest tightness and shortness of breath.    Cardiovascular: Negative for chest pain and palpitations.   Gastrointestinal: Negative for abdominal pain and constipation.        Objective   Objective   Vital Signs  Temp:  [97 °F (36.1 °C)-97.7 °F (36.5 °C)] 97.7 °F (36.5 °C)  Heart Rate:  [75-77] 76  Resp:  [18] 18  BP: ()/(54-83) 117/83  SpO2:  [93 %-99 %] 93 %  on  Flow (L/min):  [2] 2;   Device (Oxygen Therapy): room air  Body mass index is 42.3 kg/m².  Physical Exam  Constitutional:       Appearance: Normal appearance. He is obese.   HENT:      Head: Normocephalic and atraumatic.   Cardiovascular:      Rate and Rhythm: Normal rate and regular rhythm.   Pulmonary:      Effort: Pulmonary effort is normal. No respiratory distress.   Abdominal:      General: There is no distension.      Palpations: Abdomen is soft.      Tenderness: There is no guarding.   Musculoskeletal:      Right lower leg: No edema.      Left lower leg: No edema.   Neurological:      General: No focal deficit present.      Mental Status: He is alert and oriented to person, place, and time.         Results Review     I reviewed the patient's new clinical results.  Results from last 7 days   Lab Units 22  1620   WBC 10*3/mm3 9.87   HEMOGLOBIN g/dL 14.3   PLATELETS 10*3/mm3 214     Results from last 7 days   Lab Units 22  0823 22  0838 22  1015 22  1620   SODIUM mmol/L 137 138 135* 138   POTASSIUM mmol/L 3.5 3.9 3.6 3.8   CHLORIDE mmol/L 99 102 99 104   CO2 mmol/L 29.7* 28.8 27.3 26.6   BUN mg/dL 37* 34* 33* 28*   CREATININE mg/dL 2.01* 1.78* 1.78* 1.89*   GLUCOSE mg/dL 181* 161* 159*  167*   Estimated Creatinine Clearance: 61.7 mL/min (A) (by C-G formula based on SCr of 2.01 mg/dL (H)).  Results from last 7 days   Lab Units 06/07/22  0838 06/06/22  1015 06/04/22  1620   ALBUMIN g/dL 2.80* 2.90* 2.50*   BILIRUBIN mg/dL  --   --  0.4   ALK PHOS U/L  --   --  108   AST (SGOT) U/L  --   --  17   ALT (SGPT) U/L  --   --  18     Results from last 7 days   Lab Units 06/08/22  0823 06/07/22  0838 06/06/22  1015 06/04/22  1620   CALCIUM mg/dL 8.0* 8.0* 8.1* 8.0*   ALBUMIN g/dL  --  2.80* 2.90* 2.50*   MAGNESIUM mg/dL  --   --   --  2.4   PHOSPHORUS mg/dL  --  4.0 3.7  --      Results from last 7 days   Lab Units 06/04/22  1620   PROCALCITONIN ng/mL 0.16     COVID19   Date Value Ref Range Status   06/04/2022 Detected (C) Not Detected - Ref. Range Final     Glucose   Date/Time Value Ref Range Status   06/08/2022 1114 151 (H) 70 - 130 mg/dL Final     Comment:     Meter: [id number] : [id number] Ellie Fay NA   06/08/2022 0641 184 (H) 70 - 130 mg/dL Final     Comment:     Meter: [id number] : [id number] James Velizaia NA   06/07/2022 1938 152 (H) 70 - 130 mg/dL Final     Comment:     Meter: [id number] : [id number] Ramirez Nataia NA   06/07/2022 1552 161 (H) 70 - 130 mg/dL Final     Comment:     Meter: [id number] : [id number] Ellie [facility] NA   06/07/2022 1120 136 (H) 70 - 130 mg/dL Final     Comment:     Meter: [id number] : [id number] Ellie [facility] NA   06/07/2022 0532 159 (H) 70 - 130 mg/dL Final     Comment:     Meter: [id number] : [id number] Sharon LAO   06/06/2022 1939 197 (H) 70 - 130 mg/dL Final     Comment:     Meter: [id number] : [id number] Sharon LAO       Adult Transthoracic Echo Complete W/ Cont if Necessary Per Protocol  · The study is technically difficult for diagnosis.  · The following left ventricular wall segments are hypokinetic: mid   anterior, apical anterior, basal anterolateral, mid anterolateral, apical   lateral, basal inferolateral, mid  inferolateral, apical inferior, mid   inferior, apical septal, basal inferoseptal, mid inferoseptal, apex   hypokinetic, mid anteroseptal, basal anterior, basal inferior and basal   inferoseptal.  · The left ventricular cavity is moderately dilated.  · Calculated left ventricular EF = 20.7% Estimated left ventricular EF was   in agreement with the calculated left ventricular EF. Left ventricular   systolic function is severely decreased.  · Moderately reduced right ventricular systolic function noted.  · Left ventricular diastolic function is consistent with (grade III w/high   LAP) fixed restrictive pattern.  · There is a moderate (1-2cm) pericardial effusion.       Scheduled Medications  carvedilol, 12.5 mg, Oral, BID With Meals  insulin lispro, 0-9 Units, Subcutaneous, TID With Meals  isosorbide-hydrALAZINE (BIDIL) 20-37.5 mg combo dose, , Oral, Q8H  rosuvastatin, 40 mg, Oral, Nightly  spironolactone, 25 mg, Oral, Daily    Infusions   Diet  Diet Regular; Cardiac, Consistent Carbohydrate       Assessment/Plan     Active Hospital Problems    Diagnosis  POA   • **Acute on chronic combined systolic and diastolic CHF (congestive heart failure) (Formerly [facility]) [I50.43]  Unknown   • Acute pulmonary edema (Formerly [facility]) [J81.0]  Unknown   • Elevated troponin [R77.8]  Unknown   • Type 2 diabetes mellitus with circulatory disorder (Formerly [facility]) [E11.59]  Unknown   • Type 2 diabetes mellitus with renal complication (Formerly [facility]) [E11.29]  Unknown   • Medically noncompliant [Z91.19]  Not Applicable   • COVID-19 [U07.1]  Yes      Resolved Hospital Problems   No resolved problems to display.       36 y.o. male admitted with Acute on chronic combined systolic and diastolic CHF (congestive heart failure) (Formerly [facility]).    Acute on chronic combined systolic and diastolic heart failure  Pulmonary edema  Troponin elevation  Cardiology managing  Currently on Lasix 40 mg IV every 8 hours be transition to oral Lasix tomorrow  Continue Imdur, rosuvastatin, spironolactone,  Coreg.    Type 2 diabetes  a1c 7.7  Sugars acceptable on sliding scale    Chronic renal failure  Monitor renal function     · SCDs for DVT prophylaxis.  · Full code.  · Discussed with patient and nursing staff.  · Anticipate discharge home in 1-2 days.      Hiram Velez MD  [facility]  06/08/22  13:04 EDT    Patient was wearing facemask when I entered the room and throughout our encounter.  I wore protective equipment throughout this patient encounter including a face mask, gloves and protective eyewear.  Hand hygiene was performed before donning protective equipment and after removal when leaving the room.

## 2022-06-08 NOTE — PROGRESS NOTES
"Patient Name: Rufus Dorsey  :1985  36 y.o.      Patient Care Team:  Provider, No Known as PCP - General    Interval History:   Good diuresis.    Subjective:  Following for combined heart failure    Objective   Vital Signs  Temp:  [97 °F (36.1 °C)-97.5 °F (36.4 °C)] 97 °F (36.1 °C)  Heart Rate:  [75-82] 75  Resp:  [18] 18  BP: ()/(54-77) 136/77    Intake/Output Summary (Last 24 hours) at 2022 0906  Last data filed at 2022 1805  Gross per 24 hour   Intake 600 ml   Output --   Net 600 ml     Flowsheet Rows    Flowsheet Row First Filed Value   Admission Height 167.6 cm (66\") Documented at 2022 1624   Admission Weight 118 kg (260 lb) Documented at 2022 1624          Physical Exam:   General Appearance:    Alert, cooperative, in no acute distress   Lungs:     Clear to auscultation.  Normal respiratory effort and rate.      Heart:    Regular rhythm and normal rate, normal S1 and S2, no murmurs, gallops or rubs.     Chest Wall:    No abnormalities observed   Abdomen:     Soft, nontender, positive bowel sounds.     Extremities:   no cyanosis, clubbing or edema.  No marked joint deformities.  Adequate musculoskeletal strength.       Results Review:    Results from last 7 days   Lab Units 22  0838   SODIUM mmol/L 138   POTASSIUM mmol/L 3.9   CHLORIDE mmol/L 102   CO2 mmol/L 28.8   BUN mg/dL 34*   CREATININE mg/dL 1.78*   GLUCOSE mg/dL 161*   CALCIUM mg/dL 8.0*     Results from last 7 days   Lab Units 22  0724 22  1620   TROPONIN T ng/mL 0.036* 0.039*     Results from last 7 days   Lab Units 22  1620   WBC 10*3/mm3 9.87   HEMOGLOBIN g/dL 14.3   HEMATOCRIT % 42.4   PLATELETS 10*3/mm3 214         Results from last 7 days   Lab Units 22  0724   CHOLESTEROL mg/dL 297*     Results from last 7 days   Lab Units 22  1620   MAGNESIUM mg/dL 2.4     Results from last 7 days   Lab Units 22  0724   CHOLESTEROL mg/dL 297*   TRIGLYCERIDES mg/dL 146   HDL CHOL mg/dL " 38*   LDL CHOL mg/dL 231*         Medication Review:   carvedilol, 12.5 mg, Oral, BID With Meals  furosemide, 40 mg, Intravenous, Q8H  insulin lispro, 0-9 Units, Subcutaneous, TID With Meals  isosorbide-hydrALAZINE (BIDIL) 20-37.5 mg combo dose, , Oral, Q8H  rosuvastatin, 40 mg, Oral, Nightly  spironolactone, 25 mg, Oral, Daily              Assessment & Plan     1.  Acute on chronic combined systolic and diastolic congestive heart failure.  Ejection fraction 21%.  Grade 3 diastolic dysfunction.  2.  Moderate pericardial effusion.  3.  Hypertension.  Now controlled on current medications.  4.  COVID-positive.  5.  Diabetes  6.  Hyperlipidemia  7.  Hypokalemia.  Noted replacement orders.  7.  Chronic kidney disease.  8.  Coronary artery disease with history of CABG and status post drug-eluting stent to the LIMA to the LAD in December 2021.     -His BNP has come down nicely.  Showing signs of some volume contraction.  Discontinue IV Lasix.  -Start Lasix 40 mg p.o. daily tomorrow.  -Follow-up with the heart failure clinic.  He has previously been seen there.  He has 2 charts which is complicating things.  They have not been merged.  - Follow-up with Dr. Menon or her nurse practitioner in 6 weeks.  -Okay for discharge from my standpoint.    Sagrario Otoole MD, [facility]  06/08/22  09:06 EDT

## 2022-06-08 NOTE — PLAN OF CARE
Goal Outcome Evaluation:  Plan of Care Reviewed With: patient  Progress: improving  Outcome Evaluation: VSS, remains RA. No c/o pain, no signs of distress. Ambulating in room. Showered by himself. Code status up to date. A&Ox4  Problem: Fall Injury Risk  Goal: Absence of Fall and Fall-Related Injury  Outcome: Ongoing, Progressing  Intervention: Identify and Manage Contributors  Recent Flowsheet Documentation  Taken 6/8/2022 1000 by Kulwinder Yanes RN  Medication Review/Management: medications reviewed  Taken 6/8/2022 0828 by Kulwinder Yanes RN  Medication Review/Management: medications reviewed  Intervention: Promote Injury-Free Environment  Recent Flowsheet Documentation  Taken 6/8/2022 1400 by Kulwinder Yanes RN  Safety Promotion/Fall Prevention:   activity supervised   assistive device/personal items within reach   safety round/check completed   room organization consistent  Taken 6/8/2022 1237 by Kulwinder Yanes RN  Safety Promotion/Fall Prevention:   room organization consistent   safety round/check completed  Taken 6/8/2022 1000 by Kulwinder Yanes RN  Safety Promotion/Fall Prevention:   activity supervised   assistive device/personal items within reach   clutter free environment maintained   fall prevention program maintained   nonskid shoes/slippers when out of bed   room organization consistent   safety round/check completed  Taken 6/8/2022 0828 by Kulwinder Yanes RN  Safety Promotion/Fall Prevention:   activity supervised   assistive device/personal items within reach   clutter free environment maintained   room organization consistent   safety round/check completed     Problem: Adult Inpatient Plan of Care  Goal: Plan of Care Review  Outcome: Ongoing, Progressing  Flowsheets (Taken 6/8/2022 1544)  Progress: improving  Plan of Care Reviewed With: patient  Outcome Evaluation: VSS, remains RA. No c/o pain, no signs of distress. Ambulating in room. Showered by himself.  Code status up to date. A&Ox4  Goal: Patient-Specific Goal (Individualized)  Outcome: Ongoing, Progressing  Goal: Absence of Hospital-Acquired Illness or Injury  Outcome: Ongoing, Progressing  Intervention: Identify and Manage Fall Risk  Recent Flowsheet Documentation  Taken 6/8/2022 1400 by Kulwinder Yanes RN  Safety Promotion/Fall Prevention:   activity supervised   assistive device/personal items within reach   safety round/check completed   room organization consistent  Taken 6/8/2022 1237 by Kulwinder Yanes RN  Safety Promotion/Fall Prevention:   room organization consistent   safety round/check completed  Taken 6/8/2022 1000 by Kulwinder Yanes RN  Safety Promotion/Fall Prevention:   activity supervised   assistive device/personal items within reach   clutter free environment maintained   fall prevention program maintained   nonskid shoes/slippers when out of bed   room organization consistent   safety round/check completed  Taken 6/8/2022 0828 by Kulwinder Yanes RN  Safety Promotion/Fall Prevention:   activity supervised   assistive device/personal items within reach   clutter free environment maintained   room organization consistent   safety round/check completed  Intervention: Prevent Skin Injury  Recent Flowsheet Documentation  Taken 6/8/2022 1400 by Kulwinder Yanes RN  Body Position: position changed independently  Taken 6/8/2022 1237 by Kulwinder Yanes RN  Body Position: position changed independently  Taken 6/8/2022 1000 by Kulwinder Yanes RN  Body Position: position changed independently  Taken 6/8/2022 0828 by Kulwinder Yanes RN  Body Position: position changed independently  Skin Protection:   adhesive use limited   transparent dressing maintained   tubing/devices free from skin contact  Intervention: Prevent and Manage VTE (Venous Thromboembolism) Risk  Recent Flowsheet Documentation  Taken 6/8/2022 1400 by Kulwinder Yanes RN  Activity  Management: up ad valente  Taken 6/8/2022 1237 by Kulwinder Yanes, RN  Activity Management:   up ad valente   ambulated to bathroom  Taken 6/8/2022 1000 by Kulwinder Yanes, RN  Activity Management: up ad valente  Taken 6/8/2022 0828 by Kulwinder Yanes, RN  Activity Management:   up ad valente   sitting, edge of bed  VTE Prevention/Management:   bilateral   dorsiflexion/plantar flexion performed  Goal: Optimal Comfort and Wellbeing  Outcome: Ongoing, Progressing  Goal: Readiness for Transition of Care  Outcome: Ongoing, Progressing

## 2022-06-09 ENCOUNTER — READMISSION MANAGEMENT (OUTPATIENT)
Dept: CALL CENTER | Facility: HOSPITAL | Age: 37
End: 2022-06-09

## 2022-06-09 VITALS
HEART RATE: 85 BPM | RESPIRATION RATE: 20 BRPM | TEMPERATURE: 98.2 F | BODY MASS INDEX: 42.11 KG/M2 | HEIGHT: 66 IN | WEIGHT: 262 LBS | DIASTOLIC BLOOD PRESSURE: 77 MMHG | SYSTOLIC BLOOD PRESSURE: 120 MMHG | OXYGEN SATURATION: 95 %

## 2022-06-09 LAB
ANION GAP SERPL CALCULATED.3IONS-SCNC: 9.3 MMOL/L (ref 5–15)
BUN SERPL-MCNC: 38 MG/DL (ref 6–20)
BUN/CREAT SERPL: 19.1 (ref 7–25)
CALCIUM SPEC-SCNC: 8.2 MG/DL (ref 8.6–10.5)
CHLORIDE SERPL-SCNC: 102 MMOL/L (ref 98–107)
CO2 SERPL-SCNC: 28.7 MMOL/L (ref 22–29)
CREAT SERPL-MCNC: 1.99 MG/DL (ref 0.76–1.27)
EGFRCR SERPLBLD CKD-EPI 2021: 43.8 ML/MIN/1.73
GLUCOSE BLDC GLUCOMTR-MCNC: 164 MG/DL (ref 70–130)
GLUCOSE BLDC GLUCOMTR-MCNC: 177 MG/DL (ref 70–130)
GLUCOSE SERPL-MCNC: 179 MG/DL (ref 65–99)
MAGNESIUM SERPL-MCNC: 2.3 MG/DL (ref 1.6–2.6)
POTASSIUM SERPL-SCNC: 4.1 MMOL/L (ref 3.5–5.2)
SODIUM SERPL-SCNC: 140 MMOL/L (ref 136–145)

## 2022-06-09 PROCEDURE — 63710000001 INSULIN LISPRO (HUMAN) PER 5 UNITS: Performed by: INTERNAL MEDICINE

## 2022-06-09 PROCEDURE — 83735 ASSAY OF MAGNESIUM: CPT | Performed by: STUDENT IN AN ORGANIZED HEALTH CARE EDUCATION/TRAINING PROGRAM

## 2022-06-09 PROCEDURE — 80048 BASIC METABOLIC PNL TOTAL CA: CPT | Performed by: STUDENT IN AN ORGANIZED HEALTH CARE EDUCATION/TRAINING PROGRAM

## 2022-06-09 PROCEDURE — 82962 GLUCOSE BLOOD TEST: CPT

## 2022-06-09 RX ORDER — CARVEDILOL 12.5 MG/1
12.5 TABLET ORAL 2 TIMES DAILY WITH MEALS
Qty: 120 TABLET | Refills: 0 | Status: SHIPPED | OUTPATIENT
Start: 2022-06-09 | End: 2022-10-20 | Stop reason: SDUPTHER

## 2022-06-09 RX ORDER — FUROSEMIDE 40 MG/1
40 TABLET ORAL DAILY
Qty: 120 TABLET | Refills: 0 | Status: SHIPPED | OUTPATIENT
Start: 2022-06-09 | End: 2022-10-20 | Stop reason: SDUPTHER

## 2022-06-09 RX ORDER — ROSUVASTATIN CALCIUM 40 MG/1
40 TABLET, COATED ORAL NIGHTLY
Qty: 90 TABLET | Refills: 0 | Status: SHIPPED | OUTPATIENT
Start: 2022-06-09 | End: 2022-10-20 | Stop reason: SDUPTHER

## 2022-06-09 RX ORDER — ISOSORBIDE DINITRATE AND HYDRALAZINE HYDROCHLORIDE 37.5; 2 MG/1; MG/1
1 TABLET ORAL 3 TIMES DAILY
Qty: 90 TABLET | Refills: 0 | Status: SHIPPED | OUTPATIENT
Start: 2022-06-09 | End: 2022-10-20

## 2022-06-09 RX ORDER — SPIRONOLACTONE 25 MG/1
25 TABLET ORAL DAILY
Qty: 30 TABLET | Refills: 0 | Status: SHIPPED | OUTPATIENT
Start: 2022-06-10 | End: 2022-10-20 | Stop reason: SDUPTHER

## 2022-06-09 RX ADMIN — HYDRALAZINE HYDROCHLORIDE: 25 TABLET, FILM COATED ORAL at 06:21

## 2022-06-09 RX ADMIN — SPIRONOLACTONE 25 MG: 25 TABLET, FILM COATED ORAL at 08:31

## 2022-06-09 RX ADMIN — INSULIN LISPRO 2 UNITS: 100 INJECTION, SOLUTION INTRAVENOUS; SUBCUTANEOUS at 11:23

## 2022-06-09 RX ADMIN — INSULIN LISPRO 2 UNITS: 100 INJECTION, SOLUTION INTRAVENOUS; SUBCUTANEOUS at 08:31

## 2022-06-09 RX ADMIN — CARVEDILOL 12.5 MG: 12.5 TABLET, FILM COATED ORAL at 08:31

## 2022-06-09 NOTE — PROGRESS NOTES
Discharge Planning Assessment  [facility]     Patient Name: Rufus Dorsey  MRN: [id number]  Today's Date: 6/9/2022    Admit Date: 6/4/2022     Discharge Needs Assessment    No documentation.                Discharge Plan     Row Name 06/09/22 1212       Plan    Plan Home    Final Discharge Disposition Code 01 - home or self-care    Final Note Home              Continued Care and Services - Admitted Since 6/4/2022    Coordination has not been started for this encounter.       Expected Discharge Date and Time     Expected Discharge Date Expected Discharge Time    Jun 9, 2022          Demographic Summary    No documentation.                Functional Status    No documentation.                Psychosocial    No documentation.                Abuse/Neglect    No documentation.                Legal    No documentation.                Substance Abuse    No documentation.                Patient Forms    No documentation.                   Chitra Beckham RN

## 2022-06-09 NOTE — PLAN OF CARE
Goal Outcome Evaluation:  Plan of Care Reviewed With: patient        Progress: improving  Outcome Evaluation: VSS, remains RA. No c/o pain, no signs of distress. Ambulating in room. Showered by himself. Code status up to date. A&Ox4.  List of clinic/facilities provided to pt for f/u  Problem: Fall Injury Risk  Goal: Absence of Fall and Fall-Related Injury  Outcome: Ongoing, Progressing  Intervention: Identify and Manage Contributors  Recent Flowsheet Documentation  Taken 6/9/2022 1006 by Kulwinder Yanes, RN  Medication Review/Management: medications reviewed  Taken 6/9/2022 0821 by Kulwinder Yanes, RN  Medication Review/Management: medications reviewed  Intervention: Promote Injury-Free Environment  Recent Flowsheet Documentation  Taken 6/9/2022 1006 by Kulwinder Yanes RN  Safety Promotion/Fall Prevention:   activity supervised   assistive device/personal items within reach   clutter free environment maintained   nonskid shoes/slippers when out of bed   safety round/check completed   room organization consistent  Taken 6/9/2022 0821 by Kulwinder Yanes RN  Safety Promotion/Fall Prevention:   activity supervised   assistive device/personal items within reach   clutter free environment maintained   nonskid shoes/slippers when out of bed   safety round/check completed   room organization consistent     Problem: Adult Inpatient Plan of Care  Goal: Plan of Care Review  Outcome: Ongoing, Progressing  Flowsheets  Taken 6/9/2022 1327  Progress: improving  Plan of Care Reviewed With: patient  Taken 6/8/2022 1544  Outcome Evaluation: VSS, remains RA. No c/o pain, no signs of distress. Ambulating in room. Showered by himself. Code status up to date. A&Ox4  Goal: Patient-Specific Goal (Individualized)  Outcome: Ongoing, Progressing  Goal: Absence of Hospital-Acquired Illness or Injury  Outcome: Ongoing, Progressing  Intervention: Identify and Manage Fall Risk  Recent Flowsheet Documentation  Taken  6/9/2022 1006 by Kulwinder Yanes RN  Safety Promotion/Fall Prevention:   activity supervised   assistive device/personal items within reach   clutter free environment maintained   nonskid shoes/slippers when out of bed   safety round/check completed   room organization consistent  Taken 6/9/2022 0821 by Kulwinder Yanes RN  Safety Promotion/Fall Prevention:   activity supervised   assistive device/personal items within reach   clutter free environment maintained   nonskid shoes/slippers when out of bed   safety round/check completed   room organization consistent  Intervention: Prevent Skin Injury  Recent Flowsheet Documentation  Taken 6/9/2022 1006 by Kulwinder Yanes RN  Body Position: position changed independently  Taken 6/9/2022 0821 by Kulwinder Yanes RN  Body Position: position changed independently  Skin Protection:   adhesive use limited   transparent dressing maintained   tubing/devices free from skin contact  Intervention: Prevent and Manage VTE (Venous Thromboembolism) Risk  Recent Flowsheet Documentation  Taken 6/9/2022 1006 by Kulwinder Yanes RN  Activity Management: up ad valente  Taken 6/9/2022 0821 by Kulwinder Yanes RN  Activity Management: up ad valente  VTE Prevention/Management:   bilateral   dorsiflexion/plantar flexion performed  Range of Motion:   ROM (range of motion) performed   active ROM (range of motion) encouraged  Intervention: Prevent Infection  Recent Flowsheet Documentation  Taken 6/9/2022 0821 by Kulwinder Yanes RN  Infection Prevention: personal protective equipment utilized  Goal: Optimal Comfort and Wellbeing  Outcome: Ongoing, Progressing  Goal: Readiness for Transition of Care  Outcome: Ongoing, Progressing

## 2022-06-09 NOTE — DISCHARGE SUMMARY
Patient Name: Rufus Dorsey  : 1985  MRN: [id number]    Date of Admission: 2022  Date of Discharge:  2022  Primary Care Physician: Provider, No Known      Chief Complaint:   Shortness of Breath      Discharge Diagnoses     Active Hospital Problems    Diagnosis  POA   • **Acute on chronic combined systolic and diastolic CHF (congestive heart failure) (HCC) [I50.43]  Unknown   • Acute pulmonary edema (HCC) [J81.0]  Unknown   • Elevated troponin [R77.8]  Unknown   • Type 2 diabetes mellitus with circulatory disorder (HCC) [E11.59]  Unknown   • Type 2 diabetes mellitus with renal complication (HCC) [E11.29]  Unknown   • Medically noncompliant [Z91.19]  Not Applicable   • COVID-19 [U07.1]  Yes      Resolved Hospital Problems   No resolved problems to display.        Hospital Course     36-year-old male with a past medical history of hypertension, diabetes, CABG came to the ER with signs and symptoms of heart failure.  Had an echo done on  that was technically difficult but showed an LVEF of 20%.  Looks like he was noncompliant with his medications.  Cardiology was consulted.  He was diuresed.  Medications were managed.  The day of discharge he was afebrile, hemodynamically stable.  Follow-up with Dr. Pfeiffer or her nurse practitioner in clinic in 6 weeks after discharge.    Day of Discharge       Physical Exam:  Temp:  [97 °F (36.1 °C)-98.2 °F (36.8 °C)] 98.2 °F (36.8 °C)  Heart Rate:  [77-85] 85  Resp:  [18-20] 20  BP: (108-133)/(57-84) 120/77  Body mass index is 42.29 kg/m².  Physical Exam    General: Alert and oriented x3, no acute distress. Obese  HEENT: Normocephalic, atraumatic  CV: Regular rate and rhythm, no murmurs rubs or gallops  Lungs: Clear to auscultation bilaterally, no crackles or wheezes  Abdomen: Soft, nontender, nondistended  Neuro: CN II-XII intact, no FND appreciated     Consultants     Consult Orders (all) (From admission, onward)     Start     Ordered    22 2200   Inpatient Case Management  Consult  Once        Provider:  (Not yet assigned)    06/04/22 2159 06/04/22 1919  Inpatient Cardiology Consult  Once        Specialty:  Cardiology  Provider:  Sagrario Otoole MD    06/04/22 1918 06/04/22 1915  Inpatient Cardiology Consult  Once        Specialty:  Cardiology  Provider:  Sagrario Otoole MD    06/04/22 1914 06/04/22 1723  [facility] (on-call MD unless specified) Details  Once        Specialty:  Hospitalist  Provider:  Jennyfer Simons MD    06/04/22 1722              Procedures     Imaging Results (All)     Procedure Component Value Units Date/Time    XR Chest 1 View [[id number]] Collected: 06/04/22 1709     Updated: 06/04/22 2128    Narrative:      PORTABLE RADIOGRAPHIC VIEW OF THE CHEST     CLINICAL HISTORY: Dyspnea upon exertion.     FINDINGS: Portable radiographic view of the chest demonstrates  relatively low lung volumes. Overlying chest wall soft tissues  compromise visualization of the lungs and overall this examination is  poorly penetrated. Nonetheless, there are interstitial abnormalities  identified within both lungs in a diffuse fashion. It is unclear whether  this represents hydrostatic interstitial pulmonary edema or pneumonia or  some combination thereof. Please correlate with clinical data. The  cardiomediastinal silhouette is enlarged but likely accentuated by low  lung volumes, as well as portable technique. Sternal wires are  incidentally noted. The osseous structures are unremarkable.     This report was finalized on 6/4/2022 9:25 PM by Dr. Morgan Taylor M.D.             Pertinent Labs     Results from last 7 days   Lab Units 06/04/22  1620   WBC 10*3/mm3 9.87   HEMOGLOBIN g/dL 14.3   PLATELETS 10*3/mm3 214     Results from last 7 days   Lab Units 06/09/22  0926 06/08/22  0823 06/07/22  0838 06/06/22  1015   SODIUM mmol/L 140 137 138 135*   POTASSIUM mmol/L 4.1 3.5 3.9 3.6   CHLORIDE mmol/L 102 99 102 99   CO2 mmol/L 28.7 29.7*  28.8 27.3   BUN mg/dL 38* 37* 34* 33*   CREATININE mg/dL 1.99* 2.01* 1.78* 1.78*   GLUCOSE mg/dL 179* 181* 161* 159*   Estimated Creatinine Clearance: 62.4 mL/min (A) (by C-G formula based on SCr of 1.99 mg/dL (H)).  Results from last 7 days   Lab Units 06/07/22  0838 06/06/22  1015 06/04/22  1620   ALBUMIN g/dL 2.80* 2.90* 2.50*   BILIRUBIN mg/dL  --   --  0.4   ALK PHOS U/L  --   --  108   AST (SGOT) U/L  --   --  17   ALT (SGPT) U/L  --   --  18     Results from last 7 days   Lab Units 06/09/22  0926 06/08/22  0823 06/07/22  0838 06/06/22  1015 06/04/22  1620   CALCIUM mg/dL 8.2* 8.0* 8.0* 8.1* 8.0*   ALBUMIN g/dL  --   --  2.80* 2.90* 2.50*   MAGNESIUM mg/dL 2.3  --   --   --  2.4   PHOSPHORUS mg/dL  --   --  4.0 3.7  --        Results from last 7 days   Lab Units 06/07/22  0838 06/05/22  0724 06/04/22  1620   TROPONIN T ng/mL  --  0.036* 0.039*   PROBNP pg/mL 802.0*  --  4,193.0*     Results from last 7 days   Lab Units 06/08/22  0823   URIC ACID mg/dL 6.8     Results from last 7 days   Lab Units 06/05/22  0724   CHOLESTEROL mg/dL 297*   TRIGLYCERIDES mg/dL 146   HDL CHOL mg/dL 38*   LDL CHOL mg/dL 231*           Test Results Pending at Discharge       Discharge Details        Discharge Medications      New Medications      Instructions Start Date   carvedilol 12.5 MG tablet  Commonly known as: COREG   12.5 mg, Oral, 2 Times Daily With Meals      furosemide 40 MG tablet  Commonly known as: Lasix   40 mg, Oral, Daily      isosorbide-hydrALAZINE 20-37.5 MG per tablet  Commonly known as: BIDIL   1 tablet, Oral, 3 Times Daily      rosuvastatin 40 MG tablet  Commonly known as: CRESTOR   40 mg, Oral, Nightly      spironolactone 25 MG tablet  Commonly known as: ALDACTONE   25 mg, Oral, Daily   Start Date: Jacquie 10, 2022            No Known Allergies      Discharge Disposition:  Home or Self Care    Discharge Diet:  Diet Order   Procedures   • Diet Regular; Cardiac, Consistent Carbohydrate       Discharge Activity:        CODE STATUS:    Code Status and Medical Interventions:   Ordered at: 06/08/22 1045     Code Status (Patient has no pulse and is not breathing):    CPR (Attempt to Resuscitate)     Medical Interventions (Patient has pulse or is breathing):    Full Support       Future Appointments   Date Time Provider Department Center   7/20/2022  3:00 PM Arabella Minor APRN [facility]      Follow-up Information     Provider, No Known .    Contact information:  [facility] [address] [phone]             Justine Menon MD Follow up.    Specialty: Cardiology  Why: Make an appointment 6 weeks after discharge for heart failure management  Contact information:  [address] [facility] [address]  [facility] [address] [phone]                         Time Spent on Discharge:  Greater than 30 minutes      Hiram Velez MD  [facility]  06/09/22  11:50 EDT

## 2022-06-10 NOTE — OUTREACH NOTE
Prep Survey    Flowsheet Row Responses   [facility] patient discharged from? [place]   Is LACE score < 7 ? No   Emergency Room discharge w/ pulse ox? No   Eligibility [facility]   Date of Admission 06/04/22   Date of Discharge 06/09/22   Discharge Disposition Home or Self Care   Discharge diagnosis CHF  Acute pulmonary edema    Does the patient have one of the following disease processes/diagnoses(primary or secondary)? CHF   Does the patient have Home health ordered? No   Is there a DME ordered? No   Prep survey completed? Yes          KIM MITCHELL - Registered Nurse

## 2022-06-10 NOTE — PROGRESS NOTES
Enter Query Response Below      Query Response: CKD 3b             If applicable, please update the problem list.         Patient: Rufus Dorsey        : 1985  Account: [id number]           Admit Date: 2022        Options to Respond to Query:    1. Access the Encounter     a. From the To-Do Side bar, click Respond With Note.     b. Click New Note     c. Answer query within the yellow box.                d. Update the Problem List if applicable.     Dr. Velez,    36 yr old male admitted with acute on chronic combined systolic and diastolic congestive heart failure. The patient has chronic kidney disease documented  in the chart. Cardiology consult note documents CKD baseline 1.6. The patient was treated with monitoring the BMP/CMP/Renal function panel. Patient’s laboratory results during this encounter included:     22 16:20  Creatinine: 1.89   BUN: 28   EGFR: 46.6     22 10:15  Creatinine: 1.78   BUN: 33   EGFR: 50.1    22 08:38  Creatinine: 1.78   BUN: 34   EGFR: 50.1     22 08:23  Creatinine: 2.01   BUN: 37  EGFR: 43.3    22 09:26  Creatinine: 1.99  BUN: 38   EGFR: 43.8.    Please clarify the stage of the patients chronic kidney disease as ________________________.    KDIGO Chronic Kidney Disease staging [email or web address]  Stage 1    GFR > 90  Stage 2    GFR 60-89  Stage 3a  GFR 45-59  Stage 3b  GFR 30-44  Stage 4    GFR 15-29  Stage 5    GFR <15    By submitting this query, we are merely seeking further clarification of documentation to accurately reflect all conditions that you are monitoring, evaluating, treating or that extend the hospitalization or utilize additional resources of care. Please utilize your independent clinical judgment when addressing the question(s) above.     This query and your response, once completed, will be entered into the legal medical record.    Sincerely,  [email or web address]  Clinical Documentation Integrity  Program

## 2022-06-14 ENCOUNTER — READMISSION MANAGEMENT (OUTPATIENT)
Dept: CALL CENTER | Facility: HOSPITAL | Age: 37
End: 2022-06-14

## 2022-06-14 NOTE — OUTREACH NOTE
CHF Week 1 Survey    Flowsheet Row Responses   [facility] patient discharged from? [place]   Does the patient have one of the following disease processes/diagnoses(primary or secondary)? CHF   CHF Week 1 attempt successful? Yes   Call start time 1615   Call end time 1631   General alerts for this patient [id number] SATYA Morales Com[place]an speaking   Discharge diagnosis CHF  Acute pulmonary edema    Is patient permission given to speak with other caregiver? Yes   List who call center can speak with brother   Meds reviewed with patient/caregiver? Yes   Is the patient having any side effects they believe may be caused by any medication additions or changes? No   Does the patient have all medications ordered at discharge? Yes   Is the patient taking all medications as directed (includes completed medication regime)? Yes   Does the patient have a primary care provider?  No   PCP Nursing Intervention Provided number to obtain PCP   Does the patient have an appointment with their PCP within 7 days of discharge? No   Comments regarding PCP emailed CM for assistance as the pt was driving so he could not take the #   Psychosocial issues? No   Comments Pt denies SOA/CP but does report edema in bilat LE in evening and go down by morning. Pt is not monitoring daily wts or bp since DC. Pt was educated on importance of daily wts.    Did the patient receive a copy of their discharge instructions? Yes   Nursing interventions Reviewed instructions with patient   What is the patient's perception of their health status since discharge? Improving   Nursing interventions Nurse provided patient education   Is the patient weighing daily? No   Does the patient have scales? Yes   Daily weight interventions Education provided on importance of daily weight   Is the patient able to teach back Heart Failure diet management? Yes   Is the patient able to teach back signs and symptoms of worsening condition? (i.e. weight gain, shortness of air,  etc.) Yes   If the patient is a current smoker, are they able to teach back resources for cessation? Not a smoker   Is the patient/caregiver able to teach back the hierarchy of who to call/visit for symptoms/problems? PCP, Specialist, Home health nurse, Urgent Care, ED, 911 Yes    CHF Week 1 call completed? Yes          MEDARDO SOARES - Registered Nurse

## 2022-06-21 ENCOUNTER — READMISSION MANAGEMENT (OUTPATIENT)
Dept: CALL CENTER | Facility: HOSPITAL | Age: 37
End: 2022-06-21

## 2022-06-21 NOTE — OUTREACH NOTE
CHF Week 2 Survey    Flowsheet Row Responses   [facility] patient discharged from? [place]   Does the patient have one of the following disease processes/diagnoses(primary or secondary)? CHF   Week 2 attempt successful? --  [PI # [id number] ]   Call start time 1524   Call end time 1531   General alerts for this patient Pt Armenian speaking, needs PI   Discharge diagnosis CHF  Acute pulmonary edema    Meds reviewed with patient/caregiver? Yes   Is the patient having any side effects they believe may be caused by any medication additions or changes? No   Does the patient have all medications ordered at discharge? Yes   Is the patient taking all medications as directed (includes completed medication regime)? Yes   Comments regarding appointments MD- Cardio appt July 20   Does the patient have an appointment with their PCP within 7 days of discharge? No   What is preventing the patient from scheduling follow up appointments within 7 days of discharge? Haven't had time   Has the patient kept scheduled appointments due by today? N/A   Psychosocial issues? No   Comments Pt c/o occas LE edema. Denies SOA/ALEX   What is the patient's perception of their health status since discharge? Returned to baseline/stable   Is the patient weighing daily? Yes   Is the patient able to teach back Heart Failure diet management? Yes   Is the patient able to teach back Heart Failure Zones? Yes   Is the patient/caregiver able to teach back the hierarchy of who to call/visit for symptoms/problems? PCP, Specialist, Home health nurse, Urgent Care, ED, 911 Yes   CHF Week 2 call completed? Yes   Revoked No further contact(revokes)-requires comment   Is the patient interested in additional calls from an ambulatory ?  NOTE:  applies to high risk patients requiring additional follow-up. No   Graduated/Revoked comments cyril SOARES - Registered Nurse

## 2022-09-14 RX ORDER — SPIRONOLACTONE 25 MG/1
25 TABLET ORAL DAILY
Qty: 30 TABLET | Refills: 0 | Status: CANCELLED | OUTPATIENT
Start: 2022-09-14

## 2022-09-14 RX ORDER — CARVEDILOL 12.5 MG/1
12.5 TABLET ORAL 2 TIMES DAILY WITH MEALS
Qty: 120 TABLET | Refills: 0 | Status: CANCELLED | OUTPATIENT
Start: 2022-09-14

## 2022-09-14 RX ORDER — ROSUVASTATIN CALCIUM 40 MG/1
40 TABLET, COATED ORAL NIGHTLY
Qty: 90 TABLET | Refills: 0 | Status: CANCELLED | OUTPATIENT
Start: 2022-09-14

## 2022-09-14 RX ORDER — ISOSORBIDE DINITRATE 20 MG/1
20 TABLET ORAL 3 TIMES DAILY
Qty: 90 TABLET | Refills: 0 | Status: CANCELLED | OUTPATIENT
Start: 2022-09-14

## 2022-09-14 RX ORDER — FUROSEMIDE 40 MG/1
40 TABLET ORAL DAILY
Qty: 120 TABLET | Refills: 0 | Status: CANCELLED | OUTPATIENT
Start: 2022-09-14

## 2022-09-14 RX ORDER — HYDRALAZINE HYDROCHLORIDE 25 MG/1
37.5 TABLET, FILM COATED ORAL 3 TIMES DAILY
Qty: 135 TABLET | Refills: 0 | Status: CANCELLED | OUTPATIENT
Start: 2022-09-14

## 2022-10-04 RX ORDER — CARVEDILOL 12.5 MG/1
12.5 TABLET ORAL 2 TIMES DAILY WITH MEALS
Qty: 120 TABLET | Refills: 0 | Status: CANCELLED | OUTPATIENT
Start: 2022-10-04

## 2022-10-04 RX ORDER — ROSUVASTATIN CALCIUM 40 MG/1
40 TABLET, COATED ORAL NIGHTLY
Qty: 90 TABLET | Refills: 0 | Status: CANCELLED | OUTPATIENT
Start: 2022-10-04

## 2022-10-04 RX ORDER — SPIRONOLACTONE 25 MG/1
25 TABLET ORAL DAILY
Qty: 30 TABLET | Refills: 0 | Status: CANCELLED | OUTPATIENT
Start: 2022-10-04

## 2022-10-04 RX ORDER — HYDRALAZINE HYDROCHLORIDE 25 MG/1
37.5 TABLET, FILM COATED ORAL 3 TIMES DAILY
Qty: 135 TABLET | Refills: 0 | Status: CANCELLED | OUTPATIENT
Start: 2022-10-04

## 2022-10-05 RX ORDER — CARVEDILOL 12.5 MG/1
12.5 TABLET ORAL 2 TIMES DAILY WITH MEALS
Qty: 120 TABLET | Refills: 0 | Status: CANCELLED | OUTPATIENT
Start: 2022-10-04

## 2022-10-05 RX ORDER — SPIRONOLACTONE 25 MG/1
25 TABLET ORAL DAILY
Qty: 30 TABLET | Refills: 0 | Status: CANCELLED | OUTPATIENT
Start: 2022-10-04

## 2022-10-05 RX ORDER — ROSUVASTATIN CALCIUM 40 MG/1
40 TABLET, COATED ORAL NIGHTLY
Qty: 90 TABLET | Refills: 0 | Status: CANCELLED | OUTPATIENT
Start: 2022-10-04

## 2022-10-05 RX ORDER — HYDRALAZINE HYDROCHLORIDE 25 MG/1
37.5 TABLET, FILM COATED ORAL 3 TIMES DAILY
Qty: 135 TABLET | Refills: 0 | Status: CANCELLED | OUTPATIENT
Start: 2022-10-04

## 2022-10-19 RX ORDER — CARVEDILOL 12.5 MG/1
12.5 TABLET ORAL 2 TIMES DAILY WITH MEALS
Qty: 120 TABLET | Refills: 0 | Status: CANCELLED | OUTPATIENT
Start: 2022-10-04

## 2022-10-19 RX ORDER — ROSUVASTATIN CALCIUM 40 MG/1
40 TABLET, COATED ORAL NIGHTLY
Qty: 90 TABLET | Refills: 0 | Status: CANCELLED | OUTPATIENT
Start: 2022-10-04

## 2022-10-19 RX ORDER — SPIRONOLACTONE 25 MG/1
25 TABLET ORAL DAILY
Qty: 30 TABLET | Refills: 0 | Status: CANCELLED | OUTPATIENT
Start: 2022-10-04

## 2022-10-19 RX ORDER — HYDRALAZINE HYDROCHLORIDE 25 MG/1
37.5 TABLET, FILM COATED ORAL 3 TIMES DAILY
Qty: 135 TABLET | Refills: 0 | Status: CANCELLED | OUTPATIENT
Start: 2022-10-04

## 2022-10-20 ENCOUNTER — TELEPHONE (OUTPATIENT)
Dept: CARDIOLOGY | Facility: CLINIC | Age: 37
End: 2022-10-20

## 2022-10-20 ENCOUNTER — OFFICE VISIT (OUTPATIENT)
Dept: CARDIOLOGY | Facility: CLINIC | Age: 37
End: 2022-10-20

## 2022-10-20 ENCOUNTER — HOSPITAL ENCOUNTER (OUTPATIENT)
Dept: CARDIOLOGY | Facility: HOSPITAL | Age: 37
Discharge: HOME | End: 2022-10-20
Admitting: NURSE PRACTITIONER

## 2022-10-20 VITALS
HEIGHT: 66 IN | WEIGHT: 288.2 LBS | HEART RATE: 96 BPM | BODY MASS INDEX: 46.32 KG/M2 | OXYGEN SATURATION: 92 % | DIASTOLIC BLOOD PRESSURE: 80 MMHG | SYSTOLIC BLOOD PRESSURE: 140 MMHG

## 2022-10-20 DIAGNOSIS — N18.2 CKD (CHRONIC KIDNEY DISEASE) STAGE 2, GFR 60-89 ML/MIN: ICD-10-CM

## 2022-10-20 DIAGNOSIS — I10 PRIMARY HYPERTENSION: ICD-10-CM

## 2022-10-20 DIAGNOSIS — I50.814 BIVENTRICULAR HEART FAILURE WITH REDUCED LEFT VENTRICULAR FUNCTION: ICD-10-CM

## 2022-10-20 DIAGNOSIS — I50.23 ACUTE ON CHRONIC SYSTOLIC CONGESTIVE HEART FAILURE: ICD-10-CM

## 2022-10-20 DIAGNOSIS — I50.21 ACUTE SYSTOLIC CHF (CONGESTIVE HEART FAILURE): ICD-10-CM

## 2022-10-20 DIAGNOSIS — I50.21 ACUTE SYSTOLIC CHF (CONGESTIVE HEART FAILURE): Primary | ICD-10-CM

## 2022-10-20 DIAGNOSIS — I50.31 ACUTE DIASTOLIC CHF (CONGESTIVE HEART FAILURE): Primary | ICD-10-CM

## 2022-10-20 DIAGNOSIS — I25.10 CORONARY ARTERY DISEASE INVOLVING NATIVE HEART WITHOUT ANGINA PECTORIS, UNSPECIFIED VESSEL OR LESION TYPE: ICD-10-CM

## 2022-10-20 LAB
ANION GAP SERPL CALCULATED.3IONS-SCNC: 10.5 MMOL/L (ref 5–15)
BUN SERPL-MCNC: 27 MG/DL (ref 6–20)
BUN/CREAT SERPL: 12.2 (ref 7–25)
CALCIUM SPEC-SCNC: 8.2 MG/DL (ref 8.6–10.5)
CHLORIDE SERPL-SCNC: 102 MMOL/L (ref 98–107)
CO2 SERPL-SCNC: 26.5 MMOL/L (ref 22–29)
CREAT SERPL-MCNC: 2.22 MG/DL (ref 0.76–1.27)
EGFRCR SERPLBLD CKD-EPI 2021: 38.2 ML/MIN/1.73
GLUCOSE SERPL-MCNC: 244 MG/DL (ref 65–99)
NT-PROBNP SERPL-MCNC: 1757 PG/ML (ref 0–450)
POTASSIUM SERPL-SCNC: 3.8 MMOL/L (ref 3.5–5.2)
SODIUM SERPL-SCNC: 139 MMOL/L (ref 136–145)

## 2022-10-20 PROCEDURE — 80048 BASIC METABOLIC PNL TOTAL CA: CPT | Performed by: NURSE PRACTITIONER

## 2022-10-20 PROCEDURE — 96374 THER/PROPH/DIAG INJ IV PUSH: CPT

## 2022-10-20 PROCEDURE — 93000 ELECTROCARDIOGRAM COMPLETE: CPT | Performed by: NURSE PRACTITIONER

## 2022-10-20 PROCEDURE — 99214 OFFICE O/P EST MOD 30 MIN: CPT | Performed by: NURSE PRACTITIONER

## 2022-10-20 PROCEDURE — 83880 ASSAY OF NATRIURETIC PEPTIDE: CPT | Performed by: NURSE PRACTITIONER

## 2022-10-20 PROCEDURE — 36415 COLL VENOUS BLD VENIPUNCTURE: CPT

## 2022-10-20 PROCEDURE — 25010000002 FUROSEMIDE PER 20 MG: Performed by: NURSE PRACTITIONER

## 2022-10-20 RX ORDER — FUROSEMIDE 40 MG/1
40 TABLET ORAL DAILY
Qty: 120 TABLET | Refills: 0 | Status: SHIPPED | OUTPATIENT
Start: 2022-10-20 | End: 2023-01-10 | Stop reason: SDUPTHER

## 2022-10-20 RX ORDER — DAPAGLIFLOZIN 10 MG/1
10 TABLET, FILM COATED ORAL DAILY
Qty: 30 TABLET | Refills: 0 | Status: SHIPPED | OUTPATIENT
Start: 2022-10-20 | End: 2022-12-06 | Stop reason: SDUPTHER

## 2022-10-20 RX ORDER — ROSUVASTATIN CALCIUM 40 MG/1
40 TABLET, COATED ORAL NIGHTLY
Qty: 90 TABLET | Refills: 0 | Status: SHIPPED | OUTPATIENT
Start: 2022-10-20 | End: 2022-12-09 | Stop reason: SDUPTHER

## 2022-10-20 RX ORDER — HYDRALAZINE HYDROCHLORIDE 25 MG/1
37.5 TABLET, FILM COATED ORAL 3 TIMES DAILY
Qty: 135 TABLET | Refills: 0 | Status: SHIPPED | OUTPATIENT
Start: 2022-10-20 | End: 2022-12-09 | Stop reason: SDUPTHER

## 2022-10-20 RX ORDER — ISOSORBIDE DINITRATE 20 MG/1
20 TABLET ORAL 3 TIMES DAILY
Qty: 90 TABLET | Refills: 0 | Status: SHIPPED | OUTPATIENT
Start: 2022-10-20 | End: 2022-12-06 | Stop reason: SDUPTHER

## 2022-10-20 RX ORDER — SPIRONOLACTONE 25 MG/1
25 TABLET ORAL DAILY
Qty: 30 TABLET | Refills: 0 | Status: SHIPPED | OUTPATIENT
Start: 2022-10-20 | End: 2022-12-06 | Stop reason: SDUPTHER

## 2022-10-20 RX ORDER — CARVEDILOL 12.5 MG/1
12.5 TABLET ORAL 2 TIMES DAILY WITH MEALS
Qty: 120 TABLET | Refills: 0 | Status: SHIPPED | OUTPATIENT
Start: 2022-10-20 | End: 2022-12-06

## 2022-10-20 RX ADMIN — FUROSEMIDE 40 MG: 10 INJECTION, SOLUTION INTRAMUSCULAR; INTRAVENOUS at 11:52
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
## 2022-10-20 NOTE — TELEPHONE ENCOUNTER
Discussed lab results with the patient using the  phone, ID # [id number]. I am going to have him increase his furosemide to twice a day for three days and have scheduled him for a follow up with Dr. Trujillo on Tuesday October 25 at 1:30. He will obtain a BMP that day to reassess his kidney function.

## 2022-10-20 NOTE — PROGRESS NOTES
Subjective:     Encounter Date:10/20/2022      Patient ID: Rufus Dorsey is a 37 y.o. male.    Chief Complaint:  History of Present Illness    A little fatigued.     I have reviewed and updated as appropriate allergies, current medications, past family history, past medical history, past surgical history and problem list.    ROS      Current Outpatient Medications:   •  carvedilol (COREG) 12.5 MG tablet, Take 1 tablet by mouth 2 (Two) Times a Day With Meals., Disp: 120 tablet, Rfl: 0  •  dapagliflozin Propanediol (Farxiga) 10 MG tablet, Take 1 tablet by mouth Daily., Disp: 30 tablet, Rfl: 0  •  furosemide (Lasix) 40 MG tablet, Take 1 tablet by mouth Daily., Disp: 120 tablet, Rfl: 0  •  hydrALAZINE (APRESOLINE) 25 MG tablet, Take 1.5 tablets by mouth 3 (Three) Times a Day., Disp: 135 tablet, Rfl: 0  •  isosorbide dinitrate (ISORDIL) 20 MG tablet, Take 1 tablet by mouth 3 (Three) Times a Day., Disp: 90 tablet, Rfl: 0  •  rosuvastatin (CRESTOR) 40 MG tablet, Take 1 tablet by mouth Every Night., Disp: 90 tablet, Rfl: 0  •  sacubitril-valsartan (ENTRESTO) 24-26 MG tablet, Take 1 tablet by mouth 2 (Two) Times a Day., Disp: 60 tablet, Rfl:   •  spironolactone (ALDACTONE) 25 MG tablet, Take 1 tablet by mouth Daily., Disp: 30 tablet, Rfl: 0  •  Blood Glucose Monitoring Suppl (FreeStyle Freedom Lite) w/Device kit, Use to check blood sugar as directed., Disp: 1 each, Rfl: 0  •  Blood Glucose Monitoring Suppl (Glucocard Expression Monitor) w/Device kit, Use as directed, Disp: 1 kit, Rfl: 0  •  budesonide-formoterol (SYMBICORT) 160-4.5 MCG/ACT inhaler, Inhale 2 puffs 2 (Two) Times a Day for 30 days., Disp: 1 each, Rfl: 0  •  glucose monitor monitoring kit, 1 each 4 (Four) Times a Day Before Meals & at Bedtime., Disp: 1 each, Rfl: 0  •  Insulin Glargine (BASAGLAR KWIKPEN) 100 UNIT/ML injection pen, Inject 20 Units under the skin into the appropriate area as directed Every Night for 30 days., Disp: 15 mL, Rfl: 0  •  Insulin  Pen Needle (TRUEplus Pen Needles) 31G X 5 MM misc, Use as directed daily., Disp: 100 each, Rfl: 0  •  Insulin Pen Needle 32G X 4 MM misc, Use with insulin 5 times daily, Disp: 200 each, Rfl: 0  •  Lancets 28G misc, 1 each by Other route 4 (Four) Times a Day After Meals & at Bedtime., Disp: 100 each, Rfl: 0  •  Lancets 28G misc, Test 4 (Four) Times a Day After Meals & at Bedtime, Disp: 100 each, Rfl: 0  No current facility-administered medications for this visit.    Past Medical History:   Diagnosis Date   • CHF (congestive heart failure) ([facility])    • Coronary artery disease    • Diabetes (HCC)    • Heart failure (HCC) 05/16/2016   • High cholesterol    • Hypertension    • Ischemic cardiomyopathy 06/25/2016       Past Surgical History:   Procedure Laterality Date   • CARDIAC CATHETERIZATION  01/25/2017    Right heart cath   • CARDIAC CATHETERIZATION N/A 12/4/2021    Procedure: SAPHENOUS VEIN GRAFT;  Surgeon: Les Reyes MD;  Location: [facility] CATH INVASIVE LOCATION;  Service: Cardiovascular;  Laterality: N/A;   • CARDIAC CATHETERIZATION N/A 12/4/2021    Procedure: Coronary angiography;  Surgeon: Les Reyes MD;  [facility];  Service: Cardiovascular;  Laterality: N/A;   • CARDIAC CATHETERIZATION N/A 12/4/2021    Procedure: Stent ROSEY coronary;  Surgeon: Les Reyes MD;  [facility];  Service: Cardiovascular;  Laterality: N/A;   • CARDIAC CATHETERIZATION N/A 12/4/2021    Procedure: Native mammary injection;  Surgeon: Les Reyes MD;  Location: [facility] CATH INVASIVE LOCATION;  Service: Cardiovascular;  Laterality: N/A;   • CARDIAC SURGERY     • CORONARY ARTERY BYPASS GRAFT  05/26/2015    cabg x3 Dr. Key       Family History   Family history unknown: Yes       Social History     Tobacco Use   • Smoking status: Never   • Smokeless tobacco: Never   Vaping Use   • Vaping Use: Never used   Substance Use Topics   • Alcohol use: Never   • Drug use:  "Never       Procedures       Objective:     Visit Vitals  /80 (BP Location: Right arm, Patient Position: Sitting, Cuff Size: Adult)   Pulse 96   Ht 167.6 cm (66\")   Wt 131 kg (288 lb 3.2 oz)   SpO2 92%   BMI 46.52 kg/m²             Physical Exam     Lab Review:   Lipid Panel    Lipid Panel 12/5/21 6/5/22   Total Cholesterol 260 (A) 297 (A)   Triglycerides 126 146   HDL Cholesterol 36 (A) 38 (A)   VLDL Cholesterol 23 28   LDL Cholesterol  201 (A) 231 (A)   LDL/HDL Ratio 5.52 6.05   (A) Abnormal value                Cardiac Procedures:   1.     Assessment:         There are no diagnoses linked to this encounter.        Plan:           Thank you for allowing me to participate in this patient's care. Please call with any questions or concerns. ****will follow up with           Your medication list          Accurate as of October 20, 2022 12:39 PM. If you have any questions, ask your nurse or doctor.            CHANGE how you take these medications      Instructions Last Dose Given Next Dose Due   hydrALAZINE 25 MG tablet  Commonly known as: APRESOLINE  What changed: Another medication with the same name was removed. Continue taking this medication, and follow the directions you see here.  Changed by: JESSICA Cueto      Take 1.5 tablets by mouth 3 (Three) Times a Day.          CONTINUE taking these medications      Instructions Last Dose Given Next Dose Due   BASAGLAR KWIKPEN 100 UNIT/ML injection pen      Inject 20 Units under the skin into the appropriate area as directed Every Night for 30 days.       budesonide-formoterol 160-4.5 MCG/ACT inhaler  Commonly known as: SYMBICORT      Inhale 2 puffs 2 (Two) Times a Day for 30 days.       carvedilol 12.5 MG tablet  Commonly known as: COREG      [place] 1 tableta por via oral 2 (dos) veces al joseph con comida.  (Take 1 tablet by mouth 2 (Two) Times a Day With Meals.)       Farxiga 10 MG tablet  Generic drug: dapagliflozin Propanediol      [place] 1 tableta por via oral " diariamente.  (Take 1 tablet by mouth Daily.)       FreeStyle Freedom Lite w/Device kit      Use to check blood sugar as directed.       Glucocard Expression Monitor w/Device kit      Use según las indicaciones  (Use as directed)       furosemide 40 MG tablet  Commonly known as: Lasix      [place] 1 tableta por via oral diariamente.  (Take 1 tablet by mouth Daily.)       glucose monitor monitoring kit      1 each 4 (Four) Times a Day Before Meals & at Bedtime.       isosorbide dinitrate 20 MG tablet  Commonly known as: ISORDIL      [place] 1 tableta por via oral 3 (margaret) veces al joseph.  (Take 1 tablet by mouth 3 (Three) Times a Day.)       Lancets 28G misc      1 each by Other route 4 (Four) Times a Day After Meals & at Bedtime.       TRUEplus Lancets 28G misc      Test 4 (Four) Times a Day After Meals & at Bedtime       rosuvastatin 40 MG tablet  Commonly known as: CRESTOR      [place] 1 tableta por via oral cada noche.  (Take 1 tablet by mouth Every Night.)       sacubitril-valsartan 24-26 MG tablet  Commonly known as: ENTRESTO      Take 1 tablet by mouth 2 (Two) Times a Day.       spironolactone 25 MG tablet  Commonly known as: ALDACTONE      [place] 1 tableta por via oral diariamente.  (Take 1 tablet by mouth Daily.)       Unifine Pentips 32G X 4 MM misc  Generic drug: Insulin Pen Needle      Use with insulin 5 times daily       TRUEplus Pen Needles 31G X 5 MM misc  Generic drug: Insulin Pen Needle      Use as directed daily.          STOP taking these medications    bumetanide 1 MG tablet  Commonly known as: BUMEX  Stopped by: JESSICA Cueto        isosorbide mononitrate 30 MG 24 hr tablet  Commonly known as: IMDUR  Stopped by: JESSICA Cueto        isosorbide-hydrALAZINE 20-37.5 MG per tablet  Commonly known as: BIDIL  Stopped by: JESSICA Cueto        metoprolol succinate  MG 24 hr tablet  Commonly known as: TOPROL-XL  Stopped by: JESSIAC Cueto              Where to Get Your Medications       [facility] [place] [address] [facility] [address]    Hours: 7:00 AM-6:00 PM Mon-Fri, 8:00 AM-4:30 PM Sat-Sun (Closed 12-12:30PM) Phone: [phone]   · carvedilol 12.5 MG tablet  · Farxiga 10 MG tablet  · furosemide 40 MG tablet  · hydrALAZINE 25 MG tablet  · isosorbide dinitrate 20 MG tablet  · rosuvastatin 40 MG tablet  · spironolactone 25 MG tablet     Information about where to get these medications is not yet available    Ask your nurse or doctor about these medications  · sacubitril-valsartan 24-26 MG tablet           JESSICA Cueto  10/20/22  11:00 AM EDT

## 2022-10-24 PROBLEM — I50.814 BIVENTRICULAR HEART FAILURE WITH REDUCED LEFT VENTRICULAR FUNCTION: Status: RESOLVED | Noted: 2021-08-18 | Resolved: 2022-10-24

## 2022-10-24 RX ORDER — FUROSEMIDE 10 MG/ML
40 INJECTION INTRAMUSCULAR; INTRAVENOUS ONCE
Status: DISCONTINUED | OUTPATIENT
Start: 2022-10-20 | End: 2023-01-10

## 2022-10-26 ENCOUNTER — DOCUMENTATION (OUTPATIENT)
Dept: CARDIOLOGY | Facility: CLINIC | Age: 37
End: 2022-10-26

## 2022-11-01 ENCOUNTER — HOSPITAL ENCOUNTER (OUTPATIENT)
Dept: CARDIOLOGY | Facility: HOSPITAL | Age: 37
Discharge: HOME | End: 2022-11-01
Admitting: STUDENT IN AN ORGANIZED HEALTH CARE EDUCATION/TRAINING PROGRAM

## 2022-11-01 ENCOUNTER — OFFICE VISIT (OUTPATIENT)
Dept: CARDIOLOGY | Facility: CLINIC | Age: 37
End: 2022-11-01

## 2022-11-01 VITALS
SYSTOLIC BLOOD PRESSURE: 110 MMHG | WEIGHT: 279.4 LBS | BODY MASS INDEX: 44.9 KG/M2 | OXYGEN SATURATION: 99 % | DIASTOLIC BLOOD PRESSURE: 72 MMHG | HEART RATE: 72 BPM | HEIGHT: 66 IN

## 2022-11-01 DIAGNOSIS — I50.23 ACUTE ON CHRONIC HFREF (HEART FAILURE WITH REDUCED EJECTION FRACTION): Primary | ICD-10-CM

## 2022-11-01 DIAGNOSIS — N18.2 CKD (CHRONIC KIDNEY DISEASE) STAGE 2, GFR 60-89 ML/MIN: ICD-10-CM

## 2022-11-01 DIAGNOSIS — I50.23 ACUTE ON CHRONIC HFREF (HEART FAILURE WITH REDUCED EJECTION FRACTION): ICD-10-CM

## 2022-11-01 DIAGNOSIS — I25.810 CORONARY ARTERY DISEASE INVOLVING CORONARY BYPASS GRAFT OF NATIVE HEART WITHOUT ANGINA PECTORIS: ICD-10-CM

## 2022-11-01 DIAGNOSIS — I10 PRIMARY HYPERTENSION: ICD-10-CM

## 2022-11-01 LAB
ANION GAP SERPL CALCULATED.3IONS-SCNC: 10 MMOL/L (ref 5–15)
BUN SERPL-MCNC: 33 MG/DL (ref 6–20)
BUN/CREAT SERPL: 13.1 (ref 7–25)
CALCIUM SPEC-SCNC: 8.5 MG/DL (ref 8.6–10.5)
CHLORIDE SERPL-SCNC: 101 MMOL/L (ref 98–107)
CO2 SERPL-SCNC: 26 MMOL/L (ref 22–29)
CREAT SERPL-MCNC: 2.51 MG/DL (ref 0.76–1.27)
EGFRCR SERPLBLD CKD-EPI 2021: 32.9 ML/MIN/1.73
GLUCOSE SERPL-MCNC: 196 MG/DL (ref 65–99)
MAGNESIUM SERPL-MCNC: 2.4 MG/DL (ref 1.6–2.6)
POTASSIUM SERPL-SCNC: 3.9 MMOL/L (ref 3.5–5.2)
SODIUM SERPL-SCNC: 137 MMOL/L (ref 136–145)

## 2022-11-01 PROCEDURE — 99214 OFFICE O/P EST MOD 30 MIN: CPT | Performed by: STUDENT IN AN ORGANIZED HEALTH CARE EDUCATION/TRAINING PROGRAM

## 2022-11-01 PROCEDURE — 83735 ASSAY OF MAGNESIUM: CPT | Performed by: STUDENT IN AN ORGANIZED HEALTH CARE EDUCATION/TRAINING PROGRAM

## 2022-11-01 PROCEDURE — 80048 BASIC METABOLIC PNL TOTAL CA: CPT | Performed by: STUDENT IN AN ORGANIZED HEALTH CARE EDUCATION/TRAINING PROGRAM

## 2022-11-01 PROCEDURE — 36415 COLL VENOUS BLD VENIPUNCTURE: CPT

## 2022-11-01 RX ORDER — ASPIRIN 325 MG
325 TABLET ORAL DAILY
Qty: 30 TABLET | Refills: 11 | COMMUNITY
End: 2022-12-06

## 2022-11-01 NOTE — PROGRESS NOTES
Subjective:     Encounter Date:11/01/2022      Patient ID: Rufus Dorsey is a 37 y.o. male.    Chief Complaint:  Follow-up of volume overload, heart failure    HPI:   37 y.o. male with a past medical history significant for HFrEF secondary to ischemic cardiomyopathy (EF 20%), CAD status post CABG(recent left heart cath in December 2021 with 90% LIMA to LAD obstruction status post drug-eluting stent, patent SVG to OM1 and OM 2), hypertension, diabetes, obesity who presents for follow-up.  Patient was seen in the office on 10/20/2022.  During that visit he was noted to be volume overloaded and [place] [facility] [place].  Labs during that visit revealed a creatinine of 2.22 and a proBNP of 1600.  He notes compliance with Lasix 80 mg daily however has been drinking a lot of water to counteract how much he has been urinating.  He denies any chest pain, shortness of breath, dyspnea exertion, palpitations, orthopnea, PND.  He believes his lower extremity edema is much improved.    He has brought in his home medications and the bottles he has are as follows:     Coreg 12.5 BID  Spironolactone 25mg daily  farxiga 10mg  Hydralazine 37.5 TID  Rosuvastatin 40mg qhs  Lasix 80mg daily      The following portions of the patient's history were reviewed and updated as appropriate: allergies, current medications, past family history, past medical history, past social history, past surgical history and problem list.     REVIEW OF SYSTEMS:   All systems reviewed.  Pertinent positives identified in HPI.  All other systems are negative.    Past Medical History:   Diagnosis Date   • CHF (congestive heart failure) ([facility])    • Coronary artery disease    • Diabetes (HCC)    • Heart failure (HCC) 05/16/2016   • High cholesterol    • Hypertension    • Ischemic cardiomyopathy 06/25/2016       Family History   Family history unknown: Yes       Social History     Socioeconomic History   • Marital status: Single   Tobacco Use    • Smoking status: Never   • Smokeless tobacco: Never   Vaping Use   • Vaping Use: Never used   Substance and Sexual Activity   • Alcohol use: Never   • Drug use: Never   • Sexual activity: Defer       No Known Allergies    Past Surgical History:   Procedure Laterality Date   • CARDIAC CATHETERIZATION  01/25/2017    Right heart cath   • CARDIAC CATHETERIZATION N/A 12/4/2021    Procedure: SAPHENOUS VEIN GRAFT;  Surgeon: Les Reyes MD;  Location:  [facility] CATH INVASIVE LOCATION;  Service: Cardiovascular;  Laterality: N/A;   • CARDIAC CATHETERIZATION N/A 12/4/2021    Procedure: Coronary angiography;  Surgeon: Les Reyes MD;  Location:  [facility] CATH INVASIVE LOCATION;  Service: Cardiovascular;  Laterality: N/A;   • CARDIAC CATHETERIZATION N/A 12/4/2021    Procedure: Stent ROSEY coronary;  Surgeon: Les Reyes MD;  Location:  [facility] CATH INVASIVE LOCATION;  Service: Cardiovascular;  Laterality: N/A;   • CARDIAC CATHETERIZATION N/A 12/4/2021    Procedure: Native mammary injection;  Surgeon: Les Reyes MD;  [facility];  Service: Cardiovascular;  Laterality: N/A;   • CARDIAC SURGERY     • CORONARY ARTERY BYPASS GRAFT  05/26/2015    cabg x3 Dr. Key       Procedures       Objective:         Vitals:    11/01/22 1249   BP: 110/72   Pulse: 72   SpO2: 99%       PHYSICAL EXAM:  GEN: well appearing, in NAD, obese  HEENT: NCAT, EOMI, moist mucus membranes   Respiratory: CTAB, no wheezes, rales or rhonchi  CV: normal rate, regular rhythm, normal S1, S2, no murmurs, rubs, gallops, +2 radial pulses b/l, JVD difficult to assess however positive HJR  GI: soft, protuberant, nontender  MSK: 1+ edema bilaterally  Skin: no rash, warm, dry  Heme/Lymph: no bruising or bleeding  Psych: organized thought, normal behavior and affect  Neuro: Alert and Oriented x 3, grossly normal motor function        Assessment:         (I50.23) Acute on chronic HFrEF (heart failure with reduced ejection  fraction) ([facility]) - Plan: Basic Metabolic Panel, Magnesium    (I25.810) Coronary artery disease involving coronary bypass graft of native heart without angina pectoris    (I10) Primary hypertension    (N18.2) CKD (chronic kidney disease) stage 2, GFR 60-89 ml/min       Plan:       #Acute on chronic HFrEF  Patient still with significant volume overload: Positive HJR, 1+ edema bilaterally, abdominal edema.  His weight is 17 pounds heavier than it was in June.  Stressed importance of dietary compliance in addition to decreased fluid intake.  Also stressed importance of adequate glycemic control.  Patient appears to have poor understanding of his current medical conditions but does have interest in what he can do to improve.  - Given his elevated creatinine at last visit, will check BMP again.  For now continue Lasix 80 mg daily, if creatinine stable, will increase Lasix to 80 mg twice daily and have patient return in 1 week for reassessment.  - Continue hydralazine 37.5 mg 3 times daily  - He does not have Entresto or Isordil as part of his current regimen.  For now we will hold off and await labs, may start Isordil next week.    #CAD status post CABG  -Continue Crestor 40 mg daily  -Start aspirin 81 mg daily    #Hypertension  Currently well controlled  -Continue hydralazine 37.5 mg 3 times daily, carvedilol 12.5 mg twice daily      Vicente Trujillo MD  11/01/22  [facility]    Outpatient Encounter Medications as of 11/1/2022   Medication Sig Dispense Refill   • Blood Glucose Monitoring Suppl (FreeStyle Freedom Lite) w/Device kit Use to check blood sugar as directed. 1 each 0   • Blood Glucose Monitoring Suppl (Glucocard Expression Monitor) w/Device kit Use as directed 1 kit 0   • carvedilol (COREG) 12.5 MG tablet Take 1 tablet by mouth 2 (Two) Times a Day With Meals. 120 tablet 0   • dapagliflozin Propanediol (Farxiga) 10 MG tablet Take 1 tablet by mouth Daily. 30 tablet 0   • furosemide (Lasix) 40 MG tablet  Take 1 tablet by mouth Daily. 120 tablet 0   • glucose monitor monitoring kit 1 each 4 (Four) Times a Day Before Meals & at Bedtime. 1 each 0   • hydrALAZINE (APRESOLINE) 25 MG tablet Take 1.5 tablets by mouth 3 (Three) Times a Day. 135 tablet 0   • Insulin Pen Needle (TRUEplus Pen Needles) 31G X 5 MM misc Use as directed daily. 100 each 0   • Insulin Pen Needle 32G X 4 MM misc Use with insulin 5 times daily 200 each 0   • Lancets 28G misc 1 each by Other route 4 (Four) Times a Day After Meals & at Bedtime. 100 each 0   • Lancets 28G misc Test 4 (Four) Times a Day After Meals & at Bedtime 100 each 0   • rosuvastatin (CRESTOR) 40 MG tablet Take 1 tablet by mouth Every Night. 90 tablet 0   • spironolactone (ALDACTONE) 25 MG tablet Take 1 tablet by mouth Daily. 30 tablet 0   • budesonide-formoterol (SYMBICORT) 160-4.5 MCG/ACT inhaler Inhale 2 puffs 2 (Two) Times a Day for 30 days. 1 each 0   • Insulin Glargine (BASAGLAR KWIKPEN) 100 UNIT/ML injection pen Inject 20 Units under the skin into the appropriate area as directed Every Night for 30 days. 15 mL 0   • isosorbide dinitrate (ISORDIL) 20 MG tablet Take 1 tablet by mouth 3 (Three) Times a Day. 90 tablet 0   • sacubitril-valsartan (ENTRESTO) 24-26 MG tablet Take 1 tablet by mouth 2 (Two) Times a Day. 60 tablet      Facility-Administered Encounter Medications as of 11/1/2022   Medication Dose Route Frequency Provider Last Rate Last Admin   • furosemide (LASIX) injection 40 mg  40 mg Intravenous Once Angelita Moreno APRN

## 2022-11-02 NOTE — PROGRESS NOTES
Spoke to patient on the phone regarding recent bloodwork. Instructed him to continue current dose of lasix 80mg daily given elevated Cr. He states that he realized after our visit yesterday that he had mistakenly been taking double of another medication(cannot tell me which) as opposed to the lasix. He will start taking the lasix as prescribed and return to see me in clinic.

## 2022-12-06 ENCOUNTER — HOSPITAL ENCOUNTER (OUTPATIENT)
Dept: CARDIOLOGY | Facility: HOSPITAL | Age: 37
Discharge: HOME | End: 2022-12-06
Admitting: STUDENT IN AN ORGANIZED HEALTH CARE EDUCATION/TRAINING PROGRAM

## 2022-12-06 ENCOUNTER — OFFICE VISIT (OUTPATIENT)
Dept: CARDIOLOGY | Facility: CLINIC | Age: 37
End: 2022-12-06

## 2022-12-06 VITALS
HEIGHT: 66 IN | SYSTOLIC BLOOD PRESSURE: 138 MMHG | WEIGHT: 280 LBS | DIASTOLIC BLOOD PRESSURE: 84 MMHG | BODY MASS INDEX: 45 KG/M2 | HEART RATE: 70 BPM

## 2022-12-06 DIAGNOSIS — I50.22 CHRONIC HFREF (HEART FAILURE WITH REDUCED EJECTION FRACTION): Primary | ICD-10-CM

## 2022-12-06 DIAGNOSIS — Z95.1 S/P CABG (CORONARY ARTERY BYPASS GRAFT): ICD-10-CM

## 2022-12-06 DIAGNOSIS — I50.22 CHRONIC HFREF (HEART FAILURE WITH REDUCED EJECTION FRACTION): ICD-10-CM

## 2022-12-06 DIAGNOSIS — I10 PRIMARY HYPERTENSION: ICD-10-CM

## 2022-12-06 DIAGNOSIS — I25.810 CORONARY ARTERY DISEASE INVOLVING CORONARY BYPASS GRAFT OF NATIVE HEART WITHOUT ANGINA PECTORIS: ICD-10-CM

## 2022-12-06 LAB
ANION GAP SERPL CALCULATED.3IONS-SCNC: 10.7 MMOL/L (ref 5–15)
BUN SERPL-MCNC: 34 MG/DL (ref 6–20)
BUN/CREAT SERPL: 13.3 (ref 7–25)
CALCIUM SPEC-SCNC: 8.5 MG/DL (ref 8.6–10.5)
CHLORIDE SERPL-SCNC: 101 MMOL/L (ref 98–107)
CO2 SERPL-SCNC: 26.3 MMOL/L (ref 22–29)
CREAT SERPL-MCNC: 2.55 MG/DL (ref 0.76–1.27)
EGFRCR SERPLBLD CKD-EPI 2021: 32.3 ML/MIN/1.73
GLUCOSE SERPL-MCNC: 229 MG/DL (ref 65–99)
MAGNESIUM SERPL-MCNC: 2.1 MG/DL (ref 1.6–2.6)
POTASSIUM SERPL-SCNC: 3.5 MMOL/L (ref 3.5–5.2)
SODIUM SERPL-SCNC: 138 MMOL/L (ref 136–145)

## 2022-12-06 PROCEDURE — 93000 ELECTROCARDIOGRAM COMPLETE: CPT | Performed by: STUDENT IN AN ORGANIZED HEALTH CARE EDUCATION/TRAINING PROGRAM

## 2022-12-06 PROCEDURE — 83735 ASSAY OF MAGNESIUM: CPT | Performed by: STUDENT IN AN ORGANIZED HEALTH CARE EDUCATION/TRAINING PROGRAM

## 2022-12-06 PROCEDURE — 80048 BASIC METABOLIC PNL TOTAL CA: CPT | Performed by: STUDENT IN AN ORGANIZED HEALTH CARE EDUCATION/TRAINING PROGRAM

## 2022-12-06 PROCEDURE — 99214 OFFICE O/P EST MOD 30 MIN: CPT | Performed by: STUDENT IN AN ORGANIZED HEALTH CARE EDUCATION/TRAINING PROGRAM

## 2022-12-06 PROCEDURE — 36415 COLL VENOUS BLD VENIPUNCTURE: CPT

## 2022-12-06 RX ORDER — ISOSORBIDE DINITRATE 20 MG/1
20 TABLET ORAL 3 TIMES DAILY
Qty: 90 TABLET | Refills: 11 | Status: SHIPPED | OUTPATIENT
Start: 2022-12-06 | End: 2022-12-10 | Stop reason: SDUPTHER

## 2022-12-06 RX ORDER — SPIRONOLACTONE 25 MG/1
25 TABLET ORAL DAILY
Qty: 30 TABLET | Refills: 11 | Status: SHIPPED | OUTPATIENT
Start: 2022-12-06 | End: 2022-12-10 | Stop reason: SDUPTHER

## 2022-12-06 RX ORDER — DAPAGLIFLOZIN 10 MG/1
10 TABLET, FILM COATED ORAL DAILY
Qty: 30 TABLET | Refills: 11 | Status: SHIPPED | OUTPATIENT
Start: 2022-12-06 | End: 2022-12-10 | Stop reason: SDUPTHER

## 2022-12-06 RX ORDER — CARVEDILOL 25 MG/1
25 TABLET ORAL 2 TIMES DAILY WITH MEALS
Qty: 60 TABLET | Refills: 11 | Status: SHIPPED | OUTPATIENT
Start: 2022-12-06 | End: 2022-12-10 | Stop reason: SDUPTHER

## 2022-12-06 RX ORDER — ASPIRIN 81 MG/1
81 TABLET ORAL DAILY
Qty: 30 TABLET | Refills: 11 | Status: SHIPPED | OUTPATIENT
Start: 2022-12-06 | End: 2022-12-10 | Stop reason: SDUPTHER

## 2022-12-06 NOTE — PROGRESS NOTES
Subjective:     Encounter Date:11/01/2022      Patient ID: Rufus Dorsey is a 37 y.o. male.    Chief Complaint:  Follow-up of volume overload, heart failure    HPI:   37 y.o. male with HFrEF secondary to ischemic cardiomyopathy (EF 20%), CAD status post CABG(recent left heart cath in December 2021 with 90% LIMA to LAD obstruction status post drug-eluting stent, patent SVG to OM1 and OM 2), hypertension, diabetes, obesity who presents for follow-up.  Patient was last seen 11/1/2022.  At the time he was noted to still be volume overloaded and upon further questioning he admitted to drinking a lot of water to counteract how much he had been diuresing with his Lasix.  He had poor understanding of his current medical condition.  Blood work was performed which revealed a creatinine of 2.5 and a normal potassium and magnesium.  I called him to instruct him to continue his diuretic and he told me that he realized that he had been mistakenly taking double of another medication but we did not know which.    Today, he has no complaints.  He states that he is able to walk about 4 to 5 miles a day without any symptoms.  He believes his swelling has improved.  He is not adherent to all of his medications.  He notes that the Farxiga is very expensive.  He still does not appear to have much understanding of his clinical condition.    He brought his pill bottles today and multiple bottles were empty including spironolactone, Isordil, and he did not have aspirin.      The following portions of the patient's history were reviewed and updated as appropriate: allergies, current medications, past family history, past medical history, past social history, past surgical history and problem list.     REVIEW OF SYSTEMS:   All systems reviewed.  Pertinent positives identified in HPI.  All other systems are negative.    Past Medical History:   Diagnosis Date   • CHF (congestive heart failure) (HCC)    • Coronary artery disease    •  Refill sent to [facility].   Diabetes ([facility])    • Heart failure (HCC) 05/16/2016   • High cholesterol    • Hypertension    • Ischemic cardiomyopathy 06/25/2016       Family History   Family history unknown: Yes       Social History     Socioeconomic History   • Marital status: Single   Tobacco Use   • Smoking status: Never   • Smokeless tobacco: Never   Vaping Use   • Vaping Use: Never used   Substance and Sexual Activity   • Alcohol use: Never   • Drug use: Never   • Sexual activity: Defer       No Known Allergies    Past Surgical History:   Procedure Laterality Date   • CARDIAC CATHETERIZATION  01/25/2017    Right heart cath   • CARDIAC CATHETERIZATION N/A 12/4/2021    Procedure: SAPHENOUS VEIN GRAFT;  Surgeon: Les Reyes MD;  Location:  [facility];  Service: Cardiovascular;  Laterality: N/A;   • CARDIAC CATHETERIZATION N/A 12/4/2021    Procedure: Coronary angiography;  Surgeon: Les Reyes MD;  Location:  [facility];  Service: Cardiovascular;  Laterality: N/A;   • CARDIAC CATHETERIZATION N/A 12/4/2021    Procedure: Stent ROSEY coronary;  Surgeon: Les Reyes MD;  Location:  [facility];  Service: Cardiovascular;  Laterality: N/A;   • CARDIAC CATHETERIZATION N/A 12/4/2021    Procedure: Native mammary injection;  Surgeon: Les Reyes MD;  Location:  [facility];  Service: Cardiovascular;  Laterality: N/A;   • CARDIAC SURGERY     • CORONARY ARTERY BYPASS GRAFT  05/26/2015    cabg x3 Dr. Key         ECG 12 Lead    Date/Time: 12/6/2022 2:48 PM  Performed by: Vicente Trujillo MD  Authorized by: Vicente Trujillo MD   Comparison: compared with previous ECG from 10/20/2022  Similar to previous ECG  Comparison to previous ECG: Ventricular ectopy has resolved.  Rhythm: sinus rhythm  Rate: normal  Conduction: conduction normal  ST Depression: I and aVL  ST Flattening: V5, V6 and II  QRS axis: normal  Other findings: left ventricular hypertrophy    Clinical impression:  abnormal EKG               Objective:         Vitals:    12/06/22 1339   BP: 138/84   Pulse: 70       PHYSICAL EXAM:  GEN: well appearing, in NAD, obese  HEENT: NCAT, EOMI, moist mucus membranes   Respiratory: CTAB, no wheezes, rales or rhonchi  CV: normal rate, regular rhythm, normal S1, S2, no murmurs, rubs, gallops, +2 radial pulses b/l, JVD not elevated  GI: soft, protuberant, nontender  MSK: trace edema bilaterally  Skin: no rash, warm, dry  Heme/Lymph: no bruising or bleeding  Psych: organized thought, normal behavior and affect  Neuro: Alert and Oriented x 3, grossly normal motor function        Assessment:         (I50.22) Chronic HFrEF (heart failure with reduced ejection fraction) (HCC) - Plan: Basic Metabolic Panel, Magnesium, ECG 12 Lead    (I25.810) Coronary artery disease involving coronary bypass graft of native heart without angina pectoris    (Z95.1) S/P CABG (coronary artery bypass graft)    (I10) Primary hypertension    37-year-old man with HFrEF secondary to ischemic cardiomyopathy (EF 20%), CAD status post CABG(recent left heart cath in December 2021 with 90% LIMA to LAD obstruction status post drug-eluting stent, patent SVG to OM1 and OM 2), hypertension, diabetes, obesity who presents for follow-up.      Plan:       #Acute on chronic HFrEF  Patient's volume overload has improved, however his weight remains about the same.  Stressed importance of medication adherence and discussed poor prognosis and high mortality of his condition particularly an EF of 20% with medication noncompliance.  Patient appeared to understand the gravity of his diagnosis and asked appropriate questions.  - Increase carvedilol to 25 mg twice daily  - Continue hydralazine 37.5 mg 3 times daily, Isordil 20 mg 3 times daily  - Continue Lasix 80 mg daily, Aldactone 25 mg daily  - BMP and magnesium  - Not on ACE/ARB/Arni given elevated creatinine, will continue to monitor  -All medications renewed/refilled    #CAD status  Unable to assess due to patient's cognitive impairment post CABG  -Continue Crestor 40 mg daily  -Start aspirin 81 mg daily    #Hypertension  Currently well controlled  -Continue hydralazine 37.5 mg 3 times daily, carvedilol 25 mg twice daily    He will return to see me in 1 month.    Vicente Trujillo MD  12/06/22  [facility]    Outpatient Encounter Medications as of 12/6/2022   Medication Sig Dispense Refill   • Blood Glucose Monitoring Suppl (FreeStyle Freedom Lite) w/Device kit Use to check blood sugar as directed. 1 each 0   • Blood Glucose Monitoring Suppl (Glucocard Expression Monitor) w/Device kit Use as directed 1 kit 0   • carvedilol (COREG) 25 MG tablet Take 1 tablet by mouth 2 (Two) Times a Day With Meals. 60 tablet 11   • dapagliflozin Propanediol (Farxiga) 10 MG tablet Take 1 tablet by mouth Daily. 30 tablet 11   • furosemide (Lasix) 40 MG tablet Take 1 tablet by mouth Daily. 120 tablet 0   • glucose monitor monitoring kit 1 each 4 (Four) Times a Day Before Meals & at Bedtime. 1 each 0   • hydrALAZINE (APRESOLINE) 25 MG tablet Take 1.5 tablets by mouth 3 (Three) Times a Day. 135 tablet 0   • Insulin Pen Needle (TRUEplus Pen Needles) 31G X 5 MM misc Use as directed daily. 100 each 0   • Insulin Pen Needle 32G X 4 MM misc Use with insulin 5 times daily 200 each 0   • isosorbide dinitrate (ISORDIL) 20 MG tablet Take 1 tablet by mouth 3 (Three) Times a Day. 90 tablet 11   • Lancets 28G misc 1 each by Other route 4 (Four) Times a Day After Meals & at Bedtime. 100 each 0   • rosuvastatin (CRESTOR) 40 MG tablet Take 1 tablet by mouth Every Night. 90 tablet 0   • spironolactone (ALDACTONE) 25 MG tablet Take 1 tablet by mouth Daily. 30 tablet 11   • [DISCONTINUED] carvedilol (COREG) 12.5 MG tablet Take 1 tablet by mouth 2 (Two) Times a Day With Meals. 120 tablet 0   • [DISCONTINUED] dapagliflozin Propanediol (Farxiga) 10 MG tablet Take 1 tablet by mouth Daily. 30 tablet 0   • [DISCONTINUED] isosorbide dinitrate (ISORDIL) 20 MG tablet Take 1 tablet by  mouth 3 (Three) Times a Day. 90 tablet 0   • [DISCONTINUED] spironolactone (ALDACTONE) 25 MG tablet Take 1 tablet by mouth Daily. 30 tablet 0   • aspirin 81 MG EC tablet Take 1 tablet by mouth Daily. 30 tablet 11   • budesonide-formoterol (SYMBICORT) 160-4.5 MCG/ACT inhaler Inhale 2 puffs 2 (Two) Times a Day for 30 days. 1 each 0   • Insulin Glargine (BASAGLAR KWIKPEN) 100 UNIT/ML injection pen Inject 20 Units under the skin into the appropriate area as directed Every Night for 30 days. 15 mL 0   • Lancets 28G misc Test 4 (Four) Times a Day After Meals & at Bedtime 100 each 0   • sacubitril-valsartan (ENTRESTO) 24-26 MG tablet Take 1 tablet by mouth 2 (Two) Times a Day. 60 tablet    • [DISCONTINUED] aspirin 325 MG tablet Take 1 tablet by mouth Daily. 30 tablet 11     Facility-Administered Encounter Medications as of 12/6/2022   Medication Dose Route Frequency Provider Last Rate Last Admin   • furosemide (LASIX) injection 40 mg  40 mg Intravenous Once Angelita Moreno, APRN

## 2022-12-09 RX ORDER — HYDRALAZINE HYDROCHLORIDE 25 MG/1
37.5 TABLET, FILM COATED ORAL 3 TIMES DAILY
Qty: 135 TABLET | Refills: 0 | Status: SHIPPED | OUTPATIENT
Start: 2022-12-09 | End: 2023-01-10 | Stop reason: SDUPTHER

## 2022-12-09 RX ORDER — ROSUVASTATIN CALCIUM 40 MG/1
40 TABLET, COATED ORAL NIGHTLY
Qty: 90 TABLET | Refills: 0 | Status: SHIPPED | OUTPATIENT
Start: 2022-12-09

## 2022-12-12 RX ORDER — SPIRONOLACTONE 25 MG/1
25 TABLET ORAL DAILY
Qty: 30 TABLET | Refills: 11 | Status: SHIPPED | OUTPATIENT
Start: 2022-12-12 | End: 2023-01-10

## 2022-12-12 RX ORDER — ASPIRIN 81 MG/1
81 TABLET ORAL DAILY
Qty: 30 TABLET | Refills: 11 | Status: SHIPPED | OUTPATIENT
Start: 2022-12-12 | End: 2023-01-10 | Stop reason: SDUPTHER

## 2022-12-12 RX ORDER — DAPAGLIFLOZIN 10 MG/1
10 TABLET, FILM COATED ORAL DAILY
Qty: 30 TABLET | Refills: 11 | Status: SHIPPED | OUTPATIENT
Start: 2022-12-12 | End: 2023-01-11

## 2022-12-12 RX ORDER — CARVEDILOL 25 MG/1
25 TABLET ORAL 2 TIMES DAILY WITH MEALS
Qty: 60 TABLET | Refills: 11 | Status: SHIPPED | OUTPATIENT
Start: 2022-12-12 | End: 2023-01-10 | Stop reason: SDUPTHER

## 2022-12-12 RX ORDER — ISOSORBIDE DINITRATE 20 MG/1
20 TABLET ORAL 3 TIMES DAILY
Qty: 90 TABLET | Refills: 11 | Status: SHIPPED | OUTPATIENT
Start: 2022-12-12 | End: 2023-01-10 | Stop reason: SDUPTHER

## 2023-01-10 ENCOUNTER — OFFICE VISIT (OUTPATIENT)
Dept: CARDIOLOGY | Facility: CLINIC | Age: 38
End: 2023-01-10
Payer: MEDICAID

## 2023-01-10 VITALS
DIASTOLIC BLOOD PRESSURE: 86 MMHG | HEIGHT: 66 IN | WEIGHT: 287 LBS | BODY MASS INDEX: 46.12 KG/M2 | SYSTOLIC BLOOD PRESSURE: 140 MMHG | HEART RATE: 85 BPM

## 2023-01-10 DIAGNOSIS — I10 PRIMARY HYPERTENSION: ICD-10-CM

## 2023-01-10 DIAGNOSIS — I25.810 CORONARY ARTERY DISEASE INVOLVING CORONARY BYPASS GRAFT OF NATIVE HEART WITHOUT ANGINA PECTORIS: ICD-10-CM

## 2023-01-10 DIAGNOSIS — Z95.1 S/P CABG (CORONARY ARTERY BYPASS GRAFT): ICD-10-CM

## 2023-01-10 DIAGNOSIS — I50.20 HFREF (HEART FAILURE WITH REDUCED EJECTION FRACTION): Primary | ICD-10-CM

## 2023-01-10 PROCEDURE — 99214 OFFICE O/P EST MOD 30 MIN: CPT | Performed by: STUDENT IN AN ORGANIZED HEALTH CARE EDUCATION/TRAINING PROGRAM

## 2023-01-10 RX ORDER — ISOSORBIDE DINITRATE 20 MG/1
20 TABLET ORAL 3 TIMES DAILY
Qty: 90 TABLET | Refills: 11 | Status: SHIPPED | OUTPATIENT
Start: 2023-01-10

## 2023-01-10 RX ORDER — CARVEDILOL 25 MG/1
25 TABLET ORAL 2 TIMES DAILY WITH MEALS
Qty: 60 TABLET | Refills: 11 | Status: SHIPPED | OUTPATIENT
Start: 2023-01-10 | End: 2023-01-10

## 2023-01-10 RX ORDER — CARVEDILOL 25 MG/1
25 TABLET ORAL 2 TIMES DAILY WITH MEALS
Qty: 60 TABLET | Refills: 11 | Status: SHIPPED | OUTPATIENT
Start: 2023-01-10

## 2023-01-10 RX ORDER — ASPIRIN 81 MG/1
81 TABLET ORAL DAILY
Qty: 30 TABLET | Refills: 11 | Status: SHIPPED | OUTPATIENT
Start: 2023-01-10

## 2023-01-10 RX ORDER — FUROSEMIDE 40 MG/1
40 TABLET ORAL DAILY
Qty: 120 TABLET | Refills: 0 | Status: SHIPPED | OUTPATIENT
Start: 2023-01-10

## 2023-01-10 RX ORDER — HYDRALAZINE HYDROCHLORIDE 25 MG/1
37.5 TABLET, FILM COATED ORAL 3 TIMES DAILY
Qty: 135 TABLET | Refills: 0 | Status: SHIPPED | OUTPATIENT
Start: 2023-01-10

## 2023-01-10 NOTE — PROGRESS NOTES
Subjective:     Encounter Date:11/01/2022      Patient ID: Rufus Dorsey is a 37 y.o. male.    Chief Complaint:  Follow-up of volume overload, heart failure    HPI:   37 y.o. male with HFrEF secondary to ischemic cardiomyopathy (EF 20%), CAD status post CABG(recent left heart cath in December 2021 with 90% LIMA to LAD obstruction status post drug-eluting stent, patent SVG to OM1 and OM 2), hypertension, diabetes, obesity who presents for follow-up.  Patient was last seen 12/6/2022 and at the time he appeared to be better compensated, with only trace edema.  He was not taking his medications as prescribed and appeared to be for multiple reasons including cost and poor understanding of his disease.    Today, he has no complaints.  He states that his pharmacy told him it would cost him $1200 a month for his medications.  It also appears that he never increased the carvedilol to 25 mg twice daily.    The following portions of the patient's history were reviewed and updated as appropriate: allergies, current medications, past family history, past medical history, past social history, past surgical history and problem list.     REVIEW OF SYSTEMS:   All systems reviewed.  Pertinent positives identified in HPI.  All other systems are negative.    Past Medical History:   Diagnosis Date   • CHF (congestive heart failure) (HCC)    • Coronary artery disease    • Diabetes (HCC)    • Heart failure (HCC) 05/16/2016   • High cholesterol    • Hypertension    • Ischemic cardiomyopathy 06/25/2016       Family History   Family history unknown: Yes       Social History     Socioeconomic History   • Marital status: Single   Tobacco Use   • Smoking status: Never   • Smokeless tobacco: Never   Vaping Use   • Vaping Use: Never used   Substance and Sexual Activity   • Alcohol use: Never   • Drug use: Never   • Sexual activity: Defer       No Known Allergies    Past Surgical History:   Procedure Laterality Date   • CARDIAC  CATHETERIZATION  01/25/2017    Right heart cath   • CARDIAC CATHETERIZATION N/A 12/4/2021    Procedure: SAPHENOUS VEIN GRAFT;  Surgeon: Les Reyes MD;  Location:  [facility];  Service: Cardiovascular;  Laterality: N/A;   • CARDIAC CATHETERIZATION N/A 12/4/2021    Procedure: Coronary angiography;  Surgeon: Les Reyes MD;  Location:  [facility];  Service: Cardiovascular;  Laterality: N/A;   • CARDIAC CATHETERIZATION N/A 12/4/2021    Procedure: Stent ROSEY coronary;  Surgeon: Les Reyes MD;  Location:  [facility];  Service: Cardiovascular;  Laterality: N/A;   • CARDIAC CATHETERIZATION N/A 12/4/2021    Procedure: Native mammary injection;  Surgeon: Les Reyes MD;  Location:  [facility];  Service: Cardiovascular;  Laterality: N/A;   • CARDIAC SURGERY     • CORONARY ARTERY BYPASS GRAFT  05/26/2015    cabg x3 Dr. Key          Objective:         Vitals:    01/10/23 1420   BP: 140/86   Pulse: 85       PHYSICAL EXAM:  GEN: well appearing, in NAD, obese  HEENT: NCAT, EOMI, moist mucus membranes   Respiratory: CTAB, no wheezes, rales or rhonchi  CV: normal rate, regular rhythm, normal S1, S2, no murmurs, rubs, gallops, +2 radial pulses b/l, JVD not elevated  GI: soft, protuberant, nontender  MSK: No edema  Skin: no rash, warm, dry  Heme/Lymph: no bruising or bleeding  Psych: organized thought, normal behavior and affect  Neuro: Alert and Oriented x 3, grossly normal motor function        Assessment:         (I50.20) HFrEF (heart failure with reduced ejection fraction) (HCC)    (I25.810) Coronary artery disease involving coronary bypass graft of native heart without angina pectoris    (Z95.1) S/P CABG (coronary artery bypass graft)    (I10) Primary hypertension    37-year-old man with HFrEF secondary to ischemic cardiomyopathy (EF 20%), CAD status post CABG(recent left heart cath in December 2021 with 90% LIMA to LAD obstruction  status post drug-eluting stent, patent SVG to OM1 and OM 2), hypertension, diabetes, obesity who presents for follow-up.      Plan:       #Acute on chronic HFrEF  Patient not volume overloaded on exam, weight remains relatively stable.  We went over the "[employer] website and I showed him that each of his prescriptions should cost about $4-$5 which would total less than $30 a month.  I sent his new prescriptions to [facility] near his home as it appeared to have more reasonable pricing.  I also gave him a good Rx prescription card and instructed him to take this with him when he picks up the prescriptions.  - Increase carvedilol to 25 mg twice daily  - Continue hydralazine 37.5 mg 3 times daily, Isordil 20 mg 3 times daily  - Continue Lasix 80 mg daily  - Discontinue Aldactone given elevated creatinine  - Not on ACE/ARB/Arni given elevated creatinine, will continue to monitor    #CAD status post CABG  - Continue Crestor 40 mg daily, aspirin 81 mg daily    #Hypertension  Currently well controlled  - Continue hydralazine 37.5 mg 3 times daily, carvedilol 25 mg twice daily    He will return to see me in 6 months.    Vicente Trujillo MD  01/10/23  [facility]    Outpatient Encounter Medications as of 1/10/2023   Medication Sig Dispense Refill   • aspirin 81 MG EC tablet Take 1 tablet by mouth Daily. 30 tablet 11   • Blood Glucose Monitoring Suppl (FreeStyle Freedom Lite) w/Device kit Use to check blood sugar as directed. 1 each 0   • Blood Glucose Monitoring Suppl (Glucocard Expression Monitor) w/Device kit Use as directed 1 kit 0   • carvedilol (COREG) 25 MG tablet Take 1 tablet by mouth 2 (Two) Times a Day With Meals. 60 tablet 11   • furosemide (Lasix) 40 MG tablet Take 1 tablet by mouth Daily. 120 tablet 0   • glucose monitor monitoring kit 1 each 4 (Four) Times a Day Before Meals & at Bedtime. 1 each 0   • hydrALAZINE (APRESOLINE) 25 MG tablet Take 1.5 tablets by mouth 3 (Three) Times a Day. 135 tablet 0   •  Insulin Pen Needle (TRUEplus Pen Needles) 31G X 5 MM misc Use as directed daily. 100 each 0   • Insulin Pen Needle 32G X 4 MM misc Use with insulin 5 times daily 200 each 0   • isosorbide dinitrate (ISORDIL) 20 MG tablet Take 1 tablet by mouth 3 (Three) Times a Day. 90 tablet 11   • Lancets 28G misc 1 each by Other route 4 (Four) Times a Day After Meals & at Bedtime. 100 each 0   • Lancets 28G misc Test 4 (Four) Times a Day After Meals & at Bedtime 100 each 0   • rosuvastatin (CRESTOR) 40 MG tablet Take 1 tablet by mouth Every Night. 90 tablet 0   • [DISCONTINUED] aspirin 81 MG EC tablet Take 1 tablet by mouth Daily. 30 tablet 11   • [DISCONTINUED] carvedilol (COREG) 25 MG tablet Take 1 tablet by mouth 2 (Two) Times a Day With Meals. (Patient taking differently: Take 12.5 mg by mouth 2 (Two) Times a Day With Meals.) 60 tablet 11   • [DISCONTINUED] carvedilol (COREG) 25 MG tablet Take 1 tablet by mouth 2 (Two) Times a Day With Meals. 60 tablet 11   • [DISCONTINUED] furosemide (Lasix) 40 MG tablet Take 1 tablet by mouth Daily. 120 tablet 0   • [DISCONTINUED] hydrALAZINE (APRESOLINE) 25 MG tablet Take 1.5 tablets by mouth 3 (Three) Times a Day. 135 tablet 0   • [DISCONTINUED] isosorbide dinitrate (ISORDIL) 20 MG tablet Take 1 tablet by mouth 3 (Three) Times a Day. 90 tablet 11   • [DISCONTINUED] spironolactone (ALDACTONE) 25 MG tablet Take 1 tablet by mouth Daily. 30 tablet 11   • budesonide-formoterol (SYMBICORT) 160-4.5 MCG/ACT inhaler Inhale 2 puffs 2 (Two) Times a Day for 30 days. 1 each 0   • dapagliflozin Propanediol (Farxiga) 10 MG tablet Take 1 tablet by mouth Daily. 30 tablet 11   • Insulin Glargine (BASAGLAR KWIKPEN) 100 UNIT/ML injection pen Inject 20 Units under the skin into the appropriate area as directed Every Night for 30 days. 15 mL 0   • [DISCONTINUED] sacubitril-valsartan (ENTRESTO) 24-26 MG tablet Take 1 tablet by mouth 2 (Two) Times a Day. 60 tablet      Facility-Administered Encounter Medications  as of 1/10/2023   Medication Dose Route Frequency Provider Last Rate Last Admin   • [DISCONTINUED] furosemide (LASIX) injection 40 mg  40 mg Intravenous Once Angelita Moreno, APRN

## 2023-02-03 ENCOUNTER — TELEPHONE (OUTPATIENT)
Dept: CARDIOLOGY | Facility: CLINIC | Age: 38
End: 2023-02-03
Payer: MEDICAID

## 2023-02-03 NOTE — TELEPHONE ENCOUNTER
Caller: [facility]    Relationship:     Best call back number: [phone]    What form or medical record are you requesting: LAST OFFICE NOTE    Who is requesting this form or medical record from you: [facility]    How would you like to receive the form or medical records (pick-up, mail, fax): FAX  If fax, what is the fax number: [phone]    Timeframe paperwork needed: ASAP    Additional notes: GREER- JENNY

## 2023-05-22 ENCOUNTER — HOSPITAL ENCOUNTER (INPATIENT)
Facility: HOSPITAL | Age: 38
LOS: 6 days | Discharge: HOME | DRG: 602 | End: 2023-05-28
Attending: EMERGENCY MEDICINE | Admitting: INTERNAL MEDICINE

## 2023-05-22 ENCOUNTER — APPOINTMENT (OUTPATIENT)
Dept: GENERAL RADIOLOGY | Facility: HOSPITAL | Age: 38
DRG: 602 | End: 2023-05-22

## 2023-05-22 ENCOUNTER — APPOINTMENT (OUTPATIENT)
Dept: CARDIOLOGY | Facility: HOSPITAL | Age: 38
DRG: 602 | End: 2023-05-22

## 2023-05-22 DIAGNOSIS — I50.9 ACUTE ON CHRONIC CONGESTIVE HEART FAILURE, UNSPECIFIED HEART FAILURE TYPE: ICD-10-CM

## 2023-05-22 DIAGNOSIS — M79.89 LEFT LEG SWELLING: Primary | ICD-10-CM

## 2023-05-22 DIAGNOSIS — E66.01 MORBID OBESITY: ICD-10-CM

## 2023-05-22 DIAGNOSIS — N18.9 CHRONIC KIDNEY DISEASE, UNSPECIFIED CKD STAGE: ICD-10-CM

## 2023-05-22 DIAGNOSIS — I50.43 ACUTE ON CHRONIC COMBINED SYSTOLIC AND DIASTOLIC CHF (CONGESTIVE HEART FAILURE): ICD-10-CM

## 2023-05-22 DIAGNOSIS — E11.65 TYPE 2 DIABETES MELLITUS WITH HYPERGLYCEMIA, UNSPECIFIED WHETHER LONG TERM INSULIN USE: ICD-10-CM

## 2023-05-22 DIAGNOSIS — I16.0 HYPERTENSIVE URGENCY: ICD-10-CM

## 2023-05-22 PROBLEM — Z95.1 S/P CABG X 3: Status: ACTIVE | Noted: 2023-05-22

## 2023-05-22 LAB
ALBUMIN SERPL-MCNC: 2.8 G/DL (ref 3.5–5.2)
ALBUMIN/GLOB SERPL: 0.8 G/DL
ALP SERPL-CCNC: 157 U/L (ref 39–117)
ALT SERPL W P-5'-P-CCNC: 20 U/L (ref 1–41)
ANION GAP SERPL CALCULATED.3IONS-SCNC: 11.5 MMOL/L (ref 5–15)
AST SERPL-CCNC: 16 U/L (ref 1–40)
BASOPHILS # BLD AUTO: 0.03 10*3/MM3 (ref 0–0.2)
BASOPHILS NFR BLD AUTO: 0.4 % (ref 0–1.5)
BH CV LOWER VASCULAR LEFT COMMON FEMORAL AUGMENT: NORMAL
BH CV LOWER VASCULAR LEFT COMMON FEMORAL COMPETENT: NORMAL
BH CV LOWER VASCULAR LEFT COMMON FEMORAL COMPRESS: NORMAL
BH CV LOWER VASCULAR LEFT COMMON FEMORAL PHASIC: NORMAL
BH CV LOWER VASCULAR LEFT COMMON FEMORAL SPONT: NORMAL
BH CV LOWER VASCULAR LEFT DISTAL FEMORAL COMPRESS: NORMAL
BH CV LOWER VASCULAR LEFT GASTRONEMIUS COMPRESS: NORMAL
BH CV LOWER VASCULAR LEFT GREATER SAPH AK COMPRESS: NORMAL
BH CV LOWER VASCULAR LEFT GREATER SAPH BK COMPRESS: NORMAL
BH CV LOWER VASCULAR LEFT LESSER SAPH COMPRESS: NORMAL
BH CV LOWER VASCULAR LEFT MID FEMORAL AUGMENT: NORMAL
BH CV LOWER VASCULAR LEFT MID FEMORAL COMPETENT: NORMAL
BH CV LOWER VASCULAR LEFT MID FEMORAL COMPRESS: NORMAL
BH CV LOWER VASCULAR LEFT MID FEMORAL PHASIC: NORMAL
BH CV LOWER VASCULAR LEFT MID FEMORAL SPONT: NORMAL
BH CV LOWER VASCULAR LEFT POPLITEAL AUGMENT: NORMAL
BH CV LOWER VASCULAR LEFT POPLITEAL COMPETENT: NORMAL
BH CV LOWER VASCULAR LEFT POPLITEAL COMPRESS: NORMAL
BH CV LOWER VASCULAR LEFT POPLITEAL PHASIC: NORMAL
BH CV LOWER VASCULAR LEFT POPLITEAL SPONT: NORMAL
BH CV LOWER VASCULAR LEFT POSTERIOR TIBIAL COMPRESS: NORMAL
BH CV LOWER VASCULAR LEFT PROFUNDA FEMORAL COMPRESS: NORMAL
BH CV LOWER VASCULAR LEFT PROXIMAL FEMORAL COMPRESS: NORMAL
BH CV LOWER VASCULAR LEFT SAPHENOFEMORAL JUNCTION COMPRESS: NORMAL
BH CV LOWER VASCULAR RIGHT COMMON FEMORAL AUGMENT: NORMAL
BH CV LOWER VASCULAR RIGHT COMMON FEMORAL COMPETENT: NORMAL
BH CV LOWER VASCULAR RIGHT COMMON FEMORAL COMPRESS: NORMAL
BH CV LOWER VASCULAR RIGHT COMMON FEMORAL PHASIC: NORMAL
BH CV LOWER VASCULAR RIGHT COMMON FEMORAL SPONT: NORMAL
BILIRUB SERPL-MCNC: 0.2 MG/DL (ref 0–1.2)
BUN SERPL-MCNC: 39 MG/DL (ref 6–20)
BUN/CREAT SERPL: 14.8 (ref 7–25)
CALCIUM SPEC-SCNC: 8.2 MG/DL (ref 8.6–10.5)
CHLORIDE SERPL-SCNC: 101 MMOL/L (ref 98–107)
CO2 SERPL-SCNC: 24.5 MMOL/L (ref 22–29)
CREAT SERPL-MCNC: 2.64 MG/DL (ref 0.76–1.27)
DEPRECATED RDW RBC AUTO: 41.1 FL (ref 37–54)
EGFRCR SERPLBLD CKD-EPI 2021: 31 ML/MIN/1.73
EOSINOPHIL # BLD AUTO: 0.4 10*3/MM3 (ref 0–0.4)
EOSINOPHIL NFR BLD AUTO: 4.8 % (ref 0.3–6.2)
ERYTHROCYTE [DISTWIDTH] IN BLOOD BY AUTOMATED COUNT: 13.6 % (ref 12.3–15.4)
GEN 5 2HR TROPONIN T REFLEX: 61 NG/L
GLOBULIN UR ELPH-MCNC: 3.3 GM/DL
GLUCOSE SERPL-MCNC: 312 MG/DL (ref 65–99)
HCT VFR BLD AUTO: 41.9 % (ref 37.5–51)
HGB BLD-MCNC: 14.1 G/DL (ref 13–17.7)
HOLD SPECIMEN: NORMAL
HOLD SPECIMEN: NORMAL
IMM GRANULOCYTES # BLD AUTO: 0.08 10*3/MM3 (ref 0–0.05)
IMM GRANULOCYTES NFR BLD AUTO: 1 % (ref 0–0.5)
LYMPHOCYTES # BLD AUTO: 1.28 10*3/MM3 (ref 0.7–3.1)
LYMPHOCYTES NFR BLD AUTO: 15.5 % (ref 19.6–45.3)
MAGNESIUM SERPL-MCNC: 1.9 MG/DL (ref 1.6–2.6)
MAXIMAL PREDICTED HEART RATE: 183 BPM
MCH RBC QN AUTO: 28.5 PG (ref 26.6–33)
MCHC RBC AUTO-ENTMCNC: 33.7 G/DL (ref 31.5–35.7)
MCV RBC AUTO: 84.6 FL (ref 79–97)
MONOCYTES # BLD AUTO: 0.43 10*3/MM3 (ref 0.1–0.9)
MONOCYTES NFR BLD AUTO: 5.2 % (ref 5–12)
NEUTROPHILS NFR BLD AUTO: 6.04 10*3/MM3 (ref 1.7–7)
NEUTROPHILS NFR BLD AUTO: 73.1 % (ref 42.7–76)
NRBC BLD AUTO-RTO: 0 /100 WBC (ref 0–0.2)
NT-PROBNP SERPL-MCNC: 1793 PG/ML (ref 0–450)
PLATELET # BLD AUTO: 208 10*3/MM3 (ref 140–450)
PMV BLD AUTO: 11.9 FL (ref 6–12)
POTASSIUM SERPL-SCNC: 3.9 MMOL/L (ref 3.5–5.2)
PROT SERPL-MCNC: 6.1 G/DL (ref 6–8.5)
QT INTERVAL: 444 MS
RBC # BLD AUTO: 4.95 10*6/MM3 (ref 4.14–5.8)
SODIUM SERPL-SCNC: 137 MMOL/L (ref 136–145)
STRESS TARGET HR: 156 BPM
TROPONIN T DELTA: 0 NG/L
TROPONIN T SERPL HS-MCNC: 61 NG/L
WBC NRBC COR # BLD: 8.26 10*3/MM3 (ref 3.4–10.8)
WHOLE BLOOD HOLD COAG: NORMAL
WHOLE BLOOD HOLD SPECIMEN: NORMAL

## 2023-05-22 PROCEDURE — 93010 ELECTROCARDIOGRAM REPORT: CPT | Performed by: INTERNAL MEDICINE

## 2023-05-22 PROCEDURE — 93971 EXTREMITY STUDY: CPT

## 2023-05-22 PROCEDURE — 85652 RBC SED RATE AUTOMATED: CPT | Performed by: NURSE PRACTITIONER

## 2023-05-22 PROCEDURE — 93005 ELECTROCARDIOGRAM TRACING: CPT | Performed by: PHYSICIAN ASSISTANT

## 2023-05-22 PROCEDURE — 85025 COMPLETE CBC W/AUTO DIFF WBC: CPT | Performed by: EMERGENCY MEDICINE

## 2023-05-22 PROCEDURE — 83880 ASSAY OF NATRIURETIC PEPTIDE: CPT | Performed by: PHYSICIAN ASSISTANT

## 2023-05-22 PROCEDURE — 83735 ASSAY OF MAGNESIUM: CPT | Performed by: PHYSICIAN ASSISTANT

## 2023-05-22 PROCEDURE — 25010000002 FUROSEMIDE PER 20 MG: Performed by: PHYSICIAN ASSISTANT

## 2023-05-22 PROCEDURE — G0378 HOSPITAL OBSERVATION PER HR: HCPCS

## 2023-05-22 PROCEDURE — 80053 COMPREHEN METABOLIC PANEL: CPT | Performed by: EMERGENCY MEDICINE

## 2023-05-22 PROCEDURE — 99285 EMERGENCY DEPT VISIT HI MDM: CPT

## 2023-05-22 PROCEDURE — 86140 C-REACTIVE PROTEIN: CPT | Performed by: NURSE PRACTITIONER

## 2023-05-22 PROCEDURE — 84484 ASSAY OF TROPONIN QUANT: CPT | Performed by: PHYSICIAN ASSISTANT

## 2023-05-22 PROCEDURE — 25010000002 ENOXAPARIN PER 10 MG: Performed by: PHYSICIAN ASSISTANT

## 2023-05-22 PROCEDURE — 71046 X-RAY EXAM CHEST 2 VIEWS: CPT

## 2023-05-22 RX ORDER — FUROSEMIDE 10 MG/ML
80 INJECTION INTRAMUSCULAR; INTRAVENOUS ONCE
Status: COMPLETED | OUTPATIENT
Start: 2023-05-22 | End: 2023-05-22

## 2023-05-22 RX ORDER — IBUPROFEN 600 MG/1
1 TABLET ORAL
Status: DISCONTINUED | OUTPATIENT
Start: 2023-05-22 | End: 2023-05-28 | Stop reason: HOSPADM

## 2023-05-22 RX ORDER — POLYETHYLENE GLYCOL 3350 17 G/17G
17 POWDER, FOR SOLUTION ORAL DAILY PRN
Status: DISCONTINUED | OUTPATIENT
Start: 2023-05-22 | End: 2023-05-28 | Stop reason: HOSPADM

## 2023-05-22 RX ORDER — ACETAMINOPHEN 650 MG/1
650 SUPPOSITORY RECTAL EVERY 4 HOURS PRN
Status: DISCONTINUED | OUTPATIENT
Start: 2023-05-22 | End: 2023-05-28 | Stop reason: HOSPADM

## 2023-05-22 RX ORDER — NICOTINE POLACRILEX 4 MG
15 LOZENGE BUCCAL
Status: DISCONTINUED | OUTPATIENT
Start: 2023-05-22 | End: 2023-05-28 | Stop reason: HOSPADM

## 2023-05-22 RX ORDER — SODIUM CHLORIDE 0.9 % (FLUSH) 0.9 %
10 SYRINGE (ML) INJECTION AS NEEDED
Status: DISCONTINUED | OUTPATIENT
Start: 2023-05-22 | End: 2023-05-28 | Stop reason: HOSPADM

## 2023-05-22 RX ORDER — SODIUM CHLORIDE 9 MG/ML
40 INJECTION, SOLUTION INTRAVENOUS AS NEEDED
Status: DISCONTINUED | OUTPATIENT
Start: 2023-05-22 | End: 2023-05-28 | Stop reason: HOSPADM

## 2023-05-22 RX ORDER — DEXTROSE MONOHYDRATE 25 G/50ML
25 INJECTION, SOLUTION INTRAVENOUS
Status: DISCONTINUED | OUTPATIENT
Start: 2023-05-22 | End: 2023-05-28 | Stop reason: HOSPADM

## 2023-05-22 RX ORDER — BISACODYL 10 MG
10 SUPPOSITORY, RECTAL RECTAL DAILY PRN
Status: DISCONTINUED | OUTPATIENT
Start: 2023-05-22 | End: 2023-05-28 | Stop reason: HOSPADM

## 2023-05-22 RX ORDER — ENOXAPARIN SODIUM 100 MG/ML
1 INJECTION SUBCUTANEOUS ONCE
Status: COMPLETED | OUTPATIENT
Start: 2023-05-22 | End: 2023-05-22

## 2023-05-22 RX ORDER — ONDANSETRON 2 MG/ML
4 INJECTION INTRAMUSCULAR; INTRAVENOUS EVERY 6 HOURS PRN
Status: DISCONTINUED | OUTPATIENT
Start: 2023-05-22 | End: 2023-05-28 | Stop reason: HOSPADM

## 2023-05-22 RX ORDER — ACETAMINOPHEN 325 MG/1
650 TABLET ORAL EVERY 4 HOURS PRN
Status: DISCONTINUED | OUTPATIENT
Start: 2023-05-22 | End: 2023-05-25 | Stop reason: SDUPTHER

## 2023-05-22 RX ORDER — SODIUM CHLORIDE 0.9 % (FLUSH) 0.9 %
10 SYRINGE (ML) INJECTION EVERY 12 HOURS SCHEDULED
Status: DISCONTINUED | OUTPATIENT
Start: 2023-05-22 | End: 2023-05-28 | Stop reason: HOSPADM

## 2023-05-22 RX ORDER — INSULIN LISPRO 100 [IU]/ML
2-7 INJECTION, SOLUTION INTRAVENOUS; SUBCUTANEOUS
Status: DISCONTINUED | OUTPATIENT
Start: 2023-05-22 | End: 2023-05-28 | Stop reason: HOSPADM

## 2023-05-22 RX ORDER — AMOXICILLIN 250 MG
2 CAPSULE ORAL 2 TIMES DAILY
Status: DISCONTINUED | OUTPATIENT
Start: 2023-05-22 | End: 2023-05-28 | Stop reason: HOSPADM

## 2023-05-22 RX ORDER — BISACODYL 5 MG/1
5 TABLET, DELAYED RELEASE ORAL DAILY PRN
Status: DISCONTINUED | OUTPATIENT
Start: 2023-05-22 | End: 2023-05-28 | Stop reason: HOSPADM

## 2023-05-22 RX ORDER — FUROSEMIDE 10 MG/ML
80 INJECTION INTRAMUSCULAR; INTRAVENOUS ONCE
Status: COMPLETED | OUTPATIENT
Start: 2023-05-23 | End: 2023-05-23

## 2023-05-22 RX ORDER — ENOXAPARIN SODIUM 100 MG/ML
1 INJECTION SUBCUTANEOUS EVERY 12 HOURS
Status: DISCONTINUED | OUTPATIENT
Start: 2023-05-23 | End: 2023-05-23

## 2023-05-22 RX ORDER — ACETAMINOPHEN 160 MG/5ML
650 SOLUTION ORAL EVERY 4 HOURS PRN
Status: DISCONTINUED | OUTPATIENT
Start: 2023-05-22 | End: 2023-05-28 | Stop reason: HOSPADM

## 2023-05-22 RX ADMIN — FUROSEMIDE 80 MG: 10 INJECTION, SOLUTION INTRAMUSCULAR; INTRAVENOUS at 22:29

## 2023-05-22 RX ADMIN — ENOXAPARIN SODIUM 135 MG: 100 INJECTION SUBCUTANEOUS at 21:33

## 2023-05-22 NOTE — Clinical Note
A 5 fr sheath was  inserted with ultrasound guidance into the right femoral vein. Sheath insertion delayed.

## 2023-05-22 NOTE — Clinical Note
Hemostasis started on the right brachial vein. Manual pressure applied to vessel. Manual pressure was held by GT, RTR. Manual pressure was held for 10 min. Hemostasis achieved successfully. Closure device additional comment: Gauze/tagaderm

## 2023-05-22 NOTE — Clinical Note
A 5 fr sheath was  inserted with ultrasound guidance into the right basilic vein. Sheath insertion delayed.

## 2023-05-22 NOTE — Clinical Note
Hemostasis started on the right femoral vein. Manual pressure applied to vessel. Manual pressure was held by CC, RTR. Manual pressure was held for 10 min. Hemostasis achieved successfully. Closure device additional comment: Gauze/tagaderm

## 2023-05-23 LAB
ANION GAP SERPL CALCULATED.3IONS-SCNC: 9.2 MMOL/L (ref 5–15)
BASOPHILS # BLD AUTO: 0.04 10*3/MM3 (ref 0–0.2)
BASOPHILS NFR BLD AUTO: 0.4 % (ref 0–1.5)
BUN SERPL-MCNC: 39 MG/DL (ref 6–20)
BUN/CREAT SERPL: 14.3 (ref 7–25)
CALCIUM SPEC-SCNC: 8 MG/DL (ref 8.6–10.5)
CHLORIDE SERPL-SCNC: 102 MMOL/L (ref 98–107)
CO2 SERPL-SCNC: 24.8 MMOL/L (ref 22–29)
CREAT SERPL-MCNC: 2.73 MG/DL (ref 0.76–1.27)
CRP SERPL-MCNC: 0.87 MG/DL (ref 0–0.5)
D DIMER PPP FEU-MCNC: 0.91 MCGFEU/ML (ref 0–0.5)
D-LACTATE SERPL-SCNC: 0.9 MMOL/L (ref 0.5–2)
DEPRECATED RDW RBC AUTO: 41 FL (ref 37–54)
EGFRCR SERPLBLD CKD-EPI 2021: 29.8 ML/MIN/1.73
EOSINOPHIL # BLD AUTO: 0.44 10*3/MM3 (ref 0–0.4)
EOSINOPHIL NFR BLD AUTO: 4.8 % (ref 0.3–6.2)
ERYTHROCYTE [DISTWIDTH] IN BLOOD BY AUTOMATED COUNT: 13.2 % (ref 12.3–15.4)
ERYTHROCYTE [SEDIMENTATION RATE] IN BLOOD: 40 MM/HR (ref 0–15)
GLUCOSE BLDC GLUCOMTR-MCNC: 210 MG/DL (ref 70–130)
GLUCOSE BLDC GLUCOMTR-MCNC: 216 MG/DL (ref 70–130)
GLUCOSE BLDC GLUCOMTR-MCNC: 250 MG/DL (ref 70–130)
GLUCOSE BLDC GLUCOMTR-MCNC: 251 MG/DL (ref 70–130)
GLUCOSE BLDC GLUCOMTR-MCNC: 272 MG/DL (ref 70–130)
GLUCOSE SERPL-MCNC: 276 MG/DL (ref 65–99)
HBA1C MFR BLD: 11.1 % (ref 4.8–5.6)
HCT VFR BLD AUTO: 42.4 % (ref 37.5–51)
HGB BLD-MCNC: 14.2 G/DL (ref 13–17.7)
IMM GRANULOCYTES # BLD AUTO: 0.11 10*3/MM3 (ref 0–0.05)
IMM GRANULOCYTES NFR BLD AUTO: 1.2 % (ref 0–0.5)
INR PPP: 0.99 (ref 0.9–1.1)
LYMPHOCYTES # BLD AUTO: 1.64 10*3/MM3 (ref 0.7–3.1)
LYMPHOCYTES NFR BLD AUTO: 17.7 % (ref 19.6–45.3)
MAGNESIUM SERPL-MCNC: 2.1 MG/DL (ref 1.6–2.6)
MCH RBC QN AUTO: 28.3 PG (ref 26.6–33)
MCHC RBC AUTO-ENTMCNC: 33.5 G/DL (ref 31.5–35.7)
MCV RBC AUTO: 84.6 FL (ref 79–97)
MONOCYTES # BLD AUTO: 0.52 10*3/MM3 (ref 0.1–0.9)
MONOCYTES NFR BLD AUTO: 5.6 % (ref 5–12)
NEUTROPHILS NFR BLD AUTO: 6.49 10*3/MM3 (ref 1.7–7)
NEUTROPHILS NFR BLD AUTO: 70.3 % (ref 42.7–76)
NRBC BLD AUTO-RTO: 0 /100 WBC (ref 0–0.2)
PLATELET # BLD AUTO: 206 10*3/MM3 (ref 140–450)
PMV BLD AUTO: 11.5 FL (ref 6–12)
POTASSIUM SERPL-SCNC: 4.1 MMOL/L (ref 3.5–5.2)
PROTHROMBIN TIME: 13.2 SECONDS (ref 11.7–14.2)
RBC # BLD AUTO: 5.01 10*6/MM3 (ref 4.14–5.8)
SODIUM SERPL-SCNC: 136 MMOL/L (ref 136–145)
WBC NRBC COR # BLD: 9.24 10*3/MM3 (ref 3.4–10.8)

## 2023-05-23 PROCEDURE — 83735 ASSAY OF MAGNESIUM: CPT | Performed by: NURSE PRACTITIONER

## 2023-05-23 PROCEDURE — 25010000002 ENOXAPARIN PER 10 MG: Performed by: STUDENT IN AN ORGANIZED HEALTH CARE EDUCATION/TRAINING PROGRAM

## 2023-05-23 PROCEDURE — 82948 REAGENT STRIP/BLOOD GLUCOSE: CPT

## 2023-05-23 PROCEDURE — 85025 COMPLETE CBC W/AUTO DIFF WBC: CPT | Performed by: NURSE PRACTITIONER

## 2023-05-23 PROCEDURE — 83036 HEMOGLOBIN GLYCOSYLATED A1C: CPT | Performed by: NURSE PRACTITIONER

## 2023-05-23 PROCEDURE — 25010000002 FUROSEMIDE PER 20 MG: Performed by: NURSE PRACTITIONER

## 2023-05-23 PROCEDURE — G0378 HOSPITAL OBSERVATION PER HR: HCPCS

## 2023-05-23 PROCEDURE — 85610 PROTHROMBIN TIME: CPT | Performed by: NURSE PRACTITIONER

## 2023-05-23 PROCEDURE — 85379 FIBRIN DEGRADATION QUANT: CPT | Performed by: STUDENT IN AN ORGANIZED HEALTH CARE EDUCATION/TRAINING PROGRAM

## 2023-05-23 PROCEDURE — 36415 COLL VENOUS BLD VENIPUNCTURE: CPT | Performed by: NURSE PRACTITIONER

## 2023-05-23 PROCEDURE — 25010000002 ENOXAPARIN PER 10 MG: Performed by: NURSE PRACTITIONER

## 2023-05-23 PROCEDURE — 63710000001 INSULIN LISPRO (HUMAN) PER 5 UNITS: Performed by: STUDENT IN AN ORGANIZED HEALTH CARE EDUCATION/TRAINING PROGRAM

## 2023-05-23 PROCEDURE — 99254 IP/OBS CNSLTJ NEW/EST MOD 60: CPT | Performed by: STUDENT IN AN ORGANIZED HEALTH CARE EDUCATION/TRAINING PROGRAM

## 2023-05-23 PROCEDURE — 80048 BASIC METABOLIC PNL TOTAL CA: CPT | Performed by: NURSE PRACTITIONER

## 2023-05-23 PROCEDURE — 63710000001 INSULIN ISOPHANE HUMAN PER 5 UNITS: Performed by: STUDENT IN AN ORGANIZED HEALTH CARE EDUCATION/TRAINING PROGRAM

## 2023-05-23 PROCEDURE — 25010000002 HYDRALAZINE PER 20 MG: Performed by: STUDENT IN AN ORGANIZED HEALTH CARE EDUCATION/TRAINING PROGRAM

## 2023-05-23 PROCEDURE — 83605 ASSAY OF LACTIC ACID: CPT | Performed by: NURSE PRACTITIONER

## 2023-05-23 PROCEDURE — 63710000001 INSULIN LISPRO (HUMAN) PER 5 UNITS: Performed by: NURSE PRACTITIONER

## 2023-05-23 RX ORDER — HYDRALAZINE HYDROCHLORIDE 25 MG/1
37.5 TABLET, FILM COATED ORAL 3 TIMES DAILY
Status: DISCONTINUED | OUTPATIENT
Start: 2023-05-23 | End: 2023-05-24

## 2023-05-23 RX ORDER — HYDRALAZINE HYDROCHLORIDE 20 MG/ML
10 INJECTION INTRAMUSCULAR; INTRAVENOUS ONCE
Status: COMPLETED | OUTPATIENT
Start: 2023-05-23 | End: 2023-05-23

## 2023-05-23 RX ORDER — ROSUVASTATIN CALCIUM 40 MG/1
40 TABLET, COATED ORAL NIGHTLY
Status: DISCONTINUED | OUTPATIENT
Start: 2023-05-23 | End: 2023-05-28 | Stop reason: HOSPADM

## 2023-05-23 RX ORDER — CARVEDILOL 25 MG/1
25 TABLET ORAL 2 TIMES DAILY WITH MEALS
Status: DISCONTINUED | OUTPATIENT
Start: 2023-05-23 | End: 2023-05-28 | Stop reason: HOSPADM

## 2023-05-23 RX ORDER — ENOXAPARIN SODIUM 100 MG/ML
40 INJECTION SUBCUTANEOUS EVERY 12 HOURS
Status: DISCONTINUED | OUTPATIENT
Start: 2023-05-23 | End: 2023-05-28

## 2023-05-23 RX ORDER — ISOSORBIDE DINITRATE 20 MG/1
20 TABLET ORAL 3 TIMES DAILY
Status: DISCONTINUED | OUTPATIENT
Start: 2023-05-23 | End: 2023-05-25

## 2023-05-23 RX ORDER — DOXYCYCLINE 100 MG/1
100 CAPSULE ORAL EVERY 12 HOURS SCHEDULED
Status: COMPLETED | OUTPATIENT
Start: 2023-05-23 | End: 2023-05-28

## 2023-05-23 RX ORDER — ASPIRIN 81 MG/1
81 TABLET ORAL DAILY
Status: DISCONTINUED | OUTPATIENT
Start: 2023-05-23 | End: 2023-05-28 | Stop reason: HOSPADM

## 2023-05-23 RX ADMIN — ISOSORBIDE DINITRATE 20 MG: 20 TABLET ORAL at 21:25

## 2023-05-23 RX ADMIN — HYDRALAZINE HYDROCHLORIDE 37.5 MG: 25 TABLET, FILM COATED ORAL at 21:25

## 2023-05-23 RX ADMIN — ASPIRIN 81 MG: 81 TABLET, COATED ORAL at 09:36

## 2023-05-23 RX ADMIN — Medication 10 ML: at 00:04

## 2023-05-23 RX ADMIN — ISOSORBIDE DINITRATE 20 MG: 20 TABLET ORAL at 16:20

## 2023-05-23 RX ADMIN — INSULIN LISPRO 3 UNITS: 100 INJECTION, SOLUTION INTRAVENOUS; SUBCUTANEOUS at 18:20

## 2023-05-23 RX ADMIN — INSULIN HUMAN 7 UNITS: 100 INJECTION, SUSPENSION SUBCUTANEOUS at 21:24

## 2023-05-23 RX ADMIN — ROSUVASTATIN CALCIUM 40 MG: 40 TABLET, FILM COATED ORAL at 21:26

## 2023-05-23 RX ADMIN — HYDRALAZINE HYDROCHLORIDE 37.5 MG: 25 TABLET, FILM COATED ORAL at 16:20

## 2023-05-23 RX ADMIN — ENOXAPARIN SODIUM 40 MG: 100 INJECTION SUBCUTANEOUS at 09:42

## 2023-05-23 RX ADMIN — HYDRALAZINE HYDROCHLORIDE 37.5 MG: 25 TABLET, FILM COATED ORAL at 09:37

## 2023-05-23 RX ADMIN — CARVEDILOL 25 MG: 25 TABLET, FILM COATED ORAL at 18:20

## 2023-05-23 RX ADMIN — Medication 10 ML: at 21:25

## 2023-05-23 RX ADMIN — INSULIN LISPRO 4 UNITS: 100 INJECTION, SOLUTION INTRAVENOUS; SUBCUTANEOUS at 12:57

## 2023-05-23 RX ADMIN — ISOSORBIDE DINITRATE 20 MG: 20 TABLET ORAL at 09:36

## 2023-05-23 RX ADMIN — ENOXAPARIN SODIUM 40 MG: 100 INJECTION SUBCUTANEOUS at 21:25

## 2023-05-23 RX ADMIN — HYDRALAZINE HYDROCHLORIDE 10 MG: 20 INJECTION INTRAMUSCULAR; INTRAVENOUS at 12:00

## 2023-05-23 RX ADMIN — Medication 10 ML: at 09:42

## 2023-05-23 RX ADMIN — INSULIN LISPRO 3 UNITS: 100 INJECTION, SOLUTION INTRAVENOUS; SUBCUTANEOUS at 21:24

## 2023-05-23 RX ADMIN — DOCUSATE SODIUM 50MG AND SENNOSIDES 8.6MG 2 TABLET: 8.6; 5 TABLET, FILM COATED ORAL at 11:59

## 2023-05-23 RX ADMIN — DOCUSATE SODIUM 50MG AND SENNOSIDES 8.6MG 2 TABLET: 8.6; 5 TABLET, FILM COATED ORAL at 21:25

## 2023-05-23 RX ADMIN — INSULIN LISPRO 4 UNITS: 100 INJECTION, SOLUTION INTRAVENOUS; SUBCUTANEOUS at 09:38

## 2023-05-23 RX ADMIN — CARVEDILOL 25 MG: 25 TABLET, FILM COATED ORAL at 09:36

## 2023-05-23 RX ADMIN — INSULIN LISPRO 4 UNITS: 100 INJECTION, SOLUTION INTRAVENOUS; SUBCUTANEOUS at 00:04

## 2023-05-23 RX ADMIN — FUROSEMIDE 80 MG: 40 INJECTION, SOLUTION INTRAMUSCULAR; INTRAVENOUS at 09:37

## 2023-05-23 RX ADMIN — DOXYCYCLINE 100 MG: 100 CAPSULE ORAL at 21:26

## 2023-05-23 RX ADMIN — INSULIN HUMAN 7 UNITS: 100 INJECTION, SUSPENSION SUBCUTANEOUS at 12:00

## 2023-05-23 RX ADMIN — DOCUSATE SODIUM 50MG AND SENNOSIDES 8.6MG 2 TABLET: 8.6; 5 TABLET, FILM COATED ORAL at 00:04

## 2023-05-23 NOTE — PROGRESS NOTES
"[facility] Clinical Pharmacy Services: Enoxaparin Consult    Rufus Dorsey has a pharmacy consult to dose prophylactic enoxaparin per Bina Bush APRN's request.     Indication: VTE Prophylaxis  Home Anticoagulation: None     Relevant clinical data and objective history reviewed:  37 y.o. male 167.6 cm (66\") 130 kg (287 lb)   Body mass index is 46.32 kg/m².   Results from last 7 days   Lab Units 05/22/23  1906   PLATELETS 10*3/mm3 208     Estimated Creatinine Clearance: 48.9 mL/min (A) (by C-G formula based on SCr of 2.64 mg/dL (H)).    Assessment/Plan    Will start patient on  135mg (1mg/kg) subcutaneous every 12 hours, adjusted for renal function. Consult order will be discontinued but pharmacy will continue to follow.     Elke Butts [facility]  Clinical Pharmacist    "

## 2023-05-23 NOTE — ED NOTES
Nursing report ED to floor  Rufus Dorsey  37 y.o.  male    HPI :   Chief Complaint   Patient presents with    Edema    Leg Pain       Admitting doctor:   Arie Cortes MD    Admitting diagnosis:   The primary encounter diagnosis was Left leg swelling. Diagnoses of Acute on chronic congestive heart failure, unspecified heart failure type, Type 2 diabetes mellitus with hyperglycemia, unspecified whether long term insulin use, Chronic kidney disease, unspecified CKD stage, Hypertensive urgency, and Morbid obesity were also pertinent to this visit.    Code status:   Current Code Status       Date Active Code Status Order ID Comments User Context       Prior            Allergies:   Patient has no known allergies.    Isolation:   No active isolations    Intake and Output  No intake or output data in the 24 hours ending 05/22/23 2230    Weight:       05/22/23  1636   Weight: 130 kg (287 lb)       Most recent vitals:   Vitals:    05/22/23 2112 05/22/23 2131 05/22/23 2134 05/22/23 2229   BP:  (!) 180/107  (!) 184/109   Pulse:  81  85   Resp:       Temp:       SpO2: 91%  94% 96%   Weight:       Height:           Active LDAs/IV Access:   Lines, Drains & Airways       Active LDAs       Name Placement date Placement time Site Days    Peripheral IV 05/22/23 1905 Right Antecubital 05/22/23 1905  Antecubital  less than 1                    Labs (abnormal labs have a star):   Labs Reviewed   COMPREHENSIVE METABOLIC PANEL - Abnormal; Notable for the following components:       Result Value    Glucose 312 (*)     BUN 39 (*)     Creatinine 2.64 (*)     Calcium 8.2 (*)     Albumin 2.8 (*)     Alkaline Phosphatase 157 (*)     eGFR 31.0 (*)     All other components within normal limits    Narrative:     GFR Normal >60  Chronic Kidney Disease <60  Kidney Failure <15     CBC WITH AUTO DIFFERENTIAL - Abnormal; Notable for the following components:    Lymphocyte % 15.5 (*)     Immature Grans % 1.0 (*)     Immature Grans, Absolute  0.08 (*)     All other components within normal limits   BNP (IN-HOUSE) - Abnormal; Notable for the following components:    proBNP 1,793.0 (*)     All other components within normal limits    Narrative:     Among patients with dyspnea, NT-proBNP is highly sensitive for the detection of acute congestive heart failure. In addition NT-proBNP of <300 pg/ml effectively rules out acute congestive heart failure with 99% negative predictive value.    Results may be falsely decreased if patient taking Biotin.     TROPONIN - Abnormal; Notable for the following components:    HS Troponin T 61 (*)     All other components within normal limits    Narrative:     High Sensitive Troponin T Reference Range:  <10.0 ng/L- Negative Female for AMI  <15.0 ng/L- Negative Male for AMI  >=10 - Abnormal Female indicating possible myocardial injury.  >=15 - Abnormal Male indicating possible myocardial injury.   Clinicians would have to utilize clinical acumen, EKG, Troponin, and serial changes to determine if it is an Acute Myocardial Infarction or myocardial injury due to an underlying chronic condition.        HIGH SENSITIVITIY TROPONIN T 2HR - Abnormal; Notable for the following components:    HS Troponin T 61 (*)     All other components within normal limits    Narrative:     High Sensitive Troponin T Reference Range:  <10.0 ng/L- Negative Female for AMI  <15.0 ng/L- Negative Male for AMI  >=10 - Abnormal Female indicating possible myocardial injury.  >=15 - Abnormal Male indicating possible myocardial injury.   Clinicians would have to utilize clinical acumen, EKG, Troponin, and serial changes to determine if it is an Acute Myocardial Infarction or myocardial injury due to an underlying chronic condition.        MAGNESIUM - Normal   RAINBOW DRAW    Narrative:     The following orders were created for panel order [place] Draw.  Procedure                               Abnormality         Status                     ---------                                -----------         ------                     Green Top (Gel)[[id number]]                                  Final result               Lavender Top[[id number]]                                     Final result               Gold Top - SST[[id number]]                                   Final result               Light Blue Top[[id number]]                                   Final result                 Please view results for these tests on the individual orders.   CBC AND DIFFERENTIAL    Narrative:     The following orders were created for panel order CBC & Differential.  Procedure                               Abnormality         Status                     ---------                               -----------         ------                     CBC Auto Differential[[id number]]        Abnormal            Final result                 Please view results for these tests on the individual orders.   GREEN TOP   LAVENDER TOP   GOLD TOP - SST   LIGHT BLUE TOP       EKG:   ECG 12 Lead Chest Pain   Final Result   HEART RATE= 73  bpm   RR Interval= 822  ms   VA Interval= 191  ms   P Horizontal Axis= -17  deg   P Front Axis= 53  deg   QRSD Interval= 129  ms   QT Interval= 444  ms   QRS Axis= 27  deg   T Wave Axis= 140  deg   - ABNORMAL ECG -   Sinus rhythm   Left atrial enlargement   LVH with secondary repolarization abnormality   Borderline prolonged QT interval   No change from previous tracing   Electronically Signed By: Les Reyes) ([facility]) 22-May-2023 22:22:47   Date and Time of Study: 2023-05-22 20:32:42          Meds given in ED:   Medications   Enoxaparin Sodium (LOVENOX) syringe 135 mg (135 mg Subcutaneous Given 5/22/23 2133)   furosemide (LASIX) injection 80 mg (80 mg Intravenous Given 5/22/23 2229)       Imaging results:  No radiology results for the last day    Ambulatory status:   - standby    Social issues:   Social History     Socioeconomic History    Marital status: Single   Tobacco Use    Smoking  status: Never    Smokeless tobacco: Never   Vaping Use    Vaping Use: Never used   Substance and Sexual Activity    Alcohol use: Never    Drug use: Never    Sexual activity: Defer       NIH Stroke Scale:         Chace Alas RN  05/22/23 22:30 EDT

## 2023-05-23 NOTE — CONSULTS
Date of Hospital Visit: 23  Encounter Provider: Vicente Trujillo MD  Place of Service: [facility] CARDIOLOGY  Patient Name: Rufus Dorsey  :1985  Referral Provider: Arie Cortes MD    Chief complaint:  Left leg pain    History of Present Illness:   Rufus Dorsey is a 38 yo patient of mine with CAD S/P CABG (LIMA to LAD, SVG to OM1 and OM 2) and PCI to 90% LIMA in , HFrEF secondary to ischemic cardiomyopathy (EF 20%), hypertension, CKD, DM and obesity who presented to the ED on 2023 with left leg pain and swelling. The swelling started 3 weeks ago and has gotten progressively worse.  He also complained of abdominal swelling with mild chest pain, shortness of air and cough.  He has had no fever or chills.  He notes adherence to his medication regimen though states that he did not take his medicines yesterday.  He also notes adequate urine output with his furosemide.    On arrival to the ED, he was afebrile, pulse 93, respiratory rate 18, he was hypertensive to 190s/110s, and was initially on room air with O2 saturations 94% however this downtrended to 86%.  Blood work with high-sensitivity troponin 61x2, creatinine 2.73, A1c 11.1, normal CBC.  He underwent duplex of his left lower extremity which was normal without DVT    CXR shows pulmonary vascular engorgement.  He was given Lasix 80 mg IV and has had a liter of UOP.  He is on 2L NC.      STRESS TEST 21  • Post regadenoson EKG is negative for myocardial ischemia.  • Left ventricular ejection fraction is severely reduced. (Calculated EF = 19%). There is global hypokinesis with akinetic inferior wall.  • Myocardial perfusion imaging indicates no evidence of ischemia.  • Impressions are consistent with a high risk study.  • There is no prior study available for comparison.    ECHO 22  • The study is technically difficult for diagnosis.  • The following left ventricular wall segments are hypokinetic: mid  anterior, apical anterior, basal anterolateral, mid anterolateral, apical lateral, basal inferolateral, mid inferolateral, apical inferior, mid inferior, apical septal, basal inferoseptal, mid inferoseptal, apex hypokinetic, mid anteroseptal, basal anterior, basal inferior and basal inferoseptal.  • The left ventricular cavity is moderately dilated.  • Calculated left ventricular EF = 20.7% Estimated left ventricular EF was in agreement with the calculated left ventricular EF. Left ventricular systolic function is severely decreased.  • Moderately reduced right ventricular systolic function noted.  • Left ventricular diastolic function is consistent with (grade III w/high LAP) fixed restrictive pattern.  • There is a moderate (1-2cm) pericardial effusion.      Past Medical History:   Diagnosis Date   • CHF (congestive heart failure)    • Coronary artery disease    • Diabetes    • Heart failure 05/16/2016   • High cholesterol    • Hypertension    • Ischemic cardiomyopathy 06/25/2016       Past Surgical History:   Procedure Laterality Date   • CARDIAC CATHETERIZATION  01/25/2017    Right heart cath   • CARDIAC CATHETERIZATION N/A 12/4/2021    Procedure: SAPHENOUS VEIN GRAFT;  Surgeon: Les Reyes MD;  Location: [facility] CATH INVASIVE LOCATION;  Service: Cardiovascular;  Laterality: N/A;   • CARDIAC CATHETERIZATION N/A 12/4/2021    Procedure: Coronary angiography;  Surgeon: Les Reyes MD;  Location: [facility] CATH INVASIVE LOCATION;  Service: Cardiovascular;  Laterality: N/A;   • CARDIAC CATHETERIZATION N/A 12/4/2021    Procedure: Stent ROSEY coronary;  Surgeon: Les Reyes MD;  Location: [facility] CATH INVASIVE LOCATION;  Service: Cardiovascular;  Laterality: N/A;   • CARDIAC CATHETERIZATION N/A 12/4/2021    Procedure: Native mammary injection;  Surgeon: Les Reyes MD;  Location: [facility] CATH INVASIVE LOCATION;  Service: Cardiovascular;  Laterality: N/A;   • CARDIAC SURGERY     • CORONARY ARTERY BYPASS  GRAFT  05/26/2015    cabg x3 Dr. Key       Medications Prior to Admission   Medication Sig Dispense Refill Last Dose   • aspirin 81 MG EC tablet Take 1 tablet by mouth Daily. 30 tablet 11 5/22/2023   • Blood Glucose Monitoring Suppl (FreeStyle Freedom Lite) w/Device kit Use to check blood sugar as directed. 1 each 0 5/22/2023   • Blood Glucose Monitoring Suppl (Glucocard Expression Monitor) w/Device kit Use as directed 1 kit 0 5/22/2023   • carvedilol (COREG) 25 MG tablet Take 1 tablet by mouth 2 (Two) Times a Day With Meals. 60 tablet 11 5/22/2023   • furosemide (Lasix) 40 MG tablet Take 1 tablet by mouth Daily. 120 tablet 0 5/22/2023   • glucose monitor monitoring kit 1 each 4 (Four) Times a Day Before Meals & at Bedtime. 1 each 0 5/22/2023   • hydrALAZINE (APRESOLINE) 25 MG tablet Take 1.5 tablets by mouth 3 (Three) Times a Day. 135 tablet 0 5/22/2023   • Insulin Pen Needle (TRUEplus Pen Needles) 31G X 5 MM misc Use as directed daily. 100 each 0 5/22/2023   • Insulin Pen Needle 32G X 4 MM misc Use with insulin 5 times daily 200 each 0 5/22/2023   • isosorbide dinitrate (ISORDIL) 20 MG tablet Take 1 tablet by mouth 3 (Three) Times a Day. 90 tablet 11 5/22/2023   • Lancets 28G misc 1 each by Other route 4 (Four) Times a Day After Meals & at Bedtime. 100 each 0 5/22/2023   • rosuvastatin (CRESTOR) 40 MG tablet Take 1 tablet by mouth Every Night. 90 tablet 0 5/21/2023   • Lancets 28G misc Test 4 (Four) Times a Day After Meals & at Bedtime 100 each 0        Current Meds  Scheduled Meds:aspirin, 81 mg, Oral, Daily  carvedilol, 25 mg, Oral, BID With Meals  enoxaparin, 1 mg/kg, Subcutaneous, Q12H  furosemide, 80 mg, Intravenous, Once  hydrALAZINE, 37.5 mg, Oral, TID  insulin lispro, 2-7 Units, Subcutaneous, 4x Daily AC & at Bedtime  isosorbide dinitrate, 20 mg, Oral, TID  rosuvastatin, 40 mg, Oral, Nightly  senna-docusate sodium, 2 tablet, Oral, BID  sodium chloride, 10 mL, Intravenous, Q12H      Continuous  "Infusions:Pharmacy to Dose enoxaparin (LOVENOX),       PRN Meds:.•  acetaminophen **OR** acetaminophen **OR** acetaminophen  •  senna-docusate sodium **AND** polyethylene glycol **AND** bisacodyl **AND** bisacodyl  •  dextrose  •  dextrose  •  glucagon (human recombinant)  •  ondansetron  •  Pharmacy to Dose enoxaparin (LOVENOX)  •  sodium chloride  •  sodium chloride    Allergies as of 05/22/2023   • (No Known Allergies)       Social History     Socioeconomic History   • Marital status: Single   Tobacco Use   • Smoking status: Never   • Smokeless tobacco: Never   Vaping Use   • Vaping Use: Never used   Substance and Sexual Activity   • Alcohol use: Never   • Drug use: Never   • Sexual activity: Defer       Family History   Family history unknown: Yes       REVIEW OF SYSTEMS:   12 point ROS was performed and is negative except as outlined in HPI          Objective:   Temp:  [96.5 °F (35.8 °C)-98 °F (36.7 °C)] 96.5 °F (35.8 °C)  Heart Rate:  [75-93] 80  Resp:  [18-20] 20  BP: (149-192)/() 191/116  Body mass index is 49.02 kg/m².  Flowsheet Rows    Flowsheet Row First Filed Value   Admission Height 167.6 cm (66\") Documented at 05/22/2023 1636   Admission Weight 130 kg (287 lb) Documented at 05/22/2023 1636        Vitals:    05/23/23 0716   BP: (!) 191/116   Pulse: 80   Resp: 20   Temp: 96.5 °F (35.8 °C)   SpO2: 100%       GEN: no distress, alert and oriented, obese  HEENT: NACT, EOMI, moist mucous membranes  Lungs: Mild bibasilar crackles  CV: normal rate, regular rhythm, normal S1, S2, no murmurs, +2 radial pulses b/l  Abdomen: soft, nontender, nondistended, NABS  Extremities: Left leg tense, with 2+ edema, trace edema right lower extremity  Skin: no rash, warm, dry  Heme/Lymph: no bruising  Psych: organized thought, normal behavior and affect      Lab Review:   Results from last 7 days   Lab Units 05/23/23  0344 05/22/23  1906   SODIUM mmol/L 136 137   POTASSIUM mmol/L 4.1 3.9   CHLORIDE mmol/L 102 101   CO2 " mmol/L 24.8 24.5   BUN mg/dL 39* 39*   CREATININE mg/dL 2.73* 2.64*   CALCIUM mg/dL 8.0* 8.2*   BILIRUBIN mg/dL  --  0.2   ALK PHOS U/L  --  157*   ALT (SGPT) U/L  --  20   AST (SGOT) U/L  --  16   GLUCOSE mg/dL 276* 312*     Results from last 7 days   Lab Units 05/22/23  2105 05/22/23  1906   HSTROP T ng/L 61* 61*     Results from last 7 days   Lab Units 05/23/23  0344 05/22/23  1906   WBC 10*3/mm3 9.24 8.26   HEMOGLOBIN g/dL 14.2 14.1   HEMATOCRIT % 42.4 41.9   PLATELETS 10*3/mm3 206 208     Results from last 7 days   Lab Units 05/23/23  0344   INR  0.99     Results from last 7 days   Lab Units 05/23/23  0344   MAGNESIUM mg/dL 2.1                 I personally viewed and interpreted the patient's EKG/Telemetry data)      Left leg swelling    Type 2 diabetes mellitus, with long-term current use of insulin (HCC)    Pulmonary HTN (HCC)    CKD (chronic kidney disease) stage 2, GFR 60-89 ml/min    Acute on chronic congestive heart failure    Elevated troponin    S/P CABG x 3    Assessment and Plan:  #Hypertensive urgency  #Volume overload  #HFrEF  #CAD status post CABG  #MEI on CKD  #Uncontrolled diabetes  #Elevated troponin    36 yo with CAD S/P CABG (LIMA to LAD, SVG to OM1 and OM 2) and PCI to 90% LIMA in 2021, HFrEF secondary to ischemic cardiomyopathy (EF 20%), hypertension, CKD, DM and obesity who presented to the ED on 5/22/2023 with left leg pain and swelling, found to be volume overloaded and in hypertensive urgency.    Elevated troponin is in the setting of HTN urgency, CKD and volume overload. Not consistent with ACS. Will diurese and start anti-HTN therapy and continue to monitor. Unfortunately, patient has very poor understanding of his clinical condition despite multiple long outpatient discussions. He has multiple uncontrolled comorbid conditions including diabetes(A1C 11), HTN(presented with urgency) and CKD. As a result, he would be a poor candidate for any other HFrEF therapies including  LVAD/transplant. Would continue to manage him medically for now and re-assess.    - Hydralazine 10 mg IV x1, continue hydralazine 37.5 mg 3 times daily, Isordil 20 mg 3 times daily  - Start home carvedilol 25 mg twice daily  - He has received lasix 80mg IV x 2, will monitor UOP and start lasix 80mg IV daily.    Vicente Trujillo MD  05/23/23  08:25 EDT.

## 2023-05-23 NOTE — PLAN OF CARE
Problem: Adult Inpatient Plan of Care  Goal: Absence of Hospital-Acquired Illness or Injury  Intervention: Identify and Manage Fall Risk  Recent Flowsheet Documentation  Taken 5/23/2023 1428 by Joe Ferrari RN  Safety Promotion/Fall Prevention:   activity supervised   assistive device/personal items within reach   room organization consistent   safety round/check completed  Taken 5/23/2023 0944 by Joe Ferrari RN  Safety Promotion/Fall Prevention:   activity supervised   assistive device/personal items within reach   nonskid shoes/slippers when out of bed   room organization consistent   safety round/check completed     Problem: Adult Inpatient Plan of Care  Goal: Absence of Hospital-Acquired Illness or Injury  Intervention: Prevent Skin Injury  Recent Flowsheet Documentation  Taken 5/23/2023 1428 by Joe Ferrari RN  Body Position: position changed independently  Taken 5/23/2023 0944 by Joe Ferrari RN  Body Position: lower extremity elevated   Goal Outcome Evaluation:  Plan of Care Reviewed With: patient        Progress: improving  Outcome Evaluation: Patient AO x 4 with O 2 @ 3 lt/ min will decrease at 2 lt /min tolerated well up ad lid good appetite consult diabetic educator, will continue moniotr

## 2023-05-23 NOTE — PROGRESS NOTES
"[facility]: Enoxaparin Consult    Rufus Dorsey has a pharmacy consult to dose prophylactic enoxaparin per Bina Bush APRN's request.     Indication: VTE Prophylaxis  Home Anticoagulation: None     Relevant clinical data and objective history reviewed:  37 y.o. male 167.6 cm (66\") (!) 138 kg (303 lb 11.2 oz)   Body mass index is 49.02 kg/m².   Results from last 7 days   Lab Units 05/23/23  0344   PLATELETS 10*3/mm3 206     Estimated Creatinine Clearance: 49 mL/min (A) (by C-G formula based on SCr of 2.73 mg/dL (H)).    Assessment/Plan    Will start patient on Lovenox 40 mg subcutaneous every 12 hours, adjusted for renal function and pt w BMI 40-50. Consult order will be discontinued but pharmacy will continue to follow.     Myah Alonzo [facility]  Clinical Pharmacist      " no

## 2023-05-23 NOTE — ED PROVIDER NOTES
"The MARY and I have discussed this patient's history, physical exam, and treatment plan.  I have reviewed the documentation and personally had a face to face interaction with the patient. I affirm the documentation and agree with the treatment and plan.  The attached note describes my personal findings.      I provided a substantive portion of the care of the patient.  I personally performed the physical exam in its entirety, and below are my findings.     Brief history of present illness: 37-year-old male with significant chronic disease including coronary artery disease status post CABG.  Main complaints today of increased swelling left lower extremity, abdominal swelling, and some fullness in his chest gradually worsening over several weeks.  No clear exacerbating or relieving factors identified.    Physical exam:    /99   Pulse 78   Temp 98 °F (36.7 °C)   Resp 20   Ht 167.6 cm (66\")   Wt 130 kg (287 lb)   SpO2 96%   BMI 46.32 kg/m²       Physical Exam   Constitutional: No distress.  Nontoxic but chronically ill-appearing  HENT:  Head: Normocephalic and atraumatic.   Oropharynx: Mucous membranes are moist.   Eyes: . No scleral icterus. No conjunctival pallor.  Neck: Normal range of motion. Neck supple.   Cardiovascular: Pink warm and well perfused throughout.    Pulmonary/Chest: No respiratory distress.  No tachypnea or increased work of breathing appreciated.    Abdominal: Large habitus.  Soft..   Musculoskeletal: Moves all extremities equally.  Swelling with hyperemia on left lower extremity distal to the knee much greater than the right.  Neurological: Alert and oriented.  No acute focal deficit appreciated.  Skin: Skin is pink, warm, and dry.   Psychiatric: Mood and affect normal.   Nursing note and vitals reviewed.        MDM:  RADIOLOGY      Study: Two-view chest  Findings: Cardiomegaly with evidence of prior sternotomy  I independently viewed and interpreted these images contemporaneously with " treatment.     Results for orders placed or performed during the hospital encounter of 05/22/23   Comprehensive Metabolic Panel    Specimen: Blood   Result Value Ref Range    Glucose 312 (H) 65 - 99 mg/dL    BUN 39 (H) 6 - 20 mg/dL    Creatinine 2.64 (H) 0.76 - 1.27 mg/dL    Sodium 137 136 - 145 mmol/L    Potassium 3.9 3.5 - 5.2 mmol/L    Chloride 101 98 - 107 mmol/L    CO2 24.5 22.0 - 29.0 mmol/L    Calcium 8.2 (L) 8.6 - 10.5 mg/dL    Total Protein 6.1 6.0 - 8.5 g/dL    Albumin 2.8 (L) 3.5 - 5.2 g/dL    ALT (SGPT) 20 1 - 41 U/L    AST (SGOT) 16 1 - 40 U/L    Alkaline Phosphatase 157 (H) 39 - 117 U/L    Total Bilirubin 0.2 0.0 - 1.2 mg/dL    Globulin 3.3 gm/dL    A/G Ratio 0.8 g/dL    BUN/Creatinine Ratio 14.8 7.0 - 25.0    Anion Gap 11.5 5.0 - 15.0 mmol/L    eGFR 31.0 (L) >60.0 mL/min/1.73   CBC Auto Differential    Specimen: Blood   Result Value Ref Range    WBC 8.26 3.40 - 10.80 10*3/mm3    RBC 4.95 4.14 - 5.80 10*6/mm3    Hemoglobin 14.1 13.0 - 17.7 g/dL    Hematocrit 41.9 37.5 - 51.0 %    MCV 84.6 79.0 - 97.0 fL    MCH 28.5 26.6 - 33.0 pg    MCHC 33.7 31.5 - 35.7 g/dL    RDW 13.6 12.3 - 15.4 %    RDW-SD 41.1 37.0 - 54.0 fl    MPV 11.9 6.0 - 12.0 fL    Platelets 208 140 - 450 10*3/mm3    Neutrophil % 73.1 42.7 - 76.0 %    Lymphocyte % 15.5 (L) 19.6 - 45.3 %    Monocyte % 5.2 5.0 - 12.0 %    Eosinophil % 4.8 0.3 - 6.2 %    Basophil % 0.4 0.0 - 1.5 %    Immature Grans % 1.0 (H) 0.0 - 0.5 %    Neutrophils, Absolute 6.04 1.70 - 7.00 10*3/mm3    Lymphocytes, Absolute 1.28 0.70 - 3.10 10*3/mm3    Monocytes, Absolute 0.43 0.10 - 0.90 10*3/mm3    Eosinophils, Absolute 0.40 0.00 - 0.40 10*3/mm3    Basophils, Absolute 0.03 0.00 - 0.20 10*3/mm3    Immature Grans, Absolute 0.08 (H) 0.00 - 0.05 10*3/mm3    nRBC 0.0 0.0 - 0.2 /100 WBC   Magnesium    Specimen: Blood   Result Value Ref Range    Magnesium 1.9 1.6 - 2.6 mg/dL   ECG 12 Lead Chest Pain   Result Value Ref Range    QT Interval 444 ms   Green Top (Gel)   Result Value  Ref Range    Extra Tube Hold for add-ons.    Lavender Top   Result Value Ref Range    Extra Tube hold for add-on    Gold Top - SST   Result Value Ref Range    Extra Tube Hold for add-ons.    Light Blue Top   Result Value Ref Range    Extra Tube Hold for add-ons.    Duplex Venous Lower Extremity LEFT   Result Value Ref Range    Target HR (85%) 156 bpm    Max. Pred. HR (100%) 183 bpm    Right Common Femoral Spont Y     Right Common Femoral Competent Y     Right Common Femoral Phasic Y     Right Common Femoral Compress C     Right Common Femoral Augment Y     Left Common Femoral Spont Y     Left Common Femoral Competent Y     Left Common Femoral Phasic Y     Left Common Femoral Compress C     Left Common Femoral Augment Y     Left Saphenofemoral Junction Compress C     Left Profunda Femoral Compress C     Left Proximal Femoral Compress C     Left Mid Femoral Spont Y     Left Mid Femoral Competent Y     Left Mid Femoral Phasic Y     Left Mid Femoral Compress C     Left Mid Femoral Augment Y     Left Distal Femoral Compress C     Left Popliteal Spont Y     Left Popliteal Competent Y     Left Popliteal Phasic Y     Left Popliteal Compress C     Left Popliteal Augment Y     Left Posterior Tibial Compress C     Left Gastronemius Compress C     Left Greater Saph AK Compress C     Left Greater Saph BK Compress C     Left Lesser Saph Compress C      I have personally reviewed and interpreted the EKG obtained today in the emergency department at 2032 concomitant with treatment.  EKG reveals rhythm/rate -sinus, 75. AL-prominent P waves.  QRS-IVCD ST/T-wave -ST repolarization abnormalities 1 aVL and V1 V2; no evidence of STEMI; flipped T waves 1 aVL.  Comparison: 12/5/2021-similar slow R wave progression and flipped T waves      Agree with plan laboratory and radiologic evaluation and potentially need for consultation with long-term providers for disposition planning.       Juan Alas MD  05/22/23 2052

## 2023-05-23 NOTE — PLAN OF CARE
Goal Outcome Evaluation:   Patient alert follows commands admitted from er database and assessment completed.  phone at bedside. Oxygen applied when sleeping to keep sats above 90%. No acute distress noted will continue to monitor

## 2023-05-23 NOTE — ED PROVIDER NOTES
EMERGENCY DEPARTMENT ENCOUNTER    Room Number:  21/21  Date of encounter:  5/22/2023  PCP: Provider, No Known  Patient Care Team:  Provider, No Known as PCP - General  Provider, No Known   Independent Historians: Patient    HPI:  Chief Complaint: LEFT leg pain   A complete HPI/ROS/PMH/PSH/SH/FH are unobtainable due to: N/A    Chronic or social conditions impacting patient care (social determinants of health): None    Context: Rufus Dorsey is a 37 y.o. male with past medical history of T2DM, HTN, HLD, pulmonary HTN, CKD, CHF, CAD s/p CABG and obesity who arrives to the ED with complaint of left leg pain and swelling.  Patient states that swelling started approximately 3 weeks ago and has been getting progressively worse and is significantly greater than the right leg.  There is also warmth and redness to the leg.  Patient also complains of swelling to his abdomen as well as a little chest pain and shortness of breath with a cough.  Denies any fever or chills.    Review of prior external notes (non-ED):  Discharge summary from 06/09/2022 for acute on chronic CHF, pulmonary edema, and medical noncompliance.    Review of prior external test results outside of this encounter: ECHO on 06/04/2022 showed an EF of 20%.    PAST MEDICAL HISTORY  Active Ambulatory Problems     Diagnosis Date Noted   • Thrombocytopenia ([facility]) 12/21/2020   • Type 2 diabetes mellitus, with long-term current use of insulin ([facility]) 01/05/2021   • Obesity, Class III, BMI 40-49.9 (morbid obesity) ([facility]) 08/03/2021   • Bilateral pleural effusion 08/03/2021   • Pulmonary HTN ([facility]) 08/03/2021   • History of COVID-19 08/03/2021   • Coronary artery disease    • Hypertension    • CKD (chronic kidney disease) stage 2, GFR 60-89 ml/min 08/18/2021   • Snores 08/18/2021   • Acute on chronic congestive heart failure 12/03/2021   • Other chest pain 12/04/2021   • Unstable angina 12/04/2021   • Acute systolic CHF (congestive heart failure) 12/04/2021   • COVID-19  06/04/2022   • Acute pulmonary edema 06/05/2022   • Elevated troponin 06/05/2022   • Diastolic CHF, acute on chronic 06/05/2022   • Type 2 diabetes mellitus with circulatory disorder 06/05/2022   • Type 2 diabetes mellitus with renal complication 06/05/2022   • Medically noncompliant 06/05/2022   • Acute on chronic combined systolic and diastolic CHF (congestive heart failure) 06/06/2022     Resolved Ambulatory Problems     Diagnosis Date Noted   • Pneumonia of both lungs due to infectious organism 12/21/2020   • Acute respiratory failure with hypoxia 12/21/2020   • COVID-19 virus infection 12/21/2020   • Acute kidney injury 12/21/2020   • Pneumonia due to COVID-19 virus 12/21/2020   • Viral URI with cough 08/02/2021   • Acute pulmonary edema 08/03/2021   • Acute on chronic combined systolic and diastolic CHF (congestive heart failure) 08/03/2021   • Bilateral pulmonary infiltrates on chest x-ray 08/03/2021   • Ischemic cardiomyopathy 06/25/2016   • Biventricular heart failure with reduced left ventricular function (HCC) 08/18/2021   • Screening cholesterol level 08/18/2021     Past Medical History:   Diagnosis Date   • CHF (congestive heart failure)    • Diabetes    • Heart failure 05/16/2016   • High cholesterol        The patient qualifies to receive the vaccine, but they have not yet received it.    PAST SURGICAL HISTORY  Past Surgical History:   Procedure Laterality Date   • CARDIAC CATHETERIZATION  01/25/2017    Right heart cath   • CARDIAC CATHETERIZATION N/A 12/4/2021    Procedure: SAPHENOUS VEIN GRAFT;  Surgeon: Les Reyes MD;  Location: [facility] CATH INVASIVE LOCATION;  Service: Cardiovascular;  Laterality: N/A;   • CARDIAC CATHETERIZATION N/A 12/4/2021    Procedure: Coronary angiography;  Surgeon: Les Reyes MD;  Location: [facility] CATH INVASIVE LOCATION;  Service: Cardiovascular;  Laterality: N/A;   • CARDIAC CATHETERIZATION N/A 12/4/2021    Procedure: Stent ROSEY coronary;  Surgeon: Eric  MD Les;  Location:  [facility] CATH INVASIVE LOCATION;  Service: Cardiovascular;  Laterality: N/A;   • CARDIAC CATHETERIZATION N/A 12/4/2021    Procedure: Native mammary injection;  Surgeon: Les Reyes MD;  Location: [facility] CATH INVASIVE LOCATION;  Service: Cardiovascular;  Laterality: N/A;   • CARDIAC SURGERY     • CORONARY ARTERY BYPASS GRAFT  05/26/2015    cabg x3 Dr. Key         FAMILY HISTORY  Family History   Family history unknown: Yes         SOCIAL HISTORY  Social History     Socioeconomic History   • Marital status: Single   Tobacco Use   • Smoking status: Never   • Smokeless tobacco: Never   Vaping Use   • Vaping Use: Never used   Substance and Sexual Activity   • Alcohol use: Never   • Drug use: Never   • Sexual activity: Defer         ALLERGIES  Patient has no known allergies.        REVIEW OF SYSTEMS  Review of Systems     All systems reviewed and negative except for those discussed in HPI.       PHYSICAL EXAM    I have reviewed the triage vital signs and nursing notes.    ED Triage Vitals   Temp Heart Rate Resp BP SpO2   05/22/23 1636 05/22/23 1636 05/22/23 1636 05/22/23 1637 05/22/23 1636   98 °F (36.7 °C) 93 18 (!) 192/92 94 %      Temp src Heart Rate Source Patient Position BP Location FiO2 (%)   -- -- -- -- --              Physical Exam    GENERAL: alert and oriented x4, obese, not distressed  HENT: normocephalic, atraumatic, moist mucous membranes  EYES: no scleral icterus, PERRL, EOMI  CV: regular rhythm, regular rate  RESPIRATORY: normal effort, CTAB  ABDOMEN: soft/nontender  MUSCULOSKELETAL: no deformity, moderate LLE swelling with thickening and erythema  NEURO: alert, moves all extremities, no focal neuro deficits, follows commands  SKIN: warm, dry, no rash   Psych: Appropriate mood and affect      Nursing notes and vital signs reviewed      LAB RESULTS  Recent Results (from the past 24 hour(s))   Duplex Venous Lower Extremity LEFT    Collection Time: 05/22/23  5:45 PM   Result  Value Ref Range    Target HR (85%) 156 bpm    Max. Pred. HR (100%) 183 bpm    Right Common Femoral Spont Y     Right Common Femoral Competent Y     Right Common Femoral Phasic Y     Right Common Femoral Compress C     Right Common Femoral Augment Y     Left Common Femoral Spont Y     Left Common Femoral Competent Y     Left Common Femoral Phasic Y     Left Common Femoral Compress C     Left Common Femoral Augment Y     Left Saphenofemoral Junction Compress C     Left Profunda Femoral Compress C     Left Proximal Femoral Compress C     Left Mid Femoral Spont Y     Left Mid Femoral Competent Y     Left Mid Femoral Phasic Y     Left Mid Femoral Compress C     Left Mid Femoral Augment Y     Left Distal Femoral Compress C     Left Popliteal Spont Y     Left Popliteal Competent Y     Left Popliteal Phasic Y     Left Popliteal Compress C     Left Popliteal Augment Y     Left Posterior Tibial Compress C     Left Gastronemius Compress C     Left Greater Saph AK Compress C     Left Greater Saph BK Compress C     Left Lesser Saph Compress C    Comprehensive Metabolic Panel    Collection Time: 05/22/23  7:06 PM    Specimen: Blood   Result Value Ref Range    Glucose 312 (H) 65 - 99 mg/dL    BUN 39 (H) 6 - 20 mg/dL    Creatinine 2.64 (H) 0.76 - 1.27 mg/dL    Sodium 137 136 - 145 mmol/L    Potassium 3.9 3.5 - 5.2 mmol/L    Chloride 101 98 - 107 mmol/L    CO2 24.5 22.0 - 29.0 mmol/L    Calcium 8.2 (L) 8.6 - 10.5 mg/dL    Total Protein 6.1 6.0 - 8.5 g/dL    Albumin 2.8 (L) 3.5 - 5.2 g/dL    ALT (SGPT) 20 1 - 41 U/L    AST (SGOT) 16 1 - 40 U/L    Alkaline Phosphatase 157 (H) 39 - 117 U/L    Total Bilirubin 0.2 0.0 - 1.2 mg/dL    Globulin 3.3 gm/dL    A/G Ratio 0.8 g/dL    BUN/Creatinine Ratio 14.8 7.0 - 25.0    Anion Gap 11.5 5.0 - 15.0 mmol/L    eGFR 31.0 (L) >60.0 mL/min/1.73   Green Top (Gel)    Collection Time: 05/22/23  7:06 PM   Result Value Ref Range    Extra Tube Hold for add-ons.    Lavender Top    Collection Time: 05/22/23   7:06 PM   Result Value Ref Range    Extra Tube hold for add-on    Gold Top - SST    Collection Time: 05/22/23  7:06 PM   Result Value Ref Range    Extra Tube Hold for add-ons.    Light Blue Top    Collection Time: 05/22/23  7:06 PM   Result Value Ref Range    Extra Tube Hold for add-ons.    CBC Auto Differential    Collection Time: 05/22/23  7:06 PM    Specimen: Blood   Result Value Ref Range    WBC 8.26 3.40 - 10.80 10*3/mm3    RBC 4.95 4.14 - 5.80 10*6/mm3    Hemoglobin 14.1 13.0 - 17.7 g/dL    Hematocrit 41.9 37.5 - 51.0 %    MCV 84.6 79.0 - 97.0 fL    MCH 28.5 26.6 - 33.0 pg    MCHC 33.7 31.5 - 35.7 g/dL    RDW 13.6 12.3 - 15.4 %    RDW-SD 41.1 37.0 - 54.0 fl    MPV 11.9 6.0 - 12.0 fL    Platelets 208 140 - 450 10*3/mm3    Neutrophil % 73.1 42.7 - 76.0 %    Lymphocyte % 15.5 (L) 19.6 - 45.3 %    Monocyte % 5.2 5.0 - 12.0 %    Eosinophil % 4.8 0.3 - 6.2 %    Basophil % 0.4 0.0 - 1.5 %    Immature Grans % 1.0 (H) 0.0 - 0.5 %    Neutrophils, Absolute 6.04 1.70 - 7.00 10*3/mm3    Lymphocytes, Absolute 1.28 0.70 - 3.10 10*3/mm3    Monocytes, Absolute 0.43 0.10 - 0.90 10*3/mm3    Eosinophils, Absolute 0.40 0.00 - 0.40 10*3/mm3    Basophils, Absolute 0.03 0.00 - 0.20 10*3/mm3    Immature Grans, Absolute 0.08 (H) 0.00 - 0.05 10*3/mm3    nRBC 0.0 0.0 - 0.2 /100 WBC   BNP    Collection Time: 05/22/23  7:06 PM    Specimen: Blood   Result Value Ref Range    proBNP 1,793.0 (H) 0.0 - 450.0 pg/mL   High Sensitivity Troponin T    Collection Time: 05/22/23  7:06 PM    Specimen: Blood   Result Value Ref Range    HS Troponin T 61 (C) <15 ng/L   Magnesium    Collection Time: 05/22/23  7:06 PM    Specimen: Blood   Result Value Ref Range    Magnesium 1.9 1.6 - 2.6 mg/dL   ECG 12 Lead Chest Pain    Collection Time: 05/22/23  8:32 PM   Result Value Ref Range    QT Interval 444 ms       Ordered the above labs and independently reviewed and interpreted the results by me.        RADIOLOGY  XR Chest 2 View    Result Date: 5/22/2023  STAT  PA AND LATERAL RADIOGRAPHIC VIEWS OF THE CHEST  CLINICAL HISTORY: Swelling.  FINDINGS:  PA and lateral radiographic views of the chest demonstrate low lung volumes. The cardiomediastinal silhouette is enlarged. Similar findings were seen on the prior chest x-ray dated 06/04/2022. Sternal wires are noted. There is pulmonary vascular engorgement. Additionally, there is a suggestion of some interstitial indistinctness. These findings suggest possible mild hydrostatic interstitial pulmonary edema and correlation with clinical data is suggested. The osseous structures are incidentally notable for multiple endplate irregularities and mild degrees of vertebral body height loss throughout the thoracic spine.  This report was finalized on 5/22/2023 8:01 PM by Dr. Morgan Taylor M.D.      Duplex Venous Lower Extremity LEFT    Result Date: 5/22/2023  •  Normal left lower extremity venous duplex scan.       I ordered the above noted radiological studies.  These were independently interpreted and reviewed by me.  See dictation for official radiology interpretation.      PROCEDURES    Procedures      MEDICATIONS GIVEN IN ER    Medications   furosemide (LASIX) injection 80 mg (has no administration in time range)   Enoxaparin Sodium (LOVENOX) syringe 135 mg (135 mg Subcutaneous Given 5/22/23 2133)         PROGRESS, DATA ANALYSIS, CONSULTS, AND MEDICAL DECISION MAKING    All labs have been independently reviewed by me.  All radiology studies have been reviewed by me and discussed with radiologist dictating the report.   EKG's independently viewed and interpreted by me.  Discussion below represents my analysis of pertinent findings related to patient's condition, differential diagnosis, treatment plan and final disposition.    DDx:  Includes, but is not limited to DVT, edema, CHF, CKD, cellulitis    After my initial assessment I am concerned about DVT and patient may need hospitalization due to anticoagulation.    ED Course as of  05/22/23 2137   Mon May 22, 2023   2008 WBC: 8.26 [RIGOBERTO]   2008 Hemoglobin: 14.1 [RIGOBERTO]   2008 Hematocrit: 41.9 [RIGOBERTO]   2008 Platelets: 208 [RIGOBERTO]   2008 Glucose(!): 312 [RIGOBERTO]   2008 BUN(!): 39 [RIGOBERTO]   2008 Creatinine(!): 2.64 [RIGOBERTO]   2008 Sodium: 137 [RIGOBERTO]   2008 Potassium: 3.9 [RIGOBERTO]   2008 Chloride: 101 [RIGOBERTO]   2008 CO2: 24.5 [RIGOBERTO]   2049 Magnesium: 1.9 [RIGOBERTO]   2049 proBNP(!): 1,793.0 [RIGOBERTO]   2105 HS Troponin T(!!): 61 [RIGOBERTO]   2114 EKG          EKG time: 20:32  Rhythm/Rate: Sinus rhythm at 73 bpm  P waves and MN: Left atrial enlargement  QRS, axis: LVH  ST and T waves: Nonspecific T wave changes    Interpreted Contemporaneously by me, independently viewed  Not significantly changed compared to prior EKG of 12/5/2021.   [RIGOBERTO]   2135 Patient history, ER presentation and evaluation discussed with Dr. Cortes, will place in observation to a telemetry bed. [RIGOBERTO]      ED Course User Index  [RIGOBERTO] Amandeep Kauffman PA       MDM: We will place patient in observation for diuresis and monitoring.    PPE:  The patient wore a mask and I wore a mask and all appropriate PPE throughout the entire patient encounter.      AS OF 21:37 EDT VITALS:    BP - (!) 180/107  HR - 81  TEMP - 98 °F (36.7 °C)  O2 SATS - 94%      DIAGNOSIS  Final diagnoses:   Left leg swelling   Acute on chronic congestive heart failure, unspecified heart failure type   Type 2 diabetes mellitus with hyperglycemia, unspecified whether long term insulin use   Chronic kidney disease, unspecified CKD stage   Hypertensive urgency   Morbid obesity         DISPOSITION  ADMISSION    Discussed treatment plan and reason for admission with pt/family and admitting physician.  Pt/family voiced understanding of the plan for admission for further testing/treatment as needed.         Note Disclaimer: At [facility], we believe that sharing information builds trust and better relationships. You are receiving this note because you recently visited [facility]. It is possible you will see  health information before a provider has talked with you about it. This kind of information can be easy to misunderstand. To help you fully understand what it means for your health, we urge you to discuss this note with your provider.         Amandeep Kauffman PA  05/22/23 4950

## 2023-05-23 NOTE — H&P
"    Patient Name:  Rufus Dorsey  YOB: 1985  MRN:  [id number]  Admit Date:  5/22/2023  Patient Care Team:  Provider, No Known as PCP - General  Provider, No Known      Subjective   History Present Illness     Chief Complaint   Patient presents with   • Edema   • Leg Pain     History of Present Illness   Mr. Dorsey is a 37 year old with history of type 2 diabetes, htn, ckd stage 3, cabg x3, chf who presents to the ED with complaints of left leg swelling.  Patient states he has had left leg swelling over the past 3-4 weeks that has gradually worsened.  There is also some warmth and redness to that left leg, no drainage or open areas.  He is able to walk but it does have increased pain.  It is significantly more swollen than the right leg.  He denies any injury to the leg.  He also reports some increased shortness of breath especially when laying down and with exertion.  He is on lasix at home but thinks he could have missed a few doses \"here and there\".  His last 2 d echo was about a year ago and showed an EF of 20%.  He last saw cardiology in January at that time his coreg was increased to 25mg bid, he was told to continue hydralazine 37.5mg TID, Isordil 20mg tid, lasix 80mg daily, aldactone was discontinued due to renal function and ACE/ARB avoided due to renal function.  There is some concern with compliance, patient is not very clear on what is taking for sure.  In the ED he was found to have a HS troponin 61, delta 0, bnp 1793, glucose 312, sodium 137, potassium 3.9, creat 2.64, bun 39, his baseline creatinine is around 2.0, albumin is 2.8, wbc 8.26, hgb 14.1, hct 41.9, platelets 208. CXR shows low lung volume, pulmonary vascular engorgement.    Review of Systems   Constitutional: Positive for fatigue. Negative for appetite change and fever.   HENT: Negative for nosebleeds and trouble swallowing.    Eyes: Negative for photophobia, redness and visual disturbance.   Respiratory: Positive for " shortness of breath (with laying down and exertion). Negative for cough, chest tightness and wheezing.    Cardiovascular: Positive for leg swelling (left leg). Negative for chest pain and palpitations.   Gastrointestinal: Negative for abdominal distention, abdominal pain, nausea and vomiting.   Endocrine: Negative.    Genitourinary: Negative.    Musculoskeletal: Negative for gait problem and joint swelling.   Skin: Negative.    Neurological: Negative for dizziness, seizures, speech difficulty, light-headedness and headaches.   Hematological: Negative.    Psychiatric/Behavioral: Negative for behavioral problems and confusion.        Personal History     Past Medical History:   Diagnosis Date   • CHF (congestive heart failure)    • Coronary artery disease    • Diabetes    • Heart failure 05/16/2016   • High cholesterol    • Hypertension    • Ischemic cardiomyopathy 06/25/2016     Past Surgical History:   Procedure Laterality Date   • CARDIAC CATHETERIZATION  01/25/2017    Right heart cath   • CARDIAC CATHETERIZATION N/A 12/4/2021    Procedure: SAPHENOUS VEIN GRAFT;  Surgeon: Les Reyes MD;  Location: [facility] CATH INVASIVE LOCATION;  Service: Cardiovascular;  Laterality: N/A;   • CARDIAC CATHETERIZATION N/A 12/4/2021    Procedure: Coronary angiography;  Surgeon: Les Reyes MD;  Location:  [facility] CATH INVASIVE LOCATION;  Service: Cardiovascular;  Laterality: N/A;   • CARDIAC CATHETERIZATION N/A 12/4/2021    Procedure: Stent ROSEY coronary;  Surgeon: Les Reyes MD;  Location: [facility] CATH INVASIVE LOCATION;  Service: Cardiovascular;  Laterality: N/A;   • CARDIAC CATHETERIZATION N/A 12/4/2021    Procedure: Native mammary injection;  Surgeon: Les Reyes MD;  Location: [facility] CATH INVASIVE LOCATION;  Service: Cardiovascular;  Laterality: N/A;   • CARDIAC SURGERY     • CORONARY ARTERY BYPASS GRAFT  05/26/2015    cabg x3 Dr. Key     Family History   Family history unknown: Yes     Social History      Tobacco Use   • Smoking status: Never   • Smokeless tobacco: Never   Vaping Use   • Vaping Use: Never used   Substance Use Topics   • Alcohol use: Never   • Drug use: Never     No current facility-administered medications on file prior to encounter.     Current Outpatient Medications on File Prior to Encounter   Medication Sig Dispense Refill   • aspirin 81 MG EC tablet Take 1 tablet by mouth Daily. 30 tablet 11   • Blood Glucose Monitoring Suppl (FreeStyle Freedom Lite) w/Device kit Use to check blood sugar as directed. 1 each 0   • Blood Glucose Monitoring Suppl (Glucocard Expression Monitor) w/Device kit Use as directed 1 kit 0   • carvedilol (COREG) 25 MG tablet Take 1 tablet by mouth 2 (Two) Times a Day With Meals. 60 tablet 11   • furosemide (Lasix) 40 MG tablet Take 1 tablet by mouth Daily. 120 tablet 0   • glucose monitor monitoring kit 1 each 4 (Four) Times a Day Before Meals & at Bedtime. 1 each 0   • hydrALAZINE (APRESOLINE) 25 MG tablet Take 1.5 tablets by mouth 3 (Three) Times a Day. 135 tablet 0   • Insulin Pen Needle (TRUEplus Pen Needles) 31G X 5 MM misc Use as directed daily. 100 each 0   • Insulin Pen Needle 32G X 4 MM misc Use with insulin 5 times daily 200 each 0   • isosorbide dinitrate (ISORDIL) 20 MG tablet Take 1 tablet by mouth 3 (Three) Times a Day. 90 tablet 11   • Lancets 28G misc 1 each by Other route 4 (Four) Times a Day After Meals & at Bedtime. 100 each 0   • rosuvastatin (CRESTOR) 40 MG tablet Take 1 tablet by mouth Every Night. 90 tablet 0   • Lancets 28G misc Test 4 (Four) Times a Day After Meals & at Bedtime 100 each 0     No Known Allergies    Objective    Objective     Vital Signs  Temp:  [97.8 °F (36.6 °C)-98 °F (36.7 °C)] 97.8 °F (36.6 °C)  Heart Rate:  [78-93] 84  Resp:  [18-20] 20  BP: (179-192)/() 186/115  SpO2:  [91 %-96 %] 95 %  on   ;   Device (Oxygen Therapy): room air  Body mass index is 49.02 kg/m².    Physical Exam  Vitals and nursing note reviewed.    Constitutional:       General: He is not in acute distress.     Appearance: He is well-developed.   HENT:      Head: Normocephalic.   Neck:      Vascular: No JVD.   Cardiovascular:      Rate and Rhythm: Normal rate and regular rhythm.      Heart sounds: Normal heart sounds.      Comments: nsr on the monitor with HR 84 during my exam  Pulmonary:      Effort: Pulmonary effort is normal.      Breath sounds: Normal breath sounds.   Abdominal:      General: Bowel sounds are normal. There is no distension.      Palpations: Abdomen is soft.      Tenderness: There is no abdominal tenderness.   Musculoskeletal:         General: Normal range of motion.      Cervical back: Normal range of motion.      Right lower le+ Edema present.      Left lower leg: 3+ Edema present.   Skin:     General: Skin is warm and dry.      Capillary Refill: Capillary refill takes less than 2 seconds.   Neurological:      Mental Status: He is alert and oriented to person, place, and time.   Psychiatric:         Mood and Affect: Affect is flat.         Behavior: Behavior normal.         Results Review:  I reviewed the patient's new clinical results.  I reviewed the patient's new imaging results and agree with the interpretation.  I reviewed the patient's other test results and agree with the interpretation  I personally viewed and interpreted the patient's EKG/Telemetry data  Discussed with ED provider.    Lab Results (last 24 hours)     Procedure Component Value Units Date/Time    CBC & Differential [[id number]]  (Abnormal) Collected: 23    Specimen: Blood Updated: 23    Narrative:      The following orders were created for panel order CBC & Differential.  Procedure                               Abnormality         Status                     ---------                               -----------         ------                     CBC Auto Differential[[id number]]        Abnormal            Final result                 Please  view results for these tests on the individual orders.    Comprehensive Metabolic Panel [[id number]]  (Abnormal) Collected: 05/22/23 1906    Specimen: Blood Updated: 05/22/23 1942     Glucose 312 mg/dL      BUN 39 mg/dL      Creatinine 2.64 mg/dL      Sodium 137 mmol/L      Potassium 3.9 mmol/L      Chloride 101 mmol/L      CO2 24.5 mmol/L      Calcium 8.2 mg/dL      Total Protein 6.1 g/dL      Albumin 2.8 g/dL      ALT (SGPT) 20 U/L      AST (SGOT) 16 U/L      Alkaline Phosphatase 157 U/L      Total Bilirubin 0.2 mg/dL      Globulin 3.3 gm/dL      A/G Ratio 0.8 g/dL      BUN/Creatinine Ratio 14.8     Anion Gap 11.5 mmol/L      eGFR 31.0 mL/min/1.73     Narrative:      GFR Normal >60  Chronic Kidney Disease <60  Kidney Failure <15      CBC Auto Differential [[id number]]  (Abnormal) Collected: 05/22/23 1906    Specimen: Blood Updated: 05/22/23 1926     WBC 8.26 10*3/mm3      RBC 4.95 10*6/mm3      Hemoglobin 14.1 g/dL      Hematocrit 41.9 %      MCV 84.6 fL      MCH 28.5 pg      MCHC 33.7 g/dL      RDW 13.6 %      RDW-SD 41.1 fl      MPV 11.9 fL      Platelets 208 10*3/mm3      Neutrophil % 73.1 %      Lymphocyte % 15.5 %      Monocyte % 5.2 %      Eosinophil % 4.8 %      Basophil % 0.4 %      Immature Grans % 1.0 %      Neutrophils, Absolute 6.04 10*3/mm3      Lymphocytes, Absolute 1.28 10*3/mm3      Monocytes, Absolute 0.43 10*3/mm3      Eosinophils, Absolute 0.40 10*3/mm3      Basophils, Absolute 0.03 10*3/mm3      Immature Grans, Absolute 0.08 10*3/mm3      nRBC 0.0 /100 WBC     BNP [[id number]]  (Abnormal) Collected: 05/22/23 1906    Specimen: Blood Updated: 05/22/23 2047     proBNP 1,793.0 pg/mL     Narrative:      Among patients with dyspnea, NT-proBNP is highly sensitive for the detection of acute congestive heart failure. In addition NT-proBNP of <300 pg/ml effectively rules out acute congestive heart failure with 99% negative predictive value.    Results may be falsely decreased if patient taking Biotin.       High Sensitivity Troponin T [[id number]]  (Abnormal) Collected: 05/22/23 1906    Specimen: Blood Updated: 05/22/23 2053     HS Troponin T 61 ng/L     Narrative:      High Sensitive Troponin T Reference Range:  <10.0 ng/L- Negative Female for AMI  <15.0 ng/L- Negative Male for AMI  >=10 - Abnormal Female indicating possible myocardial injury.  >=15 - Abnormal Male indicating possible myocardial injury.   Clinicians would have to utilize clinical acumen, EKG, Troponin, and serial changes to determine if it is an Acute Myocardial Infarction or myocardial injury due to an underlying chronic condition.         Magnesium [[id number]]  (Normal) Collected: 05/22/23 1906    Specimen: Blood Updated: 05/22/23 2045     Magnesium 1.9 mg/dL     High Sensitivity Troponin T 2Hr [[id number]]  (Abnormal) Collected: 05/22/23 2105    Specimen: Blood Updated: 05/22/23 2141     HS Troponin T 61 ng/L      Troponin T Delta 0 ng/L     Narrative:      High Sensitive Troponin T Reference Range:  <10.0 ng/L- Negative Female for AMI  <15.0 ng/L- Negative Male for AMI  >=10 - Abnormal Female indicating possible myocardial injury.  >=15 - Abnormal Male indicating possible myocardial injury.   Clinicians would have to utilize clinical acumen, EKG, Troponin, and serial changes to determine if it is an Acute Myocardial Infarction or myocardial injury due to an underlying chronic condition.         C-reactive Protein [[id number]] Collected: 05/22/23 2105    Specimen: Blood Updated: 05/23/23 0041    POC Glucose Once [[id number]]  (Abnormal) Collected: 05/23/23 0000    Specimen: Blood Updated: 05/23/23 0001     Glucose 272 mg/dL      Comment: Meter: [id number] : [id number] Narciso Olguin RN             Imaging Results (Last 24 Hours)     Procedure Component Value Units Date/Time    XR Chest 2 View [[id number]] Collected: 05/22/23 1955     Updated: 05/22/23 2004    Narrative:      STAT PA AND LATERAL RADIOGRAPHIC VIEWS OF THE CHEST     CLINICAL HISTORY:  Swelling.     FINDINGS:     PA and lateral radiographic views of the chest demonstrate low lung  volumes. The cardiomediastinal silhouette is enlarged. Similar findings  were seen on the prior chest x-ray dated 06/04/2022. Sternal wires are  noted. There is pulmonary vascular engorgement. Additionally, there is a  suggestion of some interstitial indistinctness. These findings suggest  possible mild hydrostatic interstitial pulmonary edema and correlation  with clinical data is suggested. The osseous structures are incidentally  notable for multiple endplate irregularities and mild degrees of  vertebral body height loss throughout the thoracic spine.     This report was finalized on 5/22/2023 8:01 PM by Dr. Morgan Taylor M.D.             Results for orders placed during the hospital encounter of 06/04/22    Adult Transthoracic Echo Complete W/ Cont if Necessary Per Protocol    Interpretation Summary  · The study is technically difficult for diagnosis.  · The following left ventricular wall segments are hypokinetic: mid anterior, apical anterior, basal anterolateral, mid anterolateral, apical lateral, basal inferolateral, mid inferolateral, apical inferior, mid inferior, apical septal, basal inferoseptal, mid inferoseptal, apex hypokinetic, mid anteroseptal, basal anterior, basal inferior and basal inferoseptal.  · The left ventricular cavity is moderately dilated.  · Calculated left ventricular EF = 20.7% Estimated left ventricular EF was in agreement with the calculated left ventricular EF. Left ventricular systolic function is severely decreased.  · Moderately reduced right ventricular systolic function noted.  · Left ventricular diastolic function is consistent with (grade III w/high LAP) fixed restrictive pattern.  · There is a moderate (1-2cm) pericardial effusion.      ECG 12 Lead Chest Pain   Final Result   HEART RATE= 73  bpm   RR Interval= 822  ms   WY Interval= 191  ms   P Horizontal Axis= -17  deg   P  Front Axis= 53  deg   QRSD Interval= 129  ms   QT Interval= 444  ms   QRS Axis= 27  deg   T Wave Axis= 140  deg   - ABNORMAL ECG -   Sinus rhythm   Left atrial enlargement   LVH with secondary repolarization abnormality   Borderline prolonged QT interval   No change from previous tracing   Electronically Signed By: Les Reyes) ([facility]) 22-May-2023 22:22:47   Date and Time of Study: 2023-05-22 20:32:42           Assessment/Plan     Active Hospital Problems    Diagnosis  POA   • **Left leg swelling [M79.89]  Yes   • S/P CABG x 3 [Z95.1]  Not Applicable   • Elevated troponin [R77.8]  Yes   • Acute on chronic congestive heart failure [I50.9]  Yes   • CKD (chronic kidney disease) stage 2, GFR 60-89 ml/min [N18.2]  Yes   • Pulmonary HTN (HCC) [I27.20]  Yes   • Type 2 diabetes mellitus, with long-term current use of insulin (HCC) [E11.9, Z79.4]  Not Applicable     Mr. Dorsey is a 37 year old with history of type 2 diabetes, htn, ckd stage 3, cabg x3, chf who presents to the ED with complaints of left leg swelling.    Left leg swelling/elevated troponin  -venous doppler was negative  -neuro vascular checks every 4 hours  -daily wieights  -consult cardiology  -recheck troponin in am    Acute on chronic chf  -cardiology consulted  -scott;y weights  -80mg lasix given in the ED, will give a second dose in am and defer to cardiology for diuresis    Type 2 diabetes  -accu check ac/hs with correctional dose insulin  -continue home long acting  -check a1c in am    CKD  -worsening creatinine  -consult nephrology  -patient also has chf exacerbation  -monitor BMP and fluid volume closely    · I discussed the patient's findings and my recommendations with patient.    VTE Prophylaxis - lovenox pharmacy to dose.  Code Status - Full code.       JESSICA Corral  [facility]  05/23/23  00:45 EDT

## 2023-05-24 LAB
ANION GAP SERPL CALCULATED.3IONS-SCNC: 9.7 MMOL/L (ref 5–15)
BACTERIA UR QL AUTO: NORMAL /HPF
BILIRUB UR QL STRIP: NEGATIVE
BUN SERPL-MCNC: 43 MG/DL (ref 6–20)
BUN/CREAT SERPL: 16 (ref 7–25)
CALCIUM SPEC-SCNC: 8.1 MG/DL (ref 8.6–10.5)
CHLORIDE SERPL-SCNC: 103 MMOL/L (ref 98–107)
CLARITY UR: CLEAR
CO2 SERPL-SCNC: 24.3 MMOL/L (ref 22–29)
COLOR UR: YELLOW
CREAT SERPL-MCNC: 2.69 MG/DL (ref 0.76–1.27)
CREAT UR-MCNC: 51.4 MG/DL
DEPRECATED RDW RBC AUTO: 40.9 FL (ref 37–54)
EGFRCR SERPLBLD CKD-EPI 2021: 30.3 ML/MIN/1.73
ERYTHROCYTE [DISTWIDTH] IN BLOOD BY AUTOMATED COUNT: 13.2 % (ref 12.3–15.4)
GLUCOSE BLDC GLUCOMTR-MCNC: 228 MG/DL (ref 70–130)
GLUCOSE BLDC GLUCOMTR-MCNC: 243 MG/DL (ref 70–130)
GLUCOSE BLDC GLUCOMTR-MCNC: 247 MG/DL (ref 70–130)
GLUCOSE BLDC GLUCOMTR-MCNC: 256 MG/DL (ref 70–130)
GLUCOSE SERPL-MCNC: 275 MG/DL (ref 65–99)
GLUCOSE UR STRIP-MCNC: ABNORMAL MG/DL
HCT VFR BLD AUTO: 38 % (ref 37.5–51)
HGB BLD-MCNC: 12.9 G/DL (ref 13–17.7)
HGB UR QL STRIP.AUTO: NEGATIVE
HYALINE CASTS UR QL AUTO: NORMAL /LPF
KETONES UR QL STRIP: NEGATIVE
LEUKOCYTE ESTERASE UR QL STRIP.AUTO: NEGATIVE
MCH RBC QN AUTO: 28.5 PG (ref 26.6–33)
MCHC RBC AUTO-ENTMCNC: 33.9 G/DL (ref 31.5–35.7)
MCV RBC AUTO: 83.9 FL (ref 79–97)
NITRITE UR QL STRIP: NEGATIVE
PH UR STRIP.AUTO: 6.5 [PH] (ref 5–8)
PLATELET # BLD AUTO: 174 10*3/MM3 (ref 140–450)
PMV BLD AUTO: 11.9 FL (ref 6–12)
POTASSIUM SERPL-SCNC: 3.4 MMOL/L (ref 3.5–5.2)
PROT ?TM UR-MCNC: 303.6 MG/DL
PROT UR QL STRIP: ABNORMAL
PROT/CREAT UR: 5906.6 MG/G CREA (ref 0–200)
RBC # BLD AUTO: 4.53 10*6/MM3 (ref 4.14–5.8)
RBC # UR STRIP: NORMAL /HPF
REF LAB TEST METHOD: NORMAL
SODIUM SERPL-SCNC: 137 MMOL/L (ref 136–145)
SP GR UR STRIP: 1.01 (ref 1–1.03)
SQUAMOUS #/AREA URNS HPF: NORMAL /HPF
UROBILINOGEN UR QL STRIP: ABNORMAL
WBC # UR STRIP: NORMAL /HPF
WBC NRBC COR # BLD: 8.12 10*3/MM3 (ref 3.4–10.8)

## 2023-05-24 PROCEDURE — 25010000002 ENOXAPARIN PER 10 MG: Performed by: NURSE PRACTITIONER

## 2023-05-24 PROCEDURE — 25010000002 FUROSEMIDE PER 20 MG: Performed by: INTERNAL MEDICINE

## 2023-05-24 PROCEDURE — 25010000002 FUROSEMIDE PER 20 MG: Performed by: STUDENT IN AN ORGANIZED HEALTH CARE EDUCATION/TRAINING PROGRAM

## 2023-05-24 PROCEDURE — 25010000002 ENOXAPARIN PER 10 MG: Performed by: STUDENT IN AN ORGANIZED HEALTH CARE EDUCATION/TRAINING PROGRAM

## 2023-05-24 PROCEDURE — 99232 SBSQ HOSP IP/OBS MODERATE 35: CPT | Performed by: STUDENT IN AN ORGANIZED HEALTH CARE EDUCATION/TRAINING PROGRAM

## 2023-05-24 PROCEDURE — 81001 URINALYSIS AUTO W/SCOPE: CPT | Performed by: INTERNAL MEDICINE

## 2023-05-24 PROCEDURE — 63710000001 INSULIN ISOPHANE HUMAN PER 5 UNITS: Performed by: STUDENT IN AN ORGANIZED HEALTH CARE EDUCATION/TRAINING PROGRAM

## 2023-05-24 PROCEDURE — G0378 HOSPITAL OBSERVATION PER HR: HCPCS

## 2023-05-24 PROCEDURE — 82570 ASSAY OF URINE CREATININE: CPT | Performed by: INTERNAL MEDICINE

## 2023-05-24 PROCEDURE — 63710000001 INSULIN LISPRO (HUMAN) PER 5 UNITS: Performed by: NURSE PRACTITIONER

## 2023-05-24 PROCEDURE — 85027 COMPLETE CBC AUTOMATED: CPT | Performed by: STUDENT IN AN ORGANIZED HEALTH CARE EDUCATION/TRAINING PROGRAM

## 2023-05-24 PROCEDURE — 82948 REAGENT STRIP/BLOOD GLUCOSE: CPT

## 2023-05-24 PROCEDURE — 80048 BASIC METABOLIC PNL TOTAL CA: CPT | Performed by: STUDENT IN AN ORGANIZED HEALTH CARE EDUCATION/TRAINING PROGRAM

## 2023-05-24 PROCEDURE — 63710000001 INSULIN LISPRO (HUMAN) PER 5 UNITS: Performed by: STUDENT IN AN ORGANIZED HEALTH CARE EDUCATION/TRAINING PROGRAM

## 2023-05-24 PROCEDURE — 84156 ASSAY OF PROTEIN URINE: CPT | Performed by: INTERNAL MEDICINE

## 2023-05-24 RX ORDER — FUROSEMIDE 10 MG/ML
80 INJECTION INTRAMUSCULAR; INTRAVENOUS EVERY 12 HOURS
Status: DISCONTINUED | OUTPATIENT
Start: 2023-05-24 | End: 2023-05-24

## 2023-05-24 RX ORDER — HYDRALAZINE HYDROCHLORIDE 50 MG/1
50 TABLET, FILM COATED ORAL 3 TIMES DAILY
Status: DISCONTINUED | OUTPATIENT
Start: 2023-05-24 | End: 2023-05-25

## 2023-05-24 RX ADMIN — Medication 10 ML: at 08:37

## 2023-05-24 RX ADMIN — ISOSORBIDE DINITRATE 20 MG: 20 TABLET ORAL at 08:28

## 2023-05-24 RX ADMIN — DOCUSATE SODIUM 50MG AND SENNOSIDES 8.6MG 2 TABLET: 8.6; 5 TABLET, FILM COATED ORAL at 08:28

## 2023-05-24 RX ADMIN — ROSUVASTATIN CALCIUM 40 MG: 40 TABLET, FILM COATED ORAL at 22:18

## 2023-05-24 RX ADMIN — Medication 10 ML: at 22:19

## 2023-05-24 RX ADMIN — HYDRALAZINE HYDROCHLORIDE 50 MG: 50 TABLET, FILM COATED ORAL at 17:23

## 2023-05-24 RX ADMIN — ISOSORBIDE DINITRATE 20 MG: 20 TABLET ORAL at 17:23

## 2023-05-24 RX ADMIN — ISOSORBIDE DINITRATE 20 MG: 20 TABLET ORAL at 22:18

## 2023-05-24 RX ADMIN — DOXYCYCLINE 100 MG: 100 CAPSULE ORAL at 08:36

## 2023-05-24 RX ADMIN — FUROSEMIDE 80 MG: 10 INJECTION, SOLUTION INTRAMUSCULAR; INTRAVENOUS at 10:56

## 2023-05-24 RX ADMIN — INSULIN LISPRO 3 UNITS: 100 INJECTION, SOLUTION INTRAVENOUS; SUBCUTANEOUS at 13:54

## 2023-05-24 RX ADMIN — INSULIN HUMAN 11 UNITS: 100 INJECTION, SUSPENSION SUBCUTANEOUS at 22:19

## 2023-05-24 RX ADMIN — FUROSEMIDE 120 MG: 10 INJECTION, SOLUTION INTRAMUSCULAR; INTRAVENOUS at 18:37

## 2023-05-24 RX ADMIN — CARVEDILOL 25 MG: 25 TABLET, FILM COATED ORAL at 08:28

## 2023-05-24 RX ADMIN — HYDRALAZINE HYDROCHLORIDE 50 MG: 50 TABLET, FILM COATED ORAL at 22:19

## 2023-05-24 RX ADMIN — DOCUSATE SODIUM 50MG AND SENNOSIDES 8.6MG 2 TABLET: 8.6; 5 TABLET, FILM COATED ORAL at 22:18

## 2023-05-24 RX ADMIN — ENOXAPARIN SODIUM 40 MG: 100 INJECTION SUBCUTANEOUS at 22:18

## 2023-05-24 RX ADMIN — ASPIRIN 81 MG: 81 TABLET, COATED ORAL at 08:27

## 2023-05-24 RX ADMIN — HYDRALAZINE HYDROCHLORIDE 37.5 MG: 25 TABLET, FILM COATED ORAL at 08:28

## 2023-05-24 RX ADMIN — CARVEDILOL 25 MG: 25 TABLET, FILM COATED ORAL at 17:23

## 2023-05-24 RX ADMIN — INSULIN LISPRO 3 UNITS: 100 INJECTION, SOLUTION INTRAVENOUS; SUBCUTANEOUS at 22:19

## 2023-05-24 RX ADMIN — INSULIN LISPRO 4 UNITS: 100 INJECTION, SOLUTION INTRAVENOUS; SUBCUTANEOUS at 08:34

## 2023-05-24 RX ADMIN — ENOXAPARIN SODIUM 40 MG: 100 INJECTION SUBCUTANEOUS at 08:38

## 2023-05-24 RX ADMIN — INSULIN LISPRO 4 UNITS: 100 INJECTION, SOLUTION INTRAVENOUS; SUBCUTANEOUS at 17:23

## 2023-05-24 RX ADMIN — INSULIN HUMAN 7 UNITS: 100 INJECTION, SUSPENSION SUBCUTANEOUS at 08:36

## 2023-05-24 RX ADMIN — DOXYCYCLINE 100 MG: 100 CAPSULE ORAL at 22:19

## 2023-05-24 NOTE — CONSULTS
[facility] Consult Note      Patient Name: Rufus Dorsey  : 1985  MRN: [id number]  Primary Care Physician:  Provider, No Known  Referring Physician: Arie Cortes MD  Date of admission: 2023    Subjective     Reason for Consult:  CKD     HPI:   Rufus Dorsey is a 37 y.o. male with a past medical history of coronary artery disease status post CABG and systolic congestive heart failure with last EF 20%, hypertension, diabetes mellitus type 2 and chronic kidney disease stage IIIb/IV with baseline creatinine appears to be around 2.5 mg/dL who came into the hospital due to worsening lower extremity swelling and shortness of air and hypertension urgency.  Work-up in the emergency department showed creatinine of 2.7 mg/dl.  Patient was started on diuretic and we were consulted to help with management of his kidney dysfunction and volume status.  There is a possibility of right heart cath in the morning.      Review of Systems:   14 point review of systems is otherwise negative except for mentioned above on HPI    Personal History     Past Medical History:   Diagnosis Date   • CHF (congestive heart failure)    • Coronary artery disease    • Diabetes    • Heart failure 2016   • High cholesterol    • Hypertension    • Ischemic cardiomyopathy 2016       Past Surgical History:   Procedure Laterality Date   • CARDIAC CATHETERIZATION  2017    Right heart cath   • CARDIAC CATHETERIZATION N/A 2021    Procedure: SAPHENOUS VEIN GRAFT;  Surgeon: Les Reyes MD;  Location: [facility] CATH INVASIVE LOCATION;  Service: Cardiovascular;  Laterality: N/A;   • CARDIAC CATHETERIZATION N/A 2021    Procedure: Coronary angiography;  Surgeon: Les Reyes MD;  Location: [facility] CATH INVASIVE LOCATION;  Service: Cardiovascular;  Laterality: N/A;   • CARDIAC CATHETERIZATION N/A 2021    Procedure: Stent ROSEY coronary;  Surgeon: Les Reyes MD;  Location: [facility]  CATH INVASIVE LOCATION;  Service: Cardiovascular;  Laterality: N/A;   • CARDIAC CATHETERIZATION N/A 12/4/2021    Procedure: Native mammary injection;  Surgeon: Les Reyes MD;  Location: [facility] CATH INVASIVE LOCATION;  Service: Cardiovascular;  Laterality: N/A;   • CARDIAC SURGERY     • CORONARY ARTERY BYPASS GRAFT  05/26/2015    cabg x3 Dr. Key       Family History: Family history is unknown by patient.    Social History:  reports that he has never smoked. He has never used smokeless tobacco. He reports that he does not drink alcohol and does not use drugs.    Home Medications:  Prior to Admission medications    Medication Sig Start Date End Date Taking? Authorizing Provider   aspirin 81 MG EC tablet Take 1 tablet by mouth Daily. 1/10/23  Yes Vicente Trujillo MD   Blood Glucose Monitoring Suppl (FreeStyle Freedom Lite) w/Device kit Use to check blood sugar as directed. 1/8/21  Yes Joey Hughes MD   Blood Glucose Monitoring Suppl (Glucocard Expression Monitor) w/Device kit Use as directed 12/9/21  Yes Patrick Barrett MD   carvedilol (COREG) 25 MG tablet Take 1 tablet by mouth 2 (Two) Times a Day With Meals. 1/10/23  Yes Vicente Trujillo MD   furosemide (Lasix) 40 MG tablet Take 1 tablet by mouth Daily. 1/10/23  Yes Vicente Trujillo MD   glucose monitor monitoring kit 1 each 4 (Four) Times a Day Before Meals & at Bedtime. 1/8/21  Yes Joey Hughes MD   hydrALAZINE (APRESOLINE) 25 MG tablet Take 1.5 tablets by mouth 3 (Three) Times a Day. 1/10/23  Yes Vicente Trujillo MD   Insulin Pen Needle (TRUEplus Pen Needles) 31G X 5 MM misc Use as directed daily. 12/9/21  Yes Patrick Barrett MD   Insulin Pen Needle 32G X 4 MM misc Use with insulin 5 times daily 8/6/21  Yes Arie Cortes MD   isosorbide dinitrate (ISORDIL) 20 MG tablet Take 1 tablet by mouth 3 (Three) Times a Day. 1/10/23  Yes Vicente Trujillo MD   Lancets 28G misc 1 each by Other route 4 (Four) Times a Day After Meals & at Bedtime. 8/6/21  Yes  Arie Cortes MD   rosuvastatin (CRESTOR) 40 MG tablet Take 1 tablet by mouth Every Night. 12/9/22  Yes Angelita Moreno APRN   Lancets 28G misc Test 4 (Four) Times a Day After Meals & at Bedtime 12/9/21   Patrick Barrett MD       Allergies:  No Known Allergies    Objective     Vitals:   Temp:  [97.7 °F (36.5 °C)-98.1 °F (36.7 °C)] 97.7 °F (36.5 °C)  Heart Rate:  [65-76] 65  Resp:  [20] 20  BP: (131-171)/(67-86) 165/86  Flow (L/min):  [2] 2  No intake or output data in the 24 hours ending 05/24/23 1515    Physical Exam:    General Appearance: alert, oriented x 3, no acute distress   Skin: warm and dry  HEENT: oral mucosa normal, nonicteric sclera  Neck: supple, no JVD  Lungs: CTA  Heart: RRR, normal S1 and S2  Abdomen: soft, nontender, nondistended  : no palpable bladder  Extremities: ++ edema, cyanosis or clubbing  Neuro: normal speech and mental status     Scheduled Meds:     aspirin, 81 mg, Oral, Daily  carvedilol, 25 mg, Oral, BID With Meals  doxycycline, 100 mg, Oral, Q12H  enoxaparin, 40 mg, Subcutaneous, Q12H  furosemide, 80 mg, Intravenous, Q12H  hydrALAZINE, 50 mg, Oral, TID  insulin lispro, 2-7 Units, Subcutaneous, 4x Daily AC & at Bedtime  insulin NPH, 11 Units, Subcutaneous, Q12H  isosorbide dinitrate, 20 mg, Oral, TID  rosuvastatin, 40 mg, Oral, Nightly  senna-docusate sodium, 2 tablet, Oral, BID  sodium chloride, 10 mL, Intravenous, Q12H      IV Meds:        Results Reviewed:   I have personally reviewed the results from the time of this admission to 5/24/2023 15:15 EDT     Lab Results   Component Value Date    GLUCOSE 275 (H) 05/24/2023    CALCIUM 8.1 (L) 05/24/2023     05/24/2023    K 3.4 (L) 05/24/2023    CO2 24.3 05/24/2023     05/24/2023    BUN 43 (H) 05/24/2023    CREATININE 2.69 (H) 05/24/2023    EGFRIFAFRI 54 (L) 12/21/2020    EGFRIFNONA 50 (L) 12/09/2021    BCR 16.0 05/24/2023    ANIONGAP 9.7 05/24/2023      Lab Results   Component Value Date    MG 2.1 05/23/2023     PHOS 4.0 06/07/2022    ALBUMIN 2.8 (L) 05/22/2023           Assessment / Plan     ASSESSMENT:    1.  Chronic kidney disease stage IIIb/IV with baseline creatinine 2.5 mg/dL.  Etiology is not clear however likely due to diabetic nephropathy with last urine protein creatinine ratio was 2.4 g in December 2021.  We will obtain a UA urine protein creatinine ratio and renal ultrasound.  Volume status appears to be improving.  We will increase his diuretic to 120 mg every 8 hours and monitor kidney function very closely.    2.  Nephrotic range proteinuria with urine protein creatinine ratio around 3 g.  3.  Systolic congestive heart failure with last echocardiogram showing ejection fraction 20%.  Possible right heart cath in the morning.  4.  Hypertension urgency.  Blood pressure is improving with improvement of volume status and adjusting medications per cardiology.  Patient might benefit from adding small dose of ACE hepatal/ARB and possibly SGLT2.  5.  Dyslipidemia  6.  Diabetes mellitus type 2    PLAN:    1.  We will increase Lasix to 120 mg every 8 hours  2.  Obtain renal ultrasound  3.  Urinalysis and urine protein creatinine ratio  4.  Poor clinical insight  5.  Might benefit from adding ACE inhibitor/ARB as well as SGLT2  6.  Surveillance lab    Thank you for involving us in the care of Rufus Dorsey.  Please feel free to call with any questions.    Paris Heller MD  05/24/23  15:15 EDT    [facility]  [phone]

## 2023-05-24 NOTE — PROGRESS NOTES
"    Patient Name: Rufus Dorsey  :1985  37 y.o.      Patient Care Team:  Provider, No Known as PCP - General  Provider, No Known    Chief Complaint:   Left leg pain    Interval History:   No acute events overnight, feels slightly improved.  Still with left leg pain.    Objective   Vital Signs  Temp:  [97.1 °F (36.2 °C)-98.1 °F (36.7 °C)] 98.1 °F (36.7 °C)  Heart Rate:  [] 76  Resp:  [20] 20  BP: (131-156)/(67-86) 156/86    Intake/Output Summary (Last 24 hours) at 2023 0818  Last data filed at 2023 1230  Gross per 24 hour   Intake 260 ml   Output --   Net 260 ml     Flowsheet Rows    Flowsheet Row First Filed Value   Admission Height 167.6 cm (66\") Documented at 2023 1636   Admission Weight 130 kg (287 lb) Documented at 2023 1636          GEN: no distress, alert and oriented, obese  HEENT: NACT, EOMI, moist mucous membranes  Lungs: CTAB, no wheezes, rales or rhonchi  CV: normal rate, regular rhythm, normal S1, S2, no murmurs, +2 radial pulses b/l  Abdomen: soft, nontender, nondistended, NABS  Extremities: 1+ edema left leg, trace edema on right leg  Skin: no rash, warm, dry  Heme/Lymph: no bruising  Psych: organized thought, normal behavior and affect    Results Review:    Results from last 7 days   Lab Units 23  0506   SODIUM mmol/L 137   POTASSIUM mmol/L 3.4*   CHLORIDE mmol/L 103   CO2 mmol/L 24.3   BUN mg/dL 43*   CREATININE mg/dL 2.69*   GLUCOSE mg/dL 275*   CALCIUM mg/dL 8.1*     Results from last 7 days   Lab Units 23  2105 23  1906   HSTROP T ng/L 61* 61*     Results from last 7 days   Lab Units 23  0506   WBC 10*3/mm3 8.12   HEMOGLOBIN g/dL 12.9*   HEMATOCRIT % 38.0   PLATELETS 10*3/mm3 174     Results from last 7 days   Lab Units 23  0344   INR  0.99     Results from last 7 days   Lab Units 23  0344   MAGNESIUM mg/dL 2.1                   Medication Review:   aspirin, 81 mg, Oral, Daily  carvedilol, 25 mg, Oral, BID With " Meals  doxycycline, 100 mg, Oral, Q12H  enoxaparin, 40 mg, Subcutaneous, Q12H  hydrALAZINE, 37.5 mg, Oral, TID  insulin lispro, 2-7 Units, Subcutaneous, 4x Daily AC & at Bedtime  insulin NPH, 7 Units, Subcutaneous, Q12H  isosorbide dinitrate, 20 mg, Oral, TID  rosuvastatin, 40 mg, Oral, Nightly  senna-docusate sodium, 2 tablet, Oral, BID  sodium chloride, 10 mL, Intravenous, Q12H         Pharmacy to Dose enoxaparin (LOVENOX),         Assessment & Plan   #Hypertensive urgency  #Volume overload  #HFrEF  #CAD status post CABG  #MEI on CKD  #Uncontrolled diabetes  #Elevated troponin     38 yo with CAD S/P CABG (LIMA to LAD, SVG to OM1 and OM 2) and PCI to 90% LIMA in 2021, HFrEF secondary to ischemic cardiomyopathy (EF 20%), hypertension, CKD, DM and obesity who presented to the ED on 5/22/2023 with left leg pain and swelling, found to be volume overloaded and in hypertensive urgency.     Elevated troponin is in the setting of HTN urgency, CKD and volume overload. Not consistent with ACS. Will diurese and start anti-HTN therapy and continue to monitor. Unfortunately, patient has very poor understanding of his clinical condition despite multiple long outpatient discussions. He has multiple uncontrolled comorbid conditions including diabetes(A1C 11), HTN(presented with urgency) and CKD. As a result, he would be a poor candidate for any other HFrEF therapies including LVAD/transplant. Would continue to manage him medically for now and re-assess.     - Increase hydralazine to 50 mg 3 times daily  - continueIsordil 20 mg 3 times daily  - continue carvedilol 25 mg twice daily  - continue aspirin 81mg daily, rosuvastatin 40mg daily  - start lasix 80mg IV BID  - pending volume status, will consider RHC tomorrow    Vicente Trujillo MD  [facility]  05/24/23  08:18 EDT

## 2023-05-24 NOTE — PLAN OF CARE
Goal Outcome Evaluation:      Pt Alert, oriented x4, denied pain or any discomfort this shift, pt with 3+ edema to left lower extremity, consulted to nephrology today, and ordered increase Lasix to 120mg IV q8h, UA, daily weight and renal ultrasound. Pt denied SOB, no fever, vital WNL, oxygen through nasal canula at 2L.

## 2023-05-24 NOTE — PROGRESS NOTES
Name: Rufus Dorsey ADMIT: 2023   : 1985  PCP: Provider, No Known    MRN: [id number] LOS: 0 days   AGE/SEX: 37 y.o. male  ROOM: [facility]/     Subjective   Subjective   Continues to endorse left leg pain. Otherwise has been started on IV lasix      Objective   Objective   Vital Signs  Temp:  [97.7 °F (36.5 °C)-98.1 °F (36.7 °C)] 97.7 °F (36.5 °C)  Heart Rate:  [65-76] 65  Resp:  [20] 20  BP: (131-171)/(67-86) 165/86  SpO2:  [95 %-100 %] 95 %  on  Flow (L/min):  [2] 2;   Device (Oxygen Therapy): nasal cannula  Body mass index is 49.02 kg/m².  Physical Exam  Constitutional:       Appearance: He is obese.   Eyes:      Extraocular Movements: Extraocular movements intact.      Pupils: Pupils are equal, round, and reactive to light.   Cardiovascular:      Rate and Rhythm: Normal rate and regular rhythm.   Pulmonary:      Effort: Pulmonary effort is normal. No respiratory distress.   Abdominal:      General: There is no distension.      Palpations: Abdomen is soft.      Tenderness: There is no abdominal tenderness.   Musculoskeletal:      Right lower leg: No edema.      Left lower leg: Edema present.      Comments: Left leg with 2+ edema, tender to palpation   Skin:     General: Skin is warm and dry.   Neurological:      General: No focal deficit present.      Mental Status: He is alert and oriented to person, place, and time.         Results Review     I reviewed the patient's new clinical results.  Results from last 7 days   Lab Units 23  0506 23  0344 23  1906   WBC 10*3/mm3 8.12 9.24 8.26   HEMOGLOBIN g/dL 12.9* 14.2 14.1   PLATELETS 10*3/mm3 174 206 208     Results from last 7 days   Lab Units 23  0506 23  0344 23  1906   SODIUM mmol/L 137 136 137   POTASSIUM mmol/L 3.4* 4.1 3.9   CHLORIDE mmol/L 103 102 101   CO2 mmol/L 24.3 24.8 24.5   BUN mg/dL 43* 39* 39*   CREATININE mg/dL 2.69* 2.73* 2.64*   GLUCOSE mg/dL 275* 276* 312*   Estimated Creatinine Clearance: 49.7  mL/min (A) (by C-G formula based on SCr of 2.69 mg/dL (H)).  Results from last 7 days   Lab Units 05/22/23  1906   ALBUMIN g/dL 2.8*   BILIRUBIN mg/dL 0.2   ALK PHOS U/L 157*   AST (SGOT) U/L 16   ALT (SGPT) U/L 20     Results from last 7 days   Lab Units 05/24/23  0506 05/23/23  0344 05/22/23  1906   CALCIUM mg/dL 8.1* 8.0* 8.2*   ALBUMIN g/dL  --   --  2.8*   MAGNESIUM mg/dL  --  2.1 1.9     Results from last 7 days   Lab Units 05/23/23  0344   LACTATE mmol/L 0.9     COVID19   Date Value Ref Range Status   06/04/2022 Detected (C) Not Detected - Ref. Range Final   12/04/2021 Not Detected Not Detected - Ref. Range Final     Hemoglobin A1C   Date/Time Value Ref Range Status   05/23/2023 0344 11.10 (H) 4.80 - 5.60 % Final     Glucose   Date/Time Value Ref Range Status   05/24/2023 1158 228 (H) 70 - 130 mg/dL Final     Comment:     Meter: [id number] : [id number] Bishop Feliciano CNA   05/24/2023 0627 256 (H) 70 - 130 mg/dL Final     Comment:     Meter: [id number] : [id number] Dumont Tia NA   05/23/2023 2036 216 (H) 70 - 130 mg/dL Final     Comment:     Meter: [id number] : [id number] Dumont Tia NA   05/23/2023 1635 210 (H) 70 - 130 mg/dL Final     Comment:     Meter: [id number] : [id number] Bishop Feliciano CNA   05/23/2023 1106 251 (H) 70 - 130 mg/dL Final     Comment:     Meter: [id number] : [id number] Bishop Feliciano CNA   05/23/2023 0627 250 (H) 70 - 130 mg/dL Final     Comment:     JENSEN FULTON Notified R and V Meter: [id number] : [id number] Arsenio Reny NA   05/23/2023 0000 272 (H) 70 - 130 mg/dL Final     Comment:     Meter: [id number] : [id number] Narciso Olguin RN       Duplex Venous Lower Extremity LEFT  •  Normal left lower extremity venous duplex scan.  XR Chest 2 View  STAT PA AND LATERAL RADIOGRAPHIC VIEWS OF THE CHEST     CLINICAL HISTORY: Swelling.     FINDINGS:     PA and lateral radiographic views of the chest demonstrate low lung  volumes. The cardiomediastinal silhouette is  enlarged. Similar findings  were seen on the prior chest x-ray dated 06/04/2022. Sternal wires are  noted. There is pulmonary vascular engorgement. Additionally, there is a  suggestion of some interstitial indistinctness. These findings suggest  possible mild hydrostatic interstitial pulmonary edema and correlation  with clinical data is suggested. The osseous structures are incidentally  notable for multiple endplate irregularities and mild degrees of  vertebral body height loss throughout the thoracic spine.     This report was finalized on 5/22/2023 8:01 PM by Dr. Morgan Taylor M.D.       Scheduled Medications  aspirin, 81 mg, Oral, Daily  carvedilol, 25 mg, Oral, BID With Meals  doxycycline, 100 mg, Oral, Q12H  enoxaparin, 40 mg, Subcutaneous, Q12H  furosemide, 80 mg, Intravenous, Q12H  hydrALAZINE, 50 mg, Oral, TID  insulin lispro, 2-7 Units, Subcutaneous, 4x Daily AC & at Bedtime  insulin NPH, 11 Units, Subcutaneous, Q12H  isosorbide dinitrate, 20 mg, Oral, TID  rosuvastatin, 40 mg, Oral, Nightly  senna-docusate sodium, 2 tablet, Oral, BID  sodium chloride, 10 mL, Intravenous, Q12H    Infusions   Diet  Diet: Diabetic Diets; Consistent Carbohydrate; Texture: Regular Texture (IDDSI 7); Fluid Consistency: Thin (IDDSI 0)       Assessment/Plan     Active Hospital Problems    Diagnosis  POA   • **Left leg swelling [M79.89]  Yes   • S/P CABG x 3 [Z95.1]  Not Applicable   • Elevated troponin [R77.8]  Yes   • Acute on chronic congestive heart failure [I50.9]  Yes   • CKD (chronic kidney disease) stage 2, GFR 60-89 ml/min [N18.2]  Yes   • Pulmonary HTN (HCC) [I27.20]  Yes   • Type 2 diabetes mellitus, with long-term current use of insulin (HCC) [E11.9, Z79.4]  Not Applicable      Resolved Hospital Problems   No resolved problems to display.       37 y.o. male admitted with Left leg swelling.    Left lower extremity edema  Duplex of the left lower extremity was negative.  It was a technically difficult study, and  presentation is most consistent with DVT of the left leg. D-dimer Continue doxycycline 100mg Bid      Coronary artery disease status post CABG  Chronic heart failure with reduced ejection fraction  Hypertension  Continue aspirin, Coreg, hydralazine, Imdur, Crestor  Now on IV lasix 80mg BID  Strict I/Os. Daily weights     Type 2 diabetes  A1c 11  Started on NPH insulin, 7 units every 12 hours.  Increased to 11u q12h. Appreciate [facility] assistance     · Lovenox 40 mg SC daily for DVT prophylaxis.  · Full code.  · Discussed with patient and nursing staff.  · Anticipate discharge home in 2-3 days.      Hiram Velez MD  [facility]  05/24/23  14:54 EDT

## 2023-05-24 NOTE — CASE MANAGEMENT/SOCIAL WORK
Discharge Planning Assessment  [facility]     Patient Name: Rufus Dorsey  MRN: [id number]  Today's Date: 5/24/2023    Admit Date: 5/22/2023    Plan: Home via family   Discharge Needs Assessment     Row Name 05/24/23 1449       Living Environment    People in Home sibling(s)    Current Living Arrangements home    Potentially Unsafe Housing Conditions none    Quality of Family Relationships helpful;involved;supportive    Able to Return to Prior Arrangements yes       Resource/Environmental Concerns    Resource/Environmental Concerns none    Transportation Concerns none       Transition Planning    Patient/Family Anticipates Transition to home    Patient/Family Anticipated Services at Transition     Transportation Anticipated family or friend will provide       Discharge Needs Assessment    Readmission Within the Last 30 Days no previous admission in last 30 days    Equipment Currently Used at Home none    Concerns to be Addressed denies needs/concerns at this time;no discharge needs identified    Anticipated Changes Related to Illness none    Equipment Needed After Discharge oxygen               Discharge Plan     Row Name 05/24/23 6167       Plan    Plan Home via family    Patient/Family in Agreement with Plan yes    Plan Comments CCP met with pt at bedside, used  on phone to communicate with pt. Introduced self and role of CCP, face sheet information and pharmacy verified. Pt lives in a single-story home with his brother. Pt still drives, IADL’s and denies DME. Pt has a living will. Pt is current with meds to beds and denies trouble affording or managing his medications. Pt denies HH or SNF history. CCP explained once pt DC’d he can contact billing department for payment plan; pt verbalized understanding. DC plan is home, family to transport. Elisa FULTON/ALLISON              Continued Care and Services - Admitted Since 5/22/2023    Coordination has not been started for this encounter.        Expected Discharge Date and Time     Expected Discharge Date Expected Discharge Time    May 27, 2023          Demographic Summary    No documentation.                Functional Status     Row Name 05/24/23 1449       Functional Status    Usual Activity Tolerance moderate    Current Activity Tolerance moderate       Assessment of Health Literacy    How often do you have someone help you read hospital materials? Never    How often do you have problems learning about your medical condition because of difficulty understanding written information? Never    How often do you have a problem understanding what is told to you about your medical condition? Never    How confident are you filling out medical forms by yourself? Extremely    Health Literacy Good       Functional Status, IADL    Medications independent    Meal Preparation independent    Housekeeping independent    Laundry independent    Shopping independent                         Vanesa Barillas RN

## 2023-05-25 ENCOUNTER — APPOINTMENT (OUTPATIENT)
Dept: ULTRASOUND IMAGING | Facility: HOSPITAL | Age: 38
DRG: 602 | End: 2023-05-25

## 2023-05-25 LAB
ANION GAP SERPL CALCULATED.3IONS-SCNC: 10.5 MMOL/L (ref 5–15)
BUN SERPL-MCNC: 42 MG/DL (ref 6–20)
BUN/CREAT SERPL: 13.7 (ref 7–25)
CALCIUM SPEC-SCNC: 8.1 MG/DL (ref 8.6–10.5)
CHLORIDE SERPL-SCNC: 102 MMOL/L (ref 98–107)
CO2 SERPL-SCNC: 26.5 MMOL/L (ref 22–29)
CREAT SERPL-MCNC: 3.07 MG/DL (ref 0.76–1.27)
DEPRECATED RDW RBC AUTO: 42.6 FL (ref 37–54)
EGFRCR SERPLBLD CKD-EPI 2021: 25.9 ML/MIN/1.73
ERYTHROCYTE [DISTWIDTH] IN BLOOD BY AUTOMATED COUNT: 13.5 % (ref 12.3–15.4)
GLUCOSE BLDC GLUCOMTR-MCNC: 163 MG/DL (ref 70–130)
GLUCOSE BLDC GLUCOMTR-MCNC: 165 MG/DL (ref 70–130)
GLUCOSE BLDC GLUCOMTR-MCNC: 185 MG/DL (ref 70–130)
GLUCOSE BLDC GLUCOMTR-MCNC: 269 MG/DL (ref 70–130)
GLUCOSE SERPL-MCNC: 247 MG/DL (ref 65–99)
HCT VFR BLD AUTO: 37.8 % (ref 37.5–51)
HGB BLD-MCNC: 12.6 G/DL (ref 13–17.7)
MCH RBC QN AUTO: 28.6 PG (ref 26.6–33)
MCHC RBC AUTO-ENTMCNC: 33.3 G/DL (ref 31.5–35.7)
MCV RBC AUTO: 85.9 FL (ref 79–97)
PLATELET # BLD AUTO: 169 10*3/MM3 (ref 140–450)
PMV BLD AUTO: 11.8 FL (ref 6–12)
POTASSIUM SERPL-SCNC: 3.4 MMOL/L (ref 3.5–5.2)
RBC # BLD AUTO: 4.4 10*6/MM3 (ref 4.14–5.8)
SODIUM SERPL-SCNC: 139 MMOL/L (ref 136–145)
WBC NRBC COR # BLD: 7.6 10*3/MM3 (ref 3.4–10.8)

## 2023-05-25 PROCEDURE — 76775 US EXAM ABDO BACK WALL LIM: CPT

## 2023-05-25 PROCEDURE — 25010000002 ENOXAPARIN PER 10 MG: Performed by: STUDENT IN AN ORGANIZED HEALTH CARE EDUCATION/TRAINING PROGRAM

## 2023-05-25 PROCEDURE — B2141ZZ FLUOROSCOPY OF RIGHT HEART USING LOW OSMOLAR CONTRAST: ICD-10-PCS | Performed by: STUDENT IN AN ORGANIZED HEALTH CARE EDUCATION/TRAINING PROGRAM

## 2023-05-25 PROCEDURE — C1894 INTRO/SHEATH, NON-LASER: HCPCS | Performed by: STUDENT IN AN ORGANIZED HEALTH CARE EDUCATION/TRAINING PROGRAM

## 2023-05-25 PROCEDURE — 85027 COMPLETE CBC AUTOMATED: CPT | Performed by: STUDENT IN AN ORGANIZED HEALTH CARE EDUCATION/TRAINING PROGRAM

## 2023-05-25 PROCEDURE — 93451 RIGHT HEART CATH: CPT | Performed by: STUDENT IN AN ORGANIZED HEALTH CARE EDUCATION/TRAINING PROGRAM

## 2023-05-25 PROCEDURE — 4A023N6 MEASUREMENT OF CARDIAC SAMPLING AND PRESSURE, RIGHT HEART, PERCUTANEOUS APPROACH: ICD-10-PCS | Performed by: STUDENT IN AN ORGANIZED HEALTH CARE EDUCATION/TRAINING PROGRAM

## 2023-05-25 PROCEDURE — 82810 BLOOD GASES O2 SAT ONLY: CPT

## 2023-05-25 PROCEDURE — 63710000001 INSULIN LISPRO (HUMAN) PER 5 UNITS: Performed by: STUDENT IN AN ORGANIZED HEALTH CARE EDUCATION/TRAINING PROGRAM

## 2023-05-25 PROCEDURE — 85018 HEMOGLOBIN: CPT

## 2023-05-25 PROCEDURE — 80048 BASIC METABOLIC PNL TOTAL CA: CPT | Performed by: STUDENT IN AN ORGANIZED HEALTH CARE EDUCATION/TRAINING PROGRAM

## 2023-05-25 PROCEDURE — 63710000001 INSULIN ISOPHANE HUMAN PER 5 UNITS: Performed by: STUDENT IN AN ORGANIZED HEALTH CARE EDUCATION/TRAINING PROGRAM

## 2023-05-25 PROCEDURE — C1769 GUIDE WIRE: HCPCS | Performed by: STUDENT IN AN ORGANIZED HEALTH CARE EDUCATION/TRAINING PROGRAM

## 2023-05-25 PROCEDURE — 85014 HEMATOCRIT: CPT

## 2023-05-25 PROCEDURE — 25010000002 FUROSEMIDE PER 20 MG: Performed by: STUDENT IN AN ORGANIZED HEALTH CARE EDUCATION/TRAINING PROGRAM

## 2023-05-25 PROCEDURE — 82948 REAGENT STRIP/BLOOD GLUCOSE: CPT

## 2023-05-25 PROCEDURE — 25010000002 ENOXAPARIN PER 10 MG: Performed by: NURSE PRACTITIONER

## 2023-05-25 PROCEDURE — 99232 SBSQ HOSP IP/OBS MODERATE 35: CPT | Performed by: STUDENT IN AN ORGANIZED HEALTH CARE EDUCATION/TRAINING PROGRAM

## 2023-05-25 PROCEDURE — 63710000001 INSULIN LISPRO (HUMAN) PER 5 UNITS: Performed by: NURSE PRACTITIONER

## 2023-05-25 PROCEDURE — 25010000002 FUROSEMIDE PER 20 MG: Performed by: INTERNAL MEDICINE

## 2023-05-25 RX ORDER — POTASSIUM CHLORIDE 750 MG/1
40 TABLET, FILM COATED, EXTENDED RELEASE ORAL ONCE
Status: COMPLETED | OUTPATIENT
Start: 2023-05-25 | End: 2023-05-25

## 2023-05-25 RX ORDER — SODIUM CHLORIDE 9 MG/ML
75 INJECTION, SOLUTION INTRAVENOUS CONTINUOUS
Status: DISCONTINUED | OUTPATIENT
Start: 2023-05-26 | End: 2023-05-25

## 2023-05-25 RX ORDER — LIDOCAINE HYDROCHLORIDE 20 MG/ML
INJECTION, SOLUTION INFILTRATION; PERINEURAL
Status: DISCONTINUED | OUTPATIENT
Start: 2023-05-25 | End: 2023-05-25 | Stop reason: HOSPADM

## 2023-05-25 RX ORDER — ACETAMINOPHEN 325 MG/1
650 TABLET ORAL EVERY 4 HOURS PRN
Status: DISCONTINUED | OUTPATIENT
Start: 2023-05-25 | End: 2023-05-28 | Stop reason: HOSPADM

## 2023-05-25 RX ORDER — SODIUM CHLORIDE 9 MG/ML
INJECTION, SOLUTION INTRAVENOUS
Status: COMPLETED | OUTPATIENT
Start: 2023-05-25 | End: 2023-05-25

## 2023-05-25 RX ORDER — NITROGLYCERIN 0.4 MG/1
0.4 TABLET SUBLINGUAL
Status: DISCONTINUED | OUTPATIENT
Start: 2023-05-25 | End: 2023-05-28 | Stop reason: HOSPADM

## 2023-05-25 RX ADMIN — ASPIRIN 81 MG: 81 TABLET, COATED ORAL at 08:42

## 2023-05-25 RX ADMIN — HYDRALAZINE HYDROCHLORIDE 75 MG: 50 TABLET, FILM COATED ORAL at 21:36

## 2023-05-25 RX ADMIN — ENOXAPARIN SODIUM 40 MG: 100 INJECTION SUBCUTANEOUS at 11:13

## 2023-05-25 RX ADMIN — INSULIN LISPRO 2 UNITS: 100 INJECTION, SOLUTION INTRAVENOUS; SUBCUTANEOUS at 21:37

## 2023-05-25 RX ADMIN — HYDRALAZINE HYDROCHLORIDE 75 MG: 50 TABLET, FILM COATED ORAL at 08:39

## 2023-05-25 RX ADMIN — FUROSEMIDE 120 MG: 10 INJECTION, SOLUTION INTRAMUSCULAR; INTRAVENOUS at 03:07

## 2023-05-25 RX ADMIN — FUROSEMIDE 120 MG: 10 INJECTION, SOLUTION INTRAMUSCULAR; INTRAVENOUS at 11:14

## 2023-05-25 RX ADMIN — INSULIN LISPRO 4 UNITS: 100 INJECTION, SOLUTION INTRAVENOUS; SUBCUTANEOUS at 08:45

## 2023-05-25 RX ADMIN — ROSUVASTATIN CALCIUM 40 MG: 40 TABLET, FILM COATED ORAL at 21:36

## 2023-05-25 RX ADMIN — ISOSORBIDE DINITRATE 30 MG: 20 TABLET ORAL at 08:41

## 2023-05-25 RX ADMIN — DOXYCYCLINE 100 MG: 100 CAPSULE ORAL at 08:42

## 2023-05-25 RX ADMIN — ENOXAPARIN SODIUM 40 MG: 100 INJECTION SUBCUTANEOUS at 21:36

## 2023-05-25 RX ADMIN — Medication 10 ML: at 21:36

## 2023-05-25 RX ADMIN — HYDRALAZINE HYDROCHLORIDE 75 MG: 50 TABLET, FILM COATED ORAL at 16:01

## 2023-05-25 RX ADMIN — DOCUSATE SODIUM 50MG AND SENNOSIDES 8.6MG 2 TABLET: 8.6; 5 TABLET, FILM COATED ORAL at 08:42

## 2023-05-25 RX ADMIN — ISOSORBIDE DINITRATE 30 MG: 20 TABLET ORAL at 21:36

## 2023-05-25 RX ADMIN — POTASSIUM CHLORIDE 40 MEQ: 750 TABLET, EXTENDED RELEASE ORAL at 08:42

## 2023-05-25 RX ADMIN — DOCUSATE SODIUM 50MG AND SENNOSIDES 8.6MG 2 TABLET: 8.6; 5 TABLET, FILM COATED ORAL at 21:36

## 2023-05-25 RX ADMIN — INSULIN HUMAN 17 UNITS: 100 INJECTION, SUSPENSION SUBCUTANEOUS at 21:38

## 2023-05-25 RX ADMIN — CARVEDILOL 25 MG: 25 TABLET, FILM COATED ORAL at 08:41

## 2023-05-25 RX ADMIN — Medication 10 ML: at 08:50

## 2023-05-25 RX ADMIN — INSULIN HUMAN 11 UNITS: 100 INJECTION, SUSPENSION SUBCUTANEOUS at 08:46

## 2023-05-25 RX ADMIN — ISOSORBIDE DINITRATE 30 MG: 20 TABLET ORAL at 16:01

## 2023-05-25 RX ADMIN — DOXYCYCLINE 100 MG: 100 CAPSULE ORAL at 21:36

## 2023-05-25 RX ADMIN — INSULIN LISPRO 2 UNITS: 100 INJECTION, SOLUTION INTRAVENOUS; SUBCUTANEOUS at 12:01

## 2023-05-25 NOTE — PLAN OF CARE
Goal Outcome Evaluation:      Pt renal ultrasound normal, UA negative, moderate edema to left lower extremity, denied pain, afebrile, Cardiologist ordered a Right heart catheterization today, pt denied SOB, vital WNL, room air.

## 2023-05-25 NOTE — PROGRESS NOTES
Name: Rufus Dorsey ADMIT: 2023   : 1985  PCP: Provider, No Known    MRN: [id number] LOS: 0 days   AGE/SEX: 37 y.o. male  ROOM: [facility]/     Subjective   Subjective   Left leg is  to palpation and tight. Otherwise, Scr increased. Denies chest pain or dyspnea.     Objective   Objective   Vital Signs  Temp:  [97.5 °F (36.4 °C)-98.7 °F (37.1 °C)] 98.1 °F (36.7 °C)  Heart Rate:  [66-77] 77  Resp:  [18-20] 18  BP: (134-160)/(72-94) 139/72  SpO2:  [97 %-98 %] 97 %  on  Flow (L/min):  [2] 2;   Device (Oxygen Therapy): room air  Body mass index is 48.26 kg/m².  Physical Exam  Constitutional:       Appearance: He is obese.   Eyes:      Extraocular Movements: Extraocular movements intact.      Pupils: Pupils are equal, round, and reactive to light.   Cardiovascular:      Rate and Rhythm: Normal rate and regular rhythm.   Pulmonary:      Effort: Pulmonary effort is normal. No respiratory distress.   Abdominal:      General: There is no distension.      Palpations: Abdomen is soft.      Tenderness: There is no abdominal tenderness.   Musculoskeletal:      Right lower leg: No edema.      Left lower leg: Edema present.      Comments: Left leg with 2+ edema, tender to palpation.  Leg is no longer warm.    Skin:     General: Skin is warm and dry.   Neurological:      General: No focal deficit present.      Mental Status: He is alert and oriented to person, place, and time.         Results Review     I reviewed the patient's new clinical results.  Results from last 7 days   Lab Units 23  0429 23  0506 23  0344 23  1906   WBC 10*3/mm3 7.60 8.12 9.24 8.26   HEMOGLOBIN g/dL 12.6* 12.9* 14.2 14.1   PLATELETS 10*3/mm3 169 174 206 208     Results from last 7 days   Lab Units 23  0429 23  0506 23  0344 23  1906   SODIUM mmol/L 139 137 136 137   POTASSIUM mmol/L 3.4* 3.4* 4.1 3.9   CHLORIDE mmol/L 102 103 102 101   CO2 mmol/L 26.5 24.3 24.8 24.5   BUN mg/dL 42* 43*  39* 39*   CREATININE mg/dL 3.07* 2.69* 2.73* 2.64*   GLUCOSE mg/dL 247* 275* 276* 312*   Estimated Creatinine Clearance: 43.2 mL/min (A) (by C-G formula based on SCr of 3.07 mg/dL (H)).  Results from last 7 days   Lab Units 05/22/23  1906   ALBUMIN g/dL 2.8*   BILIRUBIN mg/dL 0.2   ALK PHOS U/L 157*   AST (SGOT) U/L 16   ALT (SGPT) U/L 20     Results from last 7 days   Lab Units 05/25/23  0429 05/24/23  0506 05/23/23  0344 05/22/23  1906   CALCIUM mg/dL 8.1* 8.1* 8.0* 8.2*   ALBUMIN g/dL  --   --   --  2.8*   MAGNESIUM mg/dL  --   --  2.1 1.9     Results from last 7 days   Lab Units 05/23/23  0344   LACTATE mmol/L 0.9     COVID19   Date Value Ref Range Status   06/04/2022 Detected (C) Not Detected - Ref. Range Final   12/04/2021 Not Detected Not Detected - Ref. Range Final     Hemoglobin A1C   Date/Time Value Ref Range Status   05/23/2023 0344 11.10 (H) 4.80 - 5.60 % Final     Glucose   Date/Time Value Ref Range Status   05/25/2023 1131 185 (H) 70 - 130 mg/dL Final     Comment:     Meter: [id number] : [id number] Trenton Sharp CN   05/25/2023 0640 269 (H) 70 - 130 mg/dL Final     Comment:     Meter: [id number] : [id number] Donnell De Leonim NA   05/24/2023 2051 243 (H) 70 - 130 mg/dL Final     Comment:     Meter: [id number] : [id number] Donnell Renteriaahim NA   05/24/2023 1611 247 (H) 70 - 130 mg/dL Final     Comment:     Meter: [id number] : [id number] Bishop Feliciano A   05/24/2023 1158 228 (H) 70 - 130 mg/dL Final     Comment:     Meter: [id number] : [id number] Bishop Feliciano A   05/24/2023 0627 256 (H) 70 - 130 mg/dL Final     Comment:     Meter: [id number] : [id number] Tim Ward NA   05/23/2023 2036 216 (H) 70 - 130 mg/dL Final     Comment:     Meter: [id number] : [id number] Tim Ward NA       US Renal Bilateral  Narrative: US RENAL LIMITED-  05/25/2023     HISTORY: Renal insufficiency.     The right kidney measures 13.4 cm in length. Left kidney measures 13.7  cm in length. No  hydronephrosis, stones or renal masses are seen.  Urinary bladder is unremarkable.     Impression: 1. Normal bilateral renal ultrasound.     This report was finalized on 5/25/2023 9:54 AM by Dr. Wolf Arreola M.D.       Scheduled Medications  aspirin, 81 mg, Oral, Daily  carvedilol, 25 mg, Oral, BID With Meals  doxycycline, 100 mg, Oral, Q12H  enoxaparin, 40 mg, Subcutaneous, Q12H  furosemide, 120 mg, Intravenous, Q8H  hydrALAZINE, 75 mg, Oral, TID  insulin lispro, 2-7 Units, Subcutaneous, 4x Daily AC & at Bedtime  insulin NPH, 17 Units, Subcutaneous, Q12H  isosorbide dinitrate, 30 mg, Oral, TID  rosuvastatin, 40 mg, Oral, Nightly  senna-docusate sodium, 2 tablet, Oral, BID  sodium chloride, 10 mL, Intravenous, Q12H    Infusions   Diet  Diet: Diabetic Diets; Consistent Carbohydrate; Texture: Regular Texture (IDDSI 7); Fluid Consistency: Thin (IDDSI 0)       Assessment/Plan     Active Hospital Problems    Diagnosis  POA   • **Left leg swelling [M79.89]  Yes   • S/P CABG x 3 [Z95.1]  Not Applicable   • Acute on chronic combined systolic and diastolic CHF (congestive heart failure) [I50.43]  Unknown   • Elevated troponin [R77.8]  Yes   • Acute on chronic congestive heart failure [I50.9]  Yes   • CKD (chronic kidney disease) stage 2, GFR 60-89 ml/min [N18.2]  Yes   • Pulmonary HTN (HCC) [I27.20]  Yes   • Type 2 diabetes mellitus, with long-term current use of insulin (HCC) [E11.9, Z79.4]  Not Applicable      Resolved Hospital Problems   No resolved problems to display.       37 y.o. male admitted with Left leg swelling.    Left lower extremity edema  Duplex of the left lower extremity was negative.  It was a technically difficult study, and presentation is most consistent with DVT of the left leg. D-dimer possibly due to venous graft from CABG     Coronary artery disease status post CABG  Chronic heart failure with reduced ejection fraction  Hypertension  Continue aspirin, Coreg, hydralazine, Imdur, Crestor.  Cardiology managing.   Now on IV lasix 80mg BID  Strict I/Os. Daily weights     Type 2 diabetes  A1c 11  Started on NPH insulin and dose adjusted as required     Acute kidney injury  Chronic kidney disease  Creatinine increased to 3.07 after starting IV diuresis.  He underwent ultrasound of the kidneys which was unremarkable.    · Lovenox 40 mg SC daily for DVT prophylaxis.  · Full code.  · Discussed with patient and nursing staff.  · Anticipate discharge home in 2-3 days.      Hiram Velez MD  [facility]  05/25/23  13:59 EDT

## 2023-05-25 NOTE — PROGRESS NOTES
"    Patient Name: Rufus Dorsey  :1985  37 y.o.      Patient Care Team:  Provider, No Known as PCP - General  Provider, No Known    Chief Complaint:   Left leg pain    Interval History:   No acute events overnight.    Objective   Vital Signs  Temp:  [97.5 °F (36.4 °C)-98.7 °F (37.1 °C)] 97.8 °F (36.6 °C)  Heart Rate:  [65-77] 69  Resp:  [18-20] 20  BP: (134-165)/(73-94) 160/94    Intake/Output Summary (Last 24 hours) at 2023 0822  Last data filed at 2023 0341  Gross per 24 hour   Intake 290 ml   Output 700 ml   Net -410 ml     Flowsheet Rows    Flowsheet Row First Filed Value   Admission Height 167.6 cm (66\") Documented at 2023 1636   Admission Weight 130 kg (287 lb) Documented at 2023 1636          GEN: no distress, alert and oriented, obese  HEENT: NACT, EOMI, moist mucous membranes  Lungs: CTAB, no wheezes, rales or rhonchi  CV: normal rate, regular rhythm, normal S1, S2, no murmurs, +2 radial pulses b/l  Abdomen: soft, nontender, nondistended, NABS  Extremities: 1+ edema left leg, trace edema on right leg  Skin: no rash, warm, dry  Heme/Lymph: no bruising  Psych: organized thought, normal behavior and affect    Results Review:    Results from last 7 days   Lab Units 23  0429   SODIUM mmol/L 139   POTASSIUM mmol/L 3.4*   CHLORIDE mmol/L 102   CO2 mmol/L 26.5   BUN mg/dL 42*   CREATININE mg/dL 3.07*   GLUCOSE mg/dL 247*   CALCIUM mg/dL 8.1*     Results from last 7 days   Lab Units 23  2105 23  1906   HSTROP T ng/L 61* 61*     Results from last 7 days   Lab Units 23  0429   WBC 10*3/mm3 7.60   HEMOGLOBIN g/dL 12.6*   HEMATOCRIT % 37.8   PLATELETS 10*3/mm3 169     Results from last 7 days   Lab Units 23  0344   INR  0.99     Results from last 7 days   Lab Units 23  0344   MAGNESIUM mg/dL 2.1                   Medication Review:   aspirin, 81 mg, Oral, Daily  carvedilol, 25 mg, Oral, BID With Meals  doxycycline, 100 mg, Oral, Q12H  enoxaparin, 40 mg, " Subcutaneous, Q12H  furosemide, 120 mg, Intravenous, Q8H  hydrALAZINE, 50 mg, Oral, TID  insulin lispro, 2-7 Units, Subcutaneous, 4x Daily AC & at Bedtime  insulin NPH, 11 Units, Subcutaneous, Q12H  isosorbide dinitrate, 20 mg, Oral, TID  potassium chloride, 40 mEq, Oral, Once  rosuvastatin, 40 mg, Oral, Nightly  senna-docusate sodium, 2 tablet, Oral, BID  sodium chloride, 10 mL, Intravenous, Q12H              Assessment & Plan   #Hypertensive urgency  #Volume overload  #HFrEF  #CAD status post CABG  #MEI on CKD  #Uncontrolled diabetes  #Elevated troponin     38 yo with CAD S/P CABG (LIMA to LAD, SVG to OM1 and OM 2) and PCI to 90% LIMA in 2021, HFrEF secondary to ischemic cardiomyopathy (EF 20%), hypertension, CKD, DM and obesity who presented to the ED on 5/22/2023 with left leg pain and swelling, found to be volume overloaded and in hypertensive urgency.     Elevated troponin is in the setting of HTN urgency, CKD and volume overload. Not consistent with ACS. Will diurese and start anti-HTN therapy and continue to monitor. Unfortunately, patient has very poor understanding of his clinical condition despite multiple long outpatient discussions. He has multiple uncontrolled comorbid conditions including diabetes(A1C 11), HTN(presented with urgency) and CKD. As a result, he would be a poor candidate for any other HFrEF therapies including LVAD/transplant. Would continue to manage him medically for now and re-assess.     Creatinin worsening with diuresis, will obtain RHC today for further evaluation.    - Increase hydralazine to 75 mg 3 times daily  - Increase Isordil to 30 mg 3 times daily  - continue carvedilol 25 mg twice daily  - continue aspirin 81mg daily, rosuvastatin 40mg daily    Vicente Trujillo MD  [facility]  05/25/23  08:22 EDT

## 2023-05-25 NOTE — PROGRESS NOTES
[facility] Progress Note      Patient Name: Rufus Dorsey  : 1985  MRN: [id number]  Primary Care Physician:  Provider, No Known  Date of admission: 2023    Subjective     Interval History:     Seen and examined.    Review of Systems:   As noted above    Objective     Vitals:   Temp:  [97.5 °F (36.4 °C)-98.7 °F (37.1 °C)] 97.8 °F (36.6 °C)  Heart Rate:  [65-77] 69  Resp:  [18-20] 20  BP: (134-165)/(73-94) 160/94  Flow (L/min):  [2] 2    Intake/Output Summary (Last 24 hours) at 2023 0811  Last data filed at 2023 0341  Gross per 24 hour   Intake 290 ml   Output 700 ml   Net -410 ml       Physical Exam:    General Appearance: alert, oriented x 3, no acute distress   Skin: warm and dry  HEENT: oral mucosa normal, nonicteric sclera  Neck: supple, no JVD  Lungs: CTA  Heart: RRR, normal S1 and S2  Abdomen: soft, nontender, nondistended  : no palpable bladder  Extremities: no edema, cyanosis or clubbing  Neuro: normal speech and mental status     Scheduled Meds:     aspirin, 81 mg, Oral, Daily  carvedilol, 25 mg, Oral, BID With Meals  doxycycline, 100 mg, Oral, Q12H  enoxaparin, 40 mg, Subcutaneous, Q12H  furosemide, 120 mg, Intravenous, Q8H  hydrALAZINE, 50 mg, Oral, TID  insulin lispro, 2-7 Units, Subcutaneous, 4x Daily AC & at Bedtime  insulin NPH, 11 Units, Subcutaneous, Q12H  isosorbide dinitrate, 20 mg, Oral, TID  rosuvastatin, 40 mg, Oral, Nightly  senna-docusate sodium, 2 tablet, Oral, BID  sodium chloride, 10 mL, Intravenous, Q12H      IV Meds:        Results Reviewed:   I have personally reviewed the results from the time of this admission to 2023 08:11 EDT     Results from last 7 days   Lab Units 23  0429 23  0506 23  0344 23  1906   SODIUM mmol/L 139 137 136 137   POTASSIUM mmol/L 3.4* 3.4* 4.1 3.9   CHLORIDE mmol/L 102 103 102 101   CO2 mmol/L 26.5 24.3 24.8 24.5   BUN mg/dL 42* 43* 39* 39*   CREATININE mg/dL 3.07* 2.69* 2.73*  2.64*   CALCIUM mg/dL 8.1* 8.1* 8.0* 8.2*   BILIRUBIN mg/dL  --   --   --  0.2   ALK PHOS U/L  --   --   --  157*   ALT (SGPT) U/L  --   --   --  20   AST (SGOT) U/L  --   --   --  16   GLUCOSE mg/dL 247* 275* 276* 312*     Estimated Creatinine Clearance: 43.2 mL/min (A) (by C-G formula based on SCr of 3.07 mg/dL (H)).  Results from last 7 days   Lab Units 05/23/23  0344 05/22/23  1906   MAGNESIUM mg/dL 2.1 1.9         Results from last 7 days   Lab Units 05/25/23  0429 05/24/23  0506 05/23/23  0344 05/22/23  1906   WBC 10*3/mm3 7.60 8.12 9.24 8.26   HEMOGLOBIN g/dL 12.6* 12.9* 14.2 14.1   PLATELETS 10*3/mm3 169 174 206 208     Results from last 7 days   Lab Units 05/23/23  0344   INR  0.99       Assessment / Plan     ASSESSMENT:  1.  Chronic kidney disease stage IIIb/IV with baseline creatinine 2.5 mg/dL.  Etiology is not clear however likely due to diabetic nephropathy with last urine protein creatinine ratio was 2.4 g in December 2021.  Most recent protein creatinine ratio is about 60 g.  Volume status appears to be improving.    Awaiting renal ultrasound     2.  Nephrotic range proteinuria with urine protein creatinine ratio around 6 g.  3.  Systolic congestive heart failure with last echocardiogram showing ejection fraction 20%.  Possible right heart cath in the morning.  4.  Hypertension urgency.  Blood pressure is improving with improvement of volume status and adjusting medications per cardiology.  Patient might benefit from adding small dose of ACE hepatal/ARB and possibly SGLT2.  5.  Dyslipidemia  6.  Diabetes mellitus type 2     PLAN:     1.   Will continue current dose of diuretic 1 more day and replete potassium  2.  Awaiting renal ultrasound  3.  Might benefit from adding ACE inhibitor/ARB as well as SGLT2  4.  Possible right heart cath today  5.  Surveillance labs    Thank you for involving us in the care of Rufus Dorsey.  Please feel free to call with any questions.    Paris Heller  MD  05/25/23  08:11 EDT    [facility]  [phone]

## 2023-05-26 ENCOUNTER — APPOINTMENT (OUTPATIENT)
Dept: CARDIOLOGY | Facility: HOSPITAL | Age: 38
DRG: 602 | End: 2023-05-26

## 2023-05-26 LAB
ALBUMIN SERPL-MCNC: 3.2 G/DL (ref 3.5–5.2)
ALBUMIN/GLOB SERPL: 1 G/DL
ALP SERPL-CCNC: 103 U/L (ref 39–117)
ALT SERPL W P-5'-P-CCNC: 41 U/L (ref 1–41)
ANION GAP SERPL CALCULATED.3IONS-SCNC: 9 MMOL/L (ref 5–15)
AST SERPL-CCNC: 45 U/L (ref 1–40)
BH CV LOWER VASCULAR LEFT COMMON FEMORAL AUGMENT: NORMAL
BH CV LOWER VASCULAR LEFT COMMON FEMORAL COMPETENT: NORMAL
BH CV LOWER VASCULAR LEFT COMMON FEMORAL COMPRESS: NORMAL
BH CV LOWER VASCULAR LEFT COMMON FEMORAL PHASIC: NORMAL
BH CV LOWER VASCULAR LEFT COMMON FEMORAL SPONT: NORMAL
BH CV LOWER VASCULAR LEFT DISTAL FEMORAL COMPRESS: NORMAL
BH CV LOWER VASCULAR LEFT GASTRONEMIUS COMPRESS: NORMAL
BH CV LOWER VASCULAR LEFT GREATER SAPH AK COMPRESS: NORMAL
BH CV LOWER VASCULAR LEFT GREATER SAPH BK COMPRESS: NORMAL
BH CV LOWER VASCULAR LEFT LESSER SAPH COMPRESS: NORMAL
BH CV LOWER VASCULAR LEFT MID FEMORAL AUGMENT: NORMAL
BH CV LOWER VASCULAR LEFT MID FEMORAL COMPETENT: NORMAL
BH CV LOWER VASCULAR LEFT MID FEMORAL COMPRESS: NORMAL
BH CV LOWER VASCULAR LEFT MID FEMORAL PHASIC: NORMAL
BH CV LOWER VASCULAR LEFT MID FEMORAL SPONT: NORMAL
BH CV LOWER VASCULAR LEFT PERONEAL COMPRESS: NORMAL
BH CV LOWER VASCULAR LEFT POPLITEAL AUGMENT: NORMAL
BH CV LOWER VASCULAR LEFT POPLITEAL COMPETENT: NORMAL
BH CV LOWER VASCULAR LEFT POPLITEAL COMPRESS: NORMAL
BH CV LOWER VASCULAR LEFT POPLITEAL PHASIC: NORMAL
BH CV LOWER VASCULAR LEFT POPLITEAL SPONT: NORMAL
BH CV LOWER VASCULAR LEFT POSTERIOR TIBIAL COMPRESS: NORMAL
BH CV LOWER VASCULAR LEFT PROFUNDA FEMORAL COMPRESS: NORMAL
BH CV LOWER VASCULAR LEFT PROXIMAL FEMORAL COMPRESS: NORMAL
BH CV LOWER VASCULAR LEFT SAPHENOFEMORAL JUNCTION COMPRESS: NORMAL
BH CV LOWER VASCULAR RIGHT COMMON FEMORAL AUGMENT: NORMAL
BH CV LOWER VASCULAR RIGHT COMMON FEMORAL COMPETENT: NORMAL
BH CV LOWER VASCULAR RIGHT COMMON FEMORAL COMPRESS: NORMAL
BH CV LOWER VASCULAR RIGHT COMMON FEMORAL PHASIC: NORMAL
BH CV LOWER VASCULAR RIGHT COMMON FEMORAL SPONT: NORMAL
BILIRUB SERPL-MCNC: 0.3 MG/DL (ref 0–1.2)
BUN SERPL-MCNC: 45 MG/DL (ref 6–20)
BUN/CREAT SERPL: 14.3 (ref 7–25)
CALCIUM SPEC-SCNC: 7.9 MG/DL (ref 8.6–10.5)
CHLORIDE SERPL-SCNC: 101 MMOL/L (ref 98–107)
CO2 SERPL-SCNC: 27 MMOL/L (ref 22–29)
CREAT SERPL-MCNC: 3.14 MG/DL (ref 0.76–1.27)
DEPRECATED RDW RBC AUTO: 42 FL (ref 37–54)
EGFRCR SERPLBLD CKD-EPI 2021: 25.2 ML/MIN/1.73
ERYTHROCYTE [DISTWIDTH] IN BLOOD BY AUTOMATED COUNT: 13.7 % (ref 12.3–15.4)
GLOBULIN UR ELPH-MCNC: 3.3 GM/DL
GLUCOSE BLDC GLUCOMTR-MCNC: 193 MG/DL (ref 70–130)
GLUCOSE BLDC GLUCOMTR-MCNC: 205 MG/DL (ref 70–130)
GLUCOSE BLDC GLUCOMTR-MCNC: 216 MG/DL (ref 70–130)
GLUCOSE BLDC GLUCOMTR-MCNC: 217 MG/DL (ref 70–130)
GLUCOSE SERPL-MCNC: 193 MG/DL (ref 65–99)
HCT VFR BLD AUTO: 38.4 % (ref 37.5–51)
HCT VFR BLDA CALC: 35 % (ref 38–51)
HGB BLD-MCNC: 12.9 G/DL (ref 13–17.7)
HGB BLDA-MCNC: 11.9 G/DL (ref 12–17)
MAXIMAL PREDICTED HEART RATE: 183 BPM
MCH RBC QN AUTO: 28.2 PG (ref 26.6–33)
MCHC RBC AUTO-ENTMCNC: 33.6 G/DL (ref 31.5–35.7)
MCV RBC AUTO: 83.8 FL (ref 79–97)
PLATELET # BLD AUTO: 194 10*3/MM3 (ref 140–450)
PMV BLD AUTO: 11.9 FL (ref 6–12)
POTASSIUM SERPL-SCNC: 3.5 MMOL/L (ref 3.5–5.2)
PROT SERPL-MCNC: 6.5 G/DL (ref 6–8.5)
RBC # BLD AUTO: 4.58 10*6/MM3 (ref 4.14–5.8)
SAO2 % BLDA: 59 % (ref 95–98)
SODIUM SERPL-SCNC: 137 MMOL/L (ref 136–145)
STRESS TARGET HR: 156 BPM
WBC NRBC COR # BLD: 8.55 10*3/MM3 (ref 3.4–10.8)

## 2023-05-26 PROCEDURE — 99232 SBSQ HOSP IP/OBS MODERATE 35: CPT | Performed by: STUDENT IN AN ORGANIZED HEALTH CARE EDUCATION/TRAINING PROGRAM

## 2023-05-26 PROCEDURE — 80053 COMPREHEN METABOLIC PANEL: CPT | Performed by: INTERNAL MEDICINE

## 2023-05-26 PROCEDURE — 63710000001 INSULIN ISOPHANE HUMAN PER 5 UNITS: Performed by: STUDENT IN AN ORGANIZED HEALTH CARE EDUCATION/TRAINING PROGRAM

## 2023-05-26 PROCEDURE — 25010000002 ENOXAPARIN PER 10 MG: Performed by: STUDENT IN AN ORGANIZED HEALTH CARE EDUCATION/TRAINING PROGRAM

## 2023-05-26 PROCEDURE — 82948 REAGENT STRIP/BLOOD GLUCOSE: CPT

## 2023-05-26 PROCEDURE — 63710000001 INSULIN LISPRO (HUMAN) PER 5 UNITS: Performed by: STUDENT IN AN ORGANIZED HEALTH CARE EDUCATION/TRAINING PROGRAM

## 2023-05-26 PROCEDURE — 93971 EXTREMITY STUDY: CPT

## 2023-05-26 PROCEDURE — 85027 COMPLETE CBC AUTOMATED: CPT | Performed by: STUDENT IN AN ORGANIZED HEALTH CARE EDUCATION/TRAINING PROGRAM

## 2023-05-26 RX ORDER — ISOSORBIDE DINITRATE 20 MG/1
40 TABLET ORAL 3 TIMES DAILY
Status: DISCONTINUED | OUTPATIENT
Start: 2023-05-26 | End: 2023-05-28 | Stop reason: HOSPADM

## 2023-05-26 RX ORDER — HYDRALAZINE HYDROCHLORIDE 50 MG/1
100 TABLET, FILM COATED ORAL 3 TIMES DAILY
Status: DISCONTINUED | OUTPATIENT
Start: 2023-05-26 | End: 2023-05-28 | Stop reason: HOSPADM

## 2023-05-26 RX ORDER — TORSEMIDE 100 MG/1
100 TABLET ORAL DAILY
Status: DISCONTINUED | OUTPATIENT
Start: 2023-05-26 | End: 2023-05-28 | Stop reason: HOSPADM

## 2023-05-26 RX ADMIN — ISOSORBIDE DINITRATE 40 MG: 20 TABLET ORAL at 21:01

## 2023-05-26 RX ADMIN — INSULIN LISPRO 3 UNITS: 100 INJECTION, SOLUTION INTRAVENOUS; SUBCUTANEOUS at 18:25

## 2023-05-26 RX ADMIN — ISOSORBIDE DINITRATE 40 MG: 20 TABLET ORAL at 08:29

## 2023-05-26 RX ADMIN — INSULIN LISPRO 2 UNITS: 100 INJECTION, SOLUTION INTRAVENOUS; SUBCUTANEOUS at 08:29

## 2023-05-26 RX ADMIN — ENOXAPARIN SODIUM 40 MG: 100 INJECTION SUBCUTANEOUS at 11:20

## 2023-05-26 RX ADMIN — INSULIN HUMAN 21 UNITS: 100 INJECTION, SUSPENSION SUBCUTANEOUS at 21:01

## 2023-05-26 RX ADMIN — DOCUSATE SODIUM 50MG AND SENNOSIDES 8.6MG 2 TABLET: 8.6; 5 TABLET, FILM COATED ORAL at 21:02

## 2023-05-26 RX ADMIN — DOCUSATE SODIUM 50MG AND SENNOSIDES 8.6MG 2 TABLET: 8.6; 5 TABLET, FILM COATED ORAL at 08:29

## 2023-05-26 RX ADMIN — TORSEMIDE 100 MG: 100 TABLET ORAL at 11:20

## 2023-05-26 RX ADMIN — CARVEDILOL 25 MG: 25 TABLET, FILM COATED ORAL at 08:29

## 2023-05-26 RX ADMIN — HYDRALAZINE HYDROCHLORIDE 100 MG: 50 TABLET, FILM COATED ORAL at 21:01

## 2023-05-26 RX ADMIN — CARVEDILOL 25 MG: 25 TABLET, FILM COATED ORAL at 18:25

## 2023-05-26 RX ADMIN — DOXYCYCLINE 100 MG: 100 CAPSULE ORAL at 08:29

## 2023-05-26 RX ADMIN — HYDRALAZINE HYDROCHLORIDE 100 MG: 50 TABLET, FILM COATED ORAL at 08:29

## 2023-05-26 RX ADMIN — HYDRALAZINE HYDROCHLORIDE 100 MG: 50 TABLET, FILM COATED ORAL at 16:21

## 2023-05-26 RX ADMIN — ENOXAPARIN SODIUM 40 MG: 100 INJECTION SUBCUTANEOUS at 21:01

## 2023-05-26 RX ADMIN — ISOSORBIDE DINITRATE 40 MG: 20 TABLET ORAL at 16:21

## 2023-05-26 RX ADMIN — INSULIN HUMAN 17 UNITS: 100 INJECTION, SUSPENSION SUBCUTANEOUS at 08:40

## 2023-05-26 RX ADMIN — DOXYCYCLINE 100 MG: 100 CAPSULE ORAL at 21:01

## 2023-05-26 RX ADMIN — INSULIN LISPRO 3 UNITS: 100 INJECTION, SOLUTION INTRAVENOUS; SUBCUTANEOUS at 21:01

## 2023-05-26 RX ADMIN — Medication 10 ML: at 21:02

## 2023-05-26 RX ADMIN — Medication 10 ML: at 11:20

## 2023-05-26 RX ADMIN — INSULIN LISPRO 3 UNITS: 100 INJECTION, SOLUTION INTRAVENOUS; SUBCUTANEOUS at 11:40

## 2023-05-26 RX ADMIN — ASPIRIN 81 MG: 81 TABLET, COATED ORAL at 08:29

## 2023-05-26 RX ADMIN — ROSUVASTATIN CALCIUM 40 MG: 40 TABLET, FILM COATED ORAL at 21:01

## 2023-05-26 NOTE — PLAN OF CARE
Problem: Adult Inpatient Plan of Care  Goal: Absence of Hospital-Acquired Illness or Injury  Intervention: Prevent Skin Injury  Recent Flowsheet Documentation  Taken 5/26/2023 0200 by Joanne Mercado RN  Body Position: sitting up in bed  Taken 5/26/2023 0008 by Joanne Mercado RN  Body Position:   left   side-lying  Skin Protection: adhesive use limited  Taken 5/25/2023 2145 by Joanne Mercado RN  Body Position: sitting up in bed  Taken 5/25/2023 1944 by Joanne Mercado RN  Body Position: sitting up in bed  Skin Protection: adhesive use limited   Goal Outcome Evaluation:

## 2023-05-26 NOTE — PROGRESS NOTES
Name: Rufus Dorsey ADMIT: 2023   : 1985  PCP: Provider, No Known    MRN: [id number] LOS: 1 days   AGE/SEX: 37 y.o. male  ROOM: [facility]     Subjective   Subjective     No acute events overnight    Objective   Objective   Vital Signs  Temp:  [97.5 °F (36.4 °C)-98.1 °F (36.7 °C)] 97.6 °F (36.4 °C)  Heart Rate:  [68-82] 68  Resp:  [14-25] 18  BP: (138-199)/(72-93) 199/93  SpO2:  [92 %-98 %] 96 %  on  Flow (L/min):  [2] 2;   Device (Oxygen Therapy): nasal cannula  Body mass index is 47.6 kg/m².  Physical Exam  Constitutional:       Appearance: He is obese.   Eyes:      Extraocular Movements: Extraocular movements intact.      Pupils: Pupils are equal, round, and reactive to light.   Cardiovascular:      Rate and Rhythm: Normal rate and regular rhythm.   Pulmonary:      Effort: Pulmonary effort is normal. No respiratory distress.   Abdominal:      General: There is no distension.      Palpations: Abdomen is soft.      Tenderness: There is no abdominal tenderness.   Musculoskeletal:      Right lower leg: No edema.      Left lower leg: Edema present.      Comments: Left leg with 2+ edema, tender to palpation.  Leg is no longer warm.    Skin:     General: Skin is warm and dry.   Neurological:      General: No focal deficit present.      Mental Status: He is alert and oriented to person, place, and time.         Results Review     I reviewed the patient's new clinical results.  Results from last 7 days   Lab Units 23  0509 23  1820 23  0429 23  0506 23  0344   WBC 10*3/mm3 8.55  --  7.60 8.12 9.24   HEMOGLOBIN g/dL 12.9*  --  12.6* 12.9* 14.2   HEMOGLOBIN, POC g/dL  --  11.9*  --   --   --    PLATELETS 10*3/mm3 194  --  169 174 206     Results from last 7 days   Lab Units 23  0943 23  0429 23  0506 23  0344   SODIUM mmol/L 137 139 137 136   POTASSIUM mmol/L 3.5 3.4* 3.4* 4.1   CHLORIDE mmol/L 101 102 103 102   CO2 mmol/L 27.0 26.5 24.3 24.8   BUN mg/dL  45* 42* 43* 39*   CREATININE mg/dL 3.14* 3.07* 2.69* 2.73*   GLUCOSE mg/dL 193* 247* 275* 276*   Estimated Creatinine Clearance: 41.9 mL/min (A) (by C-G formula based on SCr of 3.14 mg/dL (H)).  Results from last 7 days   Lab Units 23  0943 23  1906   ALBUMIN g/dL 3.2* 2.8*   BILIRUBIN mg/dL 0.3 0.2   ALK PHOS U/L 103 157*   AST (SGOT) U/L 45* 16   ALT (SGPT) U/L 41 20     Results from last 7 days   Lab Units 23  0943 23  0429 23  0506 23  0344 23  1906   CALCIUM mg/dL 7.9* 8.1* 8.1* 8.0* 8.2*   ALBUMIN g/dL 3.2*  --   --   --  2.8*   MAGNESIUM mg/dL  --   --   --  2.1 1.9     Results from last 7 days   Lab Units 23  0344   LACTATE mmol/L 0.9     COVID19   Date Value Ref Range Status   2022 Detected (C) Not Detected - Ref. Range Final   2021 Not Detected Not Detected - Ref. Range Final     Glucose   Date/Time Value Ref Range Status   2023 1131 216 (H) 70 - 130 mg/dL Final     Comment:     Meter: [id number] : [id number] Bishop Feliciano CNA   2023 0614 193 (H) 70 - 130 mg/dL Final     Comment:     Meter: [id number] : [id number] Willi LAO   2023 2014 163 (H) 70 - 130 mg/dL Final     Comment:     Meter: [id number] : [id number] Willi LAO   2023 1617 165 (H) 70 - 130 mg/dL Final     Comment:     Meter: [id number] : [id number] Trenton Sharp CNA   2023 1131 185 (H) 70 - 130 mg/dL Final     Comment:     Meter: [id number] : [id number] Trenton Sharp CNA   2023 0640 269 (H) 70 - 130 mg/dL Final     Comment:     Meter: [id number] : [id number] Donnell LAO   2023 2051 243 (H) 70 - 130 mg/dL Final     Comment:     Meter: [id number] : [id number] Donnell LAO       Cardiac Catheterization/Vascular Study  [facility]   CARDIAC CATHETERIZATION PROCEDURE REPORT    Patient: Rufus Dorsey  : 1985  MRN: [id number]    Procedure Date:   23    Referring Physician:   Vicente  MD Jules    Interventional Cardiologist:   Vicente Vicente MD    Indication:  HFrEF  MEI on CKD    Clinical Presentation:  36 yo with CAD S/P CABG (LIMA to LAD, SVG to OM1 and OM 2) and PCI to 90%   LIMA in 2021, HFrEF secondary to ischemic cardiomyopathy (EF 20%),   hypertension, CKD, DM and obesity who presented to the ED on 5/22/2023   with left leg pain and swelling, found to be volume overloaded and in   hypertensive urgency. Patient has developed worsening Cr with aggressive   diuresis. RHC for further evaluation of volume status.    Procedure performed:  Diagnostic Right Heart Catheterization    Access Sites:  right basilic vein  Right common femoral vein    Findings:  1. Hemodynamics:  Right Atrium: Mean of 4 mmHg  Right Ventricle: 37/10 mmHg  Pulmonary Artery: 35/18/24 mmHg  Pulmonary Wedge: Mean of 13 mmHg  Cardiac Output/Index: 5.16 L/min 2.17 L/min/m2  PA Sat: 59%  AO Sat: 93%    Conclusions:  Normal right and left-sided filling pressures with low normal cardiac   index.    Recommendations:   We will discontinue IV furosemide as patient's intracardiac pressures are   normal and his creatinine has worsened with diuresis.  Consider repeating left lower extremity Dopplers to rule out DVT as his   edema is mostly left-sided.    Procedure Status:  Elective    Procedure Details:  Informed consent was obtained with an explanation of the risk and benefits   of the procedure. The patient was brought to the Cardiac Catheterization   Laboratory and was prepped and draped in a standard sterile fashion.    Ultrasound guided access in the right basilic vein was obtained using   lidocaine 2% to anesthetize the right antecubital fossa.  The 5 Yoruba PA   catheter was attempted to be advanced into the upper arm however met   resistance.  A BMW wire was able to be advanced into the SVC however the 5   Yoruba PA catheter was unable to track due to a likely stricture in the   right upper arm.  As a result, ultrasound-guided  access of the right   femoral vein was obtained using lidocaine 2% to anesthetize the right   groin.    A 5 Iranian PA catheter was then advanced into the heart and out into the   pulmonary artery. Hemodynamics were obtained in the pulmonary wedge,   pulmonary artery, right ventricle, and right atrium positions.    Saturations were obtained from the pulmonary artery. The PA catheter was   then removed. The venous sheaths were removed and hemostasis achieved with   manual pressure.    Patient tolerated the procedure well without any complications, and   transferred to the post procedure area for recovery in a stable condition.    Complications:  None.    Estimated Blood Loss:  Minimal.    Vicente Vicente MD  [facility]  05/25/23  19:38 EDT     Scheduled Medications  aspirin, 81 mg, Oral, Daily  carvedilol, 25 mg, Oral, BID With Meals  doxycycline, 100 mg, Oral, Q12H  enoxaparin, 40 mg, Subcutaneous, Q12H  hydrALAZINE, 100 mg, Oral, TID  insulin lispro, 2-7 Units, Subcutaneous, 4x Daily AC & at Bedtime  insulin NPH, 21 Units, Subcutaneous, Q12H  isosorbide dinitrate, 40 mg, Oral, TID  rosuvastatin, 40 mg, Oral, Nightly  senna-docusate sodium, 2 tablet, Oral, BID  sodium chloride, 10 mL, Intravenous, Q12H  torsemide, 100 mg, Oral, Daily    Infusions   Diet  Diet: Diabetic Diets; Consistent Carbohydrate; Texture: Regular Texture (IDDSI 7); Fluid Consistency: Thin (IDDSI 0)       Assessment/Plan     Active Hospital Problems    Diagnosis  POA   • **Left leg swelling [M79.89]  Yes   • S/P CABG x 3 [Z95.1]  Not Applicable   • Acute on chronic combined systolic and diastolic CHF (congestive heart failure) [I50.43]  Unknown   • Elevated troponin [R77.8]  Yes   • Acute on chronic congestive heart failure [I50.9]  Yes   • CKD (chronic kidney disease) stage 2, GFR 60-89 ml/min [N18.2]  Yes   • Pulmonary HTN (HCC) [I27.20]  Yes   • Type 2 diabetes mellitus, with long-term current use of insulin (HCC) [E11.9, Z79.4]   Not Applicable      Resolved Hospital Problems   No resolved problems to display.       37 y.o. male admitted with Left leg swelling.    Left lower extremity edema  Duplex of the left lower extremity was negative.  It was a technically difficult study, and presentation is most consistent with DVT of the left leg. Swelling possibly due to venous graft from CABG. Will repeat venous duplex today      Coronary artery disease status post CABG  Chronic heart failure with reduced ejection fraction  Hypertension  Continue aspirin, Coreg, hydralazine, Imdur, Crestor. Cardiology managing.   Now on IV lasix 80mg BID  Strict I/Os. Daily weights     Type 2 diabetes  A1c 11  Started on NPH insulin and dose adjusted as required     Acute kidney injury  Chronic kidney disease  Creatinine increased to 3.14. Transitioned over to PO torsemide.     · Lovenox 40 mg SC daily for DVT prophylaxis.  · Full code.  · Discussed with patient and nursing staff.  · Anticipate discharge home in 2-3 days.      Hiram Velez MD  [facility]  05/26/23  12:47 EDT

## 2023-05-26 NOTE — DISCHARGE PLACEMENT REQUEST
"Rufus Farias (37 y.o. Male)     Date of Birth   1985    Social Security Number       Address   [address] [address]    Home Phone   [phone]    MRN   [id number]       Bahai   None    Marital Status   Single                            Admission Date   5/22/23    Admission Type   Emergency    Admitting Provider   Arie Cortes MD    Attending Provider   Hiram Velez MD    Department, Room/Bed   [address], S[address]/1       Discharge Date       Discharge Disposition       Discharge Destination                               Attending Provider: Hiram Velez MD    Allergies: No Known Allergies    Isolation: None   Infection: None   Code Status: CPR    Ht: 167.6 cm (66\")   Wt: 134 kg (294 lb 14.4 oz)    Admission Cmt: None   Principal Problem: Left leg swelling [M79.89]                 Active Insurance as of 5/22/2023     Patient has no active insurance coverage on file for 5/22/2023.          Emergency Contacts      (Rel.) Home Phone Work Phone Mobile Phone    Dakotah Farisa (Relative) -- -- [phone]    RADHA FARIAS (Brother) [phone] -- --    Linda Mukherjee (Mother) [phone] -- --              "

## 2023-05-26 NOTE — PROGRESS NOTES
[facility] Progress Note      Patient Name: Rufus Dorsey  : 1985  MRN: [id number]  Primary Care Physician:  Provider, No Known  Date of admission: 2023    Subjective     Interval History:     Seen and examined.  Laying down in chair.  No major issues overnight.  Had right heart cath yesterday.    Review of Systems:   As noted above    Objective     Vitals:   Temp:  [97.5 °F (36.4 °C)-98.1 °F (36.7 °C)] 97.6 °F (36.4 °C)  Heart Rate:  [68-82] 68  Resp:  [14-25] 18  BP: (138-199)/(72-93) 199/93  Flow (L/min):  [2] 2    Intake/Output Summary (Last 24 hours) at 2023 0845  Last data filed at 2023 0008  Gross per 24 hour   Intake 400 ml   Output 1650 ml   Net -1250 ml       Physical Exam:    General Appearance: alert, oriented x 3, no acute distress   Skin: warm and dry  HEENT: oral mucosa normal, nonicteric sclera  Neck: supple, no JVD  Lungs: CTA  Heart: RRR, normal S1 and S2  Abdomen: soft, nontender, nondistended  : no palpable bladder  Extremities: + edema, cyanosis or clubbing  Neuro: normal speech and mental status     Scheduled Meds:     aspirin, 81 mg, Oral, Daily  carvedilol, 25 mg, Oral, BID With Meals  doxycycline, 100 mg, Oral, Q12H  enoxaparin, 40 mg, Subcutaneous, Q12H  hydrALAZINE, 100 mg, Oral, TID  insulin lispro, 2-7 Units, Subcutaneous, 4x Daily AC & at Bedtime  insulin NPH, 17 Units, Subcutaneous, Q12H  isosorbide dinitrate, 40 mg, Oral, TID  rosuvastatin, 40 mg, Oral, Nightly  senna-docusate sodium, 2 tablet, Oral, BID  sodium chloride, 10 mL, Intravenous, Q12H      IV Meds:        Results Reviewed:   I have personally reviewed the results from the time of this admission to 2023 08:45 EDT     Results from last 7 days   Lab Units 23  0429 23  0506 23  0344 23  1906   SODIUM mmol/L 139 137 136 137   POTASSIUM mmol/L 3.4* 3.4* 4.1 3.9   CHLORIDE mmol/L 102 103 102 101   CO2 mmol/L 26.5 24.3 24.8 24.5   BUN mg/dL 42*  43* 39* 39*   CREATININE mg/dL 3.07* 2.69* 2.73* 2.64*   CALCIUM mg/dL 8.1* 8.1* 8.0* 8.2*   BILIRUBIN mg/dL  --   --   --  0.2   ALK PHOS U/L  --   --   --  157*   ALT (SGPT) U/L  --   --   --  20   AST (SGOT) U/L  --   --   --  16   GLUCOSE mg/dL 247* 275* 276* 312*     Estimated Creatinine Clearance: 42.8 mL/min (A) (by C-G formula based on SCr of 3.07 mg/dL (H)).  Results from last 7 days   Lab Units 05/23/23  0344 05/22/23  1906   MAGNESIUM mg/dL 2.1 1.9         Results from last 7 days   Lab Units 05/26/23  0509 05/25/23  1820 05/25/23  0429 05/24/23  0506 05/23/23  0344 05/22/23  1906   WBC 10*3/mm3 8.55  --  7.60 8.12 9.24 8.26   HEMOGLOBIN g/dL 12.9*  --  12.6* 12.9* 14.2 14.1   HEMOGLOBIN, POC g/dL  --  11.9*  --   --   --   --    PLATELETS 10*3/mm3 194  --  169 174 206 208     Results from last 7 days   Lab Units 05/23/23  0344   INR  0.99       Assessment / Plan     ASSESSMENT:  1.  Chronic kidney disease stage IIIb/IV with baseline creatinine 2.5 mg/dL.  Etiology is not clear however likely due to diabetic nephropathy with last urine protein creatinine ratio was 2.4 g in December 2021.  Most recent protein creatinine ratio is about 60 g.  Volume status appears to be improving.    Renal ultrasound showed normal bilateral kidneys    2.  Nephrotic range proteinuria with urine protein creatinine ratio around 6 g.  3.  Systolic congestive heart failure with last echocardiogram showing ejection fraction 20%.  Possible right heart cath in the morning.  Right heart cath showed normal pressures  4.  Hypertension urgency.  Blood pressure is improving with improvement of volume status and adjusting medications per cardiology.  Patient might benefit from adding small dose of ACE hepatal/ARB and possibly SGLT2.  5.  Dyslipidemia  6.  Diabetes mellitus type 2     PLAN:     1.  Awaiting morning lab and switch patient to torsemide 100 mg p.o. daily   2.  Might benefit from adding ACE inhibitor/ARB as well as SGLT2 once  he is more stable  4.   Surveillance labs    Thank you for involving us in the care of Rufus Dorsey.  Please feel free to call with any questions.    Paris Heller MD  05/26/23  08:45 EDT    [facility]  [phone]

## 2023-05-26 NOTE — PLAN OF CARE
Goal Outcome Evaluation:                 VSS. Alert and oriented x 4. Afebrile. Left leg doppler done. Denies pain or other symptoms. Care plan carefully updated.

## 2023-05-26 NOTE — PROGRESS NOTES
"    Patient Name: Rufus Dorsey  :1985  37 y.o.      Patient Care Team:  Provider, No Known as PCP - General  Provider, No Known    Chief Complaint:   Left leg pain    Interval History:   No acute events overnight.    Objective   Vital Signs  Temp:  [97.5 °F (36.4 °C)-98.1 °F (36.7 °C)] 97.6 °F (36.4 °C)  Heart Rate:  [68-82] 68  Resp:  [14-25] 18  BP: (138-199)/(72-93) 199/93    Intake/Output Summary (Last 24 hours) at 2023 0733  Last data filed at 2023 0008  Gross per 24 hour   Intake 400 ml   Output 1650 ml   Net -1250 ml     Flowsheet Rows    Flowsheet Row First Filed Value   Admission Height 167.6 cm (66\") Documented at 2023 1636   Admission Weight 130 kg (287 lb) Documented at 2023 1636          GEN: no distress, alert and oriented, obese  HEENT: NACT, EOMI, moist mucous membranes  Lungs: CTAB, no wheezes, rales or rhonchi  CV: normal rate, regular rhythm, normal S1, S2, no murmurs, +2 radial pulses b/l  Abdomen: soft, nontender, nondistended, NABS  Extremities: 1+ edema left leg, trace edema on right leg  Skin: no rash, warm, dry  Heme/Lymph: no bruising  Psych: organized thought, normal behavior and affect    Results Review:    Results from last 7 days   Lab Units 23  0429   SODIUM mmol/L 139   POTASSIUM mmol/L 3.4*   CHLORIDE mmol/L 102   CO2 mmol/L 26.5   BUN mg/dL 42*   CREATININE mg/dL 3.07*   GLUCOSE mg/dL 247*   CALCIUM mg/dL 8.1*     Results from last 7 days   Lab Units 23  2105 23  1906   HSTROP T ng/L 61* 61*     Results from last 7 days   Lab Units 23  0509   WBC 10*3/mm3 8.55   HEMOGLOBIN g/dL 12.9*   HEMATOCRIT % 38.4   PLATELETS 10*3/mm3 194     Results from last 7 days   Lab Units 23  0344   INR  0.99     Results from last 7 days   Lab Units 23  0344   MAGNESIUM mg/dL 2.1                   Medication Review:   aspirin, 81 mg, Oral, Daily  carvedilol, 25 mg, Oral, BID With Meals  doxycycline, 100 mg, Oral, Q12H  enoxaparin, 40 " mg, Subcutaneous, Q12H  hydrALAZINE, 75 mg, Oral, TID  insulin lispro, 2-7 Units, Subcutaneous, 4x Daily AC & at Bedtime  insulin NPH, 17 Units, Subcutaneous, Q12H  isosorbide dinitrate, 30 mg, Oral, TID  rosuvastatin, 40 mg, Oral, Nightly  senna-docusate sodium, 2 tablet, Oral, BID  sodium chloride, 10 mL, Intravenous, Q12H              Assessment & Plan   #Hypertensive urgency  #Volume overload  #HFrEF  #CAD status post CABG  #MEI on CKD  #Uncontrolled diabetes  #Elevated troponin     36 yo with CAD S/P CABG (LIMA to LAD, SVG to OM1 and OM 2) and PCI to 90% LIMA in 2021, HFrEF secondary to ischemic cardiomyopathy (EF 20%), hypertension, CKD, DM and obesity who presented to the ED on 5/22/2023 with left leg pain and swelling, found to be volume overloaded and in hypertensive urgency.     Elevated troponin is in the setting of HTN urgency, CKD and volume overload. Not consistent with ACS. Will diurese and start anti-HTN therapy and continue to monitor. Unfortunately, patient has very poor understanding of his clinical condition despite multiple long outpatient discussions. He has multiple uncontrolled comorbid conditions including diabetes(A1C 11), HTN(presented with urgency) and CKD. As a result, he would be a poor candidate for any other HFrEF therapies including LVAD/transplant. Would continue to manage him medically for now and re-assess.     Right heart catheterization 5/25/2023 with normal right and left-sided filling pressures.  Lasix subsequently on hold given MEI.    DDx for his LLE swelling and pain includes cellulitis vs DVT.  His lower extremity Dopplers were technically difficult due to body habitus and left peroneal vein was not visualized but was otherwise negative for DVT.  He has been on doxycycline for possible cellulitis as well.  Symptoms appear to be mildly improved.    - Increase hydralazine to 100 mg 3 times daily  - Increase Isordil to 40 mg 3 times daily  - continue carvedilol 25 mg twice  daily  - continue aspirin 81mg daily, rosuvastatin 40mg daily    Vicente Trujillo MD  [facility]  05/26/23  07:33 EDT

## 2023-05-27 LAB
ANION GAP SERPL CALCULATED.3IONS-SCNC: 9.4 MMOL/L (ref 5–15)
BUN SERPL-MCNC: 47 MG/DL (ref 6–20)
BUN/CREAT SERPL: 14.2 (ref 7–25)
CALCIUM SPEC-SCNC: 8.4 MG/DL (ref 8.6–10.5)
CHLORIDE SERPL-SCNC: 102 MMOL/L (ref 98–107)
CO2 SERPL-SCNC: 24.6 MMOL/L (ref 22–29)
CREAT SERPL-MCNC: 3.31 MG/DL (ref 0.76–1.27)
EGFRCR SERPLBLD CKD-EPI 2021: 23.6 ML/MIN/1.73
GLUCOSE BLDC GLUCOMTR-MCNC: 185 MG/DL (ref 70–130)
GLUCOSE BLDC GLUCOMTR-MCNC: 198 MG/DL (ref 70–130)
GLUCOSE BLDC GLUCOMTR-MCNC: 199 MG/DL (ref 70–130)
GLUCOSE BLDC GLUCOMTR-MCNC: 223 MG/DL (ref 70–130)
GLUCOSE SERPL-MCNC: 201 MG/DL (ref 65–99)
POTASSIUM SERPL-SCNC: 3.6 MMOL/L (ref 3.5–5.2)
SODIUM SERPL-SCNC: 136 MMOL/L (ref 136–145)

## 2023-05-27 PROCEDURE — 82948 REAGENT STRIP/BLOOD GLUCOSE: CPT

## 2023-05-27 PROCEDURE — 80048 BASIC METABOLIC PNL TOTAL CA: CPT | Performed by: STUDENT IN AN ORGANIZED HEALTH CARE EDUCATION/TRAINING PROGRAM

## 2023-05-27 PROCEDURE — 99232 SBSQ HOSP IP/OBS MODERATE 35: CPT | Performed by: STUDENT IN AN ORGANIZED HEALTH CARE EDUCATION/TRAINING PROGRAM

## 2023-05-27 PROCEDURE — 63710000001 INSULIN LISPRO (HUMAN) PER 5 UNITS: Performed by: STUDENT IN AN ORGANIZED HEALTH CARE EDUCATION/TRAINING PROGRAM

## 2023-05-27 PROCEDURE — 25010000002 ENOXAPARIN PER 10 MG: Performed by: STUDENT IN AN ORGANIZED HEALTH CARE EDUCATION/TRAINING PROGRAM

## 2023-05-27 PROCEDURE — 63710000001 INSULIN ISOPHANE HUMAN PER 5 UNITS: Performed by: STUDENT IN AN ORGANIZED HEALTH CARE EDUCATION/TRAINING PROGRAM

## 2023-05-27 RX ADMIN — INSULIN HUMAN 21 UNITS: 100 INJECTION, SUSPENSION SUBCUTANEOUS at 08:10

## 2023-05-27 RX ADMIN — INSULIN LISPRO 3 UNITS: 100 INJECTION, SOLUTION INTRAVENOUS; SUBCUTANEOUS at 17:47

## 2023-05-27 RX ADMIN — Medication 10 ML: at 22:06

## 2023-05-27 RX ADMIN — DOCUSATE SODIUM 50MG AND SENNOSIDES 8.6MG 2 TABLET: 8.6; 5 TABLET, FILM COATED ORAL at 22:04

## 2023-05-27 RX ADMIN — ISOSORBIDE DINITRATE 40 MG: 20 TABLET ORAL at 22:04

## 2023-05-27 RX ADMIN — INSULIN LISPRO 2 UNITS: 100 INJECTION, SOLUTION INTRAVENOUS; SUBCUTANEOUS at 22:04

## 2023-05-27 RX ADMIN — INSULIN LISPRO 2 UNITS: 100 INJECTION, SOLUTION INTRAVENOUS; SUBCUTANEOUS at 08:10

## 2023-05-27 RX ADMIN — INSULIN LISPRO 2 UNITS: 100 INJECTION, SOLUTION INTRAVENOUS; SUBCUTANEOUS at 12:16

## 2023-05-27 RX ADMIN — ISOSORBIDE DINITRATE 40 MG: 20 TABLET ORAL at 17:47

## 2023-05-27 RX ADMIN — Medication 10 ML: at 08:10

## 2023-05-27 RX ADMIN — ROSUVASTATIN CALCIUM 40 MG: 40 TABLET, FILM COATED ORAL at 22:06

## 2023-05-27 RX ADMIN — ASPIRIN 81 MG: 81 TABLET, COATED ORAL at 08:09

## 2023-05-27 RX ADMIN — ENOXAPARIN SODIUM 40 MG: 100 INJECTION SUBCUTANEOUS at 08:10

## 2023-05-27 RX ADMIN — DOCUSATE SODIUM 50MG AND SENNOSIDES 8.6MG 2 TABLET: 8.6; 5 TABLET, FILM COATED ORAL at 08:09

## 2023-05-27 RX ADMIN — HYDRALAZINE HYDROCHLORIDE 100 MG: 50 TABLET, FILM COATED ORAL at 08:09

## 2023-05-27 RX ADMIN — HYDRALAZINE HYDROCHLORIDE 100 MG: 50 TABLET, FILM COATED ORAL at 22:03

## 2023-05-27 RX ADMIN — HYDRALAZINE HYDROCHLORIDE 100 MG: 50 TABLET, FILM COATED ORAL at 17:47

## 2023-05-27 RX ADMIN — ISOSORBIDE DINITRATE 40 MG: 20 TABLET ORAL at 08:09

## 2023-05-27 RX ADMIN — ENOXAPARIN SODIUM 40 MG: 100 INJECTION SUBCUTANEOUS at 22:05

## 2023-05-27 RX ADMIN — DOXYCYCLINE 100 MG: 100 CAPSULE ORAL at 08:09

## 2023-05-27 RX ADMIN — CARVEDILOL 25 MG: 25 TABLET, FILM COATED ORAL at 08:09

## 2023-05-27 RX ADMIN — CARVEDILOL 25 MG: 25 TABLET, FILM COATED ORAL at 17:47

## 2023-05-27 RX ADMIN — DOXYCYCLINE 100 MG: 100 CAPSULE ORAL at 22:04

## 2023-05-27 RX ADMIN — TORSEMIDE 100 MG: 100 TABLET ORAL at 08:09

## 2023-05-27 RX ADMIN — INSULIN HUMAN 21 UNITS: 100 INJECTION, SUSPENSION SUBCUTANEOUS at 22:07

## 2023-05-27 NOTE — CONSULTS
Patient Name: Rufus Dorsey Account #: [id number]    MRN: [id number] Admission Date: 5/22/2023      Consulting Service: Vascular Surgery Date of Evaluation: May 27, 2023    Requesting Provider: Hiram Velez MD    CHIEF COMPLAINT: Chronic left leg edema  HPI: Rufus Dorsey is a 37 y.o. male is being seen for a consultation and evaluation/management of chronic left leg edema.  Patient has had his greater saphenous taken for heart surgery at a young age.  He has been studied several times and is negative for DVT.  We have been asked to evaluate for his chronic edema which has been ongoing and longstanding.  Patient has little insight into his other medical issues including his severe coronary disease and poor ejection fraction.  He has not worn compression stockings or elevate his leg.    PAST MEDICAL HISTORY:   Past Medical History:   Diagnosis Date   • CHF (congestive heart failure)    • Coronary artery disease    • Diabetes    • Heart failure 05/16/2016   • High cholesterol    • Hypertension    • Ischemic cardiomyopathy 06/25/2016      PAST SURGICAL HISTORY:   Past Surgical History:   Procedure Laterality Date   • CARDIAC CATHETERIZATION  01/25/2017    Right heart cath   • CARDIAC CATHETERIZATION N/A 12/4/2021    Procedure: SAPHENOUS VEIN GRAFT;  Surgeon: Les Reyes MD;  Location: [facility] CATH INVASIVE LOCATION;  Service: Cardiovascular;  Laterality: N/A;   • CARDIAC CATHETERIZATION N/A 12/4/2021    Procedure: Coronary angiography;  Surgeon: Les Reyes MD;  Location: [facility] CATH INVASIVE LOCATION;  Service: Cardiovascular;  Laterality: N/A;   • CARDIAC CATHETERIZATION N/A 12/4/2021    Procedure: Stent ROSEY coronary;  Surgeon: Les Reyes MD;  Location: [facility] CATH INVASIVE LOCATION;  Service: Cardiovascular;  Laterality: N/A;   • CARDIAC CATHETERIZATION N/A 12/4/2021    Procedure: Native mammary injection;  Surgeon: Les Reyes MD;  Location: [facility] CATH INVASIVE LOCATION;  Service:  Cardiovascular;  Laterality: N/A;   • CARDIAC CATHETERIZATION N/A 5/25/2023    Procedure: Right Heart Cath;  Surgeon: Vicente Trujillo MD;  Location: [facility] CATH INVASIVE LOCATION;  Service: Cardiology;  Laterality: N/A;   • CARDIAC SURGERY     • CORONARY ARTERY BYPASS GRAFT  05/26/2015    cabg x3 Dr. Key      FAMILY HISTORY:   Family History   Family history unknown: Yes      SOCIAL HISTORY:   Social History     Tobacco Use   • Smoking status: Never   • Smokeless tobacco: Never   Vaping Use   • Vaping Use: Never used   Substance Use Topics   • Alcohol use: Never   • Drug use: Never      MEDICATIONS:   No current facility-administered medications on file prior to encounter.     Current Outpatient Medications on File Prior to Encounter   Medication Sig Dispense Refill   • aspirin 81 MG EC tablet Take 1 tablet by mouth Daily. 30 tablet 11   • Blood Glucose Monitoring Suppl (FreeStyle Freedom Lite) w/Device kit Use to check blood sugar as directed. 1 each 0   • Blood Glucose Monitoring Suppl (Glucocard Expression Monitor) w/Device kit Use as directed 1 kit 0   • carvedilol (COREG) 25 MG tablet Take 1 tablet by mouth 2 (Two) Times a Day With Meals. 60 tablet 11   • furosemide (Lasix) 40 MG tablet Take 1 tablet by mouth Daily. 120 tablet 0   • glucose monitor monitoring kit 1 each 4 (Four) Times a Day Before Meals & at Bedtime. 1 each 0   • hydrALAZINE (APRESOLINE) 25 MG tablet Take 1.5 tablets by mouth 3 (Three) Times a Day. 135 tablet 0   • Insulin Pen Needle (TRUEplus Pen Needles) 31G X 5 MM misc Use as directed daily. 100 each 0   • Insulin Pen Needle 32G X 4 MM misc Use with insulin 5 times daily 200 each 0   • isosorbide dinitrate (ISORDIL) 20 MG tablet Take 1 tablet by mouth 3 (Three) Times a Day. 90 tablet 11   • Lancets 28G misc 1 each by Other route 4 (Four) Times a Day After Meals & at Bedtime. 100 each 0   • rosuvastatin (CRESTOR) 40 MG tablet Take 1 tablet by mouth Every Night. 90 tablet 0   • Lancets 28G  misc Test 4 (Four) Times a Day After Meals & at Bedtime 100 each 0             ALLERGIES: Patient has no known allergies.   COMPLETE REVIEW OF SYSTEMS:     ENT ROS: negative  Cardiovascular ROS: no chest pain or dyspnea on exertion  Respiratory ROS: no cough, shortness of breath, or wheezing  Gastrointestinal ROS: no abdominal pain, change in bowel habits, or black or bloody stools  Neurological ROS: no TIA or stroke symptoms  Genito-Urinary ROS: no dysuria, trouble voiding, or hematuria  Musculoskeletal ROS: positive for - swelling in left leg  Dermatological ROS: negative  Psychological ROS: negative      PHYSICAL EXAM:   Patient Vitals for the past 24 hrs:   BP Temp Temp src Pulse Resp SpO2 Weight   05/27/23 1300 169/89 98.2 °F (36.8 °C) Oral 80 20 91 % --   05/27/23 0722 157/79 97.9 °F (36.6 °C) Oral 79 18 95 % --   05/27/23 0510 -- -- -- -- -- -- 134 kg (296 lb)   05/27/23 0402 -- -- -- 81 -- 93 % --   05/26/23 2320 127/74 97.7 °F (36.5 °C) Oral 76 18 93 % --   05/26/23 1947 136/68 97.3 °F (36.3 °C) Oral 74 18 96 % --        General appearance: oriented to person, place, and time, overweight, in mild to moderate distress, ill-appearing and chronically ill appearing.  Neurological exam reveals alert, oriented, normal speech, no focal findings or movement disorder noted.  ENT exam reveals - ENT exam normal, no neck nodes or sinus tenderness.  CVS exam: normal rate, regular rhythm, normal S1, S2, no murmurs, rubs, clicks or gallops.  Chest: clear to auscultation, no wheezes, rales or rhonchi, symmetric air entry.  Abdominal exam: soft, nontender, nondistended, no masses or organomegaly.  Examination of the feet reveals warm, good capillary refill.  Palpable pulses.  Left leg with +2 to +3 edema.  Diffuse throughout the entire leg.  It does seem to extend into the foot but not into the toes at this time.  Patient is lying flat.        LABS:      Results Review:       I reviewed the patient's new clinical  results.  Results from last 7 days   Lab Units 05/26/23  0509 05/25/23  1820 05/25/23  0429 05/24/23  0506 05/23/23  0344 05/22/23  1906   WBC 10*3/mm3 8.55  --  7.60 8.12 9.24 8.26   HEMOGLOBIN g/dL 12.9*  --  12.6* 12.9* 14.2 14.1   HEMOGLOBIN, POC g/dL  --  11.9*  --   --   --   --    PLATELETS 10*3/mm3 194  --  169 174 206 208     Results from last 7 days   Lab Units 05/27/23  0812 05/26/23  0943 05/25/23  0429 05/24/23  0506 05/23/23  0344 05/22/23  1906   SODIUM mmol/L 136 137 139 137 136 137   POTASSIUM mmol/L 3.6 3.5 3.4* 3.4* 4.1 3.9   CHLORIDE mmol/L 102 101 102 103 102 101   CO2 mmol/L 24.6 27.0 26.5 24.3 24.8 24.5   BUN mg/dL 47* 45* 42* 43* 39* 39*   CREATININE mg/dL 3.31* 3.14* 3.07* 2.69* 2.73* 2.64*   GLUCOSE mg/dL 201* 193* 247* 275* 276* 312*   Estimated Creatinine Clearance: 39.7 mL/min (A) (by C-G formula based on SCr of 3.31 mg/dL (H)).  Results from last 7 days   Lab Units 05/27/23  0812 05/26/23  0943 05/25/23  0429 05/24/23  0506 05/23/23 0344 05/22/23  1906   CALCIUM mg/dL 8.4* 7.9* 8.1* 8.1* 8.0* 8.2*   ALBUMIN g/dL  --  3.2*  --   --   --  2.8*   MAGNESIUM mg/dL  --   --   --   --  2.1 1.9     Results from last 7 days   Lab Units 05/23/23  0344   PROTIME Seconds 13.2   INR  0.99       The following radiologic or non-invasive studies have been reviewed by me: Venous duplex reviewed    Active Hospital Problems    Diagnosis  POA   • **Left leg swelling [M79.89]  Yes   • S/P CABG x 3 [Z95.1]  Not Applicable   • Acute on chronic combined systolic and diastolic CHF (congestive heart failure) [I50.43]  Unknown   • Elevated troponin [R77.8]  Yes   • Acute on chronic congestive heart failure [I50.9]  Yes   • CKD (chronic kidney disease) stage 2, GFR 60-89 ml/min [N18.2]  Yes   • Pulmonary HTN (HCC) [I27.20]  Yes   • Type 2 diabetes mellitus, with long-term current use of insulin (HCC) [E11.9, Z79.4]  Not Applicable      Resolved Hospital Problems   No resolved problems to display.          ASSESSMENT/PLAN: 37 y.o. male with chronic swelling to the left leg.  Seems unlikely to be related to his heart as it is unilateral.  He has had greater saphenous vein taken for his heart.  If he has superficial reflux this would actually improve his situation.  He has the appearance of someone who might have central venous stenosis and on the left side may Thurner would be the most likely source.  Because of his renal insufficiency were going to keep him n.p.o. after midnight in order a iliac and inferior vena cava scan to determine if there is any true May-Thurner disease.  If so then eventual stent placement of the iliac may be best answer.  If negative then compression would at least help him.  I will order compression stockings for him in addition to the scan.  He is aware to wear these compression stockings aggressively during the day      I discussed the plan with the patient and he is agreeable to the plan of care at this point. Thank you for this consult.     Joey Nazario MD   05/27/23

## 2023-05-27 NOTE — PROGRESS NOTES
[facility] Progress Note      Patient Name: Rufus Dorsey  : 1985  MRN: [id number]  Primary Care Physician:  Provider, No Known  Date of admission: 2023    Subjective     Interval History:     Seen and examined.  Laying down in chair.  No major issues overnight. .    Review of Systems:   As noted above    Objective     Vitals:   Temp:  [97.3 °F (36.3 °C)-98.3 °F (36.8 °C)] 97.9 °F (36.6 °C)  Heart Rate:  [74-81] 79  Resp:  [18] 18  BP: (127-157)/(68-84) 157/79  Flow (L/min):  [2] 2  No intake or output data in the 24 hours ending 23 1214    Physical Exam:    General Appearance: alert, oriented x 3, no acute distress   Skin: warm and dry  HEENT: oral mucosa normal, nonicteric sclera  Neck: supple, no JVD  Lungs: CTA  Heart: RRR, normal S1 and S2  Abdomen: soft, nontender, nondistended  : no palpable bladder  Extremities: + edema, cyanosis or clubbing  Neuro: normal speech and mental status     Scheduled Meds:     aspirin, 81 mg, Oral, Daily  carvedilol, 25 mg, Oral, BID With Meals  doxycycline, 100 mg, Oral, Q12H  enoxaparin, 40 mg, Subcutaneous, Q12H  hydrALAZINE, 100 mg, Oral, TID  insulin lispro, 2-7 Units, Subcutaneous, 4x Daily AC & at Bedtime  insulin NPH, 21 Units, Subcutaneous, Q12H  isosorbide dinitrate, 40 mg, Oral, TID  rosuvastatin, 40 mg, Oral, Nightly  senna-docusate sodium, 2 tablet, Oral, BID  sodium chloride, 10 mL, Intravenous, Q12H  torsemide, 100 mg, Oral, Daily      IV Meds:        Results Reviewed:   I have personally reviewed the results from the time of this admission to 2023 12:14 EDT     Results from last 7 days   Lab Units 23  0812 23  0943 23  0429 23  0344 23  1906   SODIUM mmol/L 136 137 139   < > 137   POTASSIUM mmol/L 3.6 3.5 3.4*   < > 3.9   CHLORIDE mmol/L 102 101 102   < > 101   CO2 mmol/L 24.6 27.0 26.5   < > 24.5   BUN mg/dL 47* 45* 42*   < > 39*   CREATININE mg/dL 3.31* 3.14* 3.07*   < > 2.64*    CALCIUM mg/dL 8.4* 7.9* 8.1*   < > 8.2*   BILIRUBIN mg/dL  --  0.3  --   --  0.2   ALK PHOS U/L  --  103  --   --  157*   ALT (SGPT) U/L  --  41  --   --  20   AST (SGOT) U/L  --  45*  --   --  16   GLUCOSE mg/dL 201* 193* 247*   < > 312*    < > = values in this interval not displayed.     Estimated Creatinine Clearance: 39.7 mL/min (A) (by C-G formula based on SCr of 3.31 mg/dL (H)).  Results from last 7 days   Lab Units 05/23/23  0344 05/22/23  1906   MAGNESIUM mg/dL 2.1 1.9         Results from last 7 days   Lab Units 05/26/23  0509 05/25/23  1820 05/25/23  0429 05/24/23  0506 05/23/23  0344 05/22/23  1906   WBC 10*3/mm3 8.55  --  7.60 8.12 9.24 8.26   HEMOGLOBIN g/dL 12.9*  --  12.6* 12.9* 14.2 14.1   HEMOGLOBIN, POC g/dL  --  11.9*  --   --   --   --    PLATELETS 10*3/mm3 194  --  169 174 206 208     Results from last 7 days   Lab Units 05/23/23  0344   INR  0.99       Assessment / Plan     ASSESSMENT:  1.  Chronic kidney disease stage IIIb/IV with baseline creatinine 2.5 mg/dL.  Etiology is not clear however likely due to diabetic nephropathy with last urine protein creatinine ratio was 2.4 g in December 2021.  Most recent protein creatinine ratio is about 60 g.  Volume status appears to be improving.    Renal ultrasound showed normal bilateral kidneys    2.  Nephrotic range proteinuria with urine protein creatinine ratio around 6 g.  3.  Systolic congestive heart failure with last echocardiogram showing ejection fraction 20%.  Possible right heart cath in the morning.  Right heart cath showed normal pressures  4.  Hypertension urgency.  Blood pressure is improving with improvement of volume status and adjusting medications per cardiology.  Patient might benefit from adding small dose of ACE hepatal/ARB and possibly SGLT2.  5.  Dyslipidemia  6.  Diabetes mellitus type 2     PLAN:     1.  Continue torsemide 100 mg p.o. daily   2.  Might benefit from adding ACE inhibitor/ARB as well as SGLT2 once he is more  stable  4.  Surveillance labs    Thank you for involving us in the care of Rufus Dorsey.  Please feel free to call with any questions.    Paris Heller MD  05/27/23  12:14 EDT    [facility]  [phone]

## 2023-05-27 NOTE — PLAN OF CARE
Goal Outcome Evaluation:         VSS. Alert and Oriented. Schedule Abdomen and pelvis scan for tomorrow. NPO at midnight. Care plan carefully updated.

## 2023-05-27 NOTE — PROGRESS NOTES
"    Patient Name: Rufus Dorsey  :1985  37 y.o.      Patient Care Team:  Provider, No Known as PCP - General  Provider, No Known    Chief Complaint:   Left leg pain    Interval History:   No acute events overnight.    Objective   Vital Signs  Temp:  [97.3 °F (36.3 °C)-98.3 °F (36.8 °C)] 97.9 °F (36.6 °C)  Heart Rate:  [74-81] 79  Resp:  [18] 18  BP: (127-157)/(68-84) 157/79  No intake or output data in the 24 hours ending 23 1030  Flowsheet Rows    Flowsheet Row First Filed Value   Admission Height 167.6 cm (66\") Documented at 2023 1636   Admission Weight 130 kg (287 lb) Documented at 2023 1636          GEN: no distress, alert and oriented, obese  HEENT: NACT, EOMI, moist mucous membranes  Lungs: CTAB, no wheezes, rales or rhonchi  CV: normal rate, regular rhythm, normal S1, S2, no murmurs, +2 radial pulses b/l  Abdomen: soft, nontender, nondistended, NABS  Extremities: 1+ edema left leg, trace edema on right leg  Skin: no rash, warm, dry  Heme/Lymph: no bruising  Psych: organized thought, normal behavior and affect    Results Review:    Results from last 7 days   Lab Units 23  0812   SODIUM mmol/L 136   POTASSIUM mmol/L 3.6   CHLORIDE mmol/L 102   CO2 mmol/L 24.6   BUN mg/dL 47*   CREATININE mg/dL 3.31*   GLUCOSE mg/dL 201*   CALCIUM mg/dL 8.4*     Results from last 7 days   Lab Units 23  2105 23  1906   HSTROP T ng/L 61* 61*     Results from last 7 days   Lab Units 23  0509   WBC 10*3/mm3 8.55   HEMOGLOBIN g/dL 12.9*   HEMATOCRIT % 38.4   PLATELETS 10*3/mm3 194     Results from last 7 days   Lab Units 23  0344   INR  0.99     Results from last 7 days   Lab Units 23  0344   MAGNESIUM mg/dL 2.1                   Medication Review:   aspirin, 81 mg, Oral, Daily  carvedilol, 25 mg, Oral, BID With Meals  doxycycline, 100 mg, Oral, Q12H  enoxaparin, 40 mg, Subcutaneous, Q12H  hydrALAZINE, 100 mg, Oral, TID  insulin lispro, 2-7 Units, Subcutaneous, 4x Daily " AC & at Bedtime  insulin NPH, 21 Units, Subcutaneous, Q12H  isosorbide dinitrate, 40 mg, Oral, TID  rosuvastatin, 40 mg, Oral, Nightly  senna-docusate sodium, 2 tablet, Oral, BID  sodium chloride, 10 mL, Intravenous, Q12H  torsemide, 100 mg, Oral, Daily              Assessment & Plan   #Hypertensive urgency  #Volume overload  #HFrEF  #CAD status post CABG  #MEI on CKD  #Uncontrolled diabetes  #Elevated troponin     36 yo with CAD S/P CABG (LIMA to LAD, SVG to OM1 and OM 2) and PCI to 90% LIMA in 2021, HFrEF secondary to ischemic cardiomyopathy (EF 20%), hypertension, CKD, DM and obesity who presented to the ED on 5/22/2023 with left leg pain and swelling, found to be volume overloaded and in hypertensive urgency.     Elevated troponin is in the setting of HTN urgency, CKD and volume overload. Not consistent with ACS. Will diurese and start anti-HTN therapy and continue to monitor. Unfortunately, patient has very poor understanding of his clinical condition despite multiple long outpatient discussions. He has multiple uncontrolled comorbid conditions including diabetes(A1C 11), HTN(presented with urgency) and CKD. As a result, he would be a poor candidate for any other HFrEF therapies including LVAD/transplant. Would continue to manage him medically for now and re-assess.     Right heart catheterization 5/25/2023 with normal right and left-sided filling pressures.  Lasix subsequently on hold given MEI.    Repeat Dopplers negative for DVT.    Creatinine continues to uptrend now 3.31.    - Started on torsemide 100 mg daily by nephrology  - continue hydralazine 100 mg 3 times daily, Isordil 40 mg 3 times daily  - continue carvedilol 25 mg twice daily  - continue aspirin 81mg daily, rosuvastatin 40mg daily  - SGLT2 if GFR improves    Vicente Trujillo MD  [facility]  05/27/23  10:30 EDT

## 2023-05-27 NOTE — PROGRESS NOTES
Name: Rufus Dorsey ADMIT: 2023   : 1985  PCP: Provider, No Known    MRN: [id number] LOS: 2 days   AGE/SEX: 37 y.o. male  ROOM: [facility]/     Subjective   Subjective     His left leg is still edematous and painful.  Not much improvement with antibiotics and diuresis.  He did have a CABG in the past but the saphenous vein graft was from his right leg.    Objective   Objective   Vital Signs  Temp:  [97.3 °F (36.3 °C)-98.3 °F (36.8 °C)] 98.2 °F (36.8 °C)  Heart Rate:  [74-81] 80  Resp:  [18-20] 20  BP: (127-169)/(68-89) 169/89  SpO2:  [91 %-96 %] 91 %  on  Flow (L/min):  [2] 2;   Device (Oxygen Therapy): room air  Body mass index is 47.78 kg/m².  Physical Exam  Constitutional:       Appearance: He is obese.   Eyes:      Extraocular Movements: Extraocular movements intact.      Pupils: Pupils are equal, round, and reactive to light.   Cardiovascular:      Rate and Rhythm: Normal rate and regular rhythm.   Pulmonary:      Effort: Pulmonary effort is normal. No respiratory distress.   Abdominal:      General: There is no distension.      Palpations: Abdomen is soft.      Tenderness: There is no abdominal tenderness.   Musculoskeletal:      Right lower leg: No edema.      Left lower leg: Edema present.      Comments: Left leg with 2+ edema, tender to palpation.  Leg is no longer warm.    Skin:     General: Skin is warm and dry.   Neurological:      General: No focal deficit present.      Mental Status: He is alert and oriented to person, place, and time.     Exam updated on 2023    Results Review     I reviewed the patient's new clinical results.  Results from last 7 days   Lab Units 23  0509 23  1820 23  0429 23  0506 23  0344   WBC 10*3/mm3 8.55  --  7.60 8.12 9.24   HEMOGLOBIN g/dL 12.9*  --  12.6* 12.9* 14.2   HEMOGLOBIN, POC g/dL  --  11.9*  --   --   --    PLATELETS 10*3/mm3 194  --  169 174 206     Results from last 7 days   Lab Units 23  0812 23  0955  05/25/23  0429 05/24/23  0506   SODIUM mmol/L 136 137 139 137   POTASSIUM mmol/L 3.6 3.5 3.4* 3.4*   CHLORIDE mmol/L 102 101 102 103   CO2 mmol/L 24.6 27.0 26.5 24.3   BUN mg/dL 47* 45* 42* 43*   CREATININE mg/dL 3.31* 3.14* 3.07* 2.69*   GLUCOSE mg/dL 201* 193* 247* 275*   Estimated Creatinine Clearance: 39.7 mL/min (A) (by C-G formula based on SCr of 3.31 mg/dL (H)).  Results from last 7 days   Lab Units 05/26/23  0943 05/22/23  1906   ALBUMIN g/dL 3.2* 2.8*   BILIRUBIN mg/dL 0.3 0.2   ALK PHOS U/L 103 157*   AST (SGOT) U/L 45* 16   ALT (SGPT) U/L 41 20     Results from last 7 days   Lab Units 05/27/23  0812 05/26/23  0943 05/25/23  0429 05/24/23  0506 05/23/23  0344 05/22/23  1906   CALCIUM mg/dL 8.4* 7.9* 8.1* 8.1* 8.0* 8.2*   ALBUMIN g/dL  --  3.2*  --   --   --  2.8*   MAGNESIUM mg/dL  --   --   --   --  2.1 1.9     Results from last 7 days   Lab Units 05/23/23  0344   LACTATE mmol/L 0.9     COVID19   Date Value Ref Range Status   06/04/2022 Detected (C) Not Detected - Ref. Range Final   12/04/2021 Not Detected Not Detected - Ref. Range Final     Glucose   Date/Time Value Ref Range Status   05/27/2023 1110 198 (H) 70 - 130 mg/dL Final     Comment:     Meter: [id number] : [id number] Goodman Charlotte LAO   05/27/2023 0651 185 (H) 70 - 130 mg/dL Final     Comment:     Meter: [id number] : [id number] Hakeem LAO   05/26/2023 2044 217 (H) 70 - 130 mg/dL Final     Comment:     Meter: [id number] : [id number] Hakeem Rothdarlene LAO   05/26/2023 1617 205 (H) 70 - 130 mg/dL Final     Comment:     Meter: [id number] : [id number] Bishop BROWN   05/26/2023 1131 216 (H) 70 - 130 mg/dL Final     Comment:     Meter: [id number] : [id number] Bishop BROWN   05/26/2023 0614 193 (H) 70 - 130 mg/dL Final     Comment:     Meter: [id number] : [id number] Willi LAO   05/25/2023 2014 163 (H) 70 - 130 mg/dL Final     Comment:     Meter: [id number] : [id number] Willi LAO       Duplex Venous Lower  Extremity - Left CAR  •  Normal left lower extremity venous duplex scan.  Cardiac Catheterization/Vascular Study  [facility]   CARDIAC CATHETERIZATION PROCEDURE REPORT    Patient: Rufus Dorsey  : 1985  MRN: [id number]    Procedure Date:   23    Referring Physician:   Vicente Vicente MD    Interventional Cardiologist:   Vicente Vicente MD    Indication:  HFrEF  MEI on CKD    Clinical Presentation:  36 yo with CAD S/P CABG (LIMA to LAD, SVG to OM1 and OM 2) and PCI to 90%   LIMA in , HFrEF secondary to ischemic cardiomyopathy (EF 20%),   hypertension, CKD, DM and obesity who presented to the ED on 2023   with left leg pain and swelling, found to be volume overloaded and in   hypertensive urgency. Patient has developed worsening Cr with aggressive   diuresis. RHC for further evaluation of volume status.    Procedure performed:  Diagnostic Right Heart Catheterization    Access Sites:  right basilic vein  Right common femoral vein    Findings:  1. Hemodynamics:  Right Atrium: Mean of 4 mmHg  Right Ventricle: 37/10 mmHg  Pulmonary Artery: 35/18/24 mmHg  Pulmonary Wedge: Mean of 13 mmHg  Cardiac Output/Index: 5.16 L/min 2.17 L/min/m2  PA Sat: 59%  AO Sat: 93%    Conclusions:  Normal right and left-sided filling pressures with low normal cardiac   index.    Recommendations:   We will discontinue IV furosemide as patient's intracardiac pressures are   normal and his creatinine has worsened with diuresis.  Consider repeating left lower extremity Dopplers to rule out DVT as his   edema is mostly left-sided.    Procedure Status:  Elective    Procedure Details:  Informed consent was obtained with an explanation of the risk and benefits   of the procedure. The patient was brought to the Cardiac Catheterization   Laboratory and was prepped and draped in a standard sterile fashion.    Ultrasound guided access in the right basilic vein was obtained using   lidocaine 2% to anesthetize the right  antecubital fossa.  The 5 Trinidadian PA   catheter was attempted to be advanced into the upper arm however met   resistance.  A BMW wire was able to be advanced into the SVC however the 5   Trinidadian PA catheter was unable to track due to a likely stricture in the   right upper arm.  As a result, ultrasound-guided access of the right   femoral vein was obtained using lidocaine 2% to anesthetize the right   groin.    A 5 Trinidadian PA catheter was then advanced into the heart and out into the   pulmonary artery. Hemodynamics were obtained in the pulmonary wedge,   pulmonary artery, right ventricle, and right atrium positions.    Saturations were obtained from the pulmonary artery. The PA catheter was   then removed. The venous sheaths were removed and hemostasis achieved with   manual pressure.    Patient tolerated the procedure well without any complications, and   transferred to the post procedure area for recovery in a stable condition.    Complications:  None.    Estimated Blood Loss:  Minimal.    Vicente Vicente MD  [facility]  05/25/23  19:38 EDT     Scheduled Medications  aspirin, 81 mg, Oral, Daily  carvedilol, 25 mg, Oral, BID With Meals  doxycycline, 100 mg, Oral, Q12H  enoxaparin, 40 mg, Subcutaneous, Q12H  hydrALAZINE, 100 mg, Oral, TID  insulin lispro, 2-7 Units, Subcutaneous, 4x Daily AC & at Bedtime  insulin NPH, 21 Units, Subcutaneous, Q12H  isosorbide dinitrate, 40 mg, Oral, TID  rosuvastatin, 40 mg, Oral, Nightly  senna-docusate sodium, 2 tablet, Oral, BID  sodium chloride, 10 mL, Intravenous, Q12H  torsemide, 100 mg, Oral, Daily    Infusions   Diet  Diet: Diabetic Diets; Consistent Carbohydrate; Texture: Regular Texture (IDDSI 7); Fluid Consistency: Thin (IDDSI 0)       Assessment/Plan     Active Hospital Problems    Diagnosis  POA   • **Left leg swelling [M79.89]  Yes   • S/P CABG x 3 [Z95.1]  Not Applicable   • Acute on chronic combined systolic and diastolic CHF (congestive heart failure)  [I50.43]  Unknown   • Elevated troponin [R77.8]  Yes   • Acute on chronic congestive heart failure [I50.9]  Yes   • CKD (chronic kidney disease) stage 2, GFR 60-89 ml/min [N18.2]  Yes   • Pulmonary HTN (HCC) [I27.20]  Yes   • Type 2 diabetes mellitus, with long-term current use of insulin (HCC) [E11.9, Z79.4]  Not Applicable      Resolved Hospital Problems   No resolved problems to display.       37 y.o. male admitted with Left leg swelling.    Left lower extremity edema  Duplex of the left lower extremity was negative.  It was a technically difficult study, and presentation is most consistent with DVT of the left leg.  A repeat duplex at intervals days later was also negative but was once again technically difficult study.  I am concerned about a proximal clot in the last vascular surgery to weigh in.  Of note, he does have a history of CABG but the saphenous grain graft was from his right lower extremity.     Coronary artery disease status post CABG  Chronic heart failure with reduced ejection fraction  Hypertension  Continue aspirin, Coreg, hydralazine, Imdur, Crestor. Cardiology managing.   Strict I/Os. Daily weights     Type 2 diabetes  A1c 11  Started on NPH insulin and dose adjusted as required     Acute kidney injury  Chronic kidney disease  Creatinine increased to 3.31. on PO torsemide and renal is following     · Lovenox 40 mg SC daily for DVT prophylaxis.  · Full code.  · Discussed with patient and nursing staff.  · Anticipate discharge home in 2-3 days.      Hiram Velez MD  [facility]  05/27/23  13:16 EDT

## 2023-05-28 ENCOUNTER — APPOINTMENT (OUTPATIENT)
Dept: CARDIOLOGY | Facility: HOSPITAL | Age: 38
DRG: 602 | End: 2023-05-28

## 2023-05-28 ENCOUNTER — READMISSION MANAGEMENT (OUTPATIENT)
Dept: CALL CENTER | Facility: HOSPITAL | Age: 38
End: 2023-05-28

## 2023-05-28 VITALS
DIASTOLIC BLOOD PRESSURE: 90 MMHG | HEART RATE: 71 BPM | TEMPERATURE: 98.7 F | BODY MASS INDEX: 47.42 KG/M2 | WEIGHT: 295.1 LBS | HEIGHT: 66 IN | RESPIRATION RATE: 18 BRPM | OXYGEN SATURATION: 97 % | SYSTOLIC BLOOD PRESSURE: 181 MMHG

## 2023-05-28 LAB
ANION GAP SERPL CALCULATED.3IONS-SCNC: 9.7 MMOL/L (ref 5–15)
BH CV VAS ABD AO IVC SPONTANEOUS SCRIPTING: NORMAL
BH CV VAS ABD AO LT COMMON ILIAC PHASIC SCRIPTING: NORMAL
BH CV VAS ABD AO LT COMMON ILIAC SPONTANEOUS SCRIPTING: NORMAL
BH CV VAS ABD AO LT EXTERNAL ILIAC AUGMENT SCRIPTING: NORMAL
BH CV VAS ABD AO LT EXTERNAL ILIAC PHASIC SCRIPTING: NORMAL
BH CV VAS ABD AO LT EXTERNAL ILIAC SPONT SCRIPTING: NORMAL
BH CV VAS ABD AO RT COM ILIAC PHASIC SCRIPTING: NORMAL
BH CV VAS ABD AO RT COM ILIAC SPONTANEOUS SCRIPTING: NORMAL
BH CV VAS ABD AO RT EXT ILIAC AUGMENT SCRIPTING: NORMAL
BH CV VAS ABD AO RT EXT ILIAC PHASIC SCRIPTING: NORMAL
BH CV VAS ABD AO RT EXT ILIAC SPONTANEOUS SCRIPTING: NORMAL
BUN SERPL-MCNC: 45 MG/DL (ref 6–20)
BUN/CREAT SERPL: 13.8 (ref 7–25)
CALCIUM SPEC-SCNC: 8.6 MG/DL (ref 8.6–10.5)
CHLORIDE SERPL-SCNC: 102 MMOL/L (ref 98–107)
CO2 SERPL-SCNC: 26.3 MMOL/L (ref 22–29)
CREAT SERPL-MCNC: 3.27 MG/DL (ref 0.76–1.27)
DEPRECATED RDW RBC AUTO: 40.5 FL (ref 37–54)
EGFRCR SERPLBLD CKD-EPI 2021: 24 ML/MIN/1.73
ERYTHROCYTE [DISTWIDTH] IN BLOOD BY AUTOMATED COUNT: 13.3 % (ref 12.3–15.4)
GLUCOSE BLDC GLUCOMTR-MCNC: 139 MG/DL (ref 70–130)
GLUCOSE BLDC GLUCOMTR-MCNC: 178 MG/DL (ref 70–130)
GLUCOSE BLDC GLUCOMTR-MCNC: 211 MG/DL (ref 70–130)
GLUCOSE SERPL-MCNC: 197 MG/DL (ref 65–99)
HCT VFR BLD AUTO: 37.4 % (ref 37.5–51)
HGB BLD-MCNC: 12.4 G/DL (ref 13–17.7)
MAXIMAL PREDICTED HEART RATE: 183 BPM
MCH RBC QN AUTO: 28 PG (ref 26.6–33)
MCHC RBC AUTO-ENTMCNC: 33.2 G/DL (ref 31.5–35.7)
MCV RBC AUTO: 84.4 FL (ref 79–97)
PLATELET # BLD AUTO: 187 10*3/MM3 (ref 140–450)
PMV BLD AUTO: 12.2 FL (ref 6–12)
POTASSIUM SERPL-SCNC: 3.3 MMOL/L (ref 3.5–5.2)
RBC # BLD AUTO: 4.43 10*6/MM3 (ref 4.14–5.8)
SODIUM SERPL-SCNC: 138 MMOL/L (ref 136–145)
STRESS TARGET HR: 156 BPM
WBC NRBC COR # BLD: 9.16 10*3/MM3 (ref 3.4–10.8)

## 2023-05-28 PROCEDURE — 25010000002 ENOXAPARIN PER 10 MG: Performed by: STUDENT IN AN ORGANIZED HEALTH CARE EDUCATION/TRAINING PROGRAM

## 2023-05-28 PROCEDURE — 85027 COMPLETE CBC AUTOMATED: CPT | Performed by: STUDENT IN AN ORGANIZED HEALTH CARE EDUCATION/TRAINING PROGRAM

## 2023-05-28 PROCEDURE — 80048 BASIC METABOLIC PNL TOTAL CA: CPT | Performed by: STUDENT IN AN ORGANIZED HEALTH CARE EDUCATION/TRAINING PROGRAM

## 2023-05-28 PROCEDURE — 63710000001 INSULIN LISPRO (HUMAN) PER 5 UNITS: Performed by: STUDENT IN AN ORGANIZED HEALTH CARE EDUCATION/TRAINING PROGRAM

## 2023-05-28 PROCEDURE — 82948 REAGENT STRIP/BLOOD GLUCOSE: CPT

## 2023-05-28 PROCEDURE — 93979 VASCULAR STUDY: CPT

## 2023-05-28 PROCEDURE — 63710000001 INSULIN ISOPHANE HUMAN PER 5 UNITS: Performed by: STUDENT IN AN ORGANIZED HEALTH CARE EDUCATION/TRAINING PROGRAM

## 2023-05-28 PROCEDURE — 99232 SBSQ HOSP IP/OBS MODERATE 35: CPT | Performed by: STUDENT IN AN ORGANIZED HEALTH CARE EDUCATION/TRAINING PROGRAM

## 2023-05-28 RX ORDER — POTASSIUM CHLORIDE 750 MG/1
40 TABLET, FILM COATED, EXTENDED RELEASE ORAL EVERY 4 HOURS
Status: COMPLETED | OUTPATIENT
Start: 2023-05-28 | End: 2023-05-28

## 2023-05-28 RX ORDER — TORSEMIDE 100 MG/1
100 TABLET ORAL DAILY
Qty: 60 TABLET | Refills: 2 | Status: SHIPPED | OUTPATIENT
Start: 2023-05-29

## 2023-05-28 RX ORDER — HYDRALAZINE HYDROCHLORIDE 100 MG/1
100 TABLET, FILM COATED ORAL 3 TIMES DAILY
Qty: 120 TABLET | Refills: 2 | Status: SHIPPED | OUTPATIENT
Start: 2023-05-28

## 2023-05-28 RX ORDER — POTASSIUM CHLORIDE 20 MEQ/1
20 TABLET, EXTENDED RELEASE ORAL DAILY
Qty: 90 TABLET | Refills: 0 | Status: SHIPPED | OUTPATIENT
Start: 2023-05-28

## 2023-05-28 RX ORDER — TORSEMIDE 100 MG/1
100 TABLET ORAL DAILY
Qty: 30 TABLET | Refills: 2 | Status: SHIPPED | OUTPATIENT
Start: 2023-05-29 | End: 2023-05-28 | Stop reason: SDUPTHER

## 2023-05-28 RX ORDER — ISOSORBIDE DINITRATE 40 MG/1
40 TABLET ORAL 3 TIMES DAILY
Qty: 60 TABLET | Refills: 2 | Status: SHIPPED | OUTPATIENT
Start: 2023-05-28 | End: 2023-05-28 | Stop reason: SDUPTHER

## 2023-05-28 RX ORDER — HYDRALAZINE HYDROCHLORIDE 100 MG/1
100 TABLET, FILM COATED ORAL 3 TIMES DAILY
Qty: 90 TABLET | Refills: 2 | Status: SHIPPED | OUTPATIENT
Start: 2023-05-28 | End: 2023-05-28 | Stop reason: SDUPTHER

## 2023-05-28 RX ORDER — ENOXAPARIN SODIUM 100 MG/ML
40 INJECTION SUBCUTANEOUS EVERY 24 HOURS
Status: DISCONTINUED | OUTPATIENT
Start: 2023-05-29 | End: 2023-05-28 | Stop reason: HOSPADM

## 2023-05-28 RX ORDER — ISOSORBIDE DINITRATE 40 MG/1
40 TABLET ORAL 3 TIMES DAILY
Qty: 90 TABLET | Refills: 2 | Status: SHIPPED | OUTPATIENT
Start: 2023-05-28

## 2023-05-28 RX ORDER — POTASSIUM CHLORIDE 20 MEQ/1
20 TABLET, EXTENDED RELEASE ORAL DAILY
Qty: 60 TABLET | Refills: 0 | Status: SHIPPED | OUTPATIENT
Start: 2023-05-28 | End: 2023-05-28 | Stop reason: SDUPTHER

## 2023-05-28 RX ADMIN — ISOSORBIDE DINITRATE 40 MG: 20 TABLET ORAL at 09:21

## 2023-05-28 RX ADMIN — Medication 10 ML: at 09:23

## 2023-05-28 RX ADMIN — CARVEDILOL 25 MG: 25 TABLET, FILM COATED ORAL at 09:23

## 2023-05-28 RX ADMIN — HYDRALAZINE HYDROCHLORIDE 100 MG: 50 TABLET, FILM COATED ORAL at 16:15

## 2023-05-28 RX ADMIN — ISOSORBIDE DINITRATE 40 MG: 20 TABLET ORAL at 16:15

## 2023-05-28 RX ADMIN — ASPIRIN 81 MG: 81 TABLET, COATED ORAL at 09:22

## 2023-05-28 RX ADMIN — DOCUSATE SODIUM 50MG AND SENNOSIDES 8.6MG 2 TABLET: 8.6; 5 TABLET, FILM COATED ORAL at 09:21

## 2023-05-28 RX ADMIN — CARVEDILOL 25 MG: 25 TABLET, FILM COATED ORAL at 17:02

## 2023-05-28 RX ADMIN — INSULIN LISPRO 2 UNITS: 100 INJECTION, SOLUTION INTRAVENOUS; SUBCUTANEOUS at 17:02

## 2023-05-28 RX ADMIN — ENOXAPARIN SODIUM 40 MG: 100 INJECTION SUBCUTANEOUS at 09:22

## 2023-05-28 RX ADMIN — POTASSIUM CHLORIDE 40 MEQ: 750 TABLET, EXTENDED RELEASE ORAL at 12:27

## 2023-05-28 RX ADMIN — DOXYCYCLINE 100 MG: 100 CAPSULE ORAL at 09:21

## 2023-05-28 RX ADMIN — POTASSIUM CHLORIDE 40 MEQ: 750 TABLET, EXTENDED RELEASE ORAL at 16:15

## 2023-05-28 RX ADMIN — INSULIN LISPRO 2 UNITS: 100 INJECTION, SOLUTION INTRAVENOUS; SUBCUTANEOUS at 09:21

## 2023-05-28 RX ADMIN — TORSEMIDE 100 MG: 100 TABLET ORAL at 09:21

## 2023-05-28 RX ADMIN — HYDRALAZINE HYDROCHLORIDE 100 MG: 50 TABLET, FILM COATED ORAL at 09:21

## 2023-05-28 RX ADMIN — INSULIN HUMAN 21 UNITS: 100 INJECTION, SUSPENSION SUBCUTANEOUS at 09:22

## 2023-05-28 NOTE — PROGRESS NOTES
"    Patient Name: Rufus Dorsey  :1985  37 y.o.      Patient Care Team:  Provider, No Known as PCP - General  Provider, No Known    Chief Complaint:   Left leg pain    Interval History:   No acute events overnight.     Objective   Vital Signs  Temp:  [97.3 °F (36.3 °C)-98.3 °F (36.8 °C)] 98.2 °F (36.8 °C)  Heart Rate:  [63-81] 71  Resp:  [18-20] 20  BP: (137-169)/(52-89) 164/78    Intake/Output Summary (Last 24 hours) at 2023 0744  Last data filed at 2023 0000  Gross per 24 hour   Intake 360 ml   Output --   Net 360 ml     Flowsheet Rows    Flowsheet Row First Filed Value   Admission Height 167.6 cm (66\") Documented at 2023 1636   Admission Weight 130 kg (287 lb) Documented at 2023 1636          GEN: no distress, alert and oriented, obese  HEENT: NACT, EOMI, moist mucous membranes  Lungs: CTAB, no wheezes, rales or rhonchi  CV: normal rate, regular rhythm, normal S1, S2, no murmurs, +2 radial pulses b/l  Abdomen: soft, nontender, nondistended, NABS  Extremities: 1+ edema left leg, trace edema on right leg  Skin: no rash, warm, dry  Heme/Lymph: no bruising  Psych: organized thought, normal behavior and affect    Results Review:    Results from last 7 days   Lab Units 23  0812   SODIUM mmol/L 136   POTASSIUM mmol/L 3.6   CHLORIDE mmol/L 102   CO2 mmol/L 24.6   BUN mg/dL 47*   CREATININE mg/dL 3.31*   GLUCOSE mg/dL 201*   CALCIUM mg/dL 8.4*     Results from last 7 days   Lab Units 23  2105 23  1906   HSTROP T ng/L 61* 61*     Results from last 7 days   Lab Units 23  0509   WBC 10*3/mm3 8.55   HEMOGLOBIN g/dL 12.9*   HEMATOCRIT % 38.4   PLATELETS 10*3/mm3 194     Results from last 7 days   Lab Units 23  0344   INR  0.99     Results from last 7 days   Lab Units 23  0344   MAGNESIUM mg/dL 2.1                   Medication Review:   aspirin, 81 mg, Oral, Daily  carvedilol, 25 mg, Oral, BID With Meals  doxycycline, 100 mg, Oral, Q12H  enoxaparin, 40 mg, " Subcutaneous, Q12H  hydrALAZINE, 100 mg, Oral, TID  insulin lispro, 2-7 Units, Subcutaneous, 4x Daily AC & at Bedtime  insulin NPH, 21 Units, Subcutaneous, Q12H  isosorbide dinitrate, 40 mg, Oral, TID  rosuvastatin, 40 mg, Oral, Nightly  senna-docusate sodium, 2 tablet, Oral, BID  sodium chloride, 10 mL, Intravenous, Q12H  torsemide, 100 mg, Oral, Daily              Assessment & Plan   #Hypertensive urgency  #Volume overload  #HFrEF  #CAD status post CABG  #MEI on CKD  #Uncontrolled diabetes  #Elevated troponin     38 yo with CAD S/P CABG (LIMA to LAD, SVG to OM1 and OM 2) and PCI to 90% LIMA in 2021, HFrEF secondary to ischemic cardiomyopathy (EF 20%), hypertension, CKD, DM and obesity who presented to the ED on 5/22/2023 with left leg pain and swelling, found to be volume overloaded and in hypertensive urgency.     Elevated troponin is in the setting of HTN urgency, CKD and volume overload. Not consistent with ACS. Will diurese and start anti-HTN therapy and continue to monitor. Unfortunately, patient has very poor understanding of his clinical condition despite multiple long outpatient discussions. He has multiple uncontrolled comorbid conditions including diabetes(A1C 11), HTN(presented with urgency) and CKD. As a result, he would be a poor candidate for any other HFrEF therapies including LVAD/transplant. Would continue to manage him medically for now and re-assess.     Right heart catheterization 5/25/2023 with normal right and left-sided filling pressures.  Lasix subsequently on hold given MEI.    Repeat Dopplers negative for DVT.    - Vascular surgery consulted and ruling out May Thurner syndrome  - Nephrology following and managing diuretics and MEI  - continue hydralazine 100 mg 3 times daily, Isordil 40 mg 3 times daily  - continue carvedilol 25 mg twice daily  - continue aspirin 81mg daily, rosuvastatin 40mg daily  - SGLT2 if GFR improves    Vicente Trujillo MD  [facility]  05/28/23  07:44  EDT

## 2023-05-28 NOTE — PROGRESS NOTES
"[facility] Clinical Pharmacy Services: Enoxaparin Consult    Rufus Dorsey has a pharmacy consult to dose prophylactic enoxaparin per Dr Velez's request.     Indication: VTE Prophylaxis  Home Anticoagulation: notes     Relevant clinical data and objective history reviewed:  37 y.o. male 167.6 cm (66\") 134 kg (295 lb 1.6 oz)   Body mass index is 47.63 kg/m².   Results from last 7 days   Lab Units 05/28/23  0736   PLATELETS 10*3/mm3 187     Estimated Creatinine Clearance: 40.2 mL/min (A) (by C-G formula based on SCr of 3.27 mg/dL (H)).    Assessment/Plan    Will start patient on 40mg subcutaneous every 24 hours, eventhough BMI > 40 since Scr above baseline of 2.5. Consult order will be discontinued but pharmacy will continue to follow.     Chance Gipson PharmD  Clinical Pharmacist    "

## 2023-05-28 NOTE — PROGRESS NOTES
Name: Rufus Dorsey ADMIT: 2023   : 1985  PCP: Provider, No Known    MRN: [id number] LOS: 3 days   AGE/SEX: 37 y.o. male  ROOM: [address]    [place], Subsequent Hospital Care    Chief Complaint   Patient presents with   • Edema   • Leg Pain     CC: Left leg swelling and pain  Subjective     37 y.o. male with likely left lower extremity lymphedema.  This may be secondary to his prior vein harvest and dissection to remove however it could also just be lymphedema praecox or tartar.  The patient has had multiple other medical problems.  He has renal insufficiency and CT scan of the abdomen pelvis is no significant risk.  As such we performed duplex scan to see if he had May Thurner and it appears to be negative.    Review of Systems leg swelling better with elevation    Objective     Scheduled Medications:   aspirin, 81 mg, Oral, Daily  carvedilol, 25 mg, Oral, BID With Meals  [START ON 2023] enoxaparin, 40 mg, Subcutaneous, Q24H  hydrALAZINE, 100 mg, Oral, TID  insulin lispro, 2-7 Units, Subcutaneous, 4x Daily AC & at Bedtime  insulin NPH, 21 Units, Subcutaneous, Q12H  isosorbide dinitrate, 40 mg, Oral, TID  potassium chloride ER, 40 mEq, Oral, Q4H  rosuvastatin, 40 mg, Oral, Nightly  senna-docusate sodium, 2 tablet, Oral, BID  sodium chloride, 10 mL, Intravenous, Q12H  torsemide, 100 mg, Oral, Daily        Active Infusions:       As Needed Medications:  •  [DISCONTINUED] acetaminophen **OR** acetaminophen **OR** acetaminophen  •  acetaminophen  •  senna-docusate sodium **AND** polyethylene glycol **AND** bisacodyl **AND** bisacodyl  •  Calcium Replacement - Follow Nurse / BPA Driven Protocol  •  dextrose  •  dextrose  •  glucagon (human recombinant)  •  Magnesium Standard Dose Replacement - Follow Nurse / BPA Driven Protocol  •  nitroglycerin  •  ondansetron  •  Phosphorus Replacement - Follow Nurse / BPA Driven Protocol  •  Potassium Replacement - Follow Nurse / BPA Driven  Protocol  •  sodium chloride  •  sodium chloride    Vital Signs  Vital Signs Patient Vitals for the past 24 hrs:   BP Temp Temp src Pulse Resp SpO2 Weight   05/28/23 1347 (!) 181/90 -- -- -- -- -- --   05/28/23 1344 180/88 98.7 °F (37.1 °C) Oral 71 18 97 % --   05/28/23 0728 164/78 98.2 °F (36.8 °C) Oral 71 20 97 % --   05/28/23 0615 -- -- -- 63 -- 94 % --   05/28/23 0529 -- -- -- -- -- -- 134 kg (295 lb 1.6 oz)   05/27/23 2325 137/52 98.3 °F (36.8 °C) Oral 81 18 91 % --   05/27/23 1950 157/74 97.3 °F (36.3 °C) Oral 77 18 93 % --     Vital Signs (range)  Temp:  [97.3 °F (36.3 °C)-98.7 °F (37.1 °C)] 98.7 °F (37.1 °C)  Heart Rate:  [63-81] 71  Resp:  [18-20] 18  BP: (137-181)/(52-90) 181/90  I/O:  I/O last 3 completed shifts:  In: 360 [P.O.:360]  Out: -   I/O:   Intake/Output Summary (Last 24 hours) at 5/28/2023 1419  Last data filed at 5/28/2023 0000  Gross per 24 hour   Intake 360 ml   Output --   Net 360 ml     BMI:  Body mass index is 47.63 kg/m².    Physical Exam:  Physical Exam   Lungs coarse but equal  Heart regular rate and rhythm  Abdomen soft obese  Extremities +1 edema left leg.  No edema right leg.  No visible varicosities    Results Review:     CBC    Results from last 7 days   Lab Units 05/28/23  0736 05/26/23  0509 05/25/23  1820 05/25/23  0429 05/24/23  0506 05/23/23  0344 05/22/23  1906   WBC 10*3/mm3 9.16 8.55  --  7.60 8.12 9.24 8.26   HEMOGLOBIN g/dL 12.4* 12.9*  --  12.6* 12.9* 14.2 14.1   HEMOGLOBIN, POC g/dL  --   --  11.9*  --   --   --   --    PLATELETS 10*3/mm3 187 194  --  169 174 206 208     BMP   Results from last 7 days   Lab Units 05/28/23  0713 05/27/23  0812 05/26/23  0943 05/25/23  0429 05/24/23  0506 05/23/23  0344 05/22/23  1906   SODIUM mmol/L 138 136 137 139 137 136 137   POTASSIUM mmol/L 3.3* 3.6 3.5 3.4* 3.4* 4.1 3.9   CHLORIDE mmol/L 102 102 101 102 103 102 101   CO2 mmol/L 26.3 24.6 27.0 26.5 24.3 24.8 24.5   BUN mg/dL 45* 47* 45* 42* 43* 39* 39*   CREATININE mg/dL 3.27* 3.31*  3.14* 3.07* 2.69* 2.73* 2.64*   GLUCOSE mg/dL 197* 201* 193* 247* 275* 276* 312*   MAGNESIUM mg/dL  --   --   --   --   --  2.1 1.9     Cr Clearance Estimated Creatinine Clearance: 40.2 mL/min (A) (by C-G formula based on SCr of 3.27 mg/dL (H)).  Coag   Results from last 7 days   Lab Units 05/23/23  0344   INR  0.99     HbA1C   Lab Results   Component Value Date    HGBA1C 11.10 (H) 05/23/2023    HGBA1C 7.70 (H) 06/05/2022    HGBA1C 7.40 (H) 12/05/2021     Blood Glucose   Glucose   Date/Time Value Ref Range Status   05/28/2023 1124 139 (H) 70 - 130 mg/dL Final     Comment:     Meter: [id number] : [id number] Goodman Porter    05/28/2023 0632 211 (H) 70 - 130 mg/dL Final     Comment:     Meter: [id number] : [id number] Hakeem Silva    05/27/2023 2103 199 (H) 70 - 130 mg/dL Final     Comment:     Meter: [id number] : [id number] Hakeem Silva    05/27/2023 1603 223 (H) 70 - 130 mg/dL Final     Comment:     Meter: [id number] : [id number] Goodman Porter    05/27/2023 1110 198 (H) 70 - 130 mg/dL Final     Comment:     Meter: [id number] : [id number] Goodman Porter    05/27/2023 0651 185 (H) 70 - 130 mg/dL Final     Comment:     Meter: [id number] : [id number] Hakeem Silva    05/26/2023 2044 217 (H) 70 - 130 mg/dL Final     Comment:     Meter: [id number] : [id number] Hakeem Silva    05/26/2023 1617 205 (H) 70 - 130 mg/dL Final     Comment:     Meter: [id number] : [id number] Bishop Feliciano CNA     Infection     CMP   Results from last 7 days   Lab Units 05/28/23  0713 05/27/23  0812 05/26/23  0943 05/25/23  0429 05/24/23  0506 05/23/23  0344 05/22/23  1906   SODIUM mmol/L 138 136 137 139 137 136 137   POTASSIUM mmol/L 3.3* 3.6 3.5 3.4* 3.4* 4.1 3.9   CHLORIDE mmol/L 102 102 101 102 103 102 101   CO2 mmol/L 26.3 24.6 27.0 26.5 24.3 24.8 24.5   BUN mg/dL 45* 47* 45* 42* 43* 39* 39*   CREATININE mg/dL 3.27* 3.31* 3.14* 3.07* 2.69* 2.73* 2.64*   GLUCOSE mg/dL 197* 201* 193* 247* 275* 276*  312*   ALBUMIN g/dL  --   --  3.2*  --   --   --  2.8*   BILIRUBIN mg/dL  --   --  0.3  --   --   --  0.2   ALK PHOS U/L  --   --  103  --   --   --  157*   AST (SGOT) U/L  --   --  45*  --   --   --  16   ALT (SGPT) U/L  --   --  41  --   --   --  20     ABG      UA    Results from last 7 days   Lab Units 05/24/23  2214   NITRITE UA  Negative   WBC UA /HPF 0-2   BACTERIA UA /HPF None Seen   SQUAM EPITHEL UA /HPF 0-2     Radiology(recent) No radiology results for the last day    Assessment & Plan     Assessment & Plan      Left leg swelling    Type 2 diabetes mellitus, with long-term current use of insulin (HCC)    Pulmonary HTN (HCC)    CKD (chronic kidney disease) stage 2, GFR 60-89 ml/min    Acute on chronic congestive heart failure    Elevated troponin    Acute on chronic combined systolic and diastolic CHF (congestive heart failure)    S/P CABG x 3      37 y.o. male with likely lymphedema.  Probably primary lymphedema although could be secondary with his prior surgery.  At this point in time duplex scan fails to show any evidence of May Thurner syndrome.  He has no reflux on his testing and no DVT.  At this point in time lymphedema is likely the source for his swelling and his pain worse by multiple episodes of cellulitis.  Without central venous stenosis to treat we really have nothing to offer this gentleman other than compression which should be ordered through the lymphedema therapy here.  We have ordered lymphedema therapy through occupational therapy and ordered compression stockings but the only thing available here is his GENESIS hose.  These are better than nothing but outpatient I have written him a prescription for 20 to 30 mm tor knee-high compression has been instructed to wear them from time he gets out of bed to the time he is ready to bathe at night.  Compliance is extremely important and he understands that.  Once again there is no surgery options for this gentleman and outpatient therapy is his  best answer and this will need to be arranged prior to discharge.  We will hopefully get the lymphedema therapist to see him while he is here.  We will not plan follow-up as once again this is not a surgical issue.  Recommend follow-up with his primary care.  We will sign off      Joey Nazario MD  05/28/23  14:18 EDT    Please call my office with any question: [phone]    Active Hospital Problems    Diagnosis  POA   • **Left leg swelling [M79.89]  Yes   • S/P CABG x 3 [Z95.1]  Not Applicable   • Acute on chronic combined systolic and diastolic CHF (congestive heart failure) [I50.43]  Unknown   • Elevated troponin [R77.8]  Yes   • Acute on chronic congestive heart failure [I50.9]  Yes   • CKD (chronic kidney disease) stage 2, GFR 60-89 ml/min [N18.2]  Yes   • Pulmonary HTN (HCC) [I27.20]  Yes   • Type 2 diabetes mellitus, with long-term current use of insulin (HCC) [E11.9, Z79.4]  Not Applicable      Resolved Hospital Problems   No resolved problems to display.

## 2023-05-28 NOTE — PROGRESS NOTES
Name: Rufus Dorsey ADMIT: 2023   : 1985  PCP: Provider, No Known    MRN: [id number] LOS: 3 days   AGE/SEX: 37 y.o. male  ROOM: [facility]     Subjective   Subjective     No acute events overnight. Examined at bedside.     Objective   Objective   Vital Signs  Temp:  [97.3 °F (36.3 °C)-98.3 °F (36.8 °C)] 98.2 °F (36.8 °C)  Heart Rate:  [63-81] 71  Resp:  [18-20] 20  BP: (137-169)/(52-89) 164/78  SpO2:  [91 %-97 %] 97 %  on  Flow (L/min):  [2] 2;   Device (Oxygen Therapy): room air  Body mass index is 47.63 kg/m².  Physical Exam  Constitutional:       General: He is not in acute distress.     Appearance: He is obese.   Eyes:      Extraocular Movements: Extraocular movements intact.      Pupils: Pupils are equal, round, and reactive to light.   Cardiovascular:      Rate and Rhythm: Normal rate and regular rhythm.   Pulmonary:      Effort: Pulmonary effort is normal. No respiratory distress.   Abdominal:      General: There is no distension.      Palpations: Abdomen is soft.      Tenderness: There is no abdominal tenderness.   Musculoskeletal:      Right lower leg: No edema.      Left lower leg: Edema present.      Comments: Left leg with 2+ edema, tender to palpation.  Leg is no longer warm.    Skin:     General: Skin is warm and dry.   Neurological:      General: No focal deficit present.      Mental Status: He is alert and oriented to person, place, and time.       Results Review     I reviewed the patient's new clinical results.  Results from last 7 days   Lab Units 23  0736 23  0509 23  1820 23  0429 23  0506   WBC 10*3/mm3 9.16 8.55  --  7.60 8.12   HEMOGLOBIN g/dL 12.4* 12.9*  --  12.6* 12.9*   HEMOGLOBIN, POC g/dL  --   --  11.9*  --   --    PLATELETS 10*3/mm3 187 194  --  169 174     Results from last 7 days   Lab Units 23  0713 23  0812 23  0943 23  0429   SODIUM mmol/L 138 136 137 139   POTASSIUM mmol/L 3.3* 3.6 3.5 3.4*   CHLORIDE mmol/L 102  102 101 102   CO2 mmol/L 26.3 24.6 27.0 26.5   BUN mg/dL 45* 47* 45* 42*   CREATININE mg/dL 3.27* 3.31* 3.14* 3.07*   GLUCOSE mg/dL 197* 201* 193* 247*   Estimated Creatinine Clearance: 40.2 mL/min (A) (by C-G formula based on SCr of 3.27 mg/dL (H)).  Results from last 7 days   Lab Units 05/26/23  0943 05/22/23  1906   ALBUMIN g/dL 3.2* 2.8*   BILIRUBIN mg/dL 0.3 0.2   ALK PHOS U/L 103 157*   AST (SGOT) U/L 45* 16   ALT (SGPT) U/L 41 20     Results from last 7 days   Lab Units 05/28/23  0713 05/27/23  0812 05/26/23  0943 05/25/23  0429 05/24/23  0506 05/23/23  0344 05/22/23  1906   CALCIUM mg/dL 8.6 8.4* 7.9* 8.1*   < > 8.0* 8.2*   ALBUMIN g/dL  --   --  3.2*  --   --   --  2.8*   MAGNESIUM mg/dL  --   --   --   --   --  2.1 1.9    < > = values in this interval not displayed.     Results from last 7 days   Lab Units 05/23/23  0344   LACTATE mmol/L 0.9     COVID19   Date Value Ref Range Status   06/04/2022 Detected (C) Not Detected - Ref. Range Final   12/04/2021 Not Detected Not Detected - Ref. Range Final     Glucose   Date/Time Value Ref Range Status   05/28/2023 1124 139 (H) 70 - 130 mg/dL Final     Comment:     Meter: [id number] : [id number] Barnes Charlotte LAO   05/28/2023 0632 211 (H) 70 - 130 mg/dL Final     Comment:     Meter: [id number] : [id number] Hakeem Silva TASHIA   05/27/2023 2103 199 (H) 70 - 130 mg/dL Final     Comment:     Meter: [id number] : [id number] Hakeem Rothdarlene LAO   05/27/2023 1603 223 (H) 70 - 130 mg/dL Final     Comment:     Meter: [id number] : [id number] Goodman [place] LAO   05/27/2023 1110 198 (H) 70 - 130 mg/dL Final     Comment:     Meter: [id number] : [id number] Goodman Charlotte LAO   05/27/2023 0651 185 (H) 70 - 130 mg/dL Final     Comment:     Meter: [id number] : [id number] Hakeem LAO   05/26/2023 2044 217 (H) 70 - 130 mg/dL Final     Comment:     Meter: [id number] : [id number] Hakeem LAO       Duplex Venous Lower Extremity - Left CAR  •  Normal left lower  extremity venous duplex scan.  Cardiac Catheterization/Vascular Study  [facility]   CARDIAC CATHETERIZATION PROCEDURE REPORT    Patient: Rufus Dorsey  : 1985  MRN: [id number]    Procedure Date:   23    Referring Physician:   Vicente Vicente MD    Interventional Cardiologist:   Vicente Vicente MD    Indication:  HFrEF  MEI on CKD    Clinical Presentation:  36 yo with CAD S/P CABG (LIMA to LAD, SVG to OM1 and OM 2) and PCI to 90%   LIMA in , HFrEF secondary to ischemic cardiomyopathy (EF 20%),   hypertension, CKD, DM and obesity who presented to the ED on 2023   with left leg pain and swelling, found to be volume overloaded and in   hypertensive urgency. Patient has developed worsening Cr with aggressive   diuresis. RHC for further evaluation of volume status.    Procedure performed:  Diagnostic Right Heart Catheterization    Access Sites:  right basilic vein  Right common femoral vein    Findings:  1. Hemodynamics:  Right Atrium: Mean of 4 mmHg  Right Ventricle: 37/10 mmHg  Pulmonary Artery: 35/18/24 mmHg  Pulmonary Wedge: Mean of 13 mmHg  Cardiac Output/Index: 5.16 L/min 2.17 L/min/m2  PA Sat: 59%  AO Sat: 93%    Conclusions:  Normal right and left-sided filling pressures with low normal cardiac   index.    Recommendations:   We will discontinue IV furosemide as patient's intracardiac pressures are   normal and his creatinine has worsened with diuresis.  Consider repeating left lower extremity Dopplers to rule out DVT as his   edema is mostly left-sided.    Procedure Status:  Elective    Procedure Details:  Informed consent was obtained with an explanation of the risk and benefits   of the procedure. The patient was brought to the Cardiac Catheterization   Laboratory and was prepped and draped in a standard sterile fashion.    Ultrasound guided access in the right basilic vein was obtained using   lidocaine 2% to anesthetize the right antecubital fossa.  The 5 Congolese PA   catheter was  attempted to be advanced into the upper arm however met   resistance.  A BMW wire was able to be advanced into the SVC however the 5   Korean PA catheter was unable to track due to a likely stricture in the   right upper arm.  As a result, ultrasound-guided access of the right   femoral vein was obtained using lidocaine 2% to anesthetize the right   groin.    A 5 Korean PA catheter was then advanced into the heart and out into the   pulmonary artery. Hemodynamics were obtained in the pulmonary wedge,   pulmonary artery, right ventricle, and right atrium positions.    Saturations were obtained from the pulmonary artery. The PA catheter was   then removed. The venous sheaths were removed and hemostasis achieved with   manual pressure.    Patient tolerated the procedure well without any complications, and   transferred to the post procedure area for recovery in a stable condition.    Complications:  None.    Estimated Blood Loss:  Minimal.    Vicente Vicente MD  [facility]  05/25/23  19:38 EDT     Scheduled Medications  aspirin, 81 mg, Oral, Daily  carvedilol, 25 mg, Oral, BID With Meals  enoxaparin, 40 mg, Subcutaneous, Q12H  hydrALAZINE, 100 mg, Oral, TID  insulin lispro, 2-7 Units, Subcutaneous, 4x Daily AC & at Bedtime  insulin NPH, 21 Units, Subcutaneous, Q12H  isosorbide dinitrate, 40 mg, Oral, TID  potassium chloride ER, 40 mEq, Oral, Q4H  rosuvastatin, 40 mg, Oral, Nightly  senna-docusate sodium, 2 tablet, Oral, BID  sodium chloride, 10 mL, Intravenous, Q12H  torsemide, 100 mg, Oral, Daily    Infusions   Diet  NPO Diet NPO Type: Strict NPO       Assessment/Plan     Active Hospital Problems    Diagnosis  POA   • **Left leg swelling [M79.89]  Yes   • S/P CABG x 3 [Z95.1]  Not Applicable   • Acute on chronic combined systolic and diastolic CHF (congestive heart failure) [I50.43]  Unknown   • Elevated troponin [R77.8]  Yes   • Acute on chronic congestive heart failure [I50.9]  Yes   • CKD (chronic  kidney disease) stage 2, GFR 60-89 ml/min [N18.2]  Yes   • Pulmonary HTN (HCC) [I27.20]  Yes   • Type 2 diabetes mellitus, with long-term current use of insulin (HCC) [E11.9, Z79.4]  Not Applicable      Resolved Hospital Problems   No resolved problems to display.       37 y.o. male admitted with Left leg swelling.    Left lower extremity edema  Duplex of the left lower extremity was negative.  It was a technically difficult study, and presentation is most consistent with DVT of the left leg.  A repeat duplex at intervals days later was also negative but was once again technically difficult study.  I am concerned about a proximal clot in the last vascular surgery to weigh in.  Of note, he does have a history of CABG but the saphenous grain graft was from his right lower extremity.  -  iliac and inferior vena cava scan ordered by vascular surgery     Coronary artery disease status post CABG  Chronic heart failure with reduced ejection fraction  Hypertension  Continue aspirin, Coreg, hydralazine, Imdur, Crestor. Cardiology managing.   Strict I/Os. Daily weights     Type 2 diabetes  A1c 11  Started on NPH insulin and dose adjusted as required     Acute kidney injury  Chronic kidney disease  Creatinine has peaked and is now downtrending. On torsemide 100mg daily     · Pharmacy to dose lovenox for DVT prophylaxis.  · Full code.  · Discussed with patient and nursing staff.  · Anticipate discharge home in 2-3 days.      Hiram Velez MD  [facility]  05/28/23  11:59 EDT

## 2023-05-28 NOTE — PROGRESS NOTES
[facility] Progress Note      Patient Name: Rufus Dorsey  : 1985  MRN: [id number]  Primary Care Physician:  Provider, No Known  Date of admission: 2023    Subjective     Interval History:     Seen and examined.  Laying down in chair.  No major issues overnight. .  Wants to go home.    Review of Systems:   As noted above    Objective     Vitals:   Temp:  [97.3 °F (36.3 °C)-98.7 °F (37.1 °C)] 98.7 °F (37.1 °C)  Heart Rate:  [63-81] 71  Resp:  [18-20] 18  BP: (137-181)/(52-90) 181/90  Flow (L/min):  [2] 2    Intake/Output Summary (Last 24 hours) at 2023 1504  Last data filed at 2023 0000  Gross per 24 hour   Intake 360 ml   Output --   Net 360 ml       Physical Exam:    General Appearance: alert, oriented x 3, no acute distress   Skin: warm and dry  HEENT: oral mucosa normal, nonicteric sclera  Neck: supple, no JVD  Lungs: CTA  Heart: RRR, normal S1 and S2  Abdomen: soft, nontender, nondistended  : no palpable bladder  Extremities: + edema, cyanosis or clubbing  Neuro: normal speech and mental status     Scheduled Meds:     aspirin, 81 mg, Oral, Daily  carvedilol, 25 mg, Oral, BID With Meals  [START ON 2023] enoxaparin, 40 mg, Subcutaneous, Q24H  hydrALAZINE, 100 mg, Oral, TID  insulin lispro, 2-7 Units, Subcutaneous, 4x Daily AC & at Bedtime  insulin NPH, 21 Units, Subcutaneous, Q12H  isosorbide dinitrate, 40 mg, Oral, TID  potassium chloride ER, 40 mEq, Oral, Q4H  rosuvastatin, 40 mg, Oral, Nightly  senna-docusate sodium, 2 tablet, Oral, BID  sodium chloride, 10 mL, Intravenous, Q12H  torsemide, 100 mg, Oral, Daily      IV Meds:        Results Reviewed:   I have personally reviewed the results from the time of this admission to 2023 15:04 EDT     Results from last 7 days   Lab Units 23  0713 23  0812 23  0943 23  0344 23  1906   SODIUM mmol/L 138 136 137   < > 137   POTASSIUM mmol/L 3.3* 3.6 3.5   < > 3.9   CHLORIDE  mmol/L 102 102 101   < > 101   CO2 mmol/L 26.3 24.6 27.0   < > 24.5   BUN mg/dL 45* 47* 45*   < > 39*   CREATININE mg/dL 3.27* 3.31* 3.14*   < > 2.64*   CALCIUM mg/dL 8.6 8.4* 7.9*   < > 8.2*   BILIRUBIN mg/dL  --   --  0.3  --  0.2   ALK PHOS U/L  --   --  103  --  157*   ALT (SGPT) U/L  --   --  41  --  20   AST (SGOT) U/L  --   --  45*  --  16   GLUCOSE mg/dL 197* 201* 193*   < > 312*    < > = values in this interval not displayed.     Estimated Creatinine Clearance: 40.2 mL/min (A) (by C-G formula based on SCr of 3.27 mg/dL (H)).  Results from last 7 days   Lab Units 05/23/23  0344 05/22/23  1906   MAGNESIUM mg/dL 2.1 1.9         Results from last 7 days   Lab Units 05/28/23  0736 05/26/23  0509 05/25/23  1820 05/25/23  0429 05/24/23  0506 05/23/23  0344   WBC 10*3/mm3 9.16 8.55  --  7.60 8.12 9.24   HEMOGLOBIN g/dL 12.4* 12.9*  --  12.6* 12.9* 14.2   HEMOGLOBIN, POC g/dL  --   --  11.9*  --   --   --    PLATELETS 10*3/mm3 187 194  --  169 174 206     Results from last 7 days   Lab Units 05/23/23  0344   INR  0.99       Assessment / Plan     ASSESSMENT:  1.  Chronic kidney disease stage IIIb/IV with baseline creatinine 2.5 mg/dL.  Etiology is not clear however likely due to diabetic nephropathy with last urine protein creatinine ratio was 2.4 g in December 2021.  Most recent protein creatinine ratio is about 60 g.  Volume status appears to be improving.    Renal ultrasound showed normal bilateral kidneys    2.  Nephrotic range proteinuria with urine protein creatinine ratio around 6 g.  3.  Systolic congestive heart failure with last echocardiogram showing ejection fraction 20%.  Possible right heart cath in the morning.  Right heart cath showed normal pressures  4.  Hypertension urgency.  Blood pressure is improving with improvement of volume status and adjusting medications per cardiology.  Patient might benefit from adding small dose of ACE hepatal/ARB and possibly SGLT2.  5.  Dyslipidemia  6.  Diabetes  mellitus type 2     PLAN:     1.  Continue torsemide 100 mg p.o. daily.  Okay to be discharged from kidney standpoint to follow-up with nephrology in 2 weeks.  We will recheck his potassium before discharge as well.  2.  Might benefit from adding ACE inhibitor/ARB as well as SGLT2 once he is more stable as an outpatient.  4.  Surveillance labs    Thank you for involving us in the care of Rufus Dorsey.  Please feel free to call with any questions.    Paris Heller MD  05/28/23  15:04 EDT    [facility]  [phone]

## 2023-05-28 NOTE — PLAN OF CARE
Goal Outcome Evaluation:                   VSS. A/O x 4. Iliac and inferior vena cava scan done today was negative. Compression stocking started. Plan to DC this afternoon. Care plan carefully updated.

## 2023-05-29 NOTE — DISCHARGE SUMMARY
Patient Name: Rufus Dorsey  : 1985  MRN: [id number]    Date of Admission: 2023  Date of Discharge:  2023  Primary Care Physician: Provider, No Known      Chief Complaint:   Edema and Leg Pain      Discharge Diagnoses     Active Hospital Problems    Diagnosis  POA   • **Left leg swelling [M79.89]  Yes   • S/P CABG x 3 [Z95.1]  Not Applicable   • Acute on chronic combined systolic and diastolic CHF (congestive heart failure) [I50.43]  Unknown   • Elevated troponin [R77.8]  Yes   • Acute on chronic congestive heart failure [I50.9]  Yes   • CKD (chronic kidney disease) stage 2, GFR 60-89 ml/min [N18.2]  Yes   • Pulmonary HTN (HCC) [I27.20]  Yes   • Type 2 diabetes mellitus, with long-term current use of insulin (HCC) [E11.9, Z79.4]  Not Applicable      Resolved Hospital Problems   No resolved problems to display.        Hospital Course   This is a 37-year-old male with a past medical history of coronary artery disease status post CABG and PCI, heart failure with reduced ejection fraction with an EF of 20%, hypertension, CKD, obesity who comes to the hospital on  with left leg pain and swelling that started 3 weeks prior to presentation and had gotten progressively worse, as well as hypertensive urgency..  Cardiology saw the patient consultation to manage his medications as he was noted to be significantly hypertensive.  Nephrology was consulted for management of diuretics.   He was initially started on doxycycline for presumed cellulitis of the left lower extremity as venous duplex was negative for any DVTs.  He completed a 5-day course.  His D-dimer was elevated 0.9, which is consistent with previous findings.  The patient's creatinine did worsen with IV diuresis and he was subsequently transitioned to oral torsemide.  His creatinine had peaked at 3.3 and had decreased on the day of admission and he has follow-up arranged with nephrology.  His left leg edema remained persistent issue and a  repeat duplex was ordered which was also negative but once again a technically difficult study due to body habitus.  Vascular surgery was consulted and he underwent a duplex of the more proximal vessels which were also negative for any clots in the inferior vena cava and bilateral iliac veins..  In addition, the study also revealed no evidence of stenosis, compression, thrombosis of the left common iliac vein.  He was subsequently stable for discharge.  Medications were initially sent over to the [facility] pharmacy, but as the patient is uninsured and he to the cost prohibitive nature of his circumstance, they were once again sent to his preferred pharmacy.    Of note, it was ultimately decided that his left lower extremity edema was lymphedema in the setting of recurrent cellulitis.  He was referred to the lymphedema clinic and compression stockings were ordered upon discharge.    Day of Discharge       Physical Exam:  Temp:  [98.2 °F (36.8 °C)-98.7 °F (37.1 °C)] 98.7 °F (37.1 °C)  Heart Rate:  [63-81] 71  Resp:  [18-20] 18  BP: (137-181)/(52-90) 181/90  Body mass index is 47.63 kg/m².  Physical Exam  Constitutional:       General: He is not in acute distress.     Appearance: He is obese.   HENT:      Head: Normocephalic and atraumatic.   Cardiovascular:      Rate and Rhythm: Normal rate and regular rhythm.   Pulmonary:      Effort: Pulmonary effort is normal. No respiratory distress.   Abdominal:      General: There is no distension.      Palpations: Abdomen is soft.      Tenderness: There is no abdominal tenderness.   Musculoskeletal:      Right lower leg: No edema.      Left lower leg: Edema present.      Comments: Left lower extremity edema noted.   Skin:     General: Skin is warm and dry.      Comments: Chest wall with median sternotomy scar   Neurological:      General: No focal deficit present.      Mental Status: He is alert and oriented to person, place, and time.         Consultants     Consult  Orders (all) (From admission, onward)     Start     Ordered    05/28/23 1423  Inpatient Case Management  Consult  Once,   Status:  Canceled        Provider:  (Not yet assigned)    05/28/23 1423    05/27/23 0843  Inpatient Vascular Surgery Consult  Once        Specialty:  Vascular Surgery  Provider:  Wil Tamayo II, MD    05/27/23 0843    05/25/23 1445  Inpatient Vascular Surgery Consult  Once        Specialty:  Vascular Surgery  Provider:  Wil Tamayo II, MD    05/25/23 1445    05/24/23 0842  Inpatient Nephrology Consult  Once        Specialty:  Nephrology  Provider:  Paris Heller MD    05/24/23 0843    05/23/23 1304  Inpatient Diabetes Educator Consult  Once,   Status:  Canceled        Provider:  (Not yet assigned)    05/23/23 1303    05/22/23 2228  Inpatient Cardiology Consult  Once        Specialty:  Cardiology  Provider:  Vicente Trujillo MD    05/22/23 2230 05/22/23 2113  [facility] (on-call MD unless specified) Details  Once,   Status:  Canceled        Specialty:  Hospitalist  Provider:  (Not yet assigned)    05/22/23 2113              Procedures     Imaging Results (All)     Procedure Component Value Units Date/Time    US Renal Bilateral [[id number]] Collected: 05/25/23 0952     Updated: 05/25/23 1018    Narrative:      US RENAL LIMITED-  05/25/2023     HISTORY: Renal insufficiency.     The right kidney measures 13.4 cm in length. Left kidney measures 13.7  cm in length. No hydronephrosis, stones or renal masses are seen.  Urinary bladder is unremarkable.       Impression:      1. Normal bilateral renal ultrasound.     This report was finalized on 5/25/2023 9:54 AM by Dr. Wolf Arreola M.D.       XR Chest 2 View [[id number]] Collected: 05/22/23 1955     Updated: 05/22/23 2004    Narrative:      STAT PA AND LATERAL RADIOGRAPHIC VIEWS OF THE CHEST     CLINICAL HISTORY: Swelling.     FINDINGS:     PA and lateral radiographic views of the chest demonstrate low lung  volumes. The  cardiomediastinal silhouette is enlarged. Similar findings  were seen on the prior chest x-ray dated 06/04/2022. Sternal wires are  noted. There is pulmonary vascular engorgement. Additionally, there is a  suggestion of some interstitial indistinctness. These findings suggest  possible mild hydrostatic interstitial pulmonary edema and correlation  with clinical data is suggested. The osseous structures are incidentally  notable for multiple endplate irregularities and mild degrees of  vertebral body height loss throughout the thoracic spine.     This report was finalized on 5/22/2023 8:01 PM by Dr. Morgan Taylor M.D.             Pertinent Labs     Results from last 7 days   Lab Units 05/28/23  0736 05/26/23  0509 05/25/23  1820 05/25/23  0429 05/24/23  0506   WBC 10*3/mm3 9.16 8.55  --  7.60 8.12   HEMOGLOBIN g/dL 12.4* 12.9*  --  12.6* 12.9*   HEMOGLOBIN, POC g/dL  --   --  11.9*  --   --    PLATELETS 10*3/mm3 187 194  --  169 174     Results from last 7 days   Lab Units 05/28/23  0713 05/27/23  0812 05/26/23  0943 05/25/23  0429   SODIUM mmol/L 138 136 137 139   POTASSIUM mmol/L 3.3* 3.6 3.5 3.4*   CHLORIDE mmol/L 102 102 101 102   CO2 mmol/L 26.3 24.6 27.0 26.5   BUN mg/dL 45* 47* 45* 42*   CREATININE mg/dL 3.27* 3.31* 3.14* 3.07*   GLUCOSE mg/dL 197* 201* 193* 247*   Estimated Creatinine Clearance: 40.2 mL/min (A) (by C-G formula based on SCr of 3.27 mg/dL (H)).  Results from last 7 days   Lab Units 05/26/23  0943 05/22/23  1906   ALBUMIN g/dL 3.2* 2.8*   BILIRUBIN mg/dL 0.3 0.2   ALK PHOS U/L 103 157*   AST (SGOT) U/L 45* 16   ALT (SGPT) U/L 41 20     Results from last 7 days   Lab Units 05/28/23  0713 05/27/23  0812 05/26/23  0943 05/25/23  0429 05/24/23  0506 05/23/23  0344 05/22/23  1906   CALCIUM mg/dL 8.6 8.4* 7.9* 8.1*   < > 8.0* 8.2*   ALBUMIN g/dL  --   --  3.2*  --   --   --  2.8*   MAGNESIUM mg/dL  --   --   --   --   --  2.1 1.9    < > = values in this interval not displayed.       Results from last  7 days   Lab Units 05/23/23  1507 05/22/23  2105 05/22/23  1906   HSTROP T ng/L  --  61* 61*   PROBNP pg/mL  --   --  1,793.0*   D DIMER QUANT MCGFEU/mL 0.91*  --   --      Results from last 7 days   Lab Units 05/24/23  2214   CREATININE UR mg/dL 51.4   PROTEIN TOTAL URINE mg/dL 303.6   PROT/CREAT RATIO UR mg/G Crea 5,906.6*         Invalid input(s): LDLCALC        Test Results Pending at Discharge       Discharge Details        Discharge Medications      New Medications      Instructions Start Date   insulin  UNIT/ML injection  Commonly known as: humuLIN N,novoLIN N   20 Units, Subcutaneous, Every 12 Hours Scheduled      potassium chloride 20 MEQ CR tablet  Commonly known as: K-DUR,KLOR-CON   20 mEq, Oral, Daily      torsemide 100 MG tablet  Commonly known as: DEMADEX   100 mg, Oral, Daily   Start Date: May 29, 2023        Changes to Medications      Instructions Start Date   hydrALAZINE 100 MG tablet  Commonly known as: APRESOLINE  What changed:   · medication strength  · how much to take   100 mg, Oral, 3 Times Daily      isosorbide dinitrate 40 MG tablet  Commonly known as: ISORDIL  What changed:   · medication strength  · how much to take   40 mg, Oral, 3 Times Daily         Continue These Medications      Instructions Start Date   aspirin 81 MG EC tablet   81 mg, Oral, Daily      carvedilol 25 MG tablet  Commonly known as: COREG   25 mg, Oral, 2 Times Daily With Meals      FreeStyle Freedom Lite w/Device kit   Use to check blood sugar as directed.      Glucocard Expression Monitor w/Device kit   Use as directed      glucose monitor monitoring kit   1 each, Does not apply, 4 Times Daily Before Meals & Nightly      Lancets 28G misc   1 each, Other, 4 Times Daily After Meals & at Bedtime      TRUEplus Lancets 28G misc   Test 4 (Four) Times a Day After Meals & at Bedtime      rosuvastatin 40 MG tablet  Commonly known as: CRESTOR   40 mg, Oral, Nightly      Unifine Pentips 32G X 4 MM misc  Generic drug:  Insulin Pen Needle   Use with insulin 5 times daily      TRUEplus Pen Needles 31G X 5 MM misc  Generic drug: Insulin Pen Needle   Use as directed daily.         Stop These Medications    furosemide 40 MG tablet  Commonly known as: Lasix            No Known Allergies      Discharge Disposition:  Home or Self Care    Discharge Diet:  No active diet order      Discharge Activity:   Activity Instructions    Advance as tolerated           CODE STATUS:    Code Status and Medical Interventions:   Ordered at: 05/22/23 2230     Code Status (Patient has no pulse and is not breathing):    CPR (Attempt to Resuscitate)     Medical Interventions (Patient has pulse or is breathing):    Full Support       Future Appointments   Date Time Provider Department Center   7/14/2023  1:30 PM Vicente Trujillo MD [facility]     Additional Instructions for the Follow-ups that You Need to Schedule     Ambulatory Referral to Occupational Therapy   As directed      Specialty needed: Lymphedema    Follow-up needed: Yes            Follow-up Information     Provider, No Known .    Contact information:  [facility] [address] [phone]                         Additional Instructions for the Follow-ups that You Need to Schedule     Ambulatory Referral to Occupational Therapy   As directed      Specialty needed: Lymphedema    Follow-up needed: Yes           Time Spent on Discharge:  Greater than 30 minutes      Hiram Velez MD  [facility]  05/28/23  21:52 EDT

## 2023-05-29 NOTE — CASE MANAGEMENT/SOCIAL WORK
Case Management Discharge Note      Final Note: DC'd home 5/28              Transportation Services  Private: Car    Final Discharge Disposition Code: 01 - home or self-care

## 2023-05-29 NOTE — OUTREACH NOTE
Prep Survey    Flowsheet Row Responses   [facility] facility patient discharged from? [facility]   Is LACE score < 7 ? No   Eligibility Readm Mgmt   Discharge diagnosis Left leg swelling   Does the patient have one of the following disease processes/diagnoses(primary or secondary)? Other   Does the patient have Home health ordered? No   Is there a DME ordered? No   Prep survey completed? Yes          Selena CARDENAS - Registered Nurse

## 2023-06-14 NOTE — PROGRESS NOTES
"Enter Query Response Below      Query Response: No, cellulitis secondary to chronic lymphedema only             If applicable, please update the problem list.       Patient: Rufus Dorsey        : 1985  Account: [id number]           Admit Date: 2023        How to Respond to this query:       a. Click New Note     b. Answer query within the yellow box.                c. Update the Problem List, if applicable.  If you have any questions about this query contact me at: Sincerely,    Jayy Aleman RN, BSN  Clinical Documentation Integrity  [facility]  [email or web address]      Dr. Velez,     37 year old male with history of type 2 diabetes, coronary artery disease with CABG, ischemic cardiomyopathy presented with left lower extremity pitting edema, redness.  Discharge summary states \"it was ultimately decided that his left lower extremity edema was lymphedema in the setting of recurrent cellulitis.\"  Patient was treated with antibiotics with referral made to lymphedema clinic, compression stockings encouraged.  HGB A1C was 11.10.  Diabetes is noted as uncontrolled with hyperglycemia, patient non-compliant.  After study,  can the cellulitis also be correlated with type 2 diabetes in this case:    *Yes, cellulitis also related to type 2 diabetes  *No, cellulitis secondary to chronic lymphedema only  *Other________  *Clinically undetermined       By submitting this query, we are merely seeking further clarification of documentation to accurately reflect all conditions that you are monitoring, evaluating, treating or that extend the hospitalization or utilize additional resources of care. Please utilize your independent clinical judgment when addressing the question(s) above.     This query and your response, once completed, will be entered into the legal medical record.    Sincerely,  Jayy Aleman  Clinical Documentation Integrity Program     "

## 2023-08-04 ENCOUNTER — OFFICE VISIT (OUTPATIENT)
Dept: CARDIOLOGY | Facility: CLINIC | Age: 38
End: 2023-08-04

## 2023-08-04 VITALS
HEIGHT: 66 IN | WEIGHT: 303 LBS | BODY MASS INDEX: 48.7 KG/M2 | HEART RATE: 74 BPM | SYSTOLIC BLOOD PRESSURE: 132 MMHG | DIASTOLIC BLOOD PRESSURE: 80 MMHG

## 2023-08-04 DIAGNOSIS — I50.22 CHRONIC HFREF (HEART FAILURE WITH REDUCED EJECTION FRACTION): ICD-10-CM

## 2023-08-04 DIAGNOSIS — I10 PRIMARY HYPERTENSION: ICD-10-CM

## 2023-08-04 DIAGNOSIS — Z95.1 S/P CABG X 3: Primary | ICD-10-CM

## 2023-08-04 DIAGNOSIS — I25.810 CORONARY ARTERY DISEASE INVOLVING CORONARY BYPASS GRAFT OF NATIVE HEART WITHOUT ANGINA PECTORIS: ICD-10-CM

## 2023-08-04 RX ORDER — ASPIRIN 81 MG/1
81 TABLET ORAL DAILY
Qty: 30 TABLET | Refills: 11 | Status: SHIPPED | OUTPATIENT
Start: 2023-08-04

## 2023-08-04 RX ORDER — ISOSORBIDE DINITRATE 40 MG/1
40 TABLET ORAL 3 TIMES DAILY
Qty: 90 TABLET | Refills: 2 | Status: SHIPPED | OUTPATIENT
Start: 2023-08-04

## 2023-08-04 RX ORDER — CARVEDILOL 25 MG/1
25 TABLET ORAL 2 TIMES DAILY WITH MEALS
Qty: 60 TABLET | Refills: 11 | Status: SHIPPED | OUTPATIENT
Start: 2023-08-04

## 2023-08-04 RX ORDER — HYDRALAZINE HYDROCHLORIDE 100 MG/1
100 TABLET, FILM COATED ORAL 3 TIMES DAILY
Qty: 120 TABLET | Refills: 2 | Status: SHIPPED | OUTPATIENT
Start: 2023-08-04

## 2023-08-04 RX ORDER — TORSEMIDE 100 MG/1
100 TABLET ORAL DAILY
Qty: 60 TABLET | Refills: 2 | Status: SHIPPED | OUTPATIENT
Start: 2023-08-04

## 2023-08-20 NOTE — PROGRESS NOTES
"Patient Name: Rufus Dorsey  :1985  36 y.o.      Patient Care Team:  Provider, No Known as PCP - General    Interval History:   Blood pressure is doing a bit better.    Subjective:  Following for coronary disease    Objective   Vital Signs  Temp:  [97.9 °F (36.6 °C)-99.3 °F (37.4 °C)] 97.9 °F (36.6 °C)  Heart Rate:  [80-93] 91  Resp:  [15-18] 15  BP: (138-158)/(74-91) 138/74    Intake/Output Summary (Last 24 hours) at 2021 0914  Last data filed at 2021 0500  Gross per 24 hour   Intake 730 ml   Output 2550 ml   Net -1820 ml     Flowsheet Rows      First Filed Value   Admission Height 167.6 cm (66\") Documented at 2021 1445   Admission Weight 127 kg (280 lb 6.4 oz) Documented at 2021 1852          Physical Exam:   General Appearance:    Alert, cooperative, in no acute distress   Lungs:     Clear to auscultation.  Normal respiratory effort and rate.      Heart:    Regular rhythm and normal rate, normal S1 and S2, no murmurs, gallops or rubs.     Chest Wall:    No abnormalities observed   Abdomen:     Soft, nontender, positive bowel sounds.     Extremities:   no cyanosis, clubbing or edema.  No marked joint deformities.  Adequate musculoskeletal strength.       Results Review:    Results from last 7 days   Lab Units 21  0545   SODIUM mmol/L 133*   POTASSIUM mmol/L 3.6   CHLORIDE mmol/L 96*   CO2 mmol/L 29.0   BUN mg/dL 28*   CREATININE mg/dL 1.66*   GLUCOSE mg/dL 138*   CALCIUM mg/dL 7.9*     Results from last 7 days   Lab Units 21  0418 21  2132 21  1900   TROPONIN T ng/mL 0.177* 0.183* 0.149*     Results from last 7 days   Lab Units 21  0545   WBC 10*3/mm3 11.20*   HEMOGLOBIN g/dL 13.2   HEMATOCRIT % 40.3   PLATELETS 10*3/mm3 244     Results from last 7 days   Lab Units 21  0418   INR  1.10     Results from last 7 days   Lab Units 21  0540   CHOLESTEROL mg/dL 260*     Results from last 7 days   Lab Units 21  0418   MAGNESIUM mg/dL 2.2 "     Results from last 7 days   Lab Units 12/05/21  0540   CHOLESTEROL mg/dL 260*   TRIGLYCERIDES mg/dL 126   HDL CHOL mg/dL 36*   LDL CHOL mg/dL 201*         Medication Review:   aspirin, 81 mg, Oral, Daily  atorvastatin, 80 mg, Oral, Nightly  budesonide-formoterol, 2 puff, Inhalation, BID - RT  bumetanide, 2 mg, Oral, BID  hydrALAZINE, 50 mg, Oral, Q8H  insulin glargine, 20 Units, Subcutaneous, Nightly  insulin lispro, 0-7 Units, Subcutaneous, TID AC  insulin lispro, 5 Units, Subcutaneous, TID With Meals  isosorbide mononitrate, 30 mg, Oral, Q24H  metoprolol succinate XL, 100 mg, Oral, Q24H  potassium chloride, 20 mEq, Oral, Once  prasugrel, 10 mg, Oral, Daily  sodium chloride, 10 mL, Intravenous, Q12H         nitroprusside, 0.3-10 mcg/kg/min, Last Rate: Stopped (12/05/21 1357)        Assessment/Plan     1.  Coronary artery disease with history of CABG x3.  Heart catheterization December 4, 2021 showed diffuse small vessel disease.  A drug-eluting stent was placed in the LIMA going to the LAD.  Continue dual antiplatelet therapy.  2.  Acute on chronic systolic congestive heart failure.  Ejection fraction 37% by echo August 2021.  Left ventricular end-diastolic pressure between 40 and 50 mmHg by cath on December 4, 2021.  Looks euvolemic.  On oral diuretics.  On Toprol-XL and hydralazine and nitrates given kidney disease.  3.  Chest pain.  4.  Tachycardia.  5.  Diabetes  6.  Moderately reduced right ventricular systolic function.  7.  Acute on chronic kidney disease.  Reviewed nephrology's note.    Increase Imdur today.  Okay with transfer to the floor.  I will follow his vital signs peripherally.  Follow-up with  or her nurse practitioner in the office in 1 to 2 weeks.  My office will call to set up that appointment.    Sagrario Otoole MD, [facility]  12/07/21  09:14 EST         no chest pain, no cough, and no shortness of breath.

## 2023-08-31 RX ORDER — POTASSIUM CHLORIDE 20 MEQ/1
20 TABLET, EXTENDED RELEASE ORAL DAILY
Qty: 90 TABLET | Refills: 3 | Status: SHIPPED | OUTPATIENT
Start: 2023-08-31

## 2023-11-21 ENCOUNTER — LAB (OUTPATIENT)
Dept: LAB | Facility: HOSPITAL | Age: 38
End: 2023-11-21
Payer: MEDICAID

## 2023-11-21 ENCOUNTER — OFFICE VISIT (OUTPATIENT)
Age: 38
End: 2023-11-21
Payer: MEDICAID

## 2023-11-21 VITALS
OXYGEN SATURATION: 96 % | DIASTOLIC BLOOD PRESSURE: 90 MMHG | SYSTOLIC BLOOD PRESSURE: 180 MMHG | WEIGHT: 311 LBS | HEIGHT: 66 IN | HEART RATE: 87 BPM | BODY MASS INDEX: 49.98 KG/M2

## 2023-11-21 DIAGNOSIS — I50.22 CHRONIC HFREF (HEART FAILURE WITH REDUCED EJECTION FRACTION): ICD-10-CM

## 2023-11-21 DIAGNOSIS — I25.810 CORONARY ARTERY DISEASE INVOLVING CORONARY BYPASS GRAFT OF NATIVE HEART WITHOUT ANGINA PECTORIS: ICD-10-CM

## 2023-11-21 DIAGNOSIS — I10 PRIMARY HYPERTENSION: ICD-10-CM

## 2023-11-21 DIAGNOSIS — I50.22 CHRONIC HFREF (HEART FAILURE WITH REDUCED EJECTION FRACTION): Primary | ICD-10-CM

## 2023-11-21 LAB
ANION GAP SERPL CALCULATED.3IONS-SCNC: 9.5 MMOL/L (ref 5–15)
BUN SERPL-MCNC: 42 MG/DL (ref 6–20)
BUN/CREAT SERPL: 12.4 (ref 7–25)
CALCIUM SPEC-SCNC: 8 MG/DL (ref 8.6–10.5)
CHLORIDE SERPL-SCNC: 97 MMOL/L (ref 98–107)
CO2 SERPL-SCNC: 28.5 MMOL/L (ref 22–29)
CREAT SERPL-MCNC: 3.4 MG/DL (ref 0.76–1.27)
EGFRCR SERPLBLD CKD-EPI 2021: 22.7 ML/MIN/1.73
GLUCOSE SERPL-MCNC: 400 MG/DL (ref 65–99)
MAGNESIUM SERPL-MCNC: 2.1 MG/DL (ref 1.6–2.6)
POTASSIUM SERPL-SCNC: 3.7 MMOL/L (ref 3.5–5.2)
SODIUM SERPL-SCNC: 135 MMOL/L (ref 136–145)

## 2023-11-21 PROCEDURE — 80048 BASIC METABOLIC PNL TOTAL CA: CPT

## 2023-11-21 PROCEDURE — 83735 ASSAY OF MAGNESIUM: CPT

## 2023-11-21 PROCEDURE — 36415 COLL VENOUS BLD VENIPUNCTURE: CPT

## 2023-11-21 RX ORDER — TORSEMIDE 100 MG/1
200 TABLET ORAL DAILY
Qty: 60 TABLET | Refills: 11 | Status: SHIPPED | OUTPATIENT
Start: 2023-11-21

## 2023-11-21 NOTE — PROGRESS NOTES
Subjective:     Encounter Date:11/21/2023      Patient ID: Rufus Dorsey is a 38 y.o. male.    Chief Complaint:  Follow-up of volume overload, heart failure    HPI:   38 y.o. male with HFrEF secondary to ischemic cardiomyopathy (EF 20%), CAD status post CABG(recent left heart cath in December 2021 with 90% LIMA to LAD obstruction status post drug-eluting stent, patent SVG to OM1 and OM 2), hypertension, diabetes, obesity who presents for follow-up.  Patient was last seen in August and felt well at the time.  No medication changes were made at that time. Currently he reports a cough which he has had for about a month.  He also reports mild lower extremity edema as well as dyspnea on exertion.  He has not followed up with nephrology.    Of note, per prior note:  Patient was admitted May 22 to May 28 with lower extremity edema.  He was initially diuresed with IV diuretics however underwent right heart catheterization which revealed normal filling pressures.  His creatinine up trended to 3 and he was started on torsemide by nephrology.  He had extensive work-up for his lower extremity including Dopplers which were normal.       The following portions of the patient's history were reviewed and updated as appropriate: allergies, current medications, past family history, past medical history, past social history, past surgical history and problem list.     REVIEW OF SYSTEMS:   All systems reviewed.  Pertinent positives identified in HPI.  All other systems are negative.    Past Medical History:   Diagnosis Date    CHF (congestive heart failure)     Coronary artery disease     Diabetes     Heart failure 05/16/2016    High cholesterol     Hypertension     Ischemic cardiomyopathy 06/25/2016       Family History   Family history unknown: Yes       Social History     Socioeconomic History    Marital status: Single   Tobacco Use    Smoking status: Never    Smokeless tobacco: Never   Vaping Use    Vaping Use: Never used    Substance and Sexual Activity    Alcohol use: Never    Drug use: Never    Sexual activity: Defer       No Known Allergies    Past Surgical History:   Procedure Laterality Date    CARDIAC CATHETERIZATION  01/25/2017    Right heart cath    CARDIAC CATHETERIZATION N/A 12/4/2021    Procedure: SAPHENOUS VEIN GRAFT;  Surgeon: Les Reyes MD;  Location:  [facility];  Service: Cardiovascular;  Laterality: N/A;    CARDIAC CATHETERIZATION N/A 12/4/2021    Procedure: Coronary angiography;  Surgeon: Les Reyes MD;  Location:  [facility];  Service: Cardiovascular;  Laterality: N/A;    CARDIAC CATHETERIZATION N/A 12/4/2021    Procedure: Stent ROSEY coronary;  Surgeon: Les Reyes MD;  Location:  [facility];  Service: Cardiovascular;  Laterality: N/A;    CARDIAC CATHETERIZATION N/A 12/4/2021    Procedure: Native mammary injection;  Surgeon: Les Reyes MD;  Location:  [facility];  Service: Cardiovascular;  Laterality: N/A;    CARDIAC CATHETERIZATION N/A 5/25/2023    Procedure: Right Heart Cath;  Surgeon: Vicente Trujillo MD;  Location:  [facility];  Service: Cardiology;  Laterality: N/A;    CARDIAC SURGERY      CORONARY ARTERY BYPASS GRAFT  05/26/2015    cabg x3 Dr. Key       ECG 12 Lead    Date/Time: 11/21/2023 4:06 PM  Performed by: Vicente rTujillo MD    Authorized by: Vicente Trujillo MD  Comparison: compared with previous ECG from 8/4/2023  Similar to previous ECG  Rhythm: sinus rhythm  Rate: normal  Conduction: non-specific intraventricular conduction delay  Other findings: non-specific ST-T wave changes    Clinical impression: abnormal EKG             Objective:         Vitals:    11/21/23 1447   BP: 180/90   Pulse: 87   SpO2: 96%         PHYSICAL EXAM:  GEN: well appearing, in NAD, obese  HEENT: NCAT, EOMI, moist mucus membranes   Respiratory: CTAB, no wheezes, rales or rhonchi  CV: normal rate, regular rhythm, normal S1,  S2, no murmurs, rubs, gallops, +2 radial pulses b/l, JVD not elevated  GI: soft, protuberant, nontender  MSK: trace-1+ edema  Skin: no rash, warm, dry  Heme/Lymph: no bruising or bleeding  Psych: organized thought, normal behavior and affect  Neuro: Alert and Oriented x 3, grossly normal motor function        Assessment:         (I50.22) Chronic HFrEF (heart failure with reduced ejection fraction) - Plan: Basic Metabolic Panel, Magnesium, ECG 12 Lead    (I25.810) Coronary artery disease involving coronary bypass graft of native heart without angina pectoris - Plan: Basic Metabolic Panel, Magnesium, ECG 12 Lead    (I10) Primary hypertension    37-year-old man with HFrEF secondary to ischemic cardiomyopathy (EF 20%), CAD status post CABG(recent left heart cath in December 2021 with 90% LIMA to LAD obstruction status post drug-eluting stent, patent SVG to OM1 and OM 2), hypertension, diabetes, obesity who presents for follow-up.      Plan:       #Chronic HFrEF  Patient with mild volume overloaded on exam. BP is elevated.    - Continue carvedilol 25 mg twice daily, hydralazine 100 mg 3 times daily, Isordil 40 mg 3 times daily  - Increase torsemide to 200 mg daily  - Recheck BMP and magnesium, creatinine stable, will restart lisinopril    #CAD status post CABG  - Continue Crestor 40 mg daily, aspirin 81 mg daily    #Hypertension  jUncontrolled.  - Continue hydralazine 100 mg 3 times daily, Isordil 40mg daily, carvedilol 25 mg twice daily    He will return to see me in 3 months.    Vicente Trujillo MD  11/21/23  [facility]    Outpatient Encounter Medications as of 11/21/2023   Medication Sig Dispense Refill    aspirin 81 MG EC tablet Take 1 tablet by mouth Daily. 30 tablet 11    Blood Glucose Monitoring Suppl (FreeStyle Freedom Lite) w/Device kit Use to check blood sugar as directed. 1 each 0    Blood Glucose Monitoring Suppl (Glucocard Expression Monitor) w/Device kit Use as directed 1 kit 0    carvedilol  (COREG) 25 MG tablet Take 1 tablet by mouth 2 (Two) Times a Day With Meals. 60 tablet 11    glucose monitor monitoring kit 1 each 4 (Four) Times a Day Before Meals & at Bedtime. 1 each 0    hydrALAZINE (APRESOLINE) 100 MG tablet Take 1 tablet by mouth 3 (Three) Times a Day. 120 tablet 2    isosorbide dinitrate (ISORDIL) 40 MG tablet Take 1 tablet by mouth 3 (Three) Times a Day. 90 tablet 2    potassium chloride (K-DUR,KLOR-CON) 20 MEQ CR tablet Take 1 tablet by mouth Daily. 90 tablet 3    torsemide (DEMADEX) 100 MG tablet Take 2 tablets by mouth Daily. 60 tablet 11    [DISCONTINUED] torsemide (DEMADEX) 100 MG tablet Take 1 tablet by mouth Daily. 60 tablet 2     No facility-administered encounter medications on file as of 11/21/2023.

## 2024-02-27 ENCOUNTER — OFFICE VISIT (OUTPATIENT)
Age: 39
End: 2024-02-27
Payer: MEDICAID

## 2024-02-27 VITALS
HEART RATE: 88 BPM | SYSTOLIC BLOOD PRESSURE: 130 MMHG | HEIGHT: 66 IN | DIASTOLIC BLOOD PRESSURE: 80 MMHG | OXYGEN SATURATION: 94 % | WEIGHT: 290 LBS | BODY MASS INDEX: 46.61 KG/M2

## 2024-02-27 DIAGNOSIS — I50.22 CHRONIC HFREF (HEART FAILURE WITH REDUCED EJECTION FRACTION): ICD-10-CM

## 2024-02-27 DIAGNOSIS — I25.810 CORONARY ARTERY DISEASE INVOLVING CORONARY BYPASS GRAFT OF NATIVE HEART WITHOUT ANGINA PECTORIS: ICD-10-CM

## 2024-02-27 DIAGNOSIS — G47.33 OSA (OBSTRUCTIVE SLEEP APNEA): Primary | ICD-10-CM

## 2024-02-27 PROCEDURE — 99213 OFFICE O/P EST LOW 20 MIN: CPT | Performed by: STUDENT IN AN ORGANIZED HEALTH CARE EDUCATION/TRAINING PROGRAM

## 2024-02-27 NOTE — PROGRESS NOTES
Subjective:     Encounter Date:02/27/2024      Patient ID: Rufus Dorsey is a 38 y.o. male.    Chief Complaint:  Follow-up of volume overload, heart failure    HPI:   38 y.o. male with HFrEF secondary to ischemic cardiomyopathy (EF 20%), CAD status post CABG(recent left heart cath in December 2021 with 90% LIMA to LAD obstruction status post drug-eluting stent, patent SVG to OM1 and OM 2), hypertension, diabetes, obesity who presents for follow-up.  Patient has been in his usual state of health.  He has lost some weight and is now down to 290 pounds compared to 311 pounds in November.  He has been feeling well other than having a cold recently.  He denies any dyspnea on exertion or lower extremity edema.    The following portions of the patient's history were reviewed and updated as appropriate: allergies, current medications, past family history, past medical history, past social history, past surgical history and problem list.     REVIEW OF SYSTEMS:   All systems reviewed.  Pertinent positives identified in HPI.  All other systems are negative.    Past Medical History:   Diagnosis Date    CHF (congestive heart failure)     Coronary artery disease     Diabetes     Heart failure 05/16/2016    High cholesterol     Hypertension     Ischemic cardiomyopathy 06/25/2016       Family History   Family history unknown: Yes       Social History     Socioeconomic History    Marital status: Single   Tobacco Use    Smoking status: Never    Smokeless tobacco: Never   Vaping Use    Vaping Use: Never used   Substance and Sexual Activity    Alcohol use: Never    Drug use: Never    Sexual activity: Defer       No Known Allergies    Past Surgical History:   Procedure Laterality Date    CARDIAC CATHETERIZATION  01/25/2017    Right heart cath    CARDIAC CATHETERIZATION N/A 12/4/2021    Procedure: SAPHENOUS VEIN GRAFT;  Surgeon: Les Reyes MD;  Location: [facility] CATH INVASIVE [facility];  Service: Cardiovascular;  Laterality:  N/A;    CARDIAC CATHETERIZATION N/A 12/4/2021    Procedure: Coronary angiography;  Surgeon: Les Reyes MD;  Location:  [facility];  Service: Cardiovascular;  Laterality: N/A;    CARDIAC CATHETERIZATION N/A 12/4/2021    Procedure: Stent ROSEY coronary;  Surgeon: Les Reyes MD;  Location:  [facility];  Service: Cardiovascular;  Laterality: N/A;    CARDIAC CATHETERIZATION N/A 12/4/2021    Procedure: Native mammary injection;  Surgeon: Les Reyes MD;  Location:  [facility];  Service: Cardiovascular;  Laterality: N/A;    CARDIAC CATHETERIZATION N/A 5/25/2023    Procedure: Right Heart Cath;  Surgeon: Vicente Trujillo MD;  Location:  [facility] INV[facility];  Service: Cardiology;  Laterality: N/A;    CARDIAC SURGERY      CORONARY ARTERY BYPASS GRAFT  05/26/2015    cabg x3 Dr. Key     Procedures       Objective:         Vitals:    02/27/24 1439   BP: 130/80   Pulse: 88   SpO2: 94%         PHYSICAL EXAM:  GEN: well appearing, in NAD, obese  HEENT: NCAT, EOMI, moist mucus membranes   Respiratory: CTAB, no wheezes, rales or rhonchi  CV: normal rate, regular rhythm, normal S1, S2, no murmurs, rubs, gallops, +2 radial pulses b/l, JVD not elevated  GI: soft, protuberant, nontender  MSK: trace-1+ edema  Skin: no rash, warm, dry  Heme/Lymph: no bruising or bleeding  Psych: organized thought, normal behavior and affect  Neuro: Alert and Oriented x 3, grossly normal motor function        Assessment:         (G47.33) VERONICA (obstructive sleep apnea) - Plan: Ambulatory Referral to Sleep Medicine    (I50.22) Chronic HFrEF (heart failure with reduced ejection fraction) - Plan: Basic Metabolic Panel, Magnesium    (I25.810) Coronary artery disease involving coronary bypass graft of native heart without angina pectoris    37-year-old man with HFrEF secondary to ischemic cardiomyopathy (EF 20%), CAD status post CABG(recent left heart cath in December 2021 with 90% LIMA  to LAD obstruction status post drug-eluting stent, patent SVG to OM1 and OM 2), hypertension, diabetes, obesity who presents for follow-up.      Plan:       #Chronic HFrEF  Patient with mild volume overloaded on exam. BP is elevated.    - Continue carvedilol 25 mg twice daily, hydralazine 100 mg 3 times daily, Isordil 40 mg 3 times daily  - continue torsemide 100 mg daily  - Recheck BMP and magnesium    #CAD status post CABG  - Continue Crestor 40 mg daily, aspirin 81 mg daily    #Hypertension  Uncontrolled.  - Continue hydralazine 100 mg 3 times daily, Isordil 40mg daily, carvedilol 25 mg twice daily    He will return to see me in 6 months.    Vicente Trujillo MD  02/27/24  [facility]    Outpatient Encounter Medications as of 2/27/2024   Medication Sig Dispense Refill    aspirin 81 MG EC tablet Take 1 tablet by mouth Daily. 30 tablet 11    Blood Glucose Monitoring Suppl (FreeStyle Freedom Lite) w/Device kit Use to check blood sugar as directed. 1 each 0    Blood Glucose Monitoring Suppl (Glucocard Expression Monitor) w/Device kit Use as directed 1 kit 0    carvedilol (COREG) 25 MG tablet Take 1 tablet by mouth 2 (Two) Times a Day With Meals. 60 tablet 11    glucose monitor monitoring kit 1 each 4 (Four) Times a Day Before Meals & at Bedtime. 1 each 0    hydrALAZINE (APRESOLINE) 100 MG tablet Take 1 tablet by mouth 3 (Three) Times a Day. 120 tablet 2    isosorbide dinitrate (ISORDIL) 40 MG tablet Take 1 tablet by mouth 3 (Three) Times a Day. 90 tablet 2    potassium chloride (K-DUR,KLOR-CON) 20 MEQ CR tablet Take 1 tablet by mouth Daily. 90 tablet 3    torsemide (DEMADEX) 100 MG tablet Take 2 tablets by mouth Daily. 60 tablet 11     No facility-administered encounter medications on file as of 2/27/2024.

## 2024-10-10 RX ORDER — TORSEMIDE 100 MG/1
200 TABLET ORAL DAILY
Qty: 180 TABLET | Refills: 0 | Status: SHIPPED | OUTPATIENT
Start: 2024-10-10

## 2024-10-10 RX ORDER — POTASSIUM CHLORIDE 1500 MG/1
20 TABLET, EXTENDED RELEASE ORAL DAILY
Qty: 90 TABLET | Refills: 0 | Status: SHIPPED | OUTPATIENT
Start: 2024-10-10

## 2024-10-10 RX ORDER — CARVEDILOL 25 MG/1
25 TABLET ORAL 2 TIMES DAILY WITH MEALS
Qty: 180 TABLET | Refills: 0 | Status: SHIPPED | OUTPATIENT
Start: 2024-10-10

## 2024-10-10 RX ORDER — ISOSORBIDE DINITRATE 40 MG/1
40 TABLET ORAL 3 TIMES DAILY
Qty: 270 TABLET | Refills: 0 | Status: SHIPPED | OUTPATIENT
Start: 2024-10-10

## 2024-10-10 RX ORDER — ASPIRIN 81 MG/1
81 TABLET ORAL DAILY
Qty: 90 TABLET | Refills: 0 | Status: SHIPPED | OUTPATIENT
Start: 2024-10-10

## 2024-10-10 RX ORDER — HYDRALAZINE HYDROCHLORIDE 100 MG/1
100 TABLET, FILM COATED ORAL 3 TIMES DAILY
Qty: 270 TABLET | Refills: 0 | Status: SHIPPED | OUTPATIENT
Start: 2024-10-10

## 2024-11-13 ENCOUNTER — APPOINTMENT (OUTPATIENT)
Dept: GENERAL RADIOLOGY | Facility: HOSPITAL | Age: 39
End: 2024-11-13

## 2024-11-13 ENCOUNTER — HOSPITAL ENCOUNTER (INPATIENT)
Facility: HOSPITAL | Age: 39
LOS: 20 days | Discharge: HOME | End: 2024-12-03
Attending: EMERGENCY MEDICINE | Admitting: HOSPITALIST

## 2024-11-13 DIAGNOSIS — R79.89 ELEVATED TROPONIN: ICD-10-CM

## 2024-11-13 DIAGNOSIS — R73.9 HYPERGLYCEMIA: ICD-10-CM

## 2024-11-13 DIAGNOSIS — I50.9 ACUTE ON CHRONIC CONGESTIVE HEART FAILURE, UNSPECIFIED HEART FAILURE TYPE: Primary | ICD-10-CM

## 2024-11-13 DIAGNOSIS — N17.9 AKI (ACUTE KIDNEY INJURY): ICD-10-CM

## 2024-11-13 DIAGNOSIS — R09.02 HYPOXIA: ICD-10-CM

## 2024-11-13 DIAGNOSIS — N17.9 ACUTE RENAL FAILURE SUPERIMPOSED ON CHRONIC KIDNEY DISEASE, UNSPECIFIED ACUTE RENAL FAILURE TYPE, UNSPECIFIED CKD STAGE: ICD-10-CM

## 2024-11-13 DIAGNOSIS — N18.9 ACUTE RENAL FAILURE SUPERIMPOSED ON CHRONIC KIDNEY DISEASE, UNSPECIFIED ACUTE RENAL FAILURE TYPE, UNSPECIFIED CKD STAGE: ICD-10-CM

## 2024-11-13 DIAGNOSIS — R60.1 ANASARCA: ICD-10-CM

## 2024-11-13 LAB
ALBUMIN SERPL-MCNC: 3.3 G/DL (ref 3.5–5.2)
ALBUMIN/GLOB SERPL: 1 G/DL
ALP SERPL-CCNC: 121 U/L (ref 39–117)
ALT SERPL W P-5'-P-CCNC: 15 U/L (ref 1–41)
ANION GAP SERPL CALCULATED.3IONS-SCNC: 12.3 MMOL/L (ref 5–15)
AST SERPL-CCNC: 13 U/L (ref 1–40)
BASOPHILS # BLD AUTO: 0.03 10*3/MM3 (ref 0–0.2)
BASOPHILS NFR BLD AUTO: 0.3 % (ref 0–1.5)
BILIRUB SERPL-MCNC: 0.4 MG/DL (ref 0–1.2)
BUN SERPL-MCNC: 76 MG/DL (ref 6–20)
BUN/CREAT SERPL: 18.1 (ref 7–25)
CALCIUM SPEC-SCNC: 8 MG/DL (ref 8.6–10.5)
CHLORIDE SERPL-SCNC: 99 MMOL/L (ref 98–107)
CO2 SERPL-SCNC: 27.7 MMOL/L (ref 22–29)
CREAT SERPL-MCNC: 4.21 MG/DL (ref 0.76–1.27)
DEPRECATED RDW RBC AUTO: 44.4 FL (ref 37–54)
EGFRCR SERPLBLD CKD-EPI 2021: 17.5 ML/MIN/1.73
EOSINOPHIL # BLD AUTO: 0.21 10*3/MM3 (ref 0–0.4)
EOSINOPHIL NFR BLD AUTO: 2.2 % (ref 0.3–6.2)
ERYTHROCYTE [DISTWIDTH] IN BLOOD BY AUTOMATED COUNT: 14 % (ref 12.3–15.4)
FLUAV RNA RESP QL NAA+PROBE: NOT DETECTED
FLUBV RNA RESP QL NAA+PROBE: NOT DETECTED
GEN 5 1HR TROPONIN T REFLEX: 116 NG/L
GLOBULIN UR ELPH-MCNC: 3.3 GM/DL
GLUCOSE SERPL-MCNC: 275 MG/DL (ref 65–99)
HCT VFR BLD AUTO: 38 % (ref 37.5–51)
HGB BLD-MCNC: 11.7 G/DL (ref 13–17.7)
HOLD SPECIMEN: NORMAL
HOLD SPECIMEN: NORMAL
IMM GRANULOCYTES # BLD AUTO: 0.04 10*3/MM3 (ref 0–0.05)
IMM GRANULOCYTES NFR BLD AUTO: 0.4 % (ref 0–0.5)
LYMPHOCYTES # BLD AUTO: 0.87 10*3/MM3 (ref 0.7–3.1)
LYMPHOCYTES NFR BLD AUTO: 9.1 % (ref 19.6–45.3)
MCH RBC QN AUTO: 26.8 PG (ref 26.6–33)
MCHC RBC AUTO-ENTMCNC: 30.8 G/DL (ref 31.5–35.7)
MCV RBC AUTO: 87 FL (ref 79–97)
MONOCYTES # BLD AUTO: 0.68 10*3/MM3 (ref 0.1–0.9)
MONOCYTES NFR BLD AUTO: 7.2 % (ref 5–12)
NEUTROPHILS NFR BLD AUTO: 7.68 10*3/MM3 (ref 1.7–7)
NEUTROPHILS NFR BLD AUTO: 80.8 % (ref 42.7–76)
NRBC BLD AUTO-RTO: 0 /100 WBC (ref 0–0.2)
NT-PROBNP SERPL-MCNC: ABNORMAL PG/ML (ref 0–450)
PLATELET # BLD AUTO: 173 10*3/MM3 (ref 140–450)
PMV BLD AUTO: 11.9 FL (ref 6–12)
POTASSIUM SERPL-SCNC: 3.9 MMOL/L (ref 3.5–5.2)
PROT SERPL-MCNC: 6.6 G/DL (ref 6–8.5)
QT INTERVAL: 398 MS
QTC INTERVAL: 482 MS
RBC # BLD AUTO: 4.37 10*6/MM3 (ref 4.14–5.8)
SARS-COV-2 RNA RESP QL NAA+PROBE: NOT DETECTED
SODIUM SERPL-SCNC: 139 MMOL/L (ref 136–145)
TROPONIN T DELTA: -1 NG/L
TROPONIN T SERPL HS-MCNC: 117 NG/L
WBC NRBC COR # BLD AUTO: 9.51 10*3/MM3 (ref 3.4–10.8)
WHOLE BLOOD HOLD COAG: NORMAL
WHOLE BLOOD HOLD SPECIMEN: NORMAL

## 2024-11-13 PROCEDURE — 84484 ASSAY OF TROPONIN QUANT: CPT | Performed by: EMERGENCY MEDICINE

## 2024-11-13 PROCEDURE — 83880 ASSAY OF NATRIURETIC PEPTIDE: CPT | Performed by: EMERGENCY MEDICINE

## 2024-11-13 PROCEDURE — 93005 ELECTROCARDIOGRAM TRACING: CPT | Performed by: EMERGENCY MEDICINE

## 2024-11-13 PROCEDURE — 71045 X-RAY EXAM CHEST 1 VIEW: CPT

## 2024-11-13 PROCEDURE — 36415 COLL VENOUS BLD VENIPUNCTURE: CPT | Performed by: EMERGENCY MEDICINE

## 2024-11-13 PROCEDURE — 93005 ELECTROCARDIOGRAM TRACING: CPT

## 2024-11-13 PROCEDURE — 99285 EMERGENCY DEPT VISIT HI MDM: CPT

## 2024-11-13 PROCEDURE — 87636 SARSCOV2 & INF A&B AMP PRB: CPT | Performed by: EMERGENCY MEDICINE

## 2024-11-13 PROCEDURE — 80053 COMPREHEN METABOLIC PANEL: CPT | Performed by: EMERGENCY MEDICINE

## 2024-11-13 PROCEDURE — 85025 COMPLETE CBC W/AUTO DIFF WBC: CPT | Performed by: EMERGENCY MEDICINE

## 2024-11-13 PROCEDURE — 93010 ELECTROCARDIOGRAM REPORT: CPT | Performed by: INTERNAL MEDICINE

## 2024-11-13 PROCEDURE — 25010000002 FUROSEMIDE PER 20 MG: Performed by: EMERGENCY MEDICINE

## 2024-11-13 PROCEDURE — 25010000002 BUMETANIDE PER 0.5 MG: Performed by: EMERGENCY MEDICINE

## 2024-11-13 RX ORDER — ASPIRIN 325 MG
325 TABLET ORAL ONCE
Status: DISCONTINUED | OUTPATIENT
Start: 2024-11-13 | End: 2024-11-13

## 2024-11-13 RX ORDER — SODIUM CHLORIDE 0.9 % (FLUSH) 0.9 %
10 SYRINGE (ML) INJECTION AS NEEDED
Status: DISCONTINUED | OUTPATIENT
Start: 2024-11-13 | End: 2024-12-03 | Stop reason: HOSPADM

## 2024-11-13 RX ORDER — FUROSEMIDE 10 MG/ML
80 INJECTION INTRAMUSCULAR; INTRAVENOUS ONCE
Status: DISCONTINUED | OUTPATIENT
Start: 2024-11-13 | End: 2024-11-13

## 2024-11-13 RX ORDER — BUMETANIDE 0.25 MG/ML
2 INJECTION, SOLUTION INTRAMUSCULAR; INTRAVENOUS ONCE
Status: COMPLETED | OUTPATIENT
Start: 2024-11-13 | End: 2024-11-13

## 2024-11-13 RX ADMIN — BUMETANIDE 2 MG: 0.25 INJECTION INTRAMUSCULAR; INTRAVENOUS at 21:51

## 2024-11-13 RX ADMIN — FUROSEMIDE 80 MG: 10 INJECTION, SOLUTION INTRAMUSCULAR; INTRAVENOUS at 20:25

## 2024-11-13 NOTE — LETTER
[facility] CASE MAN  [address] [address] [facility] [address]  [phone]        2024      Patient: Rufus Dorsey  YOB: 1985  Date of Visit: 2024      To whom it may concern,    Mr. Rufus Dorsey has been acutely ill being taken care of [facility]. He has multi organ failure requiring prolonged hospitalization and would have  without interventions. He has been admitted since  and is still hospitalized.    John Gipson MD

## 2024-11-13 NOTE — LETTER
[facility] CASE MAN  [place] [facility] [address]  [phone]        November 22, 2024      Patient: Rufus Dorsey  YOB: 1985  Date of Visit: 11/13/2024      To whom it may concern,     Patient has the following life threatening diseases that need to be continuously treated to prevent imminent death:     Acute renal failure on chronic kidney disease now requiring hemodialysis. If he does not have multiple hemodialysis treatments a week he will become critically ill and die.    Other chronic illnesses contributing to his illness:  Acute on chronic combined systolic and diastolic heart failure   Coronary artery disease  Hypertension  Hypoxia  Diabetes type 2 with use of insulin    Thank you for your help,    John Gipson MD

## 2024-11-13 NOTE — LETTER
[facility]  [place] [facility] [address]  [phone]        2024  To whom it may concern,    Mr. Rufus Dorsey has been acutely ill being taken care of [facility]. He has multi organ failure requiring prolonged hospitalization and would have  without interventions. He has been admitted since  and is still hospitalized.    John Gipson MD     Patient: Rufus Dorsey  YOB: 1985  Date of Visit: 2024

## 2024-11-14 LAB
ANION GAP SERPL CALCULATED.3IONS-SCNC: 11 MMOL/L (ref 5–15)
BACTERIA UR QL AUTO: NORMAL /HPF
BILIRUB UR QL STRIP: NEGATIVE
BUN SERPL-MCNC: 78 MG/DL (ref 6–20)
BUN/CREAT SERPL: 19.3 (ref 7–25)
CALCIUM SPEC-SCNC: 7.7 MG/DL (ref 8.6–10.5)
CHLORIDE SERPL-SCNC: 101 MMOL/L (ref 98–107)
CLARITY UR: CLEAR
CO2 SERPL-SCNC: 26 MMOL/L (ref 22–29)
COLOR UR: YELLOW
CREAT SERPL-MCNC: 4.05 MG/DL (ref 0.76–1.27)
CREAT UR-MCNC: 80.1 MG/DL
DEPRECATED RDW RBC AUTO: 46.5 FL (ref 37–54)
EGFRCR SERPLBLD CKD-EPI 2021: 18.3 ML/MIN/1.73
ERYTHROCYTE [DISTWIDTH] IN BLOOD BY AUTOMATED COUNT: 14 % (ref 12.3–15.4)
GLUCOSE BLDC GLUCOMTR-MCNC: 184 MG/DL (ref 70–130)
GLUCOSE BLDC GLUCOMTR-MCNC: 193 MG/DL (ref 70–130)
GLUCOSE BLDC GLUCOMTR-MCNC: 218 MG/DL (ref 70–130)
GLUCOSE BLDC GLUCOMTR-MCNC: 249 MG/DL (ref 70–130)
GLUCOSE SERPL-MCNC: 286 MG/DL (ref 65–99)
GLUCOSE UR STRIP-MCNC: ABNORMAL MG/DL
HBA1C MFR BLD: 12.3 % (ref 4.8–5.6)
HCT VFR BLD AUTO: 35.8 % (ref 37.5–51)
HGB BLD-MCNC: 10.8 G/DL (ref 13–17.7)
HGB UR QL STRIP.AUTO: ABNORMAL
HYALINE CASTS UR QL AUTO: NORMAL /LPF
KETONES UR QL STRIP: NEGATIVE
LEUKOCYTE ESTERASE UR QL STRIP.AUTO: NEGATIVE
MCH RBC QN AUTO: 27 PG (ref 26.6–33)
MCHC RBC AUTO-ENTMCNC: 30.2 G/DL (ref 31.5–35.7)
MCV RBC AUTO: 89.5 FL (ref 79–97)
NITRITE UR QL STRIP: NEGATIVE
PH UR STRIP.AUTO: <=5 [PH] (ref 5–8)
PLATELET # BLD AUTO: 160 10*3/MM3 (ref 140–450)
PMV BLD AUTO: 12.3 FL (ref 6–12)
POTASSIUM SERPL-SCNC: 4.1 MMOL/L (ref 3.5–5.2)
PROT ?TM UR-MCNC: 418.9 MG/DL
PROT UR QL STRIP: ABNORMAL
PROT/CREAT UR: 5229.7 MG/G CREA (ref 0–200)
RBC # BLD AUTO: 4 10*6/MM3 (ref 4.14–5.8)
RBC # UR STRIP: NORMAL /HPF
REF LAB TEST METHOD: NORMAL
SODIUM SERPL-SCNC: 138 MMOL/L (ref 136–145)
SP GR UR STRIP: 1.01 (ref 1–1.03)
SQUAMOUS #/AREA URNS HPF: NORMAL /HPF
TROPONIN T SERPL HS-MCNC: 108 NG/L
UROBILINOGEN UR QL STRIP: ABNORMAL
WBC # UR STRIP: NORMAL /HPF
WBC NRBC COR # BLD AUTO: 9.72 10*3/MM3 (ref 3.4–10.8)

## 2024-11-14 PROCEDURE — 84156 ASSAY OF PROTEIN URINE: CPT | Performed by: INTERNAL MEDICINE

## 2024-11-14 PROCEDURE — 84484 ASSAY OF TROPONIN QUANT: CPT | Performed by: NURSE PRACTITIONER

## 2024-11-14 PROCEDURE — 81001 URINALYSIS AUTO W/SCOPE: CPT | Performed by: INTERNAL MEDICINE

## 2024-11-14 PROCEDURE — 99254 IP/OBS CNSLTJ NEW/EST MOD 60: CPT

## 2024-11-14 PROCEDURE — 25010000002 BUMETANIDE PER 0.5 MG: Performed by: INTERNAL MEDICINE

## 2024-11-14 PROCEDURE — 83036 HEMOGLOBIN GLYCOSYLATED A1C: CPT | Performed by: NURSE PRACTITIONER

## 2024-11-14 PROCEDURE — 85027 COMPLETE CBC AUTOMATED: CPT | Performed by: NURSE PRACTITIONER

## 2024-11-14 PROCEDURE — 80048 BASIC METABOLIC PNL TOTAL CA: CPT | Performed by: NURSE PRACTITIONER

## 2024-11-14 PROCEDURE — 82570 ASSAY OF URINE CREATININE: CPT | Performed by: INTERNAL MEDICINE

## 2024-11-14 PROCEDURE — 82948 REAGENT STRIP/BLOOD GLUCOSE: CPT

## 2024-11-14 PROCEDURE — 63710000001 INSULIN LISPRO (HUMAN) PER 5 UNITS: Performed by: NURSE PRACTITIONER

## 2024-11-14 RX ORDER — SODIUM CHLORIDE 0.9 % (FLUSH) 0.9 %
10 SYRINGE (ML) INJECTION EVERY 12 HOURS SCHEDULED
Status: DISCONTINUED | OUTPATIENT
Start: 2024-11-14 | End: 2024-12-03 | Stop reason: HOSPADM

## 2024-11-14 RX ORDER — NICOTINE POLACRILEX 4 MG
15 LOZENGE BUCCAL
Status: DISCONTINUED | OUTPATIENT
Start: 2024-11-14 | End: 2024-12-03 | Stop reason: HOSPADM

## 2024-11-14 RX ORDER — ONDANSETRON 4 MG/1
4 TABLET, ORALLY DISINTEGRATING ORAL EVERY 6 HOURS PRN
Status: DISCONTINUED | OUTPATIENT
Start: 2024-11-14 | End: 2024-12-03 | Stop reason: HOSPADM

## 2024-11-14 RX ORDER — BISACODYL 5 MG/1
5 TABLET, DELAYED RELEASE ORAL DAILY PRN
Status: DISCONTINUED | OUTPATIENT
Start: 2024-11-14 | End: 2024-12-03 | Stop reason: HOSPADM

## 2024-11-14 RX ORDER — BISACODYL 10 MG
10 SUPPOSITORY, RECTAL RECTAL DAILY PRN
Status: DISCONTINUED | OUTPATIENT
Start: 2024-11-14 | End: 2024-12-03 | Stop reason: HOSPADM

## 2024-11-14 RX ORDER — CARVEDILOL 25 MG/1
25 TABLET ORAL 2 TIMES DAILY WITH MEALS
Status: DISCONTINUED | OUTPATIENT
Start: 2024-11-14 | End: 2024-12-03 | Stop reason: HOSPADM

## 2024-11-14 RX ORDER — NITROGLYCERIN 0.4 MG/1
0.4 TABLET SUBLINGUAL
Status: DISCONTINUED | OUTPATIENT
Start: 2024-11-14 | End: 2024-12-03 | Stop reason: HOSPADM

## 2024-11-14 RX ORDER — ONDANSETRON 2 MG/ML
4 INJECTION INTRAMUSCULAR; INTRAVENOUS EVERY 6 HOURS PRN
Status: DISCONTINUED | OUTPATIENT
Start: 2024-11-14 | End: 2024-12-03 | Stop reason: HOSPADM

## 2024-11-14 RX ORDER — HYDRALAZINE HYDROCHLORIDE 50 MG/1
100 TABLET, FILM COATED ORAL EVERY 8 HOURS SCHEDULED
Status: DISCONTINUED | OUTPATIENT
Start: 2024-11-14 | End: 2024-11-17

## 2024-11-14 RX ORDER — AMOXICILLIN 250 MG
2 CAPSULE ORAL 2 TIMES DAILY PRN
Status: DISCONTINUED | OUTPATIENT
Start: 2024-11-14 | End: 2024-12-03 | Stop reason: HOSPADM

## 2024-11-14 RX ORDER — BUMETANIDE 0.25 MG/ML
4 INJECTION, SOLUTION INTRAMUSCULAR; INTRAVENOUS EVERY 8 HOURS
Status: DISCONTINUED | OUTPATIENT
Start: 2024-11-14 | End: 2024-11-15 | Stop reason: SDUPTHER

## 2024-11-14 RX ORDER — POLYETHYLENE GLYCOL 3350 17 G/17G
17 POWDER, FOR SOLUTION ORAL DAILY PRN
Status: DISCONTINUED | OUTPATIENT
Start: 2024-11-14 | End: 2024-12-03 | Stop reason: HOSPADM

## 2024-11-14 RX ORDER — INSULIN LISPRO 100 [IU]/ML
2-7 INJECTION, SOLUTION INTRAVENOUS; SUBCUTANEOUS
Status: DISCONTINUED | OUTPATIENT
Start: 2024-11-14 | End: 2024-12-03 | Stop reason: HOSPADM

## 2024-11-14 RX ORDER — DEXTROSE MONOHYDRATE 25 G/50ML
25 INJECTION, SOLUTION INTRAVENOUS
Status: DISCONTINUED | OUTPATIENT
Start: 2024-11-14 | End: 2024-12-03 | Stop reason: HOSPADM

## 2024-11-14 RX ORDER — ACETAMINOPHEN 160 MG/5ML
650 SOLUTION ORAL EVERY 4 HOURS PRN
Status: DISCONTINUED | OUTPATIENT
Start: 2024-11-14 | End: 2024-12-03 | Stop reason: HOSPADM

## 2024-11-14 RX ORDER — ASPIRIN 81 MG/1
81 TABLET ORAL DAILY
Status: DISCONTINUED | OUTPATIENT
Start: 2024-11-14 | End: 2024-12-03 | Stop reason: HOSPADM

## 2024-11-14 RX ORDER — IBUPROFEN 600 MG/1
1 TABLET ORAL
Status: DISCONTINUED | OUTPATIENT
Start: 2024-11-14 | End: 2024-12-03 | Stop reason: HOSPADM

## 2024-11-14 RX ORDER — ACETAMINOPHEN 325 MG/1
650 TABLET ORAL EVERY 4 HOURS PRN
Status: DISCONTINUED | OUTPATIENT
Start: 2024-11-14 | End: 2024-12-03 | Stop reason: HOSPADM

## 2024-11-14 RX ORDER — SODIUM CHLORIDE 0.9 % (FLUSH) 0.9 %
10 SYRINGE (ML) INJECTION AS NEEDED
Status: DISCONTINUED | OUTPATIENT
Start: 2024-11-14 | End: 2024-12-03 | Stop reason: HOSPADM

## 2024-11-14 RX ORDER — ISOSORBIDE DINITRATE 20 MG/1
40 TABLET ORAL
Status: DISCONTINUED | OUTPATIENT
Start: 2024-11-14 | End: 2024-12-03 | Stop reason: HOSPADM

## 2024-11-14 RX ORDER — SODIUM CHLORIDE 9 MG/ML
40 INJECTION, SOLUTION INTRAVENOUS AS NEEDED
Status: DISCONTINUED | OUTPATIENT
Start: 2024-11-14 | End: 2024-12-03 | Stop reason: HOSPADM

## 2024-11-14 RX ORDER — ACETAMINOPHEN 650 MG/1
650 SUPPOSITORY RECTAL EVERY 4 HOURS PRN
Status: DISCONTINUED | OUTPATIENT
Start: 2024-11-14 | End: 2024-12-03 | Stop reason: HOSPADM

## 2024-11-14 RX ADMIN — Medication 10 ML: at 22:07

## 2024-11-14 RX ADMIN — ASPIRIN 81 MG: 81 TABLET, COATED ORAL at 12:40

## 2024-11-14 RX ADMIN — ISOSORBIDE DINITRATE 40 MG: 20 TABLET ORAL at 17:23

## 2024-11-14 RX ADMIN — INSULIN LISPRO 3 UNITS: 100 INJECTION, SOLUTION INTRAVENOUS; SUBCUTANEOUS at 12:39

## 2024-11-14 RX ADMIN — INSULIN LISPRO 4 UNITS: 100 INJECTION, SOLUTION INTRAVENOUS; SUBCUTANEOUS at 09:39

## 2024-11-14 RX ADMIN — CARVEDILOL 25 MG: 25 TABLET, FILM COATED ORAL at 17:23

## 2024-11-14 RX ADMIN — CARVEDILOL 25 MG: 25 TABLET, FILM COATED ORAL at 09:40

## 2024-11-14 RX ADMIN — BUMETANIDE 4 MG: 0.25 INJECTION INTRAMUSCULAR; INTRAVENOUS at 16:25

## 2024-11-14 RX ADMIN — Medication 10 ML: at 03:54

## 2024-11-14 RX ADMIN — HYDRALAZINE HYDROCHLORIDE 100 MG: 50 TABLET ORAL at 22:07

## 2024-11-14 RX ADMIN — INSULIN LISPRO 2 UNITS: 100 INJECTION, SOLUTION INTRAVENOUS; SUBCUTANEOUS at 22:07

## 2024-11-14 RX ADMIN — INSULIN LISPRO 2 UNITS: 100 INJECTION, SOLUTION INTRAVENOUS; SUBCUTANEOUS at 17:23

## 2024-11-14 RX ADMIN — ISOSORBIDE DINITRATE 40 MG: 20 TABLET ORAL at 09:39

## 2024-11-14 RX ADMIN — HYDRALAZINE HYDROCHLORIDE 100 MG: 50 TABLET ORAL at 14:18

## 2024-11-14 RX ADMIN — HYDRALAZINE HYDROCHLORIDE 100 MG: 50 TABLET ORAL at 09:40

## 2024-11-14 RX ADMIN — BUMETANIDE 4 MG: 0.25 INJECTION INTRAMUSCULAR; INTRAVENOUS at 09:39

## 2024-11-14 RX ADMIN — Medication 10 ML: at 09:40

## 2024-11-14 NOTE — ED PROVIDER NOTES
EMERGENCY DEPARTMENT ENCOUNTER  Room Number:  24/24  Date of encounter:  11/13/2024  PCP: Provider, No Known  Patient Care Team:  Provider, No Known as PCP - General  Provider, No Known     HPI:  Context: Rufus Dorsey is a 39 y.o. male who presents to the ED c/o chief complaint of shortness of breath and edema.   used.  Patient reports that he has had progressive swelling for the last 3 weeks.  Patient reports swelling has been progressive in nature.  Patient reports he is on a diuretic, is unsure the name or the dosage but reports he has been compliant with it, denies any changes.  Patient reports swelling has progressed into his abdomen.  Patient reports shortness of breath is constant, worse with exertion, endorses orthopnea as well as paroxysmal nocturnal dyspnea.  Patient endorses cough, nonproductive, denies any fevers.  Patient has chest tightness that is constant, is not exertional, does not radiate.    MEDICAL HISTORY REVIEW  Reviewed in EPIC    PAST MEDICAL HISTORY  Active Ambulatory Problems     Diagnosis Date Noted    Thrombocytopenia 12/21/2020    Type 2 diabetes mellitus, with long-term current use of insulin 01/05/2021    Obesity, Class III, BMI 40-49.9 (morbid obesity) 08/03/2021    Bilateral pleural effusion 08/03/2021    Pulmonary HTN 08/03/2021    History of COVID-19 08/03/2021    Coronary artery disease     Hypertension     CKD (chronic kidney disease) stage 2, GFR 60-89 ml/min 08/18/2021    Snores 08/18/2021    Acute on chronic congestive heart failure 12/03/2021    Other chest pain 12/04/2021    Unstable angina 12/04/2021    Acute systolic CHF (congestive heart failure) 12/04/2021    COVID-19 06/04/2022    Acute pulmonary edema 06/05/2022    Elevated troponin 06/05/2022    Diastolic CHF, acute on chronic 06/05/2022    Type 2 diabetes mellitus with circulatory disorder 06/05/2022    Type 2 diabetes mellitus with renal complication 06/05/2022    Medically noncompliant  06/05/2022    Acute on chronic combined systolic and diastolic CHF (congestive heart failure) 06/06/2022    Left leg swelling 05/22/2023    S/P CABG x 3 05/22/2023    Chronic HFrEF (heart failure with reduced ejection fraction) 08/04/2023     Resolved Ambulatory Problems     Diagnosis Date Noted    Pneumonia of both lungs due to infectious organism 12/21/2020    Acute respiratory failure with hypoxia 12/21/2020    COVID-19 virus infection 12/21/2020    Acute kidney injury 12/21/2020    Pneumonia due to COVID-19 virus 12/21/2020    Viral URI with cough 08/02/2021    Acute pulmonary edema 08/03/2021    Acute on chronic combined systolic and diastolic CHF (congestive heart failure) 08/03/2021    Bilateral pulmonary infiltrates on chest x-ray 08/03/2021    Ischemic cardiomyopathy 06/25/2016    Biventricular heart failure with reduced left ventricular function 08/18/2021    Screening cholesterol level 08/18/2021     Past Medical History:   Diagnosis Date    CHF (congestive heart failure)     Diabetes     Heart failure 05/16/2016    High cholesterol        PAST SURGICAL HISTORY  Past Surgical History:   Procedure Laterality Date    CARDIAC CATHETERIZATION  01/25/2017    Right heart cath    CARDIAC CATHETERIZATION N/A 12/4/2021    Procedure: SAPHENOUS VEIN GRAFT;  Surgeon: Les Reyes MD;  Location: [facility] CATH INVASIVE LOCATION;  Service: Cardiovascular;  Laterality: N/A;    CARDIAC CATHETERIZATION N/A 12/4/2021    Procedure: Coronary angiography;  Surgeon: Les Reyes MD;  Location: [facility] CATH INVASIVE LOCATION;  Service: Cardiovascular;  Laterality: N/A;    CARDIAC CATHETERIZATION N/A 12/4/2021    Procedure: Stent ROSEY coronary;  Surgeon: Les Reyes MD;  Location: [facility] CATH INVASIVE LOCATION;  Service: Cardiovascular;  Laterality: N/A;    CARDIAC CATHETERIZATION N/A 12/4/2021    Procedure: Native mammary injection;  Surgeon: Les Reyes MD;  Location: [facility] CATH INVASIVE LOCATION;  Service:  Cardiovascular;  Laterality: N/A;    CARDIAC CATHETERIZATION N/A 5/25/2023    Procedure: Right Heart Cath;  Surgeon: Vicente Trujillo MD;  Location:  [facility] CATH INVASIVE LOCATION;  Service: Cardiology;  Laterality: N/A;    CARDIAC SURGERY      CORONARY ARTERY BYPASS GRAFT  05/26/2015    cabg x3 Dr. Key       FAMILY HISTORY  Family History   Family history unknown: Yes       SOCIAL HISTORY  Social History     Socioeconomic History    Marital status: Single   Tobacco Use    Smoking status: Never    Smokeless tobacco: Never   Vaping Use    Vaping status: Never Used   Substance and Sexual Activity    Alcohol use: Never    Drug use: Never    Sexual activity: Defer       ALLERGIES  Patient has no known allergies.    The patient's allergies have been reviewed    REVIEW OF SYSTEMS  All systems reviewed and negative except for those discussed in HPI.     PHYSICAL EXAM  I have reviewed the triage vital signs and nursing notes.  ED Triage Vitals   Temp Pulse Resp BP SpO2   -- -- -- -- --      Temp src Heart Rate Source Patient Position BP Location FiO2 (%)   -- -- -- -- --       General: No acute distress.  HENT: NCAT, PERRL, Nares patent.  Eyes: no scleral icterus.  Neck: trachea midline, no ROM limitations.  CV: regular rhythm, regular rate.  Respiratory: normal effort, diffuse crackles bilateral lungs.  Abdomen: soft, nondistended, NTTP, no rebound tenderness, no guarding or rigidity.  Musculoskeletal: no deformity.  Neuro: alert, moves all extremities, follows commands.  Skin: warm, dry.  3+ pitting edema extending into the thighs as well as into the abdominal wall.  Patient has anasarca    LAB RESULTS  Recent Results (from the past 24 hours)   ECG 12 Lead ED Triage Standing Order; Chest Pain    Collection Time: 11/13/24  7:18 PM   Result Value Ref Range    QT Interval 398 ms    QTC Interval 482 ms   Comprehensive Metabolic Panel    Collection Time: 11/13/24  7:25 PM    Specimen: Arm, Right; Blood   Result Value Ref Range     Glucose 275 (H) 65 - 99 mg/dL    BUN 76 (H) 6 - 20 mg/dL    Creatinine 4.21 (H) 0.76 - 1.27 mg/dL    Sodium 139 136 - 145 mmol/L    Potassium 3.9 3.5 - 5.2 mmol/L    Chloride 99 98 - 107 mmol/L    CO2 27.7 22.0 - 29.0 mmol/L    Calcium 8.0 (L) 8.6 - 10.5 mg/dL    Total Protein 6.6 6.0 - 8.5 g/dL    Albumin 3.3 (L) 3.5 - 5.2 g/dL    ALT (SGPT) 15 1 - 41 U/L    AST (SGOT) 13 1 - 40 U/L    Alkaline Phosphatase 121 (H) 39 - 117 U/L    Total Bilirubin 0.4 0.0 - 1.2 mg/dL    Globulin 3.3 gm/dL    A/G Ratio 1.0 g/dL    BUN/Creatinine Ratio 18.1 7.0 - 25.0    Anion Gap 12.3 5.0 - 15.0 mmol/L    eGFR 17.5 (L) >60.0 mL/min/1.73   High Sensitivity Troponin T    Collection Time: 11/13/24  7:25 PM    Specimen: Arm, Right; Blood   Result Value Ref Range    HS Troponin T 117 (C) <22 ng/L   Green Top (Gel)    Collection Time: 11/13/24  7:25 PM   Result Value Ref Range    Extra Tube Hold for add-ons.    Lavender Top    Collection Time: 11/13/24  7:25 PM   Result Value Ref Range    Extra Tube hold for add-on    Gold Top - SST    Collection Time: 11/13/24  7:25 PM   Result Value Ref Range    Extra Tube Hold for add-ons.    Light Blue Top    Collection Time: 11/13/24  7:25 PM   Result Value Ref Range    Extra Tube Hold for add-ons.    CBC Auto Differential    Collection Time: 11/13/24  7:25 PM    Specimen: Arm, Right; Blood   Result Value Ref Range    WBC 9.51 3.40 - 10.80 10*3/mm3    RBC 4.37 4.14 - 5.80 10*6/mm3    Hemoglobin 11.7 (L) 13.0 - 17.7 g/dL    Hematocrit 38.0 37.5 - 51.0 %    MCV 87.0 79.0 - 97.0 fL    MCH 26.8 26.6 - 33.0 pg    MCHC 30.8 (L) 31.5 - 35.7 g/dL    RDW 14.0 12.3 - 15.4 %    RDW-SD 44.4 37.0 - 54.0 fl    MPV 11.9 6.0 - 12.0 fL    Platelets 173 140 - 450 10*3/mm3    Neutrophil % 80.8 (H) 42.7 - 76.0 %    Lymphocyte % 9.1 (L) 19.6 - 45.3 %    Monocyte % 7.2 5.0 - 12.0 %    Eosinophil % 2.2 0.3 - 6.2 %    Basophil % 0.3 0.0 - 1.5 %    Immature Grans % 0.4 0.0 - 0.5 %    Neutrophils, Absolute 7.68 (H) 1.70 -  7.00 10*3/mm3    Lymphocytes, Absolute 0.87 0.70 - 3.10 10*3/mm3    Monocytes, Absolute 0.68 0.10 - 0.90 10*3/mm3    Eosinophils, Absolute 0.21 0.00 - 0.40 10*3/mm3    Basophils, Absolute 0.03 0.00 - 0.20 10*3/mm3    Immature Grans, Absolute 0.04 0.00 - 0.05 10*3/mm3    nRBC 0.0 0.0 - 0.2 /100 WBC       I ordered the above labs and reviewed the results.    RADIOLOGY  XR Chest 1 View    Result Date: 11/13/2024  XR CHEST 1 VW-  Clinical: Chest pain  COMPARISON examination 5/22/2023  FINDINGS: There is a shallow inspiratory effort with crowding of the bronchovascular markings bilaterally. Patchy areas of infiltrate/atelectasis at both lung bases. Cardiac enlargement as before. No gross pulmonary edema. The mediastinum is stable. No other interval change.  This report was finalized on 11/13/2024 8:00 PM by Dr. Dakotah Florentino M.D on Workstation: [id number]       I ordered the above noted radiological studies. I reviewed the images and results. I agree with the radiologist interpretation.    PROCEDURES  Procedures    MEDICATIONS GIVEN IN ER  Medications   sodium chloride 0.9 % flush 10 mL (has no administration in time range)   bumetanide (BUMEX) injection 2 mg (has no administration in time range)       PROGRESS, DATA ANALYSIS, CONSULTS, AND MEDICAL DECISION MAKING  A complete history and physical exam have been performed.  All available laboratory and imaging results have been reviewed by myself prior to disposition.    MDM    After the initial H&P, I discussed pertinent information from history and physical exam with patient/family.  Discussed differential diagnosis.  Discussed plan for ED evaluation/workup/treatment.  All questions answered.  Patient/family is agreeable with plan.  ED Course as of 11/13/24 2032 Wed Nov 13, 2024   1919 My differential diagnosis for dyspnea includes but is not limited to:  Asthma, COPD, pneumonia, pulmonary embolus, acute respiratory distress syndrome, pneumothorax, pleural  effusion, pulmonary fibrosis, congestive heart failure, myocardial infarction, DKA, uremia, acidosis, sepsis, anemia, drug related, hyperventilation, CNS disease     [JG]   1920 My differential diagnosis for chest pain includes but is not limited to:  Muscle strain, costochondritis, myositis, pleurisy, rib fracture, intercostal neuritis, herpes zoster, tumor, myocardial infarction, coronary syndrome, unstable angina, angina, aortic dissection, mitral valve prolapse, pericarditis, palpitations, pulmonary embolus, pneumonia, pneumothorax, lung cancer, GERD, esophagitis, esophageal spasm     [JG]   1923 EKG independently viewed and contemporaneously interpreted by ED physician. Time: 1918.  Rate 88.  Interpretation: Normal sinus rhythm, left axis deviation, left atrial enlargement, nonspecific intraventricular conduction delay, loss of anterior forces, inferior Q waves, slight ST elevation in inferior leads, slight ST depression with T wave inversions in high lateral leads. [JG]   1924 When compared with prior EKG on 5/22/2023, no acute changes are present. [JG]   1927 Review of prior external notes (non-ED) -and- Review of prior external test results outside of this encounter:   37-year-old male with history of coronary artery disease status post CABG, heart failure secondary to ischemic cardiomyopathy, hypertension hyperlipidemia diabetes and chronic kidney disease.  I reviewed patient's cardiac cath from 5/25/2023, right heart catheterization performed, normal right and left-sided filling pressures with low normal cardiac index.  I reviewed patient's discharge summary from May last year, patient admitted for hypertension lower extremity edema, underwent diuresis, cardiology and nephrology followed. [JG]   2009 Acute on chronic kidney injury, creatinine 4.21, last prior for comparison is 11 months ago, 3.4 at that time. [JG]   2010 Troponin elevated at 117, patient appears to have baseline elevation, prior values of  61 and 61.  EKG showed no acute findings.  Troponin elevation above baseline likely secondary to acute on chronic kidney injury.  Obtain repeat troponin to evaluate trend. [JG]   2010 39-year-old male with history of coronary artery disease and ischemic cardiomyopathy presents with acute CHF exacerbation with pulmonary edema and hypoxia, acute on chronic kidney injury, patient has anasarca with edema extending into abdominal body wall.  Consulting hospitalist for admission, consulting nephrology. [JG]   2010 I reviewed chest x-ray in PACS, patient has pulmonary edema per my read. [JG]   2011 Patient reassessed.  Discussed ED findings, differential diagnosis, and the need for admission for evaluation/treatment.  They are agreeable to admission and all questions were answered.     [JG]   2028 Phone call with Dr Osorio, nephrology.  Discussed the patient, relevant history, exam, diagnostics, ED findings/progress, and concerns.  He agrees to consult, request patient to receive 2 mg of Bumex IV in the ED.   [JG]   2030 Phone call with Dr. Gipson, [facility].  Discussed the patient, relevant history, exam, diagnostics, ED findings/progress, and concerns. They agree to admit the patient to telemetry. Care assumed by the admitting physician at this time.     [JG]      ED Course User Index  [JG] Scott Michelle MD       AS OF 20:32 EST VITALS:    BP - 168/82  HR - 85  TEMP -    O2 SATS - 96%    DIAGNOSIS  Final diagnoses:   Acute on chronic congestive heart failure, unspecified heart failure type   Hypoxia   Acute renal failure superimposed on chronic kidney disease, unspecified acute renal failure type, unspecified CKD stage   Anasarca   Elevated troponin   Hyperglycemia         DISPOSITION  ADMISSION    Discussed treatment plan and reason for admission with pt/family and admitting physician.  Pt/family voiced understanding of the plan for admission for further testing/treatment as needed.        Scott Michelle,  MD  11/13/24 2032

## 2024-11-14 NOTE — CONSULTS
Date of Consultation: 24    Referral Provider: John Gipson MD     Reason for Consultation: CHF     Encounter Provider: JESSICA Bravo    Group of Service: [facility]     Patient Name: Rufus Dorsey    :1985    Chief complaint: Dyspnea on exertion, increased edema     History of Present Illness:  Rufus Dorsey is a 39 year old who follows with Dr. Trujillo. He has a past medical history that is significant for HFrEF secondary to ischemic cardiomyopathy, CAD s/p CABG and PCI with ROSEY in 2021, hypertension, CKD stage IV, and obesity.     He presented to the ED on 24 with complaints of shortness of breath and edema. He notes increasing swelling for the last three weeks. He reports compliance with his torsemide. He feels his swelling in his legs and abdomen. His shortness of breath is constant and worsened with exertion. He does also have orthopnea as well as paroxysmal nocturnal dyspnea. He has a nonproductive cough. He has constant chest tightness, it is not associated with exertion, and it does not radiate.     Workup has included: HS troponin 117, then 116, then 108. proBNP 14,664. Creatinine 4.21, now 4.05, eGFR ~19. Chest xray with patchy areas of infiltrate/atelectasis at both lung bases, no gross pulmonary edema. EKG showed sinus rhythm with a rate of 88, no significant change from previous.     Today, he was sitting up on the side of the bed. He reports that his chest tightness continues but has improved. His breathing has improved. His swelling is better but still present and he reports it is still making him feel uncomfortable.     Echocardiogram 22  The study is technically difficult for diagnosis.  The following left ventricular wall segments are hypokinetic: mid anterior, apical anterior, basal anterolateral, mid anterolateral, apical lateral, basal inferolateral, mid inferolateral, apical inferior, mid inferior, apical septal, basal inferoseptal, mid  inferoseptal, apex hypokinetic, mid anteroseptal, basal anterior, basal inferior and basal inferoseptal.  The left ventricular cavity is moderately dilated.  Calculated left ventricular EF = 20.7% Estimated left ventricular EF was in agreement with the calculated left ventricular EF. Left ventricular systolic function is severely decreased.  Moderately reduced right ventricular systolic function noted.  Left ventricular diastolic function is consistent with (grade III w/high LAP) fixed restrictive pattern.  There is a moderate (1-2cm) pericardial effusion.    Cardiac catheterization 12/4/21  FINDINGS:     LEFT VENTRICULOGRAPHY: The LV pressure was 158/40.  There was no mitral insufficiency or gradient across the aortic valve on pullback.  No LV gram was performed     CORONARY ANGIOGRAPHY:  Left main: Normal  Left anterior descending: Immediately diseased 50% and a 90% then 100% occlusion in the mid LAD  Ramus intermedius:Not present  Circumflex: Diffusely diseased throughout 70% proximally OM1 90% lesion long area of 90% disease in the distal circumflex and that is just occluded there are faint left to right collaterals  RCA: Is a dominant vessel.  100% occluded proximally     LIMA to LAD: The large graft is patent until it hits the insertion site of the LAD and there is a 90% insertion lesion the LAD in the distal LAD just is occluded      SVG to OM1 and OM 2 is widely patent and looks good the native vessels are very small probably diseased throughout and there are left to right collaterals     I could not find any more vein grafts and the report was only 3 so we have the mall     SUMMARY: Acute systolic heart failure unstable angina working to do a PCI in his JENNIFER to the LAD but it is possible he could have angina after that he has terrible small vessel diffuse diabetic coronary disease in addition his LVEDP is between 40 and 50.     Interventional note: 10,000 units of heparin was given the ACT was therapeutic  prasugrel was given at the conclusion of the case.  A 6 [place] JENNIFER guide was passed up and intubated the left internal mammary artery.  A BMW was passed down in the distal LAD and we direct stented this with a 2.5x8 Xience Janina drug-eluting stent at 16 ivette there was 0% residual and BELKIS-3 flow and at that point balloons wires and guides were removed the sheath was removed and a TR band was applied     Interventional data: 90% JENNIFER to the LAD pre, post it was 0.  Type C lesion, 0 complications no dissection the flow is BELKIS-3 both pre and post    Past Medical History:   Diagnosis Date    CHF (congestive heart failure)     Coronary artery disease     Diabetes     Heart failure 05/16/2016    High cholesterol     Hypertension     Ischemic cardiomyopathy 06/25/2016         Past Surgical History:   Procedure Laterality Date    CARDIAC CATHETERIZATION  01/25/2017    Right heart cath    CARDIAC CATHETERIZATION N/A 12/4/2021    Procedure: SAPHENOUS VEIN GRAFT;  Surgeon: Les Reyes MD;  Location: [facility] CATH INVASIVE LOCATION;  Service: Cardiovascular;  Laterality: N/A;    CARDIAC CATHETERIZATION N/A 12/4/2021    Procedure: Coronary angiography;  Surgeon: Les Reyes MD;  Location[facility] CATH INVASIVE LOC[facility];  Service: Cardiovascular;  Laterality: N/A;    CARDIAC CATHETERIZATION N/A 12/4/2021    Procedure: Stent ROSEY coronary;  Surgeon: Les Reyes MD;  Location: [facility] CATH INVASIVE LOCATION;  Service: Cardiovascular;  Laterality: N/A;    CARDIAC CATHETERIZATION N/A 12/4/2021    Procedure: Native mammary injection;  Surgeon: Les Reyes MD;  Location: [facility] CATH INVASIVE LOCATION;  Service: Cardiovascular;  Laterality: N/A;    CARDIAC CATHETERIZATION N/A 5/25/2023    Procedure: Right Heart Cath;  Surgeon: Vicente Trujillo MD;  Location: [facility] CATH INVASIVE LOCATION;  Service: Cardiology;  Laterality: N/A;    CARDIAC SURGERY      CORONARY ARTERY BYPASS GRAFT  05/26/2015    cabg x3 Dr. Key         No  "Known Allergies      No current facility-administered medications on file prior to encounter.     Current Outpatient Medications on File Prior to Encounter   Medication Sig Dispense Refill    aspirin 81 MG EC tablet Take 1 tablet by mouth Daily. 90 tablet 0    carvedilol (COREG) 25 MG tablet Take 1 tablet by mouth 2 (Two) Times a Day With Meals. 180 tablet 0    hydrALAZINE (APRESOLINE) 100 MG tablet Take 1 tablet by mouth 3 (Three) Times a Day. 270 tablet 0    potassium chloride (KLOR-CON M20) 20 MEQ CR tablet Take 1 tablet by mouth Daily. 90 tablet 0    torsemide (DEMADEX) 100 MG tablet Take 2 tablets by mouth Daily. 180 tablet 0    [DISCONTINUED] Blood Glucose Monitoring Suppl (FreeStyle Freedom Lite) w/Device kit Use to check blood sugar as directed. 1 each 0    [DISCONTINUED] Blood Glucose Monitoring Suppl (Glucocard Expression Monitor) w/Device kit Use as directed 1 kit 0    [DISCONTINUED] glucose monitor monitoring kit 1 each 4 (Four) Times a Day Before Meals & at Bedtime. 1 each 0         Social History     Socioeconomic History    Marital status: Single   Tobacco Use    Smoking status: Never     Passive exposure: Never    Smokeless tobacco: Never   Vaping Use    Vaping status: Never Used   Substance and Sexual Activity    Alcohol use: Never    Drug use: Never    Sexual activity: Defer         Family History   Family history unknown: Yes       REVIEW OF SYSTEMS:   12 point ROS was performed and is negative except as outlined in HPI       Objective:     Vitals:    11/14/24 0034 11/14/24 0420 11/14/24 0540 11/14/24 0739   BP: 168/99   150/86   BP Location: Right arm   Right arm   Patient Position: Lying   Sitting   Pulse: 83   86   Resp: 20   18   Temp: 98 °F (36.7 °C)   97.5 °F (36.4 °C)   TempSrc: Oral   Oral   SpO2: 92% (!) 79%  95%   Weight:   (!) 141 kg (311 lb 6.4 oz)    Height:         Body mass index is 50.26 kg/m².  Flowsheet Rows      Flowsheet Row First Filed Value   Admission Height 167.6 cm (66\") " Documented at 11/13/2024 2325   Admission Weight 142 kg (312 lb) Documented at 11/13/2024 2325          Physical Exam  Vitals reviewed.   Constitutional:       General: He is not in acute distress.  HENT:      Head: Normocephalic.      Nose: Nose normal.   Eyes:      Extraocular Movements: Extraocular movements intact.      Pupils: Pupils are equal, round, and reactive to light.   Cardiovascular:      Rate and Rhythm: Normal rate and regular rhythm.      Pulses: Normal pulses.      Heart sounds: Normal heart sounds. Heart sounds not distant. No murmur heard.     No friction rub. No gallop. No S3 or S4 sounds.   Pulmonary:      Effort: Pulmonary effort is normal.      Breath sounds: Normal breath sounds.   Abdominal:      General: Abdomen is flat. Bowel sounds are normal. There is distension.      Palpations: Abdomen is soft.      Tenderness: There is no abdominal tenderness.   Musculoskeletal:      Right lower leg: Edema present.      Left lower leg: Edema present.   Skin:     General: Skin is warm and dry.   Neurological:      General: No focal deficit present.      Mental Status: He is alert and oriented to person, place, and time. Mental status is at baseline.   Psychiatric:         Mood and Affect: Mood normal.         Behavior: Behavior normal.       Lab Review:                Results from last 7 days   Lab Units 11/14/24  0507   SODIUM mmol/L 138   POTASSIUM mmol/L 4.1   CHLORIDE mmol/L 101   CO2 mmol/L 26.0   BUN mg/dL 78*   CREATININE mg/dL 4.05*   GLUCOSE mg/dL 286*   CALCIUM mg/dL 7.7*     Results from last 7 days   Lab Units 11/14/24  0507 11/13/24  2150 11/13/24  1925   HSTROP T ng/L 108* 116* 117*     Results from last 7 days   Lab Units 11/14/24  0507   WBC 10*3/mm3 9.72   HEMOGLOBIN g/dL 10.8*   HEMATOCRIT % 35.8*   PLATELETS 10*3/mm3 160                       EKG (reviewed by me personally):              Assessment/Plan:   Volume overload   Multifactorial  Nephrology has ordered IV bumetanide 4 mg q8  hours.   Chronic HFrEF  Secondary to ischemic cardiomyopathy  Echocardiogram in June 2022 with EF of 20%, global hypokinesis, and grade III with high LAP diastolic dysfunction.   Will repeat echocardiogram today.   GDMT: carvedilol 25 mg twice daily. On torsemide 200 mg daily at home.   Coronary artery disease  Status post CABG  Recent cardiac cath in December 2021 with 90% LIMA to LAD obstruction status post ROSEY, patent SVG to OM1 and OM2  Continue aspirin  Elevated troponin  Initial troponin 117, on repeat troponin has decreased to 116 then 108. Trend not consistent with ACS/MI. Elevated in the setting of CKD and volume overload. Echocardiogram pending.   Chronic kidney disease stage IV  Renal function worsened since last admission. May require dialysis if he is unable to diurese.   Hypertension  Continue carvedilol and hydralazine   Diabetes mellitus type II  Anemia of CKD  Hypocalcemia     Await echocardiogram. Cardiology will follow, thank you for this consult.     Mandi Sandoval, APRN   11/14/24

## 2024-11-14 NOTE — CONSULTS
[facility] Consult Note      Patient Name: Rufus Dorsey  : 1985  MRN: [id number]  Primary Care Physician:  Provider, No Known  Referring Physician: John Gipson MD  Date of admission: 2024    Subjective     Reason for Consult:  CKD4     HPI:   Rufus Dorsey is a 39 y.o. male with CKD stage 4 likely from diabetic nephropathy with assoc proteinuria, CAD s/p CABG, HFrEF (EF 20%), HTN, Dm2 who presented last night with worsening dyspnea and edema for several weeks.  BUN/Cr are 76/4.2, with normal K/bicarb.  .  BP elevated to 180/100.  CXR shows vascular tiffany but no overt edema/effusions.  Albumin is low 3.3.  We saw patient during hospitalization in May 2023 for vol overload (and had LLE cellulitis).  Cr was 2.6 to 3 then, and up to 3.4 in 2023.  He was also referred to lymphedema clinic.  Never followed up in office.  He speaks Bengali and [employer] ipad used.  He denies nausea or dysgeusia.  Been taking torsemide 200mg daily.      Review of Systems:   14 point review of systems is otherwise negative except for mentioned above on HPI    Personal History     Past Medical History:   Diagnosis Date    CHF (congestive heart failure)     Coronary artery disease     Diabetes     Heart failure 2016    High cholesterol     Hypertension     Ischemic cardiomyopathy 2016       Past Surgical History:   Procedure Laterality Date    CARDIAC CATHETERIZATION  2017    Right heart cath    CARDIAC CATHETERIZATION N/A 2021    Procedure: SAPHENOUS VEIN GRAFT;  Surgeon: Les Reyes MD;  Location:  [facility] CATH INVASIVE LOCATION;  Service: Cardiovascular;  Laterality: N/A;    CARDIAC CATHETERIZATION N/A 2021    Procedure: Coronary angiography;  Surgeon: Les Reyes MD;  Location:  [facility] CATH INVASIVE LOCATION;  Service: Cardiovascular;  Laterality: N/A;    CARDIAC CATHETERIZATION N/A 2021    Procedure: Stent ROSEY coronary;  Surgeon: Les Reyes  MD;  Location:  [facility] CATH INVASIVE LOCATION;  Service: Cardiovascular;  Laterality: N/A;    CARDIAC CATHETERIZATION N/A 12/4/2021    Procedure: Native mammary injection;  Surgeon: Les Reyes MD;  Location:  [facility] CATH INVASIVE LOCATION;  Service: Cardiovascular;  Laterality: N/A;    CARDIAC CATHETERIZATION N/A 5/25/2023    Procedure: Right Heart Cath;  Surgeon: Vicente Trujillo MD;  Location:  [facility] CATH INVASIVE LOCATION;  Service: Cardiology;  Laterality: N/A;    CARDIAC SURGERY      CORONARY ARTERY BYPASS GRAFT  05/26/2015    cabg x3 Dr. Key       Family History: Family history is unknown by patient.    Social History:  reports that he has never smoked. He has never been exposed to tobacco smoke. He has never used smokeless tobacco. He reports that he does not drink alcohol and does not use drugs.    Home Medications:  Prior to Admission medications    Medication Sig Start Date End Date Taking? Authorizing Provider   aspirin 81 MG EC tablet Take 1 tablet by mouth Daily. 10/10/24  Yes Vicente Trujillo MD   Blood Glucose Monitoring Suppl (FreeStyle Freedom Lite) w/Device kit Use to check blood sugar as directed. 1/8/21  Yes Joey Hughes MD   Blood Glucose Monitoring Suppl (Glucocard Expression Monitor) w/Device kit Use as directed 12/9/21  Yes Patrick Barrett MD   carvedilol (COREG) 25 MG tablet Take 1 tablet by mouth 2 (Two) Times a Day With Meals. 10/10/24  Yes Vicente Trujillo MD   glucose monitor monitoring kit 1 each 4 (Four) Times a Day Before Meals & at Bedtime. 1/8/21  Yes Joey Hughes MD   hydrALAZINE (APRESOLINE) 100 MG tablet Take 1 tablet by mouth 3 (Three) Times a Day. 10/10/24  Yes Vicente Trujillo MD   potassium chloride (KLOR-CON M20) 20 MEQ CR tablet Take 1 tablet by mouth Daily. 10/10/24  Yes Vicente Trujillo MD   torsemide (DEMADEX) 100 MG tablet Take 2 tablets by mouth Daily. 10/10/24  Yes Vicente Trujillo MD       Allergies:  No Known Allergies    Objective     Vitals:   Temp:  [98 °F (36.7  °C)-98.2 °F (36.8 °C)] 98 °F (36.7 °C)  Heart Rate:  [83-87] 83  Resp:  [20-22] 20  BP: (168-187)/() 168/99  Flow (L/min) (Oxygen Therapy):  [2-3] 3    Intake/Output Summary (Last 24 hours) at 11/14/2024 0703  Last data filed at 11/13/2024 2325  Gross per 24 hour   Intake --   Output 500 ml   Net -500 ml       Physical Exam:    General Appearance: alert, pleasant young obese  M on NC O2  Skin: warm and dry  HEENT: oral mucosa normal, nonicteric sclera  Neck: supple, no JVD  Lungs: Dec BS bilat  Heart: RRR, normal S1 and S2  Abdomen: soft, nontender, nondistended  : no palpable bladder  Extremities: 3+ BLE pedal/ankle edema   Neuro: normal speech and mental status     Scheduled Meds:     insulin lispro, 2-7 Units, Subcutaneous, 4x Daily AC & at Bedtime  sodium chloride, 10 mL, Intravenous, Q12H      IV Meds:        Results Reviewed:   I have personally reviewed the results from the time of this admission to 11/14/2024 07:03 EST     Lab Results   Component Value Date    GLUCOSE 286 (H) 11/14/2024    CALCIUM 7.7 (L) 11/14/2024     11/14/2024    K 4.1 11/14/2024    CO2 26.0 11/14/2024     11/14/2024    BUN 78 (H) 11/14/2024    CREATININE 4.05 (H) 11/14/2024    EGFRIFAFRI 54 (L) 12/21/2020    EGFRIFNONA 50 (L) 12/09/2021    BCR 19.3 11/14/2024    ANIONGAP 11.0 11/14/2024      Lab Results   Component Value Date    MG 2.1 11/21/2023    PHOS 4.0 06/07/2022    ALBUMIN 3.3 (L) 11/13/2024           Assessment / Plan     ASSESSMENT:  CKD stage 4 - suspect progressive diabetic nephropathy.  Cr was 2.6 to 3 in May 2023, 3.4 in Nov 2023 (a year ago) and now 4.0, with eGFR ~ 19 mlmin.  Hx subnephrotic proteinuria.  K/HCO3 normal and no uremic sx but severely vol overloaded.    Vol overload - periph > central; CXR noted.  due to progressive CKD & may be nephrotic now; alb 3.3  HTN - uncontrolled; vol excess poss factor; on coreg/hydralazine  DM2, uncontrolled, A1c 12.3  Anemia of CKD, mild, hgb  10.8  Hypocalcemia - Ca is 7.7 but corrects ~ 8.1 for low alb    PLAN:  Inc bumex 4mg IV q8h and gauge response  No immediate indicatio for dialysis but discussed this possibility should he fail to diurese  Check UA & pr/cr ratio & D25  Consider echocardiogram  Resume coreg & hydralazine max doses  Could add clonidine tomorrow if needed (rather not give CCB given anasarca)  NPO after MN tonight in event needs TDC     Thank you for involving us in the care of Rufus Dorsey.  Please feel free to call with any questions.    Red Ackerman MD  11/14/24  07:03 [facility]    [facility]  [phone]

## 2024-11-14 NOTE — PLAN OF CARE
Goal Outcome Evaluation:  Pt has been asleep off and on most of the shift  VS as noted  Accucks as note  Received extra bumex today-output large  Up in room with steady gait  Has denied chest pain or SOA throughout shift  Safety maintained

## 2024-11-14 NOTE — ED NOTES
Nursing report ED to floor  Rufus Dorsey  39 y.o.  male    HPI :  HPI  Stated Reason for Visit: pt to er for SOB and leg swelling  History Obtained From: patient    Chief Complaint  Chief Complaint   Patient presents with    Chest Pain    Shortness of Breath       Admitting doctor:   John Gipson MD    Admitting diagnosis:   The primary encounter diagnosis was Acute on chronic congestive heart failure, unspecified heart failure type. Diagnoses of Hypoxia, Acute renal failure superimposed on chronic kidney disease, unspecified acute renal failure type, unspecified CKD stage, Anasarca, Elevated troponin, and Hyperglycemia were also pertinent to this visit.    Code status:   Current Code Status       Date Active Code Status Order ID Comments User Context       Prior            Allergies:   Patient has no known allergies.    Isolation:   No active isolations    Intake and Output  No intake or output data in the 24 hours ending 11/13/24 2158    Weight:   There were no vitals filed for this visit.    Most recent vitals:   Vitals:    11/13/24 1937 11/13/24 1954 11/13/24 2032 11/13/24 2151   BP:  168/82  (!) 187/106   Pulse: 85   87   Resp:  22     Temp:   98.1 °F (36.7 °C)    TempSrc:   Tympanic    SpO2: 96%          Active LDAs/IV Access:   Lines, Drains & Airways       Active LDAs       Name Placement date Placement time Site Days    Peripheral IV 05/22/23 1905 Right Antecubital 05/22/23 1905  Antecubital  541    Peripheral IV 11/13/24 2008 Left Antecubital 11/13/24 2008  Antecubital  less than 1                    Labs (abnormal labs have a star):   Labs Reviewed   COMPREHENSIVE METABOLIC PANEL - Abnormal; Notable for the following components:       Result Value    Glucose 275 (*)     BUN 76 (*)     Creatinine 4.21 (*)     Calcium 8.0 (*)     Albumin 3.3 (*)     Alkaline Phosphatase 121 (*)     eGFR 17.5 (*)     All other components within normal limits    Narrative:     GFR Normal >60  Chronic Kidney Disease  <60  Kidney Failure <15     TROPONIN - Abnormal; Notable for the following components:    HS Troponin T 117 (*)     All other components within normal limits    Narrative:     High Sensitive Troponin T Reference Range:  <14.0 ng/L- Negative Female for AMI  <22.0 ng/L- Negative Male for AMI  >=14 - Abnormal Female indicating possible myocardial injury.  >=22 - Abnormal Male indicating possible myocardial injury.   Clinicians would have to utilize clinical acumen, EKG, Troponin, and serial changes to determine if it is an Acute Myocardial Infarction or myocardial injury due to an underlying chronic condition.        CBC WITH AUTO DIFFERENTIAL - Abnormal; Notable for the following components:    Hemoglobin 11.7 (*)     MCHC 30.8 (*)     Neutrophil % 80.8 (*)     Lymphocyte % 9.1 (*)     Neutrophils, Absolute 7.68 (*)     All other components within normal limits   BNP (IN-HOUSE) - Abnormal; Notable for the following components:    proBNP 14,664.0 (*)     All other components within normal limits    Narrative:     This assay is used as an aid in the diagnosis of individuals suspected of having heart failure. It can be used as an aid in the diagnosis of acute decompensated heart failure (ADHF) in patients presenting with signs and symptoms of ADHF to the emergency department (ED). In addition, NT-proBNP of <300 pg/mL indicates ADHF is not likely.    Age Range Result Interpretation  NT-proBNP Concentration (pg/mL:      <50             Positive            >450                   Gray                 300-450                    Negative             <300    50-75           Positive            >900                  Gray                300-900                  Negative            <300      >75             Positive            >1800                  Gray                300-1800                  Negative            <300   COVID-19 AND FLU A/B, NP SWAB IN TRANSPORT MEDIA 1 HR TAT - Normal    Narrative:     Fact sheet for providers:  https://www.fda.gov/media/982250/download    Fact sheet for patients: https://www.fda.gov/media/545283/download    Test performed by PCR.   RAINBOW DRAW    Narrative:     The following orders were created for panel order [place] Draw.  Procedure                               Abnormality         Status                     ---------                               -----------         ------                     Green Top (Gel)[[id number]]                                  Final result               Lavender Top[[id number]]                                     Final result               Gold Top - SST[[id number]]                                   Final result               Light Blue Top[[id number]]                                   Final result                 Please view results for these tests on the individual orders.   HIGH SENSITIVITIY TROPONIN T 2HR   CBC AND DIFFERENTIAL    Narrative:     The following orders were created for panel order CBC & Differential.  Procedure                               Abnormality         Status                     ---------                               -----------         ------                     CBC Auto Differential[[id number]]        Abnormal            Final result                 Please view results for these tests on the individual orders.   GREEN TOP   LAVENDER TOP   GOLD TOP - SST   LIGHT BLUE TOP       EKG:   ECG 12 Lead ED Triage Standing Order; Chest Pain   Final Result   HEART RATE=88  bpm   RR Pvkkqwso=142  ms   CO Vdubtuvb=338  ms   P Horizontal Axis=-2  deg   P Front Axis=54  deg   QRSD Xanapsgz=551  ms   QT Rbcpmetg=966  ms   FAzZ=570  ms   QRS Axis=-20  deg   T Wave Axis=126  deg   - ABNORMAL ECG -   Sinus rhythm   Probable  left atrial enlargement   Nonspecific  intraventricular conduction delay   Consider  anterior infarct   Abnormal T, consider ischemia, lateral leads   When compared with ECG of 22-May-2023 20:32:42,   No significant change   Electronically Signed By:  Alejo Mcnair ([facility]) 2024-11-13 21:12:32   Date and Time of Study:2024-11-13 19:18:04          Meds given in ED:   Medications   sodium chloride 0.9 % flush 10 mL (has no administration in time range)   bumetanide (BUMEX) injection 2 mg (2 mg Intravenous Given 11/13/24 1217)       Imaging results:  No radiology results for the last day    Ambulatory status:   - bedrest    Social issues:   Social History     Socioeconomic History    Marital status: Single   Tobacco Use    Smoking status: Never    Smokeless tobacco: Never   Vaping Use    Vaping status: Never Used   Substance and Sexual Activity    Alcohol use: Never    Drug use: Never    Sexual activity: Defer       Peripheral Neurovascular  Peripheral Neurovascular (Adult)  Peripheral Neurovascular WDL: .WDL except  Additional Documentation: Edema (Group)  Edema  Edema: leg, left, leg, right  Leg, Left Edema: 3+ (Moderate)  Leg, Right Edema: 3+ (Moderate)    Neuro Cognitive  Neuro Cognitive (Adult)  Cognitive/Neuro/Behavioral WDL: WDL    Learning  Learning Assessment  Learning Readiness and Ability: language barrier identified  :  requested  Education Provided  Person Taught: patient  Teaching Method: verbal instruction  Teaching Focus: symptom/problem overview    Respiratory  Respiratory  Airway WDL: WDL  Additional Documentation: Breath Sounds (Group)  Respiratory WDL  Respiratory WDL: .WDL except, rhythm/pattern  Rhythm/Pattern, Respiratory: grunting, shortness of breath    Abdominal Pain  Safety Interventions  Safety Precautions/Falls Reduction: assistive device/personal items within reach, fall reduction program maintained, treatment room near department station  All Alarms: alarm(s) activated and audible    Pain Assessments  Pain (Adult)  (0-10) Pain Rating: Rest: 10  Pain Location: chest    NIH Stroke Scale       Wes Alas RN  11/13/24 21:58 EST

## 2024-11-14 NOTE — PLAN OF CARE
Goal Outcome Evaluation:  Plan of Care Reviewed With: patient        Progress: improving  Outcome Evaluation: Pt was admitted with CHF. Pt does speak a little English did get the  in the room to use. Pt has extensive heart history. Pt has plus 3 edema in his legs and are very firm. Pt has some edema in the abd area and is very uncomfortable with his breathing. Pt has been postioned in a prone positon or orthopeneic postion which has helped his breathing. When patient is on his back O2 has to be turned up to 3L. Pt mostly been on 2L this shift. family at bedside at times. CTM, safety maintained.  Diuretic in Use: Lasix and bumex  Response to Diuretics (Output greater than intake): ?  Daily Weight (up or down): down  O2 Requirements: 2-3L  Functional Status (Activity level, tolerance and respiratory symptoms): SOA with any movement  Discharge Plans:  Home TBD

## 2024-11-14 NOTE — CASE MANAGEMENT/SOCIAL WORK
Discharge Planning Assessment  [facility]     Patient Name: Rufus Dorsey  MRN: [id number]  Today's Date: 11/14/2024    Admit Date: 11/13/2024    Plan: Home, brother to transport   Discharge Needs Assessment       Row Name 11/14/24 1330       Living Environment    People in Home sibling(s)    Name(s) of People in Home Kar Dorsey [phone]    Current Living Arrangements home    Potentially Unsafe Housing Conditions none    In the past 12 months has the electric, gas, oil, or water company threatened to shut off services in your home? No    Primary Care Provided by self    Provides Primary Care For no one    Family Caregiver if Needed sibling(s)    Family Caregiver Names Kar Dorsey    Quality of Family Relationships helpful;involved    Able to Return to Prior Arrangements yes       Resource/Environmental Concerns    Resource/Environmental Concerns financial    Financial Concerns insurance, none    Transportation Concerns none       Transportation Needs    In the past 12 months, has lack of transportation kept you from medical appointments or from getting medications? no    In the past 12 months, has lack of transportation kept you from meetings, work, or from getting things needed for daily living? No       Food Insecurity    Within the past 12 months, you worried that your food would run out before you got the money to buy more. Never true    Within the past 12 months, the food you bought just didn't last and you didn't have money to get more. Never true       Transition Planning    Patient/Family Anticipates Transition to home;home with family    Patient/Family Anticipated Services at Transition none    Transportation Anticipated family or friend will provide       Discharge Needs Assessment    Readmission Within the Last 30 Days no previous admission in last 30 days    Equipment Currently Used at Home glucometer;scales    Concerns to be Addressed no discharge needs identified;denies needs/concerns  at this time    Do you want help finding or keeping work or a job? I do not need or want help    Do you want help with school or training? For example, starting or completing job training or getting a high school diploma, GED or equivalent No    Anticipated Changes Related to Illness none    Equipment Needed After Discharge none                   Discharge Plan       Row Name 11/14/24 0486       Plan    Plan Home, brother to transport    Patient/Family in Agreement with Plan yes    Provided Post Acute Provider List? N/A    Provided Post Acute Provider Quality & Resource List? N/A    Plan Comments Met with pt at bedside. Introduced self and explained role of . Face sheet verified, pt does not have a PCP, stated that he uses his cardiologist, Dr. Trujillo , as his PCP. Informed pt that [facility] would give pt a clinic list in [place] print at [place]. Pt understands English, and is able to respond to direct questions in english. Pt stated that he has a difficult time paying for medications, and he obtains his medications from [facility] .  Pt lives with his brother, Kar, and stated that he could assist with any care needs that may arise. Pt is currently independent in ADL's and he does not use any assistive devices. Pt has never had home health and he has never been to SNF/Rehab. Pt stated that his glucometer is in good working order. Pt does not wear home o2 at this time. Pt does not have any insurance coverage. CCP working with [employer] to see if pt would be eligible for emergency Medicaid. Upon discharge, pt stated that his brother will transport home. Explained that CCP would follow to assess for discharge needs.                  Continued Care and Services - Admitted Since 11/13/2024    No active coordination exists for this encounter.       Expected Discharge Date and Time       Expected Discharge Date Expected Discharge Time    Nov 16, 2024            Demographic Summary    No documentation.                   Functional Status    No documentation.                  Psychosocial    No documentation.                  Abuse/Neglect    No documentation.                  Legal    No documentation.                  Substance Abuse    No documentation.                  Patient Forms    No documentation.                     Mónica Rodriguez RN

## 2024-11-14 NOTE — SIGNIFICANT NOTE
11/14/24 0643   OTHER   Discipline physical therapist   Therapy Assessment/Plan (PT)   Criteria for Skilled Interventions Met (PT) other (see comments)  (38 yo admitted withAECHF.  Per nsg notes, pt is up indep and amb 20'.  Anticipate no skilled therapy needs but will follow peripherally 1-2 days to ensure pt remains indep.)   Recommendation   PT - Next Appointment 11/15/24

## 2024-11-14 NOTE — SIGNIFICANT NOTE
11/14/24 1039   OTHER   Discipline occupational therapist   Rehab Time/Intention   Session Not Performed   (pt propped up prone in bed, watching phone.  Pt rolls to back, gets up to EOB, stands and walks around room without difficulty.  UE are functional.  Full OT eval not indicated at this time.  Discussed pt with RN.)

## 2024-11-14 NOTE — H&P
Patient Identification:  Name: Rufus Dorsey  Age: 39 y.o.  Sex: male  :  1985  MRN: [id number]         Primary Care Physician: Provider, No Known    Chief Complaint   Patient presents with    Chest Pain    Shortness of Breath     Subjective   Patient is a 39 y.o. male with a history of diabetes, systolic and diastolic heart failure, CAD status post CABG and PCI, hypertension, CKD, obesity presented with fluid gain. Patient is [place]-speaking used  device in the room. He states for the last couple of weeks he has noticed increasing fluid gain and weight gain. He has had increased abdominal girth. He states he has chronic problems for years with shortness of breath and dyspnea on exertion. His main complaint is the swelling and increased abdominal distention but also shortness of breath is constant and worse with exertion. He has orthopnea and PND. He denied to me any chest pain but reported to ED provider. No fevers. He has a chronic cough. No diarrhea or abdominal pain or constipation. No nausea or vomiting.    Past Medical History:   Diagnosis Date    CHF (congestive heart failure)     Coronary artery disease     Diabetes     Heart failure 2016    High cholesterol     Hypertension     Ischemic cardiomyopathy 2016     Past Surgical History:   Procedure Laterality Date    CARDIAC CATHETERIZATION  2017    Right heart cath    CARDIAC CATHETERIZATION N/A 2021    Procedure: SAPHENOUS VEIN GRAFT;  Surgeon: Les Reyes MD;  Location: [facility] CATH INVASIVE LOCATION;  Service: Cardiovascular;  Laterality: N/A;    CARDIAC CATHETERIZATION N/A 2021    Procedure: Coronary angiography;  Surgeon: Les Reyes MD;  Location: [facility] CATH INVASIVE LOCATION;  Service: Cardiovascular;  Laterality: N/A;    CARDIAC CATHETERIZATION N/A 2021    Procedure: Stent ROSEY coronary;  Surgeon: Les Reyes MD;  Location: [facility] CATH INVASIVE LOCATION;  Service: Cardiovascular;   Laterality: N/A;    CARDIAC CATHETERIZATION N/A 12/4/2021    Procedure: Native mammary injection;  Surgeon: Les Reyes MD;  Location:  [facility] CATH INVASIVE LOCATION;  Service: Cardiovascular;  Laterality: N/A;    CARDIAC CATHETERIZATION N/A 5/25/2023    Procedure: Right Heart Cath;  Surgeon: Vicente Trujillo MD;  Location:  [facility] CATH INVASIVE LOCATION;  Service: Cardiology;  Laterality: N/A;    CARDIAC SURGERY      CORONARY ARTERY BYPASS GRAFT  05/26/2015    cabg x3 Dr. Key     Family History   Family history unknown: Yes     Social History     Tobacco Use    Smoking status: Never     Passive exposure: Never    Smokeless tobacco: Never   Vaping Use    Vaping status: Never Used   Substance Use Topics    Alcohol use: Never    Drug use: Never     Objective      Vital Signs  Temp:  [97.5 °F (36.4 °C)-98.2 °F (36.8 °C)] 97.5 °F (36.4 °C)  Heart Rate:  [83-87] 86  Resp:  [18-22] 18  BP: (150-187)/() 150/86  Body mass index is 50.26 kg/m².    Physical Exam  Constitutional:       Appearance: He is obese. He is ill-appearing. He is not toxic-appearing.   HENT:      Head: Normocephalic and atraumatic.   Cardiovascular:      Rate and Rhythm: Normal rate and regular rhythm.   Pulmonary:      Effort: Pulmonary effort is normal. Tachypnea present.      Breath sounds: Decreased breath sounds present. No wheezing.   Abdominal:      General: Bowel sounds are normal.      Palpations: Abdomen is soft.      Tenderness: There is no abdominal tenderness.   Musculoskeletal:      Right lower leg: Edema present.      Left lower leg: Edema present.   Skin:     General: Skin is warm and dry.   Neurological:      General: No focal deficit present.      Mental Status: He is alert.   Psychiatric:         Mood and Affect: Mood normal.         Behavior: Behavior normal.     Results for orders placed during the hospital encounter of 06/04/22    Adult Transthoracic Echo Complete W/ Cont if Necessary Per Protocol    Interpretation  Summary  · The study is technically difficult for diagnosis.  · The following left ventricular wall segments are hypokinetic: mid anterior, apical anterior, basal anterolateral, mid anterolateral, apical lateral, basal inferolateral, mid inferolateral, apical inferior, mid inferior, apical septal, basal inferoseptal, mid inferoseptal, apex hypokinetic, mid anteroseptal, basal anterior, basal inferior and basal inferoseptal.  · The left ventricular cavity is moderately dilated.  · Calculated left ventricular EF = 20.7% Estimated left ventricular EF was in agreement with the calculated left ventricular EF. Left ventricular systolic function is severely decreased.  · Moderately reduced right ventricular systolic function noted.  · Left ventricular diastolic function is consistent with (grade III w/high LAP) fixed restrictive pattern.  · There is a moderate (1-2cm) pericardial effusion.       Assessment & Plan   Active Hospital Problems    Diagnosis  POA    **Acute exacerbation of CHF (congestive heart failure) [I50.9]  Yes    Chronic HFrEF (heart failure with reduced ejection fraction) [I50.22]  Yes    S/P CABG x 3 [Z95.1]  Not Applicable    Acute on chronic combined systolic and diastolic CHF (congestive heart failure) [I50.43]  Yes    Coronary artery disease [I25.10]  Yes    Hypertension [I10]  Yes    Type 2 diabetes mellitus, with long-term current use of insulin [E11.9, Z79.4]  Not Applicable    MEI (acute kidney injury) [N17.9]  Unknown      Resolved Hospital Problems   No resolved problems to display.     39 y.o. male    Volume overload  HFrEF EF 20% 2022  CAD CABG x3  Elevated troponin  HTN  MEI/CKD 4   Cardiology evaluation  ASA, coreg, hydralazine, ISDN  Diuresis per nephrology. previous Cr in the 3s. Dialysis being considered  Agree with checking echocardiogram  I/Os and daily weights    DM2 uncontrolled. A1c 12.30%. No DM meds on PTA list (dced on NPH in 2023)  Obesity Body mass index is 50.26 kg/m².      Discussed with patient and nursing staff.    Cedrick Kramer MD  [facility]  11/14/24

## 2024-11-15 ENCOUNTER — APPOINTMENT (OUTPATIENT)
Dept: CARDIOLOGY | Facility: HOSPITAL | Age: 39
End: 2024-11-15
Payer: MEDICAID

## 2024-11-15 ENCOUNTER — APPOINTMENT (OUTPATIENT)
Dept: ULTRASOUND IMAGING | Facility: HOSPITAL | Age: 39
End: 2024-11-15

## 2024-11-15 LAB
25(OH)D3 SERPL-MCNC: <6 NG/ML (ref 30–100)
ALBUMIN SERPL-MCNC: 3.1 G/DL (ref 3.5–5.2)
ANION GAP SERPL CALCULATED.3IONS-SCNC: 13.8 MMOL/L (ref 5–15)
AORTIC DIMENSIONLESS INDEX: 0.84 (DI)
BASOPHILS # BLD AUTO: 0.02 10*3/MM3 (ref 0–0.2)
BASOPHILS NFR BLD AUTO: 0.2 % (ref 0–1.5)
BH CV ECHO MEAS - ACS: 1.83 CM
BH CV ECHO MEAS - AO MAX PG: 5.5 MMHG
BH CV ECHO MEAS - AO MEAN PG: 2.5 MMHG
BH CV ECHO MEAS - AO ROOT AREA (BSA CORRECTED): 1.4 CM2
BH CV ECHO MEAS - AO ROOT DIAM: 3.4 CM
BH CV ECHO MEAS - AO V2 MAX: 117 CM/SEC
BH CV ECHO MEAS - AO V2 VTI: 19.2 CM
BH CV ECHO MEAS - AVA(I,D): 3.2 CM2
BH CV ECHO MEAS - EDV(CUBED): 236 ML
BH CV ECHO MEAS - EDV(MOD-SP2): 292 ML
BH CV ECHO MEAS - EDV(MOD-SP4): 295 ML
BH CV ECHO MEAS - EF(MOD-BP): 35.3 %
BH CV ECHO MEAS - EF(MOD-SP2): 32.2 %
BH CV ECHO MEAS - EF(MOD-SP4): 34.2 %
BH CV ECHO MEAS - ESV(CUBED): 146.8 ML
BH CV ECHO MEAS - ESV(MOD-SP2): 198 ML
BH CV ECHO MEAS - ESV(MOD-SP4): 194 ML
BH CV ECHO MEAS - FS: 14.6 %
BH CV ECHO MEAS - IVS/LVPW: 1.02 CM
BH CV ECHO MEAS - IVSD: 1.2 CM
BH CV ECHO MEAS - LAT PEAK E' VEL: 10.2 CM/SEC
BH CV ECHO MEAS - LV DIASTOLIC VOL/BSA (35-75): 122.3 CM2
BH CV ECHO MEAS - LV MASS(C)D: 324.7 GRAMS
BH CV ECHO MEAS - LV MAX PG: 3.2 MMHG
BH CV ECHO MEAS - LV MEAN PG: 1.8 MMHG
BH CV ECHO MEAS - LV SYSTOLIC VOL/BSA (12-30): 80.4 CM2
BH CV ECHO MEAS - LV V1 MAX: 88.8 CM/SEC
BH CV ECHO MEAS - LV V1 VTI: 16.2 CM
BH CV ECHO MEAS - LVIDD: 6.2 CM
BH CV ECHO MEAS - LVIDS: 5.3 CM
BH CV ECHO MEAS - LVOT AREA: 3.8 CM2
BH CV ECHO MEAS - LVOT DIAM: 2.2 CM
BH CV ECHO MEAS - LVPWD: 1.17 CM
BH CV ECHO MEAS - MED PEAK E' VEL: 6.1 CM/SEC
BH CV ECHO MEAS - MV A DUR: 0.11 SEC
BH CV ECHO MEAS - MV A MAX VEL: 66.2 CM/SEC
BH CV ECHO MEAS - MV DEC SLOPE: 425.1 CM/SEC2
BH CV ECHO MEAS - MV DEC TIME: 0.17 SEC
BH CV ECHO MEAS - MV E MAX VEL: 96.9 CM/SEC
BH CV ECHO MEAS - MV E/A: 1.46
BH CV ECHO MEAS - MV MAX PG: 4.7 MMHG
BH CV ECHO MEAS - MV MEAN PG: 2.5 MMHG
BH CV ECHO MEAS - MV P1/2T: 76.2 MSEC
BH CV ECHO MEAS - MV V2 VTI: 28 CM
BH CV ECHO MEAS - MVA(P1/2T): 2.9 CM2
BH CV ECHO MEAS - MVA(VTI): 2.2 CM2
BH CV ECHO MEAS - PA ACC TIME: 0.11 SEC
BH CV ECHO MEAS - PA V2 MAX: 103.4 CM/SEC
BH CV ECHO MEAS - RAP SYSTOLE: 3 MMHG
BH CV ECHO MEAS - RV MAX PG: 4 MMHG
BH CV ECHO MEAS - RV V1 MAX: 100.1 CM/SEC
BH CV ECHO MEAS - RV V1 VTI: 17.2 CM
BH CV ECHO MEAS - RVSP: 24 MMHG
BH CV ECHO MEAS - SV(LVOT): 61.4 ML
BH CV ECHO MEAS - SV(MOD-SP2): 94 ML
BH CV ECHO MEAS - SV(MOD-SP4): 101 ML
BH CV ECHO MEAS - SVI(LVOT): 25.4 ML/M2
BH CV ECHO MEAS - SVI(MOD-SP2): 39 ML/M2
BH CV ECHO MEAS - SVI(MOD-SP4): 41.9 ML/M2
BH CV ECHO MEAS - TAPSE (>1.6): 1.34 CM
BH CV ECHO MEAS - TR MAX PG: 21 MMHG
BH CV ECHO MEAS - TR MAX VEL: 229.4 CM/SEC
BH CV ECHO MEASUREMENTS AVERAGE E/E' RATIO: 11.89
BH CV XLRA - TDI S': 8.7 CM/SEC
BUN SERPL-MCNC: 88 MG/DL (ref 6–20)
BUN/CREAT SERPL: 19.4 (ref 7–25)
CALCIUM SPEC-SCNC: 8 MG/DL (ref 8.6–10.5)
CHLORIDE SERPL-SCNC: 102 MMOL/L (ref 98–107)
CO2 SERPL-SCNC: 24.2 MMOL/L (ref 22–29)
CREAT SERPL-MCNC: 4.54 MG/DL (ref 0.76–1.27)
DEPRECATED RDW RBC AUTO: 45.2 FL (ref 37–54)
EGFRCR SERPLBLD CKD-EPI 2021: 16 ML/MIN/1.73
EOSINOPHIL # BLD AUTO: 0.12 10*3/MM3 (ref 0–0.4)
EOSINOPHIL NFR BLD AUTO: 1.3 % (ref 0.3–6.2)
ERYTHROCYTE [DISTWIDTH] IN BLOOD BY AUTOMATED COUNT: 14 % (ref 12.3–15.4)
GLUCOSE BLDC GLUCOMTR-MCNC: 205 MG/DL (ref 70–130)
GLUCOSE BLDC GLUCOMTR-MCNC: 228 MG/DL (ref 70–130)
GLUCOSE BLDC GLUCOMTR-MCNC: 239 MG/DL (ref 70–130)
GLUCOSE BLDC GLUCOMTR-MCNC: 260 MG/DL (ref 70–130)
GLUCOSE SERPL-MCNC: 202 MG/DL (ref 65–99)
HBV SURFACE AG SERPL QL IA: NORMAL
HCT VFR BLD AUTO: 35 % (ref 37.5–51)
HGB BLD-MCNC: 11.2 G/DL (ref 13–17.7)
IMM GRANULOCYTES # BLD AUTO: 0.07 10*3/MM3 (ref 0–0.05)
IMM GRANULOCYTES NFR BLD AUTO: 0.7 % (ref 0–0.5)
LEFT ATRIUM VOLUME INDEX: 24.3 ML/M2
LV EF 2D ECHO EST: 30 %
LYMPHOCYTES # BLD AUTO: 0.63 10*3/MM3 (ref 0.7–3.1)
LYMPHOCYTES NFR BLD AUTO: 6.6 % (ref 19.6–45.3)
MCH RBC QN AUTO: 28.3 PG (ref 26.6–33)
MCHC RBC AUTO-ENTMCNC: 32 G/DL (ref 31.5–35.7)
MCV RBC AUTO: 88.4 FL (ref 79–97)
MONOCYTES # BLD AUTO: 0.52 10*3/MM3 (ref 0.1–0.9)
MONOCYTES NFR BLD AUTO: 5.4 % (ref 5–12)
NEUTROPHILS NFR BLD AUTO: 8.19 10*3/MM3 (ref 1.7–7)
NEUTROPHILS NFR BLD AUTO: 85.8 % (ref 42.7–76)
NRBC BLD AUTO-RTO: 0 /100 WBC (ref 0–0.2)
PHOSPHATE SERPL-MCNC: 5.6 MG/DL (ref 2.5–4.5)
PLATELET # BLD AUTO: 169 10*3/MM3 (ref 140–450)
PMV BLD AUTO: 11.8 FL (ref 6–12)
POTASSIUM SERPL-SCNC: 4.1 MMOL/L (ref 3.5–5.2)
RBC # BLD AUTO: 3.96 10*6/MM3 (ref 4.14–5.8)
SINUS: 2.8 CM
SODIUM SERPL-SCNC: 140 MMOL/L (ref 136–145)
WBC NRBC COR # BLD AUTO: 9.55 10*3/MM3 (ref 3.4–10.8)

## 2024-11-15 PROCEDURE — 93306 TTE W/DOPPLER COMPLETE: CPT

## 2024-11-15 PROCEDURE — 76775 US EXAM ABDO BACK WALL LIM: CPT

## 2024-11-15 PROCEDURE — 63710000001 INSULIN LISPRO (HUMAN) PER 5 UNITS: Performed by: HOSPITALIST

## 2024-11-15 PROCEDURE — 63710000001 INSULIN LISPRO (HUMAN) PER 5 UNITS: Performed by: NURSE PRACTITIONER

## 2024-11-15 PROCEDURE — 82306 VITAMIN D 25 HYDROXY: CPT | Performed by: INTERNAL MEDICINE

## 2024-11-15 PROCEDURE — 80069 RENAL FUNCTION PANEL: CPT | Performed by: INTERNAL MEDICINE

## 2024-11-15 PROCEDURE — 63710000001 INSULIN GLARGINE PER 5 UNITS: Performed by: HOSPITALIST

## 2024-11-15 PROCEDURE — 99232 SBSQ HOSP IP/OBS MODERATE 35: CPT | Performed by: STUDENT IN AN ORGANIZED HEALTH CARE EDUCATION/TRAINING PROGRAM

## 2024-11-15 PROCEDURE — 25010000002 BUMETANIDE PER 0.5 MG: Performed by: INTERNAL MEDICINE

## 2024-11-15 PROCEDURE — 25010000002 CHLOROTHIAZIDE PER 500 MG: Performed by: INTERNAL MEDICINE

## 2024-11-15 PROCEDURE — 93306 TTE W/DOPPLER COMPLETE: CPT | Performed by: INTERNAL MEDICINE

## 2024-11-15 PROCEDURE — 87340 HEPATITIS B SURFACE AG IA: CPT | Performed by: INTERNAL MEDICINE

## 2024-11-15 PROCEDURE — 25510000001 PERFLUTREN 6.52 MG/ML SUSPENSION 2 ML VIAL

## 2024-11-15 PROCEDURE — 82948 REAGENT STRIP/BLOOD GLUCOSE: CPT

## 2024-11-15 PROCEDURE — 85025 COMPLETE CBC W/AUTO DIFF WBC: CPT | Performed by: INTERNAL MEDICINE

## 2024-11-15 RX ORDER — ERGOCALCIFEROL 1.25 MG/1
50000 CAPSULE, LIQUID FILLED ORAL
Status: DISCONTINUED | OUTPATIENT
Start: 2024-11-16 | End: 2024-12-03 | Stop reason: HOSPADM

## 2024-11-15 RX ORDER — CHLOROTHIAZIDE SODIUM 500 MG/1
500 INJECTION INTRAVENOUS ONCE
Status: COMPLETED | OUTPATIENT
Start: 2024-11-15 | End: 2024-11-15

## 2024-11-15 RX ORDER — INSULIN LISPRO 100 [IU]/ML
3 INJECTION, SOLUTION INTRAVENOUS; SUBCUTANEOUS
Status: DISCONTINUED | OUTPATIENT
Start: 2024-11-15 | End: 2024-12-03 | Stop reason: HOSPADM

## 2024-11-15 RX ADMIN — INSULIN LISPRO 3 UNITS: 100 INJECTION, SOLUTION INTRAVENOUS; SUBCUTANEOUS at 21:34

## 2024-11-15 RX ADMIN — CARVEDILOL 25 MG: 25 TABLET, FILM COATED ORAL at 20:30

## 2024-11-15 RX ADMIN — ISOSORBIDE DINITRATE 40 MG: 20 TABLET ORAL at 10:24

## 2024-11-15 RX ADMIN — INSULIN LISPRO 4 UNITS: 100 INJECTION, SOLUTION INTRAVENOUS; SUBCUTANEOUS at 12:38

## 2024-11-15 RX ADMIN — INSULIN LISPRO 3 UNITS: 100 INJECTION, SOLUTION INTRAVENOUS; SUBCUTANEOUS at 10:23

## 2024-11-15 RX ADMIN — CARVEDILOL 25 MG: 25 TABLET, FILM COATED ORAL at 10:24

## 2024-11-15 RX ADMIN — ASPIRIN 81 MG: 81 TABLET, COATED ORAL at 10:24

## 2024-11-15 RX ADMIN — INSULIN GLARGINE 10 UNITS: 100 INJECTION, SOLUTION SUBCUTANEOUS at 12:37

## 2024-11-15 RX ADMIN — INSULIN LISPRO 3 UNITS: 100 INJECTION, SOLUTION INTRAVENOUS; SUBCUTANEOUS at 12:37

## 2024-11-15 RX ADMIN — ISOSORBIDE DINITRATE 40 MG: 20 TABLET ORAL at 12:39

## 2024-11-15 RX ADMIN — BUMETANIDE 1 MG/HR: 0.25 INJECTION INTRAMUSCULAR; INTRAVENOUS at 10:24

## 2024-11-15 RX ADMIN — ISOSORBIDE DINITRATE 40 MG: 20 TABLET ORAL at 20:30

## 2024-11-15 RX ADMIN — Medication 10 ML: at 21:34

## 2024-11-15 RX ADMIN — BUMETANIDE 4 MG: 0.25 INJECTION INTRAMUSCULAR; INTRAVENOUS at 00:43

## 2024-11-15 RX ADMIN — PERFLUTREN 4 ML: 6.52 INJECTION, SUSPENSION INTRAVENOUS at 08:37

## 2024-11-15 RX ADMIN — HYDRALAZINE HYDROCHLORIDE 100 MG: 50 TABLET ORAL at 20:29

## 2024-11-15 RX ADMIN — Medication 10 ML: at 10:25

## 2024-11-15 RX ADMIN — CHLOROTHIAZIDE SODIUM 500 MG: 500 INJECTION, POWDER, LYOPHILIZED, FOR SOLUTION INTRAVENOUS at 20:28

## 2024-11-15 RX ADMIN — HYDRALAZINE HYDROCHLORIDE 100 MG: 50 TABLET ORAL at 06:16

## 2024-11-15 NOTE — PROGRESS NOTES
[facility] Progress Note      Patient Name: Rufus Dorsey  : 1985  MRN: [id number]  Primary Care Physician:  Provider, No Known  Date of admission: 2024    Subjective     Interval History:   F/u CKD 4 vs 5    Review of Systems:   UOP 1400  Mild dyspnea  No nausea     Objective     Vitals:   Temp:  [97.7 °F (36.5 °C)-98.2 °F (36.8 °C)] 98.1 °F (36.7 °C)  Heart Rate:  [78-88] 78  Resp:  [18-22] 19  BP: (112-165)/() 165/123  Flow (L/min) (Oxygen Therapy):  [3] 3    Intake/Output Summary (Last 24 hours) at 11/15/2024 1551  Last data filed at 11/15/2024 1200  Gross per 24 hour   Intake 700 ml   Output 750 ml   Net -50 ml       Physical Exam:    General Appearance: alert, on NC O2 no distress  Neck: supple, no JVD  Lungs: Dec BS bilat  Heart: RRR, normal S1 and S2  Abdomen: soft, nontender, nondistended  : no palpable bladder  Extremities: 3+ BLE pedal/ankle edema    Scheduled Meds:     aspirin, 81 mg, Oral, Daily  carvedilol, 25 mg, Oral, BID With Meals  hydrALAZINE, 100 mg, Oral, Q8H  insulin glargine, 10 Units, Subcutaneous, Daily  insulin lispro, 2-7 Units, Subcutaneous, 4x Daily AC & at Bedtime  insulin lispro, 3 Units, Subcutaneous, TID With Meals  isosorbide dinitrate, 40 mg, Oral, TID - Nitrates  sodium chloride, 10 mL, Intravenous, Q12H      IV Meds:   bumetanide, 1 mg/hr, Last Rate: 1 mg/hr (11/15/24 1024)        Results Reviewed:   I have personally reviewed the results from the time of this admission to 11/15/2024 15:51 EST     Results from last 7 days   Lab Units 11/15/24  0327 24  0507 24  1925   SODIUM mmol/L 140 138 139   POTASSIUM mmol/L 4.1 4.1 3.9   CHLORIDE mmol/L 102 101 99   CO2 mmol/L 24.2 26.0 27.7   BUN mg/dL 88* 78* 76*   CREATININE mg/dL 4.54* 4.05* 4.21*   CALCIUM mg/dL 8.0* 7.7* 8.0*   BILIRUBIN mg/dL  --   --  0.4   ALK PHOS U/L  --   --  121*   ALT (SGPT) U/L  --   --  15   AST (SGOT) U/L  --   --  13   GLUCOSE mg/dL 202* 286*  275*     Estimated Creatinine Clearance: 29.3 mL/min (A) (by C-G formula based on SCr of 4.54 mg/dL (H)).  Results from last 7 days   Lab Units 11/15/24  0327   PHOSPHORUS mg/dL 5.6*         Results from last 7 days   Lab Units 11/15/24  0327 11/14/24  0507 11/13/24  1925   WBC 10*3/mm3 9.55 9.72 9.51   HEMOGLOBIN g/dL 11.2* 10.8* 11.7*   PLATELETS 10*3/mm3 169 160 173           Assessment / Plan     ASSESSMENT:  CKD stage 4 vs 5 - progressive diabetic nephropathy, now with nephrotic proteinuria, 5.2 gm.  Cr was 2.6 to 3 in May 2023, 3.4 in Nov 2023 (a year ago) and now 4.0 -> 4.5, with eGFR ~ 16 mlmin.  K/HCO3 normal and no uremic sx but severely vol overloaded.  US pending.  Vol overload - periph > central; CXR noted.  Due to progressive CKD and also has systolic CHF.  Poor diuresis with bumex 4mg IV q8h   HTN - BP labile, overall better, on max coreg/hydralazine; avoid CCB for now given edema   DM2, uncontrolled, A1c 12.3  Anemia of CKD, mild, hgb 11.2  Hypocalcemia - Ca is 8 but corrects ~ 8.8 for low alb  HFrEF, echo noted, EF 30% (vs 20% in 2022).  GDMT measures ltd by advanced CKD, though could start ACE/ARB/entresto when start HD    PLAN:  Start bumex drip   Give diuril bolus   Anticipate need to start HD on MON via TDC placement barring unexpected improvement over weekend.  Utilized  ipad to explain this and patient agreeable       Red Ackerman MD  11/15/24  15:51 EST    [facility]  [phone]

## 2024-11-15 NOTE — NURSING NOTE
Noted DM ed order per RN thru order set. Second attempt to meet with pt at bedside this stay. Pt sitting up edge of bed in front of meal tray. Pt denies being told he has DM stating he only receives insulin in the hospital. No home DM meds. Pt does not have a PCP. Pt endorses family hx of DM. Discuss importance of controlling BG outpt. Pls see note to follow for more detail. Pt will need reinforcement.

## 2024-11-15 NOTE — PROGRESS NOTES
Name: Rufus Dorsey ADMIT: 2024   : 1985  PCP: Provider, No Known    MRN: [id number] LOS: 2 days   AGE/SEX: 39 y.o. male  ROOM: [facility]/     Subjective   Subjective   He states breathing is a little improved. No chest pain. Used  device in room.     Objective   Objective   Vital Signs  Temp:  [97.7 °F (36.5 °C)-98.2 °F (36.8 °C)] 98.2 °F (36.8 °C)  Heart Rate:  [71-88] 88  Resp:  [18-22] 19  BP: (112-148)/(55-79) 131/73  SpO2:  [78 %-99 %] 97 %  on  Flow (L/min) (Oxygen Therapy):  [3] (P) 3;   Device (Oxygen Therapy): (P) room air  Body mass index is 50.2 kg/m².    Physical Exam  Constitutional:       General: He is not in acute distress.     Appearance: He is not toxic-appearing.   HENT:      Head: Normocephalic and atraumatic.   Cardiovascular:      Rate and Rhythm: Normal rate and regular rhythm.   Pulmonary:      Effort: Pulmonary effort is normal. No respiratory distress.      Breath sounds: Decreased breath sounds present. No wheezing.   Abdominal:      General: Bowel sounds are normal.      Palpations: Abdomen is soft.      Tenderness: There is no abdominal tenderness.   Musculoskeletal:      Right lower leg: Edema present.      Left lower leg: Edema present.   Skin:     General: Skin is warm and dry.   Neurological:      General: No focal deficit present.      Mental Status: He is alert and oriented to person, place, and time.   Psychiatric:         Mood and Affect: Mood normal.         Behavior: Behavior normal.     Results Review  I reviewed the patient's new clinical results.  Results from last 7 days   Lab Units 11/15/24  0327 24  0507 24  1925   WBC 10*3/mm3 9.55 9.72 9.51   HEMOGLOBIN g/dL 11.2* 10.8* 11.7*   PLATELETS 10*3/mm3 169 160 173     Results from last 7 days   Lab Units 11/15/24  0327 24  0507 24  1925   SODIUM mmol/L 140 138 139   POTASSIUM mmol/L 4.1 4.1 3.9   CHLORIDE mmol/L 102 101 99   CO2 mmol/L 24.2 26.0 27.7   BUN mg/dL 88* 78* 76*    CREATININE mg/dL 4.54* 4.05* 4.21*   GLUCOSE mg/dL 202* 286* 275*     Lab Results   Component Value Date    ANIONGAP 13.8 11/15/2024     Estimated Creatinine Clearance: 29.3 mL/min (A) (by C-G formula based on SCr of 4.54 mg/dL (H)).   Lab Results   Component Value Date    EGFR 16.0 (L) 11/15/2024     Results from last 7 days   Lab Units 11/15/24  0327 11/13/24  1925   ALBUMIN g/dL 3.1* 3.3*   BILIRUBIN mg/dL  --  0.4   ALK PHOS U/L  --  121*   AST (SGOT) U/L  --  13   ALT (SGPT) U/L  --  15     Results from last 7 days   Lab Units 11/15/24  0327 11/14/24  0507 11/13/24  1925   CALCIUM mg/dL 8.0* 7.7* 8.0*   ALBUMIN g/dL 3.1*  --  3.3*   PHOSPHORUS mg/dL 5.6*  --   --        Hemoglobin A1C   Date/Time Value Ref Range Status   11/14/2024 0507 12.30 (H) 4.80 - 5.60 % Final     Glucose   Date/Time Value Ref Range Status   11/15/2024 0855 205 (H) 70 - 130 mg/dL Final   11/14/2024 2040 193 (H) 70 - 130 mg/dL Final   11/14/2024 1641 184 (H) 70 - 130 mg/dL Final   11/14/2024 1132 218 (H) 70 - 130 mg/dL Final   11/14/2024 0741 249 (H) 70 - 130 mg/dL Final       No radiology results for the last day    Scheduled Meds  aspirin, 81 mg, Oral, Daily  carvedilol, 25 mg, Oral, BID With Meals  hydrALAZINE, 100 mg, Oral, Q8H  insulin lispro, 2-7 Units, Subcutaneous, 4x Daily AC & at Bedtime  isosorbide dinitrate, 40 mg, Oral, TID - Nitrates  sodium chloride, 10 mL, Intravenous, Q12H    Continuous Infusions  bumetanide, 1 mg/hr    PRN Meds    acetaminophen **OR** acetaminophen **OR** acetaminophen    senna-docusate sodium **AND** polyethylene glycol **AND** bisacodyl **AND** bisacodyl    dextrose    dextrose    glucagon (human recombinant)    nitroglycerin    ondansetron ODT **OR** ondansetron    sodium chloride    sodium chloride    sodium chloride    bumetanide, 1 mg/hr    Diet  Diet: Regular/House, Diabetic, Renal; Consistent Carbohydrate; Low Phosphorus; Fluid Consistency: Thin (IDDSI 0)      Intake/Output Summary (Last 24  hours) at 11/15/2024 1023  Last data filed at 11/15/2024 0330  Gross per 24 hour   Intake 700 ml   Output 900 ml   Net -200 ml     Results for orders placed during the hospital encounter of 11/13/24    Adult Transthoracic Echo Complete W/ Cont if Necessary Per Protocol    Interpretation Summary    Left ventricular systolic function is moderately decreased. Estimated left ventricular EF = 30%    The left ventricular cavity is moderate to severely dilated.    Left ventricular wall thickness is consistent with moderate eccentric hypertrophy.    The following left ventricular wall segments are hypokinetic: mid anterior, apical anterior, apical lateral, basal inferolateral, mid inferolateral, apical inferior, mid inferior, apical septal, basal inferoseptal, mid inferoseptal, apex hypokinetic, mid anteroseptal, basal anterior, basal inferior and basal inferoseptal. The following left ventricular wall segments are akinetic: basal anterolateral and mid anterolateral.    Left ventricular diastolic function was indeterminate.    Estimated right ventricular systolic pressure from tricuspid regurgitation is normal (<35 mmHg).        Assessment/Plan     Active Hospital Problems    Diagnosis  POA    **Acute exacerbation of CHF (congestive heart failure) [I50.9]  Yes    Chronic HFrEF (heart failure with reduced ejection fraction) [I50.22]  Yes    S/P CABG x 3 [Z95.1]  Not Applicable    Acute on chronic combined systolic and diastolic CHF (congestive heart failure) [I50.43]  Yes    Coronary artery disease [I25.10]  Yes    Hypertension [I10]  Yes    Type 2 diabetes mellitus, with long-term current use of insulin [E11.9, Z79.4]  Not Applicable    MEI (acute kidney injury) [N17.9]  Unknown      Resolved Hospital Problems   No resolved problems to display.     39 y.o. male     Volume overload  HFrEF EF 20% 2022  CAD CABG x3  Elevated troponin  HTN  MEI/CKD 4   Cardiology and nephrology following  Bumex drip per nephrology. TBD dialysis  need (Monday)  ASA, coreg, hydralazine, ISDN  Follow-up echocardiogram above EF 30%  Monitor I/Os and daily weights     DM2   uncontrolled. A1c 12.30%.   No DM meds on PTA list (dced on [facility] in 2023)  Currently on correctional insulin. Add long-acting and mealtime insulin.  Diabetes educator    Obesity Body mass index is 50.26 kg/m².     DVT prophylaxis  SCDs    Discharge  TBD  Expected Discharge Date: 11/16/2024; Expected Discharge Time:     Discussed with patient and nursing staff    Cedrick Kramer MD  [facility]  11/15/24

## 2024-11-15 NOTE — CASE MANAGEMENT/SOCIAL WORK
Continued Stay Note  [facility]     Patient Name: Rufus Dorsey  MRN: [id number]  Today's Date: 11/15/2024    Admit Date: 11/13/2024    Plan: Home, brother to transport   Discharge Plan       Row Name 11/15/24 1209       Plan    Plan Home, brother to transport    Plan Comments Met with pt at bedside, should pt be dc over the weekend, stated that he would have no dc needs. [facility] still pending. Brother to transport home.                   Discharge Codes    No documentation.                 Expected Discharge Date and Time       Expected Discharge Date Expected Discharge Time    Nov 18, 2024               Mónica Rodriguez RN

## 2024-11-15 NOTE — PROGRESS NOTES
Off floor for echo.  Will evaluate later.  D/W RN.  Diuresis poor and Cr up.  Switch to bumex drip.  No immediate indication for HD but may be needed come Monday if not making progress with vol status.

## 2024-11-15 NOTE — PLAN OF CARE
Goal Outcome Evaluation:  Plan of Care Reviewed With: patient           Outcome Evaluation: VSS. A/O x4. On standby assist, urinal within reach. Family remained at bedside. O2 at 2L NC to maintain sats >90%. Pt has been on alternating prone and supine position, appeared to have slept some in between care and had no c/o pain or discomfort through most of shift. Placed on NPO post MN for possible procedure per Nephrology MD orders. Dr. NESHA Ackerman made aware of AM labs. To see pt in this AM. Safety maintained. Will CTM.        Diuretic in Use: Bumex IV    Response to Diuretics (Output greater than intake): SARAH    Daily Weight (up or down): Down    O2 Requirements: 2L NC    Functional Status (Activity level, tolerance and respiratory symptoms): Minimal SOA with exertion  Discharge Plans: TBD

## 2024-11-15 NOTE — CONSULTS
Nutrition Services    Patient Name:  Rufus Dorsey  YOB: 1985  MRN: [id number]  Admit Date:  11/13/2024    Assessment Date:  11/15/24    Summary: 40 yo male adm with Acute Exacerbation of CHF, h/o CABG x3, CAD< HTN, Type 2 DM, MEI      CLINICAL NUTRITION ASSESSMENT      Reason for Assessment MST score 2+, Nurse or Nurse Practitioner Consult     Diagnosis/Problem   Patient reports no knowledge of needing to modify diet PTA   Medical/Surgical History Past Medical History:   Diagnosis Date    CHF (congestive heart failure)     Coronary artery disease     Diabetes     Heart failure 05/16/2016    High cholesterol     Hypertension     Ischemic cardiomyopathy 06/25/2016       Past Surgical History:   Procedure Laterality Date    CARDIAC CATHETERIZATION  01/25/2017    Right heart cath    CARDIAC CATHETERIZATION N/A 12/4/2021    Procedure: SAPHENOUS VEIN GRAFT;  Surgeon: Les Reyes MD;  Location:  [facility];  Service: Cardiovascular;  Laterality: N/A;    CARDIAC CATHETERIZATION N/A 12/4/2021    Procedure: Coronary angiography;  Surgeon: Les Reyes MD;  Location:  [facility];  Service: Cardiovascular;  Laterality: N/A;    CARDIAC CATHETERIZATION N/A 12/4/2021    Procedure: Stent ROSEY coronary;  Surgeon: Les Reyes MD;  Location:  [facility];  Service: Cardiovascular;  Laterality: N/A;    CARDIAC CATHETERIZATION N/A 12/4/2021    Procedure: Native mammary injection;  Surgeon: Les Reyes MD;  Location:  [facility];  Service: Cardiovascular;  Laterality: N/A;    CARDIAC CATHETERIZATION N/A 5/25/2023    Procedure: Right Heart Cath;  Surgeon: Vicente Trujillo MD;  Location:  [facility];  Service: Cardiology;  Laterality: N/A;    CARDIAC SURGERY      CORONARY ARTERY BYPASS GRAFT  05/26/2015    cabg x3 Dr. Key        Anthropometrics        Current Height  Current Weight  BMI kg/m2 Height: 167.6 cm  "(66\")  Weight: (!) 141 kg (311 lb) (11/15/24 0759)  Body mass index is 50.2 kg/m².   Adjusted BMI (if applicable)    BMI Category N/A due to edema   Ideal Body Weight (IBW) 142# +/- 10%   Usual Body Weight (UBW) ~290#   Weight Trend Gain   Weight History Wt Readings from Last 30 Encounters:   11/15/24 0759 (!) 141 kg (311 lb)   11/15/24 0530 (!) 141 kg (311 lb 1.6 oz)   11/14/24 0540 (!) 141 kg (311 lb 6.4 oz)   11/13/24 2325 (!) 142 kg (312 lb)   02/27/24 1439 132 kg (290 lb)   11/21/23 1447 (!) 141 kg (311 lb)   08/04/23 1224 (!) 137 kg (303 lb)   05/28/23 0529 134 kg (295 lb 1.6 oz)   05/27/23 0510 134 kg (296 lb)   05/26/23 0500 134 kg (294 lb 14.4 oz)   05/25/23 0538 136 kg (299 lb)   05/24/23 1456 136 kg (299 lb 1.6 oz)   05/22/23 2332 (!) 138 kg (303 lb 11.2 oz)   05/22/23 1636 130 kg (287 lb)   01/10/23 1420 130 kg (287 lb)   12/06/22 1339 127 kg (280 lb)   11/01/22 1249 127 kg (279 lb 6.4 oz)   10/20/22 1054 131 kg (288 lb 3.2 oz)   06/09/22 0648 119 kg (262 lb)   06/08/22 0635 119 kg (262 lb 1.6 oz)   06/07/22 0630 122 kg (268 lb 15.4 oz)   06/06/22 0500 122 kg (268 lb 11.2 oz)   06/05/22 0358 124 kg (272 lb 12.8 oz)   06/04/22 2121 124 kg (273 lb)   06/04/22 1624 118 kg (260 lb)   12/09/21 0606 120 kg (265 lb 3.2 oz)   12/08/21 2223 119 kg (263 lb)   12/08/21 0516 121 kg (266 lb 12.1 oz)   12/07/21 1724 120 kg (265 lb)   12/05/21 1100 122 kg (268 lb)   12/04/21 0009 124 kg (272 lb 12.8 oz)   12/03/21 1852 127 kg (280 lb 6.4 oz)   09/01/21 1320 122 kg (269 lb 12.8 oz)   08/26/21 1342 123 kg (271 lb 9.6 oz)   08/18/21 1440 124 kg (274 lb)   08/06/21 0500 126 kg (277 lb 11.2 oz)   08/05/21 0500 126 kg (277 lb 6.4 oz)   08/04/21 0734 127 kg (280 lb)   08/04/21 0650 127 kg (280 lb)   08/04/21 0513 127 kg (280 lb 11.2 oz)   08/03/21 0409 128 kg (282 lb 3 oz)   08/02/21 1627 126 kg (278 lb)   12/31/20 0500 125 kg (275 lb 3.2 oz)   12/21/20 1210 125 kg (275 lb 5.7 oz)   12/21/20 0917 118 kg (260 lb)      --  Labs "       Pertinent Labs    Results from last 7 days   Lab Units 11/15/24  0327 11/14/24  0507 11/13/24 1925   SODIUM mmol/L 140 138 139   POTASSIUM mmol/L 4.1 4.1 3.9   CHLORIDE mmol/L 102 101 99   CO2 mmol/L 24.2 26.0 27.7   BUN mg/dL 88* 78* 76*   CREATININE mg/dL 4.54* 4.05* 4.21*   CALCIUM mg/dL 8.0* 7.7* 8.0*   BILIRUBIN mg/dL  --   --  0.4   ALK PHOS U/L  --   --  121*   ALT (SGPT) U/L  --   --  15   AST (SGOT) U/L  --   --  13   GLUCOSE mg/dL 202* 286* 275*     Results from last 7 days   Lab Units 11/15/24  0327   PHOSPHORUS mg/dL 5.6*   HEMOGLOBIN g/dL 11.2*   HEMATOCRIT % 35.0*   WBC 10*3/mm3 9.55   ALBUMIN g/dL 3.1*     Results from last 7 days   Lab Units 11/15/24  0327 11/14/24  0507 11/13/24 1925   PLATELETS 10*3/mm3 169 160 173     COVID19   Date Value Ref Range Status   11/13/2024 Not Detected Not Detected - Ref. Range Final     Lab Results   Component Value Date    HGBA1C 12.30 (H) 11/14/2024          Medications           Scheduled Medications aspirin, 81 mg, Oral, Daily  carvedilol, 25 mg, Oral, BID With Meals  hydrALAZINE, 100 mg, Oral, Q8H  insulin glargine, 10 Units, Subcutaneous, Daily  insulin lispro, 2-7 Units, Subcutaneous, 4x Daily AC & at Bedtime  insulin lispro, 3 Units, Subcutaneous, TID With Meals  isosorbide dinitrate, 40 mg, Oral, TID - Nitrates  sodium chloride, 10 mL, Intravenous, Q12H       Infusions bumetanide, 1 mg/hr, Last Rate: 1 mg/hr (11/15/24 1024)       PRN Medications   acetaminophen **OR** acetaminophen **OR** acetaminophen    senna-docusate sodium **AND** polyethylene glycol **AND** bisacodyl **AND** bisacodyl    dextrose    dextrose    glucagon (human recombinant)    nitroglycerin    ondansetron ODT **OR** ondansetron    sodium chloride    sodium chloride    sodium chloride     Physical Findings          General Findings alert, overweight, short of breath   Oral/Mouth Cavity WDL   Edema  3+ (moderate)   Gastrointestinal WDL   Skin  skin intact   Tubes/Drains/Lines none    NFPE No clinical signs of muscle wasting or fat loss   --  Current Nutrition Orders & Evaluation of Intake       Oral Nutrition     Food Allergies NKFA   Current PO Diet Diet: Regular/House, Diabetic, Renal; Consistent Carbohydrate; Low Phosphorus; Fluid Consistency: Thin (IDDSI 0)   Supplement n/a   PO Evaluation     % PO Intake 100%    Factors Affecting Intake: Other: unknown   --  PES STATEMENT / NUTRITION DIAGNOSIS      Nutrition Dx Problem  Problem: Knowledge Deficit  Etiology: Medical Diagnosis - Diabetic Diet    Signs/Symptoms: Information Deficit and Report/Observation     NUTRITION INTERVENTION / PLAN OF CARE      Intervention Goal(s) Disease management/therapy         RD Intervention/Action Education regarding: Diabetic Diet  using  tablet   --        Education topic: Diabetes     Resources given:  Discussion only, Printed materials, Sample menus     Advised regarding: Beverage choices, Eating pattern, Food choices     Learner:  Patient, Other:     Readiness to learn: Accepting     RD contact info provided: Yes     Outpatient referral:          Prescription/Orders:       PO Diet       Supplements       Enteral Nutrition       Parenteral Nutrition    New Prescription Ordered? Continue same per protocol   --      Monitor/Evaluation PO intake, Pertinent labs   Discharge Plan/Needs Pending clinical course   --    RD to follow per protocol.      Electronically signed by:  Ward Min RD  11/15/24 13:13 EST

## 2024-11-15 NOTE — PROGRESS NOTES
LOS: 2 days   Patient Care Team:  Provider, No Known as PCP - General  Provider, No Known    Chief Complaint:  Following for CHF    Interval History:   Standing weights mostly unchanged.  Net -700 mL.  Nephrology following.  May need HD.  Not responsive much diuretic    Objective   Vital Signs  Temp:  [97.5 °F (36.4 °C)-97.9 °F (36.6 °C)] 97.7 °F (36.5 °C)  Heart Rate:  [71-88] 87  Resp:  [18-22] 19  BP: (122-150)/(55-86) 148/79    Intake/Output Summary (Last 24 hours) at 11/15/2024 0719  Last data filed at 11/15/2024 0330  Gross per 24 hour   Intake 700 ml   Output 1400 ml   Net -700 ml       Comfortable NAD  PERRL, conjunctivae clear  Neck supple, no JVD or thyromegaly appreciated  S1/S2 RRR, no m/r/g  With diminished breath sounds diffusely.  Difficult to ascertain due to habitus., normal effort.  Median sternotomy well-healed  Abdomen S/NT/ND (+) BS, no HSM appreciated  Extremities warm, no clubbing, cyanosis, 2+ BLE edema   No visible or palpable skin lesions  A/O x 3, mood and affect appropriate.  Russian-speaking,  utilized today    Results Review:      Results from last 7 days   Lab Units 11/15/24  0327 11/14/24  0507 11/13/24  1925   SODIUM mmol/L 140 138 139   POTASSIUM mmol/L 4.1 4.1 3.9   CHLORIDE mmol/L 102 101 99   CO2 mmol/L 24.2 26.0 27.7   BUN mg/dL 88* 78* 76*   CREATININE mg/dL 4.54* 4.05* 4.21*   GLUCOSE mg/dL 202* 286* 275*   CALCIUM mg/dL 8.0* 7.7* 8.0*     Results from last 7 days   Lab Units 11/14/24  0507 11/13/24 2150 11/13/24 1925   HSTROP T ng/L 108* 116* 117*     Results from last 7 days   Lab Units 11/15/24  0327 11/14/24  0507 11/13/24  1925   WBC 10*3/mm3 9.55 9.72 9.51   HEMOGLOBIN g/dL 11.2* 10.8* 11.7*   HEMATOCRIT % 35.0* 35.8* 38.0   PLATELETS 10*3/mm3 169 160 173                       I reviewed the patient's new clinical results.  I personally viewed and interpreted the patient's EKG/Telemetry data          Echocardiogram performed today:    Left ventricular  systolic function is moderately decreased. Estimated left ventricular EF = 30%    The left ventricular cavity is moderate to severely dilated.    Left ventricular wall thickness is consistent with moderate eccentric hypertrophy.    The following left ventricular wall segments are hypokinetic: mid anterior, apical anterior, apical lateral, basal inferolateral, mid inferolateral, apical inferior, mid inferior, apical septal, basal inferoseptal, mid inferoseptal, apex hypokinetic, mid anteroseptal, basal anterior, basal inferior and basal inferoseptal. The following left ventricular wall segments are akinetic: basal anterolateral and mid anterolateral.    Left ventricular diastolic function was indeterminate.    Estimated right ventricular systolic pressure from tricuspid regurgitation is normal (<35 mmHg).       Medication Review:   aspirin, 81 mg, Oral, Daily  bumetanide, 4 mg, Intravenous, Q8H  carvedilol, 25 mg, Oral, BID With Meals  hydrALAZINE, 100 mg, Oral, Q8H  insulin lispro, 2-7 Units, Subcutaneous, 4x Daily AC & at Bedtime  isosorbide dinitrate, 40 mg, Oral, TID - Nitrates  sodium chloride, 10 mL, Intravenous, Q12H             Assessment & Plan       Acute exacerbation of CHF (congestive heart failure)    MEI (acute kidney injury)    Type 2 diabetes mellitus, with long-term current use of insulin    Coronary artery disease    Hypertension    Acute on chronic combined systolic and diastolic CHF (congestive heart failure)    S/P CABG x 3    Chronic HFrEF (heart failure with reduced ejection fraction)    Volume overload   Multifactorial, secondary to CHF as well as significant CKD  Nephrology following for diuretics, not much output today, may need HD  Chronic HFrEF, EF 30% by echocardiogram today  Secondary to ischemic cardiomyopathy  GDMT: carvedilol 25 mg twice daily. On torsemide 200 mg daily at home.  CKD limits of the GDMT options at this time  Coronary artery disease  Status post CABG  Recent cardiac  cath in December 2021 with 90% LIMA to LAD obstruction status post ROSEY, patent SVG to OM1 and OM2  Continue aspirin  Elevated troponin, consistent with chronic myocardial injury secondary to CKD as well as CHF/overload  Initial troponin 117, on repeat troponin has decreased to 116 then 108. Trend not consistent with ACS/MI. Elevated in the setting of CKD and volume overload. Echocardiogram pending.   Chronic kidney disease stage IV  Renal function worsened since last admission. May require dialysis if he is unable to diurese.   Hypertension  Continue carvedilol and hydralazin.  Per nephrology  Diabetes mellitus type II  Anemia of CKD  Hypocalcemia        Johnathan Thacker MD  11/15/24  07:19 EST      Part of this note may be an electronic transcription/translation of spoken language to printed text using the Dragon Dictation System.

## 2024-11-15 NOTE — PLAN OF CARE
Goal Outcome Evaluation:  VS as noted  Pt very sleepy today  Sleeping off and off throughout shift-easily awakens  Accucks  as noted  Not eating all of meals  Needs much encouragement  Bumex gtt started  Down to vascular at present-awaiting return to give medications  Safety maintained

## 2024-11-16 LAB
ALBUMIN SERPL-MCNC: 2.9 G/DL (ref 3.5–5.2)
ANION GAP SERPL CALCULATED.3IONS-SCNC: 12.2 MMOL/L (ref 5–15)
BASOPHILS # BLD AUTO: 0.01 10*3/MM3 (ref 0–0.2)
BASOPHILS NFR BLD AUTO: 0.1 % (ref 0–1.5)
BUN SERPL-MCNC: 96 MG/DL (ref 6–20)
BUN/CREAT SERPL: 18.6 (ref 7–25)
CALCIUM SPEC-SCNC: 7.8 MG/DL (ref 8.6–10.5)
CHLORIDE SERPL-SCNC: 99 MMOL/L (ref 98–107)
CO2 SERPL-SCNC: 21.8 MMOL/L (ref 22–29)
CREAT SERPL-MCNC: 5.15 MG/DL (ref 0.76–1.27)
DEPRECATED RDW RBC AUTO: 43.7 FL (ref 37–54)
EGFRCR SERPLBLD CKD-EPI 2021: 13.7 ML/MIN/1.73
EOSINOPHIL # BLD AUTO: 0.11 10*3/MM3 (ref 0–0.4)
EOSINOPHIL NFR BLD AUTO: 1.5 % (ref 0.3–6.2)
ERYTHROCYTE [DISTWIDTH] IN BLOOD BY AUTOMATED COUNT: 13.6 % (ref 12.3–15.4)
GLUCOSE BLDC GLUCOMTR-MCNC: 231 MG/DL (ref 70–130)
GLUCOSE BLDC GLUCOMTR-MCNC: 238 MG/DL (ref 70–130)
GLUCOSE BLDC GLUCOMTR-MCNC: 261 MG/DL (ref 70–130)
GLUCOSE BLDC GLUCOMTR-MCNC: 273 MG/DL (ref 70–130)
GLUCOSE SERPL-MCNC: 297 MG/DL (ref 65–99)
HCT VFR BLD AUTO: 34.8 % (ref 37.5–51)
HGB BLD-MCNC: 10.7 G/DL (ref 13–17.7)
IMM GRANULOCYTES # BLD AUTO: 0.05 10*3/MM3 (ref 0–0.05)
IMM GRANULOCYTES NFR BLD AUTO: 0.7 % (ref 0–0.5)
LYMPHOCYTES # BLD AUTO: 0.55 10*3/MM3 (ref 0.7–3.1)
LYMPHOCYTES NFR BLD AUTO: 7.6 % (ref 19.6–45.3)
MCH RBC QN AUTO: 26.9 PG (ref 26.6–33)
MCHC RBC AUTO-ENTMCNC: 30.7 G/DL (ref 31.5–35.7)
MCV RBC AUTO: 87.4 FL (ref 79–97)
MONOCYTES # BLD AUTO: 0.56 10*3/MM3 (ref 0.1–0.9)
MONOCYTES NFR BLD AUTO: 7.8 % (ref 5–12)
NEUTROPHILS NFR BLD AUTO: 5.91 10*3/MM3 (ref 1.7–7)
NEUTROPHILS NFR BLD AUTO: 82.3 % (ref 42.7–76)
NRBC BLD AUTO-RTO: 0 /100 WBC (ref 0–0.2)
PHOSPHATE SERPL-MCNC: 6.5 MG/DL (ref 2.5–4.5)
PLATELET # BLD AUTO: 160 10*3/MM3 (ref 140–450)
PMV BLD AUTO: 12.4 FL (ref 6–12)
POTASSIUM SERPL-SCNC: 4 MMOL/L (ref 3.5–5.2)
RBC # BLD AUTO: 3.98 10*6/MM3 (ref 4.14–5.8)
SODIUM SERPL-SCNC: 133 MMOL/L (ref 136–145)
WBC NRBC COR # BLD AUTO: 7.19 10*3/MM3 (ref 3.4–10.8)

## 2024-11-16 PROCEDURE — 80069 RENAL FUNCTION PANEL: CPT | Performed by: INTERNAL MEDICINE

## 2024-11-16 PROCEDURE — 25010000002 CHLOROTHIAZIDE PER 500 MG: Performed by: INTERNAL MEDICINE

## 2024-11-16 PROCEDURE — 63710000001 INSULIN GLARGINE PER 5 UNITS: Performed by: HOSPITALIST

## 2024-11-16 PROCEDURE — 63710000001 INSULIN LISPRO (HUMAN) PER 5 UNITS: Performed by: NURSE PRACTITIONER

## 2024-11-16 PROCEDURE — 85025 COMPLETE CBC W/AUTO DIFF WBC: CPT | Performed by: INTERNAL MEDICINE

## 2024-11-16 PROCEDURE — 82948 REAGENT STRIP/BLOOD GLUCOSE: CPT

## 2024-11-16 PROCEDURE — 25010000002 BUMETANIDE PER 0.5 MG: Performed by: INTERNAL MEDICINE

## 2024-11-16 PROCEDURE — 63710000001 INSULIN LISPRO (HUMAN) PER 5 UNITS: Performed by: HOSPITALIST

## 2024-11-16 RX ORDER — AMOXICILLIN 250 MG
2 CAPSULE ORAL 2 TIMES DAILY
Status: DISCONTINUED | OUTPATIENT
Start: 2024-11-16 | End: 2024-12-03 | Stop reason: HOSPADM

## 2024-11-16 RX ORDER — CHLOROTHIAZIDE SODIUM 500 MG/1
500 INJECTION INTRAVENOUS ONCE
Status: COMPLETED | OUTPATIENT
Start: 2024-11-16 | End: 2024-11-16

## 2024-11-16 RX ADMIN — INSULIN LISPRO 3 UNITS: 100 INJECTION, SOLUTION INTRAVENOUS; SUBCUTANEOUS at 21:38

## 2024-11-16 RX ADMIN — INSULIN LISPRO 3 UNITS: 100 INJECTION, SOLUTION INTRAVENOUS; SUBCUTANEOUS at 17:14

## 2024-11-16 RX ADMIN — INSULIN LISPRO 3 UNITS: 100 INJECTION, SOLUTION INTRAVENOUS; SUBCUTANEOUS at 11:50

## 2024-11-16 RX ADMIN — Medication 10 ML: at 21:39

## 2024-11-16 RX ADMIN — INSULIN LISPRO 3 UNITS: 100 INJECTION, SOLUTION INTRAVENOUS; SUBCUTANEOUS at 08:25

## 2024-11-16 RX ADMIN — HYDRALAZINE HYDROCHLORIDE 100 MG: 50 TABLET ORAL at 21:42

## 2024-11-16 RX ADMIN — ISOSORBIDE DINITRATE 40 MG: 20 TABLET ORAL at 13:09

## 2024-11-16 RX ADMIN — HYDRALAZINE HYDROCHLORIDE 100 MG: 50 TABLET ORAL at 11:49

## 2024-11-16 RX ADMIN — HYDRALAZINE HYDROCHLORIDE 100 MG: 50 TABLET ORAL at 04:59

## 2024-11-16 RX ADMIN — ISOSORBIDE DINITRATE 40 MG: 20 TABLET ORAL at 08:24

## 2024-11-16 RX ADMIN — ISOSORBIDE DINITRATE 40 MG: 20 TABLET ORAL at 17:13

## 2024-11-16 RX ADMIN — SENNOSIDES AND DOCUSATE SODIUM 2 TABLET: 50; 8.6 TABLET ORAL at 05:02

## 2024-11-16 RX ADMIN — SENNOSIDES AND DOCUSATE SODIUM 2 TABLET: 50; 8.6 TABLET ORAL at 11:49

## 2024-11-16 RX ADMIN — ASPIRIN 81 MG: 81 TABLET, COATED ORAL at 08:24

## 2024-11-16 RX ADMIN — CHLOROTHIAZIDE SODIUM 500 MG: 500 INJECTION, POWDER, LYOPHILIZED, FOR SOLUTION INTRAVENOUS at 21:38

## 2024-11-16 RX ADMIN — CARVEDILOL 25 MG: 25 TABLET, FILM COATED ORAL at 17:14

## 2024-11-16 RX ADMIN — CARVEDILOL 25 MG: 25 TABLET, FILM COATED ORAL at 08:24

## 2024-11-16 RX ADMIN — Medication 10 ML: at 08:25

## 2024-11-16 RX ADMIN — ERGOCALCIFEROL 50000 UNITS: 1.25 CAPSULE ORAL at 08:24

## 2024-11-16 RX ADMIN — BUMETANIDE 1 MG/HR: 0.25 INJECTION INTRAMUSCULAR; INTRAVENOUS at 11:49

## 2024-11-16 RX ADMIN — INSULIN LISPRO 4 UNITS: 100 INJECTION, SOLUTION INTRAVENOUS; SUBCUTANEOUS at 11:50

## 2024-11-16 RX ADMIN — INSULIN GLARGINE 10 UNITS: 100 INJECTION, SOLUTION SUBCUTANEOUS at 08:25

## 2024-11-16 RX ADMIN — POLYETHYLENE GLYCOL 3350 17 G: 17 POWDER, FOR SOLUTION ORAL at 17:13

## 2024-11-16 RX ADMIN — SENNOSIDES AND DOCUSATE SODIUM 2 TABLET: 50; 8.6 TABLET ORAL at 21:39

## 2024-11-16 RX ADMIN — INSULIN LISPRO 4 UNITS: 100 INJECTION, SOLUTION INTRAVENOUS; SUBCUTANEOUS at 08:24

## 2024-11-16 NOTE — PLAN OF CARE
Goal Outcome Evaluation:              Outcome Evaluation: pt Aox4 denies any pain, up adlib, pt c/o of no BM yet, mirlax admin. pt on 3L pt O2 drops at times while sleeping but immediately comes back up. IV bumex infusing. pt denies any SOA. pt expresses no needs at this time. call light within reach.

## 2024-11-16 NOTE — PLAN OF CARE
Goal Outcome Evaluation:  Plan of Care Reviewed With: patient           Outcome Evaluation: Patient back to unit from vascular at 2015H. VSS. A/O x4. Pt noted to be more sleepy but is easily awakened. O2 at 2-3L to keep sats >90%. IV Bumex infusing at 4ml/hr. PRN stool softener given per pt request. Pt appeared to have slept some in between care and had no c/o pain, discomfort or SOB through shift. Safety maintained. Will CTM.

## 2024-11-16 NOTE — PROGRESS NOTES
Name: Rufus Dorsey ADMIT: 2024   : 1985  PCP: Provider, No Known    MRN: [id number] LOS: 3 days   AGE/SEX: 39 y.o. male  ROOM: [facility]     Subjective   Subjective   Patient sitting up on edge of bed eating breakfast.   used during encounter.  Denies any pain.  Is constipated and requesting medication for constipation.  Minimal recorded urine output    Review of Systems  As above     Objective   Objective   Vital Signs  Temp:  [97.5 °F (36.4 °C)-98.9 °F (37.2 °C)] 98.7 °F (37.1 °C)  Heart Rate:  [72-78] 74  Resp:  [18-19] 18  BP: (104-165)/() 115/59  SpO2:  [93 %-99 %] 95 %  on  Flow (L/min) (Oxygen Therapy):  [3] 3;   Device (Oxygen Therapy): nasal cannula  Body mass index is 50.66 kg/m².  Physical Exam  Vitals and nursing note reviewed.   Constitutional:       General: He is not in acute distress.  Cardiovascular:      Rate and Rhythm: Normal rate and regular rhythm.   Pulmonary:      Effort: Pulmonary effort is normal. No respiratory distress.   Abdominal:      General: Abdomen is flat. There is no distension.      Tenderness: There is no abdominal tenderness.   Musculoskeletal:         General: No swelling or deformity.      Right lower leg: Edema present.      Left lower leg: Edema present.      Comments: 3+ pitting edema bilaterally   Skin:     General: Skin is warm and dry.   Neurological:      General: No focal deficit present.      Mental Status: He is alert. Mental status is at baseline.         Results Review     I reviewed the patient's new clinical results.  Results from last 7 days   Lab Units 24  0400 11/15/24  03224  0507 24  192   WBC 10*3/mm3 7.19 9.55 9.72 9.51   HEMOGLOBIN g/dL 10.7* 11.2* 10.8* 11.7*   PLATELETS 10*3/mm3 160 169 160 173     Results from last 7 days   Lab Units 24  0400 11/15/24  0327 24  0507 24  1925   SODIUM mmol/L 133* 140 138 139   POTASSIUM mmol/L 4.0 4.1 4.1 3.9   CHLORIDE mmol/L 99 102 101  99   CO2 mmol/L 21.8* 24.2 26.0 27.7   BUN mg/dL 96* 88* 78* 76*   CREATININE mg/dL 5.15* 4.54* 4.05* 4.21*   GLUCOSE mg/dL 297* 202* 286* 275*   Estimated Creatinine Clearance: 25.9 mL/min (A) (by C-G formula based on SCr of 5.15 mg/dL (H)).  Results from last 7 days   Lab Units 11/16/24  0400 11/15/24  0327 11/13/24  1925   ALBUMIN g/dL 2.9* 3.1* 3.3*   BILIRUBIN mg/dL  --   --  0.4   ALK PHOS U/L  --   --  121*   AST (SGOT) U/L  --   --  13   ALT (SGPT) U/L  --   --  15     Results from last 7 days   Lab Units 11/16/24  0400 11/15/24  0327 11/14/24  0507 11/13/24  1925   CALCIUM mg/dL 7.8* 8.0* 7.7* 8.0*   ALBUMIN g/dL 2.9* 3.1*  --  3.3*   PHOSPHORUS mg/dL 6.5* 5.6*  --   --        COVID19   Date Value Ref Range Status   11/13/2024 Not Detected Not Detected - Ref. Range Final   06/04/2022 Detected (C) Not Detected - Ref. Range Final     Hemoglobin A1C   Date/Time Value Ref Range Status   11/14/2024 0507 12.30 (H) 4.80 - 5.60 % Final     Glucose   Date/Time Value Ref Range Status   11/16/2024 0737 273 (H) 70 - 130 mg/dL Final   11/15/2024 2020 239 (H) 70 - 130 mg/dL Final   11/15/2024 1636 228 (H) 70 - 130 mg/dL Final   11/15/2024 1212 260 (H) 70 - 130 mg/dL Final   11/15/2024 0855 205 (H) 70 - 130 mg/dL Final   11/14/2024 2040 193 (H) 70 - 130 mg/dL Final   11/14/2024 1641 184 (H) 70 - 130 mg/dL Final       US Renal Bilateral  Narrative: US RENAL BILATERAL-11/15/2024     HISTORY: Chronic kidney disease.     Right kidney measures 10.4 cm in length. Left kidney measures 11.7 cm in  length. There is an approximately 1 cm benign-appearing right renal  cyst.     No solid renal masses, stones or hydronephrosis is seen. Urinary bladder  is unremarkable. There is a small to moderate amount of free fluid in  the abdomen and pelvis.     Impression: 1. Unremarkable bilateral renal ultrasound except for a tiny right renal  cyst.  2. There is some free fluid in the abdomen and pelvis.     This report was finalized on  11/15/2024 7:32 PM by Dr. Wolf Arreola M.D on Workstation: [id number]     Adult Transthoracic Echo Complete W/ Cont if Necessary Per Protocol    Left ventricular systolic function is moderately decreased. Estimated   left ventricular EF = 30%    The left ventricular cavity is moderate to severely dilated.    Left ventricular wall thickness is consistent with moderate eccentric   hypertrophy.    The following left ventricular wall segments are hypokinetic: mid   anterior, apical anterior, apical lateral, basal inferolateral, mid   inferolateral, apical inferior, mid inferior, apical septal, basal   inferoseptal, mid inferoseptal, apex hypokinetic, mid anteroseptal, basal   anterior, basal inferior and basal inferoseptal. The following left   ventricular wall segments are akinetic: basal anterolateral and mid   anterolateral.    Left ventricular diastolic function was indeterminate.    Estimated right ventricular systolic pressure from tricuspid   regurgitation is normal (<35 mmHg).    I reviewed the patient's daily medications.  Scheduled Medications  aspirin, 81 mg, Oral, Daily  carvedilol, 25 mg, Oral, BID With Meals  hydrALAZINE, 100 mg, Oral, Q8H  insulin glargine, 10 Units, Subcutaneous, Daily  insulin lispro, 2-7 Units, Subcutaneous, 4x Daily AC & at Bedtime  insulin lispro, 3 Units, Subcutaneous, TID With Meals  isosorbide dinitrate, 40 mg, Oral, TID - Nitrates  sodium chloride, 10 mL, Intravenous, Q12H  vitamin D, 50,000 Units, Oral, Q7 Days    Infusions  bumetanide, 1 mg/hr, Last Rate: 1 mg/hr (11/15/24 1024)    Diet  Diet: Regular/House, Diabetic, Renal; Consistent Carbohydrate; Low Phosphorus; Fluid Consistency: Thin (IDDSI 0)         I have personally reviewed:  []  Laboratory   []  Microbiology   []  Radiology   []  EKG/Telemetry   []  Cardiology/Vascular   []  Pathology   []  Records     Assessment/Plan     Active Hospital Problems    Diagnosis  POA    **Acute exacerbation of CHF (congestive  heart failure) [I50.9]  Yes    Chronic HFrEF (heart failure with reduced ejection fraction) [I50.22]  Yes    S/P CABG x 3 [Z95.1]  Not Applicable    Acute on chronic combined systolic and diastolic CHF (congestive heart failure) [I50.43]  Yes    Coronary artery disease [I25.10]  Yes    Hypertension [I10]  Yes    Type 2 diabetes mellitus, with long-term current use of insulin [E11.9, Z79.4]  Not Applicable    MEI (acute kidney injury) [N17.9]  Unknown      Resolved Hospital Problems   No resolved problems to display.       39 y.o. male admitted with Acute exacerbation of CHF (congestive heart failure).    Volume overload  HFrEF EF 20% 2022  CAD CABG x3  Elevated troponin  HTN  MEI/CKD 4   Cardiology and nephrology following  Bumex drip per nephrology.  Diuril ordered yesterday.  Will likely need dialysis in the next few days  ASA, coreg, hydralazine, ISDN  Follow-up echocardiogram above EF 30%  Monitor I/Os and daily weights     DM2   uncontrolled. A1c 12.30%.   No DM meds on PTA list (dced on NPH in 2023)  Currently on correctional insulin.  Continue long-acting and mealtime insulin.  Diabetes educator     Obesity Body mass index is 50.26 kg/m².     SCDs for DVT prophylaxis.  Full code.  Discussed with patient and nursing staff.  Anticipate discharge home next week.    Expected Discharge Date: 11/18/2024; Expected Discharge Time:       John Gipson MD  [facility]  11/16/24  10:13 EST

## 2024-11-17 LAB
ALBUMIN SERPL-MCNC: 3.4 G/DL (ref 3.5–5.2)
ANION GAP SERPL CALCULATED.3IONS-SCNC: 13.8 MMOL/L (ref 5–15)
BUN SERPL-MCNC: 100 MG/DL (ref 6–20)
BUN/CREAT SERPL: 14.9 (ref 7–25)
CA-I SERPL ISE-MCNC: 1.11 MMOL/L (ref 1.15–1.35)
CALCIUM SPEC-SCNC: 7.8 MG/DL (ref 8.6–10.5)
CHLORIDE SERPL-SCNC: 95 MMOL/L (ref 98–107)
CO2 SERPL-SCNC: 23.2 MMOL/L (ref 22–29)
CREAT SERPL-MCNC: 6.7 MG/DL (ref 0.76–1.27)
DEPRECATED RDW RBC AUTO: 44.1 FL (ref 37–54)
EGFRCR SERPLBLD CKD-EPI 2021: 10 ML/MIN/1.73
ERYTHROCYTE [DISTWIDTH] IN BLOOD BY AUTOMATED COUNT: 13.8 % (ref 12.3–15.4)
GLUCOSE BLDC GLUCOMTR-MCNC: 148 MG/DL (ref 70–130)
GLUCOSE BLDC GLUCOMTR-MCNC: 174 MG/DL (ref 70–130)
GLUCOSE BLDC GLUCOMTR-MCNC: 175 MG/DL (ref 70–130)
GLUCOSE BLDC GLUCOMTR-MCNC: 176 MG/DL (ref 70–130)
GLUCOSE SERPL-MCNC: 209 MG/DL (ref 65–99)
HBV SURFACE AG SERPL QL IA: NORMAL
HCT VFR BLD AUTO: 32.5 % (ref 37.5–51)
HGB BLD-MCNC: 10.3 G/DL (ref 13–17.7)
MCH RBC QN AUTO: 27.5 PG (ref 26.6–33)
MCHC RBC AUTO-ENTMCNC: 31.7 G/DL (ref 31.5–35.7)
MCV RBC AUTO: 86.9 FL (ref 79–97)
PHOSPHATE SERPL-MCNC: 7.2 MG/DL (ref 2.5–4.5)
PLATELET # BLD AUTO: 154 10*3/MM3 (ref 140–450)
PMV BLD AUTO: 12.4 FL (ref 6–12)
POTASSIUM SERPL-SCNC: 4.1 MMOL/L (ref 3.5–5.2)
RBC # BLD AUTO: 3.74 10*6/MM3 (ref 4.14–5.8)
SODIUM SERPL-SCNC: 132 MMOL/L (ref 136–145)
WBC NRBC COR # BLD AUTO: 7.26 10*3/MM3 (ref 3.4–10.8)

## 2024-11-17 PROCEDURE — 36558 INSERT TUNNELED CV CATH: CPT

## 2024-11-17 PROCEDURE — 63710000001 INSULIN GLARGINE PER 5 UNITS: Performed by: HOSPITALIST

## 2024-11-17 PROCEDURE — 63710000001 INSULIN LISPRO (HUMAN) PER 5 UNITS: Performed by: NURSE PRACTITIONER

## 2024-11-17 PROCEDURE — 82948 REAGENT STRIP/BLOOD GLUCOSE: CPT

## 2024-11-17 PROCEDURE — 77001 FLUOROGUIDE FOR VEIN DEVICE: CPT | Performed by: SURGERY

## 2024-11-17 PROCEDURE — 85027 COMPLETE CBC AUTOMATED: CPT | Performed by: STUDENT IN AN ORGANIZED HEALTH CARE EDUCATION/TRAINING PROGRAM

## 2024-11-17 PROCEDURE — 63710000001 INSULIN LISPRO (HUMAN) PER 5 UNITS: Performed by: HOSPITALIST

## 2024-11-17 PROCEDURE — 80069 RENAL FUNCTION PANEL: CPT | Performed by: INTERNAL MEDICINE

## 2024-11-17 PROCEDURE — 76937 US GUIDE VASCULAR ACCESS: CPT | Performed by: SURGERY

## 2024-11-17 PROCEDURE — 99253 IP/OBS CNSLTJ NEW/EST LOW 45: CPT | Performed by: STUDENT IN AN ORGANIZED HEALTH CARE EDUCATION/TRAINING PROGRAM

## 2024-11-17 PROCEDURE — 82330 ASSAY OF CALCIUM: CPT | Performed by: INTERNAL MEDICINE

## 2024-11-17 PROCEDURE — 87340 HEPATITIS B SURFACE AG IA: CPT | Performed by: INTERNAL MEDICINE

## 2024-11-17 RX ORDER — HYDRALAZINE HYDROCHLORIDE 25 MG/1
25 TABLET, FILM COATED ORAL EVERY 8 HOURS SCHEDULED
Status: DISCONTINUED | OUTPATIENT
Start: 2024-11-17 | End: 2024-11-25

## 2024-11-17 RX ADMIN — SENNOSIDES AND DOCUSATE SODIUM 2 TABLET: 50; 8.6 TABLET ORAL at 21:56

## 2024-11-17 RX ADMIN — SENNOSIDES AND DOCUSATE SODIUM 2 TABLET: 50; 8.6 TABLET ORAL at 08:05

## 2024-11-17 RX ADMIN — Medication 10 ML: at 22:01

## 2024-11-17 RX ADMIN — INSULIN LISPRO 2 UNITS: 100 INJECTION, SOLUTION INTRAVENOUS; SUBCUTANEOUS at 17:47

## 2024-11-17 RX ADMIN — INSULIN LISPRO 3 UNITS: 100 INJECTION, SOLUTION INTRAVENOUS; SUBCUTANEOUS at 12:13

## 2024-11-17 RX ADMIN — INSULIN LISPRO 2 UNITS: 100 INJECTION, SOLUTION INTRAVENOUS; SUBCUTANEOUS at 08:05

## 2024-11-17 RX ADMIN — Medication 10 ML: at 08:08

## 2024-11-17 RX ADMIN — INSULIN LISPRO 2 UNITS: 100 INJECTION, SOLUTION INTRAVENOUS; SUBCUTANEOUS at 12:12

## 2024-11-17 RX ADMIN — HYDRALAZINE HYDROCHLORIDE 100 MG: 50 TABLET ORAL at 04:41

## 2024-11-17 RX ADMIN — ISOSORBIDE DINITRATE 40 MG: 20 TABLET ORAL at 08:05

## 2024-11-17 RX ADMIN — INSULIN LISPRO 3 UNITS: 100 INJECTION, SOLUTION INTRAVENOUS; SUBCUTANEOUS at 08:07

## 2024-11-17 RX ADMIN — INSULIN GLARGINE 10 UNITS: 100 INJECTION, SOLUTION SUBCUTANEOUS at 08:06

## 2024-11-17 RX ADMIN — ACETAMINOPHEN 325MG 650 MG: 325 TABLET ORAL at 00:47

## 2024-11-17 RX ADMIN — ISOSORBIDE DINITRATE 40 MG: 20 TABLET ORAL at 17:47

## 2024-11-17 RX ADMIN — ISOSORBIDE DINITRATE 40 MG: 20 TABLET ORAL at 12:11

## 2024-11-17 RX ADMIN — HYDRALAZINE HYDROCHLORIDE 25 MG: 25 TABLET ORAL at 21:56

## 2024-11-17 RX ADMIN — ASPIRIN 81 MG: 81 TABLET, COATED ORAL at 08:05

## 2024-11-17 RX ADMIN — INSULIN LISPRO 3 UNITS: 100 INJECTION, SOLUTION INTRAVENOUS; SUBCUTANEOUS at 17:47

## 2024-11-17 RX ADMIN — CARVEDILOL 25 MG: 25 TABLET, FILM COATED ORAL at 17:47

## 2024-11-17 RX ADMIN — CARVEDILOL 25 MG: 25 TABLET, FILM COATED ORAL at 08:05

## 2024-11-17 NOTE — CONSULTS
Name: Rufus Dorsey ADMIT: 2024   : 1985  PCP: Provider, No Known    MRN: [id number] LOS: 4 days   AGE/SEX: 39 y.o. male  ROOM: [facility]/     Inpatient Vascular Surgery Consult  Consult performed by: Debbie Ornelas MD  Consult ordered by: George Nicholson MD Ashlee Ann Vinyard, MD     LOS: 4 days   Patient Care Team:  Provider, No Known as PCP - General  Provider, No Known    Subjective     Chief complaint: CKD    History of Present Illness  39 y.o. male with obeseity, CAD, CHF, HTN, HLD, diabetes mellitus, and CKD who has now progressed to CKD 4/5 and  needs to start dialysis.  We were consulted for tunneled catheter placement.  He has not been on dialysis before and has never had a tunneled catheter.  He does have swelling of his arms and legs and shortness of breath.      Review of Systems   All other systems reviewed and are negative.      Past Medical History:   Diagnosis Date    CHF (congestive heart failure)     Coronary artery disease     Diabetes     Heart failure 2016    High cholesterol     Hypertension     Ischemic cardiomyopathy 2016       Past Surgical History:   Procedure Laterality Date    CARDIAC CATHETERIZATION  2017    Right heart cath    CARDIAC CATHETERIZATION N/A 2021    Procedure: SAPHENOUS VEIN GRAFT;  Surgeon: Les Reyes MD;  Location: [facility] CATH INVASIVE LOCATION;  Service: Cardiovascular;  Laterality: N/A;    CARDIAC CATHETERIZATION N/A 2021    Procedure: Coronary angiography;  Surgeon: Les Reyes MD;  Location: [facility] CATH INVASIVE LOCATION;  Service: Cardiovascular;  Laterality: N/A;    CARDIAC CATHETERIZATION N/A 2021    Procedure: Stent ROSEY coronary;  Surgeon: Les Reyes MD;  Location: [facility] CATH INVASIVE LOCATION;  Service: Cardiovascular;  Laterality: N/A;    CARDIAC CATHETERIZATION N/A 2021    Procedure: Native mammary injection;  Surgeon: Les Reyes MD;  Location: [facility] CATH INVASIVE  LOCATION;  Service: Cardiovascular;  Laterality: N/A;    CARDIAC CATHETERIZATION N/A 5/25/2023    Procedure: Right Heart Cath;  Surgeon: Vicente Trujillo MD;  Location: [facility] CATH INVASIVE LOCATION;  Service: Cardiology;  Laterality: N/A;    CARDIAC SURGERY      CORONARY ARTERY BYPASS GRAFT  05/26/2015    cabg x3 Dr. Key       Family History   Family history unknown: Yes         Social History     Tobacco Use    Smoking status: Never     Passive exposure: Never    Smokeless tobacco: Never   Vaping Use    Vaping status: Never Used   Substance Use Topics    Alcohol use: Never    Drug use: Never       Allergies: Patient has no known allergies.    Medications Prior to Admission   Medication Sig Dispense Refill Last Dose/Taking    aspirin 81 MG EC tablet Take 1 tablet by mouth Daily. 90 tablet 0 11/13/2024    carvedilol (COREG) 25 MG tablet Take 1 tablet by mouth 2 (Two) Times a Day With Meals. 180 tablet 0 11/13/2024    hydrALAZINE (APRESOLINE) 100 MG tablet Take 1 tablet by mouth 3 (Three) Times a Day. 270 tablet 0 11/13/2024    potassium chloride (KLOR-CON M20) 20 MEQ CR tablet Take 1 tablet by mouth Daily. 90 tablet 0 11/13/2024    torsemide (DEMADEX) 100 MG tablet Take 2 tablets by mouth Daily. 180 tablet 0 11/13/2024     aspirin, 81 mg, Oral, Daily  carvedilol, 25 mg, Oral, BID With Meals  hydrALAZINE, 25 mg, Oral, Q8H  insulin glargine, 10 Units, Subcutaneous, Daily  insulin lispro, 2-7 Units, Subcutaneous, 4x Daily AC & at Bedtime  insulin lispro, 3 Units, Subcutaneous, TID With Meals  isosorbide dinitrate, 40 mg, Oral, TID - Nitrates  senna-docusate sodium, 2 tablet, Oral, BID  sodium chloride, 10 mL, Intravenous, Q12H  vitamin D, 50,000 Units, Oral, Q7 Days           acetaminophen **OR** acetaminophen **OR** acetaminophen    senna-docusate sodium **AND** polyethylene glycol **AND** bisacodyl **AND** bisacodyl    dextrose    dextrose    glucagon (human recombinant)    nitroglycerin    ondansetron ODT **OR**  ondansetron    sodium chloride    sodium chloride    sodium chloride      Objective   Temp:  [97.3 °F (36.3 °C)-98.9 °F (37.2 °C)] 97.3 °F (36.3 °C)  Heart Rate:  [71-74] 72  Resp:  [17-18] 18  BP: (100-130)/(66-72) 100/66    I/O this shift:  In: 120 [P.O.:120]  Out: -     Physical Exam  Vitals reviewed.   Constitutional:       General: He is not in acute distress.     Appearance: Normal appearance. He is obese.   HENT:      Head: Normocephalic and atraumatic.      Right Ear: External ear normal.      Left Ear: External ear normal.      Nose: Nose normal.      Mouth/Throat:      Mouth: Mucous membranes are moist.      Pharynx: Oropharynx is clear.   Eyes:      Extraocular Movements: Extraocular movements intact.      Conjunctiva/sclera: Conjunctivae normal.      Pupils: Pupils are equal, round, and reactive to light.   Cardiovascular:      Rate and Rhythm: Normal rate and regular rhythm.      Pulses:           Carotid pulses are 2+ on the right side and 2+ on the left side.       Radial pulses are 2+ on the right side and 2+ on the left side.        Femoral pulses are 2+ on the right side and 2+ on the left side.       Dorsalis pedis pulses are 2+ on the right side and 2+ on the left side.        Posterior tibial pulses are 2+ on the right side and 2+ on the left side.   Pulmonary:      Comments: Non labored respirations on room air, equal chest rise  Abdominal:      General: Abdomen is flat. There is no distension.      Palpations: Abdomen is soft.   Musculoskeletal:      Cervical back: Normal range of motion. No rigidity.      Right lower leg: No edema.      Left lower leg: No edema.   Skin:     General: Skin is warm and dry.      Capillary Refill: Capillary refill takes less than 2 seconds.   Neurological:      General: No focal deficit present.      Mental Status: He is alert and oriented to person, place, and time.   Psychiatric:         Mood and Affect: Mood normal.         Behavior: Behavior normal.          Results from last 7 days   Lab Units 11/17/24  0306 11/16/24  0400 11/15/24  0327 11/14/24  0507 11/13/24  1925   WBC 10*3/mm3 7.26 7.19 9.55 9.72 9.51   HEMOGLOBIN g/dL 10.3* 10.7* 11.2* 10.8* 11.7*   PLATELETS 10*3/mm3 154 160 169 160 173     Results from last 7 days   Lab Units 11/17/24  0306 11/16/24  0400 11/15/24  0327 11/14/24  0507 11/13/24  1925   SODIUM mmol/L 132* 133* 140 138 139   POTASSIUM mmol/L 4.1 4.0 4.1 4.1 3.9   CHLORIDE mmol/L 95* 99 102 101 99   CO2 mmol/L 23.2 21.8* 24.2 26.0 27.7   BUN mg/dL 100* 96* 88* 78* 76*   CREATININE mg/dL 6.70* 5.15* 4.54* 4.05* 4.21*   GLUCOSE mg/dL 209* 297* 202* 286* 275*   Estimated Creatinine Clearance: 19.9 mL/min (A) (by C-G formula based on SCr of 6.7 mg/dL (H)).      Imaging Studies:          Data Points:  During this visit the following were done:  Labs Reviewed [x]    Labs Ordered []    Radiology Reports Reviewed [x]    Radiology Images Reviewed [x]    Radiology Ordered []    EKG, echo, and/or stress test reviewed []    Vascular lab results reviewed  []    Vascular lab images reviewed and interpreted per myself   []    Referring Provider Records Reviewed []    ER Records Reviewed []    Hospital Records Reviewed/Summarized [x]    History Obtained From Family []    Radiological images view and Interpreted per myself []    Case Discussed with referring provider []     Decision to obtain and request outside records  []    Total data points reviewed: 4      Active Hospital Problems    Diagnosis  POA    **Acute exacerbation of CHF (congestive heart failure) [I50.9]  Yes    Chronic HFrEF (heart failure with reduced ejection fraction) [I50.22]  Yes    S/P CABG x 3 [Z95.1]  Not Applicable    Acute on chronic combined systolic and diastolic CHF (congestive heart failure) [I50.43]  Yes    Coronary artery disease [I25.10]  Yes    Hypertension [I10]  Yes    Type 2 diabetes mellitus, with long-term current use of insulin [E11.9, Z79.4]  Not Applicable    MEI  (acute kidney injury) [N17.9]  Unknown      Resolved Hospital Problems   No resolved problems to display.         Assessment & Plan       Acute exacerbation of CHF (congestive heart failure)    MEI (acute kidney injury)    Type 2 diabetes mellitus, with long-term current use of insulin    Coronary artery disease    Hypertension    Acute on chronic combined systolic and diastolic CHF (congestive heart failure)    S/P CABG x 3    Chronic HFrEF (heart failure with reduced ejection fraction)        39 y.o. male with CKD now in need of hemodialysis.  We were consulted for tunneled catheter placement    -plan for tunneled catheter placement in the OR tomorrow.  Risks, benefits, and alternatives discussed with patient and his family member at bedside, who expressed understanding and would like to proceed.     I discussed the patients findings and my recommendations with patient and family.  Please call my office with any question: [phone]    Debbie Ornelas MD  11/17/24  17:10 EST

## 2024-11-17 NOTE — PROGRESS NOTES
[facility] Progress Note      Patient Name: Rufus Dorsey  : 1985  MRN: [id number]  Primary Care Physician:  Provider, No Known  Date of admission: 2024    Subjective     Interval History:   F/u CKD 4 vs 5    Review of Systems:   UOP not fully recorded  Mild dyspnea; requires 3 L/min  Appetite fair; no N/V  [Family member at bedside, providing translation]    Objective     Vitals:   Temp:  [97.5 °F (36.4 °C)-98.9 °F (37.2 °C)] 98.3 °F (36.8 °C)  Heart Rate:  [69-78] 69  Resp:  [18-19] 18  BP: (109-139)/(51-80) 129/80  Flow (L/min) (Oxygen Therapy):  [3] 3    Intake/Output Summary (Last 24 hours) at 2024  Last data filed at 2024 1100  Gross per 24 hour   Intake 240 ml   Output 600 ml   Net -360 ml       Physical Exam:    General: alert, on NC O2 NAD, morbidly obese, sitting in a chair  Neck: supple, no JVD  Lungs: Dec BS bilat; crackles  Heart: RRR, normal S1 and S2  Abdomen: soft, nontender, + D, wall edema  : no palpable bladder  Extremities: 3+ BLE pedal/ankle edema  Neurologic: No asterixis    Scheduled Meds:     aspirin, 81 mg, Oral, Daily  carvedilol, 25 mg, Oral, BID With Meals  hydrALAZINE, 100 mg, Oral, Q8H  insulin glargine, 10 Units, Subcutaneous, Daily  insulin lispro, 2-7 Units, Subcutaneous, 4x Daily AC & at Bedtime  insulin lispro, 3 Units, Subcutaneous, TID With Meals  isosorbide dinitrate, 40 mg, Oral, TID - Nitrates  senna-docusate sodium, 2 tablet, Oral, BID  sodium chloride, 10 mL, Intravenous, Q12H  vitamin D, 50,000 Units, Oral, Q7 Days      IV Meds:   bumetanide, 1 mg/hr, Last Rate: 1 mg/hr (24 1149)        Results Reviewed:   I have personally reviewed the results from the time of this admission to 2024 19:51 EST     Results from last 7 days   Lab Units 24  0400 11/15/24  0327 24  0507 24  1925   SODIUM mmol/L 133* 140 138 139   POTASSIUM mmol/L 4.0 4.1 4.1 3.9   CHLORIDE mmol/L 99 102 101 99   CO2  mmol/L 21.8* 24.2 26.0 27.7   BUN mg/dL 96* 88* 78* 76*   CREATININE mg/dL 5.15* 4.54* 4.05* 4.21*   CALCIUM mg/dL 7.8* 8.0* 7.7* 8.0*   BILIRUBIN mg/dL  --   --   --  0.4   ALK PHOS U/L  --   --   --  121*   ALT (SGPT) U/L  --   --   --  15   AST (SGOT) U/L  --   --   --  13   GLUCOSE mg/dL 297* 202* 286* 275*     Estimated Creatinine Clearance: 25.9 mL/min (A) (by C-G formula based on SCr of 5.15 mg/dL (H)).  Results from last 7 days   Lab Units 11/16/24  0400 11/15/24  0327   PHOSPHORUS mg/dL 6.5* 5.6*         Results from last 7 days   Lab Units 11/16/24  0400 11/15/24  0327 11/14/24  0507 11/13/24  1925   WBC 10*3/mm3 7.19 9.55 9.72 9.51   HEMOGLOBIN g/dL 10.7* 11.2* 10.8* 11.7*   PLATELETS 10*3/mm3 160 169 160 173           Assessment / Plan     ASSESSMENT:  CKD stage 4 vs 5 - progressive diabetic nephropathy, now with nephrotic proteinuria, 5.2 g/g.  Huge volume excess; refractory to diuretics.  Hypervolemic hyponatremia.  K/HCO3 normal and no uremic sx.  No hydronephrosis by renal ultrasound..  Vol overload - periph > central; CXR noted.  Due to progressive CKD and also has systolic CHF.  Poor diuresis with bumex drip  HTN - BP labile, overall better, on max coreg/hydralazine; avoid CCB for now given edema   DM2, uncontrolled, A1c 12.3  Anemia of CKD, mild  Hypocalcemia, which corrects for low albumin  HFrEF, echo noted, EF 30% (vs 20% in 2022).  GDMT measures ltd by advanced CKD, though could start ACE/ARB/entresto when start HD    PLAN:  1.  Continue Bumex drip; add dose of Diuril for synergy  2.  Barring any surprises in UOP and SCR tomorrow, will ask Vascular to arrange TDC  3.  Add fluid restriction, 1500 mL/day  4.  Surveillance labs      George Nicholson MD  11/16/24  19:51 EST    [facility]  [phone]

## 2024-11-17 NOTE — PLAN OF CARE
Goal Outcome Evaluation:  Plan of Care Reviewed With: patient           Outcome Evaluation: VSS. A/O x4. Up ad valente. On 2-3L O2 to keep sats >90%. Family remained at bedside through shift. Pt seen by Dr. Nicholson, labs ordered, placed on 1500 ml fluid restriction. PRN Tylenol given for c/o Body ache. Pt appeared to have slept most of shift and had no c/o discomfort. IV Bumex infusing. Safety maintained. Will CTM.

## 2024-11-17 NOTE — PROGRESS NOTES
Name: Rufus Dorsey ADMIT: 2024   : 1985  PCP: Provider, No Known    MRN: [id number] LOS: 4 days   AGE/SEX: 39 y.o. male  ROOM: [facility]     Subjective   Subjective   Patient drowsy morning.   used.  Denies any pain, nausea, vomiting.  Brother at bedside.  Discussed plan for HD line and hemodialysis after the has been placed.    Review of Systems  As above     Objective   Objective   Vital Signs  Temp:  [98.3 °F (36.8 °C)-98.9 °F (37.2 °C)] 98.7 °F (37.1 °C)  Heart Rate:  [69-74] 74  Resp:  [17-18] 17  BP: (124-130)/(69-80) 124/69  SpO2:  [89 %-93 %] 89 %  on  Flow (L/min) (Oxygen Therapy):  [3] 3;   Device (Oxygen Therapy): nasal cannula  Body mass index is 50.6 kg/m².  Physical Exam  Vitals and nursing note reviewed.   Constitutional:       General: He is not in acute distress.  Cardiovascular:      Rate and Rhythm: Normal rate and regular rhythm.   Pulmonary:      Effort: Pulmonary effort is normal. No respiratory distress.      Comments: Bilateral crackles  Abdominal:      General: Abdomen is flat. There is no distension.      Tenderness: There is no abdominal tenderness.   Musculoskeletal:         General: No swelling or deformity.      Right lower leg: Edema present.      Left lower leg: Edema present.      Comments: 3+ pitting edema bilaterally   Skin:     General: Skin is warm and dry.   Neurological:      General: No focal deficit present.      Mental Status: He is alert. Mental status is at baseline.         Results Review     I reviewed the patient's new clinical results.  Results from last 7 days   Lab Units 24  0306 24  0400 11/15/24  0327 11/14/24  0507   WBC 10*3/mm3 7.26 7.19 9.55 9.72   HEMOGLOBIN g/dL 10.3* 10.7* 11.2* 10.8*   PLATELETS 10*3/mm3 154 160 169 160     Results from last 7 days   Lab Units 24  0306 24  0400 11/15/24  0327 24  0507   SODIUM mmol/L 132* 133* 140 138   POTASSIUM mmol/L 4.1 4.0 4.1 4.1   CHLORIDE mmol/L 95*  99 102 101   CO2 mmol/L 23.2 21.8* 24.2 26.0   BUN mg/dL 100* 96* 88* 78*   CREATININE mg/dL 6.70* 5.15* 4.54* 4.05*   GLUCOSE mg/dL 209* 297* 202* 286*   Estimated Creatinine Clearance: 19.9 mL/min (A) (by C-G formula based on SCr of 6.7 mg/dL (H)).  Results from last 7 days   Lab Units 11/17/24  0306 11/16/24  0400 11/15/24  0327 11/13/24  1925   ALBUMIN g/dL 3.4* 2.9* 3.1* 3.3*   BILIRUBIN mg/dL  --   --   --  0.4   ALK PHOS U/L  --   --   --  121*   AST (SGOT) U/L  --   --   --  13   ALT (SGPT) U/L  --   --   --  15     Results from last 7 days   Lab Units 11/17/24  0306 11/16/24  0400 11/15/24  0327 11/14/24  0507 11/13/24  1925   CALCIUM mg/dL 7.8* 7.8* 8.0* 7.7* 8.0*   ALBUMIN g/dL 3.4* 2.9* 3.1*  --  3.3*   PHOSPHORUS mg/dL 7.2* 6.5* 5.6*  --   --        COVID19   Date Value Ref Range Status   11/13/2024 Not Detected Not Detected - Ref. Range Final   06/04/2022 Detected (C) Not Detected - Ref. Range Final     Glucose   Date/Time Value Ref Range Status   11/17/2024 0730 175 (H) 70 - 130 mg/dL Final   11/16/2024 2025 231 (H) 70 - 130 mg/dL Final   11/16/2024 1629 238 (H) 70 - 130 mg/dL Final   11/16/2024 1134 261 (H) 70 - 130 mg/dL Final   11/16/2024 0737 273 (H) 70 - 130 mg/dL Final   11/15/2024 2020 239 (H) 70 - 130 mg/dL Final   11/15/2024 1636 228 (H) 70 - 130 mg/dL Final       US Renal Bilateral  Narrative: US RENAL BILATERAL-11/15/2024     HISTORY: Chronic kidney disease.     Right kidney measures 10.4 cm in length. Left kidney measures 11.7 cm in  length. There is an approximately 1 cm benign-appearing right renal  cyst.     No solid renal masses, stones or hydronephrosis is seen. Urinary bladder  is unremarkable. There is a small to moderate amount of free fluid in  the abdomen and pelvis.     Impression: 1. Unremarkable bilateral renal ultrasound except for a tiny right renal  cyst.  2. There is some free fluid in the abdomen and pelvis.     This report was finalized on 11/15/2024 7:32 PM by   Wolf Arreola M.D on Workstation: [id number]     Adult Transthoracic Echo Complete W/ Cont if Necessary Per Protocol    Left ventricular systolic function is moderately decreased. Estimated   left ventricular EF = 30%    The left ventricular cavity is moderate to severely dilated.    Left ventricular wall thickness is consistent with moderate eccentric   hypertrophy.    The following left ventricular wall segments are hypokinetic: mid   anterior, apical anterior, apical lateral, basal inferolateral, mid   inferolateral, apical inferior, mid inferior, apical septal, basal   inferoseptal, mid inferoseptal, apex hypokinetic, mid anteroseptal, basal   anterior, basal inferior and basal inferoseptal. The following left   ventricular wall segments are akinetic: basal anterolateral and mid   anterolateral.    Left ventricular diastolic function was indeterminate.    Estimated right ventricular systolic pressure from tricuspid   regurgitation is normal (<35 mmHg).    I reviewed the patient's daily medications.  Scheduled Medications  aspirin, 81 mg, Oral, Daily  carvedilol, 25 mg, Oral, BID With Meals  hydrALAZINE, 100 mg, Oral, Q8H  insulin glargine, 10 Units, Subcutaneous, Daily  insulin lispro, 2-7 Units, Subcutaneous, 4x Daily AC & at Bedtime  insulin lispro, 3 Units, Subcutaneous, TID With Meals  isosorbide dinitrate, 40 mg, Oral, TID - Nitrates  senna-docusate sodium, 2 tablet, Oral, BID  sodium chloride, 10 mL, Intravenous, Q12H  vitamin D, 50,000 Units, Oral, Q7 Days    Infusions  bumetanide, 1 mg/hr, Last Rate: 1 mg/hr (11/16/24 1149)    Diet  Diet: Regular/House, Diabetic, Renal, Fluid Restriction (240 mL/tray); Consistent Carbohydrate; Low Phosphorus; 1500 mL/day; Fluid Consistency: Thin (IDDSI 0)         I have personally reviewed:  [x]  Laboratory   []  Microbiology   [x]  Radiology   []  EKG/Telemetry   []  Cardiology/Vascular   []  Pathology   [x]  Records     Assessment/Plan     Active Hospital  Problems    Diagnosis  POA    **Acute exacerbation of CHF (congestive heart failure) [I50.9]  Yes    Chronic HFrEF (heart failure with reduced ejection fraction) [I50.22]  Yes    S/P CABG x 3 [Z95.1]  Not Applicable    Acute on chronic combined systolic and diastolic CHF (congestive heart failure) [I50.43]  Yes    Coronary artery disease [I25.10]  Yes    Hypertension [I10]  Yes    Type 2 diabetes mellitus, with long-term current use of insulin [E11.9, Z79.4]  Not Applicable    MEI (acute kidney injury) [N17.9]  Unknown      Resolved Hospital Problems   No resolved problems to display.       39 y.o. male admitted with Acute exacerbation of CHF (congestive heart failure).    Volume overload  HFrEF EF 20% 2022  CAD CABG x3  Elevated troponin  HTN  MEI/CKD 4   Cardiology and nephrology following  Bumex drip per nephrology.  Vascular surgery consulted for HD line access.  ASA, coreg, hydralazine, ISDN  Follow-up echocardiogram above EF 30%  Monitor I/Os and daily weights     DM2   uncontrolled. A1c 12.30%.   No DM meds on PTA list (dced on [facility]PH in 2023)  Currently on correctional insulin.  Continue long-acting and mealtime insulin.  Diabetes educator     Obesity Body mass index is 50.26 kg/m².     SCDs for DVT prophylaxis.  Full code.  Discussed with patient and nursing staff.  Anticipate discharge home next week.    Expected Discharge Date: 11/18/2024; Expected Discharge Time:       John Gipson MD  [facility]  11/17/24  09:02 EST

## 2024-11-17 NOTE — PROGRESS NOTES
[facility] Progress Note      Patient Name: Rufus Dorsey  : 1985  MRN: [id number]  Primary Care Physician:  Provider, No Known  Date of admission: 2024    Subjective     Interval History:   F/u CKD 4 vs 5    Review of Systems:   UOP remains poor despite Bumex drip and Diuril  Still has some shortness of breath and requiring 3 L/min  Appetite is poor      Objective     Vitals:   Temp:  [97.3 °F (36.3 °C)-98.9 °F (37.2 °C)] 97.3 °F (36.3 °C)  Heart Rate:  [69-74] 72  Resp:  [17-18] 18  BP: (100-130)/(66-80) 100/66  Flow (L/min) (Oxygen Therapy):  [3] 3    Intake/Output Summary (Last 24 hours) at 2024 1111  Last data filed at 2024 0836  Gross per 24 hour   Intake 360 ml   Output --   Net 360 ml       Physical Exam:    General: alert, on NC O2 NAD, morbidly obese, lying in bed  Neck: supple, no JVD  Lungs: Dec BS bilat; crackles; slightly tachypneic on 3 L/min  Heart: RRR, normal S1 and S2  Abdomen: soft, nontender, + D, abdominal wall edema  : no palpable bladder  Extremities: 3+ BLE pedal/ankle edema  Neurologic: No asterixis    Scheduled Meds:     aspirin, 81 mg, Oral, Daily  carvedilol, 25 mg, Oral, BID With Meals  hydrALAZINE, 100 mg, Oral, Q8H  insulin glargine, 10 Units, Subcutaneous, Daily  insulin lispro, 2-7 Units, Subcutaneous, 4x Daily AC & at Bedtime  insulin lispro, 3 Units, Subcutaneous, TID With Meals  isosorbide dinitrate, 40 mg, Oral, TID - Nitrates  senna-docusate sodium, 2 tablet, Oral, BID  sodium chloride, 10 mL, Intravenous, Q12H  vitamin D, 50,000 Units, Oral, Q7 Days      IV Meds:   bumetanide, 1 mg/hr, Last Rate: 1 mg/hr (24 1149)        Results Reviewed:   I have personally reviewed the results from the time of this admission to 2024 11:11 EST     Results from last 7 days   Lab Units 24  0306 24  0400 11/15/24  0327 24  0507 24  1925   SODIUM mmol/L 132* 133* 140   < > 139   POTASSIUM mmol/L 4.1 4.0  4.1   < > 3.9   CHLORIDE mmol/L 95* 99 102   < > 99   CO2 mmol/L 23.2 21.8* 24.2   < > 27.7   BUN mg/dL 100* 96* 88*   < > 76*   CREATININE mg/dL 6.70* 5.15* 4.54*   < > 4.21*   CALCIUM mg/dL 7.8* 7.8* 8.0*   < > 8.0*   BILIRUBIN mg/dL  --   --   --   --  0.4   ALK PHOS U/L  --   --   --   --  121*   ALT (SGPT) U/L  --   --   --   --  15   AST (SGOT) U/L  --   --   --   --  13   GLUCOSE mg/dL 209* 297* 202*   < > 275*    < > = values in this interval not displayed.     Estimated Creatinine Clearance: 19.9 mL/min (A) (by C-G formula based on SCr of 6.7 mg/dL (H)).  Results from last 7 days   Lab Units 11/17/24  0306 11/16/24  0400 11/15/24  0327   PHOSPHORUS mg/dL 7.2* 6.5* 5.6*         Results from last 7 days   Lab Units 11/17/24  0306 11/16/24  0400 11/15/24  0327 11/14/24  0507 11/13/24  1925   WBC 10*3/mm3 7.26 7.19 9.55 9.72 9.51   HEMOGLOBIN g/dL 10.3* 10.7* 11.2* 10.8* 11.7*   PLATELETS 10*3/mm3 154 160 169 160 173           Assessment / Plan     ASSESSMENT:  CKD stage 4 vs 5 - progressive diabetic nephropathy, now with nephrotic proteinuria, 5.2 g/g.  Huge volume excess; refractory to diuretics.  Hypervolemic hyponatremia.  K/HCO3 normal and no uremic sx.  No hydronephrosis by renal ultrasound..  Vol overload - periph > central; CXR noted.  Due to progressive CKD and also has systolic CHF.  Poor diuresis with bumex drip  HTN, overcontrolled  DM2, uncontrolled, A1c 12.3  Anemia of CKD, mild  Hypocalcemia, which corrects for low albumin  HFrEF, echo noted, EF 30% (vs 20% in 2022).  GDMT measures ltd by advanced CKD, though could start ACE/ARB/entresto when start HD    PLAN:  1.  Discontinue Bumex drip since it is not effecting any significant diuresis  2.  Will ask Vascular for TDC hopefully tomorrow, at which time HD can be initiated.  Hepatitis B surface antigen is negative  3.  Fluid restriction, 1500 mL/day  4.  Reduce hydralazine dose and place hold orders to avoid any inadvertent hypotension  5.   Surveillance labs      George Nicholson MD  11/17/24  11:11 EST    [facility]  [phone]

## 2024-11-17 NOTE — PLAN OF CARE
Goal Outcome Evaluation:           Progress: no change  Outcome Evaluation: pt aox4 remains on 3L. pt very drowsy and slept frequently throughout shift. pt denies any pain. Bumex infusion stopped. pt has had little noted urine output, pt states he has gone to the bathroom to urinate a few times this shift, but forgets to use urinal for I/Os. plans for pt to go down for HD cath insertion tomorrow. Pt has talked to provider and has signed consent. pt expresses no needs at this time. call light within reach.

## 2024-11-18 ENCOUNTER — ANESTHESIA EVENT (OUTPATIENT)
Dept: PERIOP | Facility: HOSPITAL | Age: 39
End: 2024-11-18

## 2024-11-18 ENCOUNTER — ANESTHESIA (OUTPATIENT)
Dept: PERIOP | Facility: HOSPITAL | Age: 39
End: 2024-11-18

## 2024-11-18 ENCOUNTER — APPOINTMENT (OUTPATIENT)
Dept: GENERAL RADIOLOGY | Facility: HOSPITAL | Age: 39
End: 2024-11-18

## 2024-11-18 LAB
ALBUMIN SERPL-MCNC: 3.3 G/DL (ref 3.5–5.2)
ANION GAP SERPL CALCULATED.3IONS-SCNC: 15 MMOL/L (ref 5–15)
BUN SERPL-MCNC: 107 MG/DL (ref 6–20)
BUN/CREAT SERPL: 15 (ref 7–25)
CALCIUM SPEC-SCNC: 7.9 MG/DL (ref 8.6–10.5)
CHLORIDE SERPL-SCNC: 97 MMOL/L (ref 98–107)
CO2 SERPL-SCNC: 22 MMOL/L (ref 22–29)
CREAT SERPL-MCNC: 7.12 MG/DL (ref 0.76–1.27)
DEPRECATED RDW RBC AUTO: 45.1 FL (ref 37–54)
EGFRCR SERPLBLD CKD-EPI 2021: 9.3 ML/MIN/1.73
ERYTHROCYTE [DISTWIDTH] IN BLOOD BY AUTOMATED COUNT: 14.1 % (ref 12.3–15.4)
GLUCOSE BLDC GLUCOMTR-MCNC: 101 MG/DL (ref 70–130)
GLUCOSE BLDC GLUCOMTR-MCNC: 115 MG/DL (ref 70–130)
GLUCOSE BLDC GLUCOMTR-MCNC: 115 MG/DL (ref 70–130)
GLUCOSE BLDC GLUCOMTR-MCNC: 123 MG/DL (ref 70–130)
GLUCOSE BLDC GLUCOMTR-MCNC: 128 MG/DL (ref 70–130)
GLUCOSE SERPL-MCNC: 136 MG/DL (ref 65–99)
HCT VFR BLD AUTO: 33.7 % (ref 37.5–51)
HGB BLD-MCNC: 10.3 G/DL (ref 13–17.7)
MCH RBC QN AUTO: 26.9 PG (ref 26.6–33)
MCHC RBC AUTO-ENTMCNC: 30.6 G/DL (ref 31.5–35.7)
MCV RBC AUTO: 88 FL (ref 79–97)
PHOSPHATE SERPL-MCNC: 7.4 MG/DL (ref 2.5–4.5)
PLATELET # BLD AUTO: 161 10*3/MM3 (ref 140–450)
PMV BLD AUTO: 12.2 FL (ref 6–12)
POTASSIUM SERPL-SCNC: 4 MMOL/L (ref 3.5–5.2)
RBC # BLD AUTO: 3.83 10*6/MM3 (ref 4.14–5.8)
SODIUM SERPL-SCNC: 134 MMOL/L (ref 136–145)
WBC NRBC COR # BLD AUTO: 8.38 10*3/MM3 (ref 3.4–10.8)

## 2024-11-18 PROCEDURE — 25010000002 LIDOCAINE 1 % SOLUTION 20 ML VIAL: Performed by: SURGERY

## 2024-11-18 PROCEDURE — 02HV33Z INSERTION OF INFUSION DEVICE INTO SUPERIOR VENA CAVA, PERCUTANEOUS APPROACH: ICD-10-PCS | Performed by: SURGERY

## 2024-11-18 PROCEDURE — 25010000002 FENTANYL CITRATE (PF) 50 MCG/ML SOLUTION: Performed by: NURSE ANESTHETIST, CERTIFIED REGISTERED

## 2024-11-18 PROCEDURE — 25010000002 ALBUMIN HUMAN 5% PER 50 ML

## 2024-11-18 PROCEDURE — C1769 GUIDE WIRE: HCPCS | Performed by: SURGERY

## 2024-11-18 PROCEDURE — 36558 INSERT TUNNELED CV CATH: CPT | Performed by: SURGERY

## 2024-11-18 PROCEDURE — 80069 RENAL FUNCTION PANEL: CPT | Performed by: INTERNAL MEDICINE

## 2024-11-18 PROCEDURE — 25010000002 LIDOCAINE 2% SOLUTION: Performed by: NURSE ANESTHETIST, CERTIFIED REGISTERED

## 2024-11-18 PROCEDURE — 25010000002 CEFAZOLIN 3 G RECONSTITUTED SOLUTION 1 EACH VIAL: Performed by: STUDENT IN AN ORGANIZED HEALTH CARE EDUCATION/TRAINING PROGRAM

## 2024-11-18 PROCEDURE — C1750 CATH, HEMODIALYSIS,LONG-TERM: HCPCS | Performed by: SURGERY

## 2024-11-18 PROCEDURE — 63710000001 INSULIN LISPRO (HUMAN) PER 5 UNITS: Performed by: HOSPITALIST

## 2024-11-18 PROCEDURE — 85027 COMPLETE CBC AUTOMATED: CPT | Performed by: STUDENT IN AN ORGANIZED HEALTH CARE EDUCATION/TRAINING PROGRAM

## 2024-11-18 PROCEDURE — 71045 X-RAY EXAM CHEST 1 VIEW: CPT

## 2024-11-18 PROCEDURE — P9041 ALBUMIN (HUMAN),5%, 50ML: HCPCS

## 2024-11-18 PROCEDURE — C1894 INTRO/SHEATH, NON-LASER: HCPCS | Performed by: SURGERY

## 2024-11-18 PROCEDURE — 0JH63XZ INSERTION OF TUNNELED VASCULAR ACCESS DEVICE INTO CHEST SUBCUTANEOUS TISSUE AND FASCIA, PERCUTANEOUS APPROACH: ICD-10-PCS | Performed by: SURGERY

## 2024-11-18 PROCEDURE — 82948 REAGENT STRIP/BLOOD GLUCOSE: CPT

## 2024-11-18 PROCEDURE — 25010000002 HEPARIN (PORCINE) PER 1000 UNITS: Performed by: SURGERY

## 2024-11-18 PROCEDURE — 63710000001 INSULIN GLARGINE PER 5 UNITS: Performed by: HOSPITALIST

## 2024-11-18 PROCEDURE — 5A1D70Z PERFORMANCE OF URINARY FILTRATION, INTERMITTENT, LESS THAN 6 HOURS PER DAY: ICD-10-PCS | Performed by: STUDENT IN AN ORGANIZED HEALTH CARE EDUCATION/TRAINING PROGRAM

## 2024-11-18 PROCEDURE — 25010000002 PROPOFOL 200 MG/20ML EMULSION: Performed by: NURSE ANESTHETIST, CERTIFIED REGISTERED

## 2024-11-18 PROCEDURE — 25010000002 EPINEPHRINE PER 0.1 MG: Performed by: NURSE ANESTHETIST, CERTIFIED REGISTERED

## 2024-11-18 PROCEDURE — 25010000002 GLYCOPYRROLATE 1 MG/5ML SOLUTION: Performed by: NURSE ANESTHETIST, CERTIFIED REGISTERED

## 2024-11-18 PROCEDURE — 25010000002 BUPIVACAINE (PF) 0.25 % SOLUTION 30 ML VIAL: Performed by: SURGERY

## 2024-11-18 RX ORDER — SEVELAMER CARBONATE 800 MG/1
2400 TABLET, FILM COATED ORAL
Status: DISCONTINUED | OUTPATIENT
Start: 2024-11-18 | End: 2024-11-28

## 2024-11-18 RX ORDER — DIPHENHYDRAMINE HYDROCHLORIDE 50 MG/ML
12.5 INJECTION INTRAMUSCULAR; INTRAVENOUS
Status: DISCONTINUED | OUTPATIENT
Start: 2024-11-18 | End: 2024-11-18 | Stop reason: HOSPADM

## 2024-11-18 RX ORDER — CALCIUM CHLORIDE 100 MG/ML
INJECTION INTRAVENOUS; INTRAVENTRICULAR AS NEEDED
Status: DISCONTINUED | OUTPATIENT
Start: 2024-11-18 | End: 2024-11-18 | Stop reason: SURG

## 2024-11-18 RX ORDER — SODIUM CHLORIDE 0.9 % (FLUSH) 0.9 %
3 SYRINGE (ML) INJECTION EVERY 12 HOURS SCHEDULED
Status: DISCONTINUED | OUTPATIENT
Start: 2024-11-18 | End: 2024-11-18 | Stop reason: HOSPADM

## 2024-11-18 RX ORDER — DROPERIDOL 2.5 MG/ML
0.62 INJECTION, SOLUTION INTRAMUSCULAR; INTRAVENOUS
Status: DISCONTINUED | OUTPATIENT
Start: 2024-11-18 | End: 2024-11-18 | Stop reason: HOSPADM

## 2024-11-18 RX ORDER — FENTANYL CITRATE 50 UG/ML
INJECTION, SOLUTION INTRAMUSCULAR; INTRAVENOUS AS NEEDED
Status: DISCONTINUED | OUTPATIENT
Start: 2024-11-18 | End: 2024-11-18 | Stop reason: SURG

## 2024-11-18 RX ORDER — GLYCOPYRROLATE 0.2 MG/ML
INJECTION INTRAMUSCULAR; INTRAVENOUS AS NEEDED
Status: DISCONTINUED | OUTPATIENT
Start: 2024-11-18 | End: 2024-11-18 | Stop reason: SURG

## 2024-11-18 RX ORDER — EPINEPHRINE 1 MG/ML
INJECTION, SOLUTION, CONCENTRATE INTRAVENOUS AS NEEDED
Status: DISCONTINUED | OUTPATIENT
Start: 2024-11-18 | End: 2024-11-18 | Stop reason: SURG

## 2024-11-18 RX ORDER — FENTANYL CITRATE 50 UG/ML
50 INJECTION, SOLUTION INTRAMUSCULAR; INTRAVENOUS ONCE AS NEEDED
Status: DISCONTINUED | OUTPATIENT
Start: 2024-11-18 | End: 2024-11-18 | Stop reason: HOSPADM

## 2024-11-18 RX ORDER — PROMETHAZINE HYDROCHLORIDE 25 MG/1
25 SUPPOSITORY RECTAL ONCE AS NEEDED
Status: DISCONTINUED | OUTPATIENT
Start: 2024-11-18 | End: 2024-11-18 | Stop reason: HOSPADM

## 2024-11-18 RX ORDER — EPHEDRINE SULFATE 50 MG/ML
5 INJECTION, SOLUTION INTRAVENOUS ONCE AS NEEDED
Status: DISCONTINUED | OUTPATIENT
Start: 2024-11-18 | End: 2024-11-18 | Stop reason: HOSPADM

## 2024-11-18 RX ORDER — SODIUM CHLORIDE 9 MG/ML
9 INJECTION, SOLUTION INTRAVENOUS CONTINUOUS
Status: ACTIVE | OUTPATIENT
Start: 2024-11-18 | End: 2024-11-19

## 2024-11-18 RX ORDER — HYDROCODONE BITARTRATE AND ACETAMINOPHEN 5; 325 MG/1; MG/1
1 TABLET ORAL EVERY 4 HOURS PRN
Status: DISPENSED | OUTPATIENT
Start: 2024-11-18 | End: 2024-11-28

## 2024-11-18 RX ORDER — MIDAZOLAM HYDROCHLORIDE 1 MG/ML
1 INJECTION, SOLUTION INTRAMUSCULAR; INTRAVENOUS
Status: DISCONTINUED | OUTPATIENT
Start: 2024-11-18 | End: 2024-11-18 | Stop reason: HOSPADM

## 2024-11-18 RX ORDER — ONDANSETRON 2 MG/ML
4 INJECTION INTRAMUSCULAR; INTRAVENOUS ONCE AS NEEDED
Status: DISCONTINUED | OUTPATIENT
Start: 2024-11-18 | End: 2024-11-18 | Stop reason: HOSPADM

## 2024-11-18 RX ORDER — HYDROCODONE BITARTRATE AND ACETAMINOPHEN 5; 325 MG/1; MG/1
1 TABLET ORAL ONCE AS NEEDED
Status: DISCONTINUED | OUTPATIENT
Start: 2024-11-18 | End: 2024-11-18 | Stop reason: HOSPADM

## 2024-11-18 RX ORDER — LABETALOL HYDROCHLORIDE 5 MG/ML
5 INJECTION, SOLUTION INTRAVENOUS
Status: DISCONTINUED | OUTPATIENT
Start: 2024-11-18 | End: 2024-11-18 | Stop reason: HOSPADM

## 2024-11-18 RX ORDER — PROMETHAZINE HYDROCHLORIDE 25 MG/1
25 TABLET ORAL ONCE AS NEEDED
Status: DISCONTINUED | OUTPATIENT
Start: 2024-11-18 | End: 2024-11-18 | Stop reason: HOSPADM

## 2024-11-18 RX ORDER — FLUMAZENIL 0.1 MG/ML
0.2 INJECTION INTRAVENOUS AS NEEDED
Status: DISCONTINUED | OUTPATIENT
Start: 2024-11-18 | End: 2024-11-18 | Stop reason: HOSPADM

## 2024-11-18 RX ORDER — HYDRALAZINE HYDROCHLORIDE 20 MG/ML
5 INJECTION INTRAMUSCULAR; INTRAVENOUS
Status: DISCONTINUED | OUTPATIENT
Start: 2024-11-18 | End: 2024-11-18 | Stop reason: HOSPADM

## 2024-11-18 RX ORDER — HYDROMORPHONE HYDROCHLORIDE 1 MG/ML
0.25 INJECTION, SOLUTION INTRAMUSCULAR; INTRAVENOUS; SUBCUTANEOUS
Status: DISCONTINUED | OUTPATIENT
Start: 2024-11-18 | End: 2024-11-18 | Stop reason: HOSPADM

## 2024-11-18 RX ORDER — SODIUM CHLORIDE 0.9 % (FLUSH) 0.9 %
3-10 SYRINGE (ML) INJECTION AS NEEDED
Status: DISCONTINUED | OUTPATIENT
Start: 2024-11-18 | End: 2024-11-18 | Stop reason: HOSPADM

## 2024-11-18 RX ORDER — NALOXONE HCL 0.4 MG/ML
0.2 VIAL (ML) INJECTION AS NEEDED
Status: DISCONTINUED | OUTPATIENT
Start: 2024-11-18 | End: 2024-11-18 | Stop reason: HOSPADM

## 2024-11-18 RX ORDER — IPRATROPIUM BROMIDE AND ALBUTEROL SULFATE 2.5; .5 MG/3ML; MG/3ML
3 SOLUTION RESPIRATORY (INHALATION) ONCE AS NEEDED
Status: DISCONTINUED | OUTPATIENT
Start: 2024-11-18 | End: 2024-11-18 | Stop reason: HOSPADM

## 2024-11-18 RX ORDER — PROPOFOL 10 MG/ML
INJECTION, EMULSION INTRAVENOUS AS NEEDED
Status: DISCONTINUED | OUTPATIENT
Start: 2024-11-18 | End: 2024-11-18 | Stop reason: SURG

## 2024-11-18 RX ORDER — ALBUMIN HUMAN 50 G/1000ML
SOLUTION INTRAVENOUS
Status: COMPLETED
Start: 2024-11-18 | End: 2024-11-18

## 2024-11-18 RX ORDER — HEPARIN SODIUM 1000 [USP'U]/ML
INJECTION, SOLUTION INTRAVENOUS; SUBCUTANEOUS AS NEEDED
Status: DISCONTINUED | OUTPATIENT
Start: 2024-11-18 | End: 2024-11-18 | Stop reason: HOSPADM

## 2024-11-18 RX ORDER — LIDOCAINE HYDROCHLORIDE 20 MG/ML
INJECTION, SOLUTION INFILTRATION; PERINEURAL AS NEEDED
Status: DISCONTINUED | OUTPATIENT
Start: 2024-11-18 | End: 2024-11-18 | Stop reason: SURG

## 2024-11-18 RX ORDER — LIDOCAINE HYDROCHLORIDE 10 MG/ML
0.5 INJECTION, SOLUTION INFILTRATION; PERINEURAL ONCE AS NEEDED
Status: DISCONTINUED | OUTPATIENT
Start: 2024-11-18 | End: 2024-11-18 | Stop reason: HOSPADM

## 2024-11-18 RX ORDER — PHENYLEPHRINE HCL IN 0.9% NACL 1 MG/10 ML
SYRINGE (ML) INTRAVENOUS AS NEEDED
Status: DISCONTINUED | OUTPATIENT
Start: 2024-11-18 | End: 2024-11-18 | Stop reason: SURG

## 2024-11-18 RX ORDER — FAMOTIDINE 10 MG/ML
20 INJECTION, SOLUTION INTRAVENOUS ONCE
Status: COMPLETED | OUTPATIENT
Start: 2024-11-18 | End: 2024-11-18

## 2024-11-18 RX ORDER — PHENYLEPHRINE HCL IN 0.9% NACL 0.5 MG/5ML
.5-3 SYRINGE (ML) INTRAVENOUS
Status: DISCONTINUED | OUTPATIENT
Start: 2024-11-18 | End: 2024-11-18 | Stop reason: HOSPADM

## 2024-11-18 RX ORDER — EPHEDRINE SULFATE 50 MG/ML
INJECTION INTRAVENOUS AS NEEDED
Status: DISCONTINUED | OUTPATIENT
Start: 2024-11-18 | End: 2024-11-18 | Stop reason: SURG

## 2024-11-18 RX ORDER — HYDROCODONE BITARTRATE AND ACETAMINOPHEN 7.5; 325 MG/1; MG/1
1 TABLET ORAL EVERY 4 HOURS PRN
Status: DISCONTINUED | OUTPATIENT
Start: 2024-11-18 | End: 2024-11-18 | Stop reason: HOSPADM

## 2024-11-18 RX ORDER — SODIUM CHLORIDE 9 MG/ML
INJECTION, SOLUTION INTRAVENOUS CONTINUOUS PRN
Status: DISCONTINUED | OUTPATIENT
Start: 2024-11-18 | End: 2024-11-18 | Stop reason: SURG

## 2024-11-18 RX ORDER — FENTANYL CITRATE 50 UG/ML
25 INJECTION, SOLUTION INTRAMUSCULAR; INTRAVENOUS
Status: DISCONTINUED | OUTPATIENT
Start: 2024-11-18 | End: 2024-11-18 | Stop reason: HOSPADM

## 2024-11-18 RX ORDER — ALBUMIN HUMAN 50 G/1000ML
250 SOLUTION INTRAVENOUS
Status: DISCONTINUED | OUTPATIENT
Start: 2024-11-18 | End: 2024-11-18 | Stop reason: HOSPADM

## 2024-11-18 RX ADMIN — INSULIN GLARGINE 10 UNITS: 100 INJECTION, SOLUTION SUBCUTANEOUS at 08:17

## 2024-11-18 RX ADMIN — SENNOSIDES AND DOCUSATE SODIUM 2 TABLET: 50; 8.6 TABLET ORAL at 21:32

## 2024-11-18 RX ADMIN — SODIUM CHLORIDE: 9 INJECTION, SOLUTION INTRAVENOUS at 11:34

## 2024-11-18 RX ADMIN — EPINEPHRINE 100 MCG: 1 INJECTION, SOLUTION, CONCENTRATE INTRAVENOUS at 12:17

## 2024-11-18 RX ADMIN — CARVEDILOL 25 MG: 25 TABLET, FILM COATED ORAL at 18:43

## 2024-11-18 RX ADMIN — ASPIRIN 81 MG: 81 TABLET, COATED ORAL at 08:15

## 2024-11-18 RX ADMIN — SENNOSIDES AND DOCUSATE SODIUM 2 TABLET: 50; 8.6 TABLET ORAL at 08:15

## 2024-11-18 RX ADMIN — PROPOFOL 200 MG: 10 INJECTION, EMULSION INTRAVENOUS at 11:45

## 2024-11-18 RX ADMIN — SEVELAMER CARBONATE 2400 MG: 800 TABLET, FILM COATED ORAL at 18:43

## 2024-11-18 RX ADMIN — CALCIUM CHLORIDE 0.5 G: 100 INJECTION, SOLUTION INTRAVENOUS at 12:17

## 2024-11-18 RX ADMIN — ALBUMIN HUMAN 250 ML: 50 SOLUTION INTRAVENOUS at 13:41

## 2024-11-18 RX ADMIN — EPHEDRINE SULFATE 15 MG: 50 INJECTION INTRAVENOUS at 12:04

## 2024-11-18 RX ADMIN — CALCIUM CHLORIDE 0.5 G: 100 INJECTION, SOLUTION INTRAVENOUS at 11:56

## 2024-11-18 RX ADMIN — Medication 400 MCG: at 11:56

## 2024-11-18 RX ADMIN — SODIUM CHLORIDE 3000 MG: 900 INJECTION INTRAVENOUS at 11:26

## 2024-11-18 RX ADMIN — INSULIN LISPRO 3 UNITS: 100 INJECTION, SOLUTION INTRAVENOUS; SUBCUTANEOUS at 08:16

## 2024-11-18 RX ADMIN — ALBUMIN (HUMAN) 250 ML: 12.5 INJECTION, SOLUTION INTRAVENOUS at 13:41

## 2024-11-18 RX ADMIN — FENTANYL CITRATE 50 MCG: 50 INJECTION, SOLUTION INTRAMUSCULAR; INTRAVENOUS at 11:40

## 2024-11-18 RX ADMIN — FAMOTIDINE 20 MG: 10 INJECTION INTRAVENOUS at 10:54

## 2024-11-18 RX ADMIN — Medication 10 ML: at 21:32

## 2024-11-18 RX ADMIN — HYDRALAZINE HYDROCHLORIDE 25 MG: 25 TABLET ORAL at 21:32

## 2024-11-18 RX ADMIN — ISOSORBIDE DINITRATE 40 MG: 20 TABLET ORAL at 18:43

## 2024-11-18 RX ADMIN — EPINEPHRINE 100 MCG: 1 INJECTION, SOLUTION, CONCENTRATE INTRAVENOUS at 12:20

## 2024-11-18 RX ADMIN — Medication 10 ML: at 08:02

## 2024-11-18 RX ADMIN — EPHEDRINE SULFATE 15 MG: 50 INJECTION INTRAVENOUS at 12:09

## 2024-11-18 RX ADMIN — GLYCOPYRROLATE 0.2 MCG: 1 INJECTION INTRAMUSCULAR; INTRAVENOUS at 11:44

## 2024-11-18 RX ADMIN — CARVEDILOL 25 MG: 25 TABLET, FILM COATED ORAL at 08:17

## 2024-11-18 RX ADMIN — ISOSORBIDE DINITRATE 40 MG: 20 TABLET ORAL at 08:16

## 2024-11-18 RX ADMIN — EPINEPHRINE 100 MCG: 1 INJECTION, SOLUTION, CONCENTRATE INTRAVENOUS at 12:25

## 2024-11-18 RX ADMIN — LIDOCAINE HYDROCHLORIDE 60 MG: 20 INJECTION, SOLUTION INFILTRATION; PERINEURAL at 11:45

## 2024-11-18 NOTE — NURSING NOTE
HD treatment completed & tolerated, Blood returned & VSS. CVC clamped, locked and capped. No complications noted.    Net Volume removed: 2000ml  Pre v/s:126/74, 79, 16, 97.5, 154.4kg  Post v/s: 155/73, 77, 16, 97.5, 152.4kg  BVP: 51.5L    Marcella Marcus RN  [facility]

## 2024-11-18 NOTE — PROGRESS NOTES
[facility] Progress Note      Patient Name: Rufus Dorsey  : 1985  MRN: [id number]  Primary Care Physician:  Provider, No Known  Date of admission: 2024    Subjective     Interval History:   Acute kidney injury on chronic kidney disease    Patient has low urine output despite aggressive diuresis, he continued to have shortness of breath and significant edema, no nausea or vomiting, no abdominal pain, no lightheadedness, appetite remains very poor    Review of Systems:   As noted above      Objective     Vitals:   Temp:  [97.3 °F (36.3 °C)-98.6 °F (37 °C)] 98.3 °F (36.8 °C)  Heart Rate:  [67-74] 71  Resp:  [17-18] 18  BP: (116-150)/(60-74) 150/67  Flow (L/min) (Oxygen Therapy):  [3] 3    Intake/Output Summary (Last 24 hours) at 2024 1025  Last data filed at 2024 1743  Gross per 24 hour   Intake 720 ml   Output --   Net 720 ml       Physical Exam:    General: Awake, alert, chronically ill, morbidly obese, no acute distress  Neck: Difficult to assess because of body habitus  Lungs: Bilateral rhonchi and crackles, breathing effort not  Heart: RRR, no rub  Abdomen: soft, nontender, distended, abdominal wall edema  : Difficult to assess for palpable bladder because of body habitus  Extremities: 3+ BLE pedal/ankle edema  Neurologic: No asterixis    Scheduled Meds:     [Transfer Hold] aspirin, 81 mg, Oral, Daily  carvedilol, 25 mg, Oral, BID With Meals  ceFAZolin, 3,000 mg, Intravenous, Once  hydrALAZINE, 25 mg, Oral, Q8H  [Transfer Hold] insulin glargine, 10 Units, Subcutaneous, Daily  [Transfer Hold] insulin lispro, 2-7 Units, Subcutaneous, 4x Daily AC & at Bedtime  [Transfer Hold] insulin lispro, 3 Units, Subcutaneous, TID With Meals  [Transfer Hold] isosorbide dinitrate, 40 mg, Oral, TID - Nitrates  [Transfer Hold] senna-docusate sodium, 2 tablet, Oral, BID  [Transfer Hold] sodium chloride, 10 mL, Intravenous, Q12H  [Transfer Hold] vitamin D, 50,000 Units, Oral,  Q7 Days      IV Meds:          Results Reviewed:   I have personally reviewed the results from the time of this admission to 11/18/2024 10:25 EST     Results from last 7 days   Lab Units 11/18/24  0404 11/17/24  0306 11/16/24  0400 11/14/24  0507 11/13/24  1925   SODIUM mmol/L 134* 132* 133*   < > 139   POTASSIUM mmol/L 4.0 4.1 4.0   < > 3.9   CHLORIDE mmol/L 97* 95* 99   < > 99   CO2 mmol/L 22.0 23.2 21.8*   < > 27.7   BUN mg/dL 107* 100* 96*   < > 76*   CREATININE mg/dL 7.12* 6.70* 5.15*   < > 4.21*   CALCIUM mg/dL 7.9* 7.8* 7.8*   < > 8.0*   BILIRUBIN mg/dL  --   --   --   --  0.4   ALK PHOS U/L  --   --   --   --  121*   ALT (SGPT) U/L  --   --   --   --  15   AST (SGOT) U/L  --   --   --   --  13   GLUCOSE mg/dL 136* 209* 297*   < > 275*    < > = values in this interval not displayed.     Estimated Creatinine Clearance: 18.8 mL/min (A) (by C-G formula based on SCr of 7.12 mg/dL (H)).  Results from last 7 days   Lab Units 11/18/24  0404 11/17/24  0306 11/16/24  0400   PHOSPHORUS mg/dL 7.4* 7.2* 6.5*         Results from last 7 days   Lab Units 11/18/24  0404 11/17/24  0306 11/16/24  0400 11/15/24  0327 11/14/24  0507   WBC 10*3/mm3 8.38 7.26 7.19 9.55 9.72   HEMOGLOBIN g/dL 10.3* 10.3* 10.7* 11.2* 10.8*   PLATELETS 10*3/mm3 161 154 160 169 160           Assessment / Plan     ASSESSMENT:  Acute kidney injury chronic kidney disease stage V, secondary to diabetic nephropathy, now with nephrotic proteinuria, 5.2 g/g.  Huge volume excess; refractory to diuretics.  Hypervolemic hyponatremia.  Creatinine up to 7.12, total CO2 22, anion gap is 15, sodium 134  Volume overload poorly responsive to the large dose diuretic  Hypertension, with systolic component related to volume excess  Diabetes mellitus type 2 with chronic kidney disease poorly controlled followed by the primary team  Anemia of CKD, hemoglobin 10.3, will monitor  Hypocalcemia, which corrects for low albumin  Hyperphosphatemia associate with chronic kidney  disease phosphorus 7.4  HFrEF, echo noted, EF 30% (vs 20% in 2022).  GDMT measures ltd by advanced CKD, though could start ACE/ARB/entresto when start HD    PLAN:  1.  The patient will be getting tunneled dialysis cath  2.  Initiating hemodialysis  3.  Fluid restriction, 1500 mL/day  4.  Add phosphate binder sevelamer 2400 mg with each meal  5.  Surveillance labs    I reviewed the chart and other providers notes, reviewed labs.  I discussed the case with the patient.  Copied text in this note has been reviewed and is accurate as of 11/18/24.     Navi Hill MD  11/18/24  10:25 [facility]    [facility]  [phone]

## 2024-11-18 NOTE — PLAN OF CARE
Goal Outcome Evaluation:              Outcome Evaluation: VSS Patient on 3LNC patient denies discomfort. Family member at bedside. NPO after midnight maintain safety and continue to monitor.

## 2024-11-18 NOTE — PROGRESS NOTES
Name: Rufus Dorsey ADMIT: 2024   : 1985  PCP: Provider, No Known    MRN: [id number] LOS: 5 days   AGE/SEX: 39 y.o. male  ROOM: [facility]     Subjective   Subjective   Patient seen in recovery.  Still drowsy.         Objective   Objective   Vital Signs  Temp:  [97.3 °F (36.3 °C)-98.6 °F (37 °C)] 98.3 °F (36.8 °C)  Heart Rate:  [67-74] 71  Resp:  [17-22] 22  BP: (116-150)/(60-74) 150/67  SpO2:  [91 %-98 %] 97 %  on  Flow (L/min) (Oxygen Therapy):  [3] 3;   Device (Oxygen Therapy): nasal cannula  Body mass index is 50.76 kg/m².  Physical Exam  Vitals and nursing note reviewed.   Constitutional:       General: He is not in acute distress.  Cardiovascular:      Rate and Rhythm: Normal rate and regular rhythm.   Pulmonary:      Effort: Pulmonary effort is normal. No respiratory distress.      Comments: Bilateral crackles  Abdominal:      General: Abdomen is flat. There is no distension.      Tenderness: There is no abdominal tenderness.   Musculoskeletal:         General: No swelling or deformity.      Right lower leg: Edema present.      Left lower leg: Edema present.      Comments: 3+ pitting edema bilaterally   Skin:     General: Skin is warm and dry.   Neurological:      General: No focal deficit present.      Mental Status: He is alert. Mental status is at baseline.         Results Review     I reviewed the patient's new clinical results.  Results from last 7 days   Lab Units 24  04024  0306 11/16/24  0400 11/15/24  032   WBC 10*3/mm3 8.38 7.26 7.19 9.55   HEMOGLOBIN g/dL 10.3* 10.3* 10.7* 11.2*   PLATELETS 10*3/mm3 161 154 160 169     Results from last 7 days   Lab Units 24  04024  0306 24  0400 11/15/24  032   SODIUM mmol/L 134* 132* 133* 140   POTASSIUM mmol/L 4.0 4.1 4.0 4.1   CHLORIDE mmol/L 97* 95* 99 102   CO2 mmol/L 22.0 23.2 21.8* 24.2   BUN mg/dL 107* 100* 96* 88*   CREATININE mg/dL 7.12* 6.70* 5.15* 4.54*   GLUCOSE mg/dL 136* 209* 297* 202*    Estimated Creatinine Clearance: 18.8 mL/min (A) (by C-G formula based on SCr of 7.12 mg/dL (H)).  Results from last 7 days   Lab Units 11/18/24  0404 11/17/24  0306 11/16/24  0400 11/15/24  0327 11/13/24  1925   ALBUMIN g/dL 3.3* 3.4* 2.9* 3.1* 3.3*   BILIRUBIN mg/dL  --   --   --   --  0.4   ALK PHOS U/L  --   --   --   --  121*   AST (SGOT) U/L  --   --   --   --  13   ALT (SGPT) U/L  --   --   --   --  15     Results from last 7 days   Lab Units 11/18/24  0404 11/17/24  0306 11/16/24  0400 11/15/24  0327   CALCIUM mg/dL 7.9* 7.8* 7.8* 8.0*   ALBUMIN g/dL 3.3* 3.4* 2.9* 3.1*   PHOSPHORUS mg/dL 7.4* 7.2* 6.5* 5.6*       COVID19   Date Value Ref Range Status   11/13/2024 Not Detected Not Detected - Ref. Range Final   06/04/2022 Detected (C) Not Detected - Ref. Range Final     Glucose   Date/Time Value Ref Range Status   11/18/2024 1109 115 70 - 130 mg/dL Final   11/18/2024 0940 115 70 - 130 mg/dL Final   11/18/2024 0755 123 70 - 130 mg/dL Final   11/17/2024 2018 148 (H) 70 - 130 mg/dL Final   11/17/2024 1655 174 (H) 70 - 130 mg/dL Final   11/17/2024 1142 176 (H) 70 - 130 mg/dL Final   11/17/2024 0730 175 (H) 70 - 130 mg/dL Final       Arteriogram (Autofinalize)  This procedure was auto-finalized with no dictation required.    I reviewed the patient's daily medications.  Scheduled Medications  albumin human, , ,   [Transfer Hold] aspirin, 81 mg, Oral, Daily  carvedilol, 25 mg, Oral, BID With Meals  hydrALAZINE, 25 mg, Oral, Q8H  [Transfer Hold] insulin glargine, 10 Units, Subcutaneous, Daily  [Transfer Hold] insulin lispro, 2-7 Units, Subcutaneous, 4x Daily AC & at Bedtime  [Transfer Hold] insulin lispro, 3 Units, Subcutaneous, TID With Meals  [Transfer Hold] isosorbide dinitrate, 40 mg, Oral, TID - Nitrates  [Transfer Hold] senna-docusate sodium, 2 tablet, Oral, BID  sevelamer, 2,400 mg, Oral, TID With Meals  [Transfer Hold] sodium chloride, 10 mL, Intravenous, Q12H  [Transfer Hold] vitamin D, 50,000 Units, Oral,  Q7 Days    Infusions  phenylephrine, 0.5-3 mcg/kg/min  sodium chloride, 9 mL/hr    Diet  NPO Diet NPO Type: Sips with Meds         I have personally reviewed:  [x]  Laboratory   []  Microbiology   [x]  Radiology   []  EKG/Telemetry   []  Cardiology/Vascular   []  Pathology   [x]  Records     Assessment/Plan     Active Hospital Problems    Diagnosis  POA    **Acute exacerbation of CHF (congestive heart failure) [I50.9]  Yes    Chronic HFrEF (heart failure with reduced ejection fraction) [I50.22]  Yes    S/P CABG x 3 [Z95.1]  Not Applicable    Acute on chronic combined systolic and diastolic CHF (congestive heart failure) [I50.43]  Yes    Coronary artery disease [I25.10]  Yes    Hypertension [I10]  Yes    Type 2 diabetes mellitus, with long-term current use of insulin [E11.9, Z79.4]  Not Applicable    MEI (acute kidney injury) [N17.9]  Unknown      Resolved Hospital Problems   No resolved problems to display.       39 y.o. male admitted with Acute exacerbation of CHF (congestive heart failure).    Volume overload  HFrEF EF 20% 2022  CAD CABG x3  Elevated troponin  HTN  MEI/CKD 4   Cardiology and nephrology following  Vascular surgery consulted for HD line access. Plan for HD today.  ASA, coreg, hydralazine, ISDN  Follow-up echocardiogram above EF 30%  Monitor I/Os and daily weights     DM2   uncontrolled. A1c 12.30%.   Currently on correctional insulin.  Continue long-acting and mealtime insulin.  Diabetes educator     Obesity Body mass index is 50.26 kg/m².     SCDs for DVT prophylaxis.  Full code.  Discussed with patient and nursing staff.  Anticipate discharge home next week.    Expected Discharge Date: 11/18/2024; Expected Discharge Time:       John Gipson MD  [facility]  11/18/24  13:28 EST

## 2024-11-18 NOTE — CASE MANAGEMENT/SOCIAL WORK
Continued Stay Note  [facility]     Patient Name: Rufus Dorsey  MRN: [id number]  Today's Date: 11/18/2024    Admit Date: 11/13/2024    Plan: Home, brother to transport   Discharge Plan       Row Name 11/18/24 9034       Plan    Plan Comments Pt will require out pt dialysis. Spoke with Antonette Adames, who stated without emergency Medicaid or insurance, pt will not be able to have dialysis arranged, and he would have to go the ER for his dialysis. [facility] is requesting [employer] to review again for emergency medicaid, as well as .                   Discharge Codes    No documentation.                 Expected Discharge Date and Time       Expected Discharge Date Expected Discharge Time    Nov 18, 2024               Mónica Rodriguez RN

## 2024-11-18 NOTE — ANESTHESIA POSTPROCEDURE EVALUATION
"Patient: Rufus Dorsey    Procedure Summary       Date: 11/18/24 Room / Location: [facility] OR [address] [place] [address] HYBRID OR    Anesthesia Start: 1134 Anesthesia Stop: 1256    Procedure: HEMODIALYSIS CATHETER INSERTION Diagnosis:       MEI (acute kidney injury)      (MEI (acute kidney injury) [N17.9])    Surgeons: Galilea Bearden Jr., MD Provider: Michael Larsen MD    Anesthesia Type: MAC ASA Status: 4            Anesthesia Type: MAC    Vitals  Vitals Value Taken Time   /92 11/18/24 1330   Temp     Pulse 79 11/18/24 1345   Resp     SpO2 93 % 11/18/24 1345   Vitals shown include unfiled device data.        Post Anesthesia Care and Evaluation    Patient location during evaluation: bedside  Patient participation: complete - patient participated  Level of consciousness: awake and alert  Pain management: adequate    Airway patency: patent  Anesthetic complications: No anesthetic complications  PONV Status: controlled  Cardiovascular status: acceptable and hemodynamically stable  Respiratory status: acceptable, spontaneous ventilation and nonlabored ventilation  Hydration status: acceptable    Comments: /92   Pulse 83   Temp 36.8 °C (98.3 °F) (Oral)   Resp 22 Comment: patient states no shortness of breath  Ht 167.6 cm (66\")   Wt (!) 143 kg (314 lb 8 oz)   SpO2 98%   BMI 50.76 kg/m²       "

## 2024-11-18 NOTE — OP NOTE
Operative Note  Date of Admission:  11/13/2024  OR Date: 11/18/2024    Pre-op Diagnosis:   MEI (acute kidney injury) [N17.9] on chronic kidney disease in need of access for hemodialysis    Post-op Diagnosis:     Same    Procedure:  1) Tunneled dialysis catheter insertion (48729)  2) Ultrasound guided vascular access (30991)  3) Radiologic supervision of catheter tip placement (80468)    Assistant:  NIKI Mitchell CSA    and they provided critical assistance during the case including suctioning, exposure, retraction, and reduction of blood loss.    Anesthesia: General By LMA    Estimated Blood Loss: minimal    Specimens: * No orders in the log *    Staff:   Circulator: Chrissy Robles RN; Kristen Saeed RN  Scrub Person: Dominga Mtz  Assistant: Olga David CSA    Complications: None apparent    Findings:  Tip in SVC/Atrial     Indications:    The patient is an 39 y.o. male referred for tunneled dialysis catheter placement.  The risks, benefits, and alternatives have been discussed with the patient and/or family and include but are not limited to injury to artery, vein, lung, bleeding, and infection.    Procedure:  The patient was taken to the operating room and placed supine on the operating room table. After the induction of IV sedation, the neck and chest were prepped and draped with ChloraPrep. The patient was placed in Trendelenburg position. Timeout was observed. The right  Internal Jugular  was evaluated on ultrasound and found to be easily compressible.  Using duplex ultrasound the vein was accessed percutaneously under direct vision.  A micropuncture sheath was then placed over the guidewire. An angled wire was then advanced down into the superior vena cava under fluoroscopy without any difficulty. Exit site was chosen on the chest. The tract was anesthetized with 1% lidocaine mixed with 0.25% Marcaine. A 23 cm Palindrome catheter was then flushed and brought up onto the field. It was tunneled in  an antegrade fashion up to the IJ insertion site. Dilator and peel-away sheath were then advanced over the wire under fluoroscopy without any significant difficulty. Dilator and wire were removed leaving the peel-away sheath in place. The catheter was then placed into the peel-away sheath and the peel-away sheath was removed. Under fluoroscopy, the distal tip of the catheter was positioned into the superior vena cava near the right atrium. There was no kinking at the catheter insertion site. The catheter flushed and aspirated easily. The lung fields were examined. There was no evidence of hemothorax or pneumothorax. The catheter flushed and aspirated quite easily.  Each lumen was flushed with heparinized saline solution then 2000 units of heparin instilled into each lumen. The catheter was then secured to the skin at the exit site with 3-0 Vicryl suture and the hub of the catheter was secured to the chest with 3-0 nylon sutures.  The neck incision was closed with 3-0 Vicryl suture in a subcuticular fashion and dermal glue were used to close the neck site as well. Sterile dressings were applied. The patient tolerated the procedure well. There were no immediately apparent complications.           Active Hospital Problems    Diagnosis  POA    **Acute exacerbation of CHF (congestive heart failure) [I50.9]  Yes    Chronic HFrEF (heart failure with reduced ejection fraction) [I50.22]  Yes    S/P CABG x 3 [Z95.1]  Not Applicable    Acute on chronic combined systolic and diastolic CHF (congestive heart failure) [I50.43]  Yes    Coronary artery disease [I25.10]  Yes    Hypertension [I10]  Yes    Type 2 diabetes mellitus, with long-term current use of insulin [E11.9, Z79.4]  Not Applicable    MEI (acute kidney injury) [N17.9]  Unknown      Resolved Hospital Problems   No resolved problems to display.      Galilea Bearden Jr., MD     Date: 11/18/2024  Time: 12:13 EST

## 2024-11-18 NOTE — ANESTHESIA PREPROCEDURE EVALUATION
Anesthesia Evaluation     Patient summary reviewed   no history of anesthetic complications:   NPO Solid Status: > 8 hours  NPO Liquid Status: > 2 hours           Airway   Mallampati: II  TM distance: >3 FB  Neck ROM: full  Dental      Pulmonary     breath sounds clear to auscultation  (+) ,shortness of breath  (-) recent URI, not a smoker  Cardiovascular     ECG reviewed  Rhythm: regular  Rate: normal    (+) hypertension, past MI (2016)  >12 months, CAD, CABG (prvs 3v) >6 Months, cardiac stents (12/2021) Drug eluting stent more than 12 months ago , CHF (HFrEF secondary to ischemic cardiomyopathy, Acute on chronic CHF, current EF 30%) Diastolic >=55% and Systolic <55%, orthopnea, REDDY, hyperlipidemia  (-) dysrhythmias, angina    ROS comment: 11/15/2024 ECHO - Interpretation Summary  ·  Left ventricular systolic function is moderately decreased. Estimated left ventricular EF = 30%  ·  The left ventricular cavity is moderate to severely dilated.  ·  Left ventricular wall thickness is consistent with moderate eccentric hypertrophy.  ·  The following left ventricular wall segments are hypokinetic: mid anterior, apical anterior, apical lateral, basal inferolateral, mid inferolateral, apical inferior, mid inferior, apical septal, basal inferoseptal, mid inferoseptal, apex hypokinetic, mid anteroseptal, basal anterior, basal inferior and basal inferoseptal. The following left ventricular wall segments are akinetic: basal anterolateral and mid anterolateral.  ·  Left ventricular diastolic function was indeterminate.  ·  Estimated right ventricular systolic pressure from tricuspid regurgitation is normal (<35 mmHg).      Neuro/Psych  (-) seizures, CVA  GI/Hepatic/Renal/Endo    (+) morbid obesity, renal disease (Cr 7.12, K+ 4.0)- CRI, ARF and ESRD, diabetes mellitus (A1c 12+) type 2 poorly controlled    Musculoskeletal     (-) neck stiffness  Abdominal    Substance History      OB/GYN          Other   blood dyscrasia (Hb  10.3) anemia,                       Anesthesia Plan    ASA 4     MAC     (MAC anesthesia discussed with patient and/or patient representative. Risks (including but not limited to intra-op awareness), benefits, and alternatives were discussed. Understanding was voiced with an agreement to proceed with a MAC technique and General as a backup option. )    Anesthetic plan, risks, benefits, and alternatives have been provided, discussed and informed consent has been obtained with: patient.    Use of blood products discussed with patient .        CODE STATUS:    Code Status (Patient has no pulse and is not breathing): CPR (Attempt to Resuscitate)  Medical Interventions (Patient has pulse or is breathing): Full Support

## 2024-11-18 NOTE — PLAN OF CARE
Goal Outcome Evaluation:  Plan of Care Reviewed With: patient   Patient was off the unit for majority of shift for dialysis cath placement, then went directly to dialysis. 2L of fluid was removed. Patient returned to the unit alert and oriented. VSS. 3L NC. No C/o pain. Safety maintained. Family to remain at bedside.

## 2024-11-19 LAB
ALBUMIN SERPL-MCNC: 3.2 G/DL (ref 3.5–5.2)
ANION GAP SERPL CALCULATED.3IONS-SCNC: 14 MMOL/L (ref 5–15)
BASOPHILS # BLD AUTO: 0.01 10*3/MM3 (ref 0–0.2)
BASOPHILS NFR BLD AUTO: 0.1 % (ref 0–1.5)
BUN SERPL-MCNC: 84 MG/DL (ref 6–20)
BUN/CREAT SERPL: 13.8 (ref 7–25)
CALCIUM SPEC-SCNC: 7.8 MG/DL (ref 8.6–10.5)
CHLORIDE SERPL-SCNC: 97 MMOL/L (ref 98–107)
CO2 SERPL-SCNC: 23 MMOL/L (ref 22–29)
CREAT SERPL-MCNC: 6.08 MG/DL (ref 0.76–1.27)
DEPRECATED RDW RBC AUTO: 44.5 FL (ref 37–54)
EGFRCR SERPLBLD CKD-EPI 2021: 11.3 ML/MIN/1.73
EOSINOPHIL # BLD AUTO: 0.1 10*3/MM3 (ref 0–0.4)
EOSINOPHIL NFR BLD AUTO: 1.3 % (ref 0.3–6.2)
ERYTHROCYTE [DISTWIDTH] IN BLOOD BY AUTOMATED COUNT: 14.3 % (ref 12.3–15.4)
GLUCOSE BLDC GLUCOMTR-MCNC: 105 MG/DL (ref 70–130)
GLUCOSE BLDC GLUCOMTR-MCNC: 85 MG/DL (ref 70–130)
GLUCOSE BLDC GLUCOMTR-MCNC: 92 MG/DL (ref 70–130)
GLUCOSE BLDC GLUCOMTR-MCNC: 95 MG/DL (ref 70–130)
GLUCOSE SERPL-MCNC: 108 MG/DL (ref 65–99)
HCT VFR BLD AUTO: 31.9 % (ref 37.5–51)
HGB BLD-MCNC: 10.4 G/DL (ref 13–17.7)
IMM GRANULOCYTES # BLD AUTO: 0.04 10*3/MM3 (ref 0–0.05)
IMM GRANULOCYTES NFR BLD AUTO: 0.5 % (ref 0–0.5)
LYMPHOCYTES # BLD AUTO: 0.62 10*3/MM3 (ref 0.7–3.1)
LYMPHOCYTES NFR BLD AUTO: 8 % (ref 19.6–45.3)
MAGNESIUM SERPL-MCNC: 2.3 MG/DL (ref 1.6–2.6)
MCH RBC QN AUTO: 28 PG (ref 26.6–33)
MCHC RBC AUTO-ENTMCNC: 32.6 G/DL (ref 31.5–35.7)
MCV RBC AUTO: 86 FL (ref 79–97)
MONOCYTES # BLD AUTO: 0.71 10*3/MM3 (ref 0.1–0.9)
MONOCYTES NFR BLD AUTO: 9.2 % (ref 5–12)
NEUTROPHILS NFR BLD AUTO: 6.27 10*3/MM3 (ref 1.7–7)
NEUTROPHILS NFR BLD AUTO: 80.9 % (ref 42.7–76)
NRBC BLD AUTO-RTO: 0 /100 WBC (ref 0–0.2)
PHOSPHATE SERPL-MCNC: 6.2 MG/DL (ref 2.5–4.5)
PLATELET # BLD AUTO: 156 10*3/MM3 (ref 140–450)
PMV BLD AUTO: 11.8 FL (ref 6–12)
POTASSIUM SERPL-SCNC: 3.9 MMOL/L (ref 3.5–5.2)
RBC # BLD AUTO: 3.71 10*6/MM3 (ref 4.14–5.8)
SODIUM SERPL-SCNC: 134 MMOL/L (ref 136–145)
WBC NRBC COR # BLD AUTO: 7.75 10*3/MM3 (ref 3.4–10.8)

## 2024-11-19 PROCEDURE — 63710000001 INSULIN LISPRO (HUMAN) PER 5 UNITS: Performed by: SURGERY

## 2024-11-19 PROCEDURE — 63710000001 INSULIN GLARGINE PER 5 UNITS: Performed by: SURGERY

## 2024-11-19 PROCEDURE — 82948 REAGENT STRIP/BLOOD GLUCOSE: CPT

## 2024-11-19 PROCEDURE — 85025 COMPLETE CBC W/AUTO DIFF WBC: CPT | Performed by: SURGERY

## 2024-11-19 PROCEDURE — 83735 ASSAY OF MAGNESIUM: CPT | Performed by: SURGERY

## 2024-11-19 PROCEDURE — 80069 RENAL FUNCTION PANEL: CPT | Performed by: SURGERY

## 2024-11-19 RX ADMIN — CARVEDILOL 25 MG: 25 TABLET, FILM COATED ORAL at 17:20

## 2024-11-19 RX ADMIN — HYDRALAZINE HYDROCHLORIDE 25 MG: 25 TABLET ORAL at 21:35

## 2024-11-19 RX ADMIN — SEVELAMER CARBONATE 2400 MG: 800 TABLET, FILM COATED ORAL at 17:20

## 2024-11-19 RX ADMIN — INSULIN LISPRO 3 UNITS: 100 INJECTION, SOLUTION INTRAVENOUS; SUBCUTANEOUS at 08:34

## 2024-11-19 RX ADMIN — ISOSORBIDE DINITRATE 40 MG: 20 TABLET ORAL at 12:50

## 2024-11-19 RX ADMIN — Medication 10 ML: at 08:39

## 2024-11-19 RX ADMIN — SENNOSIDES AND DOCUSATE SODIUM 2 TABLET: 50; 8.6 TABLET ORAL at 08:33

## 2024-11-19 RX ADMIN — SEVELAMER CARBONATE 2400 MG: 800 TABLET, FILM COATED ORAL at 08:33

## 2024-11-19 RX ADMIN — SEVELAMER CARBONATE 2400 MG: 800 TABLET, FILM COATED ORAL at 12:50

## 2024-11-19 RX ADMIN — ISOSORBIDE DINITRATE 40 MG: 20 TABLET ORAL at 08:33

## 2024-11-19 RX ADMIN — Medication 10 ML: at 21:35

## 2024-11-19 RX ADMIN — CARVEDILOL 25 MG: 25 TABLET, FILM COATED ORAL at 08:33

## 2024-11-19 RX ADMIN — INSULIN GLARGINE 10 UNITS: 100 INJECTION, SOLUTION SUBCUTANEOUS at 08:34

## 2024-11-19 RX ADMIN — HYDRALAZINE HYDROCHLORIDE 25 MG: 25 TABLET ORAL at 05:18

## 2024-11-19 RX ADMIN — HYDROCODONE BITARTRATE AND ACETAMINOPHEN 1 TABLET: 5; 325 TABLET ORAL at 08:38

## 2024-11-19 RX ADMIN — ISOSORBIDE DINITRATE 40 MG: 20 TABLET ORAL at 17:20

## 2024-11-19 RX ADMIN — HYDRALAZINE HYDROCHLORIDE 25 MG: 25 TABLET ORAL at 12:50

## 2024-11-19 RX ADMIN — SENNOSIDES AND DOCUSATE SODIUM 2 TABLET: 50; 8.6 TABLET ORAL at 21:35

## 2024-11-19 RX ADMIN — INSULIN LISPRO 3 UNITS: 100 INJECTION, SOLUTION INTRAVENOUS; SUBCUTANEOUS at 12:50

## 2024-11-19 RX ADMIN — ASPIRIN 81 MG: 81 TABLET, COATED ORAL at 08:33

## 2024-11-19 NOTE — PLAN OF CARE
Goal Outcome Evaluation:           Progress: no change  Outcome Evaluation: pt more awake during this shift. maintained on 2L. c/o some pain this morning, see mar, expressed relief. pt states he feels better this shift. pt expresses no other needs at this time. call light within reach.

## 2024-11-19 NOTE — CONSULTS
Nutrition Services    Patient Name:  Rufus Dorsey  YOB: 1985  MRN: [id number]  Admit Date:  11/13/2024    Education completed 11/15 - see RD note    Electronically signed by:  Ward Min RD  11/19/24 15:12 EST

## 2024-11-19 NOTE — CONSULTS
"Diabetes Education  Assessment/Teaching    Patient Name:  Rufus Dorsey  YOB: 1985  MRN: [id number]  Admit Date:  11/13/2024      Assessment Date:  11/19/2024  Flowsheet Row Most Recent Value   General Information     Height 167.6 cm (66\")   Height Method Stated   Weight 140 kg (308 lb 3.3 oz)   Weight Method Standing scale   Diabetes History    What type of diabetes do you have? Type 2   Length of Diabetes Diagnosis --  [Per previous DM notes from 2021, pt had DM 6-10 years then.  Pt states he was never told he had DM but was only being treated for steroid induced.]   Current DM knowledge fair   Do you test your blood sugar at home? --  [Pt states he tested in the past]   Education Preferences    Barriers to Learning other (comment)  [Pt is Amharic speaking.   iPad utilized.]   Assessment Topics    Taking Medication - Assessment Needs education   Reducing Risk - Assessment Needs education   DM Goals    Taking Medication - Goal 0-7 days from discharge   Reducing Risk - Goal 30-90 days from discharge   Monitoring - Goal 0-7 days from discharge   Contact Plan Follow-up medical care       Flowsheet Row Most Recent Value   DM Education Needs    Reducing Risks A1C testing  [A1c 12.3%  Discussed complications of uncontrolled DM.]   Healthy Eating RD consult   Healthy Coping Appropriate   Discharge Plan Home   Motivation Engaged   Teaching Method Discussion, Explanation, Handouts   Patient Response Needs reinforcement     Other Comments:      Pt states he was never told he had DM but hyperglycemia secondary to steroids.  Pt was seen by this department in August 2021.  Please see previous notes.  At that discharge, pt was discharged home on his home medication Basaglar 20 units at .  Pt states he was taking Basaglar but stopped for unknown reasons.     Discussed pt's elevated A1c and need for insulin.  Pt states he was using an insulin pen previously.  Discussed discharge order TBD with pt's " lack of health insurance.  Per [facility] pharmacist, pt could get a box each of Tresiba/Novolog for free to start with then each box would be $35 subsequently.  Discussed cost options of glucometers and OTC [employer] glucometer supplies brochure provided to pt.      Above discussed with RN.      Electronically signed by:  Edda Fernandez RN, [facility]  11/19/24 10:33 EST

## 2024-11-19 NOTE — PLAN OF CARE
Goal Outcome Evaluation:              Outcome Evaluation: VSS. O2 SAT  STABLE ON 2 L/M N/C. RESTED WELL OVERNIGHT WITHOUT C/O'S IN BETWEEN CARE.

## 2024-11-19 NOTE — PROGRESS NOTES
[facility] Progress Note      Patient Name: Rufus Dorsey  : 1985  MRN: [id number]  Primary Care Physician:  Provider, No Known  Date of admission: 2024    Subjective     Interval History:   Acute kidney injury on chronic kidney disease    The patient had tunneled dialysis catheter yesterday and dialysis was initiated 2 L were removed the patient is feeling somewhat better, continue to have significant edema no significant shortness of breath, no orthopnea or PND, no nausea or vomiting.    Review of Systems:   As noted above      Objective     Vitals:   Temp:  [97.5 °F (36.4 °C)-98.1 °F (36.7 °C)] 98 °F (36.7 °C)  Heart Rate:  [75-85] 75  Resp:  [18-20] 18  BP: ()/(45-92) 139/75  Flow (L/min) (Oxygen Therapy):  [2] 2    Intake/Output Summary (Last 24 hours) at 2024 1057  Last data filed at 2024 1722  Gross per 24 hour   Intake --   Output 2000 ml   Net -2000 ml       Physical Exam:    General: Awake, alert, chronically ill, morbidly obese, no acute distress  Neck: Difficult to assess because of body habitus, tunneled dialysis catheter to the right IJ  Lungs: Bilateral rhonchi and crackles, breathing effort not  Heart: RRR, no rub  Abdomen: soft, nontender, distended, abdominal wall edema  : Difficult to assess for palpable bladder because of body habitus  Extremities: 3+ BLE pedal/ankle edema  Neurologic: No asterixis    Scheduled Meds:     aspirin, 81 mg, Oral, Daily  carvedilol, 25 mg, Oral, BID With Meals  hydrALAZINE, 25 mg, Oral, Q8H  insulin glargine, 10 Units, Subcutaneous, Daily  insulin lispro, 2-7 Units, Subcutaneous, 4x Daily AC & at Bedtime  insulin lispro, 3 Units, Subcutaneous, TID With Meals  isosorbide dinitrate, 40 mg, Oral, TID - Nitrates  senna-docusate sodium, 2 tablet, Oral, BID  sevelamer, 2,400 mg, Oral, TID With Meals  sodium chloride, 10 mL, Intravenous, Q12H  vitamin D, 50,000 Units, Oral, Q7 Days      IV Meds:           Results Reviewed:   I have personally reviewed the results from the time of this admission to 11/19/2024 10:57 EST     Results from last 7 days   Lab Units 11/19/24 0313 11/18/24 0404 11/17/24  0306 11/14/24  0507 11/13/24  1925   SODIUM mmol/L 134* 134* 132*   < > 139   POTASSIUM mmol/L 3.9 4.0 4.1   < > 3.9   CHLORIDE mmol/L 97* 97* 95*   < > 99   CO2 mmol/L 23.0 22.0 23.2   < > 27.7   BUN mg/dL 84* 107* 100*   < > 76*   CREATININE mg/dL 6.08* 7.12* 6.70*   < > 4.21*   CALCIUM mg/dL 7.8* 7.9* 7.8*   < > 8.0*   BILIRUBIN mg/dL  --   --   --   --  0.4   ALK PHOS U/L  --   --   --   --  121*   ALT (SGPT) U/L  --   --   --   --  15   AST (SGOT) U/L  --   --   --   --  13   GLUCOSE mg/dL 108* 136* 209*   < > 275*    < > = values in this interval not displayed.     Estimated Creatinine Clearance: 21.8 mL/min (A) (by C-G formula based on SCr of 6.08 mg/dL (H)).  Results from last 7 days   Lab Units 11/19/24 0313 11/18/24 0404 11/17/24  0306   MAGNESIUM mg/dL 2.3  --   --    PHOSPHORUS mg/dL 6.2* 7.4* 7.2*         Results from last 7 days   Lab Units 11/19/24 0313 11/18/24 0404 11/17/24  0306 11/16/24  0400 11/15/24  0327   WBC 10*3/mm3 7.75 8.38 7.26 7.19 9.55   HEMOGLOBIN g/dL 10.4* 10.3* 10.3* 10.7* 11.2*   PLATELETS 10*3/mm3 156 161 154 160 169           Assessment / Plan     ASSESSMENT:  Acute kidney injury chronic kidney disease stage V, secondary to diabetic nephropathy, now with nephrotic proteinuria, 5.2 g/g.  Huge volume excess; refractory to diuretics.  Dialysis was initiated, use electrolyte otherwise within acceptable range.  Hypervolemic hyponatremia.  Sodium is 134   Hypertension, with systolic component related to volume excess  Diabetes mellitus type 2 with chronic kidney disease poorly controlled followed by the primary team  Anemia of CKD, hemoglobin 10.4, will monitor  Hypocalcemia, which corrects for low albumin  Hyperphosphatemia associate with chronic kidney disease phosphorus down to  6.2 with the addition of phosphate binder  HFrEF, echo noted, EF 30% (vs 20% in 2022).  GDMT measures ltd by advanced CKD, though could start ACE/ARB/entresto when start HD    PLAN:  1.  Hemodialysis again tomorrow  2.  Continue fluid restriction fluid restriction, 1500 mL/day  3.  Surveillance labs  4.  Waiting for outpatient dialysis arranged    I reviewed the chart and other providers notes, reviewed labs.  I discussed the case with the patient.  Copied text in this note has been reviewed and is accurate as of 11/19/24.     Navi Hill MD  11/19/24  10:57 EST    [facility]  [phone]

## 2024-11-19 NOTE — PROGRESS NOTES
Patient 1 day status post placement of a right internal jugular tunneled dialysis catheter.  Had dialysis yesterday following catheter placement which went uneventfully.  2 L of fluid removed.    Catheter sites without signs of infection or hematoma.    Doing satisfactorily following catheter placement.  Will see again as needed.

## 2024-11-19 NOTE — PROGRESS NOTES
Name: Rufus Dorsey ADMIT: 2024   : 1985  PCP: Provider, No Known    MRN: [id number] LOS: 6 days   AGE/SEX: 39 y.o. male  ROOM: [facility]     Subjective   Subjective   Patient sitting up on edge of bed.  Mental status much improved today.  No further drowsiness.  No nausea or vomiting.  Tolerated hemodialysis well yesterday.  2 L off.         Objective   Objective   Vital Signs  Temp:  [97.5 °F (36.4 °C)-98.1 °F (36.7 °C)] 98 °F (36.7 °C)  Heart Rate:  [75-85] 75  Resp:  [18-20] 18  BP: ()/(69-92) 139/75  SpO2:  [93 %-100 %] 93 %  on  Flow (L/min) (Oxygen Therapy):  [2] 2;   Device (Oxygen Therapy): nasal cannula  Body mass index is 49.75 kg/m².  Physical Exam  Vitals and nursing note reviewed.   Constitutional:       General: He is not in acute distress.  Cardiovascular:      Rate and Rhythm: Normal rate and regular rhythm.   Pulmonary:      Effort: Pulmonary effort is normal. No respiratory distress.      Comments: Bilateral crackles  Abdominal:      General: Abdomen is flat. There is no distension.      Tenderness: There is no abdominal tenderness.   Musculoskeletal:         General: No swelling or deformity.      Right lower leg: Edema present.      Left lower leg: Edema present.      Comments: 3+ pitting edema bilaterally   Skin:     General: Skin is warm and dry.   Neurological:      General: No focal deficit present.      Mental Status: He is alert. Mental status is at baseline.         Results Review     I reviewed the patient's new clinical results.  Results from last 7 days   Lab Units 24  0313 11/18/24  0404 24  0306 24  0400   WBC 10*3/mm3 7.75 8.38 7.26 7.19   HEMOGLOBIN g/dL 10.4* 10.3* 10.3* 10.7*   PLATELETS 10*3/mm3 156 161 154 160     Results from last 7 days   Lab Units 24  0313 24  0404 24  0306 24  0400   SODIUM mmol/L 134* 134* 132* 133*   POTASSIUM mmol/L 3.9 4.0 4.1 4.0   CHLORIDE mmol/L 97* 97* 95* 99   CO2 mmol/L 23.0 22.0 23.2  21.8*   BUN mg/dL 84* 107* 100* 96*   CREATININE mg/dL 6.08* 7.12* 6.70* 5.15*   GLUCOSE mg/dL 108* 136* 209* 297*   Estimated Creatinine Clearance: 21.8 mL/min (A) (by C-G formula based on SCr of 6.08 mg/dL (H)).  Results from last 7 days   Lab Units 11/19/24 0313 11/18/24  0404 11/17/24  0306 11/16/24  0400 11/15/24  0327 11/13/24  1925   ALBUMIN g/dL 3.2* 3.3* 3.4* 2.9*   < > 3.3*   BILIRUBIN mg/dL  --   --   --   --   --  0.4   ALK PHOS U/L  --   --   --   --   --  121*   AST (SGOT) U/L  --   --   --   --   --  13   ALT (SGPT) U/L  --   --   --   --   --  15    < > = values in this interval not displayed.     Results from last 7 days   Lab Units 11/19/24 0313 11/18/24 0404 11/17/24  0306 11/16/24  0400   CALCIUM mg/dL 7.8* 7.9* 7.8* 7.8*   ALBUMIN g/dL 3.2* 3.3* 3.4* 2.9*   MAGNESIUM mg/dL 2.3  --   --   --    PHOSPHORUS mg/dL 6.2* 7.4* 7.2* 6.5*       COVID19   Date Value Ref Range Status   11/13/2024 Not Detected Not Detected - Ref. Range Final   06/04/2022 Detected (C) Not Detected - Ref. Range Final     Glucose   Date/Time Value Ref Range Status   11/19/2024 1129 95 70 - 130 mg/dL Final   11/19/2024 0746 105 70 - 130 mg/dL Final   11/18/2024 2035 101 70 - 130 mg/dL Final   11/18/2024 1328 128 70 - 130 mg/dL Final   11/18/2024 1109 115 70 - 130 mg/dL Final   11/18/2024 0940 115 70 - 130 mg/dL Final   11/18/2024 0755 123 70 - 130 mg/dL Final       XR Chest 1 View  Narrative: XR CHEST 1 VW-     HISTORY: Male who is 39 years-old, decreased breath sounds     TECHNIQUE: Frontal view of the chest     COMPARISON: 11/13/2024     FINDINGS:  The central venous catheter extends to the cavoatrial junction region.  Sternotomy wires are noted. Heart is enlarged. Pulmonary vasculature is  congested. Bilateral pulmonary opacities appear increased, may represent  edema and/or pneumonia and/or ARDS. No large pleural effusion, or  pneumothorax. No acute osseous process.     Impression: Increased bilateral pulmonary opacities  compatible with  interval worsening.     This report was finalized on 11/18/2024 1:38 PM by Dr. Suleiman Anderson M.D on Workstation: [id number]     Arteriogram (Autofinalize)  This procedure was auto-finalized with no dictation required.    I reviewed the patient's daily medications.  Scheduled Medications  aspirin, 81 mg, Oral, Daily  carvedilol, 25 mg, Oral, BID With Meals  hydrALAZINE, 25 mg, Oral, Q8H  insulin glargine, 10 Units, Subcutaneous, Daily  insulin lispro, 2-7 Units, Subcutaneous, 4x Daily AC & at Bedtime  insulin lispro, 3 Units, Subcutaneous, TID With Meals  isosorbide dinitrate, 40 mg, Oral, TID - Nitrates  senna-docusate sodium, 2 tablet, Oral, BID  sevelamer, 2,400 mg, Oral, TID With Meals  sodium chloride, 10 mL, Intravenous, Q12H  vitamin D, 50,000 Units, Oral, Q7 Days    Infusions     Diet  Diet: Diabetic; Consistent Carbohydrate; Fluid Consistency: Thin (IDDSI 0)         I have personally reviewed:  [x]  Laboratory   []  Microbiology   [x]  Radiology   []  EKG/Telemetry   []  Cardiology/Vascular   []  Pathology   [x]  Records     Assessment/Plan     Active Hospital Problems    Diagnosis  POA    **Acute exacerbation of CHF (congestive heart failure) [I50.9]  Yes    Chronic HFrEF (heart failure with reduced ejection fraction) [I50.22]  Yes    S/P CABG x 3 [Z95.1]  Not Applicable    Acute on chronic combined systolic and diastolic CHF (congestive heart failure) [I50.43]  Yes    Coronary artery disease [I25.10]  Yes    Hypertension [I10]  Yes    Type 2 diabetes mellitus, with long-term current use of insulin [E11.9, Z79.4]  Not Applicable    MEI (acute kidney injury) [N17.9]  Unknown      Resolved Hospital Problems   No resolved problems to display.       39 y.o. male admitted with Acute exacerbation of CHF (congestive heart failure).    Volume overload  HFrEF EF 20% 2022  CAD CABG x3  Elevated troponin  HTN  MEI/CKD 4   Cardiology have signed off  TDC catheter placed on 11/18.  HD on 11/18 with 2  L off.  Renal plans for additional HD tomorrow  ASA, coreg, hydralazine, ISDN  Follow-up echocardiogram above EF 30%  Monitor I/Os and daily weights     DM2   uncontrolled. A1c 12.30%.   Currently on correctional insulin.  Continue long-acting and mealtime insulin.  Diabetes educator-prior to discharge on Tresiba/NovoLog as this will be the most economical option with lack of insurance     Obesity Body mass index is 50.26 kg/m².     SCDs for DVT prophylaxis.  Full code.  Discussed with patient and nursing staff.  Anticipate discharge home later this week.    Expected Discharge Date: 11/21/2024; Expected Discharge Time:       John Gipson MD  [facility]  11/19/24  13:02 EST

## 2024-11-20 LAB
ALBUMIN SERPL-MCNC: 3.3 G/DL (ref 3.5–5.2)
ANION GAP SERPL CALCULATED.3IONS-SCNC: 10 MMOL/L (ref 5–15)
BASOPHILS # BLD AUTO: 0.02 10*3/MM3 (ref 0–0.2)
BASOPHILS NFR BLD AUTO: 0.3 % (ref 0–1.5)
BUN SERPL-MCNC: 56 MG/DL (ref 6–20)
BUN/CREAT SERPL: 11.8 (ref 7–25)
CALCIUM SPEC-SCNC: 8 MG/DL (ref 8.6–10.5)
CHLORIDE SERPL-SCNC: 102 MMOL/L (ref 98–107)
CO2 SERPL-SCNC: 25 MMOL/L (ref 22–29)
CREAT SERPL-MCNC: 4.75 MG/DL (ref 0.76–1.27)
DEPRECATED RDW RBC AUTO: 43.6 FL (ref 37–54)
EGFRCR SERPLBLD CKD-EPI 2021: 15.1 ML/MIN/1.73
EOSINOPHIL # BLD AUTO: 0.16 10*3/MM3 (ref 0–0.4)
EOSINOPHIL NFR BLD AUTO: 2.1 % (ref 0.3–6.2)
ERYTHROCYTE [DISTWIDTH] IN BLOOD BY AUTOMATED COUNT: 14.3 % (ref 12.3–15.4)
GLUCOSE BLDC GLUCOMTR-MCNC: 101 MG/DL (ref 70–130)
GLUCOSE BLDC GLUCOMTR-MCNC: 108 MG/DL (ref 70–130)
GLUCOSE BLDC GLUCOMTR-MCNC: 78 MG/DL (ref 70–130)
GLUCOSE BLDC GLUCOMTR-MCNC: 80 MG/DL (ref 70–130)
GLUCOSE BLDC GLUCOMTR-MCNC: 81 MG/DL (ref 70–130)
GLUCOSE SERPL-MCNC: 80 MG/DL (ref 65–99)
HCT VFR BLD AUTO: 32.9 % (ref 37.5–51)
HGB BLD-MCNC: 10.5 G/DL (ref 13–17.7)
IMM GRANULOCYTES # BLD AUTO: 0.04 10*3/MM3 (ref 0–0.05)
IMM GRANULOCYTES NFR BLD AUTO: 0.5 % (ref 0–0.5)
LYMPHOCYTES # BLD AUTO: 0.66 10*3/MM3 (ref 0.7–3.1)
LYMPHOCYTES NFR BLD AUTO: 8.5 % (ref 19.6–45.3)
MCH RBC QN AUTO: 27.2 PG (ref 26.6–33)
MCHC RBC AUTO-ENTMCNC: 31.9 G/DL (ref 31.5–35.7)
MCV RBC AUTO: 85.2 FL (ref 79–97)
MONOCYTES # BLD AUTO: 0.72 10*3/MM3 (ref 0.1–0.9)
MONOCYTES NFR BLD AUTO: 9.3 % (ref 5–12)
NEUTROPHILS NFR BLD AUTO: 6.13 10*3/MM3 (ref 1.7–7)
NEUTROPHILS NFR BLD AUTO: 79.3 % (ref 42.7–76)
NRBC BLD AUTO-RTO: 0 /100 WBC (ref 0–0.2)
PHOSPHATE SERPL-MCNC: 4.1 MG/DL (ref 2.5–4.5)
PLATELET # BLD AUTO: 154 10*3/MM3 (ref 140–450)
PMV BLD AUTO: 11.4 FL (ref 6–12)
POTASSIUM SERPL-SCNC: 3.6 MMOL/L (ref 3.5–5.2)
RBC # BLD AUTO: 3.86 10*6/MM3 (ref 4.14–5.8)
SODIUM SERPL-SCNC: 137 MMOL/L (ref 136–145)
WBC NRBC COR # BLD AUTO: 7.73 10*3/MM3 (ref 3.4–10.8)

## 2024-11-20 PROCEDURE — 80069 RENAL FUNCTION PANEL: CPT | Performed by: SURGERY

## 2024-11-20 PROCEDURE — 25010000002 ONDANSETRON PER 1 MG: Performed by: SURGERY

## 2024-11-20 PROCEDURE — 82948 REAGENT STRIP/BLOOD GLUCOSE: CPT

## 2024-11-20 PROCEDURE — 85025 COMPLETE CBC W/AUTO DIFF WBC: CPT | Performed by: INTERNAL MEDICINE

## 2024-11-20 RX ADMIN — CARVEDILOL 25 MG: 25 TABLET, FILM COATED ORAL at 17:17

## 2024-11-20 RX ADMIN — HYDRALAZINE HYDROCHLORIDE 25 MG: 25 TABLET ORAL at 21:27

## 2024-11-20 RX ADMIN — ONDANSETRON 4 MG: 2 INJECTION, SOLUTION INTRAMUSCULAR; INTRAVENOUS at 14:20

## 2024-11-20 RX ADMIN — SENNOSIDES AND DOCUSATE SODIUM 2 TABLET: 50; 8.6 TABLET ORAL at 21:27

## 2024-11-20 RX ADMIN — SENNOSIDES AND DOCUSATE SODIUM 2 TABLET: 50; 8.6 TABLET ORAL at 12:32

## 2024-11-20 RX ADMIN — Medication 10 ML: at 21:27

## 2024-11-20 RX ADMIN — SEVELAMER CARBONATE 2400 MG: 800 TABLET, FILM COATED ORAL at 17:17

## 2024-11-20 RX ADMIN — SEVELAMER CARBONATE 2400 MG: 800 TABLET, FILM COATED ORAL at 12:32

## 2024-11-20 RX ADMIN — ASPIRIN 81 MG: 81 TABLET, COATED ORAL at 12:32

## 2024-11-20 RX ADMIN — ISOSORBIDE DINITRATE 40 MG: 20 TABLET ORAL at 17:17

## 2024-11-20 RX ADMIN — HYDRALAZINE HYDROCHLORIDE 25 MG: 25 TABLET ORAL at 05:48

## 2024-11-20 RX ADMIN — ISOSORBIDE DINITRATE 40 MG: 20 TABLET ORAL at 12:32

## 2024-11-20 RX ADMIN — CARVEDILOL 25 MG: 25 TABLET, FILM COATED ORAL at 12:32

## 2024-11-20 RX ADMIN — Medication 10 ML: at 12:34

## 2024-11-20 RX ADMIN — HYDRALAZINE HYDROCHLORIDE 25 MG: 25 TABLET ORAL at 12:32

## 2024-11-20 NOTE — PROGRESS NOTES
[facility] Progress Note      Patient Name: Rufus Dorsey  : 1985  MRN: [id number]  Primary Care Physician:  Provider, No Known  Date of admission: 2024    Subjective     Interval History:   Acute kidney injury on chronic kidney disease    Patient is feeling the same, less shortness of breath, no nausea or vomiting.  Unable to arrange for outpatient dialysis since he is undocumented and the hospital team is working on this matter.    Review of Systems:   As noted above      Objective     Vitals:   Temp:  [97.3 °F (36.3 °C)-98.4 °F (36.9 °C)] 97.3 °F (36.3 °C)  Heart Rate:  [73-74] 73  Resp:  [18] 18  BP: (137-168)/(70-84) 144/74  Flow (L/min) (Oxygen Therapy):  [2] 2    Intake/Output Summary (Last 24 hours) at 2024 0833  Last data filed at 2024 2106  Gross per 24 hour   Intake 120 ml   Output --   Net 120 ml       Physical Exam:    General: Awake, alert, chronically ill, morbidly obese, no acute distress  Neck: Difficult to assess because of body habitus, tunneled dialysis catheter to the right IJ  Lungs: Bilateral rhonchi and crackles, breathing effort not  Heart: RRR, no rub  Abdomen: soft, nontender, distended, abdominal wall edema  : Difficult to assess for palpable bladder because of body habitus  Extremities: 3+ BLE pedal/ankle edema  Neurologic: No asterixis    Scheduled Meds:     aspirin, 81 mg, Oral, Daily  carvedilol, 25 mg, Oral, BID With Meals  hydrALAZINE, 25 mg, Oral, Q8H  insulin glargine, 10 Units, Subcutaneous, Daily  insulin lispro, 2-7 Units, Subcutaneous, 4x Daily AC & at Bedtime  insulin lispro, 3 Units, Subcutaneous, TID With Meals  isosorbide dinitrate, 40 mg, Oral, TID - Nitrates  senna-docusate sodium, 2 tablet, Oral, BID  sevelamer, 2,400 mg, Oral, TID With Meals  sodium chloride, 10 mL, Intravenous, Q12H  vitamin D, 50,000 Units, Oral, Q7 Days      IV Meds:          Results Reviewed:   I have personally reviewed the results from  the time of this admission to 11/20/2024 08:33 EST     Results from last 7 days   Lab Units 11/19/24 0313 11/18/24  0404 11/17/24  0306 11/14/24  0507 11/13/24  1925   SODIUM mmol/L 134* 134* 132*   < > 139   POTASSIUM mmol/L 3.9 4.0 4.1   < > 3.9   CHLORIDE mmol/L 97* 97* 95*   < > 99   CO2 mmol/L 23.0 22.0 23.2   < > 27.7   BUN mg/dL 84* 107* 100*   < > 76*   CREATININE mg/dL 6.08* 7.12* 6.70*   < > 4.21*   CALCIUM mg/dL 7.8* 7.9* 7.8*   < > 8.0*   BILIRUBIN mg/dL  --   --   --   --  0.4   ALK PHOS U/L  --   --   --   --  121*   ALT (SGPT) U/L  --   --   --   --  15   AST (SGOT) U/L  --   --   --   --  13   GLUCOSE mg/dL 108* 136* 209*   < > 275*    < > = values in this interval not displayed.     Estimated Creatinine Clearance: 21.8 mL/min (A) (by C-G formula based on SCr of 6.08 mg/dL (H)).  Results from last 7 days   Lab Units 11/19/24 0313 11/18/24 0404 11/17/24  0306   MAGNESIUM mg/dL 2.3  --   --    PHOSPHORUS mg/dL 6.2* 7.4* 7.2*         Results from last 7 days   Lab Units 11/19/24 0313 11/18/24  0404 11/17/24  0306 11/16/24  0400 11/15/24  0327   WBC 10*3/mm3 7.75 8.38 7.26 7.19 9.55   HEMOGLOBIN g/dL 10.4* 10.3* 10.3* 10.7* 11.2*   PLATELETS 10*3/mm3 156 161 154 160 169           Assessment / Plan     ASSESSMENT:  Acute kidney injury chronic kidney disease stage V, secondary to diabetic nephropathy, now with nephrotic proteinuria, 5.2 g/g.  Huge volume excess; refractory to diuretics.  His electrolytes still pending, plan for dialysis today  Hypervolemic hyponatremia.  Sodium yesterday was 130  Hypertension, with systolic component related to volume excess  Diabetes mellitus type 2 with chronic kidney disease poorly controlled followed by the primary team  Anemia of CKD, hemoglobin yesterday was 10.4, will monitor  Hypocalcemia, which corrects for low albumin  Hyperphosphatemia associate with chronic kidney disease phosphorus down to 6.2 with the addition of phosphate binder  HFrEF, echo noted, EF  30% (vs 20% in 2022).  GDMT measures ltd by advanced CKD, though could start ACE/ARB/entresto when start HD    PLAN:  1.  Hemodialysis today  2.  Continue fluid restriction fluid restriction, 1500 mL/day  3.  Surveillance labs  4.  Waiting for outpatient dialysis arranged    I reviewed the chart and other providers notes, reviewed labs.  I discussed the case with the patient.  Copied text in this note has been reviewed and is accurate as of 11/20/24.     Navi Hill MD  11/20/24  08:33 EST    [facility]  [phone]

## 2024-11-20 NOTE — CASE MANAGEMENT/SOCIAL WORK
"Continued Stay Note  [facility]     Patient Name: Rufus Dorsey  MRN: [id number]  Today's Date: 11/20/2024    Admit Date: 11/13/2024    Plan: Home, brother to transport   Discharge Plan       Row Name 11/20/24 1312       Plan    Plan Home, brother to transport    Plan Comments Long discussion with pt re dialysis and no insurance, using [employer], ID # [id number]. Informed pt that [facility] has been working diligently attempting to find a dialysis center that will accept a private pay contract. CCP has spoken to [facility], [facility], [facility] and [facility] dialysis, all of which no longer accept private pay contracts. Informed pt that he may be eligible for emergency medicaid, but that is would only be for this current hospitalization, and not long term. Explained that since he does not have a social security number, he is not eligible for any type of government assistance. When CCP asked pt if he would be willing to work toward citizenship and obtaining his social security number, he stated \"no\". Informed pt that he could return to his country and attempt to receive dialysis there. Pt was reluctant to do so. Informed pt that he could attempt to seek out a private insurance company and pay for that, he stated that he would have his sister look into that option. Pt asked CCP if he went to [place], would it be the same situation. CCP explained that it would be unwise for me to give an answer to that question, as CCP would have no experience with the state laws of [place], or local governmental agencies. CCP explained that the only viable option at this time is when he is discharged, and if dialysis is required, he would have to go to the local emergeny room and state that it is time for his dialysis. Pt was tearful, but verbalized understanding, stating that he was not willing to obtain a social security number, and he was not sure if he wanted to return to his country. CCP offered as much support as possible, " and stated that would continue to pursue discharge needs.                   Discharge Codes    No documentation.                 Expected Discharge Date and Time       Expected Discharge Date Expected Discharge Time    Nov 25, 2024               Mónica Rodriguez RN

## 2024-11-20 NOTE — PROGRESS NOTES
Name: Rufus Dorsey ADMIT: 2024   : 1985  PCP: Provider, No Known    MRN: [id number] LOS: 7 days   AGE/SEX: 39 y.o. male  ROOM: [facility]     Subjective   Subjective   Patient seen on hemodialysis.  Doing well.  Denies any complaints.         Objective   Objective   Vital Signs  Temp:  [97.2 °F (36.2 °C)-98.4 °F (36.9 °C)] 97.2 °F (36.2 °C)  Heart Rate:  [70-74] 70  Resp:  [18] 18  BP: (133-168)/(60-84) 135/69  SpO2:  [93 %-99 %] 93 %  on  Flow (L/min) (Oxygen Therapy):  [2] 2;   Device (Oxygen Therapy): nasal cannula  Body mass index is 49.64 kg/m².  Physical Exam  Vitals and nursing note reviewed.   Constitutional:       General: He is not in acute distress.  Cardiovascular:      Rate and Rhythm: Normal rate and regular rhythm.   Pulmonary:      Effort: Pulmonary effort is normal. No respiratory distress.      Comments: Bilateral crackles  Abdominal:      General: Abdomen is flat. There is no distension.      Tenderness: There is no abdominal tenderness.   Musculoskeletal:         General: No swelling or deformity.      Right lower leg: Edema present.      Left lower leg: Edema present.      Comments: 3+ pitting edema bilaterally   Skin:     General: Skin is warm and dry.   Neurological:      General: No focal deficit present.      Mental Status: He is alert. Mental status is at baseline.         Results Review     I reviewed the patient's new clinical results.  Results from last 7 days   Lab Units 24  0313 11/18/24  0404 24  0306 24  0400   WBC 10*3/mm3 7.75 8.38 7.26 7.19   HEMOGLOBIN g/dL 10.4* 10.3* 10.3* 10.7*   PLATELETS 10*3/mm3 156 161 154 160     Results from last 7 days   Lab Units 24  0313 24  0404 24  0306 24  0400   SODIUM mmol/L 134* 134* 132* 133*   POTASSIUM mmol/L 3.9 4.0 4.1 4.0   CHLORIDE mmol/L 97* 97* 95* 99   CO2 mmol/L 23.0 22.0 23.2 21.8*   BUN mg/dL 84* 107* 100* 96*   CREATININE mg/dL 6.08* 7.12* 6.70* 5.15*   GLUCOSE mg/dL 108*  136* 209* 297*   Estimated Creatinine Clearance: 21.8 mL/min (A) (by C-G formula based on SCr of 6.08 mg/dL (H)).  Results from last 7 days   Lab Units 11/19/24 0313 11/18/24 0404 11/17/24  0306 11/16/24  0400 11/15/24  0327 11/13/24  1925   ALBUMIN g/dL 3.2* 3.3* 3.4* 2.9*   < > 3.3*   BILIRUBIN mg/dL  --   --   --   --   --  0.4   ALK PHOS U/L  --   --   --   --   --  121*   AST (SGOT) U/L  --   --   --   --   --  13   ALT (SGPT) U/L  --   --   --   --   --  15    < > = values in this interval not displayed.     Results from last 7 days   Lab Units 11/19/24 0313 11/18/24 0404 11/17/24  0306 11/16/24  0400   CALCIUM mg/dL 7.8* 7.9* 7.8* 7.8*   ALBUMIN g/dL 3.2* 3.3* 3.4* 2.9*   MAGNESIUM mg/dL 2.3  --   --   --    PHOSPHORUS mg/dL 6.2* 7.4* 7.2* 6.5*       COVID19   Date Value Ref Range Status   11/13/2024 Not Detected Not Detected - Ref. Range Final   06/04/2022 Detected (C) Not Detected - Ref. Range Final     Glucose   Date/Time Value Ref Range Status   11/20/2024 1217 81 70 - 130 mg/dL Final   11/19/2024 2035 92 70 - 130 mg/dL Final   11/19/2024 1639 85 70 - 130 mg/dL Final   11/19/2024 1129 95 70 - 130 mg/dL Final   11/19/2024 0746 105 70 - 130 mg/dL Final   11/18/2024 2035 101 70 - 130 mg/dL Final   11/18/2024 1328 128 70 - 130 mg/dL Final       XR Chest 1 View  Narrative: XR CHEST 1 VW-     HISTORY: Male who is 39 years-old, decreased breath sounds     TECHNIQUE: Frontal view of the chest     COMPARISON: 11/13/2024     FINDINGS:  The central venous catheter extends to the cavoatrial junction region.  Sternotomy wires are noted. Heart is enlarged. Pulmonary vasculature is  congested. Bilateral pulmonary opacities appear increased, may represent  edema and/or pneumonia and/or ARDS. No large pleural effusion, or  pneumothorax. No acute osseous process.     Impression: Increased bilateral pulmonary opacities compatible with  interval worsening.     This report was finalized on 11/18/2024 1:38 PM by Dr. Bearden  TOMÁS Anderson M.D on Workstation: [id number]     Arteriogram (Autofinalize)  This procedure was auto-finalized with no dictation required.    I reviewed the patient's daily medications.  Scheduled Medications  aspirin, 81 mg, Oral, Daily  carvedilol, 25 mg, Oral, BID With Meals  hydrALAZINE, 25 mg, Oral, Q8H  insulin glargine, 10 Units, Subcutaneous, Daily  insulin lispro, 2-7 Units, Subcutaneous, 4x Daily AC & at Bedtime  insulin lispro, 3 Units, Subcutaneous, TID With Meals  isosorbide dinitrate, 40 mg, Oral, TID - Nitrates  senna-docusate sodium, 2 tablet, Oral, BID  sevelamer, 2,400 mg, Oral, TID With Meals  sodium chloride, 10 mL, Intravenous, Q12H  vitamin D, 50,000 Units, Oral, Q7 Days    Infusions     Diet  Diet: Diabetic, Fluid Restriction (240 mL/tray); Consistent Carbohydrate; 1500 mL/day; Fluid Consistency: Thin (IDDSI 0)         I have personally reviewed:  [x]  Laboratory   []  Microbiology   [x]  Radiology   []  EKG/Telemetry   []  Cardiology/Vascular   []  Pathology   [x]  Records     Assessment/Plan     Active Hospital Problems    Diagnosis  POA    **Acute exacerbation of CHF (congestive heart failure) [I50.9]  Yes    Chronic HFrEF (heart failure with reduced ejection fraction) [I50.22]  Yes    S/P CABG x 3 [Z95.1]  Not Applicable    Acute on chronic combined systolic and diastolic CHF (congestive heart failure) [I50.43]  Yes    Coronary artery disease [I25.10]  Yes    Hypertension [I10]  Yes    Type 2 diabetes mellitus, with long-term current use of insulin [E11.9, Z79.4]  Not Applicable    MEI (acute kidney injury) [N17.9]  Unknown      Resolved Hospital Problems   No resolved problems to display.       39 y.o. male admitted with Acute exacerbation of CHF (congestive heart failure).    Volume overload  HFrEF EF 20% 2022  CAD CABG x3  Elevated troponin  HTN  MEI/CKD 4   Cardiology have signed off  TDC catheter placed on 11/18.  HD today with 3L off  Wean oxygen as able  ASA, coreg, hydralazine,  ISDN  Follow-up echocardiogram above EF 30%  Monitor I/Os and daily weights     DM2   uncontrolled. A1c 12.30%.   Currently on correctional insulin.  Continue long-acting and mealtime insulin.  Diabetes educator-prior to discharge on Tresiba/NovoLog as this will be the most economical option with lack of insurance     Obesity Body mass index is 50.26 kg/m².     SCDs for DVT prophylaxis.  Full code.  Discussed with patient and nursing staff.  Anticipate discharge home later this week. When on RA and LE swelling improved.    Expected Discharge Date: 11/25/2024; Expected Discharge Time:       John Gipson MD  [facility]  11/20/24  13:05 EST

## 2024-11-20 NOTE — NURSING NOTE
Dialysis complete, removed 3 liter with this treatment and no complications noted.  Pre and post vitals are in epic and dressing to dialysis catheter is current.

## 2024-11-20 NOTE — CASE MANAGEMENT/SOCIAL WORK
"Continued Stay Note  [facility]     Patient Name: Rufus Dorsey  MRN: [id number]  Today's Date: 11/20/2024    Admit Date: 11/13/2024    Plan: Home, brother to transport   Discharge Plan       Row Name 11/20/24 1448       Plan    Plan Comments [employer] to re screen pt on this date, to determine if pt can be approved for any eligibility. If pt does qualify for emergency medicaid, he would be eligible for ~ 2 months of coverage. CCP will continue to follow.      Row Name 11/20/24 1312       Plan    Plan Home, brother to transport    Plan Comments Long discussion with pt re dialysis and no insurance, using [employer], ID # [id number]. Informed pt that [facility] has been working diligently attempting to find a dialysis center that will accept a private pay contract. CCP has spoken to [facility], [facility], [facility] and [facility], all of which no longer accept private pay contracts. Informed pt that he may be eligible for emergency medicaid, but that is would only be for this current hospitalization, and not long term. Explained that since he does not have a social security number, he is not eligible for any type of government assistance. When CCP asked pt if he would be willing to work toward citizenship and obtaining his social security number, he stated \"no\". Informed pt that he could return to his country and attempt to receive dialysis there. Pt was reluctant to do so. Informed pt that he could attempt to seek out a private insurance company and pay for that, he stated that he would have his sister look into that option. Pt asked CCP if he went to [place], would it be the same situation. CCP explained that it would be unwise for me to give an answer to that question, as CCP would have no experience with the state laws of [place], or local governmental agencies. CCP explained that the only viable option at this time is when he is discharged, and if dialysis is required, hw would have to go to the " local emergeny room and state that it is time for his dialysis. Pt was tearful, but verbalized understanding, stating that he was not willing to obtain a social security number, and he was not sure if he wanted to return to his country. CCP offered as much support as possible, and stated that would continue to pursue discharge needs.                   Discharge Codes    No documentation.                 Expected Discharge Date and Time       Expected Discharge Date Expected Discharge Time    Nov 25, 2024               Mónica Rodriguez RN

## 2024-11-20 NOTE — PLAN OF CARE
Goal Outcome Evaluation:           Progress: no change  Outcome Evaluation: pt more awake this shift, went down for dialysis. pt denies any pain. pt found vomiting and dry heaving after lunch. zofran admin, pt states relief. pt blood sugars have been lower this shift, MD notified, scheduled insulin and latus were held d/t lower sugars. pt encourged to eat dinner. pt denies any issues at this time and expresses no other needs. call light within reach.

## 2024-11-20 NOTE — PLAN OF CARE
Goal Outcome Evaluation:              Outcome Evaluation: VSS. RESTED WELL OVERNIGHT WITHOUT C/O'S. FOR DIALYSIS TODAY.

## 2024-11-20 NOTE — PROGRESS NOTES
Nutrition Services    Patient Name:  Rufus Dorsey  YOB: 1985  MRN: [id number]  Admit Date:  11/13/2024  Nutrition Services      PROGRESS NOTE      Encounter Information: Follow up note       PO Diet: Diet: Diabetic, Fluid Restriction (240 mL/tray); Consistent Carbohydrate; 1500 mL/day; Fluid Consistency: Thin (IDDSI 0)   PO Supplements: N/A   PO Intake:  Adequate po intake-100%       Nutrition support orders: --   Nutrition support review: --       Labs (reviewed below): Na 134, BUN 84, cr 6.08, calcium 7.8, glu 108, phos 6.2hgb/Hct 10.4/31.9       GI Function:  Last BM 11/19       Nutrition Intervention: Po intake adequate. Pt continues on 1500ml fluid restriction. Note plans for dialysis today.  RD to follow.       Results from last 7 days   Lab Units 11/19/24  0313 11/18/24  0404 11/17/24  0306 11/14/24  0507 11/13/24  1925   SODIUM mmol/L 134* 134* 132*   < > 139   POTASSIUM mmol/L 3.9 4.0 4.1   < > 3.9   CHLORIDE mmol/L 97* 97* 95*   < > 99   CO2 mmol/L 23.0 22.0 23.2   < > 27.7   BUN mg/dL 84* 107* 100*   < > 76*   CREATININE mg/dL 6.08* 7.12* 6.70*   < > 4.21*   CALCIUM mg/dL 7.8* 7.9* 7.8*   < > 8.0*   BILIRUBIN mg/dL  --   --   --   --  0.4   ALK PHOS U/L  --   --   --   --  121*   ALT (SGPT) U/L  --   --   --   --  15   AST (SGOT) U/L  --   --   --   --  13   GLUCOSE mg/dL 108* 136* 209*   < > 275*    < > = values in this interval not displayed.     Results from last 7 days   Lab Units 11/19/24  0313   MAGNESIUM mg/dL 2.3   PHOSPHORUS mg/dL 6.2*   HEMOGLOBIN g/dL 10.4*   HEMATOCRIT % 31.9*     COVID19   Date Value Ref Range Status   11/13/2024 Not Detected Not Detected - Ref. Range Final     Lab Results   Component Value Date    HGBA1C 12.30 (H) 11/14/2024       RD to follow up per protocol.    Electronically signed by:  Sunita Brown RD  11/20/24 11:14 EST

## 2024-11-20 NOTE — CASE MANAGEMENT/SOCIAL WORK
Continued Stay Note  [facility]     Patient Name: Rufus Dorsey  MRN: [id number]  Today's Date: 11/20/2024    Admit Date: 11/13/2024    Plan: Home, brother to transport   Discharge Plan       Row Name 11/20/24 7165       Plan    Plan Comments Pt's sister arrived, wanted answers re: dialysis. CCP used  computer # [id number], and with pts permission, gave pts sister information that was given to pt re insurance and dialysis. Sister tearful, but verbalized understanding. Informed sister to contact [facility], as they may be able to offer some assistance navigating insurance assistance. Sister stated that she would contact the organization. Phone number given to family.      Row Name 11/20/24 5687       Plan    Plan Comments [employer] to re screen pt on this date, to determine if pt can be approved for any eligibility. If pt does qualify for emergency medicaid, he would be eligible for ~ 2 months of coverage. CCP will continue to follow.                   Discharge Codes    No documentation.                 Expected Discharge Date and Time       Expected Discharge Date Expected Discharge Time    Nov 25, 2024               Mónica Rodriguez RN

## 2024-11-21 LAB
ALBUMIN SERPL-MCNC: 3.3 G/DL (ref 3.5–5.2)
ANION GAP SERPL CALCULATED.3IONS-SCNC: 14 MMOL/L (ref 5–15)
BASOPHILS # BLD AUTO: 0.02 10*3/MM3 (ref 0–0.2)
BASOPHILS NFR BLD AUTO: 0.2 % (ref 0–1.5)
BUN SERPL-MCNC: 61 MG/DL (ref 6–20)
BUN/CREAT SERPL: 11.4 (ref 7–25)
CALCIUM SPEC-SCNC: 8.1 MG/DL (ref 8.6–10.5)
CHLORIDE SERPL-SCNC: 99 MMOL/L (ref 98–107)
CO2 SERPL-SCNC: 23 MMOL/L (ref 22–29)
CREAT SERPL-MCNC: 5.37 MG/DL (ref 0.76–1.27)
DEPRECATED RDW RBC AUTO: 44.5 FL (ref 37–54)
EGFRCR SERPLBLD CKD-EPI 2021: 13.1 ML/MIN/1.73
EOSINOPHIL # BLD AUTO: 0.15 10*3/MM3 (ref 0–0.4)
EOSINOPHIL NFR BLD AUTO: 1.8 % (ref 0.3–6.2)
ERYTHROCYTE [DISTWIDTH] IN BLOOD BY AUTOMATED COUNT: 14.2 % (ref 12.3–15.4)
GLUCOSE BLDC GLUCOMTR-MCNC: 130 MG/DL (ref 70–130)
GLUCOSE BLDC GLUCOMTR-MCNC: 146 MG/DL (ref 70–130)
GLUCOSE BLDC GLUCOMTR-MCNC: 150 MG/DL (ref 70–130)
GLUCOSE BLDC GLUCOMTR-MCNC: 194 MG/DL (ref 70–130)
GLUCOSE SERPL-MCNC: 127 MG/DL (ref 65–99)
HCT VFR BLD AUTO: 35.5 % (ref 37.5–51)
HGB BLD-MCNC: 11.5 G/DL (ref 13–17.7)
IMM GRANULOCYTES # BLD AUTO: 0.03 10*3/MM3 (ref 0–0.05)
IMM GRANULOCYTES NFR BLD AUTO: 0.4 % (ref 0–0.5)
LYMPHOCYTES # BLD AUTO: 0.61 10*3/MM3 (ref 0.7–3.1)
LYMPHOCYTES NFR BLD AUTO: 7.4 % (ref 19.6–45.3)
MCH RBC QN AUTO: 28.1 PG (ref 26.6–33)
MCHC RBC AUTO-ENTMCNC: 32.4 G/DL (ref 31.5–35.7)
MCV RBC AUTO: 86.8 FL (ref 79–97)
MONOCYTES # BLD AUTO: 0.65 10*3/MM3 (ref 0.1–0.9)
MONOCYTES NFR BLD AUTO: 7.9 % (ref 5–12)
NEUTROPHILS NFR BLD AUTO: 6.78 10*3/MM3 (ref 1.7–7)
NEUTROPHILS NFR BLD AUTO: 82.3 % (ref 42.7–76)
NRBC BLD AUTO-RTO: 0 /100 WBC (ref 0–0.2)
PHOSPHATE SERPL-MCNC: 4.9 MG/DL (ref 2.5–4.5)
PLATELET # BLD AUTO: 183 10*3/MM3 (ref 140–450)
PMV BLD AUTO: 12.3 FL (ref 6–12)
POTASSIUM SERPL-SCNC: 4.1 MMOL/L (ref 3.5–5.2)
RBC # BLD AUTO: 4.09 10*6/MM3 (ref 4.14–5.8)
SODIUM SERPL-SCNC: 136 MMOL/L (ref 136–145)
WBC NRBC COR # BLD AUTO: 8.24 10*3/MM3 (ref 3.4–10.8)

## 2024-11-21 PROCEDURE — 63710000001 INSULIN LISPRO (HUMAN) PER 5 UNITS: Performed by: SURGERY

## 2024-11-21 PROCEDURE — 63710000001 INSULIN GLARGINE PER 5 UNITS: Performed by: STUDENT IN AN ORGANIZED HEALTH CARE EDUCATION/TRAINING PROGRAM

## 2024-11-21 PROCEDURE — 82948 REAGENT STRIP/BLOOD GLUCOSE: CPT

## 2024-11-21 PROCEDURE — 80069 RENAL FUNCTION PANEL: CPT | Performed by: SURGERY

## 2024-11-21 PROCEDURE — 85025 COMPLETE CBC W/AUTO DIFF WBC: CPT | Performed by: INTERNAL MEDICINE

## 2024-11-21 RX ORDER — MANNITOL 250 MG/ML
12.5 INJECTION, SOLUTION INTRAVENOUS AS NEEDED
Status: CANCELLED | OUTPATIENT
Start: 2024-11-22 | End: 2024-11-22

## 2024-11-21 RX ADMIN — ASPIRIN 81 MG: 81 TABLET, COATED ORAL at 09:45

## 2024-11-21 RX ADMIN — CARVEDILOL 25 MG: 25 TABLET, FILM COATED ORAL at 09:46

## 2024-11-21 RX ADMIN — SENNOSIDES AND DOCUSATE SODIUM 2 TABLET: 50; 8.6 TABLET ORAL at 20:54

## 2024-11-21 RX ADMIN — Medication 10 ML: at 20:55

## 2024-11-21 RX ADMIN — SEVELAMER CARBONATE 2400 MG: 800 TABLET, FILM COATED ORAL at 11:44

## 2024-11-21 RX ADMIN — HYDRALAZINE HYDROCHLORIDE 25 MG: 25 TABLET ORAL at 11:44

## 2024-11-21 RX ADMIN — SEVELAMER CARBONATE 2400 MG: 800 TABLET, FILM COATED ORAL at 09:45

## 2024-11-21 RX ADMIN — HYDRALAZINE HYDROCHLORIDE 25 MG: 25 TABLET ORAL at 20:54

## 2024-11-21 RX ADMIN — ISOSORBIDE DINITRATE 40 MG: 20 TABLET ORAL at 17:19

## 2024-11-21 RX ADMIN — INSULIN LISPRO 3 UNITS: 100 INJECTION, SOLUTION INTRAVENOUS; SUBCUTANEOUS at 11:44

## 2024-11-21 RX ADMIN — INSULIN LISPRO 2 UNITS: 100 INJECTION, SOLUTION INTRAVENOUS; SUBCUTANEOUS at 11:44

## 2024-11-21 RX ADMIN — INSULIN LISPRO 3 UNITS: 100 INJECTION, SOLUTION INTRAVENOUS; SUBCUTANEOUS at 17:19

## 2024-11-21 RX ADMIN — ISOSORBIDE DINITRATE 40 MG: 20 TABLET ORAL at 09:45

## 2024-11-21 RX ADMIN — INSULIN LISPRO 3 UNITS: 100 INJECTION, SOLUTION INTRAVENOUS; SUBCUTANEOUS at 09:46

## 2024-11-21 RX ADMIN — CARVEDILOL 25 MG: 25 TABLET, FILM COATED ORAL at 17:19

## 2024-11-21 RX ADMIN — INSULIN LISPRO 2 UNITS: 100 INJECTION, SOLUTION INTRAVENOUS; SUBCUTANEOUS at 09:46

## 2024-11-21 RX ADMIN — SEVELAMER CARBONATE 2400 MG: 800 TABLET, FILM COATED ORAL at 17:19

## 2024-11-21 RX ADMIN — HYDRALAZINE HYDROCHLORIDE 25 MG: 25 TABLET ORAL at 04:12

## 2024-11-21 RX ADMIN — INSULIN LISPRO 2 UNITS: 100 INJECTION, SOLUTION INTRAVENOUS; SUBCUTANEOUS at 20:54

## 2024-11-21 RX ADMIN — ISOSORBIDE DINITRATE 40 MG: 20 TABLET ORAL at 15:44

## 2024-11-21 RX ADMIN — Medication 10 ML: at 09:47

## 2024-11-21 RX ADMIN — INSULIN GLARGINE 8 UNITS: 100 INJECTION, SOLUTION SUBCUTANEOUS at 09:46

## 2024-11-21 RX ADMIN — SENNOSIDES AND DOCUSATE SODIUM 2 TABLET: 50; 8.6 TABLET ORAL at 09:45

## 2024-11-21 NOTE — PLAN OF CARE
Goal Outcome Evaluation:  Plan of Care Reviewed With: patient   Patient slept majority of shift. Family to remain at bedside. Alert and oriented. VSS. Safety maintained.

## 2024-11-21 NOTE — PLAN OF CARE
Goal Outcome Evaluation:              Outcome Evaluation: VSS. STILL REQUIRING O2 AT 2 L/M N/C TO MAINTAIN O2 SATS ABOVE 90%, BUT DOES DROP INTO THE 80'S WHILE LYING ON HIS BACK SLEEPING.

## 2024-11-21 NOTE — PROGRESS NOTES
[facility] Progress Note      Patient Name: Rufus Dorsey  : 1985  MRN: [id number]  Primary Care Physician:  Provider, No Known  Date of admission: 2024    Subjective     Interval History:   Acute kidney injury on chronic kidney disease    Patient is feeling the same, denies any chest pain or shortness of air, he had dialysis yesterday without any difficulties, cooperative care continue to work on finding arrangement for him for outpatient dialysis.    Review of Systems:   As noted above      Objective     Vitals:   Temp:  [97.2 °F (36.2 °C)-97.5 °F (36.4 °C)] 97.5 °F (36.4 °C)  Heart Rate:  [70-75] 75  Resp:  [18-22] 22  BP: (121-167)/(60-78) 141/78  Flow (L/min) (Oxygen Therapy):  [2] 2    Intake/Output Summary (Last 24 hours) at 2024 0841  Last data filed at 2024 1146  Gross per 24 hour   Intake --   Output 3000 ml   Net -3000 ml       Physical Exam:    General: Awake, alert, chronically ill, morbidly obese, no acute distress  Neck: Difficult to assess because of body habitus, tunneled dialysis catheter to the right IJ  Lungs: Bilateral rhonchi and crackles, breathing effort not  Heart: RRR, no rub  Abdomen: soft, nontender, distended, abdominal wall edema  : Difficult to assess for palpable bladder because of body habitus  Extremities: 3+ BLE pedal/ankle edema  Neurologic: No asterixis    Scheduled Meds:     aspirin, 81 mg, Oral, Daily  carvedilol, 25 mg, Oral, BID With Meals  hydrALAZINE, 25 mg, Oral, Q8H  insulin glargine, 8 Units, Subcutaneous, Daily  insulin lispro, 2-7 Units, Subcutaneous, 4x Daily AC & at Bedtime  insulin lispro, 3 Units, Subcutaneous, TID With Meals  isosorbide dinitrate, 40 mg, Oral, TID - Nitrates  senna-docusate sodium, 2 tablet, Oral, BID  sevelamer, 2,400 mg, Oral, TID With Meals  sodium chloride, 10 mL, Intravenous, Q12H  vitamin D, 50,000 Units, Oral, Q7 Days      IV Meds:          Results Reviewed:   I have personally  reviewed the results from the time of this admission to 11/21/2024 08:41 EST     Results from last 7 days   Lab Units 11/21/24  0316 11/20/24  1253 11/19/24  0313   SODIUM mmol/L 136 137 134*   POTASSIUM mmol/L 4.1 3.6 3.9   CHLORIDE mmol/L 99 102 97*   CO2 mmol/L 23.0 25.0 23.0   BUN mg/dL 61* 56* 84*   CREATININE mg/dL 5.37* 4.75* 6.08*   CALCIUM mg/dL 8.1* 8.0* 7.8*   GLUCOSE mg/dL 127* 80 108*     Estimated Creatinine Clearance: 24.6 mL/min (A) (by C-G formula based on SCr of 5.37 mg/dL (H)).  Results from last 7 days   Lab Units 11/21/24  0316 11/20/24  1253 11/19/24  0313   MAGNESIUM mg/dL  --   --  2.3   PHOSPHORUS mg/dL 4.9* 4.1 6.2*         Results from last 7 days   Lab Units 11/21/24  0316 11/20/24  1254 11/19/24  0313 11/18/24  0404 11/17/24  0306   WBC 10*3/mm3 8.24 7.73 7.75 8.38 7.26   HEMOGLOBIN g/dL 11.5* 10.5* 10.4* 10.3* 10.3*   PLATELETS 10*3/mm3 183 154 156 161 154           Assessment / Plan     ASSESSMENT:  Acute kidney injury chronic kidney disease stage V, secondary to diabetic nephropathy, now with nephrotic proteinuria, 5.2 g/g.  Huge volume excess; refractory to diuretics.  His electrolyte within acceptable range, volume status improving slowly, he had dialysis yesterday without any difficulties.    Hypervolemic hyponatremia.  Resolved sodium up to 136  Hypertension, with systolic component related to volume excess  Diabetes mellitus type 2 with chronic kidney disease poorly controlled followed by the primary team  Anemia of CKD, hemoglobin today is 11 point, will monitor  Hypocalcemia, which corrects for low albumin  Hyperphosphatemia associate with chronic kidney disease phosphorus down to 6.2 with the addition of phosphate binder  HFrEF, echo noted, EF 30% (vs 20% in 2022).  GDMT measures ltd by advanced CKD, though could start ACE/ARB/entresto when start HD    PLAN:  1.  Hemodialysis tomorrow  2.  Continue fluid restriction fluid restriction, 1500 mL/day  3.  Surveillance labs  4.   Waiting for outpatient dialysis arranged    I reviewed the chart and other providers notes, reviewed labs.  I discussed the case with the patient.  Copied text in this note has been reviewed and is accurate as of 11/21/24.     Navi Hill MD  11/21/24  08:41 [facility]    [facility]  [phone]

## 2024-11-21 NOTE — PROGRESS NOTES
Name: Rufus Dorsey ADMIT: 2024   : 1985  PCP: Provider, No Known    MRN: [id number] LOS: 8 days   AGE/SEX: 39 y.o. male  ROOM: [facility]     Subjective   Subjective   Patient without any complaints.   used.  No family at bedside.  States his breathing is okay.  Swelling slowly improving.         Objective   Objective   Vital Signs  Temp:  [97.3 °F (36.3 °C)-97.5 °F (36.4 °C)] 97.5 °F (36.4 °C)  Heart Rate:  [75] 75  Resp:  [18-22] 20  BP: (118-141)/(60-78) 118/63  SpO2:  [84 %-98 %] 98 %  on  Flow (L/min) (Oxygen Therapy):  [2] 2;   Device (Oxygen Therapy): nasal cannula  Body mass index is 48.36 kg/m².  Physical Exam  Vitals and nursing note reviewed.   Constitutional:       General: He is not in acute distress.  Cardiovascular:      Rate and Rhythm: Normal rate and regular rhythm.   Pulmonary:      Effort: Pulmonary effort is normal. No respiratory distress.      Comments: Bilateral crackles  Abdominal:      General: Abdomen is flat. There is no distension.      Tenderness: There is no abdominal tenderness.   Musculoskeletal:         General: No swelling or deformity.      Right lower leg: Edema present.      Left lower leg: Edema present.      Comments: 3+ pitting edema bilaterally   Skin:     General: Skin is warm and dry.   Neurological:      General: No focal deficit present.      Mental Status: He is alert. Mental status is at baseline.         Results Review     I reviewed the patient's new clinical results.  Results from last 7 days   Lab Units 24  0316 24  1254 24  0313 24  0404   WBC 10*3/mm3 8.24 7.73 7.75 8.38   HEMOGLOBIN g/dL 11.5* 10.5* 10.4* 10.3*   PLATELETS 10*3/mm3 183 154 156 161     Results from last 7 days   Lab Units 24  0316 24  1253 24  0313 24  0404   SODIUM mmol/L 136 137 134* 134*   POTASSIUM mmol/L 4.1 3.6 3.9 4.0   CHLORIDE mmol/L 99 102 97* 97*   CO2 mmol/L 23.0 25.0 23.0 22.0   BUN mg/dL 61* 56* 84* 107*    CREATININE mg/dL 5.37* 4.75* 6.08* 7.12*   GLUCOSE mg/dL 127* 80 108* 136*   Estimated Creatinine Clearance: 24.2 mL/min (A) (by C-G formula based on SCr of 5.37 mg/dL (H)).  Results from last 7 days   Lab Units 11/21/24  0316 11/20/24  1253 11/19/24  0313 11/18/24  0404   ALBUMIN g/dL 3.3* 3.3* 3.2* 3.3*     Results from last 7 days   Lab Units 11/21/24  0316 11/20/24  1253 11/19/24  0313 11/18/24  0404   CALCIUM mg/dL 8.1* 8.0* 7.8* 7.9*   ALBUMIN g/dL 3.3* 3.3* 3.2* 3.3*   MAGNESIUM mg/dL  --   --  2.3  --    PHOSPHORUS mg/dL 4.9* 4.1 6.2* 7.4*       COVID19   Date Value Ref Range Status   11/13/2024 Not Detected Not Detected - Ref. Range Final   06/04/2022 Detected (C) Not Detected - Ref. Range Final     Glucose   Date/Time Value Ref Range Status   11/21/2024 1201 146 (H) 70 - 130 mg/dL Final   11/21/2024 0743 150 (H) 70 - 130 mg/dL Final   11/20/2024 2012 108 70 - 130 mg/dL Final   11/20/2024 1706 101 70 - 130 mg/dL Final   11/20/2024 1414 80 70 - 130 mg/dL Final   11/20/2024 1332 78 70 - 130 mg/dL Final   11/20/2024 1217 81 70 - 130 mg/dL Final       XR Chest 1 View  Narrative: XR CHEST 1 VW-     HISTORY: Male who is 39 years-old, decreased breath sounds     TECHNIQUE: Frontal view of the chest     COMPARISON: 11/13/2024     FINDINGS:  The central venous catheter extends to the cavoatrial junction region.  Sternotomy wires are noted. Heart is enlarged. Pulmonary vasculature is  congested. Bilateral pulmonary opacities appear increased, may represent  edema and/or pneumonia and/or ARDS. No large pleural effusion, or  pneumothorax. No acute osseous process.     Impression: Increased bilateral pulmonary opacities compatible with  interval worsening.     This report was finalized on 11/18/2024 1:38 PM by Dr. Suleiman Anderson M.D on Workstation: [id number]     Arteriogram (Autofinalize)  This procedure was auto-finalized with no dictation required.    I reviewed the patient's daily medications.  Scheduled  Medications  aspirin, 81 mg, Oral, Daily  carvedilol, 25 mg, Oral, BID With Meals  hydrALAZINE, 25 mg, Oral, Q8H  insulin glargine, 8 Units, Subcutaneous, Daily  insulin lispro, 2-7 Units, Subcutaneous, 4x Daily AC & at Bedtime  insulin lispro, 3 Units, Subcutaneous, TID With Meals  isosorbide dinitrate, 40 mg, Oral, TID - Nitrates  senna-docusate sodium, 2 tablet, Oral, BID  sevelamer, 2,400 mg, Oral, TID With Meals  sodium chloride, 10 mL, Intravenous, Q12H  vitamin D, 50,000 Units, Oral, Q7 Days    Infusions     Diet  Diet: Diabetic, Fluid Restriction (240 mL/tray); Consistent Carbohydrate; 1500 mL/day; Fluid Consistency: Thin (IDDSI 0)         I have personally reviewed:  [x]  Laboratory   []  Microbiology   [x]  Radiology   []  EKG/Telemetry   []  Cardiology/Vascular   []  Pathology   [x]  Records     Assessment/Plan     Active Hospital Problems    Diagnosis  POA    **Acute exacerbation of CHF (congestive heart failure) [I50.9]  Yes    Chronic HFrEF (heart failure with reduced ejection fraction) [I50.22]  Yes    S/P CABG x 3 [Z95.1]  Not Applicable    Acute on chronic combined systolic and diastolic CHF (congestive heart failure) [I50.43]  Yes    Coronary artery disease [I25.10]  Yes    Hypertension [I10]  Yes    Type 2 diabetes mellitus, with long-term current use of insulin [E11.9, Z79.4]  Not Applicable    MEI (acute kidney injury) [N17.9]  Unknown      Resolved Hospital Problems   No resolved problems to display.       39 y.o. male admitted with Acute exacerbation of CHF (congestive heart failure).    Volume overload  HFrEF EF 20% 2022  CAD CABG x3  Elevated troponin  HTN  MEI/CKD 4   Cardiology have signed off  Renal consulted-TDC catheter placed on 11/18.  Patient tolerating HD.  Wean oxygen as able  ASA, coreg, hydralazine, ISDN  Follow-up echocardiogram above EF 30%  Monitor I/Os and daily weights     DM2   uncontrolled. A1c 12.30%.   Currently on correctional insulin.  Continue long-acting and mealtime  insulin.  Diabetes educator-prior to discharge on Tresiba/NovoLog as this will be the most economical option with lack of insurance     Obesity Body mass index is 50.26 kg/m².     SCDs for DVT prophylaxis.  Full code.  Discussed with patient and nursing staff.  Anticipate discharge home timing yet to be determined. When on RA and LE swelling improved.    Expected Discharge Date: 11/25/2024; Expected Discharge Time:       John Gipson MD  [facility]  11/21/24  13:43 EST

## 2024-11-21 NOTE — CASE MANAGEMENT/SOCIAL WORK
Continued Stay Note  [facility]     Patient Name: Rufus Dorsey  MRN: [id number]  Today's Date: 11/21/2024    Admit Date: 11/13/2024    Plan: Home, brother to transport   Discharge Plan       Row Name 11/21/24 4909       Plan    Plan Comments CCP spoke with Dr. Hill and informed of difficulty with arranging outpt dialysis and that CCP will not be able to arrange outpt dialysis at a clinic. Informed Dr. Hill his options are to go to local ER's to obtain his dialysis or to return to his country, or to obtain citizenship.                   Discharge Codes    No documentation.                 Expected Discharge Date and Time       Expected Discharge Date Expected Discharge Time    Nov 25, 2024               Mónica Rodriguez RN

## 2024-11-22 LAB
ALBUMIN SERPL-MCNC: 3.3 G/DL (ref 3.5–5.2)
ANION GAP SERPL CALCULATED.3IONS-SCNC: 12.4 MMOL/L (ref 5–15)
BASOPHILS # BLD AUTO: 0.02 10*3/MM3 (ref 0–0.2)
BASOPHILS NFR BLD AUTO: 0.3 % (ref 0–1.5)
BUN SERPL-MCNC: 70 MG/DL (ref 6–20)
BUN/CREAT SERPL: 11.9 (ref 7–25)
CALCIUM SPEC-SCNC: 7.9 MG/DL (ref 8.6–10.5)
CHLORIDE SERPL-SCNC: 99 MMOL/L (ref 98–107)
CO2 SERPL-SCNC: 22.6 MMOL/L (ref 22–29)
CREAT SERPL-MCNC: 5.86 MG/DL (ref 0.76–1.27)
DEPRECATED RDW RBC AUTO: 44.5 FL (ref 37–54)
EGFRCR SERPLBLD CKD-EPI 2021: 11.8 ML/MIN/1.73
EOSINOPHIL # BLD AUTO: 0.15 10*3/MM3 (ref 0–0.4)
EOSINOPHIL NFR BLD AUTO: 2 % (ref 0.3–6.2)
ERYTHROCYTE [DISTWIDTH] IN BLOOD BY AUTOMATED COUNT: 13.9 % (ref 12.3–15.4)
GLUCOSE BLDC GLUCOMTR-MCNC: 145 MG/DL (ref 70–130)
GLUCOSE BLDC GLUCOMTR-MCNC: 201 MG/DL (ref 70–130)
GLUCOSE SERPL-MCNC: 173 MG/DL (ref 65–99)
HCT VFR BLD AUTO: 32.2 % (ref 37.5–51)
HGB BLD-MCNC: 10 G/DL (ref 13–17.7)
IMM GRANULOCYTES # BLD AUTO: 0.03 10*3/MM3 (ref 0–0.05)
IMM GRANULOCYTES NFR BLD AUTO: 0.4 % (ref 0–0.5)
LYMPHOCYTES # BLD AUTO: 0.75 10*3/MM3 (ref 0.7–3.1)
LYMPHOCYTES NFR BLD AUTO: 10.1 % (ref 19.6–45.3)
MCH RBC QN AUTO: 27 PG (ref 26.6–33)
MCHC RBC AUTO-ENTMCNC: 31.1 G/DL (ref 31.5–35.7)
MCV RBC AUTO: 87 FL (ref 79–97)
MONOCYTES # BLD AUTO: 0.66 10*3/MM3 (ref 0.1–0.9)
MONOCYTES NFR BLD AUTO: 8.9 % (ref 5–12)
NEUTROPHILS NFR BLD AUTO: 5.78 10*3/MM3 (ref 1.7–7)
NEUTROPHILS NFR BLD AUTO: 78.3 % (ref 42.7–76)
NRBC BLD AUTO-RTO: 0 /100 WBC (ref 0–0.2)
PHOSPHATE SERPL-MCNC: 5.9 MG/DL (ref 2.5–4.5)
PLATELET # BLD AUTO: 166 10*3/MM3 (ref 140–450)
PMV BLD AUTO: 11.7 FL (ref 6–12)
POTASSIUM SERPL-SCNC: 4 MMOL/L (ref 3.5–5.2)
RBC # BLD AUTO: 3.7 10*6/MM3 (ref 4.14–5.8)
SODIUM SERPL-SCNC: 134 MMOL/L (ref 136–145)
WBC NRBC COR # BLD AUTO: 7.39 10*3/MM3 (ref 3.4–10.8)

## 2024-11-22 PROCEDURE — 63710000001 INSULIN LISPRO (HUMAN) PER 5 UNITS: Performed by: SURGERY

## 2024-11-22 PROCEDURE — 63710000001 INSULIN GLARGINE PER 5 UNITS: Performed by: STUDENT IN AN ORGANIZED HEALTH CARE EDUCATION/TRAINING PROGRAM

## 2024-11-22 PROCEDURE — 85025 COMPLETE CBC W/AUTO DIFF WBC: CPT | Performed by: INTERNAL MEDICINE

## 2024-11-22 PROCEDURE — 25010000002 HEPARIN (PORCINE) PER 1000 UNITS: Performed by: INTERNAL MEDICINE

## 2024-11-22 PROCEDURE — 80069 RENAL FUNCTION PANEL: CPT | Performed by: SURGERY

## 2024-11-22 PROCEDURE — 82948 REAGENT STRIP/BLOOD GLUCOSE: CPT

## 2024-11-22 RX ORDER — MANNITOL 250 MG/ML
12.5 INJECTION, SOLUTION INTRAVENOUS AS NEEDED
Status: ACTIVE | OUTPATIENT
Start: 2024-11-22 | End: 2024-11-22

## 2024-11-22 RX ORDER — HEPARIN SODIUM 1000 [USP'U]/ML
2000 INJECTION, SOLUTION INTRAVENOUS; SUBCUTANEOUS ONCE
Status: COMPLETED | OUTPATIENT
Start: 2024-11-22 | End: 2024-11-22

## 2024-11-22 RX ORDER — HEPARIN SODIUM 1000 [USP'U]/ML
4000 INJECTION, SOLUTION INTRAVENOUS; SUBCUTANEOUS ONCE
Status: COMPLETED | OUTPATIENT
Start: 2024-11-22 | End: 2024-11-22

## 2024-11-22 RX ADMIN — SENNOSIDES AND DOCUSATE SODIUM 2 TABLET: 50; 8.6 TABLET ORAL at 14:01

## 2024-11-22 RX ADMIN — ASPIRIN 81 MG: 81 TABLET, COATED ORAL at 13:31

## 2024-11-22 RX ADMIN — HYDRALAZINE HYDROCHLORIDE 25 MG: 25 TABLET ORAL at 20:19

## 2024-11-22 RX ADMIN — INSULIN LISPRO 3 UNITS: 100 INJECTION, SOLUTION INTRAVENOUS; SUBCUTANEOUS at 18:17

## 2024-11-22 RX ADMIN — SEVELAMER CARBONATE 2400 MG: 800 TABLET, FILM COATED ORAL at 13:32

## 2024-11-22 RX ADMIN — INSULIN LISPRO 3 UNITS: 100 INJECTION, SOLUTION INTRAVENOUS; SUBCUTANEOUS at 21:34

## 2024-11-22 RX ADMIN — SEVELAMER CARBONATE 2400 MG: 800 TABLET, FILM COATED ORAL at 18:17

## 2024-11-22 RX ADMIN — CARVEDILOL 25 MG: 25 TABLET, FILM COATED ORAL at 18:17

## 2024-11-22 RX ADMIN — CARVEDILOL 25 MG: 25 TABLET, FILM COATED ORAL at 13:37

## 2024-11-22 RX ADMIN — BISACODYL 5 MG: 5 TABLET, COATED ORAL at 13:32

## 2024-11-22 RX ADMIN — Medication 10 ML: at 13:33

## 2024-11-22 RX ADMIN — INSULIN LISPRO 3 UNITS: 100 INJECTION, SOLUTION INTRAVENOUS; SUBCUTANEOUS at 13:33

## 2024-11-22 RX ADMIN — Medication 10 ML: at 20:20

## 2024-11-22 RX ADMIN — ISOSORBIDE DINITRATE 40 MG: 20 TABLET ORAL at 13:32

## 2024-11-22 RX ADMIN — INSULIN GLARGINE 8 UNITS: 100 INJECTION, SOLUTION SUBCUTANEOUS at 13:32

## 2024-11-22 RX ADMIN — SENNOSIDES AND DOCUSATE SODIUM 2 TABLET: 50; 8.6 TABLET ORAL at 13:37

## 2024-11-22 RX ADMIN — SENNOSIDES AND DOCUSATE SODIUM 2 TABLET: 50; 8.6 TABLET ORAL at 20:19

## 2024-11-22 RX ADMIN — HYDRALAZINE HYDROCHLORIDE 25 MG: 25 TABLET ORAL at 13:32

## 2024-11-22 RX ADMIN — ISOSORBIDE DINITRATE 40 MG: 20 TABLET ORAL at 18:17

## 2024-11-22 RX ADMIN — HEPARIN SODIUM 4000 UNITS: 1000 INJECTION INTRAVENOUS; SUBCUTANEOUS at 11:53

## 2024-11-22 RX ADMIN — HYDRALAZINE HYDROCHLORIDE 25 MG: 25 TABLET ORAL at 04:48

## 2024-11-22 RX ADMIN — HEPARIN SODIUM 2000 UNITS: 1000 INJECTION INTRAVENOUS; SUBCUTANEOUS at 08:30

## 2024-11-22 NOTE — PROGRESS NOTES
[facility] Progress Note      Patient Name: Rufus Dorsey  : 1985  MRN: [id number]  Primary Care Physician:  Provider, No Known  Date of admission: 2024    Subjective     Interval History:   Acute kidney injury on chronic kidney disease    Patient is feeling the same, denies any chest pain or shortness of air, the patient is seen and examined while on dialysis, cooperative care continue to work on finding arrangement for him for outpatient dialysis.    Review of Systems:   As noted above      Objective     Vitals:   Temp:  [97.5 °F (36.4 °C)-98.8 °F (37.1 °C)] 98.8 °F (37.1 °C)  Heart Rate:  [68-73] 69  Resp:  [20] 20  BP: (101-133)/(59-71) 133/70  Flow (L/min) (Oxygen Therapy):  [2] 2    Intake/Output Summary (Last 24 hours) at 2024 0950  Last data filed at 2024 0335  Gross per 24 hour   Intake 1120 ml   Output --   Net 1120 ml       Physical Exam:    General: Awake, alert, chronically ill, morbidly obese, no acute distress  Neck: Difficult to assess because of body habitus, tunneled dialysis catheter to the right IJ  Lungs: Bilateral rhonchi and crackles, breathing effort not  Heart: RRR, no rub  Abdomen: soft, nontender, distended, abdominal wall edema  : Difficult to assess for palpable bladder because of body habitus  Extremities: 2+ BLE pedal/ankle edema  Neurologic: No asterixis    Ultrafiltration goal 4600 cc, blood pressure 118/66, heart rate 71/min,  cc per    Scheduled Meds:     aspirin, 81 mg, Oral, Daily  carvedilol, 25 mg, Oral, BID With Meals  heparin (porcine), 2,000 Units, Intracatheter, Once  hydrALAZINE, 25 mg, Oral, Q8H  insulin glargine, 8 Units, Subcutaneous, Daily  insulin lispro, 2-7 Units, Subcutaneous, 4x Daily AC & at Bedtime  insulin lispro, 3 Units, Subcutaneous, TID With Meals  isosorbide dinitrate, 40 mg, Oral, TID - Nitrates  senna-docusate sodium, 2 tablet, Oral, BID  sevelamer, 2,400 mg, Oral, TID With Meals  sodium  chloride, 10 mL, Intravenous, Q12H  vitamin D, 50,000 Units, Oral, Q7 Days      IV Meds:          Results Reviewed:   I have personally reviewed the results from the time of this admission to 11/22/2024 09:50 EST     Results from last 7 days   Lab Units 11/22/24  0312 11/21/24  0316 11/20/24  1253   SODIUM mmol/L 134* 136 137   POTASSIUM mmol/L 4.0 4.1 3.6   CHLORIDE mmol/L 99 99 102   CO2 mmol/L 22.6 23.0 25.0   BUN mg/dL 70* 61* 56*   CREATININE mg/dL 5.86* 5.37* 4.75*   CALCIUM mg/dL 7.9* 8.1* 8.0*   GLUCOSE mg/dL 173* 127* 80     Estimated Creatinine Clearance: 22.2 mL/min (A) (by C-G formula based on SCr of 5.86 mg/dL (H)).  Results from last 7 days   Lab Units 11/22/24  0312 11/21/24  0316 11/20/24  1253 11/19/24  0313   MAGNESIUM mg/dL  --   --   --  2.3   PHOSPHORUS mg/dL 5.9* 4.9* 4.1 6.2*         Results from last 7 days   Lab Units 11/22/24  0312 11/21/24  0316 11/20/24  1254 11/19/24  0313 11/18/24  0404   WBC 10*3/mm3 7.39 8.24 7.73 7.75 8.38   HEMOGLOBIN g/dL 10.0* 11.5* 10.5* 10.4* 10.3*   PLATELETS 10*3/mm3 166 183 154 156 161           Assessment / Plan     ASSESSMENT:  Acute kidney injury chronic kidney disease stage V, secondary to diabetic nephropathy, now with nephrotic proteinuria, 5.2 g/g.  Huge volume excess; refractory to diuretics.  Currently on dialysis tolerating the treatment, his volume status improved significantly and his sodium today is 134.    Hypervolemic hyponatremia.  Much improved sodium today 134 prediabetes  Hypertension, with systolic component related to volume excess  Diabetes mellitus type 2 with chronic kidney disease poorly controlled followed by the primary team  Anemia of CKD, hemoglobin today is 10, will monitor  Hypocalcemia, which corrects for low albumin  Hyperphosphatemia associate with chronic kidney disease phosphorus down to 5.9 with the addition of phosphate binder  HFrEF, echo noted, EF 30% (vs 20% in 2022).  GDMT measures ltd by advanced CKD, though could  start ACE/ARB/entresto when start HD    PLAN:  1.  Continue the same  2.  Continue fluid restriction fluid restriction, 1500 mL/day  3.  Surveillance labs  4.  Waiting for outpatient dialysis arranged    I reviewed the chart and other providers notes, reviewed labs.  I discussed the case with the patient.  Copied text in this note has been reviewed and is accurate as of 11/22/24.     Navi Hill MD  11/22/24  09:50 [facility]    [facility]  [phone]

## 2024-11-22 NOTE — CASE MANAGEMENT/SOCIAL WORK
Continued Stay Note  [facility]     Patient Name: Rufus Dorsey  MRN: [id number]  Today's Date: 11/22/2024    Admit Date: 11/13/2024    Plan: Home, brother to transport   Discharge Plan       Row Name 11/22/24 1510       Plan    Plan Home, brother to transport    Plan Comments CCP continues to search for solutions to pt's dialysis dilemma. Unfortunately, the most viable solution at this time is when pt is discharged, he will have to go to local ER's for his dialysis, which pt and family understand. However, pt is reconsidering returning to [place] to receive his treatment, as he is not willing to obtain citizenship. CCP will continue to follow.                   Discharge Codes    No documentation.                 Expected Discharge Date and Time       Expected Discharge Date Expected Discharge Time    Nov 25, 2024               Mónica Rodriguez RN

## 2024-11-22 NOTE — PLAN OF CARE
Goal Outcome Evaluation:  Plan of Care Reviewed With: patient           Outcome Evaluation: VSS. A/O x4. O2 at 2L NC through shift. Pt is more sleepy but gets easily awakened. Family remained at bedside. Appeared to have slept well in between care and had no c/o pain or discomfort through shift. For HD today. Safety maintained. Will CTM.

## 2024-11-22 NOTE — PLAN OF CARE
Goal Outcome Evaluation:  Plan of Care Reviewed With: patient   Patient has been extremely drowsy. Sleeping majority of shift. Attempted to ween from O2 and was unsuccessful with O2 dropping in the 80s. Patient still on 2L O2. Alert and oriented. VSS. Safety maintained. Family to remain at bedside.

## 2024-11-22 NOTE — PROGRESS NOTES
Name: Rufus Dorsey ADMIT: 2024   : 1985  PCP: Provider, No Known    MRN: [id number] LOS: 9 days   AGE/SEX: 39 y.o. male  ROOM: [facility]     Subjective   Subjective   Patient without any complaints.   used.  States his breathing okay.         Objective   Objective   Vital Signs  Temp:  [97.5 °F (36.4 °C)-98.8 °F (37.1 °C)] 98.8 °F (37.1 °C)  Heart Rate:  [68-73] 69  Resp:  [20] 20  BP: (101-133)/(59-71) 133/70  SpO2:  [97 %-99 %] 99 %  on  Flow (L/min) (Oxygen Therapy):  [2] 2;   Device (Oxygen Therapy): nasal cannula  Body mass index is 48.36 kg/m².  Physical Exam  Vitals and nursing note reviewed.   Constitutional:       General: He is not in acute distress.  Cardiovascular:      Rate and Rhythm: Normal rate and regular rhythm.   Pulmonary:      Effort: Pulmonary effort is normal. No respiratory distress.      Comments: Bilateral crackles  Abdominal:      General: Abdomen is flat. There is no distension.      Tenderness: There is no abdominal tenderness.   Musculoskeletal:         General: No swelling or deformity.      Right lower leg: Edema present.      Left lower leg: Edema present.      Comments: 3+ pitting edema bilaterally   Skin:     General: Skin is warm and dry.   Neurological:      General: No focal deficit present.      Mental Status: He is alert. Mental status is at baseline.         Results Review     I reviewed the patient's new clinical results.  Results from last 7 days   Lab Units 24  0312 24  0316 24  1254 24  0313   WBC 10*3/mm3 7.39 8.24 7.73 7.75   HEMOGLOBIN g/dL 10.0* 11.5* 10.5* 10.4*   PLATELETS 10*3/mm3 166 183 154 156     Results from last 7 days   Lab Units 24  0312 24  0316 24  1253 24  0313   SODIUM mmol/L 134* 136 137 134*   POTASSIUM mmol/L 4.0 4.1 3.6 3.9   CHLORIDE mmol/L 99 99 102 97*   CO2 mmol/L 22.6 23.0 25.0 23.0   BUN mg/dL 70* 61* 56* 84*   CREATININE mg/dL 5.86* 5.37* 4.75* 6.08*   GLUCOSE mg/dL  173* 127* 80 108*   Estimated Creatinine Clearance: 22.2 mL/min (A) (by C-G formula based on SCr of 5.86 mg/dL (H)).  Results from last 7 days   Lab Units 11/22/24  0312 11/21/24  0316 11/20/24  1253 11/19/24  0313   ALBUMIN g/dL 3.3* 3.3* 3.3* 3.2*     Results from last 7 days   Lab Units 11/22/24  0312 11/21/24  0316 11/20/24  1253 11/19/24  0313   CALCIUM mg/dL 7.9* 8.1* 8.0* 7.8*   ALBUMIN g/dL 3.3* 3.3* 3.3* 3.2*   MAGNESIUM mg/dL  --   --   --  2.3   PHOSPHORUS mg/dL 5.9* 4.9* 4.1 6.2*       COVID19   Date Value Ref Range Status   11/13/2024 Not Detected Not Detected - Ref. Range Final   06/04/2022 Detected (C) Not Detected - Ref. Range Final     Glucose   Date/Time Value Ref Range Status   11/21/2024 2035 194 (H) 70 - 130 mg/dL Final   11/21/2024 1657 130 70 - 130 mg/dL Final   11/21/2024 1201 146 (H) 70 - 130 mg/dL Final   11/21/2024 0743 150 (H) 70 - 130 mg/dL Final   11/20/2024 2012 108 70 - 130 mg/dL Final   11/20/2024 1706 101 70 - 130 mg/dL Final   11/20/2024 1414 80 70 - 130 mg/dL Final       XR Chest 1 View  Narrative: XR CHEST 1 VW-     HISTORY: Male who is 39 years-old, decreased breath sounds     TECHNIQUE: Frontal view of the chest     COMPARISON: 11/13/2024     FINDINGS:  The central venous catheter extends to the cavoatrial junction region.  Sternotomy wires are noted. Heart is enlarged. Pulmonary vasculature is  congested. Bilateral pulmonary opacities appear increased, may represent  edema and/or pneumonia and/or ARDS. No large pleural effusion, or  pneumothorax. No acute osseous process.     Impression: Increased bilateral pulmonary opacities compatible with  interval worsening.     This report was finalized on 11/18/2024 1:38 PM by Dr. Suleimna Anderson M.D on Workstation: [id number]     Arteriogram (Autofinalize)  This procedure was auto-finalized with no dictation required.    I reviewed the patient's daily medications.  Scheduled Medications  aspirin, 81 mg, Oral, Daily  carvedilol, 25 mg,  Oral, BID With Meals  hydrALAZINE, 25 mg, Oral, Q8H  insulin glargine, 8 Units, Subcutaneous, Daily  insulin lispro, 2-7 Units, Subcutaneous, 4x Daily AC & at Bedtime  insulin lispro, 3 Units, Subcutaneous, TID With Meals  isosorbide dinitrate, 40 mg, Oral, TID - Nitrates  senna-docusate sodium, 2 tablet, Oral, BID  sevelamer, 2,400 mg, Oral, TID With Meals  sodium chloride, 10 mL, Intravenous, Q12H  vitamin D, 50,000 Units, Oral, Q7 Days    Infusions     Diet  Diet: Diabetic, Fluid Restriction (240 mL/tray); Consistent Carbohydrate; 1500 mL/day; Fluid Consistency: Thin (IDDSI 0)         I have personally reviewed:  [x]  Laboratory   []  Microbiology   [x]  Radiology   []  EKG/Telemetry   []  Cardiology/Vascular   []  Pathology   [x]  Records     Assessment/Plan     Active Hospital Problems    Diagnosis  POA    **Acute exacerbation of CHF (congestive heart failure) [I50.9]  Yes    Chronic HFrEF (heart failure with reduced ejection fraction) [I50.22]  Yes    S/P CABG x 3 [Z95.1]  Not Applicable    Acute on chronic combined systolic and diastolic CHF (congestive heart failure) [I50.43]  Yes    Coronary artery disease [I25.10]  Yes    Hypertension [I10]  Yes    Type 2 diabetes mellitus, with long-term current use of insulin [E11.9, Z79.4]  Not Applicable    MEI (acute kidney injury) [N17.9]  Unknown      Resolved Hospital Problems   No resolved problems to display.       39 y.o. male admitted with Acute exacerbation of CHF (congestive heart failure).    Volume overload  HFrEF EF 20% 2022  CAD CABG x3  Elevated troponin  HTN  MEI/CKD 4   Cardiology have signed off  Renal consulted-TDC catheter placed on 11/18.  Patient tolerating HD. Seen during HD today. Attempting to get 4.6L off.  Wean oxygen as able, suspect a component of VERONICA  ASA, coreg, hydralazine, ISDN  Follow-up echocardiogram above EF 30%  Monitor I/Os and daily weights     DM2   uncontrolled. A1c 12.30%.   Currently on correctional insulin.  Continue  long-acting and mealtime insulin.  Diabetes educator-prior to discharge on Tresiba/NovoLog as this will be the most economical option with lack of insurance     Obesity Body mass index is 50.26 kg/m².     SCDs for DVT prophylaxis.  Full code.  Discussed with patient and nursing staff.  Anticipate discharge home timing yet to be determined. When on RA and LE swelling improved.    Expected Discharge Date: 11/25/2024; Expected Discharge Time:       John Gipson MD  [facility]  11/22/24  12:04 EST

## 2024-11-23 LAB
ALBUMIN SERPL-MCNC: 2.7 G/DL (ref 3.5–5.2)
ANION GAP SERPL CALCULATED.3IONS-SCNC: 9.8 MMOL/L (ref 5–15)
BASOPHILS # BLD AUTO: 0.02 10*3/MM3 (ref 0–0.2)
BASOPHILS NFR BLD AUTO: 0.3 % (ref 0–1.5)
BUN SERPL-MCNC: 49 MG/DL (ref 6–20)
BUN/CREAT SERPL: 9.1 (ref 7–25)
CALCIUM SPEC-SCNC: 7.8 MG/DL (ref 8.6–10.5)
CHLORIDE SERPL-SCNC: 101 MMOL/L (ref 98–107)
CO2 SERPL-SCNC: 23.2 MMOL/L (ref 22–29)
CREAT SERPL-MCNC: 5.36 MG/DL (ref 0.76–1.27)
DEPRECATED RDW RBC AUTO: 43.9 FL (ref 37–54)
EGFRCR SERPLBLD CKD-EPI 2021: 13.1 ML/MIN/1.73
EOSINOPHIL # BLD AUTO: 0.14 10*3/MM3 (ref 0–0.4)
EOSINOPHIL NFR BLD AUTO: 2 % (ref 0.3–6.2)
ERYTHROCYTE [DISTWIDTH] IN BLOOD BY AUTOMATED COUNT: 14 % (ref 12.3–15.4)
GLUCOSE BLDC GLUCOMTR-MCNC: 145 MG/DL (ref 70–130)
GLUCOSE BLDC GLUCOMTR-MCNC: 155 MG/DL (ref 70–130)
GLUCOSE BLDC GLUCOMTR-MCNC: 158 MG/DL (ref 70–130)
GLUCOSE BLDC GLUCOMTR-MCNC: 211 MG/DL (ref 70–130)
GLUCOSE SERPL-MCNC: 156 MG/DL (ref 65–99)
HCT VFR BLD AUTO: 30.8 % (ref 37.5–51)
HGB BLD-MCNC: 10 G/DL (ref 13–17.7)
IMM GRANULOCYTES # BLD AUTO: 0.04 10*3/MM3 (ref 0–0.05)
IMM GRANULOCYTES NFR BLD AUTO: 0.6 % (ref 0–0.5)
LYMPHOCYTES # BLD AUTO: 0.59 10*3/MM3 (ref 0.7–3.1)
LYMPHOCYTES NFR BLD AUTO: 8.6 % (ref 19.6–45.3)
MCH RBC QN AUTO: 28.1 PG (ref 26.6–33)
MCHC RBC AUTO-ENTMCNC: 32.5 G/DL (ref 31.5–35.7)
MCV RBC AUTO: 86.5 FL (ref 79–97)
MONOCYTES # BLD AUTO: 0.55 10*3/MM3 (ref 0.1–0.9)
MONOCYTES NFR BLD AUTO: 8 % (ref 5–12)
NEUTROPHILS NFR BLD AUTO: 5.51 10*3/MM3 (ref 1.7–7)
NEUTROPHILS NFR BLD AUTO: 80.5 % (ref 42.7–76)
NRBC BLD AUTO-RTO: 0 /100 WBC (ref 0–0.2)
PHOSPHATE SERPL-MCNC: 4.6 MG/DL (ref 2.5–4.5)
PLATELET # BLD AUTO: 156 10*3/MM3 (ref 140–450)
PMV BLD AUTO: 11.8 FL (ref 6–12)
POTASSIUM SERPL-SCNC: 3.6 MMOL/L (ref 3.5–5.2)
RBC # BLD AUTO: 3.56 10*6/MM3 (ref 4.14–5.8)
SODIUM SERPL-SCNC: 134 MMOL/L (ref 136–145)
WBC NRBC COR # BLD AUTO: 6.85 10*3/MM3 (ref 3.4–10.8)

## 2024-11-23 PROCEDURE — 82948 REAGENT STRIP/BLOOD GLUCOSE: CPT

## 2024-11-23 PROCEDURE — 80069 RENAL FUNCTION PANEL: CPT | Performed by: SURGERY

## 2024-11-23 PROCEDURE — 63710000001 INSULIN LISPRO (HUMAN) PER 5 UNITS: Performed by: SURGERY

## 2024-11-23 PROCEDURE — 85025 COMPLETE CBC W/AUTO DIFF WBC: CPT | Performed by: INTERNAL MEDICINE

## 2024-11-23 PROCEDURE — 63710000001 INSULIN GLARGINE PER 5 UNITS: Performed by: STUDENT IN AN ORGANIZED HEALTH CARE EDUCATION/TRAINING PROGRAM

## 2024-11-23 RX ADMIN — ERGOCALCIFEROL 50000 UNITS: 1.25 CAPSULE ORAL at 08:45

## 2024-11-23 RX ADMIN — INSULIN LISPRO 2 UNITS: 100 INJECTION, SOLUTION INTRAVENOUS; SUBCUTANEOUS at 11:45

## 2024-11-23 RX ADMIN — INSULIN LISPRO 2 UNITS: 100 INJECTION, SOLUTION INTRAVENOUS; SUBCUTANEOUS at 21:53

## 2024-11-23 RX ADMIN — INSULIN LISPRO 3 UNITS: 100 INJECTION, SOLUTION INTRAVENOUS; SUBCUTANEOUS at 17:30

## 2024-11-23 RX ADMIN — ASPIRIN 81 MG: 81 TABLET, COATED ORAL at 08:46

## 2024-11-23 RX ADMIN — SEVELAMER CARBONATE 2400 MG: 800 TABLET, FILM COATED ORAL at 08:45

## 2024-11-23 RX ADMIN — HYDRALAZINE HYDROCHLORIDE 25 MG: 25 TABLET ORAL at 04:22

## 2024-11-23 RX ADMIN — HYDRALAZINE HYDROCHLORIDE 25 MG: 25 TABLET ORAL at 12:53

## 2024-11-23 RX ADMIN — ISOSORBIDE DINITRATE 40 MG: 20 TABLET ORAL at 19:33

## 2024-11-23 RX ADMIN — INSULIN LISPRO 3 UNITS: 100 INJECTION, SOLUTION INTRAVENOUS; SUBCUTANEOUS at 08:46

## 2024-11-23 RX ADMIN — Medication 10 ML: at 08:47

## 2024-11-23 RX ADMIN — SEVELAMER CARBONATE 2400 MG: 800 TABLET, FILM COATED ORAL at 19:34

## 2024-11-23 RX ADMIN — HYDRALAZINE HYDROCHLORIDE 25 MG: 25 TABLET ORAL at 19:59

## 2024-11-23 RX ADMIN — ISOSORBIDE DINITRATE 40 MG: 20 TABLET ORAL at 12:53

## 2024-11-23 RX ADMIN — ISOSORBIDE DINITRATE 40 MG: 20 TABLET ORAL at 08:45

## 2024-11-23 RX ADMIN — SEVELAMER CARBONATE 2400 MG: 800 TABLET, FILM COATED ORAL at 12:53

## 2024-11-23 RX ADMIN — SENNOSIDES AND DOCUSATE SODIUM 2 TABLET: 50; 8.6 TABLET ORAL at 21:53

## 2024-11-23 RX ADMIN — CARVEDILOL 25 MG: 25 TABLET, FILM COATED ORAL at 08:46

## 2024-11-23 RX ADMIN — INSULIN LISPRO 3 UNITS: 100 INJECTION, SOLUTION INTRAVENOUS; SUBCUTANEOUS at 11:45

## 2024-11-23 RX ADMIN — CARVEDILOL 25 MG: 25 TABLET, FILM COATED ORAL at 19:33

## 2024-11-23 RX ADMIN — Medication 10 ML: at 21:05

## 2024-11-23 RX ADMIN — SENNOSIDES AND DOCUSATE SODIUM 2 TABLET: 50; 8.6 TABLET ORAL at 08:46

## 2024-11-23 RX ADMIN — INSULIN GLARGINE 8 UNITS: 100 INJECTION, SOLUTION SUBCUTANEOUS at 08:46

## 2024-11-23 NOTE — PROGRESS NOTES
[facility] Progress Note      Patient Name: Rufus Dorsey  : 1985  MRN: [id number]  Primary Care Physician:  Provider, No Known  Date of admission: 2024    Subjective     Interval History:   Acute kidney injury on chronic kidney disease    Napping seated upright between bite of breakfast, no soa    Review of Systems:   As noted above      Objective     Vitals:   Temp:  [97.3 °F (36.3 °C)-98.2 °F (36.8 °C)] 97.3 °F (36.3 °C)  Heart Rate:  [71-75] 74  Resp:  [16-19] 18  BP: (104-133)/(42-72) 122/71  Flow (L/min) (Oxygen Therapy):  [2] 2    Intake/Output Summary (Last 24 hours) at 2024 08  Last data filed at 2024  Gross per 24 hour   Intake 120 ml   Output 4000 ml   Net -3880 ml       Physical Exam:    General: chronically ill appearing, morbidly obese, no acute distress  Neck: Difficult to assess because of body habitus, tunneled dialysis catheter to the right IJ  Lungs: Bilateral rhonchi and crackles, breathing effort not  Heart: RRR, no rub  Abdomen: soft, nontender, distended, less abdominal wall edema  Extremities: 1+ BLE edema  Neurologic: No asterixis      Scheduled Meds:     aspirin, 81 mg, Oral, Daily  carvedilol, 25 mg, Oral, BID With Meals  hydrALAZINE, 25 mg, Oral, Q8H  insulin glargine, 8 Units, Subcutaneous, Daily  insulin lispro, 2-7 Units, Subcutaneous, 4x Daily AC & at Bedtime  insulin lispro, 3 Units, Subcutaneous, TID With Meals  isosorbide dinitrate, 40 mg, Oral, TID - Nitrates  senna-docusate sodium, 2 tablet, Oral, BID  sevelamer, 2,400 mg, Oral, TID With Meals  sodium chloride, 10 mL, Intravenous, Q12H  vitamin D, 50,000 Units, Oral, Q7 Days      IV Meds:          Results Reviewed:   I have personally reviewed the results from the time of this admission to 2024 08:25 EST     Results from last 7 days   Lab Units 24  0337 24  0312 24  0316   SODIUM mmol/L 134* 134* 136   POTASSIUM mmol/L 3.6 4.0 4.1   CHLORIDE  mmol/L 101 99 99   CO2 mmol/L 23.2 22.6 23.0   BUN mg/dL 49* 70* 61*   CREATININE mg/dL 5.36* 5.86* 5.37*   CALCIUM mg/dL 7.8* 7.9* 8.1*   GLUCOSE mg/dL 156* 173* 127*     Estimated Creatinine Clearance: 24.2 mL/min (A) (by C-G formula based on SCr of 5.36 mg/dL (H)).  Results from last 7 days   Lab Units 11/23/24 0337 11/22/24 0312 11/21/24 0316 11/20/24  1253 11/19/24 0313   MAGNESIUM mg/dL  --   --   --   --  2.3   PHOSPHORUS mg/dL 4.6* 5.9* 4.9*   < > 6.2*    < > = values in this interval not displayed.         Results from last 7 days   Lab Units 11/23/24 0337 11/22/24 0312 11/21/24 0316 11/20/24  1254 11/19/24 0313   WBC 10*3/mm3 6.85 7.39 8.24 7.73 7.75   HEMOGLOBIN g/dL 10.0* 10.0* 11.5* 10.5* 10.4*   PLATELETS 10*3/mm3 156 166 183 154 156           Assessment / Plan     ASSESSMENT:  Acute kidney injury chronic kidney disease stage V, secondary to diabetic nephropathy, now dialysis dependent  Hypervolemic hyponatremia.  Much improved sodium today 134 prediabetes  Hypertension, with systolic component related to volume excess  Diabetes mellitus type 2 with chronic kidney disease poorly controlled followed by the primary team  Anemia of CKD, hemoglobin today is 10, will monitor  Hypocalcemia, which corrects for low albumin  Hyperphosphatemia associate with chronic kidney disease phosphorus down to 5.9 with the addition of phosphate binder  HFrEF, echo noted, EF 30% (vs 20% in 2022).  GDMT measures ltd by advanced CKD, though could start ACE/ARB/entresto when start HD    PLAN:  1.  Continue the same  2.  Continue fluid restriction fluid restriction, 1500 mL/day  3.  Surveillance labs  4.  Dialysis monday  5.  Waiting for outpatient dialysis arranged    I reviewed the chart and other providers notes, reviewed labs.  I discussed the case with the patient.  Copied text in this note has been reviewed and is accurate as of 11/23/24.     Amandeep Mason MD  11/23/24  08:25 [facility]    [facility]  [phone]

## 2024-11-23 NOTE — PLAN OF CARE
Goal Outcome Evaluation:  Plan of Care Reviewed With: patient           Outcome Evaluation: VSS. A/O x4. O2 at 2L NC to keep sats >90%. Family remained at bedside. Pt appeared to have slept well in between care and had no c/o pain or discomfort though shift. Safety maintained. Will CTM.

## 2024-11-23 NOTE — PROGRESS NOTES
"    Name: Rufus Dorsey ADMIT: 2024   : 1985  PCP: Provider, No Known    MRN: [id number] LOS: 10 days   AGE/SEX: 39 y.o. male  ROOM: [facility]     Subjective   Subjective   Patient is feeling \"so-so\".   used.  States his breathing is okay. 4L off yesterday in HD         Objective   Objective   Vital Signs  Temp:  [97.3 °F (36.3 °C)-98.2 °F (36.8 °C)] 97.9 °F (36.6 °C)  Heart Rate:  [71-75] 73  Resp:  [18-19] 18  BP: (104-137)/(51-71) 137/68  SpO2:  [91 %-99 %] 98 %  on  Flow (L/min) (Oxygen Therapy):  [2] 2;   Device (Oxygen Therapy): nasal cannula  Body mass index is 47.92 kg/m².  Physical Exam  Vitals and nursing note reviewed.   Constitutional:       General: He is not in acute distress.  Cardiovascular:      Rate and Rhythm: Normal rate and regular rhythm.   Pulmonary:      Effort: Pulmonary effort is normal. No respiratory distress.      Comments: Bilateral crackles  Abdominal:      General: Abdomen is flat. There is no distension.      Tenderness: There is no abdominal tenderness.   Musculoskeletal:         General: No swelling or deformity.      Right lower leg: Edema present.      Left lower leg: Edema present.      Comments: 3+ pitting edema bilaterally   Skin:     General: Skin is warm and dry.   Neurological:      General: No focal deficit present.      Mental Status: He is alert. Mental status is at baseline.         Results Review     I reviewed the patient's new clinical results.  Results from last 7 days   Lab Units 24  03324  03124  03124  1254   WBC 10*3/mm3 6.85 7.39 8.24 7.73   HEMOGLOBIN g/dL 10.0* 10.0* 11.5* 10.5*   PLATELETS 10*3/mm3 156 166 183 154     Results from last 7 days   Lab Units 24  03324  0312 24  0316 24  1253   SODIUM mmol/L 134* 134* 136 137   POTASSIUM mmol/L 3.6 4.0 4.1 3.6   CHLORIDE mmol/L 101 99 99 102   CO2 mmol/L 23.2 22.6 23.0 25.0   BUN mg/dL 49* 70* 61* 56*   CREATININE mg/dL 5.36* 5.86* " 5.37* 4.75*   GLUCOSE mg/dL 156* 173* 127* 80   Estimated Creatinine Clearance: 24.2 mL/min (A) (by C-G formula based on SCr of 5.36 mg/dL (H)).  Results from last 7 days   Lab Units 11/23/24  0337 11/22/24  0312 11/21/24  0316 11/20/24  1253   ALBUMIN g/dL 2.7* 3.3* 3.3* 3.3*     Results from last 7 days   Lab Units 11/23/24  0337 11/22/24  0312 11/21/24  0316 11/20/24  1253 11/19/24  0313   CALCIUM mg/dL 7.8* 7.9* 8.1* 8.0* 7.8*   ALBUMIN g/dL 2.7* 3.3* 3.3* 3.3* 3.2*   MAGNESIUM mg/dL  --   --   --   --  2.3   PHOSPHORUS mg/dL 4.6* 5.9* 4.9* 4.1 6.2*       COVID19   Date Value Ref Range Status   11/13/2024 Not Detected Not Detected - Ref. Range Final   06/04/2022 Detected (C) Not Detected - Ref. Range Final     Glucose   Date/Time Value Ref Range Status   11/23/2024 1129 158 (H) 70 - 130 mg/dL Final   11/23/2024 0739 211 (H) 70 - 130 mg/dL Final   11/22/2024 2105 201 (H) 70 - 130 mg/dL Final   11/22/2024 1636 145 (H) 70 - 130 mg/dL Final   11/21/2024 2035 194 (H) 70 - 130 mg/dL Final   11/21/2024 1657 130 70 - 130 mg/dL Final   11/21/2024 1201 146 (H) 70 - 130 mg/dL Final       XR Chest 1 View  Narrative: XR CHEST 1 VW-     HISTORY: Male who is 39 years-old, decreased breath sounds     TECHNIQUE: Frontal view of the chest     COMPARISON: 11/13/2024     FINDINGS:  The central venous catheter extends to the cavoatrial junction region.  Sternotomy wires are noted. Heart is enlarged. Pulmonary vasculature is  congested. Bilateral pulmonary opacities appear increased, may represent  edema and/or pneumonia and/or ARDS. No large pleural effusion, or  pneumothorax. No acute osseous process.     Impression: Increased bilateral pulmonary opacities compatible with  interval worsening.     This report was finalized on 11/18/2024 1:38 PM by Dr. Suleiman Anderson M.D on Workstation: [id number]     Arteriogram (Autofinalize)  This procedure was auto-finalized with no dictation required.    I reviewed the patient's daily  medications.  Scheduled Medications  aspirin, 81 mg, Oral, Daily  carvedilol, 25 mg, Oral, BID With Meals  hydrALAZINE, 25 mg, Oral, Q8H  insulin glargine, 8 Units, Subcutaneous, Daily  insulin lispro, 2-7 Units, Subcutaneous, 4x Daily AC & at Bedtime  insulin lispro, 3 Units, Subcutaneous, TID With Meals  isosorbide dinitrate, 40 mg, Oral, TID - Nitrates  senna-docusate sodium, 2 tablet, Oral, BID  sevelamer, 2,400 mg, Oral, TID With Meals  sodium chloride, 10 mL, Intravenous, Q12H  vitamin D, 50,000 Units, Oral, Q7 Days    Infusions     Diet  Diet: Diabetic, Fluid Restriction (240 mL/tray); Consistent Carbohydrate; 1500 mL/day; Fluid Consistency: Thin (IDDSI 0)         I have personally reviewed:  [x]  Laboratory   []  Microbiology   [x]  Radiology   []  EKG/Telemetry   []  Cardiology/Vascular   []  Pathology   [x]  Records     Assessment/Plan     Active Hospital Problems    Diagnosis  POA    **Acute exacerbation of CHF (congestive heart failure) [I50.9]  Yes    Chronic HFrEF (heart failure with reduced ejection fraction) [I50.22]  Yes    S/P CABG x 3 [Z95.1]  Not Applicable    Acute on chronic combined systolic and diastolic CHF (congestive heart failure) [I50.43]  Yes    Coronary artery disease [I25.10]  Yes    Hypertension [I10]  Yes    Type 2 diabetes mellitus, with long-term current use of insulin [E11.9, Z79.4]  Not Applicable    MEI (acute kidney injury) [N17.9]  Unknown      Resolved Hospital Problems   No resolved problems to display.       39 y.o. male admitted with Acute exacerbation of CHF (congestive heart failure).    Volume overload  HFrEF EF 20% 2022  CAD CABG x3  Elevated troponin  HTN  MEI/CKD 4   Cardiology have signed off  Renal consulted-TDC catheter placed on 11/18.  Patient tolerating HD.   Wean oxygen as able, suspect a component of VERONICA  ASA, coreg, hydralazine, ISDN  Follow-up echocardiogram above EF 30%  Monitor I/Os and daily weights     DM2   uncontrolled. A1c 12.30%.   Currently on  correctional insulin.  Continue long-acting and mealtime insulin.  Diabetes educator-prior to discharge on Tresiba/NovoLog as this will be the most economical option with lack of insurance     Obesity Body mass index is 50.26 kg/m².     SCDs for DVT prophylaxis.  Full code.  Discussed with patient and nursing staff.  Anticipate discharge home timing yet to be determined. When on RA and LE swelling improved.    Expected Discharge Date: 11/25/2024; Expected Discharge Time:       John Gipson MD  [facility]  11/23/24  14:37 EST

## 2024-11-24 LAB
ALBUMIN SERPL-MCNC: 3.1 G/DL (ref 3.5–5.2)
ANION GAP SERPL CALCULATED.3IONS-SCNC: 14.5 MMOL/L (ref 5–15)
BUN SERPL-MCNC: 54 MG/DL (ref 6–20)
BUN/CREAT SERPL: 9.7 (ref 7–25)
CALCIUM SPEC-SCNC: 7.7 MG/DL (ref 8.6–10.5)
CHLORIDE SERPL-SCNC: 102 MMOL/L (ref 98–107)
CO2 SERPL-SCNC: 20.5 MMOL/L (ref 22–29)
CREAT SERPL-MCNC: 5.56 MG/DL (ref 0.76–1.27)
DEPRECATED RDW RBC AUTO: 43.6 FL (ref 37–54)
EGFRCR SERPLBLD CKD-EPI 2021: 12.5 ML/MIN/1.73
ERYTHROCYTE [DISTWIDTH] IN BLOOD BY AUTOMATED COUNT: 14 % (ref 12.3–15.4)
GLUCOSE BLDC GLUCOMTR-MCNC: 112 MG/DL (ref 70–130)
GLUCOSE BLDC GLUCOMTR-MCNC: 117 MG/DL (ref 70–130)
GLUCOSE BLDC GLUCOMTR-MCNC: 122 MG/DL (ref 70–130)
GLUCOSE BLDC GLUCOMTR-MCNC: 179 MG/DL (ref 70–130)
GLUCOSE SERPL-MCNC: 118 MG/DL (ref 65–99)
HCT VFR BLD AUTO: 31.3 % (ref 37.5–51)
HGB BLD-MCNC: 9.9 G/DL (ref 13–17.7)
MCH RBC QN AUTO: 27.3 PG (ref 26.6–33)
MCHC RBC AUTO-ENTMCNC: 31.6 G/DL (ref 31.5–35.7)
MCV RBC AUTO: 86.5 FL (ref 79–97)
PHOSPHATE SERPL-MCNC: 4.3 MG/DL (ref 2.5–4.5)
PLATELET # BLD AUTO: 145 10*3/MM3 (ref 140–450)
PMV BLD AUTO: 11.4 FL (ref 6–12)
POTASSIUM SERPL-SCNC: 3.8 MMOL/L (ref 3.5–5.2)
RBC # BLD AUTO: 3.62 10*6/MM3 (ref 4.14–5.8)
SODIUM SERPL-SCNC: 137 MMOL/L (ref 136–145)
WBC NRBC COR # BLD AUTO: 7.09 10*3/MM3 (ref 3.4–10.8)

## 2024-11-24 PROCEDURE — 85027 COMPLETE CBC AUTOMATED: CPT | Performed by: SURGERY

## 2024-11-24 PROCEDURE — 63710000001 INSULIN LISPRO (HUMAN) PER 5 UNITS: Performed by: SURGERY

## 2024-11-24 PROCEDURE — 63710000001 INSULIN GLARGINE PER 5 UNITS: Performed by: STUDENT IN AN ORGANIZED HEALTH CARE EDUCATION/TRAINING PROGRAM

## 2024-11-24 PROCEDURE — 82948 REAGENT STRIP/BLOOD GLUCOSE: CPT

## 2024-11-24 PROCEDURE — 80069 RENAL FUNCTION PANEL: CPT | Performed by: SURGERY

## 2024-11-24 RX ADMIN — ISOSORBIDE DINITRATE 40 MG: 20 TABLET ORAL at 12:42

## 2024-11-24 RX ADMIN — SENNOSIDES AND DOCUSATE SODIUM 2 TABLET: 50; 8.6 TABLET ORAL at 09:05

## 2024-11-24 RX ADMIN — SEVELAMER CARBONATE 2400 MG: 800 TABLET, FILM COATED ORAL at 17:22

## 2024-11-24 RX ADMIN — SEVELAMER CARBONATE 2400 MG: 800 TABLET, FILM COATED ORAL at 09:04

## 2024-11-24 RX ADMIN — INSULIN GLARGINE 8 UNITS: 100 INJECTION, SOLUTION SUBCUTANEOUS at 09:06

## 2024-11-24 RX ADMIN — HYDRALAZINE HYDROCHLORIDE 25 MG: 25 TABLET ORAL at 04:29

## 2024-11-24 RX ADMIN — INSULIN LISPRO 3 UNITS: 100 INJECTION, SOLUTION INTRAVENOUS; SUBCUTANEOUS at 17:23

## 2024-11-24 RX ADMIN — Medication 10 ML: at 09:06

## 2024-11-24 RX ADMIN — SEVELAMER CARBONATE 2400 MG: 800 TABLET, FILM COATED ORAL at 12:42

## 2024-11-24 RX ADMIN — INSULIN LISPRO 3 UNITS: 100 INJECTION, SOLUTION INTRAVENOUS; SUBCUTANEOUS at 12:42

## 2024-11-24 RX ADMIN — HYDRALAZINE HYDROCHLORIDE 25 MG: 25 TABLET ORAL at 12:42

## 2024-11-24 RX ADMIN — ISOSORBIDE DINITRATE 40 MG: 20 TABLET ORAL at 17:23

## 2024-11-24 RX ADMIN — ISOSORBIDE DINITRATE 40 MG: 20 TABLET ORAL at 09:05

## 2024-11-24 RX ADMIN — ASPIRIN 81 MG: 81 TABLET, COATED ORAL at 09:04

## 2024-11-24 RX ADMIN — INSULIN LISPRO 3 UNITS: 100 INJECTION, SOLUTION INTRAVENOUS; SUBCUTANEOUS at 09:05

## 2024-11-24 RX ADMIN — SENNOSIDES AND DOCUSATE SODIUM 2 TABLET: 50; 8.6 TABLET ORAL at 21:07

## 2024-11-24 RX ADMIN — CARVEDILOL 25 MG: 25 TABLET, FILM COATED ORAL at 17:40

## 2024-11-24 RX ADMIN — CARVEDILOL 25 MG: 25 TABLET, FILM COATED ORAL at 09:05

## 2024-11-24 RX ADMIN — Medication 10 ML: at 21:08

## 2024-11-24 RX ADMIN — HYDRALAZINE HYDROCHLORIDE 25 MG: 25 TABLET ORAL at 21:07

## 2024-11-24 RX ADMIN — INSULIN LISPRO 2 UNITS: 100 INJECTION, SOLUTION INTRAVENOUS; SUBCUTANEOUS at 21:07

## 2024-11-24 NOTE — PROGRESS NOTES
[facility] Progress Note      Patient Name: Rufus Dorsey  : 1985  MRN: [id number]  Primary Care Physician:  Provider, No Known  Date of admission: 2024    Subjective     Interval History:   Acute kidney injury on chronic kidney disease    Feels better, no cp, no cough, no soa    Review of Systems:   As noted above      Objective     Vitals:   Temp:  [97.5 °F (36.4 °C)-98.2 °F (36.8 °C)] 97.7 °F (36.5 °C)  Heart Rate:  [68-73] 68  Resp:  [18] 18  BP: (116-143)/(60-89) 123/67  Flow (L/min) (Oxygen Therapy):  [2] 2    Intake/Output Summary (Last 24 hours) at 2024 1036  Last data filed at 2024  Gross per 24 hour   Intake 885 ml   Output --   Net 885 ml       Physical Exam:    General: chronically ill appearing, morbidly obese, no acute distress  Neck: Difficult to assess because of body habitus, tunneled dialysis catheter to the right IJ  Lungs: Bilateral rhonchi, breathing effort normal  Heart: RRR, no rub  Abdomen: soft, nontender, distended, less abdominal wall edema  Extremities: trace BLE edema  Neurologic: No asterixis      Scheduled Meds:     aspirin, 81 mg, Oral, Daily  carvedilol, 25 mg, Oral, BID With Meals  hydrALAZINE, 25 mg, Oral, Q8H  insulin glargine, 8 Units, Subcutaneous, Daily  insulin lispro, 2-7 Units, Subcutaneous, 4x Daily AC & at Bedtime  insulin lispro, 3 Units, Subcutaneous, TID With Meals  isosorbide dinitrate, 40 mg, Oral, TID - Nitrates  senna-docusate sodium, 2 tablet, Oral, BID  sevelamer, 2,400 mg, Oral, TID With Meals  sodium chloride, 10 mL, Intravenous, Q12H  vitamin D, 50,000 Units, Oral, Q7 Days      IV Meds:          Results Reviewed:   I have personally reviewed the results from the time of this admission to 2024 10:36 EST     Results from last 7 days   Lab Units 24  0342 24  0337 24  0312   SODIUM mmol/L 137 134* 134*   POTASSIUM mmol/L 3.8 3.6 4.0   CHLORIDE mmol/L 102 101 99   CO2 mmol/L 20.5*  23.2 22.6   BUN mg/dL 54* 49* 70*   CREATININE mg/dL 5.56* 5.36* 5.86*   CALCIUM mg/dL 7.7* 7.8* 7.9*   GLUCOSE mg/dL 118* 156* 173*     Estimated Creatinine Clearance: 23.3 mL/min (A) (by C-G formula based on SCr of 5.56 mg/dL (H)).  Results from last 7 days   Lab Units 11/24/24 0342 11/23/24  0337 11/22/24 0312 11/20/24  1253 11/19/24  0313   MAGNESIUM mg/dL  --   --   --   --  2.3   PHOSPHORUS mg/dL 4.3 4.6* 5.9*   < > 6.2*    < > = values in this interval not displayed.         Results from last 7 days   Lab Units 11/24/24 0342 11/23/24 0337 11/22/24 0312 11/21/24  0316 11/20/24  1254   WBC 10*3/mm3 7.09 6.85 7.39 8.24 7.73   HEMOGLOBIN g/dL 9.9* 10.0* 10.0* 11.5* 10.5*   PLATELETS 10*3/mm3 145 156 166 183 154           Assessment / Plan     ASSESSMENT:  Acute kidney injury chronic kidney disease stage V, secondary to diabetic nephropathy, now dialysis dependent  Hypervolemic hyponatremia.  Resolved with dialysis  Hypertension, with systolic component related to volume excess  Diabetes mellitus type 2 with chronic kidney disease poorly controlled followed by the primary team  Anemia of CKD, hemoglobin today is 10, will monitor  Hypocalcemia, which corrects for low albumin  Hyperphosphatemia associate with chronic kidney disease phosphorus down to 5.9 with the addition of phosphate binder  HFrEF, echo noted, EF 30% (vs 20% in 2022).  GDMT measures ltd by advanced CKD, though could start ACE/ARB/entresto when start HD    PLAN:  1.  Continue the same  2.  Continue fluid restriction fluid restriction, 1500 mL/day  3.  Surveillance labs  4.  Dialysis monday  5.  Waiting for outpatient dialysis arranged    I reviewed the chart and other providers notes, reviewed labs.  I discussed the case with the patient.  Copied text in this note has been reviewed and is accurate as of 11/24/24.     Amandeep Mason MD  11/24/24  10:36 [facility]    [facility]  [phone]

## 2024-11-24 NOTE — PLAN OF CARE
Goal Outcome Evaluation:  Plan of Care Reviewed With: patient           Outcome Evaluation: VSS. A/O x4. Pt is less drowsy and more interactive. O2 at 2L NC maintained through shift. Pt had a shower and stated to have felt much better. Dressing change done at R FA and L UA IV sites, remained c/d/i. Family remained at bedside. Pt appeared to have slept well in between care and had no c/o pain or discomfort though shift. Safety maintained. Will CTM.

## 2024-11-24 NOTE — PLAN OF CARE
Problem: Comorbidity Management  Goal: Blood Pressure in Desired Range  Intervention: Maintain Blood Pressure Management  Recent Flowsheet Documentation  Taken 11/23/2024 1812 by Nicolasa Murphy RN  Medication Review/Management: medications reviewed  Taken 11/23/2024 1434 by Nicolasa Murphy RN  Medication Review/Management: medications reviewed  Taken 11/23/2024 1240 by Nicolasa Murphy RN  Medication Review/Management: medications reviewed  Taken 11/23/2024 0856 by Nicolasa Murphy RN  Medication Review/Management: medications reviewed   Goal Outcome Evaluation:  Plan of Care Reviewed With: patient        Progress: improving     Pt AOX4. Did communicate to this nurse that he would like to shower. Pt having some concerns about his health and what the ultimate outcome will be. This nurse discussed it with him to the best of her knowledge. VSS. Safety maintained.

## 2024-11-24 NOTE — PLAN OF CARE
Problem: Glycemic Control Impaired  Goal: Blood Glucose Level Within Target Range  Outcome: Progressing   Goal Outcome Evaluation:  Plan of Care Reviewed With: patient        Progress: improving     Pt Aox4. Sleeping between care. Family at bedside throughout the day and are supportive of pt. Pt did attempt to walk around nurse's station without oxygen on, and desatted into mid 80%. Pt reported not feeling soa. Pt with no complaints at this time. VSS. Safety maintained.

## 2024-11-24 NOTE — PROGRESS NOTES
Name: Rufus Dorsey ADMIT: 2024   : 1985  PCP: Provider, No Known    MRN: [id number] LOS: 11 days   AGE/SEX: 39 y.o. male  ROOM: [facility]     Subjective   Subjective    utilized.  Patient feeling better today.  Sitting up in chair on room air.  Walked reportedly in the [place] without oxygen desaturations.           Objective   Objective   Vital Signs  Temp:  [97.5 °F (36.4 °C)-98.2 °F (36.8 °C)] 97.7 °F (36.5 °C)  Heart Rate:  [68-73] 68  Resp:  [18] 18  BP: (116-143)/(60-89) 123/67  SpO2:  [94 %-100 %] 98 %  on  Flow (L/min) (Oxygen Therapy):  [2] 2;   Device (Oxygen Therapy): nasal cannula  Body mass index is 48.1 kg/m².  Physical Exam  Vitals and nursing note reviewed.   Constitutional:       General: He is not in acute distress.  Cardiovascular:      Rate and Rhythm: Normal rate and regular rhythm.   Pulmonary:      Effort: Pulmonary effort is normal. No respiratory distress.   Abdominal:      General: Abdomen is flat. There is no distension.      Tenderness: There is no abdominal tenderness.   Musculoskeletal:         General: No swelling or deformity.      Right lower leg: Edema present.      Left lower leg: Edema present.      Comments: 3+ pitting edema bilaterally   Skin:     General: Skin is warm and dry.   Neurological:      General: No focal deficit present.      Mental Status: He is alert. Mental status is at baseline.         Results Review     I reviewed the patient's new clinical results.  Results from last 7 days   Lab Units 24  03424   WBC 10*3/mm3 7.09 6.85 7.39 8.24   HEMOGLOBIN g/dL 9.9* 10.0* 10.0* 11.5*   PLATELETS 10*3/mm3 145 156 166 183     Results from last 7 days   Lab Units 24   SODIUM mmol/L 137 134* 134* 136   POTASSIUM mmol/L 3.8 3.6 4.0 4.1   CHLORIDE mmol/L 102 101 99 99   CO2 mmol/L 20.5* 23.2 22.6 23.0   BUN mg/dL 54* 49* 70* 61*   CREATININE mg/dL  5.56* 5.36* 5.86* 5.37*   GLUCOSE mg/dL 118* 156* 173* 127*   Estimated Creatinine Clearance: 23.3 mL/min (A) (by C-G formula based on SCr of 5.56 mg/dL (H)).  Results from last 7 days   Lab Units 11/24/24  0342 11/23/24  0337 11/22/24  0312 11/21/24  0316   ALBUMIN g/dL 3.1* 2.7* 3.3* 3.3*     Results from last 7 days   Lab Units 11/24/24  0342 11/23/24  0337 11/22/24  0312 11/21/24  0316 11/20/24  1253 11/19/24  0313   CALCIUM mg/dL 7.7* 7.8* 7.9* 8.1*   < > 7.8*   ALBUMIN g/dL 3.1* 2.7* 3.3* 3.3*   < > 3.2*   MAGNESIUM mg/dL  --   --   --   --   --  2.3   PHOSPHORUS mg/dL 4.3 4.6* 5.9* 4.9*   < > 6.2*    < > = values in this interval not displayed.       COVID19   Date Value Ref Range Status   11/13/2024 Not Detected Not Detected - Ref. Range Final   06/04/2022 Detected (C) Not Detected - Ref. Range Final     Glucose   Date/Time Value Ref Range Status   11/24/2024 0758 117 70 - 130 mg/dL Final   11/23/2024 2044 155 (H) 70 - 130 mg/dL Final   11/23/2024 1643 145 (H) 70 - 130 mg/dL Final   11/23/2024 1129 158 (H) 70 - 130 mg/dL Final   11/23/2024 0739 211 (H) 70 - 130 mg/dL Final   11/22/2024 2105 201 (H) 70 - 130 mg/dL Final   11/22/2024 1636 145 (H) 70 - 130 mg/dL Final       XR Chest 1 View  Narrative: XR CHEST 1 VW-     HISTORY: Male who is 39 years-old, decreased breath sounds     TECHNIQUE: Frontal view of the chest     COMPARISON: 11/13/2024     FINDINGS:  The central venous catheter extends to the cavoatrial junction region.  Sternotomy wires are noted. Heart is enlarged. Pulmonary vasculature is  congested. Bilateral pulmonary opacities appear increased, may represent  edema and/or pneumonia and/or ARDS. No large pleural effusion, or  pneumothorax. No acute osseous process.     Impression: Increased bilateral pulmonary opacities compatible with  interval worsening.     This report was finalized on 11/18/2024 1:38 PM by Dr. Suleiman Anderson M.D on Workstation: [id number]     Arteriogram (Autofinalize)  This  procedure was auto-finalized with no dictation required.    I reviewed the patient's daily medications.  Scheduled Medications  aspirin, 81 mg, Oral, Daily  carvedilol, 25 mg, Oral, BID With Meals  hydrALAZINE, 25 mg, Oral, Q8H  insulin glargine, 8 Units, Subcutaneous, Daily  insulin lispro, 2-7 Units, Subcutaneous, 4x Daily AC & at Bedtime  insulin lispro, 3 Units, Subcutaneous, TID With Meals  isosorbide dinitrate, 40 mg, Oral, TID - Nitrates  senna-docusate sodium, 2 tablet, Oral, BID  sevelamer, 2,400 mg, Oral, TID With Meals  sodium chloride, 10 mL, Intravenous, Q12H  vitamin D, 50,000 Units, Oral, Q7 Days    Infusions     Diet  Diet: Diabetic, Fluid Restriction (240 mL/tray); Consistent Carbohydrate; 1500 mL/day; Fluid Consistency: Thin (IDDSI 0)         I have personally reviewed:  [x]  Laboratory   []  Microbiology   [x]  Radiology   []  EKG/Telemetry   []  Cardiology/Vascular   []  Pathology   [x]  Records     Assessment/Plan     Active Hospital Problems    Diagnosis  POA    **Acute exacerbation of CHF (congestive heart failure) [I50.9]  Yes    Chronic HFrEF (heart failure with reduced ejection fraction) [I50.22]  Yes    S/P CABG x 3 [Z95.1]  Not Applicable    Acute on chronic combined systolic and diastolic CHF (congestive heart failure) [I50.43]  Yes    Coronary artery disease [I25.10]  Yes    Hypertension [I10]  Yes    Type 2 diabetes mellitus, with long-term current use of insulin [E11.9, Z79.4]  Not Applicable    MEI (acute kidney injury) [N17.9]  Unknown      Resolved Hospital Problems   No resolved problems to display.       39 y.o. male admitted with Acute exacerbation of CHF (congestive heart failure).    Volume overload  HFrEF EF 20% 2022  CAD CABG x3  Elevated troponin  HTN  MEI/CKD 4   Cardiology have signed off  Renal consulted-TDC catheter placed on 11/18.  Patient tolerating HD.   Wean oxygen as able, suspect a component of VERONICA  ASA, coreg, hydralazine, ISDN  Follow-up echocardiogram above EF  30%  Monitor I/Os and daily weights  Patient is slowly improving.     DM2   uncontrolled. A1c 12.30%.   Currently on correctional insulin.  Continue long-acting and mealtime insulin.  Diabetes educator-prior to discharge on Tresiba/NovoLog as this will be the most economical option with lack of insurance     Obesity Body mass index is 50.26 kg/m².     SCDs for DVT prophylaxis.  Full code.  Discussed with patient and nursing staff.  Anticipate discharge home timing yet to be determined. When on RA and LE swelling improved.    Expected Discharge Date: 11/25/2024; Expected Discharge Time:       John Gipson MD  [facility]  11/24/24  11:28 EST

## 2024-11-25 LAB
ALBUMIN SERPL-MCNC: 3.3 G/DL (ref 3.5–5.2)
ANION GAP SERPL CALCULATED.3IONS-SCNC: 11 MMOL/L (ref 5–15)
BUN SERPL-MCNC: 64 MG/DL (ref 6–20)
BUN/CREAT SERPL: 11.4 (ref 7–25)
CALCIUM SPEC-SCNC: 8 MG/DL (ref 8.6–10.5)
CHLORIDE SERPL-SCNC: 102 MMOL/L (ref 98–107)
CO2 SERPL-SCNC: 23 MMOL/L (ref 22–29)
CREAT SERPL-MCNC: 5.63 MG/DL (ref 0.76–1.27)
DEPRECATED RDW RBC AUTO: 47.5 FL (ref 37–54)
EGFRCR SERPLBLD CKD-EPI 2021: 12.3 ML/MIN/1.73
ERYTHROCYTE [DISTWIDTH] IN BLOOD BY AUTOMATED COUNT: 14.5 % (ref 12.3–15.4)
GLUCOSE BLDC GLUCOMTR-MCNC: 150 MG/DL (ref 70–130)
GLUCOSE BLDC GLUCOMTR-MCNC: 153 MG/DL (ref 70–130)
GLUCOSE BLDC GLUCOMTR-MCNC: 197 MG/DL (ref 70–130)
GLUCOSE BLDC GLUCOMTR-MCNC: 290 MG/DL (ref 70–130)
GLUCOSE SERPL-MCNC: 208 MG/DL (ref 65–99)
HCT VFR BLD AUTO: 33.4 % (ref 37.5–51)
HGB BLD-MCNC: 10 G/DL (ref 13–17.7)
MCH RBC QN AUTO: 26.8 PG (ref 26.6–33)
MCHC RBC AUTO-ENTMCNC: 29.9 G/DL (ref 31.5–35.7)
MCV RBC AUTO: 89.5 FL (ref 79–97)
PHOSPHATE SERPL-MCNC: 4.3 MG/DL (ref 2.5–4.5)
PLATELET # BLD AUTO: 167 10*3/MM3 (ref 140–450)
PMV BLD AUTO: 11.7 FL (ref 6–12)
POTASSIUM SERPL-SCNC: 3.9 MMOL/L (ref 3.5–5.2)
RBC # BLD AUTO: 3.73 10*6/MM3 (ref 4.14–5.8)
SODIUM SERPL-SCNC: 136 MMOL/L (ref 136–145)
WBC NRBC COR # BLD AUTO: 8.55 10*3/MM3 (ref 3.4–10.8)

## 2024-11-25 PROCEDURE — 82948 REAGENT STRIP/BLOOD GLUCOSE: CPT

## 2024-11-25 PROCEDURE — 25010000002 EPOETIN ALFA-EPBX 10000 UNIT/ML SOLUTION: Performed by: INTERNAL MEDICINE

## 2024-11-25 PROCEDURE — 63710000001 INSULIN GLARGINE PER 5 UNITS: Performed by: STUDENT IN AN ORGANIZED HEALTH CARE EDUCATION/TRAINING PROGRAM

## 2024-11-25 PROCEDURE — 63710000001 INSULIN LISPRO (HUMAN) PER 5 UNITS: Performed by: SURGERY

## 2024-11-25 PROCEDURE — 85027 COMPLETE CBC AUTOMATED: CPT | Performed by: SURGERY

## 2024-11-25 PROCEDURE — 80069 RENAL FUNCTION PANEL: CPT | Performed by: SURGERY

## 2024-11-25 RX ORDER — HYDRALAZINE HYDROCHLORIDE 10 MG/1
10 TABLET, FILM COATED ORAL EVERY 8 HOURS SCHEDULED
Status: DISCONTINUED | OUTPATIENT
Start: 2024-11-25 | End: 2024-12-03 | Stop reason: HOSPADM

## 2024-11-25 RX ADMIN — SEVELAMER CARBONATE 2400 MG: 800 TABLET, FILM COATED ORAL at 17:46

## 2024-11-25 RX ADMIN — INSULIN LISPRO 2 UNITS: 100 INJECTION, SOLUTION INTRAVENOUS; SUBCUTANEOUS at 12:43

## 2024-11-25 RX ADMIN — HYDRALAZINE HYDROCHLORIDE 10 MG: 10 TABLET ORAL at 12:43

## 2024-11-25 RX ADMIN — INSULIN LISPRO 2 UNITS: 100 INJECTION, SOLUTION INTRAVENOUS; SUBCUTANEOUS at 17:46

## 2024-11-25 RX ADMIN — INSULIN GLARGINE 8 UNITS: 100 INJECTION, SOLUTION SUBCUTANEOUS at 12:43

## 2024-11-25 RX ADMIN — SENNOSIDES AND DOCUSATE SODIUM 2 TABLET: 50; 8.6 TABLET ORAL at 21:06

## 2024-11-25 RX ADMIN — INSULIN LISPRO 3 UNITS: 100 INJECTION, SOLUTION INTRAVENOUS; SUBCUTANEOUS at 17:46

## 2024-11-25 RX ADMIN — SEVELAMER CARBONATE 2400 MG: 800 TABLET, FILM COATED ORAL at 12:43

## 2024-11-25 RX ADMIN — INSULIN LISPRO 4 UNITS: 100 INJECTION, SOLUTION INTRAVENOUS; SUBCUTANEOUS at 21:06

## 2024-11-25 RX ADMIN — CARVEDILOL 25 MG: 25 TABLET, FILM COATED ORAL at 07:31

## 2024-11-25 RX ADMIN — ISOSORBIDE DINITRATE 40 MG: 20 TABLET ORAL at 12:43

## 2024-11-25 RX ADMIN — ISOSORBIDE DINITRATE 40 MG: 20 TABLET ORAL at 07:31

## 2024-11-25 RX ADMIN — Medication 10 ML: at 21:07

## 2024-11-25 RX ADMIN — INSULIN LISPRO 3 UNITS: 100 INJECTION, SOLUTION INTRAVENOUS; SUBCUTANEOUS at 12:44

## 2024-11-25 RX ADMIN — CARVEDILOL 25 MG: 25 TABLET, FILM COATED ORAL at 17:46

## 2024-11-25 RX ADMIN — EPOETIN ALFA-EPBX 10000 UNITS: 10000 INJECTION, SOLUTION INTRAVENOUS; SUBCUTANEOUS at 12:44

## 2024-11-25 RX ADMIN — ASPIRIN 81 MG: 81 TABLET, COATED ORAL at 07:31

## 2024-11-25 RX ADMIN — ISOSORBIDE DINITRATE 40 MG: 20 TABLET ORAL at 17:46

## 2024-11-25 RX ADMIN — HYDRALAZINE HYDROCHLORIDE 25 MG: 25 TABLET ORAL at 03:51

## 2024-11-25 NOTE — CASE MANAGEMENT/SOCIAL WORK
Continued Stay Note  [facility]     Patient Name: Rufus Dorsey  MRN: [id number]  Today's Date: 11/25/2024    Admit Date: 11/13/2024    Plan: Home, brother to transport   Discharge Plan       Row Name 11/25/24 1242       Plan    Plan Comments Pt still has no further options for insurance. Family continues to reconsider returning to [place] for treatment. CCP continues to follow.                   Discharge Codes    No documentation.                 Expected Discharge Date and Time       Expected Discharge Date Expected Discharge Time    Nov 28, 2024               Mónica Rodriguez RN

## 2024-11-25 NOTE — NURSING NOTE
Dialysis complete, removed 4 liters with this treatment and no complications noted.  Pre and post vitals are in epic, dressing change completed to dialysis catheter.

## 2024-11-25 NOTE — PROGRESS NOTES
[facility] Progress Note      Patient Name: Rufus Dorsey  : 1985  MRN: [id number]  Primary Care Physician:  Provider, No Known  Date of admission: 2024    Subjective     Interval History:   MEI on CKD: likely new ESRD    Seen and examined on HD; breathing is comfortable on NC 02  No cramping; 3 L net targeted for removal today    Review of Systems:   As noted above      Objective     Vitals:   Temp:  [97.5 °F (36.4 °C)-98.2 °F (36.8 °C)] 97.5 °F (36.4 °C)  Heart Rate:  [70-74] 72  Resp:  [18] 18  BP: (120-165)/(53-82) 135/53  Flow (L/min) (Oxygen Therapy):  [2] 2    Intake/Output Summary (Last 24 hours) at 2024 0921  Last data filed at 2024 2350  Gross per 24 hour   Intake 240 ml   Output --   Net 240 ml       Physical Exam:    General: chronically ill appearing, morbidly obese, NAD  Neck: Difficult to assess because of body habitus, TDC right IJ  Lungs: Bilateral rhonchi, breathing effort normal  Heart: RRR, no rub  Abdomen: soft, nontender, distended, l+abdominal wall edema  Extremities: +2 BLE edema  Neurologic: No asterixis      Scheduled Meds:     aspirin, 81 mg, Oral, Daily  carvedilol, 25 mg, Oral, BID With Meals  epoetin kylie/kylie-epbx, 10,000 Units, Intravenous, Once  hydrALAZINE, 25 mg, Oral, Q8H  insulin glargine, 8 Units, Subcutaneous, Daily  insulin lispro, 2-7 Units, Subcutaneous, 4x Daily AC & at Bedtime  insulin lispro, 3 Units, Subcutaneous, TID With Meals  isosorbide dinitrate, 40 mg, Oral, TID - Nitrates  senna-docusate sodium, 2 tablet, Oral, BID  sevelamer, 2,400 mg, Oral, TID With Meals  sodium chloride, 10 mL, Intravenous, Q12H  vitamin D, 50,000 Units, Oral, Q7 Days      IV Meds:          Results Reviewed:   I have personally reviewed the results from the time of this admission to 2024 09:21 EST     Results from last 7 days   Lab Units 24  0344 24  0342 24  0337   SODIUM mmol/L 136 137 134*   POTASSIUM mmol/L  3.9 3.8 3.6   CHLORIDE mmol/L 102 102 101   CO2 mmol/L 23.0 20.5* 23.2   BUN mg/dL 64* 54* 49*   CREATININE mg/dL 5.63* 5.56* 5.36*   CALCIUM mg/dL 8.0* 7.7* 7.8*   GLUCOSE mg/dL 208* 118* 156*     Estimated Creatinine Clearance: 23.1 mL/min (A) (by C-G formula based on SCr of 5.63 mg/dL (H)).  Results from last 7 days   Lab Units 11/25/24  0344 11/24/24  0342 11/23/24  0337 11/20/24  1253 11/19/24  0313   MAGNESIUM mg/dL  --   --   --   --  2.3   PHOSPHORUS mg/dL 4.3 4.3 4.6*   < > 6.2*    < > = values in this interval not displayed.         Results from last 7 days   Lab Units 11/25/24  0344 11/24/24 0342 11/23/24 0337 11/22/24  0312 11/21/24  0316   WBC 10*3/mm3 8.55 7.09 6.85 7.39 8.24   HEMOGLOBIN g/dL 10.0* 9.9* 10.0* 10.0* 11.5*   PLATELETS 10*3/mm3 167 145 156 166 183           Assessment / Plan     ASSESSMENT:  MEI on CKD 5, progressed to ESRD.  Remains very volume-overloaded; electrolytes fine with improving hypervolemic hyponatremia.  Resolved hyperphosphatemia.  On HD now  Hypertension, with systolic component related to volume excess  Diabetes mellitus type 2 with chronic kidney disease, poorly controlled  Anemia of CKD, with hemoglobin at goal  HFrEF, echo noted, EF 30% (vs 20% in 2022).  GDMT measures ltd by advanced CKD, though could start ACE/ARB/entresto when start HD    PLAN:  1.  iUF tomorrow for additional fluid removal.  Will attempt additional liter today--for a total of 4 L net--with today's dialysis  2.  Continue fluid restriction, 1500 mL/day  3.  Reduce hydralazine to allow safer fluid removal  4.  Unclear whether there are any good options for outpatient dialysis    Copied text in this note has been reviewed and is accurate as of 11/25/24.     George Nicholson MD  11/25/24  09:21 EST    [facility]  [phone]

## 2024-11-25 NOTE — PROGRESS NOTES
Name: Rufus Dorsey ADMIT: 2024   : 1985  PCP: Provider, No Known    MRN: [id number] LOS: 12 days   AGE/SEX: 39 y.o. male  ROOM: [facility]     Subjective   Subjective     Patient is lying on the bed and does not appear to be major distress.  No new events overnight.         Objective   Objective   Vital Signs  Temp:  [97.3 °F (36.3 °C)-98.1 °F (36.7 °C)] 97.3 °F (36.3 °C)  Heart Rate:  [66-74] 66  Resp:  [16-18] 16  BP: (120-165)/(53-82) 160/76  SpO2:  [96 %-98 %] 96 %  on  Flow (L/min) (Oxygen Therapy):  [2] 2;   Device (Oxygen Therapy): nasal cannula  Body mass index is 48.46 kg/m².  Physical Exam  Vitals and nursing note reviewed.   Constitutional:       General: He is not in acute distress.  HENT:      Head: Normocephalic and atraumatic.      Mouth/Throat:      Mouth: Mucous membranes are moist.   Eyes:      Extraocular Movements: Extraocular movements intact.      Pupils: Pupils are equal, round, and reactive to light.   Cardiovascular:      Rate and Rhythm: Normal rate and regular rhythm.   Pulmonary:      Effort: Pulmonary effort is normal. No respiratory distress.   Abdominal:      General: Abdomen is flat. There is no distension.      Tenderness: There is no abdominal tenderness.   Musculoskeletal:         General: No swelling or deformity.      Right lower leg: Edema present.      Left lower leg: Edema present.      Comments: 3+ pitting edema bilaterally   Skin:     General: Skin is warm and dry.   Neurological:      General: No focal deficit present.      Mental Status: He is alert. Mental status is at baseline.         Results Review     I reviewed the patient's new clinical results.  Results from last 7 days   Lab Units 24  03424  031   WBC 10*3/mm3 8.55 7.09 6.85 7.39   HEMOGLOBIN g/dL 10.0* 9.9* 10.0* 10.0*   PLATELETS 10*3/mm3 167 145 156 166     Results from last 7 days   Lab Units 24  03424  11/22/24  0312   SODIUM mmol/L 136 137 134* 134*   POTASSIUM mmol/L 3.9 3.8 3.6 4.0   CHLORIDE mmol/L 102 102 101 99   CO2 mmol/L 23.0 20.5* 23.2 22.6   BUN mg/dL 64* 54* 49* 70*   CREATININE mg/dL 5.63* 5.56* 5.36* 5.86*   GLUCOSE mg/dL 208* 118* 156* 173*   Estimated Creatinine Clearance: 23.1 mL/min (A) (by C-G formula based on SCr of 5.63 mg/dL (H)).  Results from last 7 days   Lab Units 11/25/24  0344 11/24/24  0342 11/23/24  0337 11/22/24 0312   ALBUMIN g/dL 3.3* 3.1* 2.7* 3.3*     Results from last 7 days   Lab Units 11/25/24  0344 11/24/24  0342 11/23/24  0337 11/22/24  0312 11/20/24  1253 11/19/24  0313   CALCIUM mg/dL 8.0* 7.7* 7.8* 7.9*   < > 7.8*   ALBUMIN g/dL 3.3* 3.1* 2.7* 3.3*   < > 3.2*   MAGNESIUM mg/dL  --   --   --   --   --  2.3   PHOSPHORUS mg/dL 4.3 4.3 4.6* 5.9*   < > 6.2*    < > = values in this interval not displayed.       COVID19   Date Value Ref Range Status   11/13/2024 Not Detected Not Detected - Ref. Range Final   06/04/2022 Detected (C) Not Detected - Ref. Range Final     Glucose   Date/Time Value Ref Range Status   11/25/2024 1219 150 (H) 70 - 130 mg/dL Final   11/25/2024 0733 197 (H) 70 - 130 mg/dL Final   11/24/2024 2008 179 (H) 70 - 130 mg/dL Final   11/24/2024 1632 122 70 - 130 mg/dL Final   11/24/2024 1132 112 70 - 130 mg/dL Final   11/24/2024 0758 117 70 - 130 mg/dL Final   11/23/2024 2044 155 (H) 70 - 130 mg/dL Final       XR Chest 1 View  Narrative: XR CHEST 1 VW-     HISTORY: Male who is 39 years-old, decreased breath sounds     TECHNIQUE: Frontal view of the chest     COMPARISON: 11/13/2024     FINDINGS:  The central venous catheter extends to the cavoatrial junction region.  Sternotomy wires are noted. Heart is enlarged. Pulmonary vasculature is  congested. Bilateral pulmonary opacities appear increased, may represent  edema and/or pneumonia and/or ARDS. No large pleural effusion, or  pneumothorax. No acute osseous process.     Impression: Increased bilateral pulmonary  opacities compatible with  interval worsening.     This report was finalized on 11/18/2024 1:38 PM by Dr. Suleiman Anderson M.D on Workstation: [id number]     Arteriogram (Autofinalize)  This procedure was auto-finalized with no dictation required.    I reviewed the patient's daily medications.  Scheduled Medications  aspirin, 81 mg, Oral, Daily  carvedilol, 25 mg, Oral, BID With Meals  hydrALAZINE, 10 mg, Oral, Q8H  insulin glargine, 8 Units, Subcutaneous, Daily  insulin lispro, 2-7 Units, Subcutaneous, 4x Daily AC & at Bedtime  insulin lispro, 3 Units, Subcutaneous, TID With Meals  isosorbide dinitrate, 40 mg, Oral, TID - Nitrates  senna-docusate sodium, 2 tablet, Oral, BID  sevelamer, 2,400 mg, Oral, TID With Meals  sodium chloride, 10 mL, Intravenous, Q12H  vitamin D, 50,000 Units, Oral, Q7 Days    Infusions     Diet  Diet: Diabetic, Fluid Restriction (240 mL/tray); Consistent Carbohydrate; 1500 mL/day; Fluid Consistency: Thin (IDDSI 0)         I have personally reviewed:  [x]  Laboratory   []  Microbiology   [x]  Radiology   []  EKG/Telemetry   []  Cardiology/Vascular   []  Pathology   [x]  Records     Assessment/Plan     Active Hospital Problems    Diagnosis  POA    **Acute exacerbation of CHF (congestive heart failure) [I50.9]  Yes    Chronic HFrEF (heart failure with reduced ejection fraction) [I50.22]  Yes    S/P CABG x 3 [Z95.1]  Not Applicable    Acute on chronic combined systolic and diastolic CHF (congestive heart failure) [I50.43]  Yes    Coronary artery disease [I25.10]  Yes    Hypertension [I10]  Yes    Type 2 diabetes mellitus, with long-term current use of insulin [E11.9, Z79.4]  Not Applicable    MEI (acute kidney injury) [N17.9]  Unknown      Resolved Hospital Problems   No resolved problems to display.       39 y.o. male admitted with Acute exacerbation of CHF (congestive heart failure).    Volume overload  HFrEF EF 20% 2022  CAD CABG x3  Elevated troponin  HTN  MEI/CKD 4   Cardiology have signed  off  Renal consulted-TDC catheter placed on 11/18.  Patient tolerating HD.   Status post dialysis earlier today with removal of 4 L  Wean oxygen as able, suspect a component of VERONICA  ASA, coreg, hydralazine, ISDN  Follow-up echocardiogram above EF 30%  Monitor I/Os and daily weights       DM2   uncontrolled. A1c 12.30%.   Currently on correctional insulin.  Continue long-acting and mealtime insulin.  Diabetes educator-prior to discharge on Tresiba/NovoLog as this will be the most economical option with lack of insurance     Obesity Body mass index is 50.26 kg/m².     SCDs for DVT prophylaxis.  Full code.  Discussed with patient and nursing staff.  Anticipate discharge home timing yet to be determined.  Home once euvolemic and once cleared by nephrology.    Expected Discharge Date: 11/28/2024; Expected Discharge Time:       Patrick Barrett MD  [facility]  11/25/24  13:35 EST

## 2024-11-25 NOTE — PLAN OF CARE
Goal Outcome Evaluation:              Outcome Evaluation: VSS. STILL REQUIRES O2 AT 2 L/M N/C. AND DOES DESAT INTO THE 80'S BRIEFLY WITH HIS SLEEP APNEA. FOR DIALYSIS TODAY.

## 2024-11-25 NOTE — PROGRESS NOTES
Nutrition Services    Patient Name:  Rufus Dorsey  YOB: 1985  MRN: [id number]  Admit Date:  11/13/2024  Nutrition Services      PROGRESS NOTE      Encounter Information: Follow up note       PO Diet: Diet: Diabetic, Fluid Restriction (240 mL/tray); Consistent Carbohydrate; 1500 mL/day; Fluid Consistency: Thin (IDDSI 0)   PO Supplements: N/A   PO Intake:  Adequate po intake-100% at many meals, family bringing in meals also       Nutrition support orders: --   Nutrition support review: --       Labs (reviewed below): Discussion with nursing care team about potential change to Renal diet due to patients K, Na, and Phos levels.  Will not change diet as family bringing in meals that will reflect what patient eats at discharge.   Weight Weight loss of 6 kg since admission, however still have 3+ edema in lower extremities and abdomen.  Currently at 136 kg   GI Function:  Last BM 11/23       Nutrition Intervention: Po intake adequate. Pt continues on 1500ml fluid restriction. In dialysis currently.  RD to follow.       Results from last 7 days   Lab Units 11/25/24  0344 11/24/24  0342 11/23/24  0337   SODIUM mmol/L 136 137 134*   POTASSIUM mmol/L 3.9 3.8 3.6   CHLORIDE mmol/L 102 102 101   CO2 mmol/L 23.0 20.5* 23.2   BUN mg/dL 64* 54* 49*   CREATININE mg/dL 5.63* 5.56* 5.36*   CALCIUM mg/dL 8.0* 7.7* 7.8*   GLUCOSE mg/dL 208* 118* 156*     Results from last 7 days   Lab Units 11/25/24  0344 11/20/24  1253 11/19/24  0313   MAGNESIUM mg/dL  --   --  2.3   PHOSPHORUS mg/dL 4.3   < > 6.2*   HEMOGLOBIN g/dL 10.0*   < > 10.4*   HEMATOCRIT % 33.4*   < > 31.9*    < > = values in this interval not displayed.     COVID19   Date Value Ref Range Status   11/13/2024 Not Detected Not Detected - Ref. Range Final     Lab Results   Component Value Date    HGBA1C 12.30 (H) 11/14/2024       RD to follow up per protocol.    Electronically signed by:  Ward Min RD  11/25/24 11:52 EST

## 2024-11-26 LAB
ALBUMIN SERPL-MCNC: 3 G/DL (ref 3.5–5.2)
ANION GAP SERPL CALCULATED.3IONS-SCNC: 10.9 MMOL/L (ref 5–15)
BASOPHILS # BLD AUTO: 0.02 10*3/MM3 (ref 0–0.2)
BASOPHILS NFR BLD AUTO: 0.3 % (ref 0–1.5)
BUN SERPL-MCNC: 42 MG/DL (ref 6–20)
BUN/CREAT SERPL: 9.6 (ref 7–25)
CALCIUM SPEC-SCNC: 7.8 MG/DL (ref 8.6–10.5)
CHLORIDE SERPL-SCNC: 103 MMOL/L (ref 98–107)
CO2 SERPL-SCNC: 25.1 MMOL/L (ref 22–29)
CREAT SERPL-MCNC: 4.38 MG/DL (ref 0.76–1.27)
DEPRECATED RDW RBC AUTO: 47.5 FL (ref 37–54)
EGFRCR SERPLBLD CKD-EPI 2021: 16.7 ML/MIN/1.73
EOSINOPHIL # BLD AUTO: 0.25 10*3/MM3 (ref 0–0.4)
EOSINOPHIL NFR BLD AUTO: 3.1 % (ref 0.3–6.2)
ERYTHROCYTE [DISTWIDTH] IN BLOOD BY AUTOMATED COUNT: 14.5 % (ref 12.3–15.4)
FERRITIN SERPL-MCNC: 216 NG/ML (ref 30–400)
GLUCOSE BLDC GLUCOMTR-MCNC: 162 MG/DL (ref 70–130)
GLUCOSE BLDC GLUCOMTR-MCNC: 165 MG/DL (ref 70–130)
GLUCOSE BLDC GLUCOMTR-MCNC: 183 MG/DL (ref 70–130)
GLUCOSE SERPL-MCNC: 196 MG/DL (ref 65–99)
HCT VFR BLD AUTO: 32.2 % (ref 37.5–51)
HGB BLD-MCNC: 9.8 G/DL (ref 13–17.7)
IMM GRANULOCYTES # BLD AUTO: 0.05 10*3/MM3 (ref 0–0.05)
IMM GRANULOCYTES NFR BLD AUTO: 0.6 % (ref 0–0.5)
IRON 24H UR-MRATE: 38 MCG/DL (ref 59–158)
IRON SATN MFR SERPL: 14 % (ref 20–50)
LYMPHOCYTES # BLD AUTO: 0.92 10*3/MM3 (ref 0.7–3.1)
LYMPHOCYTES NFR BLD AUTO: 11.5 % (ref 19.6–45.3)
MCH RBC QN AUTO: 27.3 PG (ref 26.6–33)
MCHC RBC AUTO-ENTMCNC: 30.4 G/DL (ref 31.5–35.7)
MCV RBC AUTO: 89.7 FL (ref 79–97)
MONOCYTES # BLD AUTO: 0.71 10*3/MM3 (ref 0.1–0.9)
MONOCYTES NFR BLD AUTO: 8.9 % (ref 5–12)
NEUTROPHILS NFR BLD AUTO: 6.02 10*3/MM3 (ref 1.7–7)
NEUTROPHILS NFR BLD AUTO: 75.6 % (ref 42.7–76)
NRBC BLD AUTO-RTO: 0 /100 WBC (ref 0–0.2)
PHOSPHATE SERPL-MCNC: 2.9 MG/DL (ref 2.5–4.5)
PLATELET # BLD AUTO: 160 10*3/MM3 (ref 140–450)
PMV BLD AUTO: 11.6 FL (ref 6–12)
POTASSIUM SERPL-SCNC: 4.3 MMOL/L (ref 3.5–5.2)
RBC # BLD AUTO: 3.59 10*6/MM3 (ref 4.14–5.8)
SODIUM SERPL-SCNC: 139 MMOL/L (ref 136–145)
TIBC SERPL-MCNC: 280 MCG/DL (ref 298–536)
TRANSFERRIN SERPL-MCNC: 188 MG/DL (ref 200–360)
WBC NRBC COR # BLD AUTO: 7.97 10*3/MM3 (ref 3.4–10.8)

## 2024-11-26 PROCEDURE — 82948 REAGENT STRIP/BLOOD GLUCOSE: CPT

## 2024-11-26 PROCEDURE — 83540 ASSAY OF IRON: CPT | Performed by: INTERNAL MEDICINE

## 2024-11-26 PROCEDURE — 80069 RENAL FUNCTION PANEL: CPT | Performed by: SURGERY

## 2024-11-26 PROCEDURE — 84466 ASSAY OF TRANSFERRIN: CPT | Performed by: INTERNAL MEDICINE

## 2024-11-26 PROCEDURE — 85025 COMPLETE CBC W/AUTO DIFF WBC: CPT | Performed by: INTERNAL MEDICINE

## 2024-11-26 PROCEDURE — 63710000001 INSULIN LISPRO (HUMAN) PER 5 UNITS: Performed by: SURGERY

## 2024-11-26 PROCEDURE — 82728 ASSAY OF FERRITIN: CPT | Performed by: INTERNAL MEDICINE

## 2024-11-26 PROCEDURE — 63710000001 INSULIN GLARGINE PER 5 UNITS: Performed by: STUDENT IN AN ORGANIZED HEALTH CARE EDUCATION/TRAINING PROGRAM

## 2024-11-26 RX ADMIN — SENNOSIDES AND DOCUSATE SODIUM 2 TABLET: 50; 8.6 TABLET ORAL at 21:07

## 2024-11-26 RX ADMIN — Medication 10 ML: at 13:08

## 2024-11-26 RX ADMIN — HYDRALAZINE HYDROCHLORIDE 10 MG: 10 TABLET ORAL at 13:07

## 2024-11-26 RX ADMIN — SEVELAMER CARBONATE 2400 MG: 800 TABLET, FILM COATED ORAL at 17:13

## 2024-11-26 RX ADMIN — INSULIN LISPRO 3 UNITS: 100 INJECTION, SOLUTION INTRAVENOUS; SUBCUTANEOUS at 17:14

## 2024-11-26 RX ADMIN — INSULIN LISPRO 3 UNITS: 100 INJECTION, SOLUTION INTRAVENOUS; SUBCUTANEOUS at 08:46

## 2024-11-26 RX ADMIN — INSULIN LISPRO 2 UNITS: 100 INJECTION, SOLUTION INTRAVENOUS; SUBCUTANEOUS at 08:46

## 2024-11-26 RX ADMIN — Medication 10 ML: at 21:10

## 2024-11-26 RX ADMIN — INSULIN LISPRO 2 UNITS: 100 INJECTION, SOLUTION INTRAVENOUS; SUBCUTANEOUS at 17:14

## 2024-11-26 RX ADMIN — SEVELAMER CARBONATE 2400 MG: 800 TABLET, FILM COATED ORAL at 13:08

## 2024-11-26 RX ADMIN — ISOSORBIDE DINITRATE 40 MG: 20 TABLET ORAL at 17:13

## 2024-11-26 RX ADMIN — INSULIN GLARGINE 8 UNITS: 100 INJECTION, SOLUTION SUBCUTANEOUS at 08:45

## 2024-11-26 RX ADMIN — INSULIN LISPRO 2 UNITS: 100 INJECTION, SOLUTION INTRAVENOUS; SUBCUTANEOUS at 21:08

## 2024-11-26 RX ADMIN — CARVEDILOL 25 MG: 25 TABLET, FILM COATED ORAL at 17:13

## 2024-11-26 RX ADMIN — ASPIRIN 81 MG: 81 TABLET, COATED ORAL at 13:07

## 2024-11-26 RX ADMIN — ISOSORBIDE DINITRATE 40 MG: 20 TABLET ORAL at 13:04

## 2024-11-26 RX ADMIN — HYDRALAZINE HYDROCHLORIDE 10 MG: 10 TABLET ORAL at 05:38

## 2024-11-26 NOTE — NURSING NOTE
Ultrafiltration complete, removed 3.5 liters with this treatment and no complications noted.  Pre and post vitals are in epic.  Dressing to dialysis catheter is current.

## 2024-11-26 NOTE — PLAN OF CARE
Problem: Comorbidity Management  Goal: Blood Pressure in Desired Range  Outcome: Progressing  Intervention: Maintain Blood Pressure Management  Recent Flowsheet Documentation  Taken 11/25/2024 1533 by Nicolasa Murphy RN  Medication Review/Management: medications reviewed  Taken 11/25/2024 1250 by Nicolasa Murphy RN  Medication Review/Management: medications reviewed  Taken 11/25/2024 0738 by Nicolasa Murphy RN  Medication Review/Management: medications reviewed   Goal Outcome Evaluation:  Plan of Care Reviewed With: patient        Progress: improving     Pt Aox4. Pt and family have concerns and questions about what his clinical course is. Pt with no complaints at this time. Up ad valente. Underwent dialysis treatment this shift. Sleeping during and after dialysis treatment but arousable. VSS. Safety maintained.

## 2024-11-26 NOTE — PROGRESS NOTES
Name: Rufus Dorsey ADMIT: 2024   : 1985  PCP: Provider, No Known    MRN: [id number] LOS: 13 days   AGE/SEX: 39 y.o. male  ROOM: [facility]     Subjective   Subjective     Patient is lying on the bed and does not appear to be major distress.  No new events overnight.  Denies nausea, vomiting, abdominal pain, chest pain.         Objective   Objective   Vital Signs  Temp:  [97.5 °F (36.4 °C)-98.6 °F (37 °C)] 97.5 °F (36.4 °C)  Heart Rate:  [] 71  Resp:  [16-19] 18  BP: (108-150)/(57-77) 133/77  SpO2:  [96 %-98 %] 97 %  on  Flow (L/min) (Oxygen Therapy):  [2] 2;   Device (Oxygen Therapy): nasal cannula  Body mass index is 47.31 kg/m².  Physical Exam  Vitals and nursing note reviewed.   Constitutional:       General: He is not in acute distress.  HENT:      Head: Normocephalic and atraumatic.      Mouth/Throat:      Mouth: Mucous membranes are moist.   Eyes:      Extraocular Movements: Extraocular movements intact.      Pupils: Pupils are equal, round, and reactive to light.   Cardiovascular:      Rate and Rhythm: Normal rate and regular rhythm.   Pulmonary:      Effort: Pulmonary effort is normal. No respiratory distress.   Abdominal:      General: Abdomen is flat. There is no distension.      Tenderness: There is no abdominal tenderness.   Musculoskeletal:         General: No swelling or deformity.      Cervical back: Normal range of motion and neck supple. No rigidity.      Right lower leg: Edema present.      Left lower leg: Edema present.      Comments: 3+ pitting edema bilaterally   Skin:     General: Skin is warm and dry.   Neurological:      General: No focal deficit present.      Mental Status: He is alert. Mental status is at baseline.         Results Review     I reviewed the patient's new clinical results.  Results from last 7 days   Lab Units 24  03424  03424  03424  0337   WBC 10*3/mm3 7.97 8.55 7.09 6.85   HEMOGLOBIN g/dL 9.8* 10.0* 9.9* 10.0*   PLATELETS  10*3/mm3 160 167 145 156     Results from last 7 days   Lab Units 11/26/24  0348 11/25/24  0344 11/24/24  0342 11/23/24  0337   SODIUM mmol/L 139 136 137 134*   POTASSIUM mmol/L 4.3 3.9 3.8 3.6   CHLORIDE mmol/L 103 102 102 101   CO2 mmol/L 25.1 23.0 20.5* 23.2   BUN mg/dL 42* 64* 54* 49*   CREATININE mg/dL 4.38* 5.63* 5.56* 5.36*   GLUCOSE mg/dL 196* 208* 118* 156*   Estimated Creatinine Clearance: 29.3 mL/min (A) (by C-G formula based on SCr of 4.38 mg/dL (H)).  Results from last 7 days   Lab Units 11/26/24  0348 11/25/24  0344 11/24/24  0342 11/23/24  0337   ALBUMIN g/dL 3.0* 3.3* 3.1* 2.7*     Results from last 7 days   Lab Units 11/26/24  0348 11/25/24  0344 11/24/24  0342 11/23/24  0337   CALCIUM mg/dL 7.8* 8.0* 7.7* 7.8*   ALBUMIN g/dL 3.0* 3.3* 3.1* 2.7*   PHOSPHORUS mg/dL 2.9 4.3 4.3 4.6*       COVID19   Date Value Ref Range Status   11/13/2024 Not Detected Not Detected - Ref. Range Final   06/04/2022 Detected (C) Not Detected - Ref. Range Final     Glucose   Date/Time Value Ref Range Status   11/26/2024 0747 183 (H) 70 - 130 mg/dL Final   11/25/2024 2025 290 (H) 70 - 130 mg/dL Final   11/25/2024 1648 153 (H) 70 - 130 mg/dL Final   11/25/2024 1219 150 (H) 70 - 130 mg/dL Final   11/25/2024 0733 197 (H) 70 - 130 mg/dL Final   11/24/2024 2008 179 (H) 70 - 130 mg/dL Final   11/24/2024 1632 122 70 - 130 mg/dL Final       XR Chest 1 View  Narrative: XR CHEST 1 VW-     HISTORY: Male who is 39 years-old, decreased breath sounds     TECHNIQUE: Frontal view of the chest     COMPARISON: 11/13/2024     FINDINGS:  The central venous catheter extends to the cavoatrial junction region.  Sternotomy wires are noted. Heart is enlarged. Pulmonary vasculature is  congested. Bilateral pulmonary opacities appear increased, may represent  edema and/or pneumonia and/or ARDS. No large pleural effusion, or  pneumothorax. No acute osseous process.     Impression: Increased bilateral pulmonary opacities compatible with  interval  worsening.     This report was finalized on 11/18/2024 1:38 PM by Dr. Suleiman Anderson M.D on Workstation: [id number]     Arteriogram (Autofinalize)  This procedure was auto-finalized with no dictation required.    I reviewed the patient's daily medications.  Scheduled Medications  aspirin, 81 mg, Oral, Daily  carvedilol, 25 mg, Oral, BID With Meals  hydrALAZINE, 10 mg, Oral, Q8H  insulin glargine, 8 Units, Subcutaneous, Daily  insulin lispro, 2-7 Units, Subcutaneous, 4x Daily AC & at Bedtime  insulin lispro, 3 Units, Subcutaneous, TID With Meals  isosorbide dinitrate, 40 mg, Oral, TID - Nitrates  senna-docusate sodium, 2 tablet, Oral, BID  sevelamer, 2,400 mg, Oral, TID With Meals  sodium chloride, 10 mL, Intravenous, Q12H  vitamin D, 50,000 Units, Oral, Q7 Days    Infusions     Diet  Diet: Diabetic, Fluid Restriction (240 mL/tray); Consistent Carbohydrate; 1500 mL/day; Fluid Consistency: Thin (IDDSI 0)         I have personally reviewed:  [x]  Laboratory   []  Microbiology   [x]  Radiology   []  EKG/Telemetry   []  Cardiology/Vascular   []  Pathology   [x]  Records     Assessment/Plan     Active Hospital Problems    Diagnosis  POA    **Acute exacerbation of CHF (congestive heart failure) [I50.9]  Yes    Chronic HFrEF (heart failure with reduced ejection fraction) [I50.22]  Yes    S/P CABG x 3 [Z95.1]  Not Applicable    Acute on chronic combined systolic and diastolic CHF (congestive heart failure) [I50.43]  Yes    Coronary artery disease [I25.10]  Yes    Hypertension [I10]  Yes    Type 2 diabetes mellitus, with long-term current use of insulin [E11.9, Z79.4]  Not Applicable    MEI (acute kidney injury) [N17.9]  Unknown      Resolved Hospital Problems   No resolved problems to display.       39 y.o. male admitted with Acute exacerbation of CHF (congestive heart failure).    Volume overload  HFrEF EF 20% 2022  CAD CABG x3  Elevated troponin  HTN  MEI/CKD 4   Cardiology have signed off  Renal consulted-TDC catheter  placed on 11/18.  Patient tolerating HD.   Continue with routine dialysis and creatinine is improved from 4 L to 2 L  Wean oxygen as able, suspect a component of VERONICA  ASA, coreg, hydralazine, ISDN  Follow-up echocardiogram above EF 30%  Monitor I/Os and daily weights  Patient is uninsured and not a citizen and will be a difficult disposition       DM2   uncontrolled. A1c 12.30%.   Currently on correctional insulin.  Continue long-acting and mealtime insulin.  Diabetes educator-prior to discharge on Tresiba/NovoLog as this will be the most economical option with lack of insurance     Obesity Body mass index is 50.26 kg/m².   Counseled to lose weight    SCDs for DVT prophylaxis.  Full code.  Discussed with patient and nursing staff.  Anticipate discharge home timing yet to be determined.    Patient is uninsured which makes disposition challenging    Copy text of this note has been reviewed by me on 11/26/2024          Patrick Barrett MD  [facility]  11/26/24  13:05 EST

## 2024-11-26 NOTE — PROGRESS NOTES
[facility] Progress Note      Patient Name: Rufus Dorsey  : 1985  MRN: [id number]  Primary Care Physician:  Provider, No Known  Date of admission: 2024    Subjective     Interval History:   Follow-up new ESRD.  No urine recorded.  4 L removed on dialysis yesterday and planning for 3.5 L today.  Blood pressure stable.  Denies pain.  Eating.    Review of Systems:   As noted above    Objective     Vitals:   Temp:  [97.5 °F (36.4 °C)-98.6 °F (37 °C)] 97.5 °F (36.4 °C)  Heart Rate:  [] 71  Resp:  [16-19] 18  BP: (108-150)/(57-77) 133/77  Flow (L/min) (Oxygen Therapy):  [2] 2    Intake/Output Summary (Last 24 hours) at 2024 1244  Last data filed at 2024 1220  Gross per 24 hour   Intake 360 ml   Output 3500 ml   Net -3140 ml       Physical Exam:    General Appearance: On dialysis.  Blood flow rate 400.  Tunneled dialysis catheter.  Obese sleeping very soundly but awakens easily.  Skin: warm and dry  HEENT: oral mucosa dry, nonicteric sclera.  Nasal cannula oxygen 2 L  Neck: supple, no JVD  Lungs: Clear to auscultation anteriorly.  Heart: RRR, normal S1 and S2  Abdomen: soft, nontender, distended. +bs.  Obese  : no palpable bladder  Extremities: 3+ upper and lower extremity edema  Neuro: normal speech and mental status .  Able to have limited conversation with him in Turkish.    Scheduled Meds:     aspirin, 81 mg, Oral, Daily  carvedilol, 25 mg, Oral, BID With Meals  hydrALAZINE, 10 mg, Oral, Q8H  insulin glargine, 8 Units, Subcutaneous, Daily  insulin lispro, 2-7 Units, Subcutaneous, 4x Daily AC & at Bedtime  insulin lispro, 3 Units, Subcutaneous, TID With Meals  isosorbide dinitrate, 40 mg, Oral, TID - Nitrates  senna-docusate sodium, 2 tablet, Oral, BID  sevelamer, 2,400 mg, Oral, TID With Meals  sodium chloride, 10 mL, Intravenous, Q12H  vitamin D, 50,000 Units, Oral, Q7 Days      IV Meds:        Results Reviewed:   I have personally reviewed the results  from the time of this admission to 11/26/2024 12:44 EST     Results from last 7 days   Lab Units 11/26/24  0348 11/25/24 0344 11/24/24 0342   SODIUM mmol/L 139 136 137   POTASSIUM mmol/L 4.3 3.9 3.8   CHLORIDE mmol/L 103 102 102   CO2 mmol/L 25.1 23.0 20.5*   BUN mg/dL 42* 64* 54*   CREATININE mg/dL 4.38* 5.63* 5.56*   CALCIUM mg/dL 7.8* 8.0* 7.7*   GLUCOSE mg/dL 196* 208* 118*       Estimated Creatinine Clearance: 29.3 mL/min (A) (by C-G formula based on SCr of 4.38 mg/dL (H)).    Results from last 7 days   Lab Units 11/26/24 0348 11/25/24 0344 11/24/24 0342   PHOSPHORUS mg/dL 2.9 4.3 4.3             Results from last 7 days   Lab Units 11/26/24 0348 11/25/24 0344 11/24/24 0342 11/23/24  0337 11/22/24  0312   WBC 10*3/mm3 7.97 8.55 7.09 6.85 7.39   HEMOGLOBIN g/dL 9.8* 10.0* 9.9* 10.0* 10.0*   PLATELETS 10*3/mm3 160 167 145 156 166             Assessment / Plan     ASSESSMENT:  New ESRD.  UF today for volume removal.  Dialysis again tomorrow.  Difficult situation as he has no outpatient dialysis unit available to him.  He would have to return to the emergency room for intermittent dialysis.  [facility] has worked diligently to find a option for  outpatient dialysis.  But no options currently available but he will accept.  I have reviewed the CCP notes in detail and spoken with Mónica Rodriguez.  Hypertension with chronic kidney disease.  Likely due to in part to volume excess.  Continue to remove fluid.  Improving with volume removal.  Diabetes mellitus type 2 with chronic kidney disease.  Very poorly controlled with hemoglobin A1c 12.3.  Anemia of CKD.  Back iron on the blood in the lab.  5.  Heart failure reduced ejection fraction with EF 30%.  PLAN:  Hemodialysis tomorrow.  Discussed with case management.  No outpatient dialysis unit available.    Thank you for involving us in the care of Rufus Dorsey.  Please feel free to call with any questions.    Marissa Beck MD  11/26/24  12:44 EST    Nephrology  [facility]  [phone]    Please note that portions of this note were completed with a voice recognition program.

## 2024-11-27 LAB
ALBUMIN SERPL-MCNC: 3 G/DL (ref 3.5–5.2)
ANION GAP SERPL CALCULATED.3IONS-SCNC: 9.8 MMOL/L (ref 5–15)
BASOPHILS # BLD AUTO: 0.02 10*3/MM3 (ref 0–0.2)
BASOPHILS NFR BLD AUTO: 0.3 % (ref 0–1.5)
BUN SERPL-MCNC: 51 MG/DL (ref 6–20)
BUN/CREAT SERPL: 10.8 (ref 7–25)
CALCIUM SPEC-SCNC: 7.8 MG/DL (ref 8.6–10.5)
CHLORIDE SERPL-SCNC: 103 MMOL/L (ref 98–107)
CO2 SERPL-SCNC: 22.2 MMOL/L (ref 22–29)
CREAT SERPL-MCNC: 4.72 MG/DL (ref 0.76–1.27)
DEPRECATED RDW RBC AUTO: 44.4 FL (ref 37–54)
EGFRCR SERPLBLD CKD-EPI 2021: 15.3 ML/MIN/1.73
EOSINOPHIL # BLD AUTO: 0.29 10*3/MM3 (ref 0–0.4)
EOSINOPHIL NFR BLD AUTO: 3.6 % (ref 0.3–6.2)
ERYTHROCYTE [DISTWIDTH] IN BLOOD BY AUTOMATED COUNT: 14.1 % (ref 12.3–15.4)
GLUCOSE BLDC GLUCOMTR-MCNC: 137 MG/DL (ref 70–130)
GLUCOSE BLDC GLUCOMTR-MCNC: 185 MG/DL (ref 70–130)
GLUCOSE BLDC GLUCOMTR-MCNC: 191 MG/DL (ref 70–130)
GLUCOSE BLDC GLUCOMTR-MCNC: 228 MG/DL (ref 70–130)
GLUCOSE SERPL-MCNC: 180 MG/DL (ref 65–99)
HCT VFR BLD AUTO: 31 % (ref 37.5–51)
HGB BLD-MCNC: 10 G/DL (ref 13–17.7)
IMM GRANULOCYTES # BLD AUTO: 0.04 10*3/MM3 (ref 0–0.05)
IMM GRANULOCYTES NFR BLD AUTO: 0.5 % (ref 0–0.5)
LYMPHOCYTES # BLD AUTO: 0.91 10*3/MM3 (ref 0.7–3.1)
LYMPHOCYTES NFR BLD AUTO: 11.4 % (ref 19.6–45.3)
MCH RBC QN AUTO: 28.2 PG (ref 26.6–33)
MCHC RBC AUTO-ENTMCNC: 32.3 G/DL (ref 31.5–35.7)
MCV RBC AUTO: 87.6 FL (ref 79–97)
MONOCYTES # BLD AUTO: 0.68 10*3/MM3 (ref 0.1–0.9)
MONOCYTES NFR BLD AUTO: 8.6 % (ref 5–12)
NEUTROPHILS NFR BLD AUTO: 6.01 10*3/MM3 (ref 1.7–7)
NEUTROPHILS NFR BLD AUTO: 75.6 % (ref 42.7–76)
NRBC BLD AUTO-RTO: 0 /100 WBC (ref 0–0.2)
PHOSPHATE SERPL-MCNC: 3.2 MG/DL (ref 2.5–4.5)
PLATELET # BLD AUTO: 159 10*3/MM3 (ref 140–450)
PMV BLD AUTO: 11.6 FL (ref 6–12)
POTASSIUM SERPL-SCNC: 4.2 MMOL/L (ref 3.5–5.2)
RBC # BLD AUTO: 3.54 10*6/MM3 (ref 4.14–5.8)
SODIUM SERPL-SCNC: 135 MMOL/L (ref 136–145)
WBC NRBC COR # BLD AUTO: 7.95 10*3/MM3 (ref 3.4–10.8)

## 2024-11-27 PROCEDURE — 82948 REAGENT STRIP/BLOOD GLUCOSE: CPT

## 2024-11-27 PROCEDURE — 25010000002 HEPARIN (PORCINE) PER 1000 UNITS: Performed by: INTERNAL MEDICINE

## 2024-11-27 PROCEDURE — 63710000001 INSULIN LISPRO (HUMAN) PER 5 UNITS: Performed by: SURGERY

## 2024-11-27 PROCEDURE — 80069 RENAL FUNCTION PANEL: CPT | Performed by: SURGERY

## 2024-11-27 PROCEDURE — 63710000001 INSULIN GLARGINE PER 5 UNITS: Performed by: STUDENT IN AN ORGANIZED HEALTH CARE EDUCATION/TRAINING PROGRAM

## 2024-11-27 PROCEDURE — 85025 COMPLETE CBC W/AUTO DIFF WBC: CPT | Performed by: INTERNAL MEDICINE

## 2024-11-27 RX ORDER — HEPARIN SODIUM 1000 [USP'U]/ML
4000 INJECTION, SOLUTION INTRAVENOUS; SUBCUTANEOUS ONCE
Status: COMPLETED | OUTPATIENT
Start: 2024-11-27 | End: 2024-11-27

## 2024-11-27 RX ADMIN — HYDRALAZINE HYDROCHLORIDE 10 MG: 10 TABLET ORAL at 20:37

## 2024-11-27 RX ADMIN — INSULIN LISPRO 3 UNITS: 100 INJECTION, SOLUTION INTRAVENOUS; SUBCUTANEOUS at 21:04

## 2024-11-27 RX ADMIN — ISOSORBIDE DINITRATE 40 MG: 20 TABLET ORAL at 13:02

## 2024-11-27 RX ADMIN — Medication 10 ML: at 20:41

## 2024-11-27 RX ADMIN — HYDRALAZINE HYDROCHLORIDE 10 MG: 10 TABLET ORAL at 06:06

## 2024-11-27 RX ADMIN — Medication 10 ML: at 13:10

## 2024-11-27 RX ADMIN — INSULIN GLARGINE 8 UNITS: 100 INJECTION, SOLUTION SUBCUTANEOUS at 13:04

## 2024-11-27 RX ADMIN — SEVELAMER CARBONATE 2400 MG: 800 TABLET, FILM COATED ORAL at 13:03

## 2024-11-27 RX ADMIN — HEPARIN SODIUM 4000 UNITS: 1000 INJECTION, SOLUTION INTRAVENOUS; SUBCUTANEOUS at 12:48

## 2024-11-27 RX ADMIN — HYDRALAZINE HYDROCHLORIDE 10 MG: 10 TABLET ORAL at 13:03

## 2024-11-27 RX ADMIN — ASPIRIN 81 MG: 81 TABLET, COATED ORAL at 13:03

## 2024-11-27 RX ADMIN — SENNOSIDES AND DOCUSATE SODIUM 2 TABLET: 50; 8.6 TABLET ORAL at 20:37

## 2024-11-27 RX ADMIN — INSULIN LISPRO 3 UNITS: 100 INJECTION, SOLUTION INTRAVENOUS; SUBCUTANEOUS at 17:19

## 2024-11-27 RX ADMIN — SEVELAMER CARBONATE 2400 MG: 800 TABLET, FILM COATED ORAL at 17:19

## 2024-11-27 RX ADMIN — INSULIN LISPRO 2 UNITS: 100 INJECTION, SOLUTION INTRAVENOUS; SUBCUTANEOUS at 17:19

## 2024-11-27 RX ADMIN — CARVEDILOL 25 MG: 25 TABLET, FILM COATED ORAL at 17:19

## 2024-11-27 RX ADMIN — ISOSORBIDE DINITRATE 40 MG: 20 TABLET ORAL at 17:19

## 2024-11-27 NOTE — PROGRESS NOTES
[facility] Progress Note      Patient Name: Rufus Dorsey  : 1985  MRN: [id number]  Primary Care Physician:  Provider, No Known  Date of admission: 2024    Subjective     Interval History:   Follow-up new ESRD.  No urine recorded.  3.5 L removed on dialysis yesterday and planning for 3.5 L today.  Blood pressure stable.  Denies pain.  Eating.  Discussed with  Mónica Rodriguez this morning.  Patient can go to [facility] emergency room and be dialyzed without requiring admission once he is discharged from here.    Review of Systems:   As noted above    Objective     Vitals:   Temp:  [97.3 °F (36.3 °C)-98.2 °F (36.8 °C)] 97.3 °F (36.3 °C)  Heart Rate:  [70-79] 70  Resp:  [17-18] 18  BP: (125-154)/(69-89) 136/73  Flow (L/min) (Oxygen Therapy):  [2] 2    Intake/Output Summary (Last 24 hours) at 2024 1002  Last data filed at 2024  Gross per 24 hour   Intake 120 ml   Output 3500 ml   Net -3380 ml       Physical Exam:    General Appearance: On dialysis.  Blood flow rate 400.  Tunneled dialysis catheter.  Awake, alert.  Able to converse in English and Hebrew.  Skin: warm and dry  HEENT: oral mucosa dry, nonicteric sclera.  Nasal cannula oxygen 2 L  Neck: supple, no JVD  Lungs: Clear to auscultation anteriorly.  Heart: RRR, normal S1 and S2  Abdomen: soft, nontender, distended. +bs.  Obese  : no palpable bladder  Extremities: 3+ upper and lower extremity edema  Neuro: normal speech and mental status .  Able to have conversation with him in Hebrew and he speaks some English..    Scheduled Meds:     aspirin, 81 mg, Oral, Daily  carvedilol, 25 mg, Oral, BID With Meals  hydrALAZINE, 10 mg, Oral, Q8H  insulin glargine, 8 Units, Subcutaneous, Daily  insulin lispro, 2-7 Units, Subcutaneous, 4x Daily AC & at Bedtime  insulin lispro, 3 Units, Subcutaneous, TID With Meals  isosorbide dinitrate, 40 mg, Oral, TID - Nitrates  senna-docusate sodium, 2 tablet,  Oral, BID  sevelamer, 2,400 mg, Oral, TID With Meals  sodium chloride, 10 mL, Intravenous, Q12H  vitamin D, 50,000 Units, Oral, Q7 Days      IV Meds:        Results Reviewed:   I have personally reviewed the results from the time of this admission to 11/27/2024 10:02 EST     Results from last 7 days   Lab Units 11/27/24 0333 11/26/24 0348 11/25/24 0344   SODIUM mmol/L 135* 139 136   POTASSIUM mmol/L 4.2 4.3 3.9   CHLORIDE mmol/L 103 103 102   CO2 mmol/L 22.2 25.1 23.0   BUN mg/dL 51* 42* 64*   CREATININE mg/dL 4.72* 4.38* 5.63*   CALCIUM mg/dL 7.8* 7.8* 8.0*   GLUCOSE mg/dL 180* 196* 208*       Estimated Creatinine Clearance: 26.6 mL/min (A) (by C-G formula based on SCr of 4.72 mg/dL (H)).    Results from last 7 days   Lab Units 11/27/24 0333 11/26/24 0348 11/25/24 0344   PHOSPHORUS mg/dL 3.2 2.9 4.3             Results from last 7 days   Lab Units 11/27/24 0333 11/26/24 0348 11/25/24 0344 11/24/24 0342 11/23/24  0337   WBC 10*3/mm3 7.95 7.97 8.55 7.09 6.85   HEMOGLOBIN g/dL 10.0* 9.8* 10.0* 9.9* 10.0*   PLATELETS 10*3/mm3 159 160 167 145 156             Assessment / Plan     ASSESSMENT:  New ESRD.  UF today for volume removal.  Dialysis again tomorrow.  Difficult situation as he has no outpatient dialysis unit available to him.  He would have to return to the emergency room for intermittent dialysis.  [facility] has worked diligently to find a option for  outpatient dialysis.  But no options currently for outpatient scheduled dialysis unit available that he will accept.  I have reviewed the CCP notes in detail and spoken with Mónica Rodriguez.  [facility] will likely be the best option.  Continuing to remove volume and waste products with daily dialysis here.  Not yet at a dry weight.  Hypertension with chronic kidney disease.  Likely due to in part to volume excess.  Continue to remove fluid.  Improving with volume removal.  Diabetes mellitus type 2 with chronic kidney disease.  Very poorly controlled with  hemoglobin A1c 12.3.  Anemia of CKD.  Check iron on the blood in the lab.  5.  Heart failure reduced ejection fraction with EF 30%.  PLAN:  Hemodialysis again tomorrow.  Discussed with case management.  No outpatient dialysis unit available.  [facility] is an option for presentation to the emergency room for dialysis without requiring admission.  Thank you for involving us in the care of Rufus Dorsey.  Please feel free to call with any questions.    Marissa Beck MD  11/27/24  10:02 [facility]    [facility]  [phone]    Please note that portions of this note were completed with a voice recognition program.

## 2024-11-27 NOTE — PLAN OF CARE
Goal Outcome Evaluation:            Pt vss, had Hd today, plan for HD tomorrow. No c/o pain. Pt up ad valente in room. Po intake of 620. CTM

## 2024-11-27 NOTE — PROGRESS NOTES
Name: Rufus Dorsey ADMIT: 2024   : 1985  PCP: Provider, No Known    MRN: [id number] LOS: 14 days   AGE/SEX: 39 y.o. male  ROOM: [facility]/     Subjective   Subjective     Patient is lying on the bed and does not appear to be major distress.  No new events overnight.  Denies nausea, vomiting, abdominal pain, chest pain, shortness of breath.  Currently on 2 L of oxygen.         Objective   Objective   Vital Signs  Temp:  [97.3 °F (36.3 °C)-98.2 °F (36.8 °C)] 97.3 °F (36.3 °C)  Heart Rate:  [70-79] 70  Resp:  [17-18] 18  BP: (125-154)/(69-89) 132/84  SpO2:  [91 %-97 %] 96 %  on  Flow (L/min) (Oxygen Therapy):  [2] 2;   Device (Oxygen Therapy): nasal cannula  Body mass index is 44.27 kg/m².  Physical Exam  Vitals and nursing note reviewed.   Constitutional:       General: He is not in acute distress.  HENT:      Head: Normocephalic and atraumatic.      Mouth/Throat:      Mouth: Mucous membranes are moist.   Eyes:      Extraocular Movements: Extraocular movements intact.      Pupils: Pupils are equal, round, and reactive to light.   Cardiovascular:      Rate and Rhythm: Normal rate and regular rhythm.   Pulmonary:      Effort: Pulmonary effort is normal. No respiratory distress.   Abdominal:      General: Abdomen is flat. There is no distension.      Tenderness: There is no abdominal tenderness.   Musculoskeletal:         General: No swelling or deformity.      Cervical back: Normal range of motion and neck supple. No rigidity.      Right lower leg: Edema present.      Left lower leg: Edema present.      Comments: 3+ pitting edema bilaterally   Skin:     General: Skin is warm and dry.   Neurological:      General: No focal deficit present.      Mental Status: He is alert. Mental status is at baseline.         Results Review     I reviewed the patient's new clinical results.  Results from last 7 days   Lab Units 24  0333 24  0348 24  0344 24  0342   WBC 10*3/mm3 7.95 7.97 8.55 7.09    HEMOGLOBIN g/dL 10.0* 9.8* 10.0* 9.9*   PLATELETS 10*3/mm3 159 160 167 145     Results from last 7 days   Lab Units 11/27/24  0333 11/26/24  0348 11/25/24  0344 11/24/24  0342   SODIUM mmol/L 135* 139 136 137   POTASSIUM mmol/L 4.2 4.3 3.9 3.8   CHLORIDE mmol/L 103 103 102 102   CO2 mmol/L 22.2 25.1 23.0 20.5*   BUN mg/dL 51* 42* 64* 54*   CREATININE mg/dL 4.72* 4.38* 5.63* 5.56*   GLUCOSE mg/dL 180* 196* 208* 118*   Estimated Creatinine Clearance: 26.1 mL/min (A) (by C-G formula based on SCr of 4.72 mg/dL (H)).  Results from last 7 days   Lab Units 11/27/24  0333 11/26/24 0348 11/25/24  0344 11/24/24  0342   ALBUMIN g/dL 3.0* 3.0* 3.3* 3.1*     Results from last 7 days   Lab Units 11/27/24  0333 11/26/24  0348 11/25/24  0344 11/24/24  0342   CALCIUM mg/dL 7.8* 7.8* 8.0* 7.7*   ALBUMIN g/dL 3.0* 3.0* 3.3* 3.1*   PHOSPHORUS mg/dL 3.2 2.9 4.3 4.3       COVID19   Date Value Ref Range Status   11/13/2024 Not Detected Not Detected - Ref. Range Final   06/04/2022 Detected (C) Not Detected - Ref. Range Final     Glucose   Date/Time Value Ref Range Status   11/27/2024 0741 185 (H) 70 - 130 mg/dL Final   11/26/2024 2048 162 (H) 70 - 130 mg/dL Final   11/26/2024 1653 165 (H) 70 - 130 mg/dL Final   11/26/2024 0747 183 (H) 70 - 130 mg/dL Final   11/25/2024 2025 290 (H) 70 - 130 mg/dL Final   11/25/2024 1648 153 (H) 70 - 130 mg/dL Final   11/25/2024 1219 150 (H) 70 - 130 mg/dL Final       XR Chest 1 View  Narrative: XR CHEST 1 VW-     HISTORY: Male who is 39 years-old, decreased breath sounds     TECHNIQUE: Frontal view of the chest     COMPARISON: 11/13/2024     FINDINGS:  The central venous catheter extends to the cavoatrial junction region.  Sternotomy wires are noted. Heart is enlarged. Pulmonary vasculature is  congested. Bilateral pulmonary opacities appear increased, may represent  edema and/or pneumonia and/or ARDS. No large pleural effusion, or  pneumothorax. No acute osseous process.     Impression: Increased  bilateral pulmonary opacities compatible with  interval worsening.     This report was finalized on 11/18/2024 1:38 PM by Dr. Suleiman Anderson M.D on Workstation: [id number]     Arteriogram (Autofinalize)  This procedure was auto-finalized with no dictation required.    I reviewed the patient's daily medications.  Scheduled Medications  aspirin, 81 mg, Oral, Daily  carvedilol, 25 mg, Oral, BID With Meals  hydrALAZINE, 10 mg, Oral, Q8H  insulin glargine, 8 Units, Subcutaneous, Daily  insulin lispro, 2-7 Units, Subcutaneous, 4x Daily AC & at Bedtime  insulin lispro, 3 Units, Subcutaneous, TID With Meals  isosorbide dinitrate, 40 mg, Oral, TID - Nitrates  senna-docusate sodium, 2 tablet, Oral, BID  sevelamer, 2,400 mg, Oral, TID With Meals  sodium chloride, 10 mL, Intravenous, Q12H  vitamin D, 50,000 Units, Oral, Q7 Days    Infusions     Diet  Diet: Diabetic, Fluid Restriction (240 mL/tray); Consistent Carbohydrate; 1500 mL/day; Fluid Consistency: Thin (IDDSI 0)         I have personally reviewed:  [x]  Laboratory   []  Microbiology   [x]  Radiology   []  EKG/Telemetry   []  Cardiology/Vascular   []  Pathology   [x]  Records     Assessment/Plan     Active Hospital Problems    Diagnosis  POA    **Acute exacerbation of CHF (congestive heart failure) [I50.9]  Yes    Chronic HFrEF (heart failure with reduced ejection fraction) [I50.22]  Yes    S/P CABG x 3 [Z95.1]  Not Applicable    Acute on chronic combined systolic and diastolic CHF (congestive heart failure) [I50.43]  Yes    Coronary artery disease [I25.10]  Yes    Hypertension [I10]  Yes    Type 2 diabetes mellitus, with long-term current use of insulin [E11.9, Z79.4]  Not Applicable    MEI (acute kidney injury) [N17.9]  Unknown      Resolved Hospital Problems   No resolved problems to display.       39 y.o. male admitted with Acute exacerbation of CHF (congestive heart failure).    Volume overload  HFrEF EF 20% 2022  CAD CABG x3  Elevated troponin  HTN  MEI/CKD 4    Cardiology have signed off  Renal consulted-TDC catheter placed on 11/18.  Dialysis initiated during this hospital stay.  Continue with routine dialysis and creatinine is improved from 4 L to 2 L  Wean oxygen as able, suspect a component of VERONICA  ASA, coreg, hydralazine, isosorbide dinitrate  Follow-up echocardiogram above EF 30%  Monitor I/Os and daily weights  Patient is uninsured and not a citizen and will be a difficult disposition       DM2   uncontrolled. A1c 12.30%.   Currently on correctional insulin.  Continue long-acting and mealtime insulin.  Diabetes educator-prior to discharge on Tresiba/NovoLog as this will be the most economical option with lack of insurance     Obesity Body mass index is 50.26 kg/m².   Counseled to lose weight    SCDs for DVT prophylaxis.  Full code.  Discussed with patient and nursing staff.  Anticipate discharge home timing yet to be determined.    Patient is uninsured which makes disposition challenging    Copy text of this note has been reviewed by me on 11/27/2024          Patrick Barrett MD  [facility]  11/27/24  12:55 EST

## 2024-11-27 NOTE — CASE MANAGEMENT/SOCIAL WORK
Continued Stay Note  [facility]     Patient Name: Rufus Dorsey  MRN: [id number]  Today's Date: 11/27/2024    Admit Date: 11/13/2024    Plan: Home, brother to transport   Discharge Plan       Row Name 11/27/24 1229       Plan    Plan Home, brother to transport    Plan Comments Received a call from  at [facility]. Pt may go to [facility] ER to receive dialysis, as their ER does dialysis treatments with out admitting patients. Patient cannot make an appointment, he just shows up and the center at [facility] may or may not arrange times with him. They would have to discuss arrangements when he arrives. Information placed on AVS, for when he is discharged. Pt seemed to understand conversation,                   Discharge Codes    No documentation.                 Expected Discharge Date and Time       Expected Discharge Date Expected Discharge Time    Nov 28, 2024               Mónica Rodriguez RN

## 2024-11-27 NOTE — DISCHARGE INSTR - OTHER ORDERS
[facility] ER PROVIDES DIALYSIS WITHOUT HAVING TO BE ADMITTED TO THE HOSPITAL AND THAT YOU CAN PAY . THERE IS NO APPOINTMENT SCHEDULE. YOUR DIALYSIS SCHEDULE IS [id number]  THE ADDRESS IS [address] [place] [address]

## 2024-11-27 NOTE — PLAN OF CARE
Goal Outcome Evaluation:  Plan of Care Reviewed With: patient           Outcome Evaluation: VSS, no c/o pain. Pt appeared to sleep some between care. O2 sats when O2 removed while sleeping--recovered after replaced. Pt to have dialysis today. Will CTM, safety maintained.       Diuretic in Use: none  Response to Diuretics (Output greater than intake): N/A  Daily Weight (up or down): down  O2 Requirements: 2LNC  Functional Status (Activity level, tolerance and respiratory symptoms): none  Discharge Plans: TBD

## 2024-11-27 NOTE — PLAN OF CARE
Goal Outcome Evaluation:  Plan of Care Reviewed With: patient      Outcome Evaluation: VSS. No c/o pain this shift. Pt is up ad valente, and independent. Continues to require 2 L O2 NC to maintain O2 sats >90%.Ultra filtration done today. Safety maintained.

## 2024-11-28 LAB
ALBUMIN SERPL-MCNC: 3.4 G/DL (ref 3.5–5.2)
ANION GAP SERPL CALCULATED.3IONS-SCNC: 9 MMOL/L (ref 5–15)
BASOPHILS # BLD AUTO: 0.02 10*3/MM3 (ref 0–0.2)
BASOPHILS NFR BLD AUTO: 0.2 % (ref 0–1.5)
BUN SERPL-MCNC: 36 MG/DL (ref 6–20)
BUN/CREAT SERPL: 9.5 (ref 7–25)
CALCIUM SPEC-SCNC: 8.4 MG/DL (ref 8.6–10.5)
CHLORIDE SERPL-SCNC: 102 MMOL/L (ref 98–107)
CO2 SERPL-SCNC: 26 MMOL/L (ref 22–29)
CREAT SERPL-MCNC: 3.8 MG/DL (ref 0.76–1.27)
DEPRECATED RDW RBC AUTO: 43.1 FL (ref 37–54)
EGFRCR SERPLBLD CKD-EPI 2021: 19.8 ML/MIN/1.73
EOSINOPHIL # BLD AUTO: 0.26 10*3/MM3 (ref 0–0.4)
EOSINOPHIL NFR BLD AUTO: 3.1 % (ref 0.3–6.2)
ERYTHROCYTE [DISTWIDTH] IN BLOOD BY AUTOMATED COUNT: 14 % (ref 12.3–15.4)
GLUCOSE BLDC GLUCOMTR-MCNC: 152 MG/DL (ref 70–130)
GLUCOSE BLDC GLUCOMTR-MCNC: 155 MG/DL (ref 70–130)
GLUCOSE BLDC GLUCOMTR-MCNC: 195 MG/DL (ref 70–130)
GLUCOSE BLDC GLUCOMTR-MCNC: 224 MG/DL (ref 70–130)
GLUCOSE SERPL-MCNC: 183 MG/DL (ref 65–99)
HCT VFR BLD AUTO: 33.4 % (ref 37.5–51)
HGB BLD-MCNC: 10.6 G/DL (ref 13–17.7)
IMM GRANULOCYTES # BLD AUTO: 0.05 10*3/MM3 (ref 0–0.05)
IMM GRANULOCYTES NFR BLD AUTO: 0.6 % (ref 0–0.5)
LYMPHOCYTES # BLD AUTO: 1 10*3/MM3 (ref 0.7–3.1)
LYMPHOCYTES NFR BLD AUTO: 11.8 % (ref 19.6–45.3)
MCH RBC QN AUTO: 27.2 PG (ref 26.6–33)
MCHC RBC AUTO-ENTMCNC: 31.7 G/DL (ref 31.5–35.7)
MCV RBC AUTO: 85.6 FL (ref 79–97)
MONOCYTES # BLD AUTO: 0.66 10*3/MM3 (ref 0.1–0.9)
MONOCYTES NFR BLD AUTO: 7.8 % (ref 5–12)
NEUTROPHILS NFR BLD AUTO: 6.52 10*3/MM3 (ref 1.7–7)
NEUTROPHILS NFR BLD AUTO: 76.5 % (ref 42.7–76)
NRBC BLD AUTO-RTO: 0 /100 WBC (ref 0–0.2)
PHOSPHATE SERPL-MCNC: 2 MG/DL (ref 2.5–4.5)
PLATELET # BLD AUTO: 175 10*3/MM3 (ref 140–450)
PMV BLD AUTO: 11.5 FL (ref 6–12)
POTASSIUM SERPL-SCNC: 4.4 MMOL/L (ref 3.5–5.2)
RBC # BLD AUTO: 3.9 10*6/MM3 (ref 4.14–5.8)
SODIUM SERPL-SCNC: 137 MMOL/L (ref 136–145)
WBC NRBC COR # BLD AUTO: 8.51 10*3/MM3 (ref 3.4–10.8)

## 2024-11-28 PROCEDURE — 80069 RENAL FUNCTION PANEL: CPT | Performed by: SURGERY

## 2024-11-28 PROCEDURE — 63710000001 INSULIN LISPRO (HUMAN) PER 5 UNITS: Performed by: SURGERY

## 2024-11-28 PROCEDURE — 82948 REAGENT STRIP/BLOOD GLUCOSE: CPT

## 2024-11-28 PROCEDURE — 85025 COMPLETE CBC W/AUTO DIFF WBC: CPT | Performed by: INTERNAL MEDICINE

## 2024-11-28 PROCEDURE — 25010000002 HEPARIN (PORCINE) PER 1000 UNITS: Performed by: INTERNAL MEDICINE

## 2024-11-28 PROCEDURE — 63710000001 INSULIN GLARGINE PER 5 UNITS: Performed by: STUDENT IN AN ORGANIZED HEALTH CARE EDUCATION/TRAINING PROGRAM

## 2024-11-28 RX ORDER — HEPARIN SODIUM 1000 [USP'U]/ML
3800 INJECTION, SOLUTION INTRAVENOUS; SUBCUTANEOUS AS NEEDED
Status: DISCONTINUED | OUTPATIENT
Start: 2024-11-28 | End: 2024-12-03 | Stop reason: HOSPADM

## 2024-11-28 RX ADMIN — HYDRALAZINE HYDROCHLORIDE 10 MG: 10 TABLET ORAL at 12:48

## 2024-11-28 RX ADMIN — INSULIN LISPRO 2 UNITS: 100 INJECTION, SOLUTION INTRAVENOUS; SUBCUTANEOUS at 12:50

## 2024-11-28 RX ADMIN — ASPIRIN 81 MG: 81 TABLET, COATED ORAL at 12:48

## 2024-11-28 RX ADMIN — INSULIN LISPRO 3 UNITS: 100 INJECTION, SOLUTION INTRAVENOUS; SUBCUTANEOUS at 18:16

## 2024-11-28 RX ADMIN — SENNOSIDES AND DOCUSATE SODIUM 2 TABLET: 50; 8.6 TABLET ORAL at 20:40

## 2024-11-28 RX ADMIN — ISOSORBIDE DINITRATE 40 MG: 20 TABLET ORAL at 12:49

## 2024-11-28 RX ADMIN — HEPARIN SODIUM 3800 UNITS: 1000 INJECTION INTRAVENOUS; SUBCUTANEOUS at 10:33

## 2024-11-28 RX ADMIN — INSULIN LISPRO 2 UNITS: 100 INJECTION, SOLUTION INTRAVENOUS; SUBCUTANEOUS at 18:15

## 2024-11-28 RX ADMIN — HYDRALAZINE HYDROCHLORIDE 10 MG: 10 TABLET ORAL at 20:40

## 2024-11-28 RX ADMIN — Medication 10 ML: at 12:52

## 2024-11-28 RX ADMIN — Medication 10 ML: at 20:41

## 2024-11-28 RX ADMIN — ISOSORBIDE DINITRATE 40 MG: 20 TABLET ORAL at 18:15

## 2024-11-28 RX ADMIN — INSULIN LISPRO 3 UNITS: 100 INJECTION, SOLUTION INTRAVENOUS; SUBCUTANEOUS at 12:49

## 2024-11-28 RX ADMIN — INSULIN GLARGINE 8 UNITS: 100 INJECTION, SOLUTION SUBCUTANEOUS at 12:50

## 2024-11-28 RX ADMIN — CARVEDILOL 25 MG: 25 TABLET, FILM COATED ORAL at 18:15

## 2024-11-28 RX ADMIN — INSULIN LISPRO 2 UNITS: 100 INJECTION, SOLUTION INTRAVENOUS; SUBCUTANEOUS at 20:41

## 2024-11-28 RX ADMIN — HYDRALAZINE HYDROCHLORIDE 10 MG: 10 TABLET ORAL at 03:56

## 2024-11-28 RX ADMIN — CARVEDILOL 25 MG: 25 TABLET, FILM COATED ORAL at 12:53

## 2024-11-28 NOTE — PROGRESS NOTES
Name: Rufus Dorsey ADMIT: 2024   : 1985  PCP: Provider, No Known    MRN: [id number] LOS: 15 days   AGE/SEX: 39 y.o. male  ROOM: [facility]     Subjective   Subjective     Patient is lying on the bed and does not appear to be major distress.  No new events overnight.  Denies nausea, vomiting, abdominal pain, chest pain, shortness of breath.  Currently on 2 L of oxygen.         Objective   Objective   Vital Signs  Temp:  [98 °F (36.7 °C)-98.8 °F (37.1 °C)] 98 °F (36.7 °C)  Heart Rate:  [70-79] 70  Resp:  [17-20] 18  BP: (117-169)/(59-90) 169/90  SpO2:  [97 %] 97 %  on   ;      Body mass index is 44.71 kg/m².  Physical Exam  Vitals and nursing note reviewed.   Constitutional:       General: He is not in acute distress.  HENT:      Head: Normocephalic and atraumatic.      Mouth/Throat:      Mouth: Mucous membranes are moist.   Eyes:      Extraocular Movements: Extraocular movements intact.      Pupils: Pupils are equal, round, and reactive to light.   Cardiovascular:      Rate and Rhythm: Normal rate and regular rhythm.   Pulmonary:      Effort: Pulmonary effort is normal. No respiratory distress.   Abdominal:      General: Abdomen is flat. There is no distension.      Tenderness: There is no abdominal tenderness.   Musculoskeletal:         General: No swelling or deformity.      Cervical back: Normal range of motion and neck supple. No rigidity.      Right lower leg: Edema present.      Left lower leg: Edema present.      Comments: 3+ pitting edema bilaterally   Skin:     General: Skin is warm and dry.   Neurological:      General: No focal deficit present.      Mental Status: He is alert. Mental status is at baseline.         Results Review     I reviewed the patient's new clinical results.  Results from last 7 days   Lab Units 24  0330 24  0333 24  0348 24  0344   WBC 10*3/mm3 8.51 7.95 7.97 8.55   HEMOGLOBIN g/dL 10.6* 10.0* 9.8* 10.0*   PLATELETS 10*3/mm3 175 159 160 167      Results from last 7 days   Lab Units 11/28/24  0331 11/27/24  0333 11/26/24 0348 11/25/24  0344   SODIUM mmol/L 137 135* 139 136   POTASSIUM mmol/L 4.4 4.2 4.3 3.9   CHLORIDE mmol/L 102 103 103 102   CO2 mmol/L 26.0 22.2 25.1 23.0   BUN mg/dL 36* 51* 42* 64*   CREATININE mg/dL 3.80* 4.72* 4.38* 5.63*   GLUCOSE mg/dL 183* 180* 196* 208*   Estimated Creatinine Clearance: 32.7 mL/min (A) (by C-G formula based on SCr of 3.8 mg/dL (H)).  Results from last 7 days   Lab Units 11/28/24 0331 11/27/24 0333 11/26/24 0348 11/25/24  0344   ALBUMIN g/dL 3.4* 3.0* 3.0* 3.3*     Results from last 7 days   Lab Units 11/28/24 0331 11/27/24 0333 11/26/24 0348 11/25/24  0344   CALCIUM mg/dL 8.4* 7.8* 7.8* 8.0*   ALBUMIN g/dL 3.4* 3.0* 3.0* 3.3*   PHOSPHORUS mg/dL 2.0* 3.2 2.9 4.3       COVID19   Date Value Ref Range Status   11/13/2024 Not Detected Not Detected - Ref. Range Final   06/04/2022 Detected (C) Not Detected - Ref. Range Final     Glucose   Date/Time Value Ref Range Status   11/28/2024 1217 155 (H) 70 - 130 mg/dL Final   11/28/2024 0734 224 (H) 70 - 130 mg/dL Final   11/27/2024 2055 228 (H) 70 - 130 mg/dL Final   11/27/2024 1627 191 (H) 70 - 130 mg/dL Final   11/27/2024 1308 137 (H) 70 - 130 mg/dL Final   11/27/2024 0741 185 (H) 70 - 130 mg/dL Final   11/26/2024 2048 162 (H) 70 - 130 mg/dL Final       XR Chest 1 View  Narrative: XR CHEST 1 VW-     HISTORY: Male who is 39 years-old, decreased breath sounds     TECHNIQUE: Frontal view of the chest     COMPARISON: 11/13/2024     FINDINGS:  The central venous catheter extends to the cavoatrial junction region.  Sternotomy wires are noted. Heart is enlarged. Pulmonary vasculature is  congested. Bilateral pulmonary opacities appear increased, may represent  edema and/or pneumonia and/or ARDS. No large pleural effusion, or  pneumothorax. No acute osseous process.     Impression: Increased bilateral pulmonary opacities compatible with  interval worsening.     This report  was finalized on 11/18/2024 1:38 PM by Dr. Suleiman Anderson M.D on Workstation: [id number]     Arteriogram (Autofinalize)  This procedure was auto-finalized with no dictation required.    I reviewed the patient's daily medications.  Scheduled Medications  aspirin, 81 mg, Oral, Daily  carvedilol, 25 mg, Oral, BID With Meals  hydrALAZINE, 10 mg, Oral, Q8H  insulin glargine, 8 Units, Subcutaneous, Daily  insulin lispro, 2-7 Units, Subcutaneous, 4x Daily AC & at Bedtime  insulin lispro, 3 Units, Subcutaneous, TID With Meals  isosorbide dinitrate, 40 mg, Oral, TID - Nitrates  senna-docusate sodium, 2 tablet, Oral, BID  sodium chloride, 10 mL, Intravenous, Q12H  vitamin D, 50,000 Units, Oral, Q7 Days    Infusions     Diet  Diet: Diabetic, Fluid Restriction (240 mL/tray); Consistent Carbohydrate; 1500 mL/day; Fluid Consistency: Thin (IDDSI 0)         I have personally reviewed:  [x]  Laboratory   []  Microbiology   [x]  Radiology   []  EKG/Telemetry   []  Cardiology/Vascular   []  Pathology   [x]  Records     Assessment/Plan     Active Hospital Problems    Diagnosis  POA    **Acute exacerbation of CHF (congestive heart failure) [I50.9]  Yes    Chronic HFrEF (heart failure with reduced ejection fraction) [I50.22]  Yes    S/P CABG x 3 [Z95.1]  Not Applicable    Acute on chronic combined systolic and diastolic CHF (congestive heart failure) [I50.43]  Yes    Coronary artery disease [I25.10]  Yes    Hypertension [I10]  Yes    Type 2 diabetes mellitus, with long-term current use of insulin [E11.9, Z79.4]  Not Applicable    MEI (acute kidney injury) [N17.9]  Unknown      Resolved Hospital Problems   No resolved problems to display.       39 y.o. male admitted with Acute exacerbation of CHF (congestive heart failure).    Volume overload  HFrEF EF 20% 2022  CAD CABG x3  Elevated troponin  HTN  MEI/CKD 4   Cardiology have signed off  Renal consulted-TDC catheter placed on 11/18.  Dialysis initiated during this hospital  stay.  Continue with routine dialysis and creatinine is improved from 4 L to 2 L  Wean oxygen as able, suspect a component of VERONICA  ASA, coreg, hydralazine, isosorbide dinitrate  Follow-up echocardiogram above EF 30%  Monitor I/Os and daily weights  Patient is uninsured and not a citizen and will be a difficult disposition       DM2   uncontrolled. A1c 12.30%.   On corrective dose insulin and will further advanced long-acting insulin to 12 units and is on Premeal insulin as well.  Diabetes educator-prior to discharge on Tresiba/NovoLog as this will be the most economical option with lack of insurance     Obesity Body mass index is 50.26 kg/m².   Counseled to lose weight    SCDs for DVT prophylaxis.  Full code.  Discussed with patient and nursing staff.  Anticipate discharge home timing yet to be determined.    Patient is uninsured which makes disposition challenging    Copy text of this note has been reviewed by me on 11/28/2024          Patrick Barrett MD  [facility]  11/28/24  14:42 EST

## 2024-11-28 NOTE — PLAN OF CARE
Goal Outcome Evaluation:               No acute distress noted this shift, safety maintained, continue plan of care

## 2024-11-28 NOTE — PROGRESS NOTES
[facility] Progress Note      Patient Name: Rufus Dorsey  : 1985  MRN: [id number]  Primary Care Physician:  Provider, No Known  Date of admission: 2024    Subjective     Interval History:   Follow-up new ESRD    Seen and examined on HD  Feels less puffy; breathing is comfortable    Able to be dialyzed at [facility] without requiring admission: this is current outpt plan    Review of Systems:   As noted above    Objective     Vitals:   Temp:  [97.9 °F (36.6 °C)-98.8 °F (37.1 °C)] 98.4 °F (36.9 °C)  Heart Rate:  [74-79] 74  Resp:  [17-20] 20  BP: (117-153)/(59-84) 150/62  Flow (L/min) (Oxygen Therapy):  [2] 2    Intake/Output Summary (Last 24 hours) at 2024 1014  Last data filed at 2024 1800  Gross per 24 hour   Intake 620 ml   Output 3500 ml   Net -2880 ml       Physical Exam:    General: overweight, chronically ill, pleasant, communicative, appropriate   Qb 375, 167/88, HR 75, fluid removal goal 3.5 L net  Skin: warm and dry  HEENT: oral mucosa dry, nonicteric sclera.  Nasal cannula oxygen 2 L  Neck: supple, no JVD  Lungs: Clear to auscultation; not labored lying flat on 2 L/min  Heart: RRR, normal S1 and S2  Abdomen: soft, nontender, distended. +bs.  Obese  : no palpable bladder  Extremities: 2+ upper and lower extremity edema  Neuro: normal speech and mental status    Scheduled Meds:     aspirin, 81 mg, Oral, Daily  carvedilol, 25 mg, Oral, BID With Meals  hydrALAZINE, 10 mg, Oral, Q8H  insulin glargine, 8 Units, Subcutaneous, Daily  insulin lispro, 2-7 Units, Subcutaneous, 4x Daily AC & at Bedtime  insulin lispro, 3 Units, Subcutaneous, TID With Meals  isosorbide dinitrate, 40 mg, Oral, TID - Nitrates  senna-docusate sodium, 2 tablet, Oral, BID  sevelamer, 2,400 mg, Oral, TID With Meals  sodium chloride, 10 mL, Intravenous, Q12H  vitamin D, 50,000 Units, Oral, Q7 Days      IV Meds:        Results Reviewed:   I have personally reviewed the results from the  time of this admission to 11/28/2024 10:14 EST     Results from last 7 days   Lab Units 11/28/24 0331 11/27/24 0333 11/26/24 0348   SODIUM mmol/L 137 135* 139   POTASSIUM mmol/L 4.4 4.2 4.3   CHLORIDE mmol/L 102 103 103   CO2 mmol/L 26.0 22.2 25.1   BUN mg/dL 36* 51* 42*   CREATININE mg/dL 3.80* 4.72* 4.38*   CALCIUM mg/dL 8.4* 7.8* 7.8*   GLUCOSE mg/dL 183* 180* 196*       Estimated Creatinine Clearance: 32.7 mL/min (A) (by C-G formula based on SCr of 3.8 mg/dL (H)).    Results from last 7 days   Lab Units 11/28/24 0331 11/27/24 0333 11/26/24 0348   PHOSPHORUS mg/dL 2.0* 3.2 2.9             Results from last 7 days   Lab Units 11/28/24 0330 11/27/24 0333 11/26/24 0348 11/25/24 0344 11/24/24 0342   WBC 10*3/mm3 8.51 7.95 7.97 8.55 7.09   HEMOGLOBIN g/dL 10.6* 10.0* 9.8* 10.0* 9.9*   PLATELETS 10*3/mm3 175 159 160 167 145             Assessment / Plan     ASSESSMENT:  New ESRD.  Improving volume overload.  Electrolytes fine other than low phosphorus.  Unable to arrange outpatient HD, and so plan will be for as needed HD through [facility]   Hypertension with chronic kidney disease.  Likely due to in part to volume excess.  Continue to remove fluid.  Improving with volume removal.  Diabetes mellitus type 2 with chronic kidney disease.  Very poorly controlled with hemoglobin A1c 12.3.  Anemia of CKD.  Check iron on the blood in the lab.  5.  Heart failure reduced ejection fraction with EF 30%      PLAN:  Hemodialysis underway now  Discontinue Renvela as phosphorus is now too low  No outpatient dialysis unit available yet.  [facility] ER as of now will be his best option for dialysis  HD TTS while here  Discharge anytime from renal view    Thank you for involving us in the care of Rufus Dorsey.  Please feel free to call with any questions.    George Nicholson MD  11/28/24  10:14 EST    [facility]  [phone]    Please note that portions of this note were  completed with a voice recognition program.

## 2024-11-29 LAB
ALBUMIN SERPL-MCNC: 3.2 G/DL (ref 3.5–5.2)
ANION GAP SERPL CALCULATED.3IONS-SCNC: 6 MMOL/L (ref 5–15)
BASOPHILS # BLD AUTO: 0.03 10*3/MM3 (ref 0–0.2)
BASOPHILS NFR BLD AUTO: 0.4 % (ref 0–1.5)
BUN SERPL-MCNC: 31 MG/DL (ref 6–20)
BUN/CREAT SERPL: 8.3 (ref 7–25)
CALCIUM SPEC-SCNC: 7.8 MG/DL (ref 8.6–10.5)
CHLORIDE SERPL-SCNC: 102 MMOL/L (ref 98–107)
CO2 SERPL-SCNC: 27 MMOL/L (ref 22–29)
CREAT SERPL-MCNC: 3.75 MG/DL (ref 0.76–1.27)
DEPRECATED RDW RBC AUTO: 43.3 FL (ref 37–54)
EGFRCR SERPLBLD CKD-EPI 2021: 20.1 ML/MIN/1.73
EOSINOPHIL # BLD AUTO: 0.3 10*3/MM3 (ref 0–0.4)
EOSINOPHIL NFR BLD AUTO: 3.8 % (ref 0.3–6.2)
ERYTHROCYTE [DISTWIDTH] IN BLOOD BY AUTOMATED COUNT: 14 % (ref 12.3–15.4)
GLUCOSE BLDC GLUCOMTR-MCNC: 158 MG/DL (ref 70–130)
GLUCOSE BLDC GLUCOMTR-MCNC: 194 MG/DL (ref 70–130)
GLUCOSE BLDC GLUCOMTR-MCNC: 96 MG/DL (ref 70–130)
GLUCOSE BLDC GLUCOMTR-MCNC: 99 MG/DL (ref 70–130)
GLUCOSE SERPL-MCNC: 205 MG/DL (ref 65–99)
HCT VFR BLD AUTO: 31.8 % (ref 37.5–51)
HGB BLD-MCNC: 10.1 G/DL (ref 13–17.7)
IMM GRANULOCYTES # BLD AUTO: 0.03 10*3/MM3 (ref 0–0.05)
IMM GRANULOCYTES NFR BLD AUTO: 0.4 % (ref 0–0.5)
LYMPHOCYTES # BLD AUTO: 1.06 10*3/MM3 (ref 0.7–3.1)
LYMPHOCYTES NFR BLD AUTO: 13.5 % (ref 19.6–45.3)
MCH RBC QN AUTO: 27.3 PG (ref 26.6–33)
MCHC RBC AUTO-ENTMCNC: 31.8 G/DL (ref 31.5–35.7)
MCV RBC AUTO: 85.9 FL (ref 79–97)
MONOCYTES # BLD AUTO: 0.68 10*3/MM3 (ref 0.1–0.9)
MONOCYTES NFR BLD AUTO: 8.7 % (ref 5–12)
NEUTROPHILS NFR BLD AUTO: 5.73 10*3/MM3 (ref 1.7–7)
NEUTROPHILS NFR BLD AUTO: 73.2 % (ref 42.7–76)
NRBC BLD AUTO-RTO: 0 /100 WBC (ref 0–0.2)
PHOSPHATE SERPL-MCNC: 2 MG/DL (ref 2.5–4.5)
PLATELET # BLD AUTO: 166 10*3/MM3 (ref 140–450)
PMV BLD AUTO: 11.3 FL (ref 6–12)
POTASSIUM SERPL-SCNC: 4.4 MMOL/L (ref 3.5–5.2)
RBC # BLD AUTO: 3.7 10*6/MM3 (ref 4.14–5.8)
SODIUM SERPL-SCNC: 135 MMOL/L (ref 136–145)
WBC NRBC COR # BLD AUTO: 7.83 10*3/MM3 (ref 3.4–10.8)

## 2024-11-29 PROCEDURE — 85025 COMPLETE CBC W/AUTO DIFF WBC: CPT | Performed by: INTERNAL MEDICINE

## 2024-11-29 PROCEDURE — 82948 REAGENT STRIP/BLOOD GLUCOSE: CPT

## 2024-11-29 PROCEDURE — 80069 RENAL FUNCTION PANEL: CPT | Performed by: SURGERY

## 2024-11-29 PROCEDURE — 63710000001 INSULIN GLARGINE PER 5 UNITS: Performed by: INTERNAL MEDICINE

## 2024-11-29 PROCEDURE — 63710000001 INSULIN LISPRO (HUMAN) PER 5 UNITS: Performed by: SURGERY

## 2024-11-29 RX ADMIN — INSULIN GLARGINE 12 UNITS: 100 INJECTION, SOLUTION SUBCUTANEOUS at 10:24

## 2024-11-29 RX ADMIN — INSULIN LISPRO 3 UNITS: 100 INJECTION, SOLUTION INTRAVENOUS; SUBCUTANEOUS at 12:45

## 2024-11-29 RX ADMIN — HYDRALAZINE HYDROCHLORIDE 10 MG: 10 TABLET ORAL at 20:39

## 2024-11-29 RX ADMIN — INSULIN LISPRO 3 UNITS: 100 INJECTION, SOLUTION INTRAVENOUS; SUBCUTANEOUS at 17:52

## 2024-11-29 RX ADMIN — ISOSORBIDE DINITRATE 40 MG: 20 TABLET ORAL at 12:46

## 2024-11-29 RX ADMIN — SENNOSIDES AND DOCUSATE SODIUM 2 TABLET: 50; 8.6 TABLET ORAL at 10:23

## 2024-11-29 RX ADMIN — CARVEDILOL 25 MG: 25 TABLET, FILM COATED ORAL at 17:51

## 2024-11-29 RX ADMIN — ISOSORBIDE DINITRATE 40 MG: 20 TABLET ORAL at 17:51

## 2024-11-29 RX ADMIN — INSULIN LISPRO 2 UNITS: 100 INJECTION, SOLUTION INTRAVENOUS; SUBCUTANEOUS at 10:22

## 2024-11-29 RX ADMIN — INSULIN LISPRO 2 UNITS: 100 INJECTION, SOLUTION INTRAVENOUS; SUBCUTANEOUS at 12:45

## 2024-11-29 RX ADMIN — CARVEDILOL 25 MG: 25 TABLET, FILM COATED ORAL at 10:23

## 2024-11-29 RX ADMIN — ASPIRIN 81 MG: 81 TABLET, COATED ORAL at 10:23

## 2024-11-29 RX ADMIN — SENNOSIDES AND DOCUSATE SODIUM 2 TABLET: 50; 8.6 TABLET ORAL at 20:39

## 2024-11-29 RX ADMIN — HYDRALAZINE HYDROCHLORIDE 10 MG: 10 TABLET ORAL at 05:01

## 2024-11-29 RX ADMIN — HYDRALAZINE HYDROCHLORIDE 10 MG: 10 TABLET ORAL at 12:45

## 2024-11-29 RX ADMIN — Medication 10 ML: at 20:39

## 2024-11-29 RX ADMIN — INSULIN LISPRO 3 UNITS: 100 INJECTION, SOLUTION INTRAVENOUS; SUBCUTANEOUS at 10:24

## 2024-11-29 RX ADMIN — Medication 10 ML: at 10:24

## 2024-11-29 RX ADMIN — ISOSORBIDE DINITRATE 40 MG: 20 TABLET ORAL at 10:23

## 2024-11-29 NOTE — CASE MANAGEMENT/SOCIAL WORK
Continued Stay Note  [facility]     Patient Name: Rufus Dorsey  MRN: [id number]  Today's Date: 11/29/2024    Admit Date: 11/13/2024    Plan: Home, brother to transport   Discharge Plan       Row Name 11/29/24 1552       Plan    Plan Home, brother to transport    Plan Comments Nothing new to add from case management perspective, pt will need to follow up as previously instructed with [facility] ER or local ER's for dialysis treatments.                   Discharge Codes    No documentation.                 Expected Discharge Date and Time       Expected Discharge Date Expected Discharge Time    Dec 2, 2024               Mónica Rodriguez RN

## 2024-11-29 NOTE — PROGRESS NOTES
[facility] Progress Note      Patient Name: Rufus Dorsey  : 1985  MRN: [id number]  Primary Care Physician:  Provider, No Known  Date of admission: 2024    Subjective     Interval History:   Follow-up new ESRD    Breathing is comfortable; had HD yesterday with 3.5 L removed  Weight is down 40 pounds since admission  Appetite is good; no N/V    Able to be dialyzed at [facility] without requiring admission: this is current outpt plan    Review of Systems:   As noted above    Objective     Vitals:   Temp:  [97.5 °F (36.4 °C)-99 °F (37.2 °C)] 97.5 °F (36.4 °C)  Heart Rate:  [72-78] 72  Resp:  [19-20] 19  BP: (109-160)/(61-81) 132/61  No intake or output data in the 24 hours ending 24 1638      Physical Exam:    General: overweight, chronically ill, pleasant, communicative, appropriate   Skin: warm and dry  HEENT: oral mucosa dry, nonicteric sclera.  Nasal cannula oxygen 2 L  Neck: supple, no JVD  Lungs: Clear to auscultation; not labored lying flat on 2 L/min  Heart: RRR, normal S1 and S2  Abdomen: soft, nontender, distended. +bs.  Obese  : no palpable bladder  Extremities: 1-2+ upper and lower extremity edema  Neuro: normal speech and mental status    Scheduled Meds:     aspirin, 81 mg, Oral, Daily  carvedilol, 25 mg, Oral, BID With Meals  hydrALAZINE, 10 mg, Oral, Q8H  [START ON 2024] insulin glargine, 16 Units, Subcutaneous, Daily  insulin lispro, 2-7 Units, Subcutaneous, 4x Daily AC & at Bedtime  insulin lispro, 3 Units, Subcutaneous, TID With Meals  isosorbide dinitrate, 40 mg, Oral, TID - Nitrates  senna-docusate sodium, 2 tablet, Oral, BID  sodium chloride, 10 mL, Intravenous, Q12H  vitamin D, 50,000 Units, Oral, Q7 Days      IV Meds:        Results Reviewed:   I have personally reviewed the results from the time of this admission to 2024 16:38 EST     Results from last 7 days   Lab Units 24  0332 24  0331 24  0333   SODIUM mmol/L  135* 137 135*   POTASSIUM mmol/L 4.4 4.4 4.2   CHLORIDE mmol/L 102 102 103   CO2 mmol/L 27.0 26.0 22.2   BUN mg/dL 31* 36* 51*   CREATININE mg/dL 3.75* 3.80* 4.72*   CALCIUM mg/dL 7.8* 8.4* 7.8*   GLUCOSE mg/dL 205* 183* 180*       Estimated Creatinine Clearance: 32.7 mL/min (A) (by C-G formula based on SCr of 3.75 mg/dL (H)).    Results from last 7 days   Lab Units 11/29/24  0332 11/28/24  0331 11/27/24 0333   PHOSPHORUS mg/dL 2.0* 2.0* 3.2             Results from last 7 days   Lab Units 11/29/24  0332 11/28/24 0330 11/27/24 0333 11/26/24  0348 11/25/24  0344   WBC 10*3/mm3 7.83 8.51 7.95 7.97 8.55   HEMOGLOBIN g/dL 10.1* 10.6* 10.0* 9.8* 10.0*   PLATELETS 10*3/mm3 166 175 159 160 167             Assessment / Plan     ASSESSMENT:  New ESRD.  Improving volume overload.  Electrolytes fine other than low phosphorus.  Unable to arrange outpatient HD, and so plan will be for as needed HD through [facility]   Hypertension with chronic kidney disease.  Likely due to in part to volume excess.  Continue to remove fluid.  Improving with volume removal.  Diabetes mellitus type 2 with chronic kidney disease.  Very poorly controlled with hemoglobin A1c 12.3.  Anemia of CKD.  Check iron on the blood in the lab.  5.  Heart failure reduced ejection fraction with EF 30%      PLAN:  HD tomorrow (on TTS schedule while here)  No outpatient dialysis unit available yet.  [facility] ER as of now will be his best option for dialysis  Discharge anytime from renal [facility]    Thank you for involving us in the care of Rufus Dorsey.  Please feel free to call with any questions.    George Nicholson MD  11/29/24  16:38 EST    [facility]  [phone]    Please note that portions of this note were completed with a voice recognition program.

## 2024-11-29 NOTE — PROGRESS NOTES
Name: Rufus Dorsey ADMIT: 2024   : 1985  PCP: Provider, No Known    MRN: [id number] LOS: 16 days   AGE/SEX: 39 y.o. male  ROOM: [facility]     Subjective   Subjective     Patient is lying on the bed and does not appear to be major distress.  No new events overnight.  Denies nausea, vomiting, abdominal pain, chest pain, shortness of breath.  Currently off oxygen.         Objective   Objective   Vital Signs  Temp:  [97.5 °F (36.4 °C)-99 °F (37.2 °C)] 97.5 °F (36.4 °C)  Heart Rate:  [72-78] 72  Resp:  [19-20] 19  BP: (109-160)/(61-81) 132/61  SpO2:  [93 %-98 %] 95 %  on   ;   Device (Oxygen Therapy): room air  Body mass index is 43.77 kg/m².  Physical Exam  Vitals and nursing note reviewed.   Constitutional:       General: He is not in acute distress.  HENT:      Head: Normocephalic and atraumatic.      Mouth/Throat:      Mouth: Mucous membranes are moist.   Eyes:      Extraocular Movements: Extraocular movements intact.      Pupils: Pupils are equal, round, and reactive to light.   Cardiovascular:      Rate and Rhythm: Normal rate and regular rhythm.   Pulmonary:      Effort: Pulmonary effort is normal. No respiratory distress.   Abdominal:      General: Abdomen is flat. There is no distension.      Tenderness: There is no abdominal tenderness.   Musculoskeletal:         General: No swelling or deformity.      Cervical back: Normal range of motion and neck supple. No rigidity.      Right lower leg: Edema present.      Left lower leg: Edema present.      Comments: 1+ pitting edema bilaterally   Skin:     General: Skin is warm and dry.   Neurological:      General: No focal deficit present.      Mental Status: He is alert. Mental status is at baseline.         Results Review     I reviewed the patient's new clinical results.  Results from last 7 days   Lab Units 24  0332 24  03324  0333 24  0348   WBC 10*3/mm3 7.83 8.51 7.95 7.97   HEMOGLOBIN g/dL 10.1* 10.6* 10.0* 9.8*    PLATELETS 10*3/mm3 166 175 159 160     Results from last 7 days   Lab Units 11/29/24  0332 11/28/24  0331 11/27/24  0333 11/26/24  0348   SODIUM mmol/L 135* 137 135* 139   POTASSIUM mmol/L 4.4 4.4 4.2 4.3   CHLORIDE mmol/L 102 102 103 103   CO2 mmol/L 27.0 26.0 22.2 25.1   BUN mg/dL 31* 36* 51* 42*   CREATININE mg/dL 3.75* 3.80* 4.72* 4.38*   GLUCOSE mg/dL 205* 183* 180* 196*   Estimated Creatinine Clearance: 32.7 mL/min (A) (by C-G formula based on SCr of 3.75 mg/dL (H)).  Results from last 7 days   Lab Units 11/29/24  0332 11/28/24  0331 11/27/24  0333 11/26/24  0348   ALBUMIN g/dL 3.2* 3.4* 3.0* 3.0*     Results from last 7 days   Lab Units 11/29/24  0332 11/28/24  0331 11/27/24  0333 11/26/24  0348   CALCIUM mg/dL 7.8* 8.4* 7.8* 7.8*   ALBUMIN g/dL 3.2* 3.4* 3.0* 3.0*   PHOSPHORUS mg/dL 2.0* 2.0* 3.2 2.9       COVID19   Date Value Ref Range Status   11/13/2024 Not Detected Not Detected - Ref. Range Final   06/04/2022 Detected (C) Not Detected - Ref. Range Final     Glucose   Date/Time Value Ref Range Status   11/29/2024 1130 158 (H) 70 - 130 mg/dL Final   11/29/2024 0727 194 (H) 70 - 130 mg/dL Final   11/28/2024 2023 195 (H) 70 - 130 mg/dL Final   11/28/2024 1647 152 (H) 70 - 130 mg/dL Final   11/28/2024 1217 155 (H) 70 - 130 mg/dL Final   11/28/2024 0734 224 (H) 70 - 130 mg/dL Final   11/27/2024 2055 228 (H) 70 - 130 mg/dL Final       XR Chest 1 View  Narrative: XR CHEST 1 VW-     HISTORY: Male who is 39 years-old, decreased breath sounds     TECHNIQUE: Frontal view of the chest     COMPARISON: 11/13/2024     FINDINGS:  The central venous catheter extends to the cavoatrial junction region.  Sternotomy wires are noted. Heart is enlarged. Pulmonary vasculature is  congested. Bilateral pulmonary opacities appear increased, may represent  edema and/or pneumonia and/or ARDS. No large pleural effusion, or  pneumothorax. No acute osseous process.     Impression: Increased bilateral pulmonary opacities compatible  with  interval worsening.     This report was finalized on 11/18/2024 1:38 PM by Dr. Suleiman Anderson M.D on Workstation: [id number]     Arteriogram (Autofinalize)  This procedure was auto-finalized with no dictation required.    I reviewed the patient's daily medications.  Scheduled Medications  aspirin, 81 mg, Oral, Daily  carvedilol, 25 mg, Oral, BID With Meals  hydrALAZINE, 10 mg, Oral, Q8H  insulin glargine, 12 Units, Subcutaneous, Daily  insulin lispro, 2-7 Units, Subcutaneous, 4x Daily AC & at Bedtime  insulin lispro, 3 Units, Subcutaneous, TID With Meals  isosorbide dinitrate, 40 mg, Oral, TID - Nitrates  senna-docusate sodium, 2 tablet, Oral, BID  sodium chloride, 10 mL, Intravenous, Q12H  vitamin D, 50,000 Units, Oral, Q7 Days    Infusions     Diet  Diet: Diabetic, Fluid Restriction (240 mL/tray); Consistent Carbohydrate; 1500 mL/day; Fluid Consistency: Thin (IDDSI 0)         I have personally reviewed:  [x]  Laboratory   []  Microbiology   [x]  Radiology   []  EKG/Telemetry   []  Cardiology/Vascular   []  Pathology   [x]  Records     Assessment/Plan     Active Hospital Problems    Diagnosis  POA    **Acute exacerbation of CHF (congestive heart failure) [I50.9]  Yes    Chronic HFrEF (heart failure with reduced ejection fraction) [I50.22]  Yes    S/P CABG x 3 [Z95.1]  Not Applicable    Acute on chronic combined systolic and diastolic CHF (congestive heart failure) [I50.43]  Yes    Coronary artery disease [I25.10]  Yes    Hypertension [I10]  Yes    Type 2 diabetes mellitus, with long-term current use of insulin [E11.9, Z79.4]  Not Applicable    MEI (acute kidney injury) [N17.9]  Unknown      Resolved Hospital Problems   No resolved problems to display.       39 y.o. male admitted with Acute exacerbation of CHF (congestive heart failure).    Volume overload  HFrEF EF 20% 2022  CAD CABG x3  Elevated troponin  HTN  MEI/CKD 4   Cardiology have signed off  Renal consulted-TDC catheter placed on 11/18.  Dialysis  initiated during this hospital stay.  Continue with routine dialysis and creatinine is improved from 4 L to 2 L  Wean oxygen as able, suspect a component of VERONICA  ASA, coreg, hydralazine, isosorbide dinitrate  Follow-up echocardiogram above EF 30%  Monitor I/Os and daily weights  Patient is uninsured and not a citizen and will be a difficult disposition       DM2   uncontrolled. A1c 12.30%.   On corrective dose insulin and will further advanced long-acting insulin to 16 units and is on Premeal insulin as well.  Diabetes educator-prior to discharge on Tresiba/NovoLog as this will be the most economical option with lack of insurance     Obesity Body mass index is 50.26 kg/m².   Counseled to lose weight    SCDs for DVT prophylaxis.  Full code.  Discussed with patient and nursing staff.  Anticipate discharge home timing yet to be determined.    Patient is uninsured which makes disposition challenging    Copy text of this note has been reviewed by me on 11/29/2024          Patrick Barrett MD  [facility]  11/29/24  14:58 EST

## 2024-11-30 LAB
ALBUMIN SERPL-MCNC: 3.1 G/DL (ref 3.5–5.2)
ANION GAP SERPL CALCULATED.3IONS-SCNC: 10 MMOL/L (ref 5–15)
BUN SERPL-MCNC: 40 MG/DL (ref 6–20)
BUN/CREAT SERPL: 9.6 (ref 7–25)
CALCIUM SPEC-SCNC: 8.1 MG/DL (ref 8.6–10.5)
CHLORIDE SERPL-SCNC: 105 MMOL/L (ref 98–107)
CO2 SERPL-SCNC: 23 MMOL/L (ref 22–29)
CREAT SERPL-MCNC: 4.16 MG/DL (ref 0.76–1.27)
DEPRECATED RDW RBC AUTO: 44.1 FL (ref 37–54)
EGFRCR SERPLBLD CKD-EPI 2021: 17.7 ML/MIN/1.73
ERYTHROCYTE [DISTWIDTH] IN BLOOD BY AUTOMATED COUNT: 14.2 % (ref 12.3–15.4)
GLUCOSE BLDC GLUCOMTR-MCNC: 102 MG/DL (ref 70–130)
GLUCOSE BLDC GLUCOMTR-MCNC: 124 MG/DL (ref 70–130)
GLUCOSE BLDC GLUCOMTR-MCNC: 124 MG/DL (ref 70–130)
GLUCOSE BLDC GLUCOMTR-MCNC: 125 MG/DL (ref 70–130)
GLUCOSE SERPL-MCNC: 128 MG/DL (ref 65–99)
HCT VFR BLD AUTO: 32.6 % (ref 37.5–51)
HGB BLD-MCNC: 10.4 G/DL (ref 13–17.7)
MAGNESIUM SERPL-MCNC: 2.1 MG/DL (ref 1.6–2.6)
MCH RBC QN AUTO: 27.4 PG (ref 26.6–33)
MCHC RBC AUTO-ENTMCNC: 31.9 G/DL (ref 31.5–35.7)
MCV RBC AUTO: 85.8 FL (ref 79–97)
PHOSPHATE SERPL-MCNC: 2.7 MG/DL (ref 2.5–4.5)
PLATELET # BLD AUTO: 175 10*3/MM3 (ref 140–450)
PMV BLD AUTO: 11.2 FL (ref 6–12)
POTASSIUM SERPL-SCNC: 4.6 MMOL/L (ref 3.5–5.2)
RBC # BLD AUTO: 3.8 10*6/MM3 (ref 4.14–5.8)
SODIUM SERPL-SCNC: 138 MMOL/L (ref 136–145)
WBC NRBC COR # BLD AUTO: 7.56 10*3/MM3 (ref 3.4–10.8)

## 2024-11-30 PROCEDURE — 83735 ASSAY OF MAGNESIUM: CPT | Performed by: INTERNAL MEDICINE

## 2024-11-30 PROCEDURE — 80069 RENAL FUNCTION PANEL: CPT | Performed by: SURGERY

## 2024-11-30 PROCEDURE — 82948 REAGENT STRIP/BLOOD GLUCOSE: CPT

## 2024-11-30 PROCEDURE — 25010000002 HEPARIN (PORCINE) PER 1000 UNITS: Performed by: INTERNAL MEDICINE

## 2024-11-30 PROCEDURE — 63710000001 INSULIN GLARGINE PER 5 UNITS: Performed by: INTERNAL MEDICINE

## 2024-11-30 PROCEDURE — 85027 COMPLETE CBC AUTOMATED: CPT | Performed by: SURGERY

## 2024-11-30 PROCEDURE — 63710000001 INSULIN LISPRO (HUMAN) PER 5 UNITS: Performed by: SURGERY

## 2024-11-30 RX ADMIN — HYDRALAZINE HYDROCHLORIDE 10 MG: 10 TABLET ORAL at 04:06

## 2024-11-30 RX ADMIN — ISOSORBIDE DINITRATE 40 MG: 20 TABLET ORAL at 17:21

## 2024-11-30 RX ADMIN — HYDRALAZINE HYDROCHLORIDE 10 MG: 10 TABLET ORAL at 20:11

## 2024-11-30 RX ADMIN — INSULIN GLARGINE 16 UNITS: 100 INJECTION, SOLUTION SUBCUTANEOUS at 13:32

## 2024-11-30 RX ADMIN — Medication 10 ML: at 13:32

## 2024-11-30 RX ADMIN — ASPIRIN 81 MG: 81 TABLET, COATED ORAL at 13:31

## 2024-11-30 RX ADMIN — ERGOCALCIFEROL 50000 UNITS: 1.25 CAPSULE ORAL at 13:31

## 2024-11-30 RX ADMIN — ISOSORBIDE DINITRATE 40 MG: 20 TABLET ORAL at 13:31

## 2024-11-30 RX ADMIN — HYDRALAZINE HYDROCHLORIDE 10 MG: 10 TABLET ORAL at 13:31

## 2024-11-30 RX ADMIN — INSULIN LISPRO 3 UNITS: 100 INJECTION, SOLUTION INTRAVENOUS; SUBCUTANEOUS at 17:22

## 2024-11-30 RX ADMIN — Medication 10 ML: at 20:11

## 2024-11-30 RX ADMIN — SENNOSIDES AND DOCUSATE SODIUM 2 TABLET: 50; 8.6 TABLET ORAL at 13:31

## 2024-11-30 RX ADMIN — CARVEDILOL 25 MG: 25 TABLET, FILM COATED ORAL at 17:21

## 2024-11-30 RX ADMIN — HEPARIN SODIUM 3800 UNITS: 1000 INJECTION INTRAVENOUS; SUBCUTANEOUS at 10:09

## 2024-11-30 NOTE — PLAN OF CARE
Problem: Adult Inpatient Plan of Care  Goal: Plan of Care Review  Outcome: Progressing   Goal Outcome Evaluation:  VSS. LINO.  w/BBB. A&Ox4. HD scheduled for today. Pt rested throughout the night with no issues or complaints.

## 2024-11-30 NOTE — PLAN OF CARE
Goal Outcome Evaluation:              Outcome Evaluation: VSS, A/Ox4, no c/o pain, [facility] ed has agreed to do HD, orders for HD tomorrow, anticipated dc soon, safety  maintained, will CTM cl

## 2024-11-30 NOTE — PROGRESS NOTES
[facility] Progress Note      Patient Name: Rufus Dorsey  : 1985  MRN: [id number]  Primary Care Physician:  Provider, No Known  Date of admission: 2024    Subjective     Interval History:   Follow-up new ESRD    Seen and examined on HD; very sleepy, but does awaken when sternum rubbed  Weight is down 40 pounds since admission  Denies shortness of breath; no cramping    Able to be dialyzed at [facility] without requiring admission: this is current outpt plan    Review of Systems:   As noted above    Objective     Vitals:   Temp:  [97.3 °F (36.3 °C)-98.1 °F (36.7 °C)] 98.1 °F (36.7 °C)  Heart Rate:  [71-73] 71  Resp:  [16-19] 16  BP: (132-137)/(61-89) 136/64    Intake/Output Summary (Last 24 hours) at 2024 1106  Last data filed at 2024 0958  Gross per 24 hour   Intake --   Output 0 ml   Net 0 ml         Physical Exam:    General: overweight, chronically ill, pleasant, communicative, appropriate   Qb 350, 154/83, HR 65, fluid removal goal 3.5 L net  Skin: warm and dry  HEENT: oral mucosa dry, nonicteric sclera  Neck: supple, no JVD  Lungs: Clear to auscultation; not labored lying flat on 2 L/min  Heart: RRR, normal S1 and S2  Abdomen: soft, nontender, distended. +bs.  Obese  : no palpable bladder  Extremities: 1+ upper and lower extremity edema  Neuro: Sleepy, minimal speech    Scheduled Meds:     aspirin, 81 mg, Oral, Daily  carvedilol, 25 mg, Oral, BID With Meals  hydrALAZINE, 10 mg, Oral, Q8H  insulin glargine, 16 Units, Subcutaneous, Daily  insulin lispro, 2-7 Units, Subcutaneous, 4x Daily AC & at Bedtime  insulin lispro, 3 Units, Subcutaneous, TID With Meals  isosorbide dinitrate, 40 mg, Oral, TID - Nitrates  senna-docusate sodium, 2 tablet, Oral, BID  sodium chloride, 10 mL, Intravenous, Q12H  vitamin D, 50,000 Units, Oral, Q7 Days      IV Meds:        Results Reviewed:   I have personally reviewed the results from the time of this admission to 2024  11:06 EST     Results from last 7 days   Lab Units 11/30/24 0446 11/29/24 0332 11/28/24  0331   SODIUM mmol/L 138 135* 137   POTASSIUM mmol/L 4.6 4.4 4.4   CHLORIDE mmol/L 105 102 102   CO2 mmol/L 23.0 27.0 26.0   BUN mg/dL 40* 31* 36*   CREATININE mg/dL 4.16* 3.75* 3.80*   CALCIUM mg/dL 8.1* 7.8* 8.4*   GLUCOSE mg/dL 128* 205* 183*       Estimated Creatinine Clearance: 29.5 mL/min (A) (by C-G formula based on SCr of 4.16 mg/dL (H)).    Results from last 7 days   Lab Units 11/30/24 0446 11/29/24 0332 11/28/24 0331   MAGNESIUM mg/dL 2.1  --   --    PHOSPHORUS mg/dL 2.7 2.0* 2.0*             Results from last 7 days   Lab Units 11/30/24 0446 11/29/24 0332 11/28/24 0330 11/27/24 0333 11/26/24  0348   WBC 10*3/mm3 7.56 7.83 8.51 7.95 7.97   HEMOGLOBIN g/dL 10.4* 10.1* 10.6* 10.0* 9.8*   PLATELETS 10*3/mm3 175 166 175 159 160             Assessment / Plan     ASSESSMENT:  New ESRD.  Improving volume overload.  Electrolytes fine.  Unable to arrange outpatient HD, and so plan will be for as needed HD through [facility]   Hypertension with chronic kidney disease.  Definitely improved with fluid removal  Diabetes mellitus type 2 with chronic kidney disease.  Very poorly controlled with hemoglobin A1c 12.3.  Anemia of CKD.  Check iron on the blood in the lab.  Anemia of ESRD: Hemoglobin at goal  5.  Heart failure reduced ejection fraction with EF 30%        PLAN:  HD today (on TTS schedule while here)  No outpatient dialysis unit available yet.  [facility] as of now will be his best option for dialysis  Discharge anytime from [facility]    Thank you for involving us in the care of Rufus Dorsey.  Please feel free to call with any questions.    George Nicholson MD  11/30/24  11:06 EST    [facility]  [phone]    Please note that portions of this note were completed with a voice recognition program.

## 2024-11-30 NOTE — PLAN OF CARE
Goal Outcome Evaluation:  Plan of Care Reviewed With: patient      Outcome Evaluation: VSS. HD done today. No c/o pain this shift. Safety maintained.

## 2024-11-30 NOTE — PROGRESS NOTES
Name: Rufus Dorsey ADMIT: 2024   : 1985  PCP: Provider, No Known    MRN: [id number] LOS: 17 days   AGE/SEX: 39 y.o. male  ROOM: [facility]     Subjective   Subjective     Patient is lying on the bed and does not appear to be major distress.  No new events overnight.  Denies nausea, vomiting, abdominal pain, chest pain, shortness of breath.  Currently off oxygen.         Objective   Objective   Vital Signs  Temp:  [97.3 °F (36.3 °C)-98.1 °F (36.7 °C)] 97.7 °F (36.5 °C)  Heart Rate:  [71-73] 71  Resp:  [16-18] 16  BP: (136-139)/(64-89) 139/80  SpO2:  [93 %-100 %] 93 %  on   ;   Device (Oxygen Therapy): room air  Body mass index is 43.87 kg/m².  Physical Exam  Vitals and nursing note reviewed.   Constitutional:       General: He is not in acute distress.  HENT:      Head: Normocephalic and atraumatic.      Mouth/Throat:      Mouth: Mucous membranes are moist.   Eyes:      Extraocular Movements: Extraocular movements intact.      Pupils: Pupils are equal, round, and reactive to light.   Cardiovascular:      Rate and Rhythm: Normal rate and regular rhythm.   Pulmonary:      Effort: Pulmonary effort is normal. No respiratory distress.   Abdominal:      General: Abdomen is flat. There is no distension.      Tenderness: There is no abdominal tenderness.   Musculoskeletal:         General: No swelling or deformity.      Cervical back: Normal range of motion and neck supple. No rigidity.      Right lower leg: Edema present.      Left lower leg: Edema present.      Comments: 1+ pitting edema bilaterally   Skin:     General: Skin is warm and dry.   Neurological:      General: No focal deficit present.      Mental Status: He is alert. Mental status is at baseline.         Results Review     I reviewed the patient's new clinical results.  Results from last 7 days   Lab Units 24  0446 24  0332 24  0330 24  0333   WBC 10*3/mm3 7.56 7.83 8.51 7.95   HEMOGLOBIN g/dL 10.4* 10.1* 10.6* 10.0*    PLATELETS 10*3/mm3 175 166 175 159     Results from last 7 days   Lab Units 11/30/24 0446 11/29/24  0332 11/28/24  0331 11/27/24  0333   SODIUM mmol/L 138 135* 137 135*   POTASSIUM mmol/L 4.6 4.4 4.4 4.2   CHLORIDE mmol/L 105 102 102 103   CO2 mmol/L 23.0 27.0 26.0 22.2   BUN mg/dL 40* 31* 36* 51*   CREATININE mg/dL 4.16* 3.75* 3.80* 4.72*   GLUCOSE mg/dL 128* 205* 183* 180*   Estimated Creatinine Clearance: 29.5 mL/min (A) (by C-G formula based on SCr of 4.16 mg/dL (H)).  Results from last 7 days   Lab Units 11/30/24 0446 11/29/24 0332 11/28/24 0331 11/27/24  0333   ALBUMIN g/dL 3.1* 3.2* 3.4* 3.0*     Results from last 7 days   Lab Units 11/30/24 0446 11/29/24 0332 11/28/24  0331 11/27/24  0333   CALCIUM mg/dL 8.1* 7.8* 8.4* 7.8*   ALBUMIN g/dL 3.1* 3.2* 3.4* 3.0*   MAGNESIUM mg/dL 2.1  --   --   --    PHOSPHORUS mg/dL 2.7 2.0* 2.0* 3.2       COVID19   Date Value Ref Range Status   11/13/2024 Not Detected Not Detected - Ref. Range Final   06/04/2022 Detected (C) Not Detected - Ref. Range Final     Glucose   Date/Time Value Ref Range Status   11/30/2024 1313 102 70 - 130 mg/dL Final   11/30/2024 0732 124 70 - 130 mg/dL Final   11/29/2024 2034 96 70 - 130 mg/dL Final   11/29/2024 1647 99 70 - 130 mg/dL Final   11/29/2024 1130 158 (H) 70 - 130 mg/dL Final   11/29/2024 0727 194 (H) 70 - 130 mg/dL Final   11/28/2024 2023 195 (H) 70 - 130 mg/dL Final       XR Chest 1 View  Narrative: XR CHEST 1 VW-     HISTORY: Male who is 39 years-old, decreased breath sounds     TECHNIQUE: Frontal view of the chest     COMPARISON: 11/13/2024     FINDINGS:  The central venous catheter extends to the cavoatrial junction region.  Sternotomy wires are noted. Heart is enlarged. Pulmonary vasculature is  congested. Bilateral pulmonary opacities appear increased, may represent  edema and/or pneumonia and/or ARDS. No large pleural effusion, or  pneumothorax. No acute osseous process.     Impression: Increased bilateral pulmonary  opacities compatible with  interval worsening.     This report was finalized on 11/18/2024 1:38 PM by Dr. Suleiman Anderson M.D on Workstation: [id number]     Arteriogram (Autofinalize)  This procedure was auto-finalized with no dictation required.    I reviewed the patient's daily medications.  Scheduled Medications  aspirin, 81 mg, Oral, Daily  carvedilol, 25 mg, Oral, BID With Meals  hydrALAZINE, 10 mg, Oral, Q8H  insulin glargine, 16 Units, Subcutaneous, Daily  insulin lispro, 2-7 Units, Subcutaneous, 4x Daily AC & at Bedtime  insulin lispro, 3 Units, Subcutaneous, TID With Meals  isosorbide dinitrate, 40 mg, Oral, TID - Nitrates  senna-docusate sodium, 2 tablet, Oral, BID  sodium chloride, 10 mL, Intravenous, Q12H  vitamin D, 50,000 Units, Oral, Q7 Days    Infusions     Diet  Diet: Diabetic, Fluid Restriction (240 mL/tray); Consistent Carbohydrate; 1500 mL/day; Fluid Consistency: Thin (IDDSI 0)         I have personally reviewed:  [x]  Laboratory   []  Microbiology   [x]  Radiology   []  EKG/Telemetry   []  Cardiology/Vascular   []  Pathology   [x]  Records     Assessment/Plan     Active Hospital Problems    Diagnosis  POA    **Acute exacerbation of CHF (congestive heart failure) [I50.9]  Yes    Chronic HFrEF (heart failure with reduced ejection fraction) [I50.22]  Yes    S/P CABG x 3 [Z95.1]  Not Applicable    Acute on chronic combined systolic and diastolic CHF (congestive heart failure) [I50.43]  Yes    Coronary artery disease [I25.10]  Yes    Hypertension [I10]  Yes    Type 2 diabetes mellitus, with long-term current use of insulin [E11.9, Z79.4]  Not Applicable    MEI (acute kidney injury) [N17.9]  Unknown      Resolved Hospital Problems   No resolved problems to display.       39 y.o. male admitted with Acute exacerbation of CHF (congestive heart failure).    Volume overload  HFrEF EF 20% 2022  CAD CABG x3  Elevated troponin  HTN  MEI/CKD 4   Cardiology have signed off  Renal consulted-TDC catheter placed  on 11/18.  Dialysis initiated during this hospital stay.  Continue with routine dialysis and creatinine is improved from 4 L to 2 L  Wean oxygen as able, suspect a component of VERONICA  ASA, coreg, hydralazine, isosorbide dinitrate  Follow-up echocardiogram above EF 30%  Monitor I/Os and daily weights  Patient is uninsured and not a citizen and will be a difficult disposition       DM2   uncontrolled. A1c 12.30%.   Currently on long-acting insulin 16 units, Premeal insulin and corrective dose insulin and blood sugars are quite well-controlled  Diabetes educator-prior to discharge on Tresiba/NovoLog as this will be the most economical option with lack of insurance     Obesity Body mass index is 50.26 kg/m².   Counseled to lose weight    SCDs for DVT prophylaxis.  Full code.  Discussed with patient and nursing staff.  Anticipate discharge home timing yet to be determined.    Patient is uninsured which makes disposition challenging    Copy text of this note has been reviewed by me on 11/30/2024          Patrick Barrett MD  [facility]  11/30/24  13:46 EST

## 2024-12-01 LAB
ALBUMIN SERPL-MCNC: 3.4 G/DL (ref 3.5–5.2)
ANION GAP SERPL CALCULATED.3IONS-SCNC: 8.3 MMOL/L (ref 5–15)
BUN SERPL-MCNC: 34 MG/DL (ref 6–20)
BUN/CREAT SERPL: 9.3 (ref 7–25)
CALCIUM SPEC-SCNC: 8.3 MG/DL (ref 8.6–10.5)
CHLORIDE SERPL-SCNC: 103 MMOL/L (ref 98–107)
CO2 SERPL-SCNC: 26.7 MMOL/L (ref 22–29)
CREAT SERPL-MCNC: 3.64 MG/DL (ref 0.76–1.27)
DEPRECATED RDW RBC AUTO: 45.4 FL (ref 37–54)
EGFRCR SERPLBLD CKD-EPI 2021: 20.8 ML/MIN/1.73
ERYTHROCYTE [DISTWIDTH] IN BLOOD BY AUTOMATED COUNT: 14.4 % (ref 12.3–15.4)
GLUCOSE BLDC GLUCOMTR-MCNC: 109 MG/DL (ref 70–130)
GLUCOSE BLDC GLUCOMTR-MCNC: 113 MG/DL (ref 70–130)
GLUCOSE BLDC GLUCOMTR-MCNC: 124 MG/DL (ref 70–130)
GLUCOSE BLDC GLUCOMTR-MCNC: 125 MG/DL (ref 70–130)
GLUCOSE SERPL-MCNC: 145 MG/DL (ref 65–99)
HCT VFR BLD AUTO: 33.9 % (ref 37.5–51)
HGB BLD-MCNC: 10.9 G/DL (ref 13–17.7)
MCH RBC QN AUTO: 27.9 PG (ref 26.6–33)
MCHC RBC AUTO-ENTMCNC: 32.2 G/DL (ref 31.5–35.7)
MCV RBC AUTO: 86.9 FL (ref 79–97)
PHOSPHATE SERPL-MCNC: 3.2 MG/DL (ref 2.5–4.5)
PLATELET # BLD AUTO: 176 10*3/MM3 (ref 140–450)
PMV BLD AUTO: 11 FL (ref 6–12)
POTASSIUM SERPL-SCNC: 4.6 MMOL/L (ref 3.5–5.2)
RBC # BLD AUTO: 3.9 10*6/MM3 (ref 4.14–5.8)
SODIUM SERPL-SCNC: 138 MMOL/L (ref 136–145)
WBC NRBC COR # BLD AUTO: 8.29 10*3/MM3 (ref 3.4–10.8)

## 2024-12-01 PROCEDURE — 85027 COMPLETE CBC AUTOMATED: CPT | Performed by: SURGERY

## 2024-12-01 PROCEDURE — 82948 REAGENT STRIP/BLOOD GLUCOSE: CPT

## 2024-12-01 PROCEDURE — 63710000001 INSULIN LISPRO (HUMAN) PER 5 UNITS: Performed by: SURGERY

## 2024-12-01 PROCEDURE — 80069 RENAL FUNCTION PANEL: CPT | Performed by: SURGERY

## 2024-12-01 PROCEDURE — 63710000001 INSULIN GLARGINE PER 5 UNITS: Performed by: INTERNAL MEDICINE

## 2024-12-01 RX ADMIN — Medication 10 ML: at 20:12

## 2024-12-01 RX ADMIN — INSULIN GLARGINE 16 UNITS: 100 INJECTION, SOLUTION SUBCUTANEOUS at 08:31

## 2024-12-01 RX ADMIN — ASPIRIN 81 MG: 81 TABLET, COATED ORAL at 08:29

## 2024-12-01 RX ADMIN — ISOSORBIDE DINITRATE 40 MG: 20 TABLET ORAL at 12:40

## 2024-12-01 RX ADMIN — ISOSORBIDE DINITRATE 40 MG: 20 TABLET ORAL at 08:29

## 2024-12-01 RX ADMIN — INSULIN LISPRO 3 UNITS: 100 INJECTION, SOLUTION INTRAVENOUS; SUBCUTANEOUS at 12:41

## 2024-12-01 RX ADMIN — SENNOSIDES AND DOCUSATE SODIUM 2 TABLET: 50; 8.6 TABLET ORAL at 08:29

## 2024-12-01 RX ADMIN — HYDRALAZINE HYDROCHLORIDE 10 MG: 10 TABLET ORAL at 05:30

## 2024-12-01 RX ADMIN — INSULIN LISPRO 3 UNITS: 100 INJECTION, SOLUTION INTRAVENOUS; SUBCUTANEOUS at 08:30

## 2024-12-01 RX ADMIN — ISOSORBIDE DINITRATE 40 MG: 20 TABLET ORAL at 17:17

## 2024-12-01 RX ADMIN — CARVEDILOL 25 MG: 25 TABLET, FILM COATED ORAL at 08:30

## 2024-12-01 RX ADMIN — Medication 10 ML: at 08:30

## 2024-12-01 RX ADMIN — HYDRALAZINE HYDROCHLORIDE 10 MG: 10 TABLET ORAL at 12:41

## 2024-12-01 RX ADMIN — HYDRALAZINE HYDROCHLORIDE 10 MG: 10 TABLET ORAL at 20:12

## 2024-12-01 RX ADMIN — INSULIN LISPRO 3 UNITS: 100 INJECTION, SOLUTION INTRAVENOUS; SUBCUTANEOUS at 17:17

## 2024-12-01 RX ADMIN — SENNOSIDES AND DOCUSATE SODIUM 2 TABLET: 50; 8.6 TABLET ORAL at 20:12

## 2024-12-01 RX ADMIN — CARVEDILOL 25 MG: 25 TABLET, FILM COATED ORAL at 17:17

## 2024-12-01 NOTE — PROGRESS NOTES
Name: Rufus Dorsey ADMIT: 2024   : 1985  PCP: Provider, No Known    MRN: [id number] LOS: 18 days   AGE/SEX: 39 y.o. male  ROOM: [facility]     Subjective   Subjective     Patient is lying on the bed and does not appear to be major distress.  No new events overnight.  Denies nausea, vomiting, abdominal pain, chest pain, shortness of breath.  Currently off oxygen.         Objective   Objective   Vital Signs  Temp:  [97.7 °F (36.5 °C)-98.1 °F (36.7 °C)] 98.1 °F (36.7 °C)  Heart Rate:  [71-80] 77  Resp:  [18] 18  BP: (128-149)/() 149/79  SpO2:  [96 %-98 %] 97 %  on   ;   Device (Oxygen Therapy): room air  Body mass index is 42.52 kg/m².  Physical Exam  Vitals and nursing note reviewed.   Constitutional:       General: He is not in acute distress.  HENT:      Head: Normocephalic and atraumatic.      Mouth/Throat:      Mouth: Mucous membranes are moist.   Eyes:      Extraocular Movements: Extraocular movements intact.      Pupils: Pupils are equal, round, and reactive to light.   Cardiovascular:      Rate and Rhythm: Normal rate and regular rhythm.   Pulmonary:      Effort: Pulmonary effort is normal. No respiratory distress.   Abdominal:      General: Abdomen is flat. There is no distension.      Tenderness: There is no abdominal tenderness.   Musculoskeletal:         General: No swelling or deformity.      Cervical back: Normal range of motion and neck supple. No rigidity.      Right lower leg: Edema present.      Left lower leg: Edema present.      Comments: 1+ pitting edema bilaterally   Skin:     General: Skin is warm and dry.   Neurological:      General: No focal deficit present.      Mental Status: He is alert. Mental status is at baseline.         Results Review     I reviewed the patient's new clinical results.  Results from last 7 days   Lab Units 24  0945 24  0446 24  0332 24  0330   WBC 10*3/mm3 8.29 7.56 7.83 8.51   HEMOGLOBIN g/dL 10.9* 10.4* 10.1* 10.6*    PLATELETS 10*3/mm3 176 175 166 175     Results from last 7 days   Lab Units 12/01/24  0945 11/30/24  0446 11/29/24  0332 11/28/24  0331   SODIUM mmol/L 138 138 135* 137   POTASSIUM mmol/L 4.6 4.6 4.4 4.4   CHLORIDE mmol/L 103 105 102 102   CO2 mmol/L 26.7 23.0 27.0 26.0   BUN mg/dL 34* 40* 31* 36*   CREATININE mg/dL 3.64* 4.16* 3.75* 3.80*   GLUCOSE mg/dL 145* 128* 205* 183*   Estimated Creatinine Clearance: 33.3 mL/min (A) (by C-G formula based on SCr of 3.64 mg/dL (H)).  Results from last 7 days   Lab Units 12/01/24  0945 11/30/24 0446 11/29/24 0332 11/28/24  0331   ALBUMIN g/dL 3.4* 3.1* 3.2* 3.4*     Results from last 7 days   Lab Units 12/01/24  0945 11/30/24 0446 11/29/24  0332 11/28/24  0331   CALCIUM mg/dL 8.3* 8.1* 7.8* 8.4*   ALBUMIN g/dL 3.4* 3.1* 3.2* 3.4*   MAGNESIUM mg/dL  --  2.1  --   --    PHOSPHORUS mg/dL 3.2 2.7 2.0* 2.0*       COVID19   Date Value Ref Range Status   11/13/2024 Not Detected Not Detected - Ref. Range Final   06/04/2022 Detected (C) Not Detected - Ref. Range Final     Glucose   Date/Time Value Ref Range Status   12/01/2024 1153 125 70 - 130 mg/dL Final   12/01/2024 0807 124 70 - 130 mg/dL Final   11/30/2024 2111 125 70 - 130 mg/dL Final   11/30/2024 1613 124 70 - 130 mg/dL Final   11/30/2024 1313 102 70 - 130 mg/dL Final   11/30/2024 0732 124 70 - 130 mg/dL Final   11/29/2024 2034 96 70 - 130 mg/dL Final       XR Chest 1 View  Narrative: XR CHEST 1 VW-     HISTORY: Male who is 39 years-old, decreased breath sounds     TECHNIQUE: Frontal view of the chest     COMPARISON: 11/13/2024     FINDINGS:  The central venous catheter extends to the cavoatrial junction region.  Sternotomy wires are noted. Heart is enlarged. Pulmonary vasculature is  congested. Bilateral pulmonary opacities appear increased, may represent  edema and/or pneumonia and/or ARDS. No large pleural effusion, or  pneumothorax. No acute osseous process.     Impression: Increased bilateral pulmonary opacities  compatible with  interval worsening.     This report was finalized on 11/18/2024 1:38 PM by Dr. Suleiman Anderson M.D on Workstation: [id number]     Arteriogram (Autofinalize)  This procedure was auto-finalized with no dictation required.    I reviewed the patient's daily medications.  Scheduled Medications  aspirin, 81 mg, Oral, Daily  carvedilol, 25 mg, Oral, BID With Meals  hydrALAZINE, 10 mg, Oral, Q8H  insulin glargine, 16 Units, Subcutaneous, Daily  insulin lispro, 2-7 Units, Subcutaneous, 4x Daily AC & at Bedtime  insulin lispro, 3 Units, Subcutaneous, TID With Meals  isosorbide dinitrate, 40 mg, Oral, TID - Nitrates  senna-docusate sodium, 2 tablet, Oral, BID  sodium chloride, 10 mL, Intravenous, Q12H  vitamin D, 50,000 Units, Oral, Q7 Days    Infusions     Diet  Diet: Diabetic, Fluid Restriction (240 mL/tray); Consistent Carbohydrate; 1500 mL/day; Fluid Consistency: Thin (IDDSI 0)         I have personally reviewed:  [x]  Laboratory   []  Microbiology   [x]  Radiology   []  EKG/Telemetry   []  Cardiology/Vascular   []  Pathology   [x]  Records     Assessment/Plan     Active Hospital Problems    Diagnosis  POA    **Acute exacerbation of CHF (congestive heart failure) [I50.9]  Yes    Chronic HFrEF (heart failure with reduced ejection fraction) [I50.22]  Yes    S/P CABG x 3 [Z95.1]  Not Applicable    Acute on chronic combined systolic and diastolic CHF (congestive heart failure) [I50.43]  Yes    Coronary artery disease [I25.10]  Yes    Hypertension [I10]  Yes    Type 2 diabetes mellitus, with long-term current use of insulin [E11.9, Z79.4]  Not Applicable    MEI (acute kidney injury) [N17.9]  Unknown      Resolved Hospital Problems   No resolved problems to display.       39 y.o. male admitted with Acute exacerbation of CHF (congestive heart failure).    Volume overload  HFrEF EF 20% 2022  CAD CABG x3  Elevated troponin  HTN  MEI/CKD 4   Cardiology have signed off  Renal consulted-TDC catheter placed on 11/18.   Dialysis initiated during this hospital stay and nephrology is following along.  Was requiring 4 L of oxygen and has been weaned off and currently on room air  ASA, coreg, hydralazine, isosorbide dinitrate  Follow-up echocardiogram above EF 30% and is currently compensated. Will continue with Coreg, hydralazine, isosorbide.  Monitor I/Os and daily weights  Patient is uninsured and not a citizen and will be a difficult disposition       DM2   uncontrolled. A1c 12.30%.   Currently on long-acting insulin 16 units, Premeal insulin and corrective dose insulin and blood sugars are quite well-controlled  Diabetes educator-prior to discharge on Tresiba/NovoLog as this will be the most economical option with lack of insurance     Obesity Body mass index is 50.26 kg/m².   Counseled to lose weight    SCDs for DVT prophylaxis.  Full code.  Discussed with patient and nursing staff.  Anticipate discharge home timing yet to be determined.    Patient is uninsured which makes disposition challenging    Copy text of this note has been reviewed by me on 12/01//2024          Patrick Barrett MD  [facility]  12/01/24  13:25 EST

## 2024-12-01 NOTE — PLAN OF CARE
Problem: Adult Inpatient Plan of Care  Goal: Plan of Care Review  12/1/2024 0411 by Bruna Izaguirre RN  Outcome: Progressing  12/1/2024 0411 by Bruna Izaguirre RN  Outcome: Progressing   Goal Outcome Evaluation:  VSS. VALERIA MARTEL A&Ox4. Pt rested throughout the night with no issues or complaints.

## 2024-12-01 NOTE — PLAN OF CARE
Goal Outcome Evaluation:  Plan of Care Reviewed With: patient      Outcome Evaluation: VSS. No c/o pain/ soa this shift. Family at bedside attentive to patient. Pt ambulated around nursing station a couple of times today. Safety maintained.

## 2024-12-02 LAB
ALBUMIN SERPL-MCNC: 3.5 G/DL (ref 3.5–5.2)
ANION GAP SERPL CALCULATED.3IONS-SCNC: 7.9 MMOL/L (ref 5–15)
BUN SERPL-MCNC: 40 MG/DL (ref 6–20)
BUN/CREAT SERPL: 9.4 (ref 7–25)
CALCIUM SPEC-SCNC: 8.4 MG/DL (ref 8.6–10.5)
CHLORIDE SERPL-SCNC: 104 MMOL/L (ref 98–107)
CO2 SERPL-SCNC: 25.1 MMOL/L (ref 22–29)
CREAT SERPL-MCNC: 4.26 MG/DL (ref 0.76–1.27)
DEPRECATED RDW RBC AUTO: 44.8 FL (ref 37–54)
EGFRCR SERPLBLD CKD-EPI 2021: 17.2 ML/MIN/1.73
ERYTHROCYTE [DISTWIDTH] IN BLOOD BY AUTOMATED COUNT: 14.4 % (ref 12.3–15.4)
GLUCOSE BLDC GLUCOMTR-MCNC: 134 MG/DL (ref 70–130)
GLUCOSE BLDC GLUCOMTR-MCNC: 144 MG/DL (ref 70–130)
GLUCOSE BLDC GLUCOMTR-MCNC: 214 MG/DL (ref 70–130)
GLUCOSE BLDC GLUCOMTR-MCNC: 99 MG/DL (ref 70–130)
GLUCOSE SERPL-MCNC: 135 MG/DL (ref 65–99)
HCT VFR BLD AUTO: 33.8 % (ref 37.5–51)
HGB BLD-MCNC: 10.7 G/DL (ref 13–17.7)
MCH RBC QN AUTO: 27.3 PG (ref 26.6–33)
MCHC RBC AUTO-ENTMCNC: 31.7 G/DL (ref 31.5–35.7)
MCV RBC AUTO: 86.2 FL (ref 79–97)
PHOSPHATE SERPL-MCNC: 4 MG/DL (ref 2.5–4.5)
PLATELET # BLD AUTO: 169 10*3/MM3 (ref 140–450)
PMV BLD AUTO: 11.3 FL (ref 6–12)
POTASSIUM SERPL-SCNC: 5 MMOL/L (ref 3.5–5.2)
RBC # BLD AUTO: 3.92 10*6/MM3 (ref 4.14–5.8)
SODIUM SERPL-SCNC: 137 MMOL/L (ref 136–145)
WBC NRBC COR # BLD AUTO: 7.26 10*3/MM3 (ref 3.4–10.8)

## 2024-12-02 PROCEDURE — 82948 REAGENT STRIP/BLOOD GLUCOSE: CPT

## 2024-12-02 PROCEDURE — 63710000001 INSULIN LISPRO (HUMAN) PER 5 UNITS: Performed by: SURGERY

## 2024-12-02 PROCEDURE — 80069 RENAL FUNCTION PANEL: CPT | Performed by: SURGERY

## 2024-12-02 PROCEDURE — 63710000001 INSULIN GLARGINE PER 5 UNITS: Performed by: INTERNAL MEDICINE

## 2024-12-02 PROCEDURE — 85027 COMPLETE CBC AUTOMATED: CPT | Performed by: SURGERY

## 2024-12-02 RX ADMIN — HYDRALAZINE HYDROCHLORIDE 10 MG: 10 TABLET ORAL at 20:52

## 2024-12-02 RX ADMIN — SENNOSIDES AND DOCUSATE SODIUM 2 TABLET: 50; 8.6 TABLET ORAL at 20:52

## 2024-12-02 RX ADMIN — Medication 10 ML: at 20:52

## 2024-12-02 RX ADMIN — INSULIN LISPRO 3 UNITS: 100 INJECTION, SOLUTION INTRAVENOUS; SUBCUTANEOUS at 13:02

## 2024-12-02 RX ADMIN — INSULIN LISPRO 3 UNITS: 100 INJECTION, SOLUTION INTRAVENOUS; SUBCUTANEOUS at 09:25

## 2024-12-02 RX ADMIN — Medication 10 ML: at 09:26

## 2024-12-02 RX ADMIN — HYDRALAZINE HYDROCHLORIDE 10 MG: 10 TABLET ORAL at 13:02

## 2024-12-02 RX ADMIN — CARVEDILOL 25 MG: 25 TABLET, FILM COATED ORAL at 18:04

## 2024-12-02 RX ADMIN — SENNOSIDES AND DOCUSATE SODIUM 2 TABLET: 50; 8.6 TABLET ORAL at 09:24

## 2024-12-02 RX ADMIN — INSULIN LISPRO 3 UNITS: 100 INJECTION, SOLUTION INTRAVENOUS; SUBCUTANEOUS at 18:04

## 2024-12-02 RX ADMIN — ASPIRIN 81 MG: 81 TABLET, COATED ORAL at 09:25

## 2024-12-02 RX ADMIN — HYDRALAZINE HYDROCHLORIDE 10 MG: 10 TABLET ORAL at 04:44

## 2024-12-02 RX ADMIN — ISOSORBIDE DINITRATE 40 MG: 20 TABLET ORAL at 09:25

## 2024-12-02 RX ADMIN — ISOSORBIDE DINITRATE 40 MG: 20 TABLET ORAL at 18:04

## 2024-12-02 RX ADMIN — CARVEDILOL 25 MG: 25 TABLET, FILM COATED ORAL at 09:24

## 2024-12-02 RX ADMIN — ISOSORBIDE DINITRATE 40 MG: 20 TABLET ORAL at 13:01

## 2024-12-02 RX ADMIN — INSULIN GLARGINE 16 UNITS: 100 INJECTION, SOLUTION SUBCUTANEOUS at 09:25

## 2024-12-02 NOTE — PROGRESS NOTES
[facility] Progress Note      Patient Name: Rufus Dorsey  : 1985  MRN: [id number]  Primary Care Physician:  Provider, No Known  Date of admission: 2024    Subjective     Interval History:   Follow-up new ESRD    Feels okay; no shortness of breath on room air  Had HD yesterday; 0.5 L removed  Weight is down 50 pounds since admission    Able to be dialyzed at [facility] without requiring admission: this is current outpt plan    Review of Systems:   As noted above    Objective     Vitals:   Temp:  [97.7 °F (36.5 °C)-98.1 °F (36.7 °C)] 97.7 °F (36.5 °C)  Heart Rate:  [71-80] 71  Resp:  [18] 18  BP: (132-149)/() 132/70  No intake or output data in the 24 hours ending 24        Physical Exam:    General: overweight, chronically ill, pleasant, communicative, appropriate   Skin: warm and dry  HEENT: oral mucosa dry, nonicteric sclera  Neck: supple, no JVD  Lungs: Clear to auscultation; not labored lying flat on RA  Heart: RRR, normal S1 and S2  Abdomen: soft, nontender, distended. +bs.  Obese  : no palpable bladder  Extremities: 1+ upper and lower extremity edema  Neuro: Moves all extremities    Scheduled Meds:     aspirin, 81 mg, Oral, Daily  carvedilol, 25 mg, Oral, BID With Meals  hydrALAZINE, 10 mg, Oral, Q8H  insulin glargine, 16 Units, Subcutaneous, Daily  insulin lispro, 2-7 Units, Subcutaneous, 4x Daily AC & at Bedtime  insulin lispro, 3 Units, Subcutaneous, TID With Meals  isosorbide dinitrate, 40 mg, Oral, TID - Nitrates  senna-docusate sodium, 2 tablet, Oral, BID  sodium chloride, 10 mL, Intravenous, Q12H  vitamin D, 50,000 Units, Oral, Q7 Days      IV Meds:        Results Reviewed:   I have personally reviewed the results from the time of this admission to 2024 21:01 EST     Results from last 7 days   Lab Units 24  0945 24  0446 24  0332   SODIUM mmol/L 138 138 135*   POTASSIUM mmol/L 4.6 4.6 4.4   CHLORIDE mmol/L 103 105 102    CO2 mmol/L 26.7 23.0 27.0   BUN mg/dL 34* 40* 31*   CREATININE mg/dL 3.64* 4.16* 3.75*   CALCIUM mg/dL 8.3* 8.1* 7.8*   GLUCOSE mg/dL 145* 128* 205*       Estimated Creatinine Clearance: 33.3 mL/min (A) (by C-G formula based on SCr of 3.64 mg/dL (H)).    Results from last 7 days   Lab Units 12/01/24 0945 11/30/24 0446 11/29/24 0332   MAGNESIUM mg/dL  --  2.1  --    PHOSPHORUS mg/dL 3.2 2.7 2.0*             Results from last 7 days   Lab Units 12/01/24 0945 11/30/24 0446 11/29/24 0332 11/28/24 0330 11/27/24 0333   WBC 10*3/mm3 8.29 7.56 7.83 8.51 7.95   HEMOGLOBIN g/dL 10.9* 10.4* 10.1* 10.6* 10.0*   PLATELETS 10*3/mm3 176 175 166 175 159             Assessment / Plan     ASSESSMENT:  New ESRD.  Improving volume overload; weight down 50 pounds since admission.  Electrolytes fine.  Unable to arrange outpatient HD, and so plan will be for as needed HD through [facility].  Last HD was 11/30; on TTS schedule while inpatient  Hypertension with chronic kidney disease.  Improved with fluid removal  Diabetes mellitus type 2 with chronic kidney disease.  Very poorly controlled with hemoglobin A1c 12.3.    Anemia of ESRD: Hemoglobin at goal; iron stores are low  Heart failure with reduced ejection fraction (30%)        PLAN:  HD today (on TTS schedule while here); will add IV iron to next treatment if here 12/3  No outpatient dialysis unit available yet.  [facility] as of now will be his best option for dialysis  Discharge anytime from renal view    Thank you for involving us in the care of Rufus Dorsey.  Please feel free to call with any questions.    George Nicholson MD  12/01/24  21:01 [facility]    [facility]  [phone]    Please note that portions of this note were completed with a voice recognition program.

## 2024-12-02 NOTE — CASE MANAGEMENT/SOCIAL WORK
Continued Stay Note  [facility]     Patient Name: Rufus Dorsey  MRN: [id number]  Today's Date: 12/2/2024    Admit Date: 11/13/2024    Plan: Home, brother to transport   Discharge Plan       Row Name 12/02/24 7538       Plan    Plan Comments CCP will instruct pt upon discharge that he will need to follow up at local ER's for dialysis, or go to [facility] and make arrangements for his dialysis there on T-TH-Sat                   Discharge Codes    No documentation.                 Expected Discharge Date and Time       Expected Discharge Date Expected Discharge Time    Dec 3, 2024               Mónica Rodriguez RN

## 2024-12-02 NOTE — PROGRESS NOTES
Name: Rufus Dorsey ADMIT: 2024   : 1985  PCP: Provider, No Known    MRN: [id number] LOS: 19 days   AGE/SEX: 39 y.o. male  ROOM: [facility]     Subjective   Subjective   No acute events overnight.  Patient seen walking around the hallway.  Went back to his room and iPad  used for encounter.  Patient states he is feeling pretty well.  No chest pain or shortness of breath.  No abdominal pain.    Objective   Objective     Vital Signs  Temp:  [97.9 °F (36.6 °C)-98.7 °F (37.1 °C)] 97.9 °F (36.6 °C)  Heart Rate:  [66-78] 71  Resp:  [18] 18  BP: (140-147)/(73-75) 143/73  SpO2:  [94 %-96 %] 94 %  on   ;   Device (Oxygen Therapy): room air  Body mass index is 42.45 kg/m².    Physical Exam  General: Alert, no acute distress.  Walking around.  ENT: No conjunctival injection or scleral icterus. Moist mucous membranes.   Neuro: Eyes open and moving in all directions, strength normal in all extremities, no focal deficits.   Lungs: Clear to auscultation bilaterally. No wheeze or crackles. No distress.   Heart: RRR, no murmurs.   Abdomen: Obese. Soft, non-tender, non-distended. Normal bowel sounds.   Ext: Trace edema bilateral lower extremities  Skin: No rashes or lesions. IV site without swelling or erythema.     Results Review     I reviewed the patient's new clinical results:  Results from last 7 days   Lab Units 24  0352 2445 24  0332   WBC 10*3/mm3 7.26 8.29 7.56 7.83   HEMOGLOBIN g/dL 10.7* 10.9* 10.4* 10.1*   PLATELETS 10*3/mm3 169 176 175 166     Results from last 7 days   Lab Units 24  0352 2445 246 24  0332   SODIUM mmol/L 137 138 138 135*   POTASSIUM mmol/L 5.0 4.6 4.6 4.4   CHLORIDE mmol/L 104 103 105 102   CO2 mmol/L 25.1 26.7 23.0 27.0   BUN mg/dL 40* 34* 40* 31*   CREATININE mg/dL 4.26* 3.64* 4.16* 3.75*   GLUCOSE mg/dL 135* 145* 128* 205*   EGFR mL/min/1.73 17.2* 20.8* 17.7* 20.1*     Results from last 7 days   Lab Units  12/02/24  0352 12/01/24  0945 11/30/24  0446 11/29/24  0332   ALBUMIN g/dL 3.5 3.4* 3.1* 3.2*     Results from last 7 days   Lab Units 12/02/24  0352 12/01/24  0945 11/30/24  0446 11/29/24  0332   CALCIUM mg/dL 8.4* 8.3* 8.1* 7.8*   ALBUMIN g/dL 3.5 3.4* 3.1* 3.2*   MAGNESIUM mg/dL  --   --  2.1  --    PHOSPHORUS mg/dL 4.0 3.2 2.7 2.0*       Glucose   Date/Time Value Ref Range Status   12/02/2024 1145 99 70 - 130 mg/dL Final   12/02/2024 0802 214 (H) 70 - 130 mg/dL Final   12/01/2024 2048 109 70 - 130 mg/dL Final   12/01/2024 1627 113 70 - 130 mg/dL Final   12/01/2024 1153 125 70 - 130 mg/dL Final   12/01/2024 0807 124 70 - 130 mg/dL Final   11/30/2024 2111 125 70 - 130 mg/dL Final       No radiology results for the last day    I have personally reviewed all medications:  Scheduled Medications  aspirin, 81 mg, Oral, Daily  carvedilol, 25 mg, Oral, BID With Meals  hydrALAZINE, 10 mg, Oral, Q8H  insulin glargine, 16 Units, Subcutaneous, Daily  insulin lispro, 2-7 Units, Subcutaneous, 4x Daily AC & at Bedtime  insulin lispro, 3 Units, Subcutaneous, TID With Meals  isosorbide dinitrate, 40 mg, Oral, TID - Nitrates  senna-docusate sodium, 2 tablet, Oral, BID  sodium chloride, 10 mL, Intravenous, Q12H  vitamin D, 50,000 Units, Oral, Q7 Days    Infusions   Diet  Diet: Diabetic, Fluid Restriction (240 mL/tray); Consistent Carbohydrate; 1500 mL/day; Fluid Consistency: Thin (IDDSI 0)    No intake or output data in the 24 hours ending 12/02/24 1427    Assessment/Plan     Active Hospital Problems    Diagnosis  POA    **Acute exacerbation of CHF (congestive heart failure) [I50.9]  Yes    Chronic HFrEF (heart failure with reduced ejection fraction) [I50.22]  Yes    S/P CABG x 3 [Z95.1]  Not Applicable    Acute on chronic combined systolic and diastolic CHF (congestive heart failure) [I50.43]  Yes    Coronary artery disease [I25.10]  Yes    Hypertension [I10]  Yes    Type 2 diabetes mellitus, with long-term current use of insulin  [E11.9, Z79.4]  Not Applicable    MEI (acute kidney injury) [N17.9]  Unknown      Resolved Hospital Problems   No resolved problems to display.       39 y.o. male with Acute exacerbation of CHF (congestive heart failure).    Volume overload  HFrEF EF 20% 2022  CAD CABG x3  Elevated troponin  HTN  MEI/CKD 4   Cardiology have signed off  Echocardiogram with EF 30%, moderate to severe LV dilation, indeterminate diastolic function  Renal consulted-TDC catheter placed on 11/18.  Dialysis initiated during this hospital stay and nephrology is following along.  Was requiring 4 L of oxygen and has been weaned off and currently on room air  ASA, coreg, hydralazine, isosorbide dinitrate  Follow-up echocardiogram above EF 30% and is currently compensated. Will continue with Coreg, hydralazine, isosorbide.  Blood pressure trend overall acceptable, continue to monitor.  Monitor I/Os and daily weights  Plan is for dialysis at [facility].  Discussed this with patient today via .  He conveyed understanding.  Will plan to discharge him tomorrow after he receives dialysis here.     DM2   uncontrolled. A1c 12.30%.   Currently on long-acting insulin 16 units, Premeal insulin and corrective dose insulin and blood sugars are quite well-controlled  Diabetes educator-prior to discharge on Tresiba/NovoLog as this will be the most economical option with lack of insurance  Anticipate discharge home on current regimen    Anemia of ESRD  Hgb has been stable, consistent with baseline  Repeat CBC with morning labs, transfuse for Hgb less than 7     Obesity   Body mass index is 50.26 kg/m².   Complicating all aspects of care       SCDs for DVT prophylaxis.  Full code.  Discussed with patient and nursing staff.  Anticipate discharge home tomorrow.      Robyn Palafox MD  [facility]  12/02/24  14:27 EST

## 2024-12-02 NOTE — PROGRESS NOTES
Nutrition Services    Patient Name:  Rufus Dorsey  YOB: 1985  MRN: [id number]  Admit Date:  11/13/2024  Nutrition Services      PROGRESS NOTE      Encounter Information: Follow up note       PO Diet: Diet: Diabetic, Fluid Restriction (240 mL/tray); Consistent Carbohydrate; 1500 mL/day; Fluid Consistency: Thin (IDDSI 0)   PO Supplements: N/A   PO Intake:  Adequate po intake-100% at many meals, family bringing in meals also       Nutrition support orders: --   Nutrition support review: --       Labs (reviewed below): Reviewed.   Weight Weight loss of 23 kg since admission, edema at 2+ in lower extremities and abdomen.  Admitted with anasarca, currently at 119 kg   GI Function:  Last BM 11/30       Nutrition Intervention: Po intake adequate. Pt continues on 1500ml fluid restriction. RD to follow.       Results from last 7 days   Lab Units 12/02/24  0352 12/01/24  0945 11/30/24  0446   SODIUM mmol/L 137 138 138   POTASSIUM mmol/L 5.0 4.6 4.6   CHLORIDE mmol/L 104 103 105   CO2 mmol/L 25.1 26.7 23.0   BUN mg/dL 40* 34* 40*   CREATININE mg/dL 4.26* 3.64* 4.16*   CALCIUM mg/dL 8.4* 8.3* 8.1*   GLUCOSE mg/dL 135* 145* 128*     Results from last 7 days   Lab Units 12/02/24  0352 12/01/24  0945 11/30/24  0446   MAGNESIUM mg/dL  --   --  2.1   PHOSPHORUS mg/dL 4.0   < > 2.7   HEMOGLOBIN g/dL 10.7*   < > 10.4*   HEMATOCRIT % 33.8*   < > 32.6*    < > = values in this interval not displayed.     COVID19   Date Value Ref Range Status   11/13/2024 Not Detected Not Detected - Ref. Range Final     Lab Results   Component Value Date    HGBA1C 12.30 (H) 11/14/2024       RD to follow up per protocol.    Electronically signed by:  Ward Min RD  12/02/24 14:08 EST

## 2024-12-02 NOTE — PLAN OF CARE
Goal Outcome Evaluation:  Plan of Care Reviewed With: patient                 No acute distress noted this shift, pt awake most of the night, safety maintained, continue plan of care

## 2024-12-02 NOTE — PROGRESS NOTES
[facility] Progress Note      Patient Name: Rufus Dorsey  : 1985  MRN: [id number]  Primary Care Physician:  Provider, No Known  Date of admission: 2024    Subjective     Interval History:   Follow-up new ESRD    No new complaints noted today.  Dialyzed on Saturday with no reported issues.    Denies any chest pain  Review of Systems:   As noted above    Objective     Vitals:   Temp:  [97.7 °F (36.5 °C)-98.7 °F (37.1 °C)] 97.9 °F (36.6 °C)  Heart Rate:  [66-78] 69  Resp:  [18] 18  BP: (132-149)/() 143/73  No intake or output data in the 24 hours ending 24 1117        Physical Exam:    General: overweight, chronically ill, pleasant, communicative, appropriate   Skin: warm and dry  HEENT: oral mucosa dry, nonicteric sclera  Neck: supple, no JVD  Lungs: Clear to auscultation; not labored lying flat on RA  Heart: RRR, normal S1 and S2  Abdomen: soft, nontender, distended. +bs.  Obese  : no palpable bladder  Extremities: Trace to 1+ upper and lower extremity edema  Neuro: Moves all extremities    Scheduled Meds:     aspirin, 81 mg, Oral, Daily  carvedilol, 25 mg, Oral, BID With Meals  hydrALAZINE, 10 mg, Oral, Q8H  insulin glargine, 16 Units, Subcutaneous, Daily  insulin lispro, 2-7 Units, Subcutaneous, 4x Daily AC & at Bedtime  insulin lispro, 3 Units, Subcutaneous, TID With Meals  isosorbide dinitrate, 40 mg, Oral, TID - Nitrates  senna-docusate sodium, 2 tablet, Oral, BID  sodium chloride, 10 mL, Intravenous, Q12H  vitamin D, 50,000 Units, Oral, Q7 Days      IV Meds:        Results Reviewed:   I have personally reviewed the results from the time of this admission to 2024 11:17 EST     Results from last 7 days   Lab Units 24  0352 24  0945 24  0446   SODIUM mmol/L 137 138 138   POTASSIUM mmol/L 5.0 4.6 4.6   CHLORIDE mmol/L 104 103 105   CO2 mmol/L 25.1 26.7 23.0   BUN mg/dL 40* 34* 40*   CREATININE mg/dL 4.26* 3.64* 4.16*   CALCIUM mg/dL  8.4* 8.3* 8.1*   GLUCOSE mg/dL 135* 145* 128*       Estimated Creatinine Clearance: 28.3 mL/min (A) (by C-G formula based on SCr of 4.26 mg/dL (H)).    Results from last 7 days   Lab Units 12/02/24  0352 12/01/24  0945 11/30/24  0446   MAGNESIUM mg/dL  --   --  2.1   PHOSPHORUS mg/dL 4.0 3.2 2.7             Results from last 7 days   Lab Units 12/02/24  0352 12/01/24  0945 11/30/24  0446 11/29/24  0332 11/28/24  0330   WBC 10*3/mm3 7.26 8.29 7.56 7.83 8.51   HEMOGLOBIN g/dL 10.7* 10.9* 10.4* 10.1* 10.6*   PLATELETS 10*3/mm3 169 176 175 166 175             Assessment / Plan     ASSESSMENT:  New ESRD.  Improving volume overload; weight down 50 pounds since admission.  Electrolytes fine.  Unable to arrange outpatient HD, and so plan will be for as needed HD through [facility] ER.  Last HD was 11/30; on TTS schedule while inpatient  Hypertension with chronic kidney disease.  Improved with fluid removal  Diabetes mellitus type 2 with chronic kidney disease.  Very poorly controlled with hemoglobin A1c 12.3.    Anemia of ESRD: Hemoglobin at goal; iron stores are low  Heart failure with reduced ejection fraction (30%)        PLAN:  Will continue dialysis on TTS schedule while in the hospital.  No outpatient dialysis option except for dialysis through ER at [facility]  Discharge anytime from renal view    Thank you for involving us in the care of Rufus Dorsey.  Please feel free to call with any questions.    Ghazala Garza MD  12/02/24  11:17 [facility]    [facility]  [phone]    Please note that portions of this note were completed with a voice recognition program.

## 2024-12-03 ENCOUNTER — READMISSION MANAGEMENT (OUTPATIENT)
Dept: CALL CENTER | Facility: HOSPITAL | Age: 39
End: 2024-12-03
Payer: MEDICAID

## 2024-12-03 VITALS
SYSTOLIC BLOOD PRESSURE: 140 MMHG | BODY MASS INDEX: 42.62 KG/M2 | TEMPERATURE: 97.9 F | HEART RATE: 76 BPM | RESPIRATION RATE: 18 BRPM | DIASTOLIC BLOOD PRESSURE: 74 MMHG | WEIGHT: 265.2 LBS | OXYGEN SATURATION: 97 % | HEIGHT: 66 IN

## 2024-12-03 LAB
ALBUMIN SERPL-MCNC: 3.6 G/DL (ref 3.5–5.2)
ANION GAP SERPL CALCULATED.3IONS-SCNC: 10.9 MMOL/L (ref 5–15)
BUN SERPL-MCNC: 51 MG/DL (ref 6–20)
BUN/CREAT SERPL: 10.5 (ref 7–25)
CALCIUM SPEC-SCNC: 8.6 MG/DL (ref 8.6–10.5)
CHLORIDE SERPL-SCNC: 105 MMOL/L (ref 98–107)
CO2 SERPL-SCNC: 25.1 MMOL/L (ref 22–29)
CREAT SERPL-MCNC: 4.84 MG/DL (ref 0.76–1.27)
DEPRECATED RDW RBC AUTO: 45.3 FL (ref 37–54)
EGFRCR SERPLBLD CKD-EPI 2021: 14.8 ML/MIN/1.73
ERYTHROCYTE [DISTWIDTH] IN BLOOD BY AUTOMATED COUNT: 14.3 % (ref 12.3–15.4)
GLUCOSE BLDC GLUCOMTR-MCNC: 128 MG/DL (ref 70–130)
GLUCOSE BLDC GLUCOMTR-MCNC: 145 MG/DL (ref 70–130)
GLUCOSE SERPL-MCNC: 149 MG/DL (ref 65–99)
HCT VFR BLD AUTO: 34.9 % (ref 37.5–51)
HGB BLD-MCNC: 11.2 G/DL (ref 13–17.7)
MCH RBC QN AUTO: 27.9 PG (ref 26.6–33)
MCHC RBC AUTO-ENTMCNC: 32.1 G/DL (ref 31.5–35.7)
MCV RBC AUTO: 87 FL (ref 79–97)
PHOSPHATE SERPL-MCNC: 4.6 MG/DL (ref 2.5–4.5)
PLATELET # BLD AUTO: 159 10*3/MM3 (ref 140–450)
PMV BLD AUTO: 11.1 FL (ref 6–12)
POTASSIUM SERPL-SCNC: 5.7 MMOL/L (ref 3.5–5.2)
RBC # BLD AUTO: 4.01 10*6/MM3 (ref 4.14–5.8)
SODIUM SERPL-SCNC: 141 MMOL/L (ref 136–145)
WBC NRBC COR # BLD AUTO: 7.68 10*3/MM3 (ref 3.4–10.8)

## 2024-12-03 PROCEDURE — 85027 COMPLETE CBC AUTOMATED: CPT | Performed by: SURGERY

## 2024-12-03 PROCEDURE — 80069 RENAL FUNCTION PANEL: CPT | Performed by: SURGERY

## 2024-12-03 PROCEDURE — 63710000001 INSULIN GLARGINE PER 5 UNITS: Performed by: INTERNAL MEDICINE

## 2024-12-03 PROCEDURE — 63710000001 INSULIN LISPRO (HUMAN) PER 5 UNITS: Performed by: SURGERY

## 2024-12-03 PROCEDURE — 82948 REAGENT STRIP/BLOOD GLUCOSE: CPT

## 2024-12-03 RX ORDER — ISOSORBIDE DINITRATE 20 MG/1
40 TABLET ORAL
Qty: 180 TABLET | Refills: 0 | Status: SHIPPED | OUTPATIENT
Start: 2024-12-03

## 2024-12-03 RX ORDER — HYDRALAZINE HYDROCHLORIDE 10 MG/1
10 TABLET, FILM COATED ORAL EVERY 8 HOURS SCHEDULED
Qty: 90 TABLET | Refills: 0 | Status: SHIPPED | OUTPATIENT
Start: 2024-12-03

## 2024-12-03 RX ORDER — ERGOCALCIFEROL 1.25 MG/1
50000 CAPSULE, LIQUID FILLED ORAL
Qty: 5 CAPSULE | Refills: 0 | Status: SHIPPED | OUTPATIENT
Start: 2024-12-07

## 2024-12-03 RX ORDER — AMOXICILLIN 250 MG
1 CAPSULE ORAL 2 TIMES DAILY
Qty: 60 TABLET | Refills: 0 | Status: SHIPPED | OUTPATIENT
Start: 2024-12-03

## 2024-12-03 RX ORDER — INSULIN DEGLUDEC 100 U/ML
15 INJECTION, SOLUTION SUBCUTANEOUS DAILY
Qty: 15 ML | Refills: 1 | Status: SHIPPED | OUTPATIENT
Start: 2024-12-03

## 2024-12-03 RX ORDER — INSULIN ASPART 100 [IU]/ML
3 INJECTION, SOLUTION INTRAVENOUS; SUBCUTANEOUS
Qty: 15 ML | Refills: 1 | Status: SHIPPED | OUTPATIENT
Start: 2024-12-03

## 2024-12-03 RX ADMIN — INSULIN GLARGINE 16 UNITS: 100 INJECTION, SOLUTION SUBCUTANEOUS at 09:43

## 2024-12-03 RX ADMIN — Medication 10 ML: at 10:12

## 2024-12-03 RX ADMIN — INSULIN LISPRO 3 UNITS: 100 INJECTION, SOLUTION INTRAVENOUS; SUBCUTANEOUS at 12:13

## 2024-12-03 RX ADMIN — CARVEDILOL 25 MG: 25 TABLET, FILM COATED ORAL at 09:11

## 2024-12-03 RX ADMIN — HYDRALAZINE HYDROCHLORIDE 10 MG: 10 TABLET ORAL at 04:35

## 2024-12-03 RX ADMIN — HYDRALAZINE HYDROCHLORIDE 10 MG: 10 TABLET ORAL at 12:14

## 2024-12-03 NOTE — PROGRESS NOTES
[facility] Progress Note      Patient Name: Rufus Dorsey  : 1985  MRN: [id number]  Primary Care Physician:  Provider, No Known  Date of admission: 2024    Subjective     Interval History:   Follow-up new ESRD    Doing well, denies chest pain, SOA, or N/V, though + D since yesterday  Last HD was Saturday without issues.    Review of Systems:   As noted above    Objective     Vitals:   Temp:  [97.5 °F (36.4 °C)-98.1 °F (36.7 °C)] 97.9 °F (36.6 °C)  Heart Rate:  [65-76] 76  Resp:  [18] 18  BP: (138-141)/(58-79) 140/74  No intake or output data in the 24 hours ending 24 1512        Physical Exam:    General: overweight, chronically ill, pleasant NAD  Skin: warm and dry  HEENT: oral mucosa dry, nonicteric sclera  Neck: supple, no JVD  Lungs: Clear to auscultation; not labored lying flat on RA  Heart: RRR, no rub  Abdomen: soft, nontender, distended.  + BS, obese  : no palpable bladder  Extremities: 1+ BLE edema  Neuro: Moves all extremities    Scheduled Meds:     aspirin, 81 mg, Oral, Daily  carvedilol, 25 mg, Oral, BID With Meals  hydrALAZINE, 10 mg, Oral, Q8H  insulin glargine, 16 Units, Subcutaneous, Daily  insulin lispro, 2-7 Units, Subcutaneous, 4x Daily AC & at Bedtime  insulin lispro, 3 Units, Subcutaneous, TID With Meals  isosorbide dinitrate, 40 mg, Oral, TID - Nitrates  senna-docusate sodium, 2 tablet, Oral, BID  sodium chloride, 10 mL, Intravenous, Q12H  vitamin D, 50,000 Units, Oral, Q7 Days      IV Meds:        Results Reviewed:   I have personally reviewed the results from the time of this admission to 12/3/2024 15:12 EST     Results from last 7 days   Lab Units 24  0300 24  0352 24  0945   SODIUM mmol/L 141 137 138   POTASSIUM mmol/L 5.7* 5.0 4.6   CHLORIDE mmol/L 105 104 103   CO2 mmol/L 25.1 25.1 26.7   BUN mg/dL 51* 40* 34*   CREATININE mg/dL 4.84* 4.26* 3.64*   CALCIUM mg/dL 8.6 8.4* 8.3*   GLUCOSE mg/dL 149* 135* 145*        Estimated Creatinine Clearance: 25 mL/min (A) (by C-G formula based on SCr of 4.84 mg/dL (H)).    Results from last 7 days   Lab Units 12/03/24  0300 12/02/24  0352 12/01/24  0945 11/30/24  0446   MAGNESIUM mg/dL  --   --   --  2.1   PHOSPHORUS mg/dL 4.6* 4.0 3.2 2.7             Results from last 7 days   Lab Units 12/03/24  0300 12/02/24  0352 12/01/24  0945 11/30/24  0446 11/29/24  0332   WBC 10*3/mm3 7.68 7.26 8.29 7.56 7.83   HEMOGLOBIN g/dL 11.2* 10.7* 10.9* 10.4* 10.1*   PLATELETS 10*3/mm3 159 169 176 175 166             Assessment / Plan     ASSESSMENT:  New ESRD.  Improving volume overload; weight down 50 pounds since admission.  Electrolytes fine except hyperkalemia.  Unable to arrange outpatient HD, and so plan will be [facility] ER on TTS.  Last HD was 11/30  Hypertension with chronic kidney disease.  Improved with fluid removal  Diabetes mellitus type 2 with chronic kidney disease.  Very poorly controlled with hemoglobin A1c 12.3.    Anemia of ESRD: Hemoglobin at goal; iron stores are low  Heart failure with reduced ejection fraction (30%)    PLAN:  Will continue dialysis on TTS schedule while in the hospital, HD today with 2K bath  No outpatient dialysis option except for dialysis through ER at [facility]  Educated patient about renal diet  Discharge anytime from renal view    Thank you for involving us in the care of Rufus Dorsey.  Please feel free to call with any questions.    Ghazala Lang MD  12/03/24  15:12 [facility]    [facility]  [phone]    Please note that portions of this note were completed with a voice recognition program.

## 2024-12-03 NOTE — DISCHARGE SUMMARY
Patient Name: Rufus Dorsey  : 1985  MRN: [id number]    Date of Admission: 2024  Date of Discharge:  12/3/2024  Primary Care Physician: Provider, No Known      Chief Complaint:   Chest Pain and Shortness of Breath      Discharge Diagnoses     Active Hospital Problems    Diagnosis  POA    **Acute exacerbation of CHF (congestive heart failure) [I50.9]  Yes    Chronic HFrEF (heart failure with reduced ejection fraction) [I50.22]  Yes    S/P CABG x 3 [Z95.1]  Not Applicable    Acute on chronic combined systolic and diastolic CHF (congestive heart failure) [I50.43]  Yes    Coronary artery disease [I25.10]  Yes    Hypertension [I10]  Yes    Type 2 diabetes mellitus, with long-term current use of insulin [E11.9, Z79.4]  Not Applicable    MEI (acute kidney injury) [N17.9]  Unknown      Resolved Hospital Problems   No resolved problems to display.        Hospital Course     Mr. Dorsey is a 39 y.o. male with a history of HFrEF, hypertension, CKD stage IV, type 2 diabetes mellitus, and obesity who presented to [facility] initially complaining of shortness of breath and fluid overload.  Please see the admitting history and physical for further details.  He wasadmitted to the hospital for further evaluation and treatment.      Cardiology and nephrology were consulted on admission.  Echocardiogram was done and revealed an EF of 30% with moderate to severe LV dilation and indeterminate diastolic function.  The patient was diuresed with IV diuretics.  Unfortunately, creatinine continued to rise and the patient ultimately required transition to dialysis.  Tunneled dialysis catheter was placed on  and the patient was initiated on dialysis thereafter.  This led to significant improvement in his symptoms and oxygenation.  The patient was weaned to room air.  Cardiology optimized GDMT for this patient, but it was limited by his kidney function.  He is being discharged on Coreg, hydralazine, and  Imdur.  Patient was found to have a hemoglobin A1c greater than 12 on admission and was started on insulin.  Regimen was titrated and blood glucoses became much better controlled.  The patient did meet with the diabetes educator.  He is being discharged on Tresiba and NovoLog, as this is the most economical option for him without insurance.  Regimen as below.  Diabetes educator did provide insulin teaching.  Unfortunately, because of the patient's uninsured/undocumented status, there was difficulty in arranging outpatient hemodialysis.  CCP and nephrology worked tirelessly to find a place for him to go, but unfortunately there was no accepting facility.  The patient is being discharged with plans to follow-up at [facility] emergency department to receive dialysis there.  They will hopefully be able to dialyze him without a hospital admission.  This was discussed with patient via use of an  iPad prior to discharge home and he conveyed understanding.  Stressed to him the importance of continuing to receive dialysis.  I also counseled him on returning to the nearest ER should he have any symptoms including but not limited to chest pain, shortness of breath, or leg swelling.  He expressed understanding of all of this and was comfortable with the plan to discharge home.  He was discharged in satisfactory condition.    Day of Discharge     Subjective:  No acute events overnight.  Patient seen in dialysis today.  States he is feeling well.  No chest pain or shortness of breath.  He would like to be discharged home.  He understands the plan in place.    Physical Exam:  Temp:  [97.5 °F (36.4 °C)-98.1 °F (36.7 °C)] 97.9 °F (36.6 °C)  Heart Rate:  [65-76] 76  Resp:  [18] 18  BP: (138-141)/(58-79) 140/74  Body mass index is 42.8 kg/m².  Physical Exam  General: Alert, no acute distress.  Seen in dialysis.  ENT: No conjunctival injection or scleral icterus. Moist mucous membranes.   Neuro: Eyes open and moving in  all directions, strength normal in all extremities, no focal deficits.   Lungs: Clear to auscultation bilaterally. No wheeze or crackles. No distress.   Heart: RRR, no murmurs.   Abdomen: Obese. Soft, non-tender, non-distended. Normal bowel sounds.   Ext: Trace edema bilateral lower extremities  Skin: No rashes or lesions. IV site without swelling or erythema.     Consultants     Consult Orders (all) (From admission, onward)       Start     Ordered    11/19/24 1103  Inpatient Nutrition Consult  Once        Comments: New diagnosis   Provider:  (Not yet assigned)    11/19/24 1103    11/17/24 1118  Inpatient Case Management  Consult  Once        Provider:  (Not yet assigned)    11/17/24 1117    11/17/24 0831  Inpatient Vascular Surgery Consult  Once        Specialty:  Vascular Surgery  Provider:  Mike Cook MD    11/17/24 0832    11/14/24 0135  Inpatient Nephrology Consult  Once        Specialty:  Nephrology  Provider:  George Nicholson MD    11/14/24 0136    11/14/24 0133  Inpatient Cardiology Consult  Once        Specialty:  Cardiology  Provider:  Vicente Trujillo MD    11/14/24 0136    11/14/24 0021  Inpatient Nutrition Consult  Once        Provider:  (Not yet assigned)    11/14/24 0022    11/14/24 0021  Inpatient Diabetes Educator Consult  Once        Provider:  (Not yet assigned)    11/14/24 0022    11/14/24 0021  Inpatient Case Management  Consult  Once        Provider:  (Not yet assigned)    11/14/24 0022    11/13/24 2014  [facility] (on-call MD unless specified) Details  Once        Specialty:  Hospitalist  Provider:  (Not yet assigned)    11/13/24 2013 11/13/24 2014  Nephrology (on -call MD unless specified)  Once        Specialty:  Nephrology  Provider:  Paris Heller MD    11/13/24 2013                  Procedures     HEMODIALYSIS CATHETER INSERTION    Imaging Results (All)       Procedure Component Value Units Date/Time    XR Chest 1 View [[id number]] Collected:  11/18/24 1334     Updated: 11/18/24 1341    Narrative:      XR CHEST 1 VW-     HISTORY: Male who is 39 years-old, decreased breath sounds     TECHNIQUE: Frontal view of the chest     COMPARISON: 11/13/2024     FINDINGS:  The central venous catheter extends to the cavoatrial junction region.  Sternotomy wires are noted. Heart is enlarged. Pulmonary vasculature is  congested. Bilateral pulmonary opacities appear increased, may represent  edema and/or pneumonia and/or ARDS. No large pleural effusion, or  pneumothorax. No acute osseous process.       Impression:      Increased bilateral pulmonary opacities compatible with  interval worsening.     This report was finalized on 11/18/2024 1:38 PM by Dr. Suleiman Anderson M.D on Workstation: [id number]       Arteriogram (Autofinalize) [[id number]] Resulted: 11/18/24 1215     Updated: 11/18/24 1215    Narrative:      This procedure was auto-finalized with no dictation required.    US Renal Bilateral [[id number]] Collected: 11/15/24 1929     Updated: 11/15/24 1935    Narrative:      US RENAL BILATERAL-11/15/2024     HISTORY: Chronic kidney disease.     Right kidney measures 10.4 cm in length. Left kidney measures 11.7 cm in  length. There is an approximately 1 cm benign-appearing right renal  cyst.     No solid renal masses, stones or hydronephrosis is seen. Urinary bladder  is unremarkable. There is a small to moderate amount of free fluid in  the abdomen and pelvis.       Impression:      1. Unremarkable bilateral renal ultrasound except for a tiny right renal  cyst.  2. There is some free fluid in the abdomen and pelvis.     This report was finalized on 11/15/2024 7:32 PM by Dr. Wolf Arreola M.D on Workstation: [id number]       XR Chest 1 View [[id number]] Collected: 11/13/24 1959     Updated: 11/13/24 2003    Narrative:      XR CHEST 1 VW-     Clinical: Chest pain     COMPARISON examination 5/22/2023     FINDINGS: There is a shallow inspiratory effort with crowding  of the  bronchovascular markings bilaterally. Patchy areas of  infiltrate/atelectasis at both lung bases. Cardiac enlargement as  before. No gross pulmonary edema. The mediastinum is stable. No other  interval change.     This report was finalized on 11/13/2024 8:00 PM by Dr. Dakotah Florentino M.D on Workstation: [id number]             Results for orders placed during the hospital encounter of 05/22/23    Duplex Aorta IVC Iliac Graft Limited CAR    Interpretation Summary    No evidence of left common iliac vein compression, stenosis or thrombosis    Inferior vena cava and bilateral iliac venous duplex within normal limits    Results for orders placed during the hospital encounter of 11/13/24    Adult Transthoracic Echo Complete W/ Cont if Necessary Per Protocol    Interpretation Summary    Left ventricular systolic function is moderately decreased. Estimated left ventricular EF = 30%    The left ventricular cavity is moderate to severely dilated.    Left ventricular wall thickness is consistent with moderate eccentric hypertrophy.    The following left ventricular wall segments are hypokinetic: mid anterior, apical anterior, apical lateral, basal inferolateral, mid inferolateral, apical inferior, mid inferior, apical septal, basal inferoseptal, mid inferoseptal, apex hypokinetic, mid anteroseptal, basal anterior, basal inferior and basal inferoseptal. The following left ventricular wall segments are akinetic: basal anterolateral and mid anterolateral.    Left ventricular diastolic function was indeterminate.    Estimated right ventricular systolic pressure from tricuspid regurgitation is normal (<35 mmHg).    Pertinent Labs     Results from last 7 days   Lab Units 12/03/24  0300 12/02/24  0352 12/01/24  0945 11/30/24  0446   WBC 10*3/mm3 7.68 7.26 8.29 7.56   HEMOGLOBIN g/dL 11.2* 10.7* 10.9* 10.4*   PLATELETS 10*3/mm3 159 169 176 175     Results from last 7 days   Lab Units 12/03/24  0300 12/02/24  0352  "12/01/24  0945 11/30/24  0446   SODIUM mmol/L 141 137 138 138   POTASSIUM mmol/L 5.7* 5.0 4.6 4.6   CHLORIDE mmol/L 105 104 103 105   CO2 mmol/L 25.1 25.1 26.7 23.0   BUN mg/dL 51* 40* 34* 40*   CREATININE mg/dL 4.84* 4.26* 3.64* 4.16*   GLUCOSE mg/dL 149* 135* 145* 128*   EGFR mL/min/1.73 14.8* 17.2* 20.8* 17.7*     Results from last 7 days   Lab Units 12/03/24  0300 12/02/24  0352 12/01/24 0945 11/30/24  0446   ALBUMIN g/dL 3.6 3.5 3.4* 3.1*     Results from last 7 days   Lab Units 12/03/24  0300 12/02/24 0352 12/01/24 0945 11/30/24  0446   CALCIUM mg/dL 8.6 8.4* 8.3* 8.1*   ALBUMIN g/dL 3.6 3.5 3.4* 3.1*   MAGNESIUM mg/dL  --   --   --  2.1   PHOSPHORUS mg/dL 4.6* 4.0 3.2 2.7               Invalid input(s): \"LDLCALC\"          Test Results Pending at Discharge     Pending Results       None          Discharge Details        Discharge Medications        New Medications        Instructions Start Date   Insulin Pen Needle 32G X 4 MM misc   Use to inject insulin under the skin up to 4 times daily via pens      isosorbide dinitrate 40 MG tablet  Commonly known as: ISORDIL   40 mg, Oral, 3 Times Daily (Nitrates)      NovoLOG FlexPen 100 UNIT/ML solution pen-injector sc pen  Generic drug: insulin aspart   3 Units, Subcutaneous, 3 Times Daily With Meals      sennosides-docusate 8.6-50 MG per tablet  Commonly known as: PERICOLACE   1 tablet, Oral, 2 Times Daily      Tresiba FlexTouch 100 UNIT/ML solution pen-injector injection  Generic drug: insulin degludec   15 Units, Subcutaneous, Daily      vitamin D 1.25 MG (15008 UT) capsule capsule  Commonly known as: ERGOCALCIFEROL   50,000 Units, Oral, Every 7 Days   Start Date: December 7, 2024            Changes to Medications        Instructions Start Date   hydrALAZINE 10 MG tablet  Commonly known as: APRESOLINE  What changed:   medication strength  how much to take  when to take this   10 mg, Oral, Every 8 Hours Scheduled             Continue These Medications        " Instructions Start Date   aspirin 81 MG EC tablet   81 mg, Oral, Daily      carvedilol 25 MG tablet  Commonly known as: COREG   25 mg, Oral, 2 Times Daily With Meals             Stop These Medications      potassium chloride 20 MEQ CR tablet  Commonly known as: KLOR-CON M20     torsemide 100 MG tablet  Commonly known as: DEMADEX            No Known Allergies    Discharge Disposition:  Home or Self Care    Discharge Diet:  Diet Order   Procedures    Diet: Diabetic, Fluid Restriction (240 mL/tray); Consistent Carbohydrate; 1500 mL/day; Fluid Consistency: Thin (IDDSI 0)     Discharge Activity: Activity as tolerated    CODE STATUS:    Code Status and Medical Interventions: CPR (Attempt to Resuscitate); Full Support   Ordered at: 11/14/24 0136     Code Status (Patient has no pulse and is not breathing):    CPR (Attempt to Resuscitate)     Medical Interventions (Patient has pulse or is breathing):    Full Support     No future appointments.   Follow-up Information       Provider, No Known .    Contact information:  [facility] [address] [phone]                           Time Spent on Discharge:  Greater than 30 minutes    Robyn Palafox MD  [facility]  12/03/24  15:35 EST

## 2024-12-03 NOTE — DISCHARGE INSTR - APPOINTMENTS
Follow up appointment made for 12/4/24 @12:45pm. Please bring medication list with you and arrive 30 minutes early.

## 2024-12-03 NOTE — PLAN OF CARE
Goal Outcome Evaluation:  Plan of Care Reviewed With: patient      Outcome Evaluation: VSS. No c/o pain this shift. Walked around the unit several time. Family at bedside attentive to patient. Anxious to go home. Safety maintained.

## 2024-12-03 NOTE — PLAN OF CARE
Goal Outcome Evaluation:  Plan of Care Reviewed With: patient   Patient discharge

## 2024-12-03 NOTE — PLAN OF CARE
Goal Outcome Evaluation:  Plan of Care Reviewed With: patient         No acute distress noted this shift, Pt walked around the nurses station several times, safety maintained, continue plan of care

## 2024-12-04 ENCOUNTER — TRANSITIONAL CARE MANAGEMENT TELEPHONE ENCOUNTER (OUTPATIENT)
Dept: CALL CENTER | Facility: HOSPITAL | Age: 39
End: 2024-12-04
Payer: MEDICAID

## 2024-12-04 ENCOUNTER — OFFICE VISIT (OUTPATIENT)
Dept: INTERNAL MEDICINE | Facility: CLINIC | Age: 39
End: 2024-12-04

## 2024-12-04 VITALS
HEIGHT: 65 IN | OXYGEN SATURATION: 95 % | DIASTOLIC BLOOD PRESSURE: 82 MMHG | BODY MASS INDEX: 43.55 KG/M2 | TEMPERATURE: 97.5 F | SYSTOLIC BLOOD PRESSURE: 140 MMHG | HEART RATE: 66 BPM | WEIGHT: 261.4 LBS

## 2024-12-04 DIAGNOSIS — I50.33 DIASTOLIC CHF, ACUTE ON CHRONIC: Chronic | ICD-10-CM

## 2024-12-04 DIAGNOSIS — Z79.4 TYPE 2 DIABETES MELLITUS WITH OTHER CIRCULATORY COMPLICATION, WITH LONG-TERM CURRENT USE OF INSULIN: ICD-10-CM

## 2024-12-04 DIAGNOSIS — Z09 HOSPITAL DISCHARGE FOLLOW-UP: ICD-10-CM

## 2024-12-04 DIAGNOSIS — Z76.89 ENCOUNTER TO ESTABLISH CARE: Primary | ICD-10-CM

## 2024-12-04 DIAGNOSIS — I10 PRIMARY HYPERTENSION: Chronic | ICD-10-CM

## 2024-12-04 DIAGNOSIS — N18.5 CKD (CHRONIC KIDNEY DISEASE) STAGE 5, GFR LESS THAN 15 ML/MIN: Chronic | ICD-10-CM

## 2024-12-04 DIAGNOSIS — N18.4 TYPE 2 DIABETES MELLITUS WITH STAGE 4 CHRONIC KIDNEY DISEASE, UNSPECIFIED WHETHER LONG TERM INSULIN USE: Chronic | ICD-10-CM

## 2024-12-04 DIAGNOSIS — E11.22 TYPE 2 DIABETES MELLITUS WITH STAGE 4 CHRONIC KIDNEY DISEASE, UNSPECIFIED WHETHER LONG TERM INSULIN USE: Chronic | ICD-10-CM

## 2024-12-04 DIAGNOSIS — E11.59 TYPE 2 DIABETES MELLITUS WITH OTHER CIRCULATORY COMPLICATION, WITH LONG-TERM CURRENT USE OF INSULIN: ICD-10-CM

## 2024-12-04 DIAGNOSIS — I50.43 ACUTE ON CHRONIC COMBINED SYSTOLIC AND DIASTOLIC CHF (CONGESTIVE HEART FAILURE): Chronic | ICD-10-CM

## 2024-12-04 PROCEDURE — 99214 OFFICE O/P EST MOD 30 MIN: CPT

## 2024-12-04 RX ORDER — LANCETS
EACH MISCELLANEOUS
Qty: 100 EACH | Refills: 12 | Status: SHIPPED | OUTPATIENT
Start: 2024-12-04

## 2024-12-04 RX ORDER — GLUCOSAMINE HCL/CHONDROITIN SU 500-400 MG
CAPSULE ORAL
Qty: 100 EACH | Refills: 0 | Status: SHIPPED | OUTPATIENT
Start: 2024-12-04

## 2024-12-04 NOTE — PATIENT INSTRUCTIONS
Plan de acción para la diabetes mellitus  Diabetes Mellitus Action Plan  Seguir un plan de acción para la diabetes es megan forma de controlar sarbjit síntomas de diabetes (diabetes mellitus). El plan se codifica con colores para ayudarlo a comprender qué acciones necesita barrett en función de los síntomas que esté teniendo.  Si tiene síntomas pertenecientes a la lainey jil, necesita buscar atención médica inmediatamente.  Si tiene síntomas pertenecientes a la lainey amarilla, está teniendo problemas.  Si tiene síntomas pertenecientes a la lainey alex, significa que se encuentra catracho.  Aprender y comprender la diabetes puede llevar tiempo. Siga el plan que elaboró con el médico. Conozca el rango deseado para landers nivel de azúcar en la nakul (glucosa), y revise landers plan de tratamiento con landers médico en cada visita.  El rango deseado para mi nivel de azúcar en la nakul es __________________________ mg/dl.  Lainey jil  Obtenga ayuda de inmediato si observa cualquiera de estos síntomas:  Un resultado de azúcar en la nakul que está por debajo de 54 mg/dl (3 mmol/l).  Un nivel de azúcar en la nakul mayor o igual que 240 mg/dl (13,3 mmol/l) carine 2 días seguidos.  Confusión o dificultad para pensar con claridad.  Dificultad para respirar.  Malestar o fiebre carine 2 o más días que no mejora.  Niveles moderados o altos de cetonas en la orina.  Sentirse cansado o sin energía.  Si tiene cualquiera de los síntomas pertenecientes a la lainey jil, no espere para lashaun si desaparecen. Solicite atención médica de inmediato. Comuníquese con el servicio de emergencias de landers localidad (911 en los [place]). No conduzca por sarbjit propios medios hasta el hospital.  Si tiene un nivel de azúcar en la nakul muy bajo (hipoglucemia grave) y no puede ingerir ningún alimento ni bebida, eddy vez necesite glucagón. Asegúrese de que un familiar o amigo sepa controlarle el nivel de azúcar en la nakul y aplicarle glucagón. Puede necesitar tratamiento en  un hospital para esta afección.  Lainey amarilla  Si tiene alguno de los siguientes síntomas, landers diabetes no está controlada y usted necesitará hacer algunos cambios:  Un nivel de azúcar en la nakul mayor o igual que 240 mg/dl (13,3 mmol/l) carine 2 días seguidos.  Un resultado de azúcar en la nakul que está por debajo de 70 mg/dl (3,9 mmol/l).  Otros síntomas de hipoglucemia, owen:  Temblores o sensación de desvanecimiento.  Confusión o irritabilidad.  Sensación de hambre.  Latidos cardíacos acelerados.  Si tiene algún síntoma de la lainey amarilla:  Trate la hipoglucemia comiendo o bebiendo 15 gramos de hidratos de carbono de acción rápida. Siga las rose 15:15:  Consuma 15 gramos de hidratos de carbono de acción rápida, owen:  1 pomo de glucosa en gel.  4 comprimidos de glucosa.  4 onzas (120 ml) de jugo de frutas.  4 onzas (120 ml) de refresco común (no dietético).  Controle landers nivel de azúcar en la nakul 15 minutos después de ingerir el hidrato de carbono.  Si ivy nuevo nivel de azúcar en la nakul todavía es igual o tree que 70 mg/dl (3,9 mmol/l), ingiera nuevamente 15 gramos de un hidrato de carbono.  Si el nivel de azúcar en la nakul no supera los 70 mg/dl (3,9 mmol/l) después de 3 intentos, solicite ayuda médica de inmediato.  Ingiera megan comida o un refrigerio en el transcurso de 1 hora después de que el nivel de azúcar en la nakul se haya normalizado.  Siga tomando los medicamentos diarios owen se lo haya indicado el médico.  Controle landers nivel de azúcar en la nakul con más frecuencia que lo haría normalmente.  Anote los resultados.  Llame al médico si tiene problemas para mantener el nivel de azúcar en la nakul dentro del rango deseado.    Lainey alex  Estos signos significan que se encuentra catracho y puede continuar haciendo lo que está haciendo para controlar la diabetes:  Landers nivel de azúcar en la nakul está en el rango deseado. En la mayoría de las personas, el nivel de azúcar en la nakul antes de  megan comida (preprandial) debería ser de 80 a 130 mg/dl (de 4.4 a 7.2 mmol/l).  Se siente catracho, y pueda volver a realizar las actividades diarias.  Si se encuentra en la lopez alex, continúe controlando landers diabetes owen se lo haya indicado el médico. Para hacer esto:  Siga megan dieta saludable.  Arun ejercicio regularmente.  Controle landers nivel de azúcar en la nakul owen se lo haya indicado el médico.  [place] los medicamentos owen se lo haya indicado el médico.    Dónde buscar más información  American Diabetes Association (ADA) (Asociación Estadounidense de la Diabetes): diabetes.org  Association of Diabetes Care & Education Specialists (ADCES) (Asociación de Especialistas en Atención y Educación sobre la Diabetes): diabeteseducator.org  Resumen  Seguir un plan de acción para la diabetes, es megan forma de controlar sarbjit síntomas de diabetes. El plan se codifica con colores para ayudarlo a comprender qué acciones necesita barrett en función de los síntomas que esté teniendo.  Siga el plan que elaboró con el médico. Asegúrese de conocer landers nivel deseado de azúcar en la nakul.  Revise el plan de tratamiento con el médico en todas las consultas.  Esta información no tiene owen fin reemplazar el consejo del médico. Asegúrese de hacerle al médico cualquier pregunta que tenga.  Document Revised: 07/29/2021 Document Reviewed: 07/29/2021  Elsevier Patient Education © 2024 [employer]      Diabetes mellitus y nutrición, en adultos  Diabetes Mellitus and Nutrition, Adult  Si sufre de diabetes, o diabetes mellitus, es muy importante tener hábitos alimenticios saludables debido a que sarbjit niveles de azúcar en la nakul (glucosa) se turner afectados en gran medida por lo que come y yvonne. [place] alimentos saludables en las cantidades correctas, aproximadamente a la misma hora todos los días, lo ayudará a:  Controlar landers glucemia.  Disminuir el riesgo de sufrir megan enfermedad cardíaca.  Mejorar la presión arterial.  Alcanzar o mantener un  peso saludable.  ¿Qué puede afectar mi plan de alimentación?  Todas las personas que sufren de diabetes son diferentes y cada megan tiene necesidades diferentes en cuanto a un plan de alimentación. El médico puede recomendarle que trabaje con un nutricionista para elaborar el mejor plan para usted. Landers plan de alimentación puede variar según factores owen:  Las calorías que necesita.  Los medicamentos que kirsten.  Landers peso.  Shira niveles de glucemia, presión arterial y colesterol.  Landers nivel de actividad.  Otras afecciones que tenga, owen enfermedades cardíacas o renales.  ¿Cómo me afectan los carbohidratos?  Los carbohidratos, o hidratos de carbono, afectan landers nivel de glucemia más que cualquier otro tipo de alimento. La ingesta de carbohidratos aumenta la cantidad de glucosa en la nakul.  Es importante conocer la cantidad de carbohidratos que se pueden ingerir en cada comida sin correr ningún riesgo. [place] es diferente en cada persona. Landers nutricionista puede ayudarlo a calcular la cantidad de carbohidratos que debe ingerir en cada comida y en cada refrigerio.  ¿Cómo me afecta el alcohol?  El alcohol puede provocar megan disminución de la glucemia (hipoglucemia), especialmente si usa insulina o kirsten determinados medicamentos por vía oral para la diabetes. La hipoglucemia es megan afección potencialmente mortal. Los síntomas de la hipoglucemia, owen somnolencia, mareos y confusión, son similares a los síntomas de amalia consumido demasiado alcohol.  No lewis alcohol si:  Landers médico le indica no hacerlo.  Está embarazada, puede estar embarazada o está tratando de quedar embarazada.  Si yvonne alcohol:  Limite la cantidad que yvonne a lo siguiente:  De 0 a 1 medida por día para las mujeres.  De 0 a 2 medidas por día para los hombres.  Sepa cuánta cantidad de alcohol hay en las bebidas que kirsten. En [place], megan medida equivale a megan botella de cerveza de 12 oz (355 ml), un vaso de vino de 5 oz (148 ml) o un vaso de megan bebida  alcohólica de miranda graduación de 1½ oz (44 ml).  Manténgase hidratado bebiendo agua, refrescos dietéticos o té helado sin azúcar. Tenga en cuenta que los refrescos comunes, los jugos y otras bebidas para mezclar pueden contener mucha azúcar y se deben contar owen carbohidratos.  Consejos para seguir ivy plan    Leer las etiquetas de los alimentos  Comience por leer el tamaño de la porción en la etiqueta de Información nutricional de los alimentos envasados y las bebidas. La cantidad de calorías, carbohidratos, grasas y otros nutrientes detallados en la etiqueta se basan en megan porción del alimento. Muchos alimentos contienen más de megan porción por envase.  Verifique la cantidad total de gramos (g) de carbohidratos totales en megan porción.  Verifique la cantidad de gramos de grasas saturadas y grasas trans en megan porción. Escoja alimentos que no contengan estas grasas o que landers contenido de estas sea bajo.  Verifique la cantidad de miligramos (mg) de sal (sodio) en megan porción. La mayoría de las personas deben limitar la ingesta de sodio total a menos de 2300 mg por día.  Siempre consulte la información nutricional de los alimentos etiquetados owen “con bajo contenido de grasa” o “sin grasa”. Estos alimentos pueden tener un mayor contenido de azúcar agregada o carbohidratos refinados, y deben evitarse.  Hable con landers nutricionista para identificar sarbjit objetivos diarios en cuanto a los nutrientes mencionados en la etiqueta.  Al ir de compras  Evite comprar alimentos procesados, enlatados o precocidos. Estos alimentos tienden a tener megan mayor cantidad de grasa, sodio y azúcar agregada.  Compre en la lopez exterior de la sammy de comestibles. Esta es la lopez donde se encuentran con mayor frecuencia las frutas y las verduras frescas, los cereales a granel, las gely frescas y los productos lácteos frescos.  Al cocinar  Use métodos de cocción a baja temperatura, owen hornear, en lugar de métodos de cocción a miranda  temperatura, owen freír en abundante aceite.  Cocine con aceites saludables, owen el aceite de boyer, canola o girasol.  Evite cocinar con manteca, crema o jb con alto contenido de grasa.  Planificación de las comidas  Coma las comidas y los refrigerios regularmente, preferentemente a la misma hora todos los días. Evite pasar largos períodos de tiempo sin comer.  Consuma alimentos ricos en fibra, owen frutas frescas, verduras, frijoles y cereales integrales.  Consuma entre 4 y 6 onzas (entre 112 y 168 g) de proteínas magras por día, owen jb magras, kiel, pescado, huevos o tofu. Megan onza (oz) (28 g) de proteína magra equivale a:  1 onza (28 g) de carne, kiel o pescado.  1 huevo.  ¼ taza (62 g) de tofu.  Coma algunos alimentos por día que contengan grasas saludables, owen aguacates, tamera secos, semillas y pescado.  ¿Qué alimentos nivia comer?  Frutas  Bayas. Manzanas. Naranjas. Duraznos. Damascos. Ciruelas. Uvas. Mangos. Papayas. Granadas. Kiwi. Cerezas.  Verduras  Verduras de hoja alex, que incluyen lonny, espinaca, col rizada, acelga, hojas de berza, hojas de mostaza y repollo. Remolachas. Coliflor. Brócoli. Zanahorias. Judías verdes. Tomates. Pimientos. Cebollas. Pepinos. Stokes de Bruselas.  Granos  Granos integrales, owen panes, galletas, tortillas, cereales y pastas de salvado o integrales. [place] sin azúcar. Quinua. Arroz integral o salma.  Jb y otras proteínas  Tamera de mar. Carne de ave sin piel. Anders magros de ave y carne de res. Tofu. Tamera secos. Semillas.  Lácteos  Productos lácteos sin grasa o con bajo contenido de grasa, owen leche, yogur y queso.  Es posible que los productos detallados arriba no constituyan megan lista completa de los alimentos y las bebidas que puede barrett. Consulte a un nutricionista para obtener más información.  ¿Qué alimentos nivia evitar?  Frutas  Frutas enlatadas al almíbar.  Verduras  Verduras enlatadas. Verduras congeladas con mantequilla o salsa de  crema.  Granos  Productos elaborados con harina y harina leia refinada, owen panes, pastas, bocadillos y cereales. Evite todos los alimentos procesados.  Jb y otras proteínas  Anders de carne con alto contenido de grasa. Carne de ave con piel. Jb empanizadas o fritas. Carne procesada. Evite las grasas saturadas.  Lácteos  Yogur, queso o leche enteros.  Bebidas  Bebidas azucaradas, owen gaseosas o té helado.  Es posible que los productos que se enumeran más arriba no constituyan megan lista completa de los alimentos y las bebidas que debe evitar. Consulte a un nutricionista para obtener más información.  Preguntas para hacerle al médico  ¿Yanna consultar con un especialista certificado en atención y educación sobre la diabetes?  ¿Es necesario que me reúna con un nutricionista?  ¿A qué número puedo llamar si tengo preguntas?  ¿Cuáles son los mejores momentos para controlar la glucemia?  Dónde encontrar más información:  American Diabetes Association (Asociación Estadounidense de la Diabetes): diabetes.org  Academy of Nutrition and Dietetics (Academia de Nutrición y Dietética): eatright.org  National [place] of Diabetes and Digestive and Kidney Diseases (Instituto Nacional de la Diabetes y las Enfermedades Digestivas y Renales): niddk.nih.gov  Association of Diabetes Care & Education Specialists (Asociación de Especialistas en Atención y Educación sobre la Diabetes): diabeteseducator.org  Resumen  Es importante tener hábitos alimenticios saludables debido a que sarbjit niveles de azúcar en la nakul (glucosa) se turner afectados en gran medida por lo que come y yvonne. Es importante consumir alcohol con jil.  Un plan de comidas saludable lo ayudará a controlar la glucosa en nakul y a reducir el riesgo de enfermedades cardíacas.  El médico puede recomendarle que trabaje con un nutricionista para elaborar el mejor plan para usted.  Esta información no tiene owen fin reemplazar el consejo del médico. Asegúrese de  hacerle al médico cualquier pregunta que tenga.  Document Revised: 08/25/2021 Document Reviewed: 08/25/2021  Elsevier Patient Education © 2024 [employer]    Hipoglucemia  Hypoglycemia  La hipoglucemia se produce cuando el nivel de azúcar (glucosa) en la nakul es demasiado bajo. Las personas que tienen diabetes y las que no tienen la enfermedad pueden tener un nivel de azúcar en la nakul bajo. El nivel bajo de azúcar en la nakul puede ocurrir rápidamente y ser megan emergencia.  ¿Cuáles son las causas?  Esta afección ocurre con más frecuencia en las personas que tienen diabetes. Las causas pueden ser las siguientes:  Los medicamentos para la diabetes.  No comer lo suficiente o no comer con la frecuencia suficiente.  Aumentar la actividad física.  Beber alcohol con el estómago vacío.  Si no tiene diabetes, las causas de esta afección pueden deberse a lo siguiente:  Un tumor en el páncreas.  No comer lo suficiente o no comer carine períodos largos (ayunar).  Megan infección o enfermedad muy grave.  Problemas después de realizarse la cirugía para perder peso (bariátrica).  Insuficiencia renal o insuficiencia hepática.  Ciertos medicamentos.  ¿Qué incrementa el riesgo?  Es más probable que esta afección se manifieste en las personas que:  Tienen diabetes y james medicamentos para bajar el azúcar en la nakul.  Consumen alcohol en exceso.  Tienen megan enfermedad muy grave.  ¿Cuáles son los signos o síntomas?  Leves  Hambre.  Sudoración y piel húmeda.  Sentirse mareado o aturdido.  Tener somnolencia o dificultad para dormir.  Sensación de que va a vomitar (náuseas).  Latidos cardíacos acelerados.  Dolor de skip.  Visión borrosa.  Cambios de humor, por ejemplo:  Estar malhumorado.  Sentirse preocupado o nervioso (ansioso).  Hormigueo o falta de sensibilidad (entumecimiento) alrededor de la boca, los labios o la lengua.  Moderado  Confusión y deterioro del sentido de la realidad.  Cambios en el  comportamiento.  Debilidad.  Latidos cardíacos irregulares.  Dificultad para moverse (coordinación).  Muy bajo  Si el nivel de azúcar en la nakul es muy bajo (hipoglucemia grave), eso es megan emergencia médica. Puede ocasionar:  Desmayos.  Convulsiones.  Pérdida de la conciencia (estado de coma).  Muerte.  ¿Cómo se trata?  Tratamiento del nivel bajo de azúcar en la nakul  Generalmente, el tratamiento de un nivel bajo de azúcar en la nakul consiste en ingerir o beber de inmediato algo que contenga azúcar. El alimento o bebida debe contener 15 gramos de un hidrato de carbono (carbohidrato) de acción rápida. Entre las opciones se incluyen las siguientes:  4 onzas (120 ml) de jugo de frutas.  4 onzas (120 ml) de un refresco común (no dietético).  Algunos caramelos duros. Emilie las etiquetas de información nutricional a fin de lashaun cuántas unidades consumir para obtener 15 gramos.  1 cucharada (15 ml) de azúcar o miel.  4 tabletas de glucosa.  1 pomo de glucosa en gel.  Tratamiento del nivel bajo de azúcar en la nakul si tiene diabetes  Si puede pensar con claridad y tragar de manera barreto, siga la rose 15/15, que consiste en lo siguiente:  Consuma 15 gramos de un hidrato de carbono de acción rápida. Hable con landers médico acerca de cuánto debería consumir.  Lleve siempre consigo megan bahman de hidratos de carbono de acción rápida, por ejemplo:  Comprimidos de glucosa (tome 4 comprimidos).  Algunos caramelos duros. Emilie las etiquetas de información nutricional a fin de lashaun cuántas unidades consumir para obtener 15 gramos.  4 onzas (120 ml) de jugo de frutas.  4 onzas (120 ml) de un refresco común (no dietético).  1 cucharada (15 ml) de miel o azúcar.  1 pomo de glucosa en gel.  Contrólese el nivel de azúcar en la nakul 15 minutos después de ingerir el hidrato de carbono.  Si el nivel de azúcar en la nakul todavía es igual o tree que 70 mg/dl (3.9 mmol/l), ingiera nuevamente 15 gramos de un hidrato de carbono.  Si el  nivel de azúcar en la nakul no supera los 70 mg/dl (3.9 mmol/l) después de 3 intentos, solicite ayuda de inmediato.  Ingiera megan comida o megan colación en el transcurso de 1 hora después de que el nivel de azúcar en la nakul se haya normalizado.    Tratamiento del nivel muy bajo de azúcar en la nakul  Si landers nivel de azúcar en la nakul es tree que 54 mg/dl (3 mmol/l), significa que está muy bajo o tiene hipoglucemia grave. [place] es megan emergencia. Solicite atención médica de inmediato.  Si landers nivel de azúcar en la nakul es muy bajo y no puede ingerir ningún alimento ni bebida, eddy vez deban darle megan hormona llamada glucagón. Un familiar o un amigo deben aprender a controlarle el azúcar en la nakul y a darle el glucagón. Pregúntele al médico si debe tener un kit de glucagón de emergencia en landers casa.  Un nivel muy bajo de azúcar en la nakul también puede necesitar tratamiento en un hospital.  Siga estas instrucciones en landers casa:  Indicaciones generales  Use los medicamentos de venta jalen y los recetados solamente owen se lo haya indicado el médico.  Sepa cuál es landers nivel de azúcar en la nakul owen se lo haya indicado el médico.  Si yvonne alcohol:  Limite la cantidad que yvonne a lo siguiente:  De 0 a 1 medida por día para las mujeres que no están embarazadas.  De 0 a 2 medidas por día para los hombres.  Sepa cuánta cantidad de alcohol hay en las bebidas que kirsten. En [place], megan medida equivale a megan botella de cerveza de 12 oz (355 ml), un vaso de vino de 5 oz (148 ml) o un vaso de megan bebida alcohólica de miranda graduación de 1½ oz (44 ml).  Asegúrese de ingerir alimentos cuando lewis alcohol.  Sepa que landers cuerpo absorbe el alcohol rápidamente. [place] puede provocar más tarde un nivel bajo de azúcar en la nakul. Asegúrese de seguir controlando landers azúcar en la nakul.  Concurra a todas las visitas de seguimiento.  Si usted tiene diabetes:    Lleve siempre con usted un hidrato de carbono de acción rápida  (15 gramos) para tratar el nivel bajo de azúcar en la nakul.  Siga el plan de atención de la diabetes owen se lo haya indicado el médico. Asegúrese de hacer lo siguiente:  Conozca los síntomas de nivel bajo de azúcar en la nakul.  Contrólese el nivel de azúcar en la nakul con la frecuencia que le hayan indicado. Contróleselo siempre antes y después de hacer ejercicio.  Controle siempre landers nivel de azúcar en la nakul antes de conducir.  [place] los medicamentos según las indicaciones.  Siga landers plan de comidas.  Coma a horario. No omita comidas.  Comparta landers plan de atención de la diabetes con:  Sarbjit compañeros de trabajo o de la escuela.  Las personas con las que convive.  Lleve consigo megan tarjeta, o use un brazalete o megan medalla que indique que tiene diabetes.  Dónde buscar más información  American Diabetes Association (Asociación Estadounidense de la Diabetes): www.diabetes.org  Comuníquese con un médico si:  Tiene problemas para mantener el nivel de azúcar en la nakul dentro del rango indicado.  Tiene un nivel de azúcar en la nakul bajo con frecuencia.  Solicite ayuda de inmediato si:  Todavía tiene síntomas después de comer o beber algo que contenga 15 gramos de hidratos de carbono de acción rápida y no puede hacer que landers azúcar en la nakul supere los 70 mg/dl siguiendo la rose 15/15.  Landers nivel de azúcar en la nakul es inferior a 54 mg/dl (3 mmol/l).  Tiene megan convulsión.  Se desmaya.  Estos síntomas pueden indicar megan emergencia. Solicite ayuda de inmediato. Comuníquese con el servicio de emergencias de landers localidad (911 en los [place]).  No espere a lashaun si los síntomas desaparecen.  No conduzca por sarbjit propios medios hasta el hospital.  Resumen  La hipoglucemia sucede cuando el nivel de azúcar (glucosa) en la nakul es demasiado bajo.  Las personas que tienen diabetes y las que no tienen la enfermedad pueden tener un nivel de azúcar en la nakul bajo. El nivel bajo de azúcar en la nakul puede  ocurrir rápidamente y ser megan emergencia.  Asegúrese de conocer los síntomas de un nivel bajo de azúcar en la nakul y saber cómo tratarlo.  Lleve siempre consigo megan bahman de azúcar (hidratos de carbono de acción rápida) para tratar un nivel bajo de azúcar en la nakul.  Esta información no tiene owen fin reemplazar el consejo del médico. Asegúrese de hacerle al médico cualquier pregunta que tenga.  Document Revised: 12/09/2021 Document Reviewed: 12/09/2021  Elsevier Patient Education © 2024 [employer]      Diabetes mellitus y cuidado de la piel  Diabetes Mellitus and Skin Care  La diabetes, también llamada diabetes mellitus, puede causar problemas en la piel. Si el nivel de azúcar en la nakul (glucosa) no está catracho controlado, puede causar problemas con el tiempo. Estos problemas incluyen lo siguiente:  Daño a los nervios. [place] puede afectar landers capacidad para sentir las heridas. Eso significa que es posible que no note pequeñas lesiones en la piel que podrían provocar problemas mayores. También puede disminuir la cantidad de sudoración, lo que puede hacer que la piel se reseque.  Lesiones en los vasos sanguíneos. La falta de circulación sanguínea puede hacer que la piel se deteriore. También puede hacer más lento el tiempo de curación, lo que puede derivar en infecciones.  Zonas de la piel que se engrosan o cambian de color.  Enfermedades de la piel frecuentes  Hay ciertas afecciones de la piel que suelen afectar a las personas con diabetes. Estas incluyen las siguientes:  Piel seca.  Piel rex. La piel de los pies puede volverse más rex, lesionarse más fácilmente y cicatrizar más lento que lo normal.  Infecciones cutáneas por bacterias. Estas incluyen las siguientes:  Orzuelos. Son infecciones que se producen cerca del párpado.  Forúnculos. Son protuberancias que están llenas de pus.  Folículos pilosos infectados.  Infecciones de la piel alrededor de las uñas.  Infecciones cutáneas por hongos. Son más  comunes en zonas donde la piel roza con piel, owen en las axilas o debajo de las mamas.  Cambios frecuentes en la piel  La diabetes también puede causar cambios en la piel. Puede desarrollar lo siguiente:  Marcas oscuras y aterciopeladas en la piel. Pueden aparecer en la stanley, el moises, las axilas, la parte interna de los muslos y la leslee.  Tejido enrojecido y con relieve, parecido a megan cicatriz, que puede picar, doler o convertirse en megan herida.  Ampollas en los pies, los dedos de los pies, las geovanna o los dedos de las geovanna.  Zonas de la piel gruesas y de aspecto similar al de la cera. En la mayoría de los casos, se manifiestan en las geovanna, la frente o los dedos de los pies.  Marcas en la piel de color marrón o toledo, con forma anular o de medio anillo, en las orejas o los dedos de las geovanna.  Protuberancias de color amarillo con forma de guisante, que pueden causar picazón y tener un anillo toledo. Estas pueden afectar los brazos, los pies, las nalgas y la parte de arriba de las geovanna.  Manchas redondas de color marrón que no duelen ni pican. Pueden lucir owen manchas de envejecimiento.  Materiales necesarios:  Jabón o limpiador dérmico suave.  Loción.  Cómo cuidar la piel seca y con picazón  Los niveles de glucosa altos y frecuentes pueden causar picazón en la piel. La enid circulación sanguínea y las infecciones cutáneas pueden empeorar la piel seca. Si tiene la piel seca y con picazón:  Evite los bashir o las duchas muy calientes.  Use jabón y limpiadores dérmicos suaves. No use jabón perfumado, pedro o que lastime o reseque la piel. Los jabones humectantes pueden ser útiles.  Aplique loción hidratante tan pronto owen termine de bañarse.  No se rasque la piel seca. Rascarse puede exponer la piel a megan infección.  Si tiene megan erupción o si le pica mucho la piel, comuníquese con el médico. La piel enrojecida o con erupción podría ser signo de megan reacción alérgica. Si tiene mucha picazón, puede significar que  necesita ayuda para controlar mejor landers diabetes. También es posible que necesite tratamiento para megan infección.  Consejos generales  En landers mayoría, los problemas de la piel pueden prevenirse o tratarse fácilmente si se detectan de manera temprana. Hable con el médico si tiene alguna inquietud. Los consejos generales incluyen lo siguiente:  Examínese a diario la piel y los pies en busca de rincon, moretones, enrojecimiento, ampollas o llagas, especialmente en los pies. Si no puede lashaun la planta del pie, use un cristian o pídale ayuda a otra persona. Informe al médico si tiene alguna de estas lesiones y si se curan con lentitud.  Mantenga la piel limpia y seca. No use [place].  Huméctese la piel para evitar que se agriete.  Mantenga sarbjit niveles de glucemia dentro del rango objetivo.  Siga estas indicaciones en landers casa:    Use los medicamentos de venta jalen y los recetados solamente owen lo haya indicado el médico. [place] incluye todos los medicamentos para la diabetes que esté tomando.  Programe megna salma para que el médico le controle los pies megan vez al año. Andres el examen, le revisarán la estructura y la piel de los pies para detectar problemas.  Asegúrese de que landesr médico le anais un examen visual de los pies en cada visita.  Si tiene megan lesión en la piel, owen un bev, megan ampolla o megan llaga, contrólela diariamente para detectar signos de infección. Esté atento a los siguientes signos:  Enrojecimiento, hinchazón o dolor.  Líquido o nakul.  Calor.  Pus o mal olor.  No consuma ningún producto que contenga nicotina o tabaco. Estos productos incluyen cigarrillos, tabaco para mascar y aparatos de vapeo, owen los cigarrillos electrónicos. Si necesita ayuda para dejar de consumir estos productos, consulte al médico.  Dónde obtener más información  American Diabetes Association (Asociación Estadounidense de la Diabetes): diabetes.org  Association of Diabetes Care & Education Specialists (Asociación de  Especialistas en Atención y Educación sobre la Diabetes): diabeteseducator.org  Comuníquese con un médico si:  Tiene un bev o megan llaga, especialmente en los pies.  Tiene signos de infección después de sufrir megan lesión cutánea.  La piel le pica y está enrojecida o aparece megan erupción.  Tiene manchas en la piel.  Tiene cambios en algunos lugares de la piel. Pueden engrosarse o verse brillantes.  Esta información no tiene owen fin reemplazar el consejo del médico. Asegúrese de hacerle al médico cualquier pregunta que tenga.  Document Revised: 07/11/2023 Document Reviewed: 07/11/2023  Elsevier Patient Education © 2024 [employer]vier Inc.

## 2024-12-04 NOTE — OUTREACH NOTE
Prep Survey      Flowsheet Row Responses   [facility] patient discharged from? [place]   Is LACE score < 7 ? No   Eligibility [facility]   Date of Admission 11/13/24   Date of Discharge 12/03/24   Discharge Disposition Home or Self Care   Discharge diagnosis Acute exacerbation of CHF (congestive heart failure), HEMODIALYSIS CATHETER INSERTION & Hemodialysis started   Does the patient have one of the following disease processes/diagnoses(primary or secondary)? CHF   Comments regarding appointments new PCP appt, pt to go to local ER for dialysis   General alerts for this patient Amharic SPEAKING   Prep survey completed? Yes            Vianney DUENAS - Registered Nurse

## 2024-12-04 NOTE — OUTREACH NOTE
Call Center TCM Note      Flowsheet Row Responses   [facility] patient discharged from? [place]   Does the patient have one of the following disease processes/diagnoses(primary or secondary)? CHF   TCM attempt successful? Yes   General alerts for this patient [place]wana SPEAKING   Discharge diagnosis Acute exacerbation of CHF (congestive heart failure), HEMODIALYSIS CATHETER INSERTION & Hemodialysis started   TCM call completed? Yes   Wrap up additional comments D/C DX,  Acute exacerbation of CHF (congestive heart failure), HEMODIALYSIS CATHETER INSERTION & Hemodialysis started - Pt discharged form [facility] 12/03/2024, today completed FACE TO FACE TCM APPT with PCP Dennise Reyes MA    12/4/2024, 12:52 EST

## 2024-12-04 NOTE — OUTREACH NOTE
Call Center TCM Note      Flowsheet Row Responses   [facility] patient discharged from? [place]   Does the patient have one of the following disease processes/diagnoses(primary or secondary)? CHF   TCM attempt successful? No   Unsuccessful attempts Attempt 1             Gayle Reyes MA    12/4/2024, 11:33 EST

## 2024-12-04 NOTE — PROGRESS NOTES
Chief Complaint  Establish Care, Diabetes, Congestive Heart Failure, and Chronic Kidney Disease     Subjective:      History of Present Illness {CC  Problem List  Visit  Diagnosis   Encounters  Notes  Medications  Labs  Result Review Imaging  Media :23}     Rufus Dorsey presents to [facility] for:      History of Present Illness        The patient presents for evaluation of multiple medical concerns. A  is used for his visit.    He was hospitalized from 11/13/2024 until yesterday due to shortness of breath and fluid overload. During his stay, he was diagnosed with diabetes, with an A1c level exceeding 12. He was educated about insulin use and prescribed Tresiba and NovoLog upon discharge. He is currently managing his diabetes independently and has not consulted an endocrinologist. He uses insulin pens and does not have a continuous pump. He does not monitor his blood sugar levels at home but is interested in purchasing a glucometer. He is also seeking advice on dietary modifications.    Upon his arrival at the hospital, he was informed of his heart condition. He had been taking heart medication prior to his hospitalization. His cardiologist is Dr. Bergman. He reports feeling better since his discharge from the hospital.    He began dialysis treatment during his hospital stay, which started on 11/13/2024. He is scheduled to start outpatient dialysis tomorrow at [facility]. He is unsure of his nephrologist's name.         I have reviewed patient's medical history, any new submitted information provided by patient or medical assistant and updated medical record.      Objective:      Physical Exam  Vitals reviewed.   Constitutional:       Appearance: Normal appearance.   HENT:      Head: Normocephalic.   Cardiovascular:      Rate and Rhythm: Normal rate.      Pulses: Normal pulses.   Pulmonary:      Effort: Pulmonary effort is normal.      Breath  "sounds: Normal breath sounds.   Abdominal:      General: There is distension.   Skin:     General: Skin is warm and dry.      Capillary Refill: Capillary refill takes less than 2 seconds.   Neurological:      General: No focal deficit present.      Mental Status: He is alert and oriented to person, place, and time.   Psychiatric:         Mood and Affect: Mood normal.         Behavior: Behavior normal.        Result Review  Data Reviewed:{ Labs  Result Review  Imaging  Med Tab  Media :23}     Results  Laboratory Studies  A1c was greater than 12.    Imaging  Ultrasound of the heart showed an ejection fraction of 30%.                  Vital Signs:   /82 (BP Location: Left arm, Patient Position: Sitting, Cuff Size: Adult)   Pulse 66   Temp 97.5 °F (36.4 °C) (Oral)   Ht 165.1 cm (65\")   Wt 119 kg (261 lb 6.4 oz)   SpO2 95%   BMI 43.50 kg/m²                 Requested Prescriptions     Signed Prescriptions Disp Refills    Blood Glucose Monitoring Suppl w/Device kit 1 each 0     Sig: Use 1 kit 3 (Three) Times a Day. Ok to substitute    Lancets (onetouch ultrasoft) lancets 100 each 12     Sig: Use as instructed    glucose blood test strip 100 each 12     Sig: Use as instructed    Alcohol Swabs 70 % pads 100 each 0     Sig: Test daily before all meals/snacks and once before bedtime.       Routine medications provided by this office will also be refilled via pharmacy request.       Current Outpatient Medications:     aspirin 81 MG EC tablet, Take 1 tablet by mouth Daily., Disp: 90 tablet, Rfl: 0    carvedilol (COREG) 25 MG tablet, Take 1 tablet by mouth 2 (Two) Times a Day With Meals., Disp: 180 tablet, Rfl: 0    hydrALAZINE (APRESOLINE) 10 MG tablet, Take 1 tablet by mouth Every 8 (Eight) Hours., Disp: 90 tablet, Rfl: 0    insulin aspart (NovoLOG FlexPen) 100 UNIT/ML solution pen-injector sc pen, Inject 3 Units under the skin into the appropriate area as directed 3 (Three) Times a Day With Meals., Disp: 15 mL, " Rfl: 1    insulin degludec (Tresiba FlexTouch) 100 UNIT/ML solution pen-injector injection, Inject 15 Units under the skin into the appropriate area as directed Daily., Disp: 15 mL, Rfl: 1    Insulin Pen Needle 32G X 4 MM misc, Use to inject insulin under the skin up to 4 times daily via pens, Disp: 100 each, Rfl: 1    isosorbide dinitrate (ISORDIL) 20 MG tablet, Take 2 tablets by mouth 3 (Three) Times a Day., Disp: 180 tablet, Rfl: 0    sennosides-docusate (PERICOLACE) 8.6-50 MG per tablet, Take 1 tablet by mouth 2 (Two) Times a Day., Disp: 60 tablet, Rfl: 0    vitamin D (ERGOCALCIFEROL) 1.25 MG (12014 UT) capsule capsule, Take 1 capsule by mouth Every 7 (Seven) Days., Disp: 5 capsule, Rfl: 0    Alcohol Swabs 70 % pads, Test daily before all meals/snacks and once before bedtime., Disp: 100 each, Rfl: 0    Blood Glucose Monitoring Suppl w/Device kit, Use 1 kit 3 (Three) Times a Day. Ok to substitute, Disp: 1 each, Rfl: 0    glucose blood test strip, Use as instructed, Disp: 100 each, Rfl: 12    Lancets (onetouch ultrasoft) lancets, Use as instructed, Disp: 100 each, Rfl: 12     Assessment and Plan:      Assessment and Plan {CC Problem List  Visit Diagnosis  ROS  Review (Popup)  Health Maintenance  Quality  BestPractice  Medications  SmartSets  SnapShot Encounters  Media :23}     Problem List Items Addressed This Visit       Type 2 diabetes mellitus, with long-term current use of insulin    Relevant Medications    Blood Glucose Monitoring Suppl w/Device kit    Lancets (onetouch ultrasoft) lancets    glucose blood test strip    Alcohol Swabs 70 % pads    Other Relevant Orders    Ambulatory Referral to Endocrinology    Ambulatory Referral to Nutrition Services    Hypertension    Diastolic CHF, acute on chronic    Type 2 diabetes mellitus with renal complication    Relevant Medications    Blood Glucose Monitoring Suppl w/Device kit    Lancets (onetouch ultrasoft) lancets    glucose blood test strip     Alcohol Swabs 70 % pads    Other Relevant Orders    Ambulatory Referral to Endocrinology    Ambulatory Referral to Nutrition Services    Acute on chronic combined systolic and diastolic CHF (congestive heart failure)    CKD (chronic kidney disease) stage 5, GFR less than 15 ml/min (Chronic)     Other Visit Diagnoses       Encounter to establish care    -  Primary    Hospital discharge follow-up                   1. Diabetes Mellitus.  He was recently diagnosed with diabetes during his hospital stay, with an A1c greater than 12. He is currently on Tresiba 15 units daily and NovoLog 3 units three times daily with meals. He is advised to monitor his blood sugar levels three times daily as needed. A prescription for a glucometer will be provided. A referral to an endocrinologist will be made for further management of his diabetes. Dietary advice will be given to aid in his diabetes management.    2. Heart Failure.  His current medication regimen includes carvedilol 25 mg twice daily with meals, hydralazine 10 mg every 8 hours, and Isordil 40 mg three times daily. He reports feeling better since his hospital discharge.    3. Chronic Kidney Disease.  He started dialysis on November 13th during his recent hospital stay. He will begin outpatient dialysis at [facility].     Follow-up  Return in 2 weeks for follow up.     Patient verbalizes understanding and is comfortable with the plan of care.      Follow Up {Instructions Charge Capture  Follow-up Communications :23}     Return in about 2 weeks (around 12/18/2024) for Annual physical.      Patient was given instructions and counseling regarding his condition or for health maintenance advice. Please see specific information pulled into the AVS if appropriate.    Dragon disclaimer:   Much of this encounter note is an electronic transcription/translation of spoken language to printed text. The electronic translation of spoken language may permit erroneous, or at  times, nonsensical words or phrases to be inadvertently transcribed; Although I have reviewed the note for such errors, some may still exist.         Additional Patient Counseling:       Patient Instructions   Plan de acción para la diabetes mellitus  Diabetes Mellitus Action Plan  Seguir un plan de acción para la diabetes es megan forma de controlar sarbjit síntomas de diabetes (diabetes mellitus). El plan se codifica con colores para ayudarlo a comprender qué acciones necesita barrett en función de los síntomas que esté teniendo.  Si tiene síntomas pertenecientes a la lainey jil, necesita buscar atención médica inmediatamente.  Si tiene síntomas pertenecientes a la lainey amarilla, está teniendo problemas.  Si tiene síntomas pertenecientes a la lainey alex, significa que se encuentra catracho.  Aprender y comprender la diabetes puede llevar tiempo. Siga el plan que elaboró con el médico. Conozca el rango deseado para landers nivel de azúcar en la nakul (glucosa), y revise landers plan de tratamiento con landers médico en cada visita.  El rango deseado para mi nivel de azúcar en la nakul es __________________________ mg/dl.  Lainey jil  Obtenga ayuda de inmediato si observa cualquiera de estos síntomas:  Un resultado de azúcar en la nakul que está por debajo de 54 mg/dl (3 mmol/l).  Un nivel de azúcar en la nakul mayor o igual que 240 mg/dl (13,3 mmol/l) carine 2 días seguidos.  Confusión o dificultad para pensar con claridad.  Dificultad para respirar.  Malestar o fiebre carine 2 o más días que no mejora.  Niveles moderados o altos de cetonas en la orina.  Sentirse cansado o sin energía.  Si tiene cualquiera de los síntomas pertenecientes a la lainey jil, no espere para lashaun si desaparecen. Solicite atención médica de inmediato. Comuníquese con el servicio de emergencias de landers localidad (911 en los [place]). No conduzca por sarbjit propios medios hasta el hospital.  Si tiene un nivel de azúcar en la nakul muy bajo (hipoglucemia grave) y  no puede ingerir ningún alimento ni bebida, eddy vez necesite glucagón. Asegúrese de que un familiar o amigo sepa controlarle el nivel de azúcar en la nakul y aplicarle glucagón. Puede necesitar tratamiento en un hospital para esta afección.  Lainey amarilla  Si tiene alguno de los siguientes síntomas, landers diabetes no está controlada y usted necesitará hacer algunos cambios:  Un nivel de azúcar en la nakul mayor o igual que 240 mg/dl (13,3 mmol/l) carine 2 días seguidos.  Un resultado de azúcar en la nakul que está por debajo de 70 mg/dl (3,9 mmol/l).  Otros síntomas de hipoglucemia, owen:  Temblores o sensación de desvanecimiento.  Confusión o irritabilidad.  Sensación de hambre.  Latidos cardíacos acelerados.  Si tiene algún síntoma de la lainey amarilla:  Trate la hipoglucemia comiendo o bebiendo 15 gramos de hidratos de carbono de acción rápida. Siga las rose 15:15:  Consuma 15 gramos de hidratos de carbono de acción rápida, owen:  1 pomo de glucosa en gel.  4 comprimidos de glucosa.  4 onzas (120 ml) de jugo de frutas.  4 onzas (120 ml) de refresco común (no dietético).  Controle landers nivel de azúcar en la nakul 15 minutos después de ingerir el hidrato de carbono.  Si ivy nuevo nivel de azúcar en la nakul todavía es igual o tree que 70 mg/dl (3,9 mmol/l), ingiera nuevamente 15 gramos de un hidrato de carbono.  Si el nivel de azúcar en la nakul no supera los 70 mg/dl (3,9 mmol/l) después de 3 intentos, solicite ayuda médica de inmediato.  Ingiera megan comida o un refrigerio en el transcurso de 1 hora después de que el nivel de azúcar en la nakul se haya normalizado.  Siga tomando los medicamentos diarios owen se lo haya indicado el médico.  Controle landers nivel de azúcar en la nakul con más frecuencia que lo haría normalmente.  Anote los resultados.  Llame al médico si tiene problemas para mantener el nivel de azúcar en la nakul dentro del rango deseado.    Lainey alex  Estos signos significan que se encuentra  catracho y puede continuar haciendo lo que está haciendo para controlar la diabetes:  Landers nivel de azúcar en la nakul está en el rango deseado. En la mayoría de las personas, el nivel de azúcar en la nakul antes de megan comida (preprandial) debería ser de 80 a 130 mg/dl (de 4.4 a 7.2 mmol/l).  Se siente catracho, y pueda volver a realizar las actividades diarias.  Si se encuentra en la lopez alex, continúe controlando landers diabetes owen se lo haya indicado el médico. Para hacer esto:  Siga megan dieta saludable.  Arun ejercicio regularmente.  Controle landers nivel de azúcar en la nakul owen se lo haya indicado el médico.  [place] los medicamentos owen se lo haya indicado el médico.    Dónde buscar más información  American Diabetes Association (ADA) (Asociación Estadounidense de la Diabetes): diabetes.org  Association of Diabetes Care & Education Specialists (ADCES) (Asociación de Especialistas en Atención y Educación sobre la Diabetes): diabeteseducator.org  Resumen  Seguir un plan de acción para la diabetes, es megan forma de controlar sarbjit síntomas de diabetes. El plan se codifica con colores para ayudarlo a comprender qué acciones necesita barrett en función de los síntomas que esté teniendo.  Siga el plan que elaboró con el médico. Asegúrese de conocer landers nivel deseado de azúcar en la nakul.  Revise el plan de tratamiento con el médico en todas las consultas.  Esta información no tiene owen fin reemplazar el consejo del médico. Asegúrese de hacerle al médico cualquier pregunta que tenga.  Document Revised: 07/29/2021 Document Reviewed: 07/29/2021  Elsevier Patient Education © 2024 Elsevier Inc.      Diabetes mellitus y nutrición, en adultos  Diabetes Mellitus and Nutrition, Adult  Si sufre de diabetes, o diabetes mellitus, es muy importante tener hábitos alimenticios saludables debido a que sarbjit niveles de azúcar en la nakul (glucosa) se turner afectados en gran medida por lo que come y yvonne. [place] alimentos saludables en las  cantidades correctas, aproximadamente a la misma hora todos los días, lo ayudará a:  Controlar landers glucemia.  Disminuir el riesgo de sufrir megan enfermedad cardíaca.  Mejorar la presión arterial.  Alcanzar o mantener un peso saludable.  ¿Qué puede afectar mi plan de alimentación?  Todas las personas que sufren de diabetes son diferentes y cada megan tiene necesidades diferentes en cuanto a un plan de alimentación. El médico puede recomendarle que trabaje con un nutricionista para elaborar el mejor plan para usted. Landers plan de alimentación puede variar según factores owen:  Las calorías que necesita.  Los medicamentos que kirsten.  Landers peso.  Shira niveles de glucemia, presión arterial y colesterol.  Landers nivel de actividad.  Otras afecciones que tenga, owen enfermedades cardíacas o renales.  ¿Cómo me afectan los carbohidratos?  Los carbohidratos, o hidratos de carbono, afectan landers nivel de glucemia más que cualquier otro tipo de alimento. La ingesta de carbohidratos aumenta la cantidad de glucosa en la nakul.  Es importante conocer la cantidad de carbohidratos que se pueden ingerir en cada comida sin correr ningún riesgo. [place] es diferente en cada persona. Landers nutricionista puede ayudarlo a calcular la cantidad de carbohidratos que debe ingerir en cada comida y en cada refrigerio.  ¿Cómo me afecta el alcohol?  El alcohol puede provocar megan disminución de la glucemia (hipoglucemia), especialmente si usa insulina o kirsten determinados medicamentos por vía oral para la diabetes. La hipoglucemia es megan afección potencialmente mortal. Los síntomas de la hipoglucemia, owen somnolencia, mareos y confusión, son similares a los síntomas de amalia consumido demasiado alcohol.  No lewis alcohol si:  Landers médico le indica no hacerlo.  Está embarazada, puede estar embarazada o está tratando de quedar embarazada.  Si yvonne alcohol:  Limite la cantidad que yvonne a lo siguiente:  De 0 a 1 medida por día para las mujeres.  De 0 a 2 medidas por día para  los hombres.  Sepa cuánta cantidad de alcohol hay en las bebidas que kirsten. En los [place], megan medida equivale a megan botella de cerveza de 12 oz (355 ml), un vaso de vino de 5 oz (148 ml) o un vaso de megan bebida alcohólica de miranda graduación de 1½ oz (44 ml).  Manténgase hidratado bebiendo agua, refrescos dietéticos o té helado sin azúcar. Tenga en cuenta que los refrescos comunes, los jugos y otras bebidas para mezclar pueden contener mucha azúcar y se deben contar owen carbohidratos.  Consejos para seguir ivy plan    Leer las etiquetas de los alimentos  Comience por leer el tamaño de la porción en la etiqueta de Información nutricional de los alimentos envasados y las bebidas. La cantidad de calorías, carbohidratos, grasas y otros nutrientes detallados en la etiqueta se basan en megan porción del alimento. Muchos alimentos contienen más de emgan porción por envase.  Verifique la cantidad total de gramos (g) de carbohidratos totales en megan porción.  Verifique la cantidad de gramos de grasas saturadas y grasas trans en megan porción. Escoja alimentos que no contengan estas grasas o que landers contenido de estas sea bajo.  Verifique la cantidad de miligramos (mg) de sal (sodio) en megan porción. La mayoría de las personas deben limitar la ingesta de sodio total a menos de 2300 mg por día.  Siempre consulte la información nutricional de los alimentos etiquetados owen “con bajo contenido de grasa” o “sin grasa”. Estos alimentos pueden tener un mayor contenido de azúcar agregada o carbohidratos refinados, y deben evitarse.  Hable con landers nutricionista para identificar sarbjit objetivos diarios en cuanto a los nutrientes mencionados en la etiqueta.  Al ir de compras  Evite comprar alimentos procesados, enlatados o precocidos. Estos alimentos tienden a tener megan mayor cantidad de grasa, sodio y azúcar agregada.  Compre en la lopez exterior de la sammy de comestibles. Esta es la lopez donde se encuentran con mayor frecuencia las  frutas y las verduras frescas, los cereales a granel, las jb frescas y los productos lácteos frescos.  Al cocinar  Use métodos de cocción a baja temperatura, owen hornear, en lugar de métodos de cocción a miranda temperatura, owen freír en abundante aceite.  Cocine con aceites saludables, owen el aceite de boyer, canola o girasol.  Evite cocinar con manteca, crema o jb con alto contenido de grasa.  Planificación de las comidas  Coma las comidas y los refrigerios regularmente, preferentemente a la misma hora todos los días. Evite pasar largos períodos de tiempo sin comer.  Consuma alimentos ricos en fibra, owen frutas frescas, verduras, frijoles y cereales integrales.  Consuma entre 4 y 6 onzas (entre 112 y 168 g) de proteínas magras por día, owen jb magras, kiel, pescado, huevos o tofu. Megan onza (oz) (28 g) de proteína magra equivale a:  1 onza (28 g) de carne, kiel o pescado.  1 huevo.  ¼ taza (62 g) de tofu.  Coma algunos alimentos por día que contengan grasas saludables, owen aguacates, tamera secos, semillas y pescado.  ¿Qué alimentos nivia comer?  Frutas  Bayas. Manzanas. Naranjas. Duraznos. Damascos. Ciruelas. Uvas. Mangos. Papayas. Granadas. Kiwi. Cerezas.  Verduras  Verduras de hoja alex, que incluyen lonny, espinaca, col rizada, acelga, hojas de berza, hojas de mostaza y repollo. Remolachas. Coliflor. Brócoli. Zanahorias. Judías verdes. Tomates. Pimientos. Cebollas. Pepinos. [place] de Bruselas.  Granos  Granos integrales, owen panes, galletas, tortillas, cereales y pastas de salvado o integrales. [place] sin azúcar. Quinua. Arroz integral o salma.  Jb y otras proteínas  Tamera de mar. Carne de ave sin piel. Anders magros de ave y carne de res. Tofu. Tamera secos. Semillas.  Lácteos  Productos lácteos sin grasa o con bajo contenido de grasa, owen leche, yogur y queso.  Es posible que los productos detallados arriba no constituyan megan lista completa de los alimentos y las bebidas que puede  barrett. Consulte a un nutricionista para obtener más información.  ¿Qué alimentos yanna evitar?  Frutas  Frutas enlatadas al almíbar.  Verduras  Verduras enlatadas. Verduras congeladas con mantequilla o salsa de crema.  Granos  Productos elaborados con harina y harina leia refinada, owen panes, pastas, bocadillos y cereales. Evite todos los alimentos procesados.  Jb y otras proteínas  Anders de carne con alto contenido de grasa. Carne de ave con piel. Jb empanizadas o fritas. Carne procesada. Evite las grasas saturadas.  Lácteos  Yogur, queso o leche enteros.  Bebidas  Bebidas azucaradas, owen gaseosas o té helado.  Es posible que los productos que se enumeran más arriba no constituyan megan lista completa de los alimentos y las bebidas que debe evitar. Consulte a un nutricionista para obtener más información.  Preguntas para hacerle al médico  ¿Yanna consultar con un especialista certificado en atención y educación sobre la diabetes?  ¿Es necesario que me reúna con un nutricionista?  ¿A qué número puedo llamar si tengo preguntas?  ¿Cuáles son los mejores momentos para controlar la glucemia?  Dónde encontrar más información:  American Diabetes Association (Asociación Estadounidense de la Diabetes): diabetes.org  Academy of Nutrition and Dietetics (Academia de Nutrición y Dietética): eatright.org  National [place] of Diabetes and Digestive and Kidney Diseases (Instituto Nacional de la Diabetes y las Enfermedades Digestivas y Renales): niddk.nih.gov  Association of Diabetes Care & Education Specialists (Asociación de Especialistas en Atención y Educación sobre la Diabetes): diabeteseducator.org  Resumen  Es importante tener hábitos alimenticios saludables debido a que sarbjit niveles de azúcar en la nakul (glucosa) se turner afectados en gran medida por lo que come y yvonne. Es importante consumir alcohol con jil.  Un plan de comidas saludable lo ayudará a controlar la glucosa en nakul y a reducir el riesgo de  enfermedades cardíacas.  El médico puede recomendarle que trabaje con un nutricionista para elaborar el mejor plan para usted.  Esta información no tiene owen fin reemplazar el consejo del médico. Asegúrese de hacerle al médico cualquier pregunta que tenga.  Document Revised: 08/25/2021 Document Reviewed: 08/25/2021  Elsevier Patient Education © 2024 [employer]    Hipoglucemia  Hypoglycemia  La hipoglucemia se produce cuando el nivel de azúcar (glucosa) en la nakul es demasiado bajo. Las personas que tienen diabetes y las que no tienen la enfermedad pueden tener un nivel de azúcar en la nakul bajo. El nivel bajo de azúcar en la nakul puede ocurrir rápidamente y ser megan emergencia.  ¿Cuáles son las causas?  Esta afección ocurre con más frecuencia en las personas que tienen diabetes. Las causas pueden ser las siguientes:  Los medicamentos para la diabetes.  No comer lo suficiente o no comer con la frecuencia suficiente.  Aumentar la actividad física.  Beber alcohol con el estómago vacío.  Si no tiene diabetes, las causas de esta afección pueden deberse a lo siguiente:  Un tumor en el páncreas.  No comer lo suficiente o no comer carine períodos largos (ayunar).  Megan infección o enfermedad muy grave.  Problemas después de realizarse la cirugía para perder peso (bariátrica).  Insuficiencia renal o insuficiencia hepática.  Ciertos medicamentos.  ¿Qué incrementa el riesgo?  Es más probable que esta afección se manifieste en las personas que:  Tienen diabetes y james medicamentos para bajar el azúcar en la nakul.  Consumen alcohol en exceso.  Tienen megan enfermedad muy grave.  ¿Cuáles son los signos o síntomas?  Leves  Hambre.  Sudoración y piel húmeda.  Sentirse mareado o aturdido.  Tener somnolencia o dificultad para dormir.  Sensación de que va a vomitar (náuseas).  Latidos cardíacos acelerados.  Dolor de skip.  Visión borrosa.  Cambios de humor, por ejemplo:  Estar malhumorado.  Sentirse preocupado o nervioso  (ansioso).  Hormigueo o falta de sensibilidad (entumecimiento) alrededor de la boca, los labios o la lengua.  Moderado  Confusión y deterioro del sentido de la realidad.  Cambios en el comportamiento.  Debilidad.  Latidos cardíacos irregulares.  Dificultad para moverse (coordinación).  Muy bajo  Si el nivel de azúcar en la nakul es muy bajo (hipoglucemia grave), eso es megan emergencia médica. Puede ocasionar:  Desmayos.  Convulsiones.  Pérdida de la conciencia (estado de coma).  Muerte.  ¿Cómo se trata?  Tratamiento del nivel bajo de azúcar en la nakul  Generalmente, el tratamiento de un nivel bajo de azúcar en la nakul consiste en ingerir o beber de inmediato algo que contenga azúcar. El alimento o bebida debe contener 15 gramos de un hidrato de carbono (carbohidrato) de acción rápida. Entre las opciones se incluyen las siguientes:  4 onzas (120 ml) de jugo de frutas.  4 onzas (120 ml) de un refresco común (no dietético).  Algunos caramelos duros. Emilie las etiquetas de información nutricional a fin de lashaun cuántas unidades consumir para obtener 15 gramos.  1 cucharada (15 ml) de azúcar o miel.  4 tabletas de glucosa.  1 pomo de glucosa en gel.  Tratamiento del nivel bajo de azúcar en la nakul si tiene diabetes  Si puede pensar con claridad y tragar de manera barreto, siga la rose 15/15, que consiste en lo siguiente:  Consuma 15 gramos de un hidrato de carbono de acción rápida. Hable con landers médico acerca de cuánto debería consumir.  Lleve siempre consigo megan bahman de hidratos de carbono de acción rápida, por ejemplo:  Comprimidos de glucosa (tome 4 comprimidos).  Algunos caramelos duros. Emilie las etiquetas de información nutricional a fin de lashaun cuántas unidades consumir para obtener 15 gramos.  4 onzas (120 ml) de jugo de frutas.  4 onzas (120 ml) de un refresco común (no dietético).  1 cucharada (15 ml) de miel o azúcar.  1 pomo de glucosa en gel.  Contrólese el nivel de azúcar en la nakul 15 minutos después de  ingerir el hidrato de carbono.  Si el nivel de azúcar en la nakul todavía es igual o tree que 70 mg/dl (3.9 mmol/l), ingiera nuevamente 15 gramos de un hidrato de carbono.  Si el nivel de azúcar en la nakul no supera los 70 mg/dl (3.9 mmol/l) después de 3 intentos, solicite ayuda de inmediato.  Ingiera megan comida o megan colación en el transcurso de 1 hora después de que el nivel de azúcar en la nakul se haya normalizado.    Tratamiento del nivel muy bajo de azúcar en la nakul  Si landers nivel de azúcar en la nakul es tree que 54 mg/dl (3 mmol/l), significa que está muy bajo o tiene hipoglucemia grave. [place] es megan emergencia. Solicite atención médica de inmediato.  Si landers nivel de azúcar en la nakul es muy bajo y no puede ingerir ningún alimento ni bebida, eddy vez deban darle megan hormona llamada glucagón. Un familiar o un amigo deben aprender a controlarle el azúcar en la nakul y a darle el glucagón. Pregúntele al médico si debe tener un kit de glucagón de emergencia en landers casa.  Un nivel muy bajo de azúcar en la nakul también puede necesitar tratamiento en un hospital.  Siga estas instrucciones en landers casa:  Indicaciones generales  Use los medicamentos de venta jalen y los recetados solamente owen se lo haya indicado el médico.  Sepa cuál es landers nivel de azúcar en la nakul owen se lo haya indicado el médico.  Si yvonne alcohol:  Limite la cantidad que yvonne a lo siguiente:  De 0 a 1 medida por día para las mujeres que no están embarazadas.  De 0 a 2 medidas por día para los hombres.  Sepa cuánta cantidad de alcohol hay en las bebidas que kirsten. En los [place], megan medida equivale a megan botella de cerveza de 12 oz (355 ml), un vaso de vino de 5 oz (148 ml) o un vaso de megan bebida alcohólica de miranda graduación de 1½ oz (44 ml).  Asegúrese de ingerir alimentos cuando lewis alcohol.  Sepa que landers cuerpo absorbe el alcohol rápidamente. [place] puede provocar más tarde un nivel bajo de azúcar en la nakul. Asegúrese de  seguir controlando landers azúcar en la nakul.  Concurra a todas las visitas de seguimiento.  Si usted tiene diabetes:    Lleve siempre con usted un hidrato de carbono de acción rápida (15 gramos) para tratar el nivel bajo de azúcar en la nakul.  Siga el plan de atención de la diabetes owen se lo haya indicado el médico. Asegúrese de hacer lo siguiente:  Conozca los síntomas de nivel bajo de azúcar en la nakul.  Contrólese el nivel de azúcar en la nakul con la frecuencia que le hayan indicado. Contróleselo siempre antes y después de hacer ejercicio.  Controle siempre landers nivel de azúcar en la nakul antes de conducir.  Moodus los medicamentos según las indicaciones.  Siga landers plan de comidas.  Coma a horario. No omita comidas.  Comparta landers plan de atención de la diabetes con:  Sarbjit compañeros de trabajo o de la escuela.  Las personas con las que convive.  Lleve consigo megan tarjeta, o use un brazalete o megan medalla que indique que tiene diabetes.  Dónde buscar más información  American Diabetes Association (Asociación Estadounidense de la Diabetes): www.diabetes.org  Comuníquese con un médico si:  Tiene problemas para mantener el nivel de azúcar en la nakul dentro del rango indicado.  Tiene un nivel de azúcar en la nakul bajo con frecuencia.  Solicite ayuda de inmediato si:  Todavía tiene síntomas después de comer o beber algo que contenga 15 gramos de hidratos de carbono de acción rápida y no puede hacer que landers azúcar en la nakul supere los 70 mg/dl siguiendo la rose 15/15.  Landers nivel de azúcar en la nakul es inferior a 54 mg/dl (3 mmol/l).  Tiene megan convulsión.  Se desmaya.  Estos síntomas pueden indicar megan emergencia. Solicite ayuda de inmediato. Comuníquese con el servicio de emergencias de landers localidad (911 en los [place]).  No espere a lashaun si los síntomas desaparecen.  No conduzca por sarbjit propios medios hasta el hospital.  Resumen  La hipoglucemia sucede cuando el nivel de azúcar (glucosa) en la nakul  es demasiado bajo.  Las personas que tienen diabetes y las que no tienen la enfermedad pueden tener un nivel de azúcar en la nakul bajo. El nivel bajo de azúcar en la nakul puede ocurrir rápidamente y ser megan emergencia.  Asegúrese de conocer los síntomas de un nivel bajo de azúcar en la nakul y saber cómo tratarlo.  Lleve siempre consigo megan bahman de azúcar (hidratos de carbono de acción rápida) para tratar un nivel bajo de azúcar en la nakul.  Esta información no tiene owen fin reemplazar el consejo del médico. Asegúrese de hacerle al médico cualquier pregunta que tenga.  Document Revised: 12/09/2021 Document Reviewed: 12/09/2021  ElseParkAround.com Patient Education © 2024 [employer]      Diabetes mellitus y cuidado de la piel  Diabetes Mellitus and Skin Care  La diabetes, también llamada diabetes mellitus, puede causar problemas en la piel. Si el nivel de azúcar en la nakul (glucosa) no está catracho controlado, puede causar problemas con el tiempo. Estos problemas incluyen lo siguiente:  Daño a los nervios. [place] puede afectar landers capacidad para sentir las heridas. Eso significa que es posible que no note pequeñas lesiones en la piel que podrían provocar problemas mayores. También puede disminuir la cantidad de sudoración, lo que puede hacer que la piel se reseque.  Lesiones en los vasos sanguíneos. La falta de circulación sanguínea puede hacer que la piel se deteriore. También puede hacer más lento el tiempo de curación, lo que puede derivar en infecciones.  Zonas de la piel que se engrosan o cambian de color.  Enfermedades de la piel frecuentes  Hay ciertas afecciones de la piel que suelen afectar a las personas con diabetes. Estas incluyen las siguientes:  Piel seca.  Piel rex. La piel de los pies puede volverse más rex, lesionarse más fácilmente y cicatrizar más lento que lo normal.  Infecciones cutáneas por bacterias. Estas incluyen las siguientes:  Orzuelos. Son infecciones que se producen cerca del  párpado.  Forúnculos. Son protuberancias que están llenas de pus.  Folículos pilosos infectados.  Infecciones de la piel alrededor de las uñas.  Infecciones cutáneas por hongos. Son más comunes en zonas donde la piel roza con piel, owen en las axilas o debajo de las mamas.  Cambios frecuentes en la piel  La diabetes también puede causar cambios en la piel. Puede desarrollar lo siguiente:  Marcas oscuras y aterciopeladas en la piel. Pueden aparecer en la stanley, el moises, las axilas, la parte interna de los muslos y la leslee.  Tejido enrojecido y con relieve, parecido a megan cicatriz, que puede picar, doler o convertirse en megan herida.  Ampollas en los pies, los dedos de los pies, las geovanna o los dedos de las geovanna.  Zonas de la piel gruesas y de aspecto similar al de la cera. En la mayoría de los casos, se manifiestan en las geovanna, la frente o los dedos de los pies.  Marcas en la piel de color marrón o toledo, con forma anular o de medio anillo, en las orejas o los dedos de las geovanna.  Protuberancias de color amarillo con forma de guisante, que pueden causar picazón y tener un anillo toledo. Estas pueden afectar los brazos, los pies, las nalgas y la parte de arriba de las geovanna.  Manchas redondas de color marrón que no duelen ni pican. Pueden lucir owen manchas de envejecimiento.  Materiales necesarios:  Jabón o limpiador dérmico suave.  Loción.  Cómo cuidar la piel seca y con picazón  Los niveles de glucosa altos y frecuentes pueden causar picazón en la piel. La enid circulación sanguínea y las infecciones cutáneas pueden empeorar la piel seca. Si tiene la piel seca y con picazón:  Evite los bashir o las duchas muy calientes.  Use jabón y limpiadores dérmicos suaves. No use jabón perfumado, pedro o que lastime o reseque la piel. Los jabones humectantes pueden ser útiles.  Aplique loción hidratante tan pronto owen termine de bañarse.  No se rasque la piel seca. Rascarse puede exponer la piel a megan infección.  Si tiene megan  erupción o si le pica mucho la piel, comuníquese con el médico. La piel enrojecida o con erupción podría ser signo de megan reacción alérgica. Si tiene mucha picazón, puede significar que necesita ayuda para controlar mejor landers diabetes. También es posible que necesite tratamiento para megan infección.  Consejos generales  En landers mayoría, los problemas de la piel pueden prevenirse o tratarse fácilmente si se detectan de manera temprana. Hable con el médico si tiene alguna inquietud. Los consejos generales incluyen lo siguiente:  Examínese a diario la piel y los pies en busca de rincon, moretones, enrojecimiento, ampollas o llagas, especialmente en los pies. Si no puede lashaun la planta del pie, use un cristian o pídale ayuda a otra persona. Informe al médico si tiene alguna de estas lesiones y si se curan con lentitud.  Mantenga la piel limpia y seca. No use [place].  Huméctese la piel para evitar que se agriete.  Mantenga sarbjit niveles de glucemia dentro del rango objetivo.  Siga estas indicaciones en [place] casa:    Use los medicamentos de venta jalen y los recetados solamente owen lo haya indicado el médico. [place] incluye todos los medicamentos para la diabetes que esté tomando.  Programe megan salma para que el médico le controle los pies megan vez al año. Andres el examen, le revisarán la estructura y la piel de los pies para detectar problemas.  Asegúrese de que landers médico le anais un examen visual de los pies en cada visita.  Si tiene megan lesión en la piel, owen un bev, megan ampolla o megan llaga, contrólela diariamente para detectar signos de infección. Esté atento a los siguientes signos:  Enrojecimiento, hinchazón o dolor.  Líquido o nakul.  Calor.  Pus o mal olor.  No consuma ningún producto que contenga nicotina o tabaco. Estos productos incluyen cigarrillos, tabaco para mascar y aparatos de vapeo, owen los cigarrillos electrónicos. Si necesita ayuda para dejar de consumir estos productos, consulte al médico.  Dónde  obtener más información  American Diabetes Association (Asociación Estadounidense de la Diabetes): diabetes.org  Association of Diabetes Care & Education Specialists (Asociación de Especialistas en Atención y Educación sobre la Diabetes): diabeteseducator.org  Comuníquese con un médico si:  Tiene un bev o megan llaga, especialmente en los pies.  Tiene signos de infección después de sufrir megan lesión cutánea.  La piel le pica y está enrojecida o aparece megan erupción.  Tiene manchas en la piel.  Tiene cambios en algunos lugares de la piel. Pueden engrosarse o verse brillantes.  Esta información no tiene owen fin reemplazar el consejo del médico. Asegúrese de hacerle al médico cualquier pregunta que tenga.  Document Revised: 07/11/2023 Document Reviewed: 07/11/2023  Elsevier Patient Education © 2024 Elsevier Inc.    Patient or patient representative verbalized consent for the use of Ambient Listening during the visit with  JESSICA Cobos for chart documentation. 12/26/2024  08:04 EST

## 2024-12-04 NOTE — CASE MANAGEMENT/SOCIAL WORK
Case Management Discharge Note      Final Note: Home, family to transport, pt will seek outpt dialysis through local ER's or present to [facility] for treatment on T-TH-Sat    Provided Post Acute Provider List?: N/A  Provided Post Acute Provider Quality & Resource List?: N/A    Selected Continued Care - Discharged on 12/3/2024 Admission date: 11/13/2024 - Discharge disposition: Home or Self Care      Destination    No services have been selected for the patient.                Durable Medical Equipment    No services have been selected for the patient.                Dialysis/Infusion    No services have been selected for the patient.                Home Medical Care    No services have been selected for the patient.                Therapy    No services have been selected for the patient.                Community Resources    No services have been selected for the patient.                Community & DME    No services have been selected for the patient.                    Transportation Services  Private: Car    Final Discharge Disposition Code: 01 - home or self-care

## 2024-12-06 ENCOUNTER — TELEPHONE (OUTPATIENT)
Dept: ENDOCRINOLOGY | Age: 39
End: 2024-12-06

## 2024-12-06 NOTE — TELEPHONE ENCOUNTER
This patient requires an  for their appointment. Please see the  information below.     Caller: Rufus Dorsey   Relationship to patient: Self   Best call back number: [phone]    Service:IPAD/ANY  Is  needs updated in registration: YES  Date of Appointment:12/11/24  Time of Appointment:3:30  Additional notes: PATIENT WILL NEED     Spine appears normal, range of motion is not limited, no muscle or joint tenderness

## 2024-12-11 ENCOUNTER — OFFICE VISIT (OUTPATIENT)
Dept: ENDOCRINOLOGY | Age: 39
End: 2024-12-11

## 2024-12-11 VITALS
DIASTOLIC BLOOD PRESSURE: 70 MMHG | TEMPERATURE: 98.2 F | WEIGHT: 250.8 LBS | OXYGEN SATURATION: 98 % | SYSTOLIC BLOOD PRESSURE: 120 MMHG | HEART RATE: 78 BPM | BODY MASS INDEX: 41.74 KG/M2

## 2024-12-11 DIAGNOSIS — E11.65 TYPE 2 DIABETES MELLITUS WITH HYPERGLYCEMIA, WITH LONG-TERM CURRENT USE OF INSULIN: Primary | ICD-10-CM

## 2024-12-11 DIAGNOSIS — Z79.4 TYPE 2 DIABETES MELLITUS WITH HYPERGLYCEMIA, WITH LONG-TERM CURRENT USE OF INSULIN: Primary | ICD-10-CM

## 2024-12-11 NOTE — PROGRESS NOTES
Chief complaint   Chief Complaint   Patient presents with    Diabetes          Subjective     History of Present Illness:          This is a 39 year old male I am seeing for type 2 DM   referred by PCP   Used a  , came in very late   other medical history is   1- HTN   2- HLD   3- CAD   4- H/o CHF   Pt had T2DM for 3 years now   Current home medication for diabetes     Tresiba U 100 , 15 units daily AM   Novolog with meals 3 units TID     the A1c was 12.3 in   Checks blood sugar at home using finger sick   Checks blood sugar 1-4 times per year  SBMG: > 200 , not checking now   pt states that he is having high BG now , he in the hospital in  with SOB and was found to have EF of 30 % and had low GFR required transition to dialysis  and trying his best with dietary Compliance, in regards to Exercise, not on a daily basis and his Weight has been the same and there is No Hypoglycemic episodes, there is no AM Headaches /Nightmares, no Polyuria / Polydipsia, and there os no Blurring of Vision  Last Dilated Pupil Exam, , never  no DM in the eye   NoTingling / numbness in feet, No Dizziness / lightheadedness, No Falls  No Nausea, vomiting / Diarrhea   Latest Reference Range & Units 06/04/22 16:20 06/05/22 07:24 05/23/23 03:44 11/14/24 05:07   Hemoglobin A1C 4.80 - 5.60 %  7.70 (H) 11.10 (H) 12.30 (H)   TSH Baseline 0.270 - 4.200 uIU/mL 3.160      (H): Data is abnormally high    Family History   Problem Relation Age of Onset    Malig Hyperthermia Neg Hx      Social History     Socioeconomic History    Marital status: Single   Tobacco Use    Smoking status: Never     Passive exposure: Never    Smokeless tobacco: Never   Vaping Use    Vaping status: Never Used   Substance and Sexual Activity    Alcohol use: Never    Drug use: Never    Sexual activity: Defer     Past Medical History:   Diagnosis Date    CHF (congestive heart failure)     Coronary artery disease     Diabetes     Heart failure  05/16/2016    High cholesterol     Hypertension     Ischemic cardiomyopathy 06/25/2016     Past Surgical History:   Procedure Laterality Date    CARDIAC CATHETERIZATION  01/25/2017    Right heart cath    CARDIAC CATHETERIZATION N/A 12/4/2021    Procedure: SAPHENOUS VEIN GRAFT;  Surgeon: Les Reyes MD;  Location:  [facility] CATH INVASIVE LOCATION;  Service: Cardiovascular;  Laterality: N/A;    CARDIAC CATHETERIZATION N/A 12/4/2021    Procedure: Coronary angiography;  Surgeon: Les Reyes MD;  [facility];  Service: Cardiovascular;  Laterality: N/A;    CARDIAC CATHETERIZATION N/A 12/4/2021    Procedure: Stent ROSEY coronary;  Surgeon: Les Reyes MD;  [facility];  Service: Cardiovascular;  Laterality: N/A;    CARDIAC CATHETERIZATION N/A 12/4/2021    Procedure: Native mammary injection;  Surgeon: Les Reyes MD;  [facility];  Service: Cardiovascular;  Laterality: N/A;    CARDIAC CATHETERIZATION N/A 5/25/2023    Procedure: Right Heart Cath;  Surgeon: Vicente Trujillo MD;  Location: [facility] CATH INVASIVE LOCATION;  Service: Cardiology;  Laterality: N/A;    CARDIAC SURGERY      CORONARY ARTERY BYPASS GRAFT  05/26/2015    cabg x3 Dr. Key    INSERTION HEMODIALYSIS CATHETER N/A 11/18/2024    Procedure: HEMODIALYSIS CATHETER INSERTION;  Surgeon: Galilea Bearden Jr., MD;  Location: Replaced by [facility];  Service: Vascular;  Laterality: N/A;       Current Outpatient Medications:     Alcohol Swabs 70 % pads, Test daily before all meals/snacks and once before bedtime., Disp: 100 each, Rfl: 0    aspirin 81 MG EC tablet, Take 1 tablet by mouth Daily., Disp: 90 tablet, Rfl: 0    Blood Glucose Monitoring Suppl w/Device kit, Use 1 kit 3 (Three) Times a Day. Ok to substitute, Disp: 1 each, Rfl: 0    carvedilol (COREG) 25 MG tablet, Take 1 tablet by mouth 2 (Two) Times a Day With Meals., Disp: 180 tablet, Rfl: 0    glucose blood test  strip, Use as instructed, Disp: 100 each, Rfl: 12    hydrALAZINE (APRESOLINE) 10 MG tablet, Take 1 tablet by mouth Every 8 (Eight) Hours., Disp: 90 tablet, Rfl: 0    insulin aspart (NovoLOG FlexPen) 100 UNIT/ML solution pen-injector sc pen, Inject 3 Units under the skin into the appropriate area as directed 3 (Three) Times a Day With Meals., Disp: 15 mL, Rfl: 1    insulin degludec (Tresiba FlexTouch) 100 UNIT/ML solution pen-injector injection, Inject 15 Units under the skin into the appropriate area as directed Daily., Disp: 15 mL, Rfl: 1    Insulin Pen Needle 32G X 4 MM misc, Use to inject insulin under the skin up to 4 times daily via pens, Disp: 100 each, Rfl: 1    isosorbide dinitrate (ISORDIL) 20 MG tablet, Take 2 tablets by mouth 3 (Three) Times a Day., Disp: 180 tablet, Rfl: 0    Lancets (onetouch ultrasoft) lancets, Use as instructed, Disp: 100 each, Rfl: 12    sennosides-docusate (PERICOLACE) 8.6-50 MG per tablet, Take 1 tablet by mouth 2 (Two) Times a Day., Disp: 60 tablet, Rfl: 0    vitamin D (ERGOCALCIFEROL) 1.25 MG (43367 UT) capsule capsule, Take 1 capsule by mouth Every 7 (Seven) Days., Disp: 5 capsule, Rfl: 0  Patient has no known allergies.    Objective   Vitals:    12/11/24 1444   Pulse: 78   Temp: 98.2 °F (36.8 °C)   SpO2: 98%          Physical Exam  Neurological:      General: No focal deficit present.      Mental Status: He is alert and oriented to person, place, and time.             Diagnoses and all orders for this visit:    1. Type 2 diabetes mellitus with hyperglycemia, with long-term current use of insulin (Primary)         Assessment:     1- DM, Type 2  Uncontrolled with hyperglycemia and has CKD , CHF   High risk , saw DE in the hospital   labs on file   We discussed the medications  Hypoglycemia and its importance was reviewed   Labs where shown to the patient   2. Hypertension, on BB   3. Hyperlipidemia, needs to be on a statin   4. Obesity, We discussed the importance of weight loss  and the impact of losing 7 % Of the current body weight         Plan:      1. Diet, exercise and weight management  2. Consistent food intake to avoid low blood sugars  3. Home medication includes for diabetes     Stay on Tresiba U 100 , 15 units daily   Novolog with meals 3 units TID   Not able to make a change as he is not checking     4. Consistent checking of blood sugars using finger stick before meals   5. I reviewed the last progress note  6. Annual eye exam  7. Return in 1 months   8. Bring in meter at he next visit   9. Will arrange for assistance with [facility]  10. Labs : A1c every 90 days       I discussed with the patient/legal representative the risks and the benefits associated with the medications.  The patient/legal representative has been given the opportunity to ask questions.  Alternatives to the proposed treatment (s) were discussed, including the likely results of no treatment.  The patient/legal representative wishes to proceed.       Polly Choi MD   12/11/24  14:48 EST

## 2024-12-13 ENCOUNTER — READMISSION MANAGEMENT (OUTPATIENT)
Dept: CALL CENTER | Facility: HOSPITAL | Age: 39
End: 2024-12-13

## 2024-12-13 NOTE — OUTREACH NOTE
CHF Week 2 Survey      Flowsheet Row Responses   [facility] patient discharged from? [place]   Does the patient have one of the following disease processes/diagnoses(primary or secondary)? CHF   Week 2 attempt successful? No   Unsuccessful attempts Attempt 1   Discharge diagnosis Acute exacerbation of CHF (congestive heart failure), HEMODIALYSIS CATHETER INSERTION & Hemodialysis started            JOSE WILSON - Registered Nurse

## 2024-12-17 ENCOUNTER — READMISSION MANAGEMENT (OUTPATIENT)
Dept: CALL CENTER | Facility: HOSPITAL | Age: 39
End: 2024-12-17

## 2024-12-17 NOTE — OUTREACH NOTE
CHF Week 2 Survey      Flowsheet Row Responses   [facility] patient discharged from? [place]   Does the patient have one of the following disease processes/diagnoses(primary or secondary)? CHF   Week 2 attempt successful? No   Call start time 1130   Unsuccessful attempts Attempt 2  [Patient asked to call back later today, he is getting blood drawn at current time.  [id number] used for call. ID # [id number], Grant]   General alerts for this patient Mexican SPEAKING   Discharge diagnosis Acute exacerbation of CHF (congestive heart failure), HEMODIALYSIS CATHETER INSERTION & Hemodialysis started   If primary language is not English what is the name and relationship or agency of  used? [facility]  ID# [id number] [place]   Person spoke with today (if not patient) and relationship Patient            Carolina TIDWELL - Registered Nurse

## 2024-12-26 PROBLEM — N18.5 CKD (CHRONIC KIDNEY DISEASE) STAGE 5, GFR LESS THAN 15 ML/MIN: Chronic | Status: ACTIVE | Noted: 2024-12-26

## 2025-01-02 ENCOUNTER — READMISSION MANAGEMENT (OUTPATIENT)
Dept: CALL CENTER | Facility: HOSPITAL | Age: 40
End: 2025-01-02

## 2025-01-02 NOTE — OUTREACH NOTE
CHF Week 3 Survey      Flowsheet Row Responses   [facility] patient discharged from? [place]   Does the patient have one of the following disease processes/diagnoses(primary or secondary)? CHF   Week 3 attempt successful? Yes   Call start time 1304   Call end time 1311   Meds reviewed with patient/caregiver? Yes   Is the patient taking all medications as directed (includes completed medication regime)? Yes   Comments regarding appointments Pt to see pcp on 1-.   Does the patient have a primary care provider?  Yes   Has the patient kept scheduled appointments due by today? Yes   Has home health visited the patient within 72 hours of discharge? N/A   What is the patient's perception of their health status since discharge? Improving   Is the patient/caregiver able to teach back the hierarchy of who to call/visit for symptoms/problems? PCP, Specialist, Home health nurse, Urgent Care, ED, 911 Yes   CHF Week 3 call completed? Yes   Graduated Yes   Wrap up additional comments Pt reports feeling well per . Pt is particiapting in dialysis on T/TH/S. Pt to see pcp next week.   Call end time 1311            Kisha CARDENAS - Registered Nurse

## 2025-01-08 ENCOUNTER — TELEPHONE (OUTPATIENT)
Dept: ENDOCRINOLOGY | Age: 40
End: 2025-01-08

## 2025-01-16 RX ORDER — HYDRALAZINE HYDROCHLORIDE 10 MG/1
10 TABLET, FILM COATED ORAL EVERY 8 HOURS SCHEDULED
Qty: 90 TABLET | Refills: 1 | Status: SHIPPED | OUTPATIENT
Start: 2025-01-16

## 2025-01-16 RX ORDER — CARVEDILOL 25 MG/1
25 TABLET ORAL 2 TIMES DAILY WITH MEALS
Qty: 180 TABLET | Refills: 1 | Status: SHIPPED | OUTPATIENT
Start: 2025-01-16

## 2025-01-16 RX ORDER — ASPIRIN 81 MG/1
81 TABLET ORAL DAILY
Qty: 90 TABLET | Refills: 1 | Status: SHIPPED | OUTPATIENT
Start: 2025-01-16

## 2025-03-03 ENCOUNTER — TELEPHONE (OUTPATIENT)
Dept: ENDOCRINOLOGY | Age: 40
End: 2025-03-03

## 2025-03-03 NOTE — TELEPHONE ENCOUNTER
Called patient and asked for proof of income & household size. Patient understood and will see what he can get.

## 2025-04-03 DIAGNOSIS — E11.22 TYPE 2 DIABETES MELLITUS WITH STAGE 4 CHRONIC KIDNEY DISEASE, UNSPECIFIED WHETHER LONG TERM INSULIN USE: Chronic | ICD-10-CM

## 2025-04-03 DIAGNOSIS — E11.59 TYPE 2 DIABETES MELLITUS WITH OTHER CIRCULATORY COMPLICATION, WITH LONG-TERM CURRENT USE OF INSULIN: ICD-10-CM

## 2025-04-03 DIAGNOSIS — Z79.4 TYPE 2 DIABETES MELLITUS WITH OTHER CIRCULATORY COMPLICATION, WITH LONG-TERM CURRENT USE OF INSULIN: ICD-10-CM

## 2025-04-03 DIAGNOSIS — N18.4 TYPE 2 DIABETES MELLITUS WITH STAGE 4 CHRONIC KIDNEY DISEASE, UNSPECIFIED WHETHER LONG TERM INSULIN USE: Chronic | ICD-10-CM

## 2025-04-03 RX ORDER — LANCETS
EACH MISCELLANEOUS
Qty: 100 EACH | Refills: 12 | OUTPATIENT
Start: 2025-04-03

## 2025-04-21 ENCOUNTER — OFFICE VISIT (OUTPATIENT)
Dept: ENDOCRINOLOGY | Age: 40
End: 2025-04-21
Payer: MEDICAID

## 2025-04-21 VITALS
BODY MASS INDEX: 44.5 KG/M2 | HEART RATE: 54 BPM | DIASTOLIC BLOOD PRESSURE: 78 MMHG | TEMPERATURE: 97.7 F | WEIGHT: 267.4 LBS | OXYGEN SATURATION: 96 % | SYSTOLIC BLOOD PRESSURE: 124 MMHG

## 2025-04-21 DIAGNOSIS — Z79.4 TYPE 2 DIABETES MELLITUS WITH HYPERGLYCEMIA, WITH LONG-TERM CURRENT USE OF INSULIN: Primary | ICD-10-CM

## 2025-04-21 DIAGNOSIS — E11.65 TYPE 2 DIABETES MELLITUS WITH HYPERGLYCEMIA, WITH LONG-TERM CURRENT USE OF INSULIN: Primary | ICD-10-CM

## 2025-04-21 NOTE — PROGRESS NOTES
Chief complaint   Chief Complaint   Patient presents with    Type 2 diabetes mellitus with hyperglycemia, with long-term          Subjective     History of Present Illness:          This is a 39 year old male I am seeing for type 2 DM   referred by PCP   Used a  ,   Last OV was 4 months ago   He is here for a follow up   other medical history is   1- HTN   2- HLD   3- CAD and ESRD TTS, 4 hrs   4- H/o CHF   Pt had T2DM for 3 years now   Current home medication for diabetes     Tresiba U 100 , 15 units daily AM   Novolog with meals 3 units TID     the A1c was 12.3 in   Checks blood sugar at home using finger sick   Checks blood sugar 1-4 times per week  SBM  pt states that he is having better BG now , was found to have EF of 30 % and had low GFR required transition to dialysis three times a week and trying his best with dietary Compliance, in regards to Exercise, not on a daily basis and his Weight has been the same and there is No Hypoglycemic episodes, there is no AM Headaches /Nightmares, no Polyuria / Polydipsia, and there os no Blurring of Vision  Last Dilated Pupil Exam, , never  no DM in the eye   NoTingling / numbness in feet, No Dizziness / lightheadedness, No Falls  No Nausea, vomiting / Diarrhea   Latest Reference Range & Units 22 16:20 22 07:24 23 03:44 24 05:07   Hemoglobin A1C 4.80 - 5.60 %  7.70 (H) 11.10 (H) 12.30 (H)   TSH Baseline 0.270 - 4.200 uIU/mL 3.160      (H): Data is abnormally high    Family History   Problem Relation Age of Onset    Malig Hyperthermia Neg Hx      Social History     Socioeconomic History    Marital status: Single   Tobacco Use    Smoking status: Never     Passive exposure: Never    Smokeless tobacco: Never   Vaping Use    Vaping status: Never Used   Substance and Sexual Activity    Alcohol use: Never    Drug use: Never    Sexual activity: Defer     Past Medical History:   Diagnosis Date    CHF (congestive heart  failure)     Coronary artery disease     Diabetes     Heart failure 05/16/2016    High cholesterol     Hypertension     Ischemic cardiomyopathy 06/25/2016     Past Surgical History:   Procedure Laterality Date    CARDIAC CATHETERIZATION  01/25/2017    Right heart cath    CARDIAC CATHETERIZATION N/A 12/4/2021    Procedure: SAPHENOUS VEIN GRAFT;  Surgeon: Les Reyes MD;  Location:  [facility];  Service: Cardiovascular;  Laterality: N/A;    CARDIAC CATHETERIZATION N/A 12/4/2021    Procedure: Coronary angiography;  Surgeon: Les Reyes MD;  Location:  [facility];  Service: Cardiovascular;  Laterality: N/A;    CARDIAC CATHETERIZATION N/A 12/4/2021    Procedure: Stent ROSEY coronary;  Surgeon: Les Reyes MD;  Location:  [facility];  Service: Cardiovascular;  Laterality: N/A;    CARDIAC CATHETERIZATION N/A 12/4/2021    Procedure: Native mammary injection;  Surgeon: Les Reyes MD;  Location:  [facility];  Service: Cardiovascular;  Laterality: N/A;    CARDIAC CATHETERIZATION N/A 5/25/2023    Procedure: Right Heart Cath;  Surgeon: Vicente Trujillo MD;  Location:  [facility];  Service: Cardiology;  Laterality: N/A;    CARDIAC SURGERY      CORONARY ARTERY BYPASS GRAFT  05/26/2015    cabg x3 Dr. Key    INSERTION HEMODIALYSIS CATHETER N/A 11/18/2024    Procedure: HEMODIALYSIS CATHETER INSERTION;  Surgeon: Galilea Bearden Jr., MD;  Location: [facility] OR;  Service: Vascular;  Laterality: N/A;       Current Outpatient Medications:     Alcohol Swabs 70 % pads, Test daily before all meals/snacks and once before bedtime., Disp: 100 each, Rfl: 0    aspirin 81 MG EC tablet, Take 1 tablet by mouth Daily., Disp: 90 tablet, Rfl: 1    Blood Glucose Monitoring Suppl w/Device kit, Use 1 kit 3 (Three) Times a Day. Ok to substitute, Disp: 1 each, Rfl: 0    carvedilol (COREG) 25 MG tablet, Take 1 tablet by mouth 2 (Two) Times  a Day With Meals., Disp: 180 tablet, Rfl: 1    glucose blood test strip, Use as instructed, Disp: 100 each, Rfl: 12    hydrALAZINE (APRESOLINE) 10 MG tablet, Take 1 tablet by mouth Every 8 (Eight) Hours., Disp: 90 tablet, Rfl: 1    insulin aspart (NovoLOG FlexPen) 100 UNIT/ML solution pen-injector sc pen, Inject 3 Units under the skin into the appropriate area as directed 3 (Three) Times a Day With Meals., Disp: 15 mL, Rfl: 1    insulin degludec (Tresiba FlexTouch) 100 UNIT/ML solution pen-injector injection, Inject 15 Units under the skin into the appropriate area as directed Daily., Disp: 15 mL, Rfl: 1    Insulin Pen Needle 32G X 4 MM misc, Use to inject insulin under the skin up to 4 times daily via pens, Disp: 100 each, Rfl: 1    isosorbide dinitrate (ISORDIL) 20 MG tablet, Take 2 tablets by mouth 3 (Three) Times a Day., Disp: 180 tablet, Rfl: 0    Lancets (onetouch ultrasoft) lancets, Use as instructed, Disp: 100 each, Rfl: 12    sennosides-docusate (PERICOLACE) 8.6-50 MG per tablet, Take 1 tablet by mouth 2 (Two) Times a Day., Disp: 60 tablet, Rfl: 0    vitamin D (ERGOCALCIFEROL) 1.25 MG (01260 UT) capsule capsule, Take 1 capsule by mouth Every 7 (Seven) Days., Disp: 5 capsule, Rfl: 0  Patient has no known allergies.    Objective   Vitals:    04/21/25 1457   BP: 124/78   Pulse: 54   Temp: 97.7 °F (36.5 °C)   SpO2: 96%            Physical Exam  Neurological:      General: No focal deficit present.      Mental Status: He is alert and oriented to person, place, and time.               Diagnoses and all orders for this visit:    1. Type 2 diabetes mellitus with hyperglycemia, with long-term current use of insulin (Primary)           Assessment:     1- DM, Type 2  Uncontrolled with hyperglycemia and has ESRD , CHF   High risk , saw DE in the hospital   labs on file   We discussed the medications  Hypoglycemia and its importance was reviewed   Labs where shown to the patient   2. Hypertension, on BB   3.  Hyperlipidemia, needs to be on a statin   4. Obesity, We discussed the importance of weight loss and the impact of losing 7 % Of the current body weight         Plan:      1. Diet, exercise and weight management  2. Consistent food intake to avoid low blood sugars  3. Home medication includes for diabetes     Stay on Tresiba U 100 , 15 units daily   Novolog with meals 3 units TID   Not able to make a change as he is not checking all the time     4. Consistent checking of blood sugars using finger stick before meals   5. I reviewed the last progress note  6. Annual eye exam  7. Return in 3 months   8. Bring in meter at he next visit   9. Will arrange for assistance with [employer], he has no income   10. Labs : A1c every 90 days       I discussed with the patient/legal representative the risks and the benefits associated with the medications.  The patient/legal representative has been given the opportunity to ask questions.  Alternatives to the proposed treatment (s) were discussed, including the likely results of no treatment.  The patient/legal representative wishes to proceed.       Polly Choi MD   04/21/25  15:06 EDT

## 2025-05-06 ENCOUNTER — TELEPHONE (OUTPATIENT)
Dept: ENDOCRINOLOGY | Age: 40
End: 2025-05-06
Payer: MEDICAID

## 2025-05-08 ENCOUNTER — TELEPHONE (OUTPATIENT)
Dept: ENDOCRINOLOGY | Age: 40
End: 2025-05-08
Payer: MEDICAID

## 2025-05-08 NOTE — TELEPHONE ENCOUNTER
Called 2X to let patient know his medication is in from [employer]. His mailbox has not been set up yet
